# Patient Record
Sex: MALE | Race: WHITE | NOT HISPANIC OR LATINO | Employment: OTHER | URBAN - METROPOLITAN AREA
[De-identification: names, ages, dates, MRNs, and addresses within clinical notes are randomized per-mention and may not be internally consistent; named-entity substitution may affect disease eponyms.]

---

## 2017-01-04 ENCOUNTER — PATIENT MESSAGE (OUTPATIENT)
Dept: ENDOCRINOLOGY | Facility: MEDICAL CENTER | Age: 72
End: 2017-01-04

## 2017-01-04 DIAGNOSIS — E11.9 TYPE 2 DIABETES MELLITUS WITHOUT COMPLICATION, UNSPECIFIED LONG TERM INSULIN USE STATUS: ICD-10-CM

## 2017-01-20 ENCOUNTER — OFFICE VISIT (OUTPATIENT)
Dept: MEDICAL GROUP | Facility: PHYSICIAN GROUP | Age: 72
End: 2017-01-20
Payer: MEDICARE

## 2017-01-20 VITALS
DIASTOLIC BLOOD PRESSURE: 56 MMHG | BODY MASS INDEX: 37.77 KG/M2 | SYSTOLIC BLOOD PRESSURE: 112 MMHG | RESPIRATION RATE: 12 BRPM | HEART RATE: 76 BPM | WEIGHT: 249.2 LBS | HEIGHT: 68 IN | TEMPERATURE: 99.2 F | OXYGEN SATURATION: 94 %

## 2017-01-20 DIAGNOSIS — I10 ESSENTIAL HYPERTENSION, BENIGN: ICD-10-CM

## 2017-01-20 DIAGNOSIS — M72.0 DUPUYTREN'S CONTRACTURE SYNDROME: ICD-10-CM

## 2017-01-20 PROCEDURE — 99214 OFFICE O/P EST MOD 30 MIN: CPT | Performed by: INTERNAL MEDICINE

## 2017-01-20 ASSESSMENT — PATIENT HEALTH QUESTIONNAIRE - PHQ9: CLINICAL INTERPRETATION OF PHQ2 SCORE: 0

## 2017-01-20 NOTE — MR AVS SNAPSHOT
"        Dakota Boyer   2017 4:45 PM   Office Visit   MRN: [id number]    Department:  [facility]   Dept Phone:  [phone]    Description:  Male : 1945   Provider:  Randy Brar D.O.           Reason for Visit     Follow-Up BP Medicien, new left hand issue      Allergies as of 2017     Allergen Noted Reactions    Aleve Cold & [Pseudoephedrine-Naproxen Na] 2013   Anaphylaxis    Ceftriaxone Sodium 2013   Anaphylaxis    Reaction; 1970's.    Naproxen 2013   Anaphylaxis    Reaction; 2004.    Tape 10/18/2016   Rash, Itching    Plastic tape (paper tape ok)      You were diagnosed with     Essential hypertension, benign   [401.1.ICD-9-CM]       Dupuytren's contracture syndrome   [735252]         Vital Signs     Blood Pressure Pulse Temperature Respirations Height Weight    112/56 mmHg 76 37.3 °C (99.2 °F) 12 1.727 m (5' 8\") 113.036 kg (249 lb 3.2 oz)    Body Mass Index Oxygen Saturation Smoking Status             37.90 kg/m2 94% Former Smoker         Basic Information     Date Of Birth Sex Race Ethnicity Preferred Language    1945 Male White Non- English      Your appointments     2017  8:40 AM   Diabetes Care Visit with Isabela Singletary M.D., ENDOCRINOLOGY DIABETES RN   [facility]    [address] [facility] [address], [address]  [place] [address] [phone]           You will be receiving a confirmation call a few days before your appointment from our automated call confirmation system.            2017 10:15 AM   PACER CHECK ONLY with PACER CHECK-[facility]   [facility] (--)    [address], [address]  [place] [address] [phone]            2017 10:45 AM   FOLLOW UP with Jac Christiansen M.D.   [facility] (--)    [address], [address]  [place] [address] [phone]              Problem List             " ICD-10-CM Priority Class Noted - Resolved    Type 2 diabetes mellitus without complication (CMS-[facility]) E11.9 Medium  4/10/2012 - Present    Back pain, chronic M54.9, G89.29 Low  4/10/2012 - Present    Essential hypertension, benign I10 High  4/10/2012 - Present    Neck pain M54.2 Low  4/10/2012 - Present    Insomnia G47.00 Low  4/10/2012 - Present    Dyslipidemia E78.5 High  9/28/2012 - Present    RBBB I45.10 High  9/28/2012 - Present    Hypersensitive carotid sinus syndrome G90.01 High  10/2/2012 - Present    Sick sinus syndrome (CMS-HCC) I49.5 High  10/4/2012 - Present    Pacemaker Z95.0 High  10/4/2012 - Present    Degeneration of lumbar or lumbosacral intervertebral disc M51.37 Low  11/20/2012 - Present    S/P lumbar discectomy Z98.890 Low  11/21/2012 - Present    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 Low  2/27/2013 - Present    Coronary artery disease due to calcified coronary lesion: 40-50% disease in the circumflex at cardiac catheterization in 2013 I25.10, I25.84 High  8/16/2013 - Present    Fatigue R53.83 Medium  9/10/2013 - Present    Shortness of breath R06.02 High  9/10/2013 - Present    Other dyspnea and respiratory abnormality R06.09, R09.89   10/17/2013 - Present    History of diverticulitis Z87.19 High  11/13/2013 - Present    Hypothyroidism E03.9   8/21/2014 - Present    Obesity E66.9   8/21/2014 - Present    Depression with anxiety F41.8   3/28/2016 - Present    Obstructive sleep apnea on CPAP G47.33   3/28/2016 - Present    Non-proliferative diabetic retinopathy, mild, both eyes ([facility]) E11.3293   6/2/2016 - Present    Hypertensive retinopathy of both eyes H35.033   6/2/2016 - Present    Cervical spondylosis M47.812   10/19/2016 - Present      Health Maintenance        Date Due Completion Dates    IMM DTaP/Tdap/Td Vaccine (1 - Tdap) 3/29/2008 3/28/2008    DIABETES MONOFILAMENT / LE EXAM 8/21/2015 8/21/2014, 10/11/2013 (Done)    Override on 10/11/2013: Done (seen by podiatry q 3  months)    A1C SCREENING 9/30/2016 3/30/2016, 9/29/2015, 5/19/2015, 3/3/2015, 12/2/2014, 7/22/2014, 3/5/2014, 2/10/2014, 12/5/2012, 5/1/2012    RETINAL SCREENING 1/6/2017 1/6/2016, 1/6/2016 (Done), 1/7/2015, 1/7/2015, 1/9/2014    Override on 1/6/2016: Done    URINE ACR / MICROALBUMIN 3/30/2017 3/30/2016, 7/22/2014, 3/5/2014, 2/10/2014, 5/1/2012    FASTING LIPID PROFILE 6/13/2017 6/13/2016, 12/3/2015, 5/19/2015, 12/2/2014, 7/22/2014, 8/9/2013, 12/5/2012, 10/1/2012, 5/1/2012    SERUM CREATININE 6/13/2017 6/13/2016, 3/30/2016, 12/3/2015, 9/29/2015, 5/19/2015, 5/19/2015, 12/2/2014, 11/14/2013, 11/13/2013, 10/15/2013, 8/9/2013, 4/10/2013, 3/1/2013, 2/28/2013, 2/19/2013, 12/5/2012, 11/12/2012, 10/1/2012, 9/24/2012    COLONOSCOPY 12/18/2018 12/18/2013            Current Immunizations     13-VALENT PCV PREVNAR 3/28/2016    Influenza TIV (IM) 11/13/2013  3:04 PM, 10/4/2012  4:58 PM    Influenza Vaccine Adult HD 10/28/2016    Pneumococcal polysaccharide vaccine (PPSV-23) 10/4/2012  5:00 PM    SHINGLES VACCINE 3/28/2014    TD Vaccine 3/28/2008      Below and/or attached are the medications your provider expects you to take. Review all of your home medications and newly ordered medications with your provider and/or pharmacist. Follow medication instructions as directed by your provider and/or pharmacist. Please keep your medication list with you and share with your provider. Update the information when medications are discontinued, doses are changed, or new medications (including over-the-counter products) are added; and carry medication information at all times in the event of emergency situations     Allergies:  ALEVE COLD & - Anaphylaxis     CEFTRIAXONE SODIUM - Anaphylaxis     NAPROXEN - Anaphylaxis     TAPE - Rash,Itching               Medications  Valid as of: January 20, 2017 -  5:28 PM    Generic Name Brand Name Tablet Size Instructions for use    Albiglutide (Pen-injector) Albiglutide 30 MG Inject 1 PEN as instructed  every 7 days.        Aspirin (Tablet Delayed Response) ECOTRIN 81 MG Take 81 mg by mouth every day.        Atorvastatin Calcium (Tab) LIPITOR 20 MG Take 1 Tab by mouth every day.        Blood Glucose Monitoring Suppl (Device) Blood Glucose Monitoring Suppl  Meter: Dispense Abbott Freestyle Lite meter. Sig. Use as directed for blood sugar monitoring. #1. NR.        Blood Glucose Monitoring Suppl (Misc) Blood Glucose Monitoring Suppl SUPPLIES Test strips order: Test strips for Abbott Freestyle Lite meter. Sig: use 4 times daily and prn ssx high or low sugar #100 RF x 0        BuPROPion HCl (TABLET SR 24 HR) WELLBUTRIN  MG TAKE 1 TABLET EVERY MORNING        Glucose Blood (Strip) glucose blood  1 Each by Other route as needed.        Insulin Glargine (Solution Pen-injector) LANTUS 100 UNIT/ML INJECT 30 UNITS twice a day        Insulin Pen Needle (Misc) B-D ULTRAFINE III SHORT PEN 31G X 8 MM USE 1 NEEDLE EVERY DAY WITH LANTUS        Insulin Pen Needle (Misc) B-D ULTRAFINE III SHORT PEN 31G X 8 MM USE 1 PEN NEEDLE TWICE A DAY        Lancets (Misc) Lancets  Lancets order: Lancets for Abbott Freestyle Lite meter. Sig: use 4 times daily  and prn ssx high or low sugar. #100 RF x 0        Lancets (Misc) FREESTYLE LANCETS  by Does not apply route.        Levothyroxine Sodium (Tab) SYNTHROID 75 MCG TAKE 1 TABLET EVERY DAY        MetFORMIN HCl (Tab) GLUCOPHAGE 500 MG Take 2 Tabs by mouth 2 times a day, with meals.        Metoprolol Tartrate (Tab) LOPRESSOR 25 MG Take 1 Tab by mouth 2 times a day.        Valsartan (Tab) DIOVAN 80 MG TAKE 1 TABLET DAILY        .                 Medicines prescribed today were sent to:     [facility] - [facility] [address]    [address] [address]    Phone: [phone] Fax: [phone]    Open 24 Hours?: No    [facility] [address] [place] [address] - [address] [address]    Phone: [phone] Fax:  [phone]    Open 24 Hours?: No      Medication refill instructions:       If your prescription bottle indicates you have medication refills left, it is not necessary to call your provider’s office. Please contact your pharmacy and they will refill your medication.    If your prescription bottle indicates you do not have any refills left, you may request refills at any time through one of the following ways: The online [employer] system (except Urgent Care), by calling your provider’s office, or by asking your pharmacy to contact your provider’s office with a refill request. Medication refills are processed only during regular business hours and may not be available until the next business day. Your provider may request additional information or to have a follow-up visit with you prior to refilling your medication.   *Please Note: Medication refills are assigned a new Rx number when refilled electronically. Your pharmacy may indicate that no refills were authorized even though a new prescription for the same medication is available at the pharmacy. Please request the medicine by name with the pharmacy before contacting your provider for a refill.        Referral     A referral request has been sent to our patient care coordination department. Please allow 3-5 business days for us to process this request and contact you either by phone or mail. If you do not hear from us by the 5th business day, please call us at [phone].        Instructions    Dupuytren's Contracture  Dupuytren's contracture affects the fingers and the palm of the hand. This condition usually develops slowly. It may take many years to develop. The pinky finger and the ring finger are most often affected. These fingers start to curve inward, like a claw. At some point, the fingers cannot go straight anymore. This can make it hard to do things like:  · Put on gloves.  · Shake hands.  · Grab something off a shelf.  The condition usually does not  cause pain and is not dangerous.  The condition gets its name from the doctor who came up with an operation to fix the problem. His name was Baron Daniel Leung. Contracture means pulling inward.  CAUSES   Dupuytren's contracture does not start with the fingers. It starts in the palm of the hand, under the skin. The tissue under the skin is called fascia. The fascia covers the cords (tendons) that control how the fingers move. In Dupuytren's contracture the fascia tissue becomes thick and then pulls on the cords. That causes the fingers to curl. The condition can affect both hands and any fingers, but it usually strikes one hand worse than the other. The fingers farthest from the thumb are most often the ones that curl. The cause is not clear. Some experts believe it results from an autoimmune reaction. That means the body's immune system (which fights off disease) attacks itself by mistake. What experts do know is that certain conditions and behaviors (called risk factors) make the chance of having this condition more likely. They include:  · Age. Most people who have the condition are older than 50.  · Sex. It affects men more often than women.  · Family history. The condition tends to run in families from countries in [place] and [place].  · Certain behaviors. People who smoke and drink alcohol are more apt to develop the problem.  · Some other medical conditions. Having diabetes makes Dupuytren's contracture more likely. So does having a condition that involves a seizure (when the brain's function is interrupted).  SYMPTOMS   Signs of this condition take time to develop. Sometimes this takes weeks or months. More often, it takes several years.   · Early symptoms:  ¨ Skin on the palm of the hand becomes thick. This is usually the first sign.  ¨ The skin may look dimpled or puckered.  ¨ Lumps (nodules) show up on the palm. There may be one or more lumps. They are not painful.  · Later  symptoms:  ¨ Thick cords of tissue form in the palm of the hand.  ¨ The pinky and ring fingers start to curl up into the palm.  ¨ The fingers cannot be straightened into their normal position.  DIAGNOSIS   A physical examination is the main way that a healthcare provider can tell if you have Dupuytren's contracture. Other tests usually are not needed. The caregiver will probably:  · Look at your hands. Feel your hands. This is to check for thickening and nodules.  · Measure finger motion. This tells how much your fingers have contracted (pulled in).  · Do a tabletop test. You will be asked to try to put your hand flat on a table, palm down.  TREATMENT   There is no cure for Dupuytren's contracture. But there are ways to treat the symptoms. Options include:  · Watching and waiting. The condition develops slowly. Often it does not create problems for a long time. Sometimes the skin gets thick and nodules form, but the fingers never curl. So, in some cases it is best to just watch the condition carefully and wait to see what happens.  · Shots (injections). Different substances may be injected, including:  ¨ Steroids. These drugs block swelling. These shots should make the condition less uncomfortable. Steroids may also slow down the condition. Shots are given into the nodules. The effect only lasts awhile. More shots may have to be given.  ¨ Enzymes. These are proteins. They weaken the thick tissue. After an injection, the caregiver usually stretches the fingers.  · Needling. A needle is pushed through the skin and into the thick tissue. This is done in several spots. The goal is to break up the thickened tissue. Or to weaken it.  · Surgery. This may be suggested if you cannot grasp objects. Or, if you can no longer put your hand in your pocket.  ¨ A cut (incision) is made in the palm of the hand. The thick tissue is removed.  ¨ Sometimes the thick tissue is attached to the skin. Then, the skin must be removed, too. It  is replaced with a piece of skin from another place on your body. That is called a skin graft.  ¨ Occupational or hand therapy is almost always needed after surgery. This involves special exercises to get back the use of your hand and fingers. After a skin graft, several months of therapy may be needed.  ¨ Sometimes the condition comes back, even after surgery.  · Other methods. You can do some things on your own. They include:  ¨ Stretching the fingers backwards. Do this often.  ¨ Warming the hand and massaging it. Again, do this often.  ¨ Using tools with padded . This should make things easier.  ¨ Wearing heavy gloves while working. This protects the hands.  PROGNOSIS   Dupuytren's contracture usually develops slowly. There is no cure. But, the symptoms can be treated. Sometimes they come back after treatment, but not always. It is important to remember that this is a functional problem and not a life-threatening condition.     This information is not intended to replace advice given to you by your health care provider. Make sure you discuss any questions you have with your health care provider.     Document Released: 10/15/2010 Document Revised: 01/08/2016 Document Reviewed: 10/15/2010  [employer] Interactive Patient Education ©2016 [employer]vier Inc.            [employer] Access Code: Activation code not generated  Current [employer] Status: Active

## 2017-01-21 NOTE — PROGRESS NOTES
Subjective:   Dakota Boyer is a 71 y.o. male here today for multiple problems as listed below.     Patient complains of a hard lump he noticed on his hand that occurred about 6 months ago. It started out smaller in as gotten slightly larger. No history of injury or traumatic event. The patient has in the middle of the hand on the palmar surface between the second and third finger tendons. It is painful when there is pressure over the lump but otherwise not causing any discomfort. Does have some generalized chronic paresthesias in the hand which are unchanged. Has not affected his  strength or ability to grasp objects. Reports normal range of motion. Patient is right handed. Reports his brother had similar lumps on both of this hands. He has not had this problem before. Has not tried any thing for the pain when it occurs but it really doesn't bother him unless he is sleeping on his stomach in its one hand on top of the other which causes increased pressure in the lump.    Essential hypertension, benign  Chronic condition. Managed on metoprolol 25 mg twice a day and Diovan 80 mg daily. Compliant with medications. Denies side effects. Taking medications as directed daily and is taking a baby aspirin. Refills were done for cardiologist recently.       Lab Results   Component Value Date/Time    SODIUM 137 06/13/2016 06:34 AM    POTASSIUM 4.5 06/13/2016 06:34 AM    CHLORIDE 103 06/13/2016 06:34 AM    CO2 26 06/13/2016 06:34 AM    GLUCOSE 137* 06/13/2016 06:34 AM    BUN 14 06/13/2016 06:34 AM    CREATININE 0.88 06/13/2016 06:34 AM    ALKALINE PHOSPHATASE 69 06/13/2016 06:34 AM    AST(SGOT) 19 06/13/2016 06:34 AM    ALT(SGPT) 20 06/13/2016 06:34 AM    TOTAL BILIRUBIN 1.2 06/13/2016 06:34 AM     Current medicines (including changes today)  Current Outpatient Prescriptions   Medication Sig Dispense Refill   • insulin glargine (LANTUS SOLOSTAR) 100 UNIT/ML Solution Pen-injector injection INJECT 30 UNITS twice a  day 15 PEN 2   • atorvastatin (LIPITOR) 20 MG Tab Take 1 Tab by mouth every day. 90 Tab 3   • buPROPion (WELLBUTRIN XL) 150 MG XL tablet TAKE 1 TABLET EVERY MORNING 90 Tab 0   • valsartan (DIOVAN) 80 MG Tab TAKE 1 TABLET DAILY 90 Tab 1   • B-D ULTRAFINE III SHORT PEN 31G X 8 MM Misc USE 1 PEN NEEDLE TWICE A  Each 5   • Blood Glucose Monitoring Suppl SUPPLIES Misc Test strips order: Test strips for Abbott Freestyle Lite meter. Sig: use 4 times daily and prn ssx high or low sugar #100 RF x 0 360 Each 4   • metformin (GLUCOPHAGE) 500 MG Tab Take 2 Tabs by mouth 2 times a day, with meals. 360 Tab 3   • Albiglutide (TANZEUM) 30 MG Pen-injector Inject 1 PEN as instructed every 7 days. 12 Each 3   • FREESTYLE LANCETS Misc by Does not apply route.     • glucose blood strip 1 Each by Other route as needed.     • metoprolol (LOPRESSOR) 25 MG Tab Take 1 Tab by mouth 2 times a day. 180 Tab 3   • levothyroxine (SYNTHROID) 75 MCG TABS TAKE 1 TABLET EVERY DAY 90 Tab 4   • Lancets MISC Lancets order: Lancets for Abbott Freestyle Lite meter. Sig: use 4 times daily  and prn ssx high or low sugar. #100 RF x 0 360 Each 4   • Blood Glucose Monitoring Suppl [place] Meter: Dispense Abbott Freestyle Lite meter. Sig. Use as directed for blood sugar monitoring. #1. NR. 1 Device 0   • B-D ULTRAFINE III SHORT PEN 31G X 8 MM MISC USE 1 NEEDLE EVERY DAY WITH LANTUS 999 Each 2   • aspirin EC (ECOTRIN) 81 MG TBEC Take 81 mg by mouth every day.       No current facility-administered medications for this visit.     He  has a past medical history of Anxiety; Hypertension; Hyperlipidemia; RBBB ( ); Hypersensitive carotid sinus syndrome ( ); Syncope (October 2012); Diabetes; Depression; Snoring; CAD (coronary artery disease) (October 2012); Sick sinus syndrome (CMS-HCC) ( ); Pacemaker (October 2012); Episodic lightheadedness ( ); Incisional infection (April 2013); Insomnia ( ); Degeneration of lumbar or lumbosacral intervertebral disc ( ); S/P  "lumbar discectomy ( ); DJD (degenerative joint disease) of cervical spine ( ); Dental disorder; Breath shortness; Unspecified hemorrhagic conditions (CMS-HCC); Blood clotting disorder (CMS-HCC) (1978); Cancer (CMS-HCC) (? early 90's); High cholesterol; Myocardial infarct (CMS-HCC) (?); Sleep apnea; Thyroid disease; Back pain, chronic (10/18/2016); and Neck pain (10/18/2016).    ROS   As per history of present illness.     Objective:     Blood pressure 112/56, pulse 76, temperature 37.3 °C (99.2 °F), resp. rate 12, height 1.727 m (5' 8\"), weight 113.036 kg (249 lb 3.2 oz), SpO2 94 %. Body mass index is 37.9 kg/(m^2).   Physical Exam:  Alert, oriented in no acute distress.  Eye contact is good, speech goal directed, affect calm  HEENT: conjunctiva non-injected, sclera non-icteric.  Lungs: clear to auscultation bilaterally with good excursion.  CV: regular rate and rhythm.  Ext: no edema, color normal, vascularity normal, temperature normal  Left hand:   Visible discrete palpable nodule along course of flexor tendons near the distal palmar crease causing puckering of the skin, has full extension of MCP and PIP joints. Nodules tender to palpation.  Assessment and Plan:   The following treatment plan was discussed   1. Essential hypertension, benign     2. Dupuytren's contracture syndrome  REFERRAL TO ORTHOPEDICS     1. Well-controlled on current medications-continue valsartan and metoprolol. Repeat lab work due in June 2017.  2. Discussed benign and progressive course of this common disease. Discussed goals of treatment including increasing flexibility of fingers in evaluating the need for further treatment which could include steroid injections and or surgery. Referral done to orthopedics. Patient can use Tylenol when necessary for pain. Informational handout given.    Over 50% of this 20 minute visit was spent on counseling and coordination of care regarding Dupuytren's contracture and treatment options.    Followup: " Return in about 6 months (around 7/20/2017), or if symptoms worsen or fail to improve, for follow-up with PCP.         Please note that dictation has been dictated using voice recognition soft ware. I have made every reasonable attempt to correct obvious errors, but I expect that there are errors of grammar and possibly content that I did not discover before finalizing the note.

## 2017-01-21 NOTE — ASSESSMENT & PLAN NOTE
Chronic condition. Managed on metoprolol 25 mg twice a day and Diovan 80 mg daily. Compliant with medications. Denies side effects. Taking medications as directed daily and is taking a baby aspirin. Refills were done for cardiologist recently.

## 2017-01-21 NOTE — PATIENT INSTRUCTIONS
Dupuytren's Contracture  Dupuytren's contracture affects the fingers and the palm of the hand. This condition usually develops slowly. It may take many years to develop. The pinky finger and the ring finger are most often affected. These fingers start to curve inward, like a claw. At some point, the fingers cannot go straight anymore. This can make it hard to do things like:  · Put on gloves.  · Shake hands.  · Grab something off a shelf.  The condition usually does not cause pain and is not dangerous.  The condition gets its name from the doctor who came up with an operation to fix the problem. His name was Baron Sanchez Billjorge. Contracture means pulling inward.  CAUSES   Dupuytren's contracture does not start with the fingers. It starts in the palm of the hand, under the skin. The tissue under the skin is called fascia. The fascia covers the cords (tendons) that control how the fingers move. In Dupuytren's contracture the fascia tissue becomes thick and then pulls on the cords. That causes the fingers to curl. The condition can affect both hands and any fingers, but it usually strikes one hand worse than the other. The fingers farthest from the thumb are most often the ones that curl. The cause is not clear. Some experts believe it results from an autoimmune reaction. That means the body's immune system (which fights off disease) attacks itself by mistake. What experts do know is that certain conditions and behaviors (called risk factors) make the chance of having this condition more likely. They include:  · Age. Most people who have the condition are older than 50.  · Sex. It affects men more often than women.  · Family history. The condition tends to run in families from countries in [place] and [place].  · Certain behaviors. People who smoke and drink alcohol are more apt to develop the problem.  · Some other medical conditions. Having diabetes makes Dupuytren's contracture more likely. So does  having a condition that involves a seizure (when the brain's function is interrupted).  SYMPTOMS   Signs of this condition take time to develop. Sometimes this takes weeks or months. More often, it takes several years.   · Early symptoms:  ¨ Skin on the palm of the hand becomes thick. This is usually the first sign.  ¨ The skin may look dimpled or puckered.  ¨ Lumps (nodules) show up on the palm. There may be one or more lumps. They are not painful.  · Later symptoms:  ¨ Thick cords of tissue form in the palm of the hand.  ¨ The pinky and ring fingers start to curl up into the palm.  ¨ The fingers cannot be straightened into their normal position.  DIAGNOSIS   A physical examination is the main way that a healthcare provider can tell if you have Dupuytren's contracture. Other tests usually are not needed. The caregiver will probably:  · Look at your hands. Feel your hands. This is to check for thickening and nodules.  · Measure finger motion. This tells how much your fingers have contracted (pulled in).  · Do a tabletop test. You will be asked to try to put your hand flat on a table, palm down.  TREATMENT   There is no cure for Dupuytren's contracture. But there are ways to treat the symptoms. Options include:  · Watching and waiting. The condition develops slowly. Often it does not create problems for a long time. Sometimes the skin gets thick and nodules form, but the fingers never curl. So, in some cases it is best to just watch the condition carefully and wait to see what happens.  · Shots (injections). Different substances may be injected, including:  ¨ Steroids. These drugs block swelling. These shots should make the condition less uncomfortable. Steroids may also slow down the condition. Shots are given into the nodules. The effect only lasts awhile. More shots may have to be given.  ¨ Enzymes. These are proteins. They weaken the thick tissue. After an injection, the caregiver usually stretches the  fingers.  · Needling. A needle is pushed through the skin and into the thick tissue. This is done in several spots. The goal is to break up the thickened tissue. Or to weaken it.  · Surgery. This may be suggested if you cannot grasp objects. Or, if you can no longer put your hand in your pocket.  ¨ A cut (incision) is made in the palm of the hand. The thick tissue is removed.  ¨ Sometimes the thick tissue is attached to the skin. Then, the skin must be removed, too. It is replaced with a piece of skin from another place on your body. That is called a skin graft.  ¨ Occupational or hand therapy is almost always needed after surgery. This involves special exercises to get back the use of your hand and fingers. After a skin graft, several months of therapy may be needed.  ¨ Sometimes the condition comes back, even after surgery.  · Other methods. You can do some things on your own. They include:  ¨ Stretching the fingers backwards. Do this often.  ¨ Warming the hand and massaging it. Again, do this often.  ¨ Using tools with padded . This should make things easier.  ¨ Wearing heavy gloves while working. This protects the hands.  PROGNOSIS   Dupuytren's contracture usually develops slowly. There is no cure. But, the symptoms can be treated. Sometimes they come back after treatment, but not always. It is important to remember that this is a functional problem and not a life-threatening condition.     This information is not intended to replace advice given to you by your health care provider. Make sure you discuss any questions you have with your health care provider.     Document Released: 10/15/2010 Document Revised: 01/08/2016 Document Reviewed: 10/15/2010  Elsevier Interactive Patient Education ©2016 [employer]

## 2017-01-24 ENCOUNTER — PATIENT OUTREACH (OUTPATIENT)
Dept: HEALTH INFORMATION MANAGEMENT | Facility: OTHER | Age: 72
End: 2017-01-24

## 2017-01-24 RX ORDER — FERROUS SULFATE 325(65) MG
325 TABLET ORAL
COMMUNITY
End: 2019-10-06

## 2017-01-24 RX ORDER — THIAMINE HCL 100 MG
TABLET ORAL DAILY
COMMUNITY
End: 2021-05-25

## 2017-01-24 NOTE — PROGRESS NOTES
Outbound call to Dakota for medication reconciliation.    Updated allergy and medication lists.    Patient demonstrates adherence to medication schedule and understanding of indications for medications.    Patient denies any side effects from his current medications.     Dakota has appropriate medication regimen for current disease states.   Last reported home BP reading 120/80. Last office /56. Patient reports fasting blood glucose readings of 115-133. He tests his blood sugars BID.     Dakota is being followed by pain management, Dr. Pruett. He states that he had an implantable stimulator placed in September to help control pain. Reports pain of 6/10 and that the stimulator is not really helping. He does not take any medication for pain. Encouraged patient to follow-up with pain management to report that it is not helping much.     Patient feels satisfied with medication regimen.    Patient can afford his medications.    Patient is up-to-date with his vaccinations. He has received his flu vaccination, prevnar-13, pneumovax 23, and shingles vaccinations.     Educated patient to separate his iron from the levothyroxine by at least 4 hours to prevent impaired absorption. Encouraged patient to take iron in the afternoon or evening time instead of the morning.     Avani Greenberg, PHARMD

## 2017-01-26 ENCOUNTER — OFFICE VISIT (OUTPATIENT)
Dept: ENDOCRINOLOGY | Facility: MEDICAL CENTER | Age: 72
End: 2017-01-26
Payer: MEDICARE

## 2017-01-26 VITALS
HEART RATE: 66 BPM | BODY MASS INDEX: 37.77 KG/M2 | DIASTOLIC BLOOD PRESSURE: 64 MMHG | HEIGHT: 68 IN | OXYGEN SATURATION: 94 % | WEIGHT: 249.2 LBS | SYSTOLIC BLOOD PRESSURE: 118 MMHG

## 2017-01-26 DIAGNOSIS — E03.9 ACQUIRED HYPOTHYROIDISM: ICD-10-CM

## 2017-01-26 DIAGNOSIS — I10 ESSENTIAL HYPERTENSION: ICD-10-CM

## 2017-01-26 DIAGNOSIS — E78.2 MIXED HYPERLIPIDEMIA: ICD-10-CM

## 2017-01-26 DIAGNOSIS — Z79.4 TYPE 2 DIABETES MELLITUS WITHOUT COMPLICATION, WITH LONG-TERM CURRENT USE OF INSULIN (HCC): ICD-10-CM

## 2017-01-26 DIAGNOSIS — E11.9 TYPE 2 DIABETES MELLITUS WITHOUT COMPLICATION, WITH LONG-TERM CURRENT USE OF INSULIN (HCC): ICD-10-CM

## 2017-01-26 LAB
HBA1C MFR BLD: 5.7 % (ref ?–5.8)
INT CON NEG: NORMAL
INT CON POS: NORMAL

## 2017-01-26 PROCEDURE — G8598 ASA/ANTIPLAT THER USED: HCPCS | Performed by: INTERNAL MEDICINE

## 2017-01-26 PROCEDURE — 83036 HEMOGLOBIN GLYCOSYLATED A1C: CPT | Performed by: INTERNAL MEDICINE

## 2017-01-26 PROCEDURE — 3017F COLORECTAL CA SCREEN DOC REV: CPT | Performed by: INTERNAL MEDICINE

## 2017-01-26 PROCEDURE — 99214 OFFICE O/P EST MOD 30 MIN: CPT | Mod: 25 | Performed by: INTERNAL MEDICINE

## 2017-01-26 PROCEDURE — 1101F PT FALLS ASSESS-DOCD LE1/YR: CPT | Performed by: INTERNAL MEDICINE

## 2017-01-26 PROCEDURE — 3044F HG A1C LEVEL LT 7.0%: CPT | Performed by: INTERNAL MEDICINE

## 2017-01-26 PROCEDURE — G8482 FLU IMMUNIZE ORDER/ADMIN: HCPCS | Performed by: INTERNAL MEDICINE

## 2017-01-26 PROCEDURE — 1036F TOBACCO NON-USER: CPT | Performed by: INTERNAL MEDICINE

## 2017-01-26 PROCEDURE — G8419 CALC BMI OUT NRM PARAM NOF/U: HCPCS | Performed by: INTERNAL MEDICINE

## 2017-01-26 PROCEDURE — 4040F PNEUMOC VAC/ADMIN/RCVD: CPT | Performed by: INTERNAL MEDICINE

## 2017-01-26 NOTE — PROGRESS NOTES
Endocrinology Clinic Progress Note  PCP: Randy Brar D.O.    CC: Diabetes    HPI:  Dakota Boyer is a 71 y.o. old patient who comes in today for routine follow up.     Type 2 diabetes: He is currently on Lantus 30 units twice a day, Tanzeum 30 mg once a week and metformin 1000 mg twice a day. He reports compliance with medications. Checks blood sugars 1-2 times a day, most blood sugar readings are in the range of . No hypoglycemic episode in the past 4 months. Next eye exam is scheduled for February 2017, no history of diabetic retinopathy. He denies numbness or tingling in feet.     Hypertension: Blood pressure is well controlled. He is on ARB. Reports compliance with medications.    Hyperlipidemia: LDL is at goal. He is on Lipitor, tolerating well.    Hypothyroidism: He is currently on levothyroxine 75 µg daily. Energy levels are good.     ROS:  Constitutional: No unintentional weight loss  Endo: Denies excessive thirst or frequent urination    Past Medical History:  Patient Active Problem List    Diagnosis Date Noted   • History of diverticulitis 11/13/2013     Priority: High   • Shortness of breath 09/10/2013     Priority: High   • Coronary artery disease due to calcified coronary lesion: 40-50% disease in the circumflex at cardiac catheterization in 2013 08/16/2013     Priority: High   • Sick sinus syndrome (CMS-[facility]) 10/04/2012     Priority: High   • Pacemaker 10/04/2012     Priority: High   • Hypersensitive carotid sinus syndrome 10/02/2012     Priority: High   • Dyslipidemia 09/28/2012     Priority: High   • RBBB 09/28/2012     Priority: High   • Essential hypertension, benign 04/10/2012     Priority: High   • Fatigue 09/10/2013     Priority: Medium   • Type 2 diabetes mellitus without complication (CMS-HCC) 04/10/2012     Priority: Medium   • DJD (degenerative joint disease) of cervical spine 02/27/2013     Priority: Low   • S/P lumbar discectomy 11/21/2012     Priority: Low   •  Degeneration of lumbar or lumbosacral intervertebral disc 11/20/2012     Priority: Low   • Back pain, chronic 04/10/2012     Priority: Low   • Neck pain 04/10/2012     Priority: Low   • Insomnia 04/10/2012     Priority: Low   • Cervical spondylosis 10/19/2016   • Non-proliferative diabetic retinopathy, mild, both eyes (HCC) 06/02/2016   • Hypertensive retinopathy of both eyes 06/02/2016   • Depression with anxiety 03/28/2016   • Obstructive sleep apnea on CPAP 03/28/2016   • Hypothyroidism 08/21/2014   • Obesity 08/21/2014   • Other dyspnea and respiratory abnormality 10/17/2013       Medications:    Current outpatient prescriptions:   •  ferrous sulfate 325 (65 FE) MG tablet, Take 325 mg by mouth every 48 hours., Disp: , Rfl:   •  Cyanocobalamin (VITAMIN B-12) 2500 MCG SL Tab, Place  under tongue every day., Disp: , Rfl:   •  insulin glargine (LANTUS SOLOSTAR) 100 UNIT/ML Solution Pen-injector injection, INJECT 30 UNITS twice a day, Disp: 15 PEN, Rfl: 2  •  atorvastatin (LIPITOR) 20 MG Tab, Take 1 Tab by mouth every day., Disp: 90 Tab, Rfl: 3  •  buPROPion (WELLBUTRIN XL) 150 MG XL tablet, TAKE 1 TABLET EVERY MORNING, Disp: 90 Tab, Rfl: 0  •  valsartan (DIOVAN) 80 MG Tab, TAKE 1 TABLET DAILY, Disp: 90 Tab, Rfl: 1  •  metformin (GLUCOPHAGE) 500 MG Tab, Take 2 Tabs by mouth 2 times a day, with meals., Disp: 360 Tab, Rfl: 3  •  Albiglutide (TANZEUM) 30 MG Pen-injector, Inject 1 PEN as instructed every 7 days., Disp: 12 Each, Rfl: 3  •  metoprolol (LOPRESSOR) 25 MG Tab, Take 1 Tab by mouth 2 times a day., Disp: 180 Tab, Rfl: 3  •  levothyroxine (SYNTHROID) 75 MCG TABS, TAKE 1 TABLET EVERY DAY, Disp: 90 Tab, Rfl: 4  •  aspirin EC (ECOTRIN) 81 MG TBEC, Take 81 mg by mouth every day., Disp: , Rfl:   •  B-D ULTRAFINE III SHORT PEN 31G X 8 MM Misc, USE 1 PEN NEEDLE TWICE A DAY, Disp: 200 Each, Rfl: 5  •  Blood Glucose Monitoring Suppl SUPPLIES Misc, Test strips order: Test strips for Abbott Freestyle Lite meter. Sig: use 4  times daily and prn ssx high or low sugar #100 RF x 0, Disp: 360 Each, Rfl: 4  •  FREESTYLE LANCETS Misc, by Does not apply route., Disp: , Rfl:   •  glucose blood strip, 1 Each by Other route as needed., Disp: , Rfl:   •  Lancets MISC, Lancets order: Lancets for Abbott Freestyle Lite meter. Sig: use 4 times daily  and prn ssx high or low sugar. #100 RF x 0, Disp: 360 Each, Rfl: 4  •  Blood Glucose Monitoring Suppl [place], Meter: Dispense Abbott Freestyle Lite meter. Sig. Use as directed for blood sugar monitoring. #1. NR., Disp: 1 Device, Rfl: 0  •  B-D ULTRAFINE III SHORT PEN 31G X 8 MM MISC, USE 1 NEEDLE EVERY DAY WITH LANTUS, Disp: 999 Each, Rfl: 2    Labs:  Results for TON POWER (MRN [id number]) as of 1/26/2017 08:39   Ref. Range 3/30/2016 07:02 6/13/2016 06:34   Sodium Latest Ref Range: 135-145 mmol/L  137   Potassium Latest Ref Range: 3.6-5.5 mmol/L  4.5   Chloride Latest Ref Range:  mmol/L  103   Co2 Latest Ref Range: 20-33 mmol/L  26   Anion Gap Latest Ref Range: 0.0-11.9   8.0   Glucose Latest Ref Range: 65-99 mg/dL  137 (H)   Bun Latest Ref Range: 8-22 mg/dL  14   Creatinine Latest Ref Range: 0.50-1.40 mg/dL  0.88   GFR If  Latest Ref Range: >60 mL/min/1.73 m 2  >60   GFR If Non  Latest Ref Range: >60 mL/min/1.73 m 2  >60   Calcium Latest Ref Range: 8.5-10.5 mg/dL  9.8   AST(SGOT) Latest Ref Range: 12-45 U/L  19   ALT(SGPT) Latest Ref Range: 2-50 U/L  20   Alkaline Phosphatase Latest Ref Range: 30-99 U/L  69   Total Bilirubin Latest Ref Range: 0.1-1.5 mg/dL  1.2   Albumin Latest Ref Range: 3.2-4.9 g/dL  4.0   Total Protein Latest Ref Range: 6.0-8.2 g/dL  6.6   Globulin Latest Ref Range: 1.9-3.5 g/dL  2.6   A-G Ratio Latest Units: g/dL  1.5   Cholesterol,Tot Latest Ref Range: 100-199 mg/dL  95 (L)   Triglycerides Latest Ref Range: 0-149 mg/dL  116   HDL Latest Ref Range: >=40 mg/dL  34 (A)   LDL Latest Ref Range: <100 mg/dL  38   B Natriuretic Peptide Latest  "Ref Range: 0-100 pg/mL  4   Creatinine, Random Urine Latest Ref Range: 800-2100 mg/d Not Applicable    Micro Alb Creat Ratio Latest Ref Range: 0-30 mg/g 6    Total Volume Latest Units: mL random    Microalbumin, Urine Random Latest Units: mg/dL 1.3    Creatinine Urine Latest Units: mg/dL 222        Physical Examination:  Vital signs: /64 mmHg  Pulse 66  Ht 1.727 m (5' 8\")  Wt 113.036 kg (249 lb 3.2 oz)  BMI 37.90 kg/m2  SpO2 94%  General: No apparent distress, cooperative  Eyes: No scleral icterus, no discharge   Resp: Normal effort, clear to auscultation bilaterally  CVS: Regular rate and rhythm, S1 S2 normal, no murmur  Extremities: No edema  Skin: No rash  Psych: Alert and oriented, normal mood and affect  Feet: Normal sensation to monafilament testing, skin intact    Assessment and Plan:    Type 2 diabetes mellitus without complication  · Hemoglobin A1c today in the clinic is 5.7%  · Goal hemoglobin A1c less than 7%  · Continue Lantus 30 units twice a day, Tanzeum 30 mg once a week, metformin 1000 mg twice a day; no changes to medications today  · Labs: urine microalb cr ratio    Essential hypertension  · Blood pressure is well controlled  · Continue ARB    Mixed hyperlipidemia  · LDL is at goal  · Continue Lipitor    Acquired hypothyroidism  · Repeat TSH  · Continue with levothyroxine 75 µg daily    Return to clinic in 4 months    Thank you for allowing me to participate in the care of this patient.    Isabela Singletary M.D.  09/27/2016    CC:   Randy Brar D.O.    This note was created using voice recognition software (Dragon). The accuracy of the dictation is limited by the abilities of the software. I have reviewed the note prior to signing, however some errors in grammar and context are still possible. If you have any questions related to this note please do not hesitate to contact our office.     "

## 2017-01-26 NOTE — PROGRESS NOTES
Patient with existing type diabetes:  Type 2 diabetes well controlled here for follow up      Patient's health status since last visit: no change.   Issues with diabetes since last visit none  Current Diabetes Medications: Lantus 30 units bid, Tanzeum  30 mg weekly and Metformin 1000 mg bid.     HbA1c: @hba1c@  Lab Results   Component Value Date/Time    GLYCOHEMOGLOBIN 5.7 01/26/2017 09:03 AM        FSBS  Testing: testing blood sugars 2 times per day (alternating breakfast/dinner and then lunch/hs)  Forgot to bring blood sugar logs with him.      Hypoglycemia: no lows.   Exercise: none    Retinal Exam:has appointment on 2/2/17    Daily Foot Exam: checks feet daily, denies problems.  Foot Exam:  Monofilament: done  Monofilament testing with a 10 gram force: sensation intact: intact bilaterally  Visual Inspection: Feet without maceration, ulcers, fissures.  Pedal pulses: intact bilaterally      Routine Dental Exams: current.   Flu vaccine: current.  Pneumonia vaccine current.

## 2017-02-27 RX ORDER — BUPROPION HYDROCHLORIDE 150 MG/1
TABLET ORAL
Qty: 90 TAB | Refills: 0 | Status: SHIPPED | OUTPATIENT
Start: 2017-02-27 | End: 2017-05-27 | Stop reason: SDUPTHER

## 2017-02-27 NOTE — TELEPHONE ENCOUNTER
Was the patient seen in the last year in this department? Yes     Does patient have an active prescription for medications requested? No     Received Request Via: Pharmacy      Pt met protocol?: Yes, OV last month.

## 2017-03-29 ENCOUNTER — PATIENT MESSAGE (OUTPATIENT)
Dept: ENDOCRINOLOGY | Facility: MEDICAL CENTER | Age: 72
End: 2017-03-29

## 2017-03-29 NOTE — TELEPHONE ENCOUNTER
From: Dakota Boyer  To: Isabela Singletary M.D.  Sent: 3/29/2017 10:25 AM PDT  Subject: Procedure Question    I am going to be out of town from 05/19/2017 to 06/03/2017.  I need to change my appointment for 05/25/2017 to another date after 06/03/2017.  Around 10:00 am if possible. Thank You.

## 2017-05-11 DIAGNOSIS — E03.9 UNSPECIFIED HYPOTHYROIDISM: ICD-10-CM

## 2017-05-11 RX ORDER — LEVOTHYROXINE SODIUM 0.07 MG/1
75 TABLET ORAL
Qty: 90 TAB | Refills: 3 | Status: SHIPPED | OUTPATIENT
Start: 2017-05-11 | End: 2018-02-13 | Stop reason: SDUPTHER

## 2017-05-11 NOTE — TELEPHONE ENCOUNTER
Refill done - please inform patient last thyroid labs were done over 1 year ago and order has been placed to recheck this. Thanks.

## 2017-05-29 RX ORDER — BUPROPION HYDROCHLORIDE 150 MG/1
150 TABLET ORAL EVERY MORNING
Qty: 90 TAB | Refills: 0 | Status: SHIPPED | OUTPATIENT
Start: 2017-05-29 | End: 2017-08-28 | Stop reason: SDUPTHER

## 2017-06-07 ENCOUNTER — OFFICE VISIT (OUTPATIENT)
Dept: ENDOCRINOLOGY | Facility: MEDICAL CENTER | Age: 72
End: 2017-06-07
Payer: MEDICARE

## 2017-06-07 VITALS
HEIGHT: 68 IN | OXYGEN SATURATION: 98 % | HEART RATE: 69 BPM | SYSTOLIC BLOOD PRESSURE: 90 MMHG | BODY MASS INDEX: 37.98 KG/M2 | WEIGHT: 250.6 LBS | DIASTOLIC BLOOD PRESSURE: 60 MMHG

## 2017-06-07 DIAGNOSIS — E11.9 TYPE 2 DIABETES MELLITUS WITHOUT COMPLICATION, WITH LONG-TERM CURRENT USE OF INSULIN (HCC): ICD-10-CM

## 2017-06-07 DIAGNOSIS — E03.9 ACQUIRED HYPOTHYROIDISM: ICD-10-CM

## 2017-06-07 DIAGNOSIS — E55.9 UNSPECIFIED VITAMIN D DEFICIENCY: ICD-10-CM

## 2017-06-07 DIAGNOSIS — Z79.4 TYPE 2 DIABETES MELLITUS WITHOUT COMPLICATION, WITH LONG-TERM CURRENT USE OF INSULIN (HCC): ICD-10-CM

## 2017-06-07 DIAGNOSIS — E78.5 DYSLIPIDEMIA: ICD-10-CM

## 2017-06-07 LAB
HBA1C MFR BLD: 5.9 % (ref ?–5.8)
INT CON NEG: NORMAL
INT CON POS: NORMAL

## 2017-06-07 PROCEDURE — 1101F PT FALLS ASSESS-DOCD LE1/YR: CPT | Mod: 8P | Performed by: INTERNAL MEDICINE

## 2017-06-07 PROCEDURE — 99214 OFFICE O/P EST MOD 30 MIN: CPT | Mod: 25 | Performed by: INTERNAL MEDICINE

## 2017-06-07 PROCEDURE — G8598 ASA/ANTIPLAT THER USED: HCPCS | Performed by: INTERNAL MEDICINE

## 2017-06-07 PROCEDURE — 83036 HEMOGLOBIN GLYCOSYLATED A1C: CPT | Performed by: INTERNAL MEDICINE

## 2017-06-07 PROCEDURE — 4040F PNEUMOC VAC/ADMIN/RCVD: CPT | Performed by: INTERNAL MEDICINE

## 2017-06-07 PROCEDURE — G8417 CALC BMI ABV UP PARAM F/U: HCPCS | Performed by: INTERNAL MEDICINE

## 2017-06-07 PROCEDURE — 1036F TOBACCO NON-USER: CPT | Performed by: INTERNAL MEDICINE

## 2017-06-07 PROCEDURE — 3044F HG A1C LEVEL LT 7.0%: CPT | Performed by: INTERNAL MEDICINE

## 2017-06-07 PROCEDURE — 3017F COLORECTAL CA SCREEN DOC REV: CPT | Performed by: INTERNAL MEDICINE

## 2017-06-07 PROCEDURE — G8432 DEP SCR NOT DOC, RNG: HCPCS | Performed by: INTERNAL MEDICINE

## 2017-06-07 NOTE — MR AVS SNAPSHOT
"Dakota Boyer   2017 10:00 AM   Office Visit   MRN: [id number]    Department:  Endocrinology [facility]   Dept Phone:  [phone]    Description:  Male : 1945   Provider:  Kayla Cook R.N.; Isabela Singletary M.D.           Reason for Visit     Diabetes Mellitus     Hypothyroidism           Allergies as of 2017     Allergen Noted Reactions    Aleve Cold & [Pseudoephedrine-Naproxen Na] 2013   Anaphylaxis    Ceftriaxone Sodium 2013   Anaphylaxis    Reaction; 1970's.    Naproxen 2013   Anaphylaxis    Reaction; 2004.    Tape 10/18/2016   Rash, Itching    Plastic tape (paper tape ok)      You were diagnosed with     Type 2 diabetes mellitus without complication, with long-term current use of insulin (CMS-[facility])   [5734937]       Dyslipidemia   [294228]       Acquired hypothyroidism   [1737386]       Unspecified vitamin D deficiency   [268.9.ICD-9-CM]         Vital Signs     Blood Pressure Pulse Height Weight Body Mass Index Oxygen Saturation    90/60 mmHg 69 1.727 m (5' 7.99\") 113.671 kg (250 lb 9.6 oz) 38.11 kg/m2 98%    Smoking Status                   Former Smoker           Basic Information     Date Of Birth Sex Race Ethnicity Preferred Language    1945 Male White Non- English      Your appointments     2017 10:15 AM   PACER CHECK ONLY with PACER CHECK-CAM B   [facility] (--)    [address], [facility] [address]  [place] [id number] [phone]            2017 10:45 AM   FOLLOW UP with Jac Christiansen M.D.   [facility] (--)    [address]  [place] [address]   [phone]            2017 10:20 AM   NEW TO YOU with Sofía Ruvalcaba D.O.   [facility])    [address] [place] [address], [address]  [place] [address]   [phone]            Dec 07, 2017  1:00 PM   Diabetes Care Visit with Isabela Singletary M.D., Kayla LIRA" GEOVANY Cook.   [facility] ([facility])    [address], [address]  [facility] [address] [phone]           You will be receiving a confirmation call a few days before your appointment from our automated call confirmation system.              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 High  10/2/2012 - Present    Sick sinus syndrome (CMS-HCC) I49.5 High  10/4/2012 - Present    Pacemaker Z95.0 High  10/4/2012 - Present    Degeneration of lumbar or lumbosacral intervertebral disc M51.37 Low  11/20/2012 - Present    S/P lumbar discectomy Z98.890 Low  11/21/2012 - Present    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 Low  2/27/2013 - Present    Coronary artery disease due to calcified coronary lesion: 40-50% disease in the circumflex at cardiac catheterization in 2013 I25.10, I25.84 High  8/16/2013 - Present    Fatigue R53.83 Medium  9/10/2013 - Present    Shortness of breath R06.02 High  9/10/2013 - Present    Other dyspnea and respiratory abnormality R06.09, R09.89   10/17/2013 - Present    History of diverticulitis Z87.19 High  11/13/2013 - Present    Hypothyroidism E03.9   8/21/2014 - Present    Obesity E66.9   8/21/2014 - Present    Depression with anxiety F41.8   3/28/2016 - Present    Obstructive sleep apnea on CPAP G47.33, Z99.89   3/28/2016 - Present    Non-proliferative diabetic retinopathy, mild, both eyes (CMS-HCC) E11.3293   6/2/2016 - Present    Hypertensive retinopathy of both eyes H35.033    6/2/2016 - Present    Cervical spondylosis M47.812   10/19/2016 - Present      Health Maintenance        Date Due Completion Dates    IMM DTaP/Tdap/Td Vaccine (1 - Tdap) 3/29/2008 3/28/2008    URINE ACR / MICROALBUMIN 3/30/2017 3/30/2016, 7/22/2014, 3/5/2014, 2/10/2014, 5/1/2012    FASTING LIPID PROFILE 6/13/2017 6/13/2016, 12/3/2015, 5/19/2015, 12/2/2014, 7/22/2014, 8/9/2013, 12/5/2012, 10/1/2012, 5/1/2012    SERUM CREATININE 6/13/2017 6/13/2016, 3/30/2016, 12/3/2015, 9/29/2015, 5/19/2015, 5/19/2015, 12/2/2014, 11/14/2013, 11/13/2013, 10/15/2013, 8/9/2013, 4/10/2013, 3/1/2013, 2/28/2013, 2/19/2013, 12/5/2012, 11/12/2012, 10/1/2012, 9/24/2012    A1C SCREENING 7/26/2017 1/26/2017, 3/30/2016, 9/29/2015, 5/19/2015, 3/3/2015, 12/2/2014, 7/22/2014, 3/5/2014, 2/10/2014, 12/5/2012, 5/1/2012    DIABETES MONOFILAMENT / LE EXAM 1/26/2018 1/26/2017, 8/21/2014, 10/11/2013 (Done)    Override on 10/11/2013: Done (seen by podiatry q 3 months)    RETINAL SCREENING 2/2/2018 2/2/2017, 1/6/2016, 1/6/2016 (Done), 1/7/2015, 1/7/2015, 1/9/2014    Override on 1/6/2016: Done    COLONOSCOPY 12/18/2018 12/18/2013            Results     POCT Hemoglobin A1C      Component    Glycohemoglobin    5.9    Comment:     lot [id number]  11/18    Internal Control Negative    Internal Control Positive                        Current Immunizations     13-VALENT PCV PREVNAR 3/28/2016    Influenza TIV (IM) 11/13/2013  3:04 PM, 10/4/2012  4:58 PM    Influenza Vaccine Adult HD 10/28/2016    Pneumococcal polysaccharide vaccine (PPSV-23) 10/4/2012  5:00 PM    SHINGLES VACCINE 3/28/2014    TD Vaccine 3/28/2008      Below and/or attached are the medications your provider expects you to take. Review all of your home medications and newly ordered medications with your provider and/or pharmacist. Follow medication instructions as directed by your provider and/or pharmacist. Please keep your medication list with you and share with your provider. Update the information  when medications are discontinued, doses are changed, or new medications (including over-the-counter products) are added; and carry medication information at all times in the event of emergency situations     Allergies:  ALEVE COLD & - Anaphylaxis     CEFTRIAXONE SODIUM - Anaphylaxis     NAPROXEN - Anaphylaxis     TAPE - Rash,Itching               Medications  Valid as of: June 07, 2017 - 10:35 AM    Generic Name Brand Name Tablet Size Instructions for use    Albiglutide (Pen-injector) TANZEUM 30 MG INJECT 1 PEN AS INSTRUCTED EVERY 7 DAYS        Aspirin (Tablet Delayed Response) ECOTRIN 81 MG Take 81 mg by mouth every day.        Atorvastatin Calcium (Tab) LIPITOR 20 MG Take 1 Tab by mouth every day.        Blood Glucose Monitoring Suppl (Device) Blood Glucose Monitoring Suppl  Meter: Dispense Abbott Freestyle Lite meter. Sig. Use as directed for blood sugar monitoring. #1. NR.        Blood Glucose Monitoring Suppl (Misc) Blood Glucose Monitoring Suppl SUPPLIES Test strips order: Test strips for Abbott Freestyle Lite meter. Sig: use 4 times daily and prn ssx high or low sugar #100 RF x 0        BuPROPion HCl (TABLET SR 24 HR) WELLBUTRIN  MG Take 1 Tab by mouth every morning.        Cyanocobalamin (SL Tab) Vitamin B-12 2500 MCG Place  under tongue every day.        Ferrous Sulfate (Tab) ferrous sulfate 325 (65 FE) MG Take 325 mg by mouth every 48 hours.        Glucose Blood (Strip) glucose blood  1 Each by Other route as needed.        Insulin Glargine (Solution Pen-injector) LANTUS 100 UNIT/ML INJECT 30 UNITS twice a day        Insulin Pen Needle (Misc) B-D ULTRAFINE III SHORT PEN 31G X 8 MM USE 1 NEEDLE EVERY DAY WITH LANTUS        Insulin Pen Needle (Misc) B-D ULTRAFINE III SHORT PEN 31G X 8 MM USE 1 PEN NEEDLE TWICE A DAY        Lancets (Misc) Lancets  Lancets order: Lancets for Abbott Freestyle Lite meter. Sig: use 4 times daily  and prn ssx high or low sugar. #100 RF x 0        Lancets (Misc) FREESTYLE  LANCETS  by Does not apply route.        Levothyroxine Sodium (Tab) SYNTHROID 75 MCG Take 1 Tab by mouth Every morning on an empty stomach.        MetFORMIN HCl (Tab) GLUCOPHAGE 500 MG TAKE 2 TABLETS TWICE A DAY WITH MEALS        Metoprolol Tartrate (Tab) LOPRESSOR 25 MG TAKE 1 TABLET TWICE A DAY        Valsartan (Tab) DIOVAN 80 MG TAKE 1 TABLET DAILY        .                 Medicines prescribed today were sent to:     [facility] - [address]    [address] [facility] [address]    Phone: [phone] Fax: [phone]    Open 24 Hours?: No    [facility] [address] [place] [address] - [address] [place] [address]    Phone: [phone] Fax: [phone]    Open 24 Hours?: No      Medication refill instructions:       If your prescription bottle indicates you have medication refills left, it is not necessary to call your provider’s office. Please contact your pharmacy and they will refill your medication.    If your prescription bottle indicates you do not have any refills left, you may request refills at any time through one of the following ways: The online Stepsss system (except Urgent Care), by calling your provider’s office, or by asking your pharmacy to contact your provider’s office with a refill request. Medication refills are processed only during regular business hours and may not be available until the next business day. Your provider may request additional information or to have a follow-up visit with you prior to refilling your medication.   *Please Note: Medication refills are assigned a new Rx number when refilled electronically. Your pharmacy may indicate that no refills were authorized even though a new prescription for the same medication is available at the pharmacy. Please request the medicine by name with the pharmacy before contacting your provider for a refill.        Your To Do List     Future Labs/Procedures  Complete By Expires    COMP METABOLIC PANEL  As directed 6/8/2018    FREE THYROXINE  As directed 6/8/2018    LIPID PROFILE  As directed 6/8/2018    MICROALBUMIN CREAT RATIO URINE  As directed 6/8/2018    TSH  As directed 6/8/2018    VITAMIN D,25 HYDROXY  As directed 6/8/2018         A's Child Access Code: [email or web address]  Expires: 7/7/2017 10:35 AM    A's Child  A secure, online tool to manage your health information     [employer] A's Child® is a secure, online tool that connects you to your personalized health information from the privacy of your home -- day or night - making it very easy for you to manage your healthcare. Once the activation process is completed, you can even access your medical information using the A's Child marilu, which is available for free in the Apple Marilu store or Google Play store.     A's Child provides the following levels of access (as shown below):   My Chart Features   [facility]  Specialists [facility]  Urgent  Care Non-[facility]  Primary Care  Doctor   Email your healthcare team securely and privately 24/7 X X X    Manage appointments: schedule your next appointment; view details of past/upcoming appointments X      Request prescription refills. X      View recent personal medical records, including lab and immunizations X X X X   View health record, including health history, allergies, medications X X X X   Read reports about your outpatient visits, procedures, consult and ER notes X X X X   See your discharge summary, which is a recap of your hospital and/or ER visit that includes your diagnosis, lab results, and care plan. X X       How to register for A's Child:  1. Go to  [email or web address].  2. Click on the Sign Up Now box, which takes you to the New Member Sign Up page. You will need to provide the following information:  a. Enter your A's Child Access Code exactly as it appears at the top of this page. (You will not need to use this code after you’ve completed  the sign-up process. If you do not sign up before the expiration date, you must request a new code.)   b. Enter your date of birth.   c. Enter your home email address.   d. Click Submit, and follow the next screen’s instructions.  3. Create a Sinimanes ID. This will be your PixSenset login ID and cannot be changed, so think of one that is secure and easy to remember.  4. Create a Sinimanes password. You can change your password at any time.  5. Enter your Password Reset Question and Answer. This can be used at a later time if you forget your password.   6. Enter your e-mail address. This allows you to receive e-mail notifications when new information is available in Sinimanes.  7. Click Sign Up. You can now view your health information.    For assistance activating your Sinimanes account, call [phone]

## 2017-06-07 NOTE — PROGRESS NOTES
Patient with existing type diabetes:  Type 2 diabetes here for follow up.      Patient's health status since last visit: his wife states he has had a couple of episodes in which he was standing up, complained of increased pain in his back, went to sit down and then became somewhat unresponsive.   Issues with diabetes since last visit none.   Current Diabetes Medications: Lantus 30 units bid, Metformin 1000 mg bid and Tanzeum 30 mg every Mondaly.     HbA1c: @hba1c@  Lab Results   Component Value Date/Time    GLYCOHEMOGLOBIN 5.9 06/07/2017 10:24 AM        FSBS  Testing: checking blood sugars bid (scanned into media)  Most blood sugars are in the 120-150 range.   Hypoglycemia: occasional blood sugars below 100, denies blood sugars less than 70  Exercise: none.     Retinal Exam:current.     Daily Foot Exam: yes, denies problems.     Routine Dental Exams: current.   Flu vaccine: current.   Pneumonia vaccine current.

## 2017-06-07 NOTE — PROGRESS NOTES
Endocrinology Clinic Progress Note  PCP: Randy Brar D.O.    CC: Diabetes    HPI:  Dakota Boyer is a 71 y.o. old patient who comes in today for routine follow up.     Type 2 diabetes: He is currently on Lantus 30 units twice a day, Tanzeum 30 mg once a week and metformin 1000 mg twice a day. He reports compliance with medications. Checks blood sugars 2 times a day, most blood sugar readings are in the range of 100-140. No hypoglycemia. Last eye exam was done in February 2017, no history of diabetic retinopathy. He denies numbness or tingling in feet.     Hypertension: Blood pressure is well controlled. He is on ARB. Reports compliance with medications.    Hyperlipidemia: LDL is at goal. He is on Lipitor, tolerating well.    Hypothyroidism: He is currently on levothyroxine 75 µg daily.    ROS:  Constitutional: No unintentional weight loss  Endo: Denies excessive thirst or frequent urination    Past Medical History:  Patient Active Problem List    Diagnosis Date Noted   • History of diverticulitis 11/13/2013     Priority: High   • Shortness of breath 09/10/2013     Priority: High   • Coronary artery disease due to calcified coronary lesion: 40-50% disease in the circumflex at cardiac catheterization in 2013 08/16/2013     Priority: High   • Sick sinus syndrome (CMS-HCC) 10/04/2012     Priority: High   • Pacemaker 10/04/2012     Priority: High   • Hypersensitive carotid sinus syndrome 10/02/2012     Priority: High   • Dyslipidemia 09/28/2012     Priority: High   • RBBB 09/28/2012     Priority: High   • Essential hypertension, benign 04/10/2012     Priority: High   • Fatigue 09/10/2013     Priority: Medium   • Type 2 diabetes mellitus without complication (CMS-HCC) 04/10/2012     Priority: Medium   • DJD (degenerative joint disease) of cervical spine 02/27/2013     Priority: Low   • S/P lumbar discectomy 11/21/2012     Priority: Low   • Degeneration of lumbar or lumbosacral intervertebral disc 11/20/2012      Priority: Low   • Back pain, chronic 04/10/2012     Priority: Low   • Neck pain 04/10/2012     Priority: Low   • Insomnia 04/10/2012     Priority: Low   • Cervical spondylosis 10/19/2016   • Non-proliferative diabetic retinopathy, mild, both eyes (CMS-HCC) 06/02/2016   • Hypertensive retinopathy of both eyes 06/02/2016   • Depression with anxiety 03/28/2016   • Obstructive sleep apnea on CPAP 03/28/2016   • Hypothyroidism 08/21/2014   • Obesity 08/21/2014   • Other dyspnea and respiratory abnormality 10/17/2013       Medications:    Current outpatient prescriptions:   •  buPROPion (WELLBUTRIN XL) 150 MG XL tablet, Take 1 Tab by mouth every morning., Disp: 90 Tab, Rfl: 0  •  levothyroxine (SYNTHROID) 75 MCG Tab, Take 1 Tab by mouth Every morning on an empty stomach., Disp: 90 Tab, Rfl: 3  •  TANZEUM 30 MG Pen-injector, INJECT 1 PEN AS INSTRUCTED EVERY 7 DAYS, Disp: 12 Each, Rfl: 2  •  metformin (GLUCOPHAGE) 500 MG Tab, TAKE 2 TABLETS TWICE A DAY WITH MEALS, Disp: 360 Tab, Rfl: 2  •  metoprolol (LOPRESSOR) 25 MG Tab, TAKE 1 TABLET TWICE A DAY, Disp: 180 Tab, Rfl: 3  •  insulin glargine (LANTUS SOLOSTAR) 100 UNIT/ML Solution Pen-injector injection, INJECT 30 UNITS twice a day, Disp: 15 PEN, Rfl: 2  •  atorvastatin (LIPITOR) 20 MG Tab, Take 1 Tab by mouth every day., Disp: 90 Tab, Rfl: 3  •  valsartan (DIOVAN) 80 MG Tab, TAKE 1 TABLET DAILY, Disp: 90 Tab, Rfl: 1  •  aspirin EC (ECOTRIN) 81 MG TBEC, Take 81 mg by mouth every day., Disp: , Rfl:   •  ferrous sulfate 325 (65 FE) MG tablet, Take 325 mg by mouth every 48 hours., Disp: , Rfl:   •  Cyanocobalamin (VITAMIN B-12) 2500 MCG SL Tab, Place  under tongue every day., Disp: , Rfl:   •  B-D ULTRAFINE III SHORT PEN 31G X 8 MM Misc, USE 1 PEN NEEDLE TWICE A DAY, Disp: 200 Each, Rfl: 5  •  Blood Glucose Monitoring Suppl SUPPLIES Misc, Test strips order: Test strips for Abbott Freestyle Lite meter. Sig: use 4 times daily and prn ssx high or low sugar #100 RF x 0, Disp:  360 Each, Rfl: 4  •  FREESTYLE LANCETS Misc, by Does not apply route., Disp: , Rfl:   •  glucose blood strip, 1 Each by Other route as needed., Disp: , Rfl:   •  Lancets MISC, Lancets order: Lancets for Abbott Freestyle Lite meter. Sig: use 4 times daily  and prn ssx high or low sugar. #100 RF x 0, Disp: 360 Each, Rfl: 4  •  Blood Glucose Monitoring Suppl [place], Meter: Dispense Abbott Freestyle Lite meter. Sig. Use as directed for blood sugar monitoring. #1. NR., Disp: 1 Device, Rfl: 0  •  B-D ULTRAFINE III SHORT PEN 31G X 8 MM MISC, USE 1 NEEDLE EVERY DAY WITH LANTUS, Disp: 999 Each, Rfl: 2    Labs:   Ref. Range 3/30/2016 07:02 6/13/2016 06:34   Sodium Latest Ref Range: 135-145 mmol/L  137   Potassium Latest Ref Range: 3.6-5.5 mmol/L  4.5   Chloride Latest Ref Range:  mmol/L  103   Co2 Latest Ref Range: 20-33 mmol/L  26   Anion Gap Latest Ref Range: 0.0-11.9   8.0   Glucose Latest Ref Range: 65-99 mg/dL  137 (H)   Bun Latest Ref Range: 8-22 mg/dL  14   Creatinine Latest Ref Range: 0.50-1.40 mg/dL  0.88   GFR If  Latest Ref Range: >60 mL/min/1.73 m 2  >60   GFR If Non  Latest Ref Range: >60 mL/min/1.73 m 2  >60   Calcium Latest Ref Range: 8.5-10.5 mg/dL  9.8   AST(SGOT) Latest Ref Range: 12-45 U/L  19   ALT(SGPT) Latest Ref Range: 2-50 U/L  20   Alkaline Phosphatase Latest Ref Range: 30-99 U/L  69   Total Bilirubin Latest Ref Range: 0.1-1.5 mg/dL  1.2   Albumin Latest Ref Range: 3.2-4.9 g/dL  4.0   Total Protein Latest Ref Range: 6.0-8.2 g/dL  6.6   Globulin Latest Ref Range: 1.9-3.5 g/dL  2.6   A-G Ratio Latest Units: g/dL  1.5   Cholesterol,Tot Latest Ref Range: 100-199 mg/dL  95 (L)   Triglycerides Latest Ref Range: 0-149 mg/dL  116   HDL Latest Ref Range: >=40 mg/dL  34 (A)   LDL Latest Ref Range: <100 mg/dL  38   B Natriuretic Peptide Latest Ref Range: 0-100 pg/mL  4   Creatinine, Random Urine Latest Ref Range: 800-2100 mg/d Not Applicable    Micro Alb Creat Ratio Latest Ref  "Range: 0-30 mg/g 6    Total Volume Latest Units: mL random    Microalbumin, Urine Random Latest Units: mg/dL 1.3    Creatinine Urine Latest Units: mg/dL 222        Physical Examination:  Vital signs: BP 90/60 mmHg  Pulse 69  Ht 1.727 m (5' 7.99\")  Wt 113.671 kg (250 lb 9.6 oz)  BMI 38.11 kg/m2  SpO2 98%  General: No apparent distress, cooperative  Eyes: No scleral icterus, no discharge   Resp: Normal effort, clear to auscultation bilaterally  CVS: Regular rate and rhythm, S1 S2 normal, no murmur  Extremities: No edema  Skin: No rash  Psych: Alert and oriented, normal mood and affect    Assessment and Plan:    Type 2 diabetes mellitus without complication  · Hemoglobin A1c today in the clinic is 5.9%  · Goal hemoglobin A1c less than 7%  · Continue Lantus 30 units twice a day, Tanzeum 30 mg once a week, metformin 1000 mg twice a day; no changes to medications today  · Labs: CMP, TSH, urine microalbumin creatinine ratio, lipid profile    Essential hypertension  · Blood pressure is well controlled  · Continue ARB    Mixed hyperlipidemia  · LDL is at goal  · Continue Lipitor    Acquired hypothyroidism  · Repeat TSH  · Continue with levothyroxine 75 µg daily    Return to clinic in 6 months    Thank you for allowing me to participate in the care of this patient.    Isabela Singletary M.D.     CC:   Randy Brar D.O.    This note was created using voice recognition software (Dragon). The accuracy of the dictation is limited by the abilities of the software. I have reviewed the note prior to signing, however some errors in grammar and context are still possible. If you have any questions related to this note please do not hesitate to contact our office.     "

## 2017-06-12 ENCOUNTER — HOSPITAL ENCOUNTER (OUTPATIENT)
Dept: LAB | Facility: MEDICAL CENTER | Age: 72
End: 2017-06-12
Attending: INTERNAL MEDICINE
Payer: MEDICARE

## 2017-06-12 DIAGNOSIS — E03.9 ACQUIRED HYPOTHYROIDISM: ICD-10-CM

## 2017-06-12 DIAGNOSIS — E11.9 TYPE 2 DIABETES MELLITUS WITHOUT COMPLICATION, WITH LONG-TERM CURRENT USE OF INSULIN (HCC): ICD-10-CM

## 2017-06-12 DIAGNOSIS — Z79.4 TYPE 2 DIABETES MELLITUS WITHOUT COMPLICATION, WITH LONG-TERM CURRENT USE OF INSULIN (HCC): ICD-10-CM

## 2017-06-12 DIAGNOSIS — E78.5 DYSLIPIDEMIA: ICD-10-CM

## 2017-06-12 DIAGNOSIS — E55.9 UNSPECIFIED VITAMIN D DEFICIENCY: ICD-10-CM

## 2017-06-12 LAB
25(OH)D3 SERPL-MCNC: 27 NG/ML (ref 30–100)
ALBUMIN SERPL BCP-MCNC: 4.2 G/DL (ref 3.2–4.9)
ALBUMIN/GLOB SERPL: 1.6 G/DL
ALP SERPL-CCNC: 63 U/L (ref 30–99)
ALT SERPL-CCNC: 18 U/L (ref 2–50)
ANION GAP SERPL CALC-SCNC: 9 MMOL/L (ref 0–11.9)
AST SERPL-CCNC: 18 U/L (ref 12–45)
BILIRUB SERPL-MCNC: 1.2 MG/DL (ref 0.1–1.5)
BUN SERPL-MCNC: 14 MG/DL (ref 8–22)
CALCIUM SERPL-MCNC: 10 MG/DL (ref 8.5–10.5)
CHLORIDE SERPL-SCNC: 103 MMOL/L (ref 96–112)
CHOLEST SERPL-MCNC: 94 MG/DL (ref 100–199)
CO2 SERPL-SCNC: 26 MMOL/L (ref 20–33)
CREAT SERPL-MCNC: 0.8 MG/DL (ref 0.5–1.4)
CREAT UR-MCNC: 35.4 MG/DL
GFR SERPL CREATININE-BSD FRML MDRD: >60 ML/MIN/1.73 M 2
GLOBULIN SER CALC-MCNC: 2.7 G/DL (ref 1.9–3.5)
GLUCOSE SERPL-MCNC: 143 MG/DL (ref 65–99)
HDLC SERPL-MCNC: 36 MG/DL
LDLC SERPL CALC-MCNC: 41 MG/DL
MICROALBUMIN UR-MCNC: <0.7 MG/DL
MICROALBUMIN/CREAT UR: NORMAL MG/G (ref 0–30)
POTASSIUM SERPL-SCNC: 4.2 MMOL/L (ref 3.6–5.5)
PROT SERPL-MCNC: 6.9 G/DL (ref 6–8.2)
SODIUM SERPL-SCNC: 138 MMOL/L (ref 135–145)
T4 FREE SERPL-MCNC: 1.07 NG/DL (ref 0.53–1.43)
TRIGL SERPL-MCNC: 83 MG/DL (ref 0–149)
TSH SERPL DL<=0.005 MIU/L-ACNC: 1.68 UIU/ML (ref 0.3–3.7)

## 2017-06-12 PROCEDURE — 80053 COMPREHEN METABOLIC PANEL: CPT

## 2017-06-12 PROCEDURE — 80061 LIPID PANEL: CPT

## 2017-06-12 PROCEDURE — 84443 ASSAY THYROID STIM HORMONE: CPT

## 2017-06-12 PROCEDURE — 82306 VITAMIN D 25 HYDROXY: CPT

## 2017-06-12 PROCEDURE — 84439 ASSAY OF FREE THYROXINE: CPT

## 2017-06-12 PROCEDURE — 82043 UR ALBUMIN QUANTITATIVE: CPT

## 2017-06-12 PROCEDURE — 36415 COLL VENOUS BLD VENIPUNCTURE: CPT

## 2017-06-12 PROCEDURE — 82570 ASSAY OF URINE CREATININE: CPT

## 2017-06-20 ENCOUNTER — OFFICE VISIT (OUTPATIENT)
Dept: CARDIOLOGY | Facility: MEDICAL CENTER | Age: 72
End: 2017-06-20
Payer: MEDICARE

## 2017-06-20 ENCOUNTER — NON-PROVIDER VISIT (OUTPATIENT)
Dept: CARDIOLOGY | Facility: MEDICAL CENTER | Age: 72
End: 2017-06-20
Payer: MEDICARE

## 2017-06-20 VITALS
OXYGEN SATURATION: 97 % | HEART RATE: 86 BPM | WEIGHT: 247 LBS | SYSTOLIC BLOOD PRESSURE: 130 MMHG | HEIGHT: 68 IN | DIASTOLIC BLOOD PRESSURE: 70 MMHG | BODY MASS INDEX: 37.44 KG/M2

## 2017-06-20 DIAGNOSIS — Z95.0 PACEMAKER: ICD-10-CM

## 2017-06-20 DIAGNOSIS — I25.10 CORONARY ARTERY DISEASE DUE TO CALCIFIED CORONARY LESION: ICD-10-CM

## 2017-06-20 DIAGNOSIS — I10 ESSENTIAL HYPERTENSION, BENIGN: ICD-10-CM

## 2017-06-20 DIAGNOSIS — I25.84 CORONARY ARTERY DISEASE DUE TO CALCIFIED CORONARY LESION: ICD-10-CM

## 2017-06-20 DIAGNOSIS — E78.5 DYSLIPIDEMIA: ICD-10-CM

## 2017-06-20 DIAGNOSIS — R40.4 ALTERED LEVEL OF CONSCIOUSNESS: ICD-10-CM

## 2017-06-20 PROCEDURE — 99214 OFFICE O/P EST MOD 30 MIN: CPT | Performed by: INTERNAL MEDICINE

## 2017-06-20 PROCEDURE — 93280 PM DEVICE PROGR EVAL DUAL: CPT | Performed by: INTERNAL MEDICINE

## 2017-06-20 ASSESSMENT — ENCOUNTER SYMPTOMS: FEVER: 1

## 2017-06-20 NOTE — PROGRESS NOTES
Subjective:   Dakota Boyer is a 72 y.o. male who presents today for followup of his dyspnea on exertion, hypertension, hyperlipidemia, permanent pacemaker and mild coronary artery disease.     His wife since in the note that he had an event with an altered level of consciousness in March. At that time, he was having some difficulty with back pain. He sat down in his chair, quite pale and then his arms stiffened and began to shake. This lasts for several seconds but recurred a few minutes later. Apparently his face was empty and blank but he was able to express the fact that he does not want to go to the hospital. This episode lasted about 10-15 minutes.    Patient states that he remembers being mildly lightheaded but no other symptoms. He denies any chest discomfort, dyspnea, edema or palpitations.    Past Medical History   Diagnosis Date   • Anxiety    • Hypertension    • Hyperlipidemia    • RBBB     • Hypersensitive carotid sinus syndrome     • Syncope October 2012     Treated with PPM   • Diabetes      Oral agents and insulin   • Depression    • Snoring    • CAD (coronary artery disease) October 2012     Positive coronary calcium score.  Follow-up MPI was negative for ischemia.   • Sick sinus syndrome (CMS-LTAC, located within [facility])     • Pacemaker October 2012     [employer] [address] #[id number] implanted by [facility].    • Episodic lightheadedness     • Incisional infection April 2013     Wound dehiscience of neck surgery.   • Insomnia     • Degeneration of lumbar or lumbosacral intervertebral disc     • S/P lumbar discectomy     • DJD (degenerative joint disease) of cervical spine     • Dental disorder    • Breath shortness    • Unspecified hemorrhagic conditions      Reports bleeds easily   • Blood clotting disorder (CMS-LTAC, located [facility]) 1978     s/p Left knee surgery- DVT in left leg   • Cancer (CMS-[facility]AC, located [facility]) ? early 90's     Melanoma Left arm- surgically removed   • High cholesterol    • Myocardial infarct (CMS-LTAC, located [facility]) ?     states was told  by  that he has had a heart attack in the past.   • Sleep apnea      does not use CPAP anymore- stopped in Dec 2015   • Thyroid disease      Hypothyroid   • Back pain, chronic 10/18/2016   • Neck pain 10/18/2016     Past Surgical History   Procedure Laterality Date   • Athroplasty Left 2004   • Arthroscopy, knee Bilateral 1978   • Tonsillectomy     • Vasectomy     • Shoulder decompression Left 2008     Left rotator cuff   • Orthopedic osteotomy       Both knees several times, 8 total   • Recovery  10/4/2012     Performed by Cath-Recovery Surgery at SURGERY SAME DAY [facility]   • Lumbar laminectomy diskectomy  11/20/2012     Performed by Tu Jain M.D. at SURGERY [facility]   • Pacemaker insertion  October 2012     [employer] [address] [id number] implanted by Dr. Waite.   • Cervical fusion posterior  2/27/2013     Performed by Tu Jain M.D. at SURGERY [facility]   • Cervical laminectomy posterior  2/27/2013     Performed by Tu Jain M.D. at SURGERY [facility]   • Wound dehiscence  4/10/2013     Performed by Tu Jain M.D. at SURGERY [facility]   • Recovery  10/17/2013     Performed by Cath-Recovery Surgery at SURGERY SAME DAY [facility]   • Shoulder arthroscopy Right 2015     torn bicep tendon and spurs   • Other     • Spinal cord stimulator  1 month ago   • Pr dstr nrolytc agnt parverteb fct sngl crvcl/thora Right 10/19/2016     Procedure: NEURO DEST FACET C/T W/IG SNGL - 3ON-C3, C8-T1    SINERGY;  Surgeon: Gaurang Pruett M.D.;  Location: [facility];  Service: Pain Management   • Pr dstr nrolytc agnt parverteb fct addl crvcl/thora  10/19/2016     Procedure: NEURO DEST FACET C/T W/IG ADDL;  Surgeon: Gaurang Pruett M.D.;  Location: [facility];  Service: Pain Management   • Pr fluoroscopic guidance needle placement  10/19/2016     Procedure: FLOURO GUIDE NEEDLE PLACEMENT;  Surgeon: Gaurang Pruett M.D.;  Location: [facility];   "Service: Pain Management     Family History   Problem Relation Age of Onset   • Lung Disease Mother      Smoker   • Alcohol/Drug Mother    • Heart Disease Father    • Heart Disease Sister      \"hole in heart\"   • Lung Disease Brother      COPD, CANCER   • Cancer Brother      Prostate, Lung   • Alcohol/Drug Brother    • Heart Disease Brother    • Diabetes Brother    • Hypertension Brother    • Hyperlipidemia Brother    • Heart Disease Maternal Grandmother    • Hypertension Maternal Grandmother    • Hyperlipidemia Maternal Grandmother    • Cancer Brother      stomach, thyroid   • Heart Disease Brother 60     MI, stent, PM/defib     History   Smoking status   • Former Smoker -- 1.00 packs/day for 58 years   • Types: Cigarettes   • Quit date: 01/03/1990   Smokeless tobacco   • Never Used     Allergies   Allergen Reactions   • Aleve Cold & [Pseudoephedrine-Naproxen Na] Anaphylaxis   • Ceftriaxone Sodium Anaphylaxis     Reaction; 1970's.   • Naproxen Anaphylaxis     Reaction; 2004.   • Tape Rash and Itching     Plastic tape (paper tape ok)     Outpatient Encounter Prescriptions as of 6/20/2017   Medication Sig Dispense Refill   • buPROPion (WELLBUTRIN XL) 150 MG XL tablet Take 1 Tab by mouth every morning. 90 Tab 0   • levothyroxine (SYNTHROID) 75 MCG Tab Take 1 Tab by mouth Every morning on an empty stomach. 90 Tab 3   • TANZEUM 30 MG Pen-injector INJECT 1 PEN AS INSTRUCTED EVERY 7 DAYS 12 Each 2   • metformin (GLUCOPHAGE) 500 MG Tab TAKE 2 TABLETS TWICE A DAY WITH MEALS 360 Tab 2   • metoprolol (LOPRESSOR) 25 MG Tab TAKE 1 TABLET TWICE A  Tab 3   • ferrous sulfate 325 (65 FE) MG tablet Take 325 mg by mouth every 48 hours.     • Cyanocobalamin (VITAMIN B-12) 2500 MCG SL Tab Place  under tongue every day.     • insulin glargine (LANTUS SOLOSTAR) 100 UNIT/ML Solution Pen-injector injection INJECT 30 UNITS twice a day 15 PEN 2   • atorvastatin (LIPITOR) 20 MG Tab Take 1 Tab by mouth every day. 90 Tab 3   • valsartan " "(DIOVAN) 80 MG Tab TAKE 1 TABLET DAILY 90 Tab 1   • aspirin EC (ECOTRIN) 81 MG TBEC Take 81 mg by mouth every day.     • B-D ULTRAFINE III SHORT PEN 31G X 8 MM Misc USE 1 PEN NEEDLE TWICE A  Each 5   • Blood Glucose Monitoring Suppl SUPPLIES Misc Test strips order: Test strips for Abbott Freestyle Lite meter. Sig: use 4 times daily and prn ssx high or low sugar #100 RF x 0 360 Each 4   • FREESTYLE LANCETS Misc by Does not apply route.     • glucose blood strip 1 Each by Other route as needed.     • Lancets MISC Lancets order: Lancets for Abbott Freestyle Lite meter. Sig: use 4 times daily  and prn ssx high or low sugar. #100 RF x 0 360 Each 4   • Blood Glucose Monitoring Suppl [place] Meter: Dispense Abbott Freestyle Lite meter. Sig. Use as directed for blood sugar monitoring. #1. NR. 1 Device 0   • B-D ULTRAFINE III SHORT PEN 31G X 8 MM MISC USE 1 NEEDLE EVERY DAY WITH LANTUS 999 Each 2     No facility-administered encounter medications on file as of 6/20/2017.     Review of Systems   Constitutional: Positive for fever.        Objective:   /70 mmHg  Pulse 86  Ht 1.727 m (5' 7.99\")  Wt 112.038 kg (247 lb)  BMI 37.56 kg/m2  SpO2 97%    Physical Exam   Neck: No JVD present.   Cardiovascular: Normal rate and regular rhythm.  Exam reveals no gallop.    Murmur (3/6 systolic at the base) heard.  Pulmonary/Chest: Effort normal. He has no rales.   Abdominal: Soft. There is no tenderness.   Musculoskeletal: He exhibits edema (2+ pretibial stasis changes).     Echocardiogram:  CONCLUSIONS  Normal left ventricular systolic function.  Moderate concentric left ventricular hypertrophy.  Grade I diastolic dysfunction is present.  Trace mitral regurgitation.  Aortic sclerosis with borderline stenosis.  Trace tricuspid regurgitation.  Trace pulmonic insufficiency.  Exam Date: 10/02/2014       Lab Results   Component Value Date/Time    CHOLESTEROL,TOT 94* 06/12/2017 09:53 AM    LDL 41 06/12/2017 09:53 AM    HDL 36* " 06/12/2017 09:53 AM    TRIGLYCERIDES 83 06/12/2017 09:53 AM       Lab Results   Component Value Date/Time    SODIUM 138 06/12/2017 09:53 AM    POTASSIUM 4.2 06/12/2017 09:53 AM    CHLORIDE 103 06/12/2017 09:53 AM    CO2 26 06/12/2017 09:53 AM    GLUCOSE 143* 06/12/2017 09:53 AM    BUN 14 06/12/2017 09:53 AM    CREATININE 0.80 06/12/2017 09:53 AM     Lab Results   Component Value Date/Time    ALKALINE PHOSPHATASE 63 06/12/2017 09:53 AM    AST(SGOT) 18 06/12/2017 09:53 AM    ALT(SGPT) 18 06/12/2017 09:53 AM    TOTAL BILIRUBIN 1.2 06/12/2017 09:53 AM                Assessment:     1. Coronary artery disease due to calcified coronary lesion: 40-50% disease in the circumflex at cardiac catheterization in 2013     2. Pacemaker     3. Dyslipidemia     4. Essential hypertension, benign     5. Altered level of consciousness: March 2017         Medical Decision Making:  Today's Assessment / Status / Plan:     Mr. Boyer had an episode of altered level of consciousness. It sounds like he was probably hypotensive at the time. He may have had a vasovagal event from his back pain. His pacemaker check shows no significant dysrhythmias. He has had one brief episode of PSVT but not around the time of this event.  At this time, he will be evaluated with a carotid ultrasound and repeat echocardiogram. His last echocardiogram was in 2014. It is unlikely that he could've developed severe aortic stenosis but we will reevaluate him. His blood sugar was in the 120s when it was checked, and it is unlikely that he had a hypoglycemic episode.    Depending on his clinical course and the results of the above studies will decide whether or not to refer him to neurology. He will follow-up with an APN after his ultrasounds. He will follow-up with myself in about 3 months.

## 2017-06-20 NOTE — Clinical Note
[facility]   [address] [place] [address]  [place] [address]  Phone: [phone]  Fax: [phone]              Dakota Boyer  1945    Encounter Date: 6/20/2017    Jac Christiansen M.D.          PROGRESS NOTE:  Subjective:   Dakota Boyer is a 72 y.o. male who presents today for followup of his dyspnea on exertion, hypertension, hyperlipidemia, permanent pacemaker and mild coronary artery disease.     His wife since in the note that he had an event with an altered level of consciousness in March. At that time, he was having some difficulty with back pain. He sat down in his chair, quite pale and then his arms stiffened and began to shake. This lasts for several seconds but recurred a few minutes later. Apparently his face was empty and blank but he was able to express the fact that he does not want to go to the hospital. This episode lasted about 10-15 minutes.    Patient states that he remembers being mildly lightheaded but no other symptoms. He denies any chest discomfort, dyspnea, edema or palpitations.    Past Medical History   Diagnosis Date   • Anxiety    • Hypertension    • Hyperlipidemia    • RBBB     • Hypersensitive carotid sinus syndrome     • Syncope October 2012     Treated with PPM   • Diabetes      Oral agents and insulin   • Depression    • Snoring    • CAD (coronary artery disease) October 2012     Positive coronary calcium score.  Follow-up MPI was negative for ischemia.   • Sick sinus syndrome (CMS-HCC)     • Pacemaker October 2012     [employer] [address] #[id number] implanted by [facility].    • Episodic lightheadedness     • Incisional infection April 2013     Wound dehiscience of neck surgery.   • Insomnia     • Degeneration of lumbar or lumbosacral intervertebral disc     • S/P lumbar discectomy     • DJD (degenerative joint disease) of cervical spine     • Dental disorder    • Breath shortness    • Unspecified hemorrhagic  conditions      Reports bleeds easily   • Blood clotting disorder (CMS-HCC) 1978     s/p Left knee surgery- DVT in left leg   • Cancer (CMS-HCC) ? early 90's     Melanoma Left arm- surgically removed   • High cholesterol    • Myocardial infarct (CMS-HCC) ?     states was told by  that he has had a heart attack in the past.   • Sleep apnea      does not use CPAP anymore- stopped in Dec 2015   • Thyroid disease      Hypothyroid   • Back pain, chronic 10/18/2016   • Neck pain 10/18/2016     Past Surgical History   Procedure Laterality Date   • Athroplasty Left 2004   • Arthroscopy, knee Bilateral 1978   • Tonsillectomy     • Vasectomy     • Shoulder decompression Left 2008     Left rotator cuff   • Orthopedic osteotomy       Both knees several times, 8 total   • Recovery  10/4/2012     Performed by Cath-Recovery Surgery at SURGERY SAME DAY [facility]   • Lumbar laminectomy diskectomy  11/20/2012     Performed by Tu Jain M.D. at SURGERY [facility]   • Pacemaker insertion  October 2012     [employer] [facility] [employer] [address] 2110 implanted by Dr. Waite.   • Cervical fusion posterior  2/27/2013     Performed by Tu Jain M.D. at SURGERY [facility]   • Cervical laminectomy posterior  2/27/2013     Performed by Tu Jain M.D. at SURGERY [facility]   • Wound dehiscence  4/10/2013     Performed by Tu Jain M.D. at SURGERY [facility]   • Recovery  10/17/2013     Performed by Cath-Recovery Surgery at SURGERY SAME DAY [facility]   • Shoulder arthroscopy Right 2015     torn bicep tendon and spurs   • Other     • Spinal cord stimulator  1 month ago   • Pr dstr nrolytc agnt parverteb fct sngl crvcl/thora Right 10/19/2016     Procedure: NEURO DEST FACET C/T W/IG SNGL - 3ON-C3, C8-T1    SINERGY;  Surgeon: Gaurang Pruett M.D.;  Location: SURGERY [facility];  Service: Pain Management   • Pr dstr nrolytc agnt parverteb fct addl crvcl/thora  10/19/2016     Procedure: NEURO DEST FACET C/T W/IG  "ADDL;  Surgeon: Gaurang Pruett M.D.;  Location: [facility];  Service: Pain Management   • Pr fluoroscopic guidance needle placement  10/19/2016     Procedure: FLOURO GUIDE NEEDLE PLACEMENT;  Surgeon: Gaurang Pruett M.D.;  Location: [facility];  Service: Pain Management     Family History   Problem Relation Age of Onset   • Lung Disease Mother      Smoker   • Alcohol/Drug Mother    • Heart Disease Father    • Heart Disease Sister      \"hole in heart\"   • Lung Disease Brother      COPD, CANCER   • Cancer Brother      Prostate, Lung   • Alcohol/Drug Brother    • Heart Disease Brother    • Diabetes Brother    • Hypertension Brother    • Hyperlipidemia Brother    • Heart Disease Maternal Grandmother    • Hypertension Maternal Grandmother    • Hyperlipidemia Maternal Grandmother    • Cancer Brother      stomach, thyroid   • Heart Disease Brother 60     MI, stent, PM/defib     History   Smoking status   • Former Smoker -- 1.00 packs/day for 58 years   • Types: Cigarettes   • Quit date: 01/03/1990   Smokeless tobacco   • Never Used     Allergies   Allergen Reactions   • Aleve Cold & [Pseudoephedrine-Naproxen Na] Anaphylaxis   • Ceftriaxone Sodium Anaphylaxis     Reaction; 1970's.   • Naproxen Anaphylaxis     Reaction; 2004.   • Tape Rash and Itching     Plastic tape (paper tape ok)     Outpatient Encounter Prescriptions as of 6/20/2017   Medication Sig Dispense Refill   • buPROPion (WELLBUTRIN XL) 150 MG XL tablet Take 1 Tab by mouth every morning. 90 Tab 0   • levothyroxine (SYNTHROID) 75 MCG Tab Take 1 Tab by mouth Every morning on an empty stomach. 90 Tab 3   • TANZEUM 30 MG Pen-injector INJECT 1 PEN AS INSTRUCTED EVERY 7 DAYS 12 Each 2   • metformin (GLUCOPHAGE) 500 MG Tab TAKE 2 TABLETS TWICE A DAY WITH MEALS 360 Tab 2   • metoprolol (LOPRESSOR) 25 MG Tab TAKE 1 TABLET TWICE A  Tab 3   • ferrous sulfate 325 (65 FE) MG tablet Take 325 mg by mouth every 48 hours.     • Cyanocobalamin " "(VITAMIN B-12) 2500 MCG SL Tab Place  under tongue every day.     • insulin glargine (LANTUS SOLOSTAR) 100 UNIT/ML Solution Pen-injector injection INJECT 30 UNITS twice a day 15 PEN 2   • atorvastatin (LIPITOR) 20 MG Tab Take 1 Tab by mouth every day. 90 Tab 3   • valsartan (DIOVAN) 80 MG Tab TAKE 1 TABLET DAILY 90 Tab 1   • aspirin EC (ECOTRIN) 81 MG TBEC Take 81 mg by mouth every day.     • B-D ULTRAFINE III SHORT PEN 31G X 8 MM Misc USE 1 PEN NEEDLE TWICE A  Each 5   • Blood Glucose Monitoring Suppl SUPPLIES Misc Test strips order: Test strips for Abbott Freestyle Lite meter. Sig: use 4 times daily and prn ssx high or low sugar #100 RF x 0 360 Each 4   • FREESTYLE LANCETS Misc by Does not apply route.     • glucose blood strip 1 Each by Other route as needed.     • Lancets MISC Lancets order: Lancets for Abbott Freestyle Lite meter. Sig: use 4 times daily  and prn ssx high or low sugar. #100 RF x 0 360 Each 4   • Blood Glucose Monitoring Suppl [place] Meter: Dispense Abbott Freestyle Lite meter. Sig. Use as directed for blood sugar monitoring. #1. NR. 1 Device 0   • B-D ULTRAFINE III SHORT PEN 31G X 8 MM MISC USE 1 NEEDLE EVERY DAY WITH LANTUS 999 Each 2     No facility-administered encounter medications on file as of 6/20/2017.     Review of Systems   Constitutional: Positive for fever.        Objective:   /70 mmHg  Pulse 86  Ht 1.727 m (5' 7.99\")  Wt 112.038 kg (247 lb)  BMI 37.56 kg/m2  SpO2 97%    Physical Exam   Neck: No JVD present.   Cardiovascular: Normal rate and regular rhythm.  Exam reveals no gallop.    Murmur (3/6 systolic at the base) heard.  Pulmonary/Chest: Effort normal. He has no rales.   Abdominal: Soft. There is no tenderness.   Musculoskeletal: He exhibits edema (2+ pretibial stasis changes).     Echocardiogram:  CONCLUSIONS  Normal left ventricular systolic function.  Moderate concentric left ventricular hypertrophy.  Grade I diastolic dysfunction is present.  Trace mitral " regurgitation.  Aortic sclerosis with borderline stenosis.  Trace tricuspid regurgitation.  Trace pulmonic insufficiency.  Exam Date: 10/02/2014       Lab Results   Component Value Date/Time    CHOLESTEROL,TOT 94* 06/12/2017 09:53 AM    LDL 41 06/12/2017 09:53 AM    HDL 36* 06/12/2017 09:53 AM    TRIGLYCERIDES 83 06/12/2017 09:53 AM       Lab Results   Component Value Date/Time    SODIUM 138 06/12/2017 09:53 AM    POTASSIUM 4.2 06/12/2017 09:53 AM    CHLORIDE 103 06/12/2017 09:53 AM    CO2 26 06/12/2017 09:53 AM    GLUCOSE 143* 06/12/2017 09:53 AM    BUN 14 06/12/2017 09:53 AM    CREATININE 0.80 06/12/2017 09:53 AM     Lab Results   Component Value Date/Time    ALKALINE PHOSPHATASE 63 06/12/2017 09:53 AM    AST(SGOT) 18 06/12/2017 09:53 AM    ALT(SGPT) 18 06/12/2017 09:53 AM    TOTAL BILIRUBIN 1.2 06/12/2017 09:53 AM                Assessment:     1. Coronary artery disease due to calcified coronary lesion: 40-50% disease in the circumflex at cardiac catheterization in 2013     2. Pacemaker     3. Dyslipidemia     4. Essential hypertension, benign     5. Altered level of consciousness: March 2017         Medical Decision Making:  Today's Assessment / Status / Plan:     Mr. Boyer had an episode of altered level of consciousness. It sounds like he was probably hypotensive at the time. He may have had a vasovagal event from his back pain. His pacemaker check shows no significant dysrhythmias. He has had one brief episode of PSVT but not around the time of this event.  At this time, he will be evaluated with a carotid ultrasound and repeat echocardiogram. His last echocardiogram was in 2014. It is unlikely that he could've developed severe aortic stenosis but we will reevaluate him. His blood sugar was in the 120s when it was checked, and it is unlikely that he had a hypoglycemic episode.    Depending on his clinical course and the results of the above studies will decide whether or not to refer him to neurology. He  will follow-up with an APN after his ultrasounds. He will follow-up with myself in about 3 months.      Randy Brar D.O.  [address]  [address]  [facility] [address]  VIA In Basket

## 2017-06-20 NOTE — MR AVS SNAPSHOT
"        Dakota Paulinood   2017 10:45 AM   Office Visit   MRN: [id number]    Department:  [facility]   Dept Phone:  [phone]    Description:  Male : 1945   Provider:  Jac Christiansen M.D.           Reason for Visit     Follow-Up           Allergies as of 2017     Allergen Noted Reactions    Aleve Cold & [Pseudoephedrine-Naproxen Na] 2013   Anaphylaxis    Ceftriaxone Sodium 2013   Anaphylaxis    Reaction; 1970's.    Naproxen 2013   Anaphylaxis    Reaction; 2004.    Tape 10/18/2016   Rash, Itching    Plastic tape (paper tape ok)      You were diagnosed with     Coronary artery disease due to calcified coronary lesion   [2081819]       Pacemaker   [150144]       Dyslipidemia   [303890]       Essential hypertension, benign   [401.1.ICD-9-CM]       Altered level of consciousness   [223573]         Vital Signs     Blood Pressure Pulse Height Weight Body Mass Index Oxygen Saturation    130/70 mmHg 86 1.727 m (5' 7.99\") 112.038 kg (247 lb) 37.56 kg/m2 97%    Smoking Status                   Former Smoker           Basic Information     Date Of Birth Sex Race Ethnicity Preferred Language    1945 Male White Non- English      Your appointments     2017 10:20 AM   NEW TO YOU with Sofía Ruvalcaba D.O.   [facility])    [address], [address]  [facility] [address] [phone]            Dec 07, 2017  1:00 PM   Diabetes Care Visit with Isabela Singletary M.D., Kayla Cook R.N.   [facility]    [address], [address]  [facility] [address] [phone]           You will be receiving a confirmation call a few days before your appointment from our automated call confirmation system.              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 High  10/2/2012 - Present    Sick sinus syndrome (CMS-HCC) I49.5 High  10/4/2012 - Present    Pacemaker Z95.0 High  10/4/2012 - Present    Degeneration of lumbar or lumbosacral intervertebral disc M51.37 Low  11/20/2012 - Present    S/P lumbar discectomy Z98.890 Low  11/21/2012 - Present    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 Low  2/27/2013 - Present    Coronary artery disease due to calcified coronary lesion: 40-50% disease in the circumflex at cardiac catheterization in 2013 I25.10, I25.84 High  8/16/2013 - Present    Fatigue R53.83 Medium  9/10/2013 - Present    Shortness of breath R06.02 High  9/10/2013 - Present    Other dyspnea and respiratory abnormality R06.09, R09.89   10/17/2013 - Present    History of diverticulitis Z87.19 High  11/13/2013 - Present    Hypothyroidism E03.9   8/21/2014 - Present    Obesity E66.9   8/21/2014 - Present    Depression with anxiety F41.8   3/28/2016 - Present    Obstructive sleep apnea on CPAP G47.33, Z99.89   3/28/2016 - Present    Non-proliferative diabetic retinopathy, mild, both eyes (CMS-HCC) E11.3293   6/2/2016 - Present    Hypertensive retinopathy of both eyes H35.033   6/2/2016 - Present    Cervical spondylosis M47.812   10/19/2016 - Present    Altered level of consciousness: March 2017 R40.4   6/20/2017 - Present      Health Maintenance        Date Due Completion Dates    IMM DTaP/Tdap/Td Vaccine (1 - Tdap) 3/29/2008 3/28/2008    A1C SCREENING 12/7/2017 6/7/2017, 1/26/2017, 3/30/2016, 9/29/2015, 5/19/2015, 3/3/2015, 12/2/2014, 7/22/2014, 3/5/2014, 2/10/2014, 12/5/2012, 5/1/2012    DIABETES MONOFILAMENT / LE EXAM  1/26/2018 1/26/2017, 8/21/2014, 10/11/2013 (Done)    Override on 10/11/2013: Done (seen by podiatry q 3 months)    RETINAL SCREENING 2/2/2018 2/2/2017, 1/6/2016, 1/6/2016 (Done), 1/7/2015, 1/7/2015, 1/9/2014    Override on 1/6/2016: Done    FASTING LIPID PROFILE 6/12/2018 6/12/2017, 6/13/2016, 12/3/2015, 5/19/2015, 12/2/2014, 7/22/2014, 8/9/2013, 12/5/2012, 10/1/2012, 5/1/2012    URINE ACR / MICROALBUMIN 6/12/2018 6/12/2017, 3/30/2016, 7/22/2014, 7/22/2014, 3/5/2014, 3/5/2014, 2/10/2014, 2/10/2014, 12/5/2012, 5/1/2012    SERUM CREATININE 6/12/2018 6/12/2017, 6/13/2016, 3/30/2016, 12/3/2015, 9/29/2015, 5/19/2015, 5/19/2015, 12/2/2014, 11/14/2013, 11/13/2013, 10/15/2013, 8/9/2013, 4/10/2013, 3/1/2013, 2/28/2013, 2/19/2013, 12/5/2012, 11/12/2012, 10/1/2012, 9/24/2012    COLONOSCOPY 12/18/2018 12/18/2013            Current Immunizations     13-VALENT PCV PREVNAR 3/28/2016    Influenza TIV (IM) 11/13/2013  3:04 PM, 10/4/2012  4:58 PM    Influenza Vaccine Adult HD 10/28/2016    Pneumococcal polysaccharide vaccine (PPSV-23) 10/4/2012  5:00 PM    SHINGLES VACCINE 3/28/2014    TD Vaccine 3/28/2008      Below and/or attached are the medications your provider expects you to take. Review all of your home medications and newly ordered medications with your provider and/or pharmacist. Follow medication instructions as directed by your provider and/or pharmacist. Please keep your medication list with you and share with your provider. Update the information when medications are discontinued, doses are changed, or new medications (including over-the-counter products) are added; and carry medication information at all times in the event of emergency situations     Allergies:  ALEVE COLD & - Anaphylaxis     CEFTRIAXONE SODIUM - Anaphylaxis     NAPROXEN - Anaphylaxis     TAPE - Rash,Itching               Medications  Valid as of: June 20, 2017 - 11:26 AM    Generic Name Brand Name Tablet Size Instructions for use    Albiglutide  (Pen-injector) TANZEUM 30 MG INJECT 1 PEN AS INSTRUCTED EVERY 7 DAYS        Aspirin (Tablet Delayed Response) ECOTRIN 81 MG Take 81 mg by mouth every day.        Atorvastatin Calcium (Tab) LIPITOR 20 MG Take 1 Tab by mouth every day.        Blood Glucose Monitoring Suppl (Device) Blood Glucose Monitoring Suppl  Meter: Dispense Abbott Freestyle Lite meter. Sig. Use as directed for blood sugar monitoring. #1. NR.        Blood Glucose Monitoring Suppl (Misc) Blood Glucose Monitoring Suppl SUPPLIES Test strips order: Test strips for Abbott Freestyle Lite meter. Sig: use 4 times daily and prn ssx high or low sugar #100 RF x 0        BuPROPion HCl (TABLET SR 24 HR) WELLBUTRIN  MG Take 1 Tab by mouth every morning.        Cyanocobalamin (SL Tab) Vitamin B-12 2500 MCG Place  under tongue every day.        Ferrous Sulfate (Tab) ferrous sulfate 325 (65 FE) MG Take 325 mg by mouth every 48 hours.        Glucose Blood (Strip) glucose blood  1 Each by Other route as needed.        Insulin Glargine (Solution Pen-injector) LANTUS 100 UNIT/ML INJECT 30 UNITS twice a day        Insulin Pen Needle (Misc) B-D ULTRAFINE III SHORT PEN 31G X 8 MM USE 1 NEEDLE EVERY DAY WITH LANTUS        Insulin Pen Needle (Misc) B-D ULTRAFINE III SHORT PEN 31G X 8 MM USE 1 PEN NEEDLE TWICE A DAY        Lancets (Misc) Lancets  Lancets order: Lancets for Abbott Freestyle Lite meter. Sig: use 4 times daily  and prn ssx high or low sugar. #100 RF x 0        Lancets (Misc) FREESTYLE LANCETS  by Does not apply route.        Levothyroxine Sodium (Tab) SYNTHROID 75 MCG Take 1 Tab by mouth Every morning on an empty stomach.        MetFORMIN HCl (Tab) GLUCOPHAGE 500 MG TAKE 2 TABLETS TWICE A DAY WITH MEALS        Metoprolol Tartrate (Tab) LOPRESSOR 25 MG TAKE 1 TABLET TWICE A DAY        Valsartan (Tab) DIOVAN 80 MG TAKE 1 TABLET DAILY        .                 Medicines prescribed today were sent to:     [facility] - [place], [place] - [address]    [address] [address]    Phone: [phone] Fax: [phone]    Open 24 Hours?: No    [facility] [address] [place] [address]    Phone: [phone] Fax: [phone]    Open 24 Hours?: No      Medication refill instructions:       If your prescription bottle indicates you have medication refills left, it is not necessary to call your provider’s office. Please contact your pharmacy and they will refill your medication.    If your prescription bottle indicates you do not have any refills left, you may request refills at any time through one of the following ways: The online linkedÃ¼ system (except Urgent Care), by calling your provider’s office, or by asking your pharmacy to contact your provider’s office with a refill request. Medication refills are processed only during regular business hours and may not be available until the next business day. Your provider may request additional information or to have a follow-up visit with you prior to refilling your medication.   *Please Note: Medication refills are assigned a new Rx number when refilled electronically. Your pharmacy may indicate that no refills were authorized even though a new prescription for the same medication is available at the pharmacy. Please request the medicine by name with the pharmacy before contacting your provider for a refill.        Your To Do List     Future Labs/Procedures Complete By Expires    Carotid Duplex  As directed 12/20/2017    Scheduling Instructions:    Within the next couple of weeks    Echocardiogram Comp w/o Cont  As directed 6/20/2018    Scheduling Instructions:    Within the next couple of weeks         MyChart Access Code: Activation code not generated  Current [employer] Status: Active

## 2017-07-13 ENCOUNTER — PATIENT MESSAGE (OUTPATIENT)
Dept: ENDOCRINOLOGY | Facility: MEDICAL CENTER | Age: 72
End: 2017-07-13

## 2017-07-13 DIAGNOSIS — I10 ESSENTIAL HYPERTENSION: ICD-10-CM

## 2017-07-13 RX ORDER — VALSARTAN 80 MG/1
TABLET ORAL
Qty: 90 TAB | Refills: 2 | Status: SHIPPED | OUTPATIENT
Start: 2017-07-13 | End: 2017-07-27 | Stop reason: SDUPTHER

## 2017-07-17 ENCOUNTER — HOSPITAL ENCOUNTER (OUTPATIENT)
Dept: RADIOLOGY | Facility: MEDICAL CENTER | Age: 72
End: 2017-07-17
Attending: INTERNAL MEDICINE
Payer: MEDICARE

## 2017-07-17 ENCOUNTER — HOSPITAL ENCOUNTER (OUTPATIENT)
Dept: CARDIOLOGY | Facility: MEDICAL CENTER | Age: 72
End: 2017-07-17
Attending: INTERNAL MEDICINE | Admitting: SURGERY
Payer: MEDICARE

## 2017-07-17 DIAGNOSIS — R40.4 ALTERED LEVEL OF CONSCIOUSNESS: ICD-10-CM

## 2017-07-17 DIAGNOSIS — I25.10 CORONARY ARTERY DISEASE DUE TO CALCIFIED CORONARY LESION: ICD-10-CM

## 2017-07-17 DIAGNOSIS — I25.84 CORONARY ARTERY DISEASE DUE TO CALCIFIED CORONARY LESION: ICD-10-CM

## 2017-07-17 PROCEDURE — 93880 EXTRACRANIAL BILAT STUDY: CPT | Mod: 26 | Performed by: SURGERY

## 2017-07-17 PROCEDURE — 93306 TTE W/DOPPLER COMPLETE: CPT | Mod: 26 | Performed by: INTERNAL MEDICINE

## 2017-07-17 PROCEDURE — 93880 EXTRACRANIAL BILAT STUDY: CPT

## 2017-07-17 PROCEDURE — 93306 TTE W/DOPPLER COMPLETE: CPT

## 2017-07-20 RX ORDER — PEN NEEDLE, DIABETIC 31 GX5/16"
NEEDLE, DISPOSABLE MISCELLANEOUS
Qty: 200 EACH | Refills: 9 | Status: SHIPPED | OUTPATIENT
Start: 2017-07-20 | End: 2017-07-27

## 2017-07-27 ENCOUNTER — OFFICE VISIT (OUTPATIENT)
Dept: CARDIOLOGY | Facility: MEDICAL CENTER | Age: 72
End: 2017-07-27
Payer: MEDICARE

## 2017-07-27 VITALS
OXYGEN SATURATION: 95 % | BODY MASS INDEX: 38.04 KG/M2 | DIASTOLIC BLOOD PRESSURE: 58 MMHG | HEART RATE: 76 BPM | HEIGHT: 68 IN | WEIGHT: 251 LBS | SYSTOLIC BLOOD PRESSURE: 116 MMHG | RESPIRATION RATE: 14 BRPM

## 2017-07-27 DIAGNOSIS — I25.10 CORONARY ARTERY DISEASE DUE TO CALCIFIED CORONARY LESION: ICD-10-CM

## 2017-07-27 DIAGNOSIS — I10 ESSENTIAL HYPERTENSION: ICD-10-CM

## 2017-07-27 DIAGNOSIS — Z95.0 PACEMAKER: ICD-10-CM

## 2017-07-27 DIAGNOSIS — E78.5 DYSLIPIDEMIA: ICD-10-CM

## 2017-07-27 DIAGNOSIS — R40.4 ALTERED LEVEL OF CONSCIOUSNESS: ICD-10-CM

## 2017-07-27 DIAGNOSIS — I25.84 CORONARY ARTERY DISEASE DUE TO CALCIFIED CORONARY LESION: ICD-10-CM

## 2017-07-27 PROCEDURE — 99214 OFFICE O/P EST MOD 30 MIN: CPT | Performed by: NURSE PRACTITIONER

## 2017-07-27 RX ORDER — VALSARTAN 80 MG/1
TABLET ORAL
Qty: 90 TAB | Refills: 3 | Status: SHIPPED | OUTPATIENT
Start: 2017-07-27 | End: 2018-09-06 | Stop reason: SDUPTHER

## 2017-07-27 ASSESSMENT — ENCOUNTER SYMPTOMS
ORTHOPNEA: 0
ABDOMINAL PAIN: 0
DIZZINESS: 0
MYALGIAS: 0
PALPITATIONS: 0
WEAKNESS: 0
PND: 0
CLAUDICATION: 0
SHORTNESS OF BREATH: 1
COUGH: 0

## 2017-07-27 NOTE — MR AVS SNAPSHOT
"        Dakota Boyer   2017 8:20 AM   Office Visit   MRN: [id number]    Department:  [facility]   Dept Phone:  [phone]    Description:  Male : 1945   Provider:  ERIN Mckeon           Reason for Visit     Follow-Up           Allergies as of 2017     Allergen Noted Reactions    Aleve Cold & [Pseudoephedrine-Naproxen Na] 2013   Anaphylaxis    Ceftriaxone Sodium 2013   Anaphylaxis    Reaction; 1970's.    Naproxen 2013   Anaphylaxis    Reaction; 2004.    Tape 10/18/2016   Rash, Itching    Plastic tape (paper tape ok)      You were diagnosed with     Altered level of consciousness   [477656]       Pacemaker   [717646]       Coronary artery disease due to calcified coronary lesion   [4645690]       Dyslipidemia   [496463]       Essential hypertension   [1809630]         Vital Signs     Blood Pressure Pulse Respirations Height Weight Body Mass Index    116/58 mmHg 76 14 1.727 m (5' 7.99\") 113.853 kg (251 lb) 38.17 kg/m2    Oxygen Saturation Smoking Status                95% Former Smoker          Basic Information     Date Of Birth Sex Race Ethnicity Preferred Language    1945 Male White Non- English      Your appointments     Sep 25, 2017 11:15 AM   FOLLOW UP with Jac Christiansen M.D.   [facility] (--)    [address]  [facility] [address] [phone]            2017 10:20 AM   NEW TO YOU with Sofía Ruvalcaba D.O.   [facility])    [address]  [facility] [address] [phone]            Dec 07, 2017  1:00 PM   Diabetes Care Visit with Isabela Singletary M.D., Kayla Cook R.N.   [facility]    [address], [address]  [facility] [address] [phone]           You will be receiving a confirmation call a few days before your appointment from our automated call confirmation " system.              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 High  10/2/2012 - Present    Sick sinus syndrome (CMS-HCC) I49.5 High  10/4/2012 - Present    Pacemaker Z95.0 High  10/4/2012 - Present    Degeneration of lumbar or lumbosacral intervertebral disc M51.37 Low  11/20/2012 - Present    S/P lumbar discectomy Z98.890 Low  11/21/2012 - Present    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 Low  2/27/2013 - Present    Coronary artery disease due to calcified coronary lesion: 40-50% disease in the circumflex at cardiac catheterization in 2013 I25.10, I25.84 High  8/16/2013 - Present    Fatigue R53.83 Medium  9/10/2013 - Present    Shortness of breath R06.02 High  9/10/2013 - Present    Other dyspnea and respiratory abnormality R06.09, R09.89   10/17/2013 - Present    History of diverticulitis Z87.19 High  11/13/2013 - Present    Hypothyroidism E03.9   8/21/2014 - Present    Obesity E66.9   8/21/2014 - Present    Depression with anxiety F41.8   3/28/2016 - Present    Obstructive sleep apnea on CPAP G47.33, Z99.89   3/28/2016 - Present    Non-proliferative diabetic retinopathy, mild, both eyes (CMS-HCC) E11.3293   6/2/2016 - Present    Hypertensive retinopathy of both eyes H35.033   6/2/2016 - Present    Cervical spondylosis M47.812   10/19/2016 - Present    Altered level of consciousness: March 2017 R40.4   6/20/2017 - Present      Health Maintenance        Date Due Completion Dates    IMM DTaP/Tdap/Td Vaccine (1 - Tdap) 3/29/2008  3/28/2008    IMM INFLUENZA (1) 9/1/2017 10/28/2016, 11/13/2013, 10/4/2012    A1C SCREENING 12/7/2017 6/7/2017, 1/26/2017, 3/30/2016, 9/29/2015, 5/19/2015, 3/3/2015, 12/2/2014, 7/22/2014, 3/5/2014, 2/10/2014, 12/5/2012, 5/1/2012    DIABETES MONOFILAMENT / LE EXAM 1/26/2018 1/26/2017, 8/21/2014, 10/11/2013 (Done)    Override on 10/11/2013: Done (seen by podiatry q 3 months)    RETINAL SCREENING 2/2/2018 2/2/2017, 1/6/2016, 1/6/2016 (Done), 1/7/2015, 1/7/2015, 1/9/2014    Override on 1/6/2016: Done    FASTING LIPID PROFILE 6/12/2018 6/12/2017, 6/13/2016, 12/3/2015, 5/19/2015, 12/2/2014, 7/22/2014, 8/9/2013, 12/5/2012, 10/1/2012, 5/1/2012    URINE ACR / MICROALBUMIN 6/12/2018 6/12/2017, 3/30/2016, 7/22/2014, 7/22/2014, 3/5/2014, 3/5/2014, 2/10/2014, 2/10/2014, 12/5/2012, 5/1/2012    SERUM CREATININE 6/12/2018 6/12/2017, 6/13/2016, 3/30/2016, 12/3/2015, 9/29/2015, 5/19/2015, 5/19/2015, 12/2/2014, 11/14/2013, 11/13/2013, 10/15/2013, 8/9/2013, 4/10/2013, 3/1/2013, 2/28/2013, 2/19/2013, 12/5/2012, 11/12/2012, 10/1/2012, 9/24/2012    COLONOSCOPY 12/18/2018 12/18/2013            Current Immunizations     13-VALENT PCV PREVNAR 3/28/2016    Influenza TIV (IM) 11/13/2013  3:04 PM, 10/4/2012  4:58 PM    Influenza Vaccine Adult HD 10/28/2016    Pneumococcal polysaccharide vaccine (PPSV-23) 10/4/2012  5:00 PM    SHINGLES VACCINE 3/28/2014    TD Vaccine 3/28/2008      Below and/or attached are the medications your provider expects you to take. Review all of your home medications and newly ordered medications with your provider and/or pharmacist. Follow medication instructions as directed by your provider and/or pharmacist. Please keep your medication list with you and share with your provider. Update the information when medications are discontinued, doses are changed, or new medications (including over-the-counter products) are added; and carry medication information at all times in the event of emergency situations     Allergies:   ALEVE COLD & - Anaphylaxis     CEFTRIAXONE SODIUM - Anaphylaxis     NAPROXEN - Anaphylaxis     TAPE - Rash,Itching               Medications  Valid as of: July 27, 2017 -  9:13 AM    Generic Name Brand Name Tablet Size Instructions for use    Albiglutide (Pen-injector) TANZEUM 30 MG INJECT 1 PEN AS INSTRUCTED EVERY 7 DAYS        Aspirin (Tablet Delayed Response) ECOTRIN 81 MG Take 81 mg by mouth every day.        Atorvastatin Calcium (Tab) LIPITOR 20 MG Take 1 Tab by mouth every day.        Blood Glucose Monitoring Suppl (Device) Blood Glucose Monitoring Suppl  Meter: Dispense Abbott Freestyle Lite meter. Sig. Use as directed for blood sugar monitoring. #1. NR.        Blood Glucose Monitoring Suppl (Misc) Blood Glucose Monitoring Suppl SUPPLIES Test strips order: Test strips for Abbott Freestyle Lite meter. Sig: use 4 times daily and prn ssx high or low sugar #100 RF x 0        BuPROPion HCl (TABLET SR 24 HR) WELLBUTRIN  MG Take 1 Tab by mouth every morning.        Cyanocobalamin (SL Tab) Vitamin B-12 2500 MCG Place  under tongue every day.        Ferrous Sulfate (Tab) ferrous sulfate 325 (65 FE) MG Take 325 mg by mouth every 48 hours.        Insulin Glargine (Solution Pen-injector) LANTUS SOLOSTAR 100 UNIT/ML INJECT 30 UNITS TWICE A DAY        Insulin Pen Needle (Misc) B-D ULTRAFINE III SHORT PEN 31G X 8 MM USE 1 NEEDLE EVERY DAY WITH LANTUS        Lancets (Misc) Lancets  Lancets order: Lancets for Abbott Freestyle Lite meter. Sig: use 4 times daily  and prn ssx high or low sugar. #100 RF x 0        Levothyroxine Sodium (Tab) SYNTHROID 75 MCG Take 1 Tab by mouth Every morning on an empty stomach.        MetFORMIN HCl (Tab) GLUCOPHAGE 500 MG TAKE 2 TABLETS TWICE A DAY WITH MEALS        Metoprolol Tartrate (Tab) LOPRESSOR 25 MG TAKE 1 TABLET TWICE A DAY        Valsartan (Tab) DIOVAN 80 MG TAKE 1 TABLET DAILY        .                 Medicines prescribed today were sent to:     [facility] -  [place], [place] - [address]    [address] [address]    Phone: [phone] Fax: [phone]    Open 24 Hours?: No    [facility] [address] [place] [address] - [address] [place] [address]    Phone: [phone] Fax: [phone]    Open 24 Hours?: No      Medication refill instructions:       If your prescription bottle indicates you have medication refills left, it is not necessary to call your provider’s office. Please contact your pharmacy and they will refill your medication.    If your prescription bottle indicates you do not have any refills left, you may request refills at any time through one of the following ways: The online [employer] system (except Urgent Care), by calling your provider’s office, or by asking your pharmacy to contact your provider’s office with a refill request. Medication refills are processed only during regular business hours and may not be available until the next business day. Your provider may request additional information or to have a follow-up visit with you prior to refilling your medication.   *Please Note: Medication refills are assigned a new Rx number when refilled electronically. Your pharmacy may indicate that no refills were authorized even though a new prescription for the same medication is available at the pharmacy. Please request the medicine by name with the pharmacy before contacting your provider for a refill.           [employer] Access Code: Activation code not generated  Current [employer] Status: Active

## 2017-07-27 NOTE — PROGRESS NOTES
"Subjective:   Dakota \"Gerry\"  Luis Daniel Boyer is a 72 y.o. male who presents today with his wife, Jenny, to follow-up on an episode of altered level of consciousness. Back in March, he had an episode where he had reduced level of consciousness and some minor shaking of his extremities. He was evaluated by Dr. Christiansen who felt that this may be vasovagal but ordered some testing. Patient is here for results.    He's not had any further episodes. Patient denies any near syncope or lightheadedness. He has a history of coronary artery disease, diabetes, hypertension and hyperlipidemia. He does not check his blood pressure regularly. He's not been having any exertional anginal symptoms. He does not exercise regularly. He states he feels generally well.    He has sleep apnea but does not use the CPAP. He has a pacemaker that was checked after the procedure and nothing was revealed as to the cause of his altered level of consciousness.    Past Medical History   Diagnosis Date   • Anxiety    • Hypertension    • Hyperlipidemia    • RBBB     • Hypersensitive carotid sinus syndrome     • Syncope October 2012     Treated with PPM   • Diabetes      Oral agents and insulin   • Depression    • Snoring    • CAD (coronary artery disease) October 2012     Positive coronary calcium score.  Follow-up MPI was negative for ischemia.   • Sick sinus syndrome (CMS-[facility])     • Pacemaker October 2012     [employer] [address] #[id number] implanted by [facility].    • Episodic lightheadedness     • Incisional infection April 2013     Wound dehiscience of neck surgery.   • Insomnia     • Degeneration of lumbar or lumbosacral intervertebral disc     • S/P lumbar discectomy     • DJD (degenerative joint disease) of cervical spine     • Dental disorder    • Breath shortness    • Unspecified hemorrhagic conditions      Reports bleeds easily   • Blood clotting disorder (CMS-[facility]) 1978     s/p Left knee surgery- DVT in left leg   • Cancer (CMS-[facility]) ? early " 90's     Melanoma Left arm- surgically removed   • High cholesterol    • Myocardial infarct (CMS-[facility]) ?     states was told by  that he has had a heart attack in the past.   • Sleep apnea      does not use CPAP anymore- stopped in Dec 2015   • Thyroid disease      Hypothyroid   • Back pain, chronic 10/18/2016   • Neck pain 10/18/2016     Past Surgical History   Procedure Laterality Date   • Athroplasty Left 2004   • Arthroscopy, knee Bilateral 1978   • Tonsillectomy     • Vasectomy     • Shoulder decompression Left 2008     Left rotator cuff   • Orthopedic osteotomy       Both knees several times, 8 total   • Recovery  10/4/2012     Performed by Cath-Recovery Surgery at SURGERY SAME DAY [facility]   • Lumbar laminectomy diskectomy  11/20/2012     Performed by Tu Jain M.D. at SURGERY [facility]   • Pacemaker insertion  October 2012     [employer] [address] [address] [id number] implanted by Dr. Waite.   • Cervical fusion posterior  2/27/2013     Performed by Tu Jain M.D. at SURGERY [facility]   • Cervical laminectomy posterior  2/27/2013     Performed by Tu Jain M.D. at SURGERY [facility]   • Wound dehiscence  4/10/2013     Performed by Tu Jain M.D. at SURGERY [facility]   • Recovery  10/17/2013     Performed by Cath-Recovery Surgery at SURGERY SAME DAY [facility]   • Shoulder arthroscopy Right 2015     torn bicep tendon and spurs   • Other     • Spinal cord stimulator  1 month ago   • Pr dstr nrolytc agnt parverteb fct sngl crvcl/thora Right 10/19/2016     Procedure: NEURO DEST FACET C/T W/IG SNGL - 3ON-C3, C8-T1    SINERGY;  Surgeon: Gaurang Pruett M.D.;  Location: SURGERY [facility];  Service: Pain Management   • Pr dstr nrolytc agnt parverteb fct addl crvcl/thora  10/19/2016     Procedure: NEURO DEST FACET C/T W/IG ADDL;  Surgeon: Gaurang Pruett M.D.;  Location: SURGERY [facility];  Service: Pain Management   • Pr fluoroscopic guidance needle placement   "10/19/2016     Procedure: FLOURO GUIDE NEEDLE PLACEMENT;  Surgeon: Gaurang Pruett M.D.;  Location: SURGERY [facility];  Service: Pain Management     Family History   Problem Relation Age of Onset   • Lung Disease Mother      Smoker   • Alcohol/Drug Mother    • Heart Disease Father 36     CAD   • Heart Disease Sister      \"hole in heart\"   • Lung Disease Brother      COPD, CANCER   • Cancer Brother      Prostate, Lung   • Alcohol/Drug Brother    • Heart Disease Brother    • Diabetes Brother    • Hypertension Brother    • Hyperlipidemia Brother    • Heart Disease Maternal Grandmother    • Hypertension Maternal Grandmother    • Hyperlipidemia Maternal Grandmother    • Cancer Brother      stomach, thyroid   • Heart Disease Brother 60     MI, stent, PM/defib     History   Smoking status   • Former Smoker -- 1.00 packs/day for 40 years   • Types: Cigarettes   • Quit date: 01/03/1990   Smokeless tobacco   • Never Used     Allergies   Allergen Reactions   • Aleve Cold & [Pseudoephedrine-Naproxen Na] Anaphylaxis   • Ceftriaxone Sodium Anaphylaxis     Reaction; 1970's.   • Naproxen Anaphylaxis     Reaction; 2004.   • Tape Rash and Itching     Plastic tape (paper tape ok)     Outpatient Encounter Prescriptions as of 7/27/2017   Medication Sig Dispense Refill   • valsartan (DIOVAN) 80 MG Tab TAKE 1 TABLET DAILY 90 Tab 3   • LANTUS SOLOSTAR 100 UNIT/ML Solution Pen-injector injection INJECT 30 UNITS TWICE A DAY (Patient taking differently: INJECT 25 UNITS TWICE A DAY OR AS DIRECTED) 45 mL 1   • buPROPion (WELLBUTRIN XL) 150 MG XL tablet Take 1 Tab by mouth every morning. 90 Tab 0   • levothyroxine (SYNTHROID) 75 MCG Tab Take 1 Tab by mouth Every morning on an empty stomach. 90 Tab 3   • TANZEUM 30 MG Pen-injector INJECT 1 PEN AS INSTRUCTED EVERY 7 DAYS 12 Each 2   • metformin (GLUCOPHAGE) 500 MG Tab TAKE 2 TABLETS TWICE A DAY WITH MEALS 360 Tab 2   • metoprolol (LOPRESSOR) 25 MG Tab TAKE 1 TABLET TWICE A  Tab 3   • " "ferrous sulfate 325 (65 FE) MG tablet Take 325 mg by mouth every 48 hours.     • Cyanocobalamin (VITAMIN B-12) 2500 MCG SL Tab Place  under tongue every day.     • atorvastatin (LIPITOR) 20 MG Tab Take 1 Tab by mouth every day. 90 Tab 3   • Blood Glucose Monitoring Suppl SUPPLIES Misc Test strips order: Test strips for Abbott Freestyle Lite meter. Sig: use 4 times daily and prn ssx high or low sugar #100 RF x 0 360 Each 4   • Lancets MISC Lancets order: Lancets for Abbott Freestyle Lite meter. Sig: use 4 times daily  and prn ssx high or low sugar. #100 RF x 0 360 Each 4   • Blood Glucose Monitoring Suppl [place] Meter: Dispense Abbott Freestyle Lite meter. Sig. Use as directed for blood sugar monitoring. #1. NR. 1 Device 0   • B-D ULTRAFINE III SHORT PEN 31G X 8 MM MISC USE 1 NEEDLE EVERY DAY WITH LANTUS 999 Each 2   • aspirin EC (ECOTRIN) 81 MG TBEC Take 81 mg by mouth every day.     • [DISCONTINUED] B-D ULTRAFINE III SHORT PEN 31G X 8 MM Misc USE TWICE A DAY (Patient not taking: Reported on 7/27/2017) 200 Each 9   • [DISCONTINUED] valsartan (DIOVAN) 80 MG Tab TAKE 1 TABLET DAILY 90 Tab 2   • [DISCONTINUED] FREESTYLE LANCETS Misc by Does not apply route.     • [DISCONTINUED] glucose blood strip 1 Each by Other route as needed.       No facility-administered encounter medications on file as of 7/27/2017.     Review of Systems   Constitutional: Negative for malaise/fatigue.   Respiratory: Positive for shortness of breath (exertion). Negative for cough.    Cardiovascular: Negative for chest pain, palpitations, orthopnea, claudication, leg swelling and PND.   Gastrointestinal: Negative for abdominal pain.   Musculoskeletal: Negative for myalgias.   Neurological: Negative for dizziness and weakness.        Objective:   /58 mmHg  Pulse 76  Resp 14  Ht 1.727 m (5' 7.99\")  Wt 113.853 kg (251 lb)  BMI 38.17 kg/m2  SpO2 95%    Physical Exam   Constitutional: He is oriented to person, place, and time. He appears " well-developed and well-nourished.   HENT:   Head: Normocephalic.   Eyes: Conjunctivae are normal.   Neck: No JVD present. No thyromegaly present.   Cardiovascular: Normal rate, regular rhythm and normal heart sounds.    Pulmonary/Chest: Effort normal and breath sounds normal. He has no wheezes. He has no rales.   Pacemaker left upper chest without redness or fluctuance.   Abdominal: Soft. Bowel sounds are normal. He exhibits no distension. There is no tenderness.   Protuberant abdomen   Musculoskeletal: He exhibits edema (trace bilateral).   Neurological: He is alert and oriented to person, place, and time.   Skin: Skin is warm and dry.   Psychiatric: He has a normal mood and affect.     July 17, 2017: Carotid Duplex  Report   Vascular Laboratory   CONCLUSIONS   Less than 50% bilateral internal carotid artery stenoses   Normal subclavians   Normal vertebrals      July 17, 2017: Transthoracic Echo Report  Left ventricular ejection fraction is visually estimated to be 65%.   Mild concentric left ventricular hypertrophy.   Mild mitral regurgitation.   Mild aortic stenosis.   Right ventricular systolic pressure is estimated to be 38 mmHg. Right   atrial pressure is estimated to be 3 mmHg.  No pericardial effusion seen.   Echo images from 10/2/14 showed normal gradients across aortic valve.   Normal LV function.    Results for TON POWER (MRN [id number]) as of 7/27/2017 09:18   Ref. Range 6/12/2017 09:53   Sodium Latest Ref Range: 135-145 mmol/L 138   Potassium Latest Ref Range: 3.6-5.5 mmol/L 4.2   Chloride Latest Ref Range:  mmol/L 103   Co2 Latest Ref Range: 20-33 mmol/L 26   Anion Gap Latest Ref Range: 0.0-11.9  9.0   Glucose Latest Ref Range: 65-99 mg/dL 143 (H)   Bun Latest Ref Range: 8-22 mg/dL 14   Creatinine Latest Ref Range: 0.50-1.40 mg/dL 0.80   GFR If  Latest Ref Range: >60 mL/min/1.73 m 2 >60   GFR If Non  Latest Ref Range: >60 mL/min/1.73 m 2 >60   Calcium  Latest Ref Range: 8.5-10.5 mg/dL 10.0   AST(SGOT) Latest Ref Range: 12-45 U/L 18   ALT(SGPT) Latest Ref Range: 2-50 U/L 18   Alkaline Phosphatase Latest Ref Range: 30-99 U/L 63   Total Bilirubin Latest Ref Range: 0.1-1.5 mg/dL 1.2   Albumin Latest Ref Range: 3.2-4.9 g/dL 4.2   Total Protein Latest Ref Range: 6.0-8.2 g/dL 6.9   Globulin Latest Ref Range: 1.9-3.5 g/dL 2.7   A-G Ratio Latest Units: g/dL 1.6   Cholesterol,Tot Latest Ref Range: 100-199 mg/dL 94 (L)   Triglycerides Latest Ref Range: 0-149 mg/dL 83   HDL Latest Ref Range: >=40 mg/dL 36 (A)   LDL Latest Ref Range: <100 mg/dL 41       Assessment:     1. Altered level of consciousness: March 2017     2. Pacemaker     3. Coronary artery disease due to calcified coronary lesion: 40-50% disease in the circumflex at cardiac catheterization in 2013     4. Dyslipidemia     5. Essential hypertension  valsartan (DIOVAN) 80 MG Tab       Medical Decision Making:  Today's Assessment / Status / Plan:     Altered level of consciousness: No further episodes. This may have been vasovagal or possibly hypoglycemic episode. His blood sugar was checked after the episode and was reported to be 120. Sometimes the blood sugar can be low and stress causes a release of glycogen causing the sugar to be normal when checked. However he's not had any further problems and feels well. He did have some minor shaking of his extremities but did not have a postictal state. Does not appear this was a seizure. If he does have another episode we will explore further. At this time I do not think he needs referral to neurology.    Pacemaker: Appears to be functioning well. Nothing was found on the pacemaker checked that would splaying his altered consciousness.    Coronary artery disease: Asymptomatic at this time. He is encouraged to exercise to raise his HDL and prevent progression of his disease.    Dyslipidemia: Lipids are excellent with the exception of HDL being low at 36. Again he is  encouraged exercise.    Hypertension: His blood pressure is good and he has no lightheadedness. Continue current regimen.    He will follow up as scheduled with Dr. Christiansen on September 25, 2017. He will follow-up sooner or contact us using my chart if problems.    Collaborating Provider: Dr. Benjie Campos.

## 2017-07-27 NOTE — Clinical Note
"     [facility]   [address], [address]  [place] [address]  Phone: [phone]  Fax: [phone]              Dakota Boyer  1945    Encounter Date: 7/27/2017    ERIN Mckeon          PROGRESS NOTE:  Subjective:   Dakota \"Gerry\"  Luis Daniel Boyer is a 72 y.o. male who presents today with his wife, Jenny, to follow-up on an episode of altered level of consciousness. Back in March, he had an episode where he had reduced level of consciousness and some minor shaking of his extremities. He was evaluated by Dr. Christiansen who felt that this may be vasovagal but ordered some testing. Patient is here for results.    He's not had any further episodes. Patient denies any near syncope or lightheadedness. He has a history of coronary artery disease, diabetes, hypertension and hyperlipidemia. He does not check his blood pressure regularly. He's not been having any exertional anginal symptoms. He does not exercise regularly. He states he feels generally well.    He has sleep apnea but does not use the CPAP. He has a pacemaker that was checked after the procedure and nothing was revealed as to the cause of his altered level of consciousness.    Past Medical History   Diagnosis Date   • Anxiety    • Hypertension    • Hyperlipidemia    • RBBB     • Hypersensitive carotid sinus syndrome     • Syncope October 2012     Treated with PPM   • Diabetes      Oral agents and insulin   • Depression    • Snoring    • CAD (coronary artery disease) October 2012     Positive coronary calcium score.  Follow-up MPI was negative for ischemia.   • Sick sinus syndrome (CMS-HCC)     • Pacemaker October 2012     [employer] [facility] #[id number] implanted by [facility].    • Episodic lightheadedness     • Incisional infection April 2013     Wound dehiscience of neck surgery.   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 early 90's     Melanoma Left arm- surgically removed   • High cholesterol    • Myocardial infarct (CMS-HCC) ?     states was told by  that he has had a heart attack in the past.   • Sleep apnea      does not use CPAP anymore- stopped in Dec 2015   • Thyroid disease      Hypothyroid   • Back pain, chronic 10/18/2016   • Neck pain 10/18/2016     Past Surgical History   Procedure Laterality Date   • Athroplasty Left 2004   • Arthroscopy, knee Bilateral 1978   • Tonsillectomy     • Vasectomy     • Shoulder decompression Left 2008     Left rotator cuff   • Orthopedic osteotomy       Both knees several times, 8 total   • Recovery  10/4/2012     Performed by Cath-Recovery Surgery at SURGERY SAME DAY [facility]   • Lumbar laminectomy diskectomy  11/20/2012     Performed by Tu Jain M.D. at SURGERY [facility]   • Pacemaker insertion  October 2012     [employer] [address] [id number] implanted by Dr. Waite.   • Cervical fusion posterior  2/27/2013     Performed by Tu Jain M.D. at SURGERY [facility]   • Cervical laminectomy posterior  2/27/2013     Performed by Tu Jain M.D. at SURGERY [facility]   • Wound dehiscence  4/10/2013     Performed by Tu Jain M.D. at SURGERY [facility]   • Recovery  10/17/2013     Performed by Cath-Recovery Surgery at SURGERY SAME DAY [facility]   • Shoulder arthroscopy Right 2015     torn bicep tendon and spurs   • Other     • Spinal cord stimulator  1 month ago   • Pr dstr nrolytc agnt parverteb fct sngl crvcl/thora Right 10/19/2016     Procedure: NEURO DEST FACET C/T W/IG SNGL - 3ON-C3, C8-T1    SINERGY;  Surgeon: Gaurang Pruett M.D.;  Location: SURGERY [facility];  Service: Pain  "Management   • Pr dstr nrolytc agnt parverteb fct addl crvcl/thora  10/19/2016     Procedure: NEURO DEST FACET C/T W/IG ADDL;  Surgeon: Gaurang Pruett M.D.;  Location: SURGERY [facility];  Service: Pain Management   • Pr fluoroscopic guidance needle placement  10/19/2016     Procedure: FLOURO GUIDE NEEDLE PLACEMENT;  Surgeon: Gaurang Pruett M.D.;  Location: SURGERY [facility];  Service: Pain Management     Family History   Problem Relation Age of Onset   • Lung Disease Mother      Smoker   • Alcohol/Drug Mother    • Heart Disease Father 36     CAD   • Heart Disease Sister      \"hole in heart\"   • Lung Disease Brother      COPD, CANCER   • Cancer Brother      Prostate, Lung   • Alcohol/Drug Brother    • Heart Disease Brother    • Diabetes Brother    • Hypertension Brother    • Hyperlipidemia Brother    • Heart Disease Maternal Grandmother    • Hypertension Maternal Grandmother    • Hyperlipidemia Maternal Grandmother    • Cancer Brother      stomach, thyroid   • Heart Disease Brother 60     MI, stent, PM/defib     History   Smoking status   • Former Smoker -- 1.00 packs/day for 40 years   • Types: Cigarettes   • Quit date: 01/03/1990   Smokeless tobacco   • Never Used     Allergies   Allergen Reactions   • Aleve Cold & [Pseudoephedrine-Naproxen Na] Anaphylaxis   • Ceftriaxone Sodium Anaphylaxis     Reaction; 1970's.   • Naproxen Anaphylaxis     Reaction; 2004.   • Tape Rash and Itching     Plastic tape (paper tape ok)     Outpatient Encounter Prescriptions as of 7/27/2017   Medication Sig Dispense Refill   • valsartan (DIOVAN) 80 MG Tab TAKE 1 TABLET DAILY 90 Tab 3   • LANTUS SOLOSTAR 100 UNIT/ML Solution Pen-injector injection INJECT 30 UNITS TWICE A DAY (Patient taking differently: INJECT 25 UNITS TWICE A DAY OR AS DIRECTED) 45 mL 1   • buPROPion (WELLBUTRIN XL) 150 MG XL tablet Take 1 Tab by mouth every morning. 90 Tab 0   • levothyroxine (SYNTHROID) 75 MCG Tab Take 1 Tab by mouth Every morning on an " empty stomach. 90 Tab 3   • TANZEUM 30 MG Pen-injector INJECT 1 PEN AS INSTRUCTED EVERY 7 DAYS 12 Each 2   • metformin (GLUCOPHAGE) 500 MG Tab TAKE 2 TABLETS TWICE A DAY WITH MEALS 360 Tab 2   • metoprolol (LOPRESSOR) 25 MG Tab TAKE 1 TABLET TWICE A  Tab 3   • ferrous sulfate 325 (65 FE) MG tablet Take 325 mg by mouth every 48 hours.     • Cyanocobalamin (VITAMIN B-12) 2500 MCG SL Tab Place  under tongue every day.     • atorvastatin (LIPITOR) 20 MG Tab Take 1 Tab by mouth every day. 90 Tab 3   • Blood Glucose Monitoring Suppl SUPPLIES Misc Test strips order: Test strips for Abbott Freestyle Lite meter. Sig: use 4 times daily and prn ssx high or low sugar #100 RF x 0 360 Each 4   • Lancets MISC Lancets order: Lancets for Abbott Freestyle Lite meter. Sig: use 4 times daily  and prn ssx high or low sugar. #100 RF x 0 360 Each 4   • Blood Glucose Monitoring Suppl [place] Meter: Dispense Abbott Freestyle Lite meter. Sig. Use as directed for blood sugar monitoring. #1. NR. 1 Device 0   • B-D ULTRAFINE III SHORT PEN 31G X 8 MM MISC USE 1 NEEDLE EVERY DAY WITH LANTUS 999 Each 2   • aspirin EC (ECOTRIN) 81 MG TBEC Take 81 mg by mouth every day.     • [DISCONTINUED] B-D ULTRAFINE III SHORT PEN 31G X 8 MM Misc USE TWICE A DAY (Patient not taking: Reported on 7/27/2017) 200 Each 9   • [DISCONTINUED] valsartan (DIOVAN) 80 MG Tab TAKE 1 TABLET DAILY 90 Tab 2   • [DISCONTINUED] FREESTYLE LANCETS Misc by Does not apply route.     • [DISCONTINUED] glucose blood strip 1 Each by Other route as needed.       No facility-administered encounter medications on file as of 7/27/2017.     Review of Systems   Constitutional: Negative for malaise/fatigue.   Respiratory: Positive for shortness of breath (exertion). Negative for cough.    Cardiovascular: Negative for chest pain, palpitations, orthopnea, claudication, leg swelling and PND.   Gastrointestinal: Negative for abdominal pain.   Musculoskeletal: Negative for myalgias.    "  Neurological: Negative for dizziness and weakness.        Objective:   /58 mmHg  Pulse 76  Resp 14  Ht 1.727 m (5' 7.99\")  Wt 113.853 kg (251 lb)  BMI 38.17 kg/m2  SpO2 95%    Physical Exam   Constitutional: He is oriented to person, place, and time. He appears well-developed and well-nourished.   HENT:   Head: Normocephalic.   Eyes: Conjunctivae are normal.   Neck: No JVD present. No thyromegaly present.   Cardiovascular: Normal rate, regular rhythm and normal heart sounds.    Pulmonary/Chest: Effort normal and breath sounds normal. He has no wheezes. He has no rales.   Pacemaker left upper chest without redness or fluctuance.   Abdominal: Soft. Bowel sounds are normal. He exhibits no distension. There is no tenderness.   Protuberant abdomen   Musculoskeletal: He exhibits edema (trace bilateral).   Neurological: He is alert and oriented to person, place, and time.   Skin: Skin is warm and dry.   Psychiatric: He has a normal mood and affect.     July 17, 2017: Carotid Duplex  Report   Vascular Laboratory   CONCLUSIONS   Less than 50% bilateral internal carotid artery stenoses   Normal subclavians   Normal vertebrals      July 17, 2017: Transthoracic Echo Report  Left ventricular ejection fraction is visually estimated to be 65%.   Mild concentric left ventricular hypertrophy.   Mild mitral regurgitation.   Mild aortic stenosis.   Right ventricular systolic pressure is estimated to be 38 mmHg. Right   atrial pressure is estimated to be 3 mmHg.  No pericardial effusion seen.   Echo images from 10/2/14 showed normal gradients across aortic valve.   Normal LV function.    Results for TON POWER (MRN [id number]) as of 7/27/2017 09:18   Ref. Range 6/12/2017 09:53   Sodium Latest Ref Range: 135-145 mmol/L 138   Potassium Latest Ref Range: 3.6-5.5 mmol/L 4.2   Chloride Latest Ref Range:  mmol/L 103   Co2 Latest Ref Range: 20-33 mmol/L 26   Anion Gap Latest Ref Range: 0.0-11.9  9.0   Glucose " Latest Ref Range: 65-99 mg/dL 143 (H)   Bun Latest Ref Range: 8-22 mg/dL 14   Creatinine Latest Ref Range: 0.50-1.40 mg/dL 0.80   GFR If  Latest Ref Range: >60 mL/min/1.73 m 2 >60   GFR If Non  Latest Ref Range: >60 mL/min/1.73 m 2 >60   Calcium Latest Ref Range: 8.5-10.5 mg/dL 10.0   AST(SGOT) Latest Ref Range: 12-45 U/L 18   ALT(SGPT) Latest Ref Range: 2-50 U/L 18   Alkaline Phosphatase Latest Ref Range: 30-99 U/L 63   Total Bilirubin Latest Ref Range: 0.1-1.5 mg/dL 1.2   Albumin Latest Ref Range: 3.2-4.9 g/dL 4.2   Total Protein Latest Ref Range: 6.0-8.2 g/dL 6.9   Globulin Latest Ref Range: 1.9-3.5 g/dL 2.7   A-G Ratio Latest Units: g/dL 1.6   Cholesterol,Tot Latest Ref Range: 100-199 mg/dL 94 (L)   Triglycerides Latest Ref Range: 0-149 mg/dL 83   HDL Latest Ref Range: >=40 mg/dL 36 (A)   LDL Latest Ref Range: <100 mg/dL 41       Assessment:     1. Altered level of consciousness: March 2017     2. Pacemaker     3. Coronary artery disease due to calcified coronary lesion: 40-50% disease in the circumflex at cardiac catheterization in 2013     4. Dyslipidemia     5. Essential hypertension  valsartan (DIOVAN) 80 MG Tab       Medical Decision Making:  Today's Assessment / Status / Plan:     Altered level of consciousness: No further episodes. This may have been vasovagal or possibly hypoglycemic episode. His blood sugar was checked after the episode and was reported to be 120. Sometimes the blood sugar can be low and stress causes a release of glycogen causing the sugar to be normal when checked. However he's not had any further problems and feels well. He did have some minor shaking of his extremities but did not have a postictal state. Does not appear this was a seizure. If he does have another episode we will explore further. At this time I do not think he needs referral to neurology.    Pacemaker: Appears to be functioning well. Nothing was found on the pacemaker checked that  would splaying his altered consciousness.    Coronary artery disease: Asymptomatic at this time. He is encouraged to exercise to raise his HDL and prevent progression of his disease.    Dyslipidemia: Lipids are excellent with the exception of HDL being low at 36. Again he is encouraged exercise.    Hypertension: His blood pressure is good and he has no lightheadedness. Continue current regimen.    He will follow up as scheduled with Dr. Christiansen on September 25, 2017. He will follow-up sooner or contact us using my chart if problems.    Collaborating Provider: Dr. Benjie Campos.          No Recipients

## 2017-08-29 RX ORDER — BUPROPION HYDROCHLORIDE 150 MG/1
TABLET ORAL
Qty: 90 TAB | Refills: 0 | Status: SHIPPED | OUTPATIENT
Start: 2017-08-29 | End: 2017-11-26 | Stop reason: SDUPTHER

## 2017-08-29 NOTE — TELEPHONE ENCOUNTER
Patient has appointment to establish care with new PCP 11-13-17, will send three months to pharmacy.

## 2017-09-25 ENCOUNTER — OFFICE VISIT (OUTPATIENT)
Dept: CARDIOLOGY | Facility: MEDICAL CENTER | Age: 72
End: 2017-09-25
Payer: MEDICARE

## 2017-09-25 VITALS
DIASTOLIC BLOOD PRESSURE: 70 MMHG | HEIGHT: 68 IN | HEART RATE: 74 BPM | SYSTOLIC BLOOD PRESSURE: 120 MMHG | BODY MASS INDEX: 38.34 KG/M2 | WEIGHT: 253 LBS | OXYGEN SATURATION: 95 %

## 2017-09-25 DIAGNOSIS — I25.84 CORONARY ARTERY DISEASE DUE TO CALCIFIED CORONARY LESION: ICD-10-CM

## 2017-09-25 DIAGNOSIS — I25.10 CORONARY ARTERY DISEASE DUE TO CALCIFIED CORONARY LESION: ICD-10-CM

## 2017-09-25 DIAGNOSIS — E78.5 DYSLIPIDEMIA: ICD-10-CM

## 2017-09-25 DIAGNOSIS — Z95.0 PACEMAKER: ICD-10-CM

## 2017-09-25 DIAGNOSIS — I10 ESSENTIAL HYPERTENSION, BENIGN: ICD-10-CM

## 2017-09-25 PROCEDURE — 99214 OFFICE O/P EST MOD 30 MIN: CPT | Performed by: INTERNAL MEDICINE

## 2017-09-25 ASSESSMENT — ENCOUNTER SYMPTOMS: FEVER: 1

## 2017-09-25 NOTE — PROGRESS NOTES
Subjective:   Dakota Boyer is a 72 y.o. male who presents today for followup of his dyspnea on exertion, hypertension, hyperlipidemia, permanent pacemaker and mild coronary artery disease.     His wife since in the note that he had an event with an altered level of consciousness in March. This has not been recurrent. He has had a carotid ultrasound and repeat echocardiogram.    He is not taking his blood pressure very frequently at home. He has chronic dyspnea on exertion at less than half a block. He has had no PND or orthopnea. He does have some chronic left lower extremity edema since his left knee replacement. He stated no racing of his heart, lightheadedness or altered level of consciousness.    Past Medical History:   Diagnosis Date   • Anxiety    • Back pain, chronic 10/18/2016   • Blood clotting disorder (CMS-HCC) 1978    s/p Left knee surgery- DVT in left leg   • Breath shortness    • CAD (coronary artery disease) October 2012    Positive coronary calcium score.  Follow-up MPI was negative for ischemia.   • Cancer (CMS-HCC) ? early 90's    Melanoma Left arm- surgically removed   • Degeneration of lumbar or lumbosacral intervertebral disc     • Dental disorder    • Depression    • Diabetes     Oral agents and insulin   • DJD (degenerative joint disease) of cervical spine     • Episodic lightheadedness     • High cholesterol    • Hyperlipidemia    • Hypersensitive carotid sinus syndrome     • Hypertension    • Incisional infection April 2013    Wound dehiscience of neck surgery.   • Insomnia     • Myocardial infarct (CMS-HCC) ?    states was told by  that he has had a heart attack in the past.   • Neck pain 10/18/2016   • Pacemaker October 2012    [employer] [employer] [facility] #[id number] [facility]    • RBBB     • S/P lumbar discectomy     • Sick sinus syndrome (CMS-HCC)     • Sleep apnea     does not use CPAP anymore- stopped in Dec 2015   • Snoring    • Syncope October 2012    Treated with PPM    • Thyroid disease     Hypothyroid   • Unspecified hemorrhagic conditions     Reports bleeds easily     Past Surgical History:   Procedure Laterality Date   • ARTHROSCOPY, KNEE Bilateral 1978   • ATHROPLASTY Left 2004   • CERVICAL FUSION POSTERIOR  2/27/2013    Performed by Tu Jain M.D. at SURGERY [facility]   • CERVICAL LAMINECTOMY POSTERIOR  2/27/2013    Performed by Tu Jain M.D. at SURGERY [facility]   • LUMBAR LAMINECTOMY DISKECTOMY  11/20/2012    Performed by Tu Jain M.D. at SURGERY [facility]   • ORTHOPEDIC OSTEOTOMY      Both knees several times, 8 total   • OTHER     • PACEMAKER INSERTION  October 2012    [employer] [address] [address] [id number] implanted by Dr. Waite.   • PB FLUOROSCOPIC GUIDANCE NEEDLE PLACEMENT  10/19/2016    Procedure: FLOURO GUIDE NEEDLE PLACEMENT;  Surgeon: Gaurang Pruett M.D.;  Location: SURGERY [facility];  Service: Pain Management   • AL DSTR NROLYTC AGNT PARVERTEB FCT ADDL CRVCL/THORA  10/19/2016    Procedure: NEURO DEST FACET C/T W/IG ADDL;  Surgeon: Gaurang Pruett M.D.;  Location: SURGERY [facility];  Service: Pain Management   • AL DSTR NROLYTC AGNT PARVERTEB FCT SNGL CRVCL/THORA Right 10/19/2016    Procedure: NEURO DEST FACET C/T W/IG SNGL - 3ON-C3, C8-T1    SINERGY;  Surgeon: Gaurang Pruett M.D.;  Location: [facility];  Service: Pain Management   • RECOVERY  10/4/2012    Performed by Cath-Recovery Surgery at SURGERY SAME DAY [facility]   • RECOVERY  10/17/2013    Performed by Cath-Recovery Surgery at SURGERY SAME DAY [facility]   • SHOULDER ARTHROSCOPY Right 2015    torn bicep tendon and spurs   • SHOULDER DECOMPRESSION Left 2008    Left rotator cuff   • SPINAL CORD STIMULATOR  1 month ago   • TONSILLECTOMY     • VASECTOMY     • WOUND DEHISCENCE  4/10/2013    Performed by uT Jain M.D. at SURGERY [facility]     Family History   Problem Relation Age of Onset   • Lung Disease Mother      Smoker   • Alcohol/Drug  "Mother    • Heart Disease Father 36     CAD   • Heart Disease Sister      \"hole in heart\"   • Lung Disease Brother      COPD, CANCER   • Cancer Brother      Prostate, Lung   • Alcohol/Drug Brother    • Heart Disease Brother    • Diabetes Brother    • Hypertension Brother    • Hyperlipidemia Brother    • Heart Disease Maternal Grandmother    • Hypertension Maternal Grandmother    • Hyperlipidemia Maternal Grandmother    • Cancer Brother      stomach, thyroid   • Heart Disease Brother 60     MI, stent, PM/defib     History   Smoking Status   • Former Smoker   • Packs/day: 1.00   • Years: 40.00   • Types: Cigarettes   • Quit date: 1/3/1990   Smokeless Tobacco   • Never Used     Allergies   Allergen Reactions   • Aleve Cold & [Pseudoephedrine-Naproxen Na] Anaphylaxis   • Ceftriaxone Sodium Anaphylaxis     Reaction; 1970's.   • Naproxen Anaphylaxis     Reaction; 2004.   • Tape Rash and Itching     Plastic tape (paper tape ok)     Outpatient Encounter Prescriptions as of 9/25/2017   Medication Sig Dispense Refill   • FREESTYLE LITE strip USE TO TEST FOUR TIMES A DAY AND AS NEEDED FOR SYMPTOMS OF HIGH OR LOW SUGAR 350 Strip 4   • buPROPion (WELLBUTRIN XL) 150 MG XL tablet TAKE 1 TABLET EVERY MORNING 90 Tab 0   • valsartan (DIOVAN) 80 MG Tab TAKE 1 TABLET DAILY 90 Tab 3   • LANTUS SOLOSTAR 100 UNIT/ML Solution Pen-injector injection INJECT 30 UNITS TWICE A DAY (Patient taking differently: INJECT 25 UNITS TWICE A DAY OR AS DIRECTED) 45 mL 1   • levothyroxine (SYNTHROID) 75 MCG Tab Take 1 Tab by mouth Every morning on an empty stomach. 90 Tab 3   • TANZEUM 30 MG Pen-injector INJECT 1 PEN AS INSTRUCTED EVERY 7 DAYS 12 Each 2   • metformin (GLUCOPHAGE) 500 MG Tab TAKE 2 TABLETS TWICE A DAY WITH MEALS 360 Tab 2   • metoprolol (LOPRESSOR) 25 MG Tab TAKE 1 TABLET TWICE A  Tab 3   • ferrous sulfate 325 (65 FE) MG tablet Take 325 mg by mouth every 48 hours.     • Cyanocobalamin (VITAMIN B-12) 2500 MCG SL Tab Place  under " "tongue every day.     • atorvastatin (LIPITOR) 20 MG Tab Take 1 Tab by mouth every day. 90 Tab 3   • Lancets MISC Lancets order: Lancets for Abbott Freestyle Lite meter. Sig: use 4 times daily  and prn ssx high or low sugar. #100 RF x 0 360 Each 4   • Blood Glucose Monitoring Suppl [place] Meter: Dispense Abbott Freestyle Lite meter. Sig. Use as directed for blood sugar monitoring. #1. NR. 1 Device 0   • B-D ULTRAFINE III SHORT PEN 31G X 8 MM MISC USE 1 NEEDLE EVERY DAY WITH LANTUS 999 Each 2   • aspirin EC (ECOTRIN) 81 MG TBEC Take 81 mg by mouth every day.       No facility-administered encounter medications on file as of 9/25/2017.      Review of Systems   Constitutional: Positive for fever.        Objective:   /70   Pulse 74   Ht 1.727 m (5' 8\")   Wt 114.8 kg (253 lb)   SpO2 95%   BMI 38.47 kg/m²     Physical Exam   Neck: No JVD present.   Cardiovascular: Normal rate and regular rhythm.  Exam reveals no gallop.    Murmur (3/6 systolic at the base) heard.  Pulmonary/Chest: Effort normal. He has no rales.   Abdominal: Soft. There is no tenderness.   Musculoskeletal: He exhibits edema (2+ pretibial stasis changes).       Echocardiogram:  CONCLUSIONS  Left ventricular ejection fraction is visually estimated to be 65%.   Mild concentric left ventricular hypertrophy.   Mild mitral regurgitation.   Mild aortic stenosis.   Right ventricular systolic pressure is estimated to be 38 mmHg. Right   atrial pressure is estimated to be 3 mmHg.  No pericardial effusion seen.   Echo images from 10/2/14 showed normal gradients across aortic valve.   Normal LV function.    Exam Date:         07/17/2017      Vascular Laboratory   CONCLUSIONS   Less than 50% bilateral internal carotid artery stenoses     Normal subclavians     Normal vertebrals     Exam Date:     07/17/2017 08:36      Lab Results   Component Value Date/Time    CHOLSTRLTOT 94 (L) 06/12/2017 09:53 AM    LDL 41 06/12/2017 09:53 AM    HDL 36 (A) 06/12/2017 09:53 " AM    TRIGLYCERIDE 83 06/12/2017 09:53 AM       Lab Results   Component Value Date/Time    SODIUM 138 06/12/2017 09:53 AM    POTASSIUM 4.2 06/12/2017 09:53 AM    CHLORIDE 103 06/12/2017 09:53 AM    CO2 26 06/12/2017 09:53 AM    GLUCOSE 143 (H) 06/12/2017 09:53 AM    BUN 14 06/12/2017 09:53 AM    CREATININE 0.80 06/12/2017 09:53 AM     Lab Results   Component Value Date/Time    ALKPHOSPHAT 63 06/12/2017 09:53 AM    ASTSGOT 18 06/12/2017 09:53 AM    ALTSGPT 18 06/12/2017 09:53 AM    TBILIRUBIN 1.2 06/12/2017 09:53 AM                Assessment:     1. Coronary artery disease due to calcified coronary lesion: 40-50% disease in the circumflex at cardiac catheterization in 2013     2. Dyslipidemia     3. Essential hypertension, benign     4. Pacemaker         Medical Decision Making:  Today's Assessment / Status / Plan:     Mr. Boyer Is clinically stable. He's had no recurrent episodes of altered mental status and his carotid ultrasound shows only mild carotid plaque. He has mild aortic stenosis but this is not significant. His lipid status and blood pressure are under good control. He continues on routine pacemaker checks. He will follow-up in about 6 months.

## 2017-09-25 NOTE — LETTER
[facility]   [address] [place] [address] 400  [place] [address]  Phone: [phone]  Fax: [phone]              Dakota Boyer  1945    Encounter Date: 9/25/2017    Jac Christiansen M.D.          PROGRESS NOTE:  Subjective:   Dakoat Boyer is a 72 y.o. male who presents today for followup of his dyspnea on exertion, hypertension, hyperlipidemia, permanent pacemaker and mild coronary artery disease.     His wife since in the note that he had an event with an altered level of consciousness in March. This has not been recurrent. He has had a carotid ultrasound and repeat echocardiogram.    He is not taking his blood pressure very frequently at home. He has chronic dyspnea on exertion at less than half a block. He has had no PND or orthopnea. He does have some chronic left lower extremity edema since his left knee replacement. He stated no racing of his heart, lightheadedness or altered level of consciousness.    Past Medical History:   Diagnosis Date   • Anxiety    • Back pain, chronic 10/18/2016   • Blood clotting disorder (CMS-HCC) 1978    s/p Left knee surgery- DVT in left leg   • Breath shortness    • CAD (coronary artery disease) October 2012    Positive coronary calcium score.  Follow-up MPI was negative for ischemia.   • Cancer (CMS-HCC) ? early 90's    Melanoma Left arm- surgically removed   • Degeneration of lumbar or lumbosacral intervertebral disc     • Dental disorder    • Depression    • Diabetes     Oral agents and insulin   • DJD (degenerative joint disease) of cervical spine     • Episodic lightheadedness     • High cholesterol    • Hyperlipidemia    • Hypersensitive carotid sinus syndrome     • Hypertension    • Incisional infection April 2013    Wound dehiscience of neck surgery.   • Insomnia     • Myocardial infarct (CMS-HCC) ?    states was told by  that he has had a heart attack in the past.   • Neck pain 10/18/2016   • Pacemaker  October 2012    [employer] [address] [address] #[id number] implanted by [facility].    • RBBB     • S/P lumbar discectomy     • Sick sinus syndrome (CMS-HCC)     • Sleep apnea     does not use CPAP anymore- stopped in Dec 2015   • Snoring    • Syncope October 2012    Treated with PPM   • Thyroid disease     Hypothyroid   • Unspecified hemorrhagic conditions     Reports bleeds easily     Past Surgical History:   Procedure Laterality Date   • ARTHROSCOPY, KNEE Bilateral 1978   • ATHROPLASTY Left 2004   • CERVICAL FUSION POSTERIOR  2/27/2013    Performed by Tu Jain M.D. at SURGERY [facility] ORS   • CERVICAL LAMINECTOMY POSTERIOR  2/27/2013    Performed by Tu Jain M.D. at SURGERY [facility] ORS   • LUMBAR LAMINECTOMY DISKECTOMY  11/20/2012    Performed by Tu Jain M.D. at SURGERY [facility] ORS   • ORTHOPEDIC OSTEOTOMY      Both knees several times, 8 total   • OTHER     • PACEMAKER INSERTION  October 2012    [facility] [address] [id number] implanted by Dr. Waite.   • PB FLUOROSCOPIC GUIDANCE NEEDLE PLACEMENT  10/19/2016    Procedure: FLOURO GUIDE NEEDLE PLACEMENT;  Surgeon: Gaurang Pruett M.D.;  Location: SURGERY SURGICAL [facility] ORS;  Service: Pain Management   • MO DSTR NROLYTC AGNT PARVERTEB FCT ADDL CRVCL/THORA  10/19/2016    Procedure: NEURO DEST FACET C/T W/IG ADDL;  Surgeon: Gaurang Pruett M.D.;  Location: SURGERY SURGICAL [facility] ORS;  Service: Pain Management   • MO DSTR NROLYTC AGNT PARVERTEB FCT SNGL CRVCL/THORA Right 10/19/2016    Procedure: NEURO DEST FACET C/T W/IG SNGL - 3ON-C3, C8-T1    SINERGY;  Surgeon: Gaurang Pruett M.D.;  Location: SURGERY [facility];  Service: Pain Management   • RECOVERY  10/4/2012    Performed by Cath-Recovery Surgery at SURGERY SAME DAY [facility] ORS   • RECOVERY  10/17/2013    Performed by Cath-Recovery Surgery at SURGERY SAME DAY [facility] ORS   • SHOULDER ARTHROSCOPY Right 2015    torn bicep tendon and spurs   • SHOULDER DECOMPRESSION Left 2008    Left rotator cuff   •  "SPINAL CORD STIMULATOR  1 month ago   • TONSILLECTOMY     • VASECTOMY     • WOUND DEHISCENCE  4/10/2013    Performed by Tu Jain M.D. at SURGERY [facility]     Family History   Problem Relation Age of Onset   • Lung Disease Mother      Smoker   • Alcohol/Drug Mother    • Heart Disease Father 36     CAD   • Heart Disease Sister      \"hole in heart\"   • Lung Disease Brother      COPD, CANCER   • Cancer Brother      Prostate, Lung   • Alcohol/Drug Brother    • Heart Disease Brother    • Diabetes Brother    • Hypertension Brother    • Hyperlipidemia Brother    • Heart Disease Maternal Grandmother    • Hypertension Maternal Grandmother    • Hyperlipidemia Maternal Grandmother    • Cancer Brother      stomach, thyroid   • Heart Disease Brother 60     MI, stent, PM/defib     History   Smoking Status   • Former Smoker   • Packs/day: 1.00   • Years: 40.00   • Types: Cigarettes   • Quit date: 1/3/1990   Smokeless Tobacco   • Never Used     Allergies   Allergen Reactions   • Aleve Cold & [Pseudoephedrine-Naproxen Na] Anaphylaxis   • Ceftriaxone Sodium Anaphylaxis     Reaction; 1970's.   • Naproxen Anaphylaxis     Reaction; 2004.   • Tape Rash and Itching     Plastic tape (paper tape ok)     Outpatient Encounter Prescriptions as of 9/25/2017   Medication Sig Dispense Refill   • FREESTYLE LITE strip USE TO TEST FOUR TIMES A DAY AND AS NEEDED FOR SYMPTOMS OF HIGH OR LOW SUGAR 350 Strip 4   • buPROPion (WELLBUTRIN XL) 150 MG XL tablet TAKE 1 TABLET EVERY MORNING 90 Tab 0   • valsartan (DIOVAN) 80 MG Tab TAKE 1 TABLET DAILY 90 Tab 3   • LANTUS SOLOSTAR 100 UNIT/ML Solution Pen-injector injection INJECT 30 UNITS TWICE A DAY (Patient taking differently: INJECT 25 UNITS TWICE A DAY OR AS DIRECTED) 45 mL 1   • levothyroxine (SYNTHROID) 75 MCG Tab Take 1 Tab by mouth Every morning on an empty stomach. 90 Tab 3   • TANZEUM 30 MG Pen-injector INJECT 1 PEN AS INSTRUCTED EVERY 7 DAYS 12 Each 2   • metformin (GLUCOPHAGE) 500 MG " "Tab TAKE 2 TABLETS TWICE A DAY WITH MEALS 360 Tab 2   • metoprolol (LOPRESSOR) 25 MG Tab TAKE 1 TABLET TWICE A  Tab 3   • ferrous sulfate 325 (65 FE) MG tablet Take 325 mg by mouth every 48 hours.     • Cyanocobalamin (VITAMIN B-12) 2500 MCG SL Tab Place  under tongue every day.     • atorvastatin (LIPITOR) 20 MG Tab Take 1 Tab by mouth every day. 90 Tab 3   • Lancets MISC Lancets order: Lancets for Abbott Freestyle Lite meter. Sig: use 4 times daily  and prn ssx high or low sugar. #100 RF x 0 360 Each 4   • Blood Glucose Monitoring Suppl [place] Meter: Dispense Abbott Freestyle Lite meter. Sig. Use as directed for blood sugar monitoring. #1. NR. 1 Device 0   • B-D ULTRAFINE III SHORT PEN 31G X 8 MM MISC USE 1 NEEDLE EVERY DAY WITH LANTUS 999 Each 2   • aspirin EC (ECOTRIN) 81 MG TBEC Take 81 mg by mouth every day.       No facility-administered encounter medications on file as of 9/25/2017.      Review of Systems   Constitutional: Positive for fever.        Objective:   /70   Pulse 74   Ht 1.727 m (5' 8\")   Wt 114.8 kg (253 lb)   SpO2 95%   BMI 38.47 kg/m²      Physical Exam   Neck: No JVD present.   Cardiovascular: Normal rate and regular rhythm.  Exam reveals no gallop.    Murmur (3/6 systolic at the base) heard.  Pulmonary/Chest: Effort normal. He has no rales.   Abdominal: Soft. There is no tenderness.   Musculoskeletal: He exhibits edema (2+ pretibial stasis changes).       Echocardiogram:  CONCLUSIONS  Left ventricular ejection fraction is visually estimated to be 65%.   Mild concentric left ventricular hypertrophy.   Mild mitral regurgitation.   Mild aortic stenosis.   Right ventricular systolic pressure is estimated to be 38 mmHg. Right   atrial pressure is estimated to be 3 mmHg.  No pericardial effusion seen.   Echo images from 10/2/14 showed normal gradients across aortic valve.   Normal LV function.    Exam Date:         07/17/2017      Vascular Laboratory   CONCLUSIONS   Less than 50% " bilateral internal carotid artery stenoses     Normal subclavians     Normal vertebrals     Exam Date:     07/17/2017 08:36      Lab Results   Component Value Date/Time    CHOLSTRLTOT 94 (L) 06/12/2017 09:53 AM    LDL 41 06/12/2017 09:53 AM    HDL 36 (A) 06/12/2017 09:53 AM    TRIGLYCERIDE 83 06/12/2017 09:53 AM       Lab Results   Component Value Date/Time    SODIUM 138 06/12/2017 09:53 AM    POTASSIUM 4.2 06/12/2017 09:53 AM    CHLORIDE 103 06/12/2017 09:53 AM    CO2 26 06/12/2017 09:53 AM    GLUCOSE 143 (H) 06/12/2017 09:53 AM    BUN 14 06/12/2017 09:53 AM    CREATININE 0.80 06/12/2017 09:53 AM     Lab Results   Component Value Date/Time    ALKPHOSPHAT 63 06/12/2017 09:53 AM    ASTSGOT 18 06/12/2017 09:53 AM    ALTSGPT 18 06/12/2017 09:53 AM    TBILIRUBIN 1.2 06/12/2017 09:53 AM                Assessment:     1. Coronary artery disease due to calcified coronary lesion: 40-50% disease in the circumflex at cardiac catheterization in 2013     2. Dyslipidemia     3. Essential hypertension, benign     4. Pacemaker         Medical Decision Making:  Today's Assessment / Status / Plan:     Mr. Boyer Is clinically stable. He's had no recurrent episodes of altered mental status and his carotid ultrasound shows only mild carotid plaque. He has mild aortic stenosis but this is not significant. His lipid status and blood pressure are under good control. He continues on routine pacemaker checks. He will follow-up in about 6 months.      Randy Brar D.O.  [address] [facility] [address]  [address]  [facility] [address]  VIA In Basket

## 2017-10-03 DIAGNOSIS — E11.9 TYPE 2 DIABETES MELLITUS WITHOUT COMPLICATION, WITH LONG-TERM CURRENT USE OF INSULIN (HCC): ICD-10-CM

## 2017-10-03 DIAGNOSIS — Z79.4 TYPE 2 DIABETES MELLITUS WITHOUT COMPLICATION, WITH LONG-TERM CURRENT USE OF INSULIN (HCC): ICD-10-CM

## 2017-10-03 RX ORDER — INSULIN GLARGINE 100 [IU]/ML
INJECTION, SOLUTION SUBCUTANEOUS
Qty: 45 ML | Refills: 1 | Status: SHIPPED | OUTPATIENT
Start: 2017-10-03 | End: 2017-12-07 | Stop reason: SDUPTHER

## 2017-11-13 ENCOUNTER — OFFICE VISIT (OUTPATIENT)
Dept: MEDICAL GROUP | Facility: PHYSICIAN GROUP | Age: 72
End: 2017-11-13
Payer: MEDICARE

## 2017-11-13 ENCOUNTER — APPOINTMENT (OUTPATIENT)
Dept: RADIOLOGY | Facility: IMAGING CENTER | Age: 72
End: 2017-11-13
Attending: FAMILY MEDICINE
Payer: MEDICARE

## 2017-11-13 ENCOUNTER — HOSPITAL ENCOUNTER (OUTPATIENT)
Dept: LAB | Facility: MEDICAL CENTER | Age: 72
End: 2017-11-13
Attending: FAMILY MEDICINE
Payer: MEDICARE

## 2017-11-13 VITALS
HEART RATE: 65 BPM | BODY MASS INDEX: 38.19 KG/M2 | HEIGHT: 68 IN | WEIGHT: 252 LBS | SYSTOLIC BLOOD PRESSURE: 122 MMHG | DIASTOLIC BLOOD PRESSURE: 72 MMHG | RESPIRATION RATE: 16 BRPM | OXYGEN SATURATION: 90 % | TEMPERATURE: 98.2 F

## 2017-11-13 DIAGNOSIS — E06.3 HYPOTHYROIDISM DUE TO HASHIMOTO'S THYROIDITIS: ICD-10-CM

## 2017-11-13 DIAGNOSIS — E03.8 HYPOTHYROIDISM DUE TO HASHIMOTO'S THYROIDITIS: ICD-10-CM

## 2017-11-13 DIAGNOSIS — I10 ESSENTIAL HYPERTENSION, BENIGN: ICD-10-CM

## 2017-11-13 DIAGNOSIS — Z12.5 SCREENING PSA (PROSTATE SPECIFIC ANTIGEN): ICD-10-CM

## 2017-11-13 DIAGNOSIS — Z79.4 TYPE 2 DIABETES MELLITUS WITHOUT COMPLICATION, WITH LONG-TERM CURRENT USE OF INSULIN (HCC): ICD-10-CM

## 2017-11-13 DIAGNOSIS — E11.9 TYPE 2 DIABETES MELLITUS WITHOUT COMPLICATION, WITH LONG-TERM CURRENT USE OF INSULIN (HCC): ICD-10-CM

## 2017-11-13 DIAGNOSIS — I49.5 SICK SINUS SYNDROME (HCC): ICD-10-CM

## 2017-11-13 DIAGNOSIS — M17.12 PRIMARY OSTEOARTHRITIS OF LEFT KNEE: ICD-10-CM

## 2017-11-13 DIAGNOSIS — Z23 NEED FOR VACCINATION: ICD-10-CM

## 2017-11-13 DIAGNOSIS — M17.11 PRIMARY OSTEOARTHRITIS OF RIGHT KNEE: ICD-10-CM

## 2017-11-13 PROCEDURE — 99214 OFFICE O/P EST MOD 30 MIN: CPT | Mod: 25 | Performed by: FAMILY MEDICINE

## 2017-11-13 PROCEDURE — 90662 IIV NO PRSV INCREASED AG IM: CPT | Performed by: FAMILY MEDICINE

## 2017-11-13 PROCEDURE — 36415 COLL VENOUS BLD VENIPUNCTURE: CPT | Mod: GA

## 2017-11-13 PROCEDURE — 84153 ASSAY OF PSA TOTAL: CPT | Mod: GA

## 2017-11-13 PROCEDURE — G0008 ADMIN INFLUENZA VIRUS VAC: HCPCS | Performed by: FAMILY MEDICINE

## 2017-11-13 PROCEDURE — 73562 X-RAY EXAM OF KNEE 3: CPT | Mod: TC,RT | Performed by: FAMILY MEDICINE

## 2017-11-13 RX ORDER — MELOXICAM 7.5 MG/1
7.5-15 TABLET ORAL DAILY
Qty: 60 TAB | Refills: 2 | Status: SHIPPED | OUTPATIENT
Start: 2017-11-13 | End: 2018-01-23 | Stop reason: SDUPTHER

## 2017-11-13 NOTE — PROGRESS NOTES
Subjective:     Chief Complaint   Patient presents with   • Establish Care       Dakota Boyer is a 72 y.o. male here today to est care with new provider. Patient is a former patient of Dr. Brar who has changed offices.    Pt reports L lateral knee pain  and weakness.  Pt is concerned that L knee pain is causing R knee pain as well. Pt reports pain with standing and walking.      Diabetes: Patient takes fasting blood sugars, which range 140s. Patient is compliant with medications. No side effects reported such as diarrhea, abdominal pain, dark tarry stool, hematochezia, headache, shakiness, or lightheadedness due to low blood sugar.  Patient denies chest pain, numbness and tingling in hands and feet, change in sensation in hands or feet, sores on feet, change in urine,  or change in vision.      Hypothyroid :Patient is currently taking the levothyroxine each morning on a  empty stomach with a glass of water one hour before eating. Patient denies symptoms of weight changes, heat or cold intolerance, diarrhea, constipation, heart palpitations, or loss of hair.    Pt reports diminished stream for > 1 year with hx of elevated.     Allergies   Allergen Reactions   • Aleve Cold & [Pseudoephedrine-Naproxen Na] Anaphylaxis   • Ceftriaxone Sodium Anaphylaxis     Reaction; 1970's.   • Naproxen Anaphylaxis     Reaction; 2004.   • Tape Rash and Itching     Plastic tape (paper tape ok)     Current medicines (including changes today)  Current Outpatient Prescriptions   Medication Sig Dispense Refill   • meloxicam (MOBIC) 7.5 MG Tab Take 1-2 Tabs by mouth every day. 60 Tab 2   • LANTUS SOLOSTAR 100 UNIT/ML Solution Pen-injector injection INJECT 30 UNITS TWICE A DAY 45 mL 1   • FREESTYLE LITE strip USE TO TEST FOUR TIMES A DAY AND AS NEEDED FOR SYMPTOMS OF HIGH OR LOW SUGAR 350 Strip 4   • buPROPion (WELLBUTRIN XL) 150 MG XL tablet TAKE 1 TABLET EVERY MORNING 90 Tab 0   • valsartan (DIOVAN) 80 MG Tab TAKE 1 TABLET DAILY  90 Tab 3   • levothyroxine (SYNTHROID) 75 MCG Tab Take 1 Tab by mouth Every morning on an empty stomach. 90 Tab 3   • TANZEUM 30 MG Pen-injector INJECT 1 PEN AS INSTRUCTED EVERY 7 DAYS 12 Each 2   • metformin (GLUCOPHAGE) 500 MG Tab TAKE 2 TABLETS TWICE A DAY WITH MEALS 360 Tab 2   • metoprolol (LOPRESSOR) 25 MG Tab TAKE 1 TABLET TWICE A  Tab 3   • atorvastatin (LIPITOR) 20 MG Tab Take 1 Tab by mouth every day. 90 Tab 3   • Lancets MISC Lancets order: Lancets for Abbott Freestyle Lite meter. Sig: use 4 times daily  and prn ssx high or low sugar. #100 RF x 0 360 Each 4   • Blood Glucose Monitoring Suppl [place] Meter: Dispense Abbott Freestyle Lite meter. Sig. Use as directed for blood sugar monitoring. #1. NR. 1 Device 0   • B-D ULTRAFINE III SHORT PEN 31G X 8 MM MISC USE 1 NEEDLE EVERY DAY WITH LANTUS 999 Each 2   • aspirin EC (ECOTRIN) 81 MG TBEC Take 81 mg by mouth every day.     • ferrous sulfate 325 (65 FE) MG tablet Take 325 mg by mouth every 48 hours.     • Cyanocobalamin (VITAMIN B-12) 2500 MCG SL Tab Place  under tongue every day.       No current facility-administered medications for this visit.      Social History   Substance Use Topics   • Smoking status: Former Smoker     Packs/day: 1.00     Years: 40.00     Types: Cigarettes     Quit date: 1/3/1990   • Smokeless tobacco: Never Used   • Alcohol use No     Family Status   Relation Status   • Mother  at age 80    resp failure   • Father  at age 63    suicide   • Sister Alive   • Brother  at age 68    CA lung   • Maternal Grandmother    • Brother  at age 63   • Brother Alive   • Maternal Aunt    • Maternal Uncle    • Paternal Aunt    • Paternal Uncle    • Maternal Grandfather    • Paternal Grandmother    • Paternal Grandfather      Family History   Problem Relation Age of Onset   • Lung Disease Mother      Smoker   • Alcohol/Drug Mother    • Heart Disease  "Father 36     CAD   • Heart Disease Sister      \"hole in heart\"   • Lung Disease Brother      COPD, CANCER   • Cancer Brother      Prostate, Lung   • Alcohol/Drug Brother    • Heart Disease Brother    • Diabetes Brother    • Hypertension Brother    • Hyperlipidemia Brother    • Heart Disease Maternal Grandmother    • Hypertension Maternal Grandmother    • Hyperlipidemia Maternal Grandmother    • Cancer Brother      stomach, thyroid   • Heart Disease Brother 60     MI, stent, PM/defib     He    has a past medical history of Anxiety; Back pain, chronic (10/18/2016); Blood clotting disorder (CMS-HCC) (1978); Breath shortness; CAD (coronary artery disease) (October 2012); Cancer (CMS-HCC) (? early 90's); Degeneration of lumbar or lumbosacral intervertebral disc ( ); Dental disorder; Depression; Diabetes; DJD (degenerative joint disease) of cervical spine ( ); Episodic lightheadedness ( ); High cholesterol; Hyperlipidemia; Hypersensitive carotid sinus syndrome ( ); Hypertension; Incisional infection (April 2013); Insomnia ( ); Myocardial infarct (?); Neck pain (10/18/2016); Pacemaker (October 2012); RBBB ( ); S/P lumbar discectomy ( ); Sick sinus syndrome (CMS-HCC) ( ); Sleep apnea; Syncope (October 2012); Thyroid disease; and Unspecified hemorrhagic conditions.        ROS   GEN: no weight loss, fevers, or chills  HEENT: no blurry vision or change in vision  Resp: no shortness of breath, no cough  CV: no racing heart, no irregular beats, no chest pain  Abd: no nausea, no vomiting, no diarrhea, no constipation, no blood in stool, no dark stools, no incontinence  : no dysuria, no hematuria, no urinary incontinence, no increased frequency  MSK: as per HPI  Neuro: no headaches, no dizziness, no LOC, no weakness, no numbness/tingling       Objective:     Blood pressure 122/72, pulse 65, temperature 36.8 °C (98.2 °F), resp. rate 16, height 1.727 m (5' 8\"), weight 114.3 kg (252 lb), SpO2 90 %. Body mass index is 38.32 kg/m². "   Physical Exam:  Constitutional: Alert, no distress.  Skin: Warm, dry, good turgor, no rashes in visible areas.  Eye: Equal, round and reactive, conjunctiva clear, lids normal.  ENMT: Lips without lesions, oropharynx non-erythematous, no exudate, moist oral mucosa, bilateral tympanic membranes: No bulging, no retraction, no fluid, nonerythematous, positive light reflex, external auditory canals: Clear, scant cerumen, nonerythematous  Neck: Trachea midline, no masses, no thyromegaly. No cervical or supraclavicular lymphadenopathy. Full ROM  Respiratory: Unlabored respiratory effort, good air movement, lungs clear to auscultation, no wheezes, no ronchi.  Cardiovascular:RRR, +S1, S2, 3/6 systolic murmur, no peripheral edema, pedal and radial pulses equal and intact bilat  Abdomen: Soft, non-tender, no masses, no hepatosplenomegaly.  MSK:5/5 muscle strength in upper extremities as well as lower extremity bilaterally, bilat knee crepitus  Psych: Alert and oriented, appropriate affect and mood.  Neuro: CN2-12 intact, no gross motor or sensory deficits      Assessment and Plan:   The following treatment plan was discussed    1. Type 2 diabetes mellitus without complication, with long-term current use of insulin (CMS-[facility])  Chronic: well controlled based off home blood sugars and A1C in June.   Discussed diet, exercise, disease management and weight loss goal   Pt is advised to have 150 mins of exercise a week.   Pt also understands how to count carbs with a goal for 50 carbs per meal. The pt was advised to set a goal for each meal to be 25%carbs, 25% protein, and 50% fruits/veggies. Pt also advised to look at ADA website for further information. Patient to monitor sugars: daily  Pt is currently on Lantus, Metformin, ASA, ARBi, and statin.     2. Hypothyroidism  TSH WNL    3. Essential hypertension, benign  Chronic: well controlled      4. Screening PSA (prostate specific antigen)  Pt report hesitance as above.   - PROSTATE  SPECIFIC AG SCREENING; Future    5. Need for vaccination  I discussed benefits and side effects of each vaccine with patient, and I answered all patient's questions about vaccines.  - INFLUENZA VACCINE, HIGH DOSE (65+ ONLY)    6. Primary osteoarthritis of left knee  Acute on chronic: recommend Mobic  - DX-KNEE 3 VIEWS RIGHT; Future  - meloxicam (MOBIC) 7.5 MG Tab; Take 1-2 Tabs by mouth every day.  Dispense: 60 Tab; Refill: 2    7. Sick sinus syndrome (CMS-HCC)  Pacer in place, no cardiac symptoms.      Followup: Return in about 3 months (around 2/13/2018) for chronic conditions.    Please note that this dictation was created using voice recognition software. I have made every reasonable attempt to correct obvious errors, but I expect that there are errors of grammar and possibly content that I did not discover before finalizing the note.

## 2017-11-14 LAB — PSA SERPL-MCNC: 7.47 NG/ML (ref 0–4)

## 2017-11-27 RX ORDER — BUPROPION HYDROCHLORIDE 150 MG/1
150 TABLET ORAL EVERY MORNING
Qty: 90 TAB | Refills: 0 | Status: SHIPPED | OUTPATIENT
Start: 2017-11-27 | End: 2018-02-25 | Stop reason: SDUPTHER

## 2017-11-27 NOTE — TELEPHONE ENCOUNTER
Was the patient seen in the last year in this department? Yes    Does patient have an active prescription for medications requested? No     Received Request Via: Pharmacy      Pt met protocol?: Yes    O/v 11/17

## 2017-12-07 ENCOUNTER — OFFICE VISIT (OUTPATIENT)
Dept: ENDOCRINOLOGY | Facility: MEDICAL CENTER | Age: 72
End: 2017-12-07
Payer: MEDICARE

## 2017-12-07 VITALS
HEIGHT: 68 IN | WEIGHT: 248.8 LBS | HEART RATE: 74 BPM | OXYGEN SATURATION: 92 % | DIASTOLIC BLOOD PRESSURE: 72 MMHG | BODY MASS INDEX: 37.71 KG/M2 | SYSTOLIC BLOOD PRESSURE: 124 MMHG

## 2017-12-07 DIAGNOSIS — I10 ESSENTIAL HYPERTENSION: ICD-10-CM

## 2017-12-07 DIAGNOSIS — E78.2 MIXED HYPERLIPIDEMIA: ICD-10-CM

## 2017-12-07 DIAGNOSIS — E11.9 TYPE 2 DIABETES MELLITUS WITHOUT COMPLICATION, WITH LONG-TERM CURRENT USE OF INSULIN (HCC): ICD-10-CM

## 2017-12-07 DIAGNOSIS — E03.9 ACQUIRED HYPOTHYROIDISM: ICD-10-CM

## 2017-12-07 DIAGNOSIS — Z79.4 TYPE 2 DIABETES MELLITUS WITHOUT COMPLICATION, WITH LONG-TERM CURRENT USE OF INSULIN (HCC): ICD-10-CM

## 2017-12-07 LAB
HBA1C MFR BLD: 5.9 % (ref ?–5.8)
INT CON NEG: NORMAL
INT CON POS: NORMAL

## 2017-12-07 PROCEDURE — 83036 HEMOGLOBIN GLYCOSYLATED A1C: CPT | Performed by: INTERNAL MEDICINE

## 2017-12-07 PROCEDURE — 99214 OFFICE O/P EST MOD 30 MIN: CPT | Performed by: INTERNAL MEDICINE

## 2017-12-07 NOTE — PROGRESS NOTES
"RN-CDE Note    Subjective:     Health changes since last visit/interval Hx: None    Medications (including changes made today)  Current Outpatient Prescriptions   Medication Sig Dispense Refill   • buPROPion (WELLBUTRIN XL) 150 MG XL tablet Take 1 Tab by mouth every morning. 90 Tab 0   • meloxicam (MOBIC) 7.5 MG Tab Take 1-2 Tabs by mouth every day. 60 Tab 2   • LANTUS SOLOSTAR 100 UNIT/ML Solution Pen-injector injection INJECT 30 UNITS TWICE A DAY 45 mL 1   • FREESTYLE LITE strip USE TO TEST FOUR TIMES A DAY AND AS NEEDED FOR SYMPTOMS OF HIGH OR LOW SUGAR 350 Strip 4   • valsartan (DIOVAN) 80 MG Tab TAKE 1 TABLET DAILY 90 Tab 3   • levothyroxine (SYNTHROID) 75 MCG Tab Take 1 Tab by mouth Every morning on an empty stomach. 90 Tab 3   • metformin (GLUCOPHAGE) 500 MG Tab TAKE 2 TABLETS TWICE A DAY WITH MEALS 360 Tab 2   • metoprolol (LOPRESSOR) 25 MG Tab TAKE 1 TABLET TWICE A  Tab 3   • ferrous sulfate 325 (65 FE) MG tablet Take 325 mg by mouth every 48 hours.     • Cyanocobalamin (VITAMIN B-12) 2500 MCG SL Tab Place  under tongue every day.     • atorvastatin (LIPITOR) 20 MG Tab Take 1 Tab by mouth every day. 90 Tab 3   • aspirin EC (ECOTRIN) 81 MG TBEC Take 81 mg by mouth every day.     • TANZEUM 30 MG Pen-injector INJECT 1 PEN AS INSTRUCTED EVERY 7 DAYS 12 Each 2   • Lancets MISC Lancets order: Lancets for Abbott Freestyle Lite meter. Sig: use 4 times daily  and prn ssx high or low sugar. #100 RF x 0 360 Each 4   • Blood Glucose Monitoring Suppl [place] Meter: Dispense Abbott Freestyle Lite meter. Sig. Use as directed for blood sugar monitoring. #1. NR. 1 Device 0   • B-D ULTRAFINE III SHORT PEN 31G X 8 MM MISC USE 1 NEEDLE EVERY DAY WITH LANTUS 999 Each 2     No current facility-administered medications for this visit.        Taking daily ASA: Yes  Taking above medications as prescribed: Yes  Patient Denies side effects of medication.    Exercise: no regular exercise, sedentary  Diet: \"healthy\" diet  in " general  meals per day on average: 3    Health Maintenance:   Health Maintenance Topics with due status: Overdue       Topic Date Due    IMM DTaP/Tdap/Td Vaccine 03/29/2008    Annual Wellness Visit 02/09/2017     Health Maintenance Topics with due status: Due On       Topic Date Due    A1C SCREENING 12/07/2017       Immunizations:   PPSV23: up to date  [id number]: up to date  Tdap: unknown  Flu: up to date      DM:   Last A1c:   Lab Results   Component Value Date/Time    HBA1C 5.9 12/07/2017 01:09 PM      A1c goal: < 7    Glucose monitoring frequency: testing bid  fasting range: 120-140  trend: 120-150 ac lunch and dinner.     Hypoglycemic episodes: no     Last Retinal Exam: current  Daily Foot Exam: yes  Routine Dental Exams: yes    Lab Results   Component Value Date/Time    MALBCRT see below 06/12/2017 09:52 AM    MICROALBUR <0.7 06/12/2017 09:52 AM        ACR Albumin/Creatinine Ratio goal <30 at goal        HTN:   Blood pressure goal <140/<80 at goal.   Currently Rx ACE/ARB: Yes    Dyslipidemia:    Lab Results   Component Value Date/Time    CHOLSTRLTOT 94 (L) 06/12/2017 09:53 AM    LDL 41 06/12/2017 09:53 AM    HDL 36 (A) 06/12/2017 09:53 AM    TRIGLYCERIDE 83 06/12/2017 09:53 AM       Lab Results   Component Value Date/Time    SODIUM 138 06/12/2017 09:53 AM    POTASSIUM 4.2 06/12/2017 09:53 AM    CHLORIDE 103 06/12/2017 09:53 AM    CO2 26 06/12/2017 09:53 AM    GLUCOSE 143 (H) 06/12/2017 09:53 AM    BUN 14 06/12/2017 09:53 AM    CREATININE 0.80 06/12/2017 09:53 AM     Lab Results   Component Value Date/Time    ALKPHOSPHAT 63 06/12/2017 09:53 AM    ASTSGOT 18 06/12/2017 09:53 AM    ALTSGPT 18 06/12/2017 09:53 AM    TBILIRUBIN 1.2 06/12/2017 09:53 AM        Currently Rx Statin: Yes      He  reports that he quit smoking about 27 years ago. His smoking use included Cigarettes. He has a 40.00 pack-year smoking history. He has never used smokeless tobacco.    Objective:     Exam:  Monofilament: not done      Plan:      Discussed All medications, side effects and compliance (discussed carefully)  Annual eye examinations at Ophthalmology  Foot care discussed and Podiatry visits  Home glucose monitoring emphasized.

## 2017-12-07 NOTE — PROGRESS NOTES
Endocrinology Clinic Progress Note    CC: Diabetes    HPI:  Dakota Boyer is a 71 y.o. old patient who comes in today for routine follow up.     Type 2 diabetes: He is currently on Lantus 35 units twice a day, Tanzeum 30 mg once a week and metformin 1000 mg twice a day. He reports compliance with medications. Checks blood sugars twice a day, most blood sugar readings are in the range of 100-150. No hypoglycemia. Last eye exam was done in February 2017, has another eye exam scheduled for March 2018. No issues with numbness or tingling in feet.    Hypertension: Blood pressure is well controlled. He is on ARB. Reports compliance with medications.    Hyperlipidemia: LDL is at goal. He is on Lipitor, tolerating well.    Hypothyroidism: He is currently on levothyroxine 75 µg daily.    ROS:  Constitutional: No unintentional weight loss  Endo: Denies excessive thirst or frequent urination    Past Medical History:  Patient Active Problem List    Diagnosis Date Noted   • History of diverticulitis 11/13/2013     Priority: High   • Shortness of breath 09/10/2013     Priority: High   • Coronary artery disease due to calcified coronary lesion: 40-50% disease in the circumflex at cardiac catheterization in 2013 08/16/2013     Priority: High   • Sick sinus syndrome (CMS-[facility]) 10/04/2012     Priority: High   • Pacemaker 10/04/2012     Priority: High   • Hypersensitive carotid sinus syndrome 10/02/2012     Priority: High   • Dyslipidemia 09/28/2012     Priority: High   • RBBB 09/28/2012     Priority: High   • Essential hypertension, benign 04/10/2012     Priority: High   • Fatigue 09/10/2013     Priority: Medium   • Type 2 diabetes mellitus without complication (CMS-[facility]) 04/10/2012     Priority: Medium   • DJD (degenerative joint disease) of cervical spine 02/27/2013     Priority: Low   • S/P lumbar discectomy 11/21/2012     Priority: Low   • Degeneration of lumbar or lumbosacral intervertebral disc 11/20/2012     Priority:  Low   • Back pain, chronic 04/10/2012     Priority: Low   • Neck pain 04/10/2012     Priority: Low   • Insomnia 04/10/2012     Priority: Low   • Altered level of consciousness: March 2017 06/20/2017   • Cervical spondylosis 10/19/2016   • Non-proliferative diabetic retinopathy, mild, both eyes (CMS-HCC) 06/02/2016   • Hypertensive retinopathy of both eyes 06/02/2016   • Depression with anxiety 03/28/2016   • Obstructive sleep apnea on CPAP 03/28/2016   • Hypothyroidism 08/21/2014   • Obesity 08/21/2014   • Other dyspnea and respiratory abnormality 10/17/2013     Physical Examination:  Vital signs: There were no vitals taken for this visit.  General: No apparent distress, cooperative  Eyes: No scleral icterus, no discharge   Resp: Normal effort  Extremities: No edema  Skin: No rash  Psych: Alert and oriented, normal mood and affect    Assessment and Plan:    Type 2 diabetes mellitus without complication  · Hemoglobin A1c today in the clinic is 5.9%  · Goal hemoglobin A1c less than 7%  · Continue Lantus 35 units twice a day, Tanzeum 30 mg once a week, metformin 1000 mg twice a day; no changes to medications today  · Repeat labs now: BMP     Essential hypertension  · Blood pressure is well controlled  · Continue ARB    Mixed hyperlipidemia  · LDL 41  · Continue Lipitor    Acquired hypothyroidism  · Continue with levothyroxine 75 µg daily  · Repeat labs now for TSH and free T4    Return to clinic in 6 months.    Thank you for allowing me to participate in the care of this patient.    Isabela Singletary M.D.     This note was created using voice recognition software (Dragon). The accuracy of the dictation is limited by the abilities of the software. I have reviewed the note prior to signing, however some errors in grammar and context are still possible. If you have any questions related to this note please do not hesitate to contact our office.

## 2018-01-17 ENCOUNTER — OFFICE VISIT (OUTPATIENT)
Dept: MEDICAL GROUP | Facility: PHYSICIAN GROUP | Age: 73
End: 2018-01-17
Payer: MEDICARE

## 2018-01-17 VITALS
TEMPERATURE: 98 F | HEART RATE: 76 BPM | WEIGHT: 254 LBS | DIASTOLIC BLOOD PRESSURE: 60 MMHG | HEIGHT: 68 IN | BODY MASS INDEX: 38.49 KG/M2 | OXYGEN SATURATION: 95 % | SYSTOLIC BLOOD PRESSURE: 122 MMHG

## 2018-01-17 DIAGNOSIS — M51.37 DEGENERATION OF LUMBAR OR LUMBOSACRAL INTERVERTEBRAL DISC: ICD-10-CM

## 2018-01-17 DIAGNOSIS — Z79.4 TYPE 2 DIABETES MELLITUS WITHOUT COMPLICATION, WITH LONG-TERM CURRENT USE OF INSULIN (HCC): ICD-10-CM

## 2018-01-17 DIAGNOSIS — E11.9 TYPE 2 DIABETES MELLITUS WITHOUT COMPLICATION, WITH LONG-TERM CURRENT USE OF INSULIN (HCC): ICD-10-CM

## 2018-01-17 DIAGNOSIS — F41.8 DEPRESSION WITH ANXIETY: ICD-10-CM

## 2018-01-17 DIAGNOSIS — I25.10 CORONARY ARTERY DISEASE DUE TO CALCIFIED CORONARY LESION: ICD-10-CM

## 2018-01-17 DIAGNOSIS — E78.5 DYSLIPIDEMIA: ICD-10-CM

## 2018-01-17 DIAGNOSIS — I10 ESSENTIAL HYPERTENSION, BENIGN: ICD-10-CM

## 2018-01-17 DIAGNOSIS — I45.10 RBBB: ICD-10-CM

## 2018-01-17 DIAGNOSIS — M47.812 SPONDYLOSIS OF CERVICAL REGION WITHOUT MYELOPATHY OR RADICULOPATHY: ICD-10-CM

## 2018-01-17 DIAGNOSIS — Z01.818 PREOPERATIVE CLEARANCE: ICD-10-CM

## 2018-01-17 DIAGNOSIS — G47.33 OBSTRUCTIVE SLEEP APNEA ON CPAP: ICD-10-CM

## 2018-01-17 DIAGNOSIS — M17.11 PRIMARY OSTEOARTHRITIS OF RIGHT KNEE: ICD-10-CM

## 2018-01-17 DIAGNOSIS — I25.84 CORONARY ARTERY DISEASE DUE TO CALCIFIED CORONARY LESION: ICD-10-CM

## 2018-01-17 DIAGNOSIS — R06.09 DYSPNEA ON EXERTION: ICD-10-CM

## 2018-01-17 DIAGNOSIS — Z95.0 PACEMAKER: ICD-10-CM

## 2018-01-17 DIAGNOSIS — I49.5 SICK SINUS SYNDROME (HCC): ICD-10-CM

## 2018-01-17 PROBLEM — R40.4 ALTERED LEVEL OF CONSCIOUSNESS: Status: RESOLVED | Noted: 2017-06-20 | Resolved: 2018-01-17

## 2018-01-17 PROCEDURE — 99214 OFFICE O/P EST MOD 30 MIN: CPT | Performed by: FAMILY MEDICINE

## 2018-01-17 ASSESSMENT — PATIENT HEALTH QUESTIONNAIRE - PHQ9: CLINICAL INTERPRETATION OF PHQ2 SCORE: 0

## 2018-01-17 NOTE — PROGRESS NOTES
Subjective:     Chief Complaint   Patient presents with   • Other     sugical clerance for R. knee        Dakota Boyer is a 72 y.o. male here today for pre-op surgical clearance. Dr. Cast is requesting surgical clearance for R total knee replacement.  Pt has multiple chronic conditions which increase his risk for surgery. Pt reports chronic R knee pain that is severe and limits his ADLs.   Pt reports dyspnea with walking >200 feet. Pt reports   Breathing has been stable for 6 months.Patient is not currently taking any breathing  Medications. Pt has LYNDA, but does not use CPAP. Patient is currently not using oxygen.     Pt has HTN, CAD, and pacemaker. Denies chest pain,  edema, palpitations, syncope, or near-syncope.    Pt has chronic neck and low back pain.     Diabetes:  Patient is compliant with medications. No side effects reported such as diarrhea, abdominal pain, dark tarry stool, hematochezia, headache, shakiness, or lightheadedness due to low blood sugar.  Patient denies chest pain, numbness and tingling in hands and feet, change in sensation in hands or feet, sores on feet, change in urine,  or change in vision.    Pt reports mood is stable. He denies helplessness, hopelessness, or thoughts of self-harm       Allergies   Allergen Reactions   • Aleve Cold & [Pseudoephedrine-Naproxen Na] Anaphylaxis   • Ceftriaxone Sodium Anaphylaxis     Reaction; 1970's.   • Naproxen Anaphylaxis     Reaction; 2004.   • Tape Rash and Itching     Plastic tape (paper tape ok)     Current medicines (including changes today)  Current Outpatient Prescriptions   Medication Sig Dispense Refill   • atorvastatin (LIPITOR) 20 MG Tab Take 1 Tab by mouth every day. 90 Tab 3   • metformin (GLUCOPHAGE) 500 MG Tab TAKE 2 TABLETS TWICE A DAY WITH MEALS 360 Tab 2   • insulin glargine (LANTUS SOLOSTAR) 100 UNIT/ML Solution Pen-injector injection INJECT 35 UNITS TWICE A DAY 45 mL 3   • buPROPion (WELLBUTRIN XL) 150 MG XL tablet Take 1  Tab by mouth every morning. 90 Tab 0   • meloxicam (MOBIC) 7.5 MG Tab Take 1-2 Tabs by mouth every day. 60 Tab 2   • FREESTYLE LITE strip USE TO TEST FOUR TIMES A DAY AND AS NEEDED FOR SYMPTOMS OF HIGH OR LOW SUGAR 350 Strip 4   • valsartan (DIOVAN) 80 MG Tab TAKE 1 TABLET DAILY 90 Tab 3   • levothyroxine (SYNTHROID) 75 MCG Tab Take 1 Tab by mouth Every morning on an empty stomach. 90 Tab 3   • TANZEUM 30 MG Pen-injector INJECT 1 PEN AS INSTRUCTED EVERY 7 DAYS 12 Each 2   • metoprolol (LOPRESSOR) 25 MG Tab TAKE 1 TABLET TWICE A  Tab 3   • ferrous sulfate 325 (65 FE) MG tablet Take 325 mg by mouth every 48 hours.     • Cyanocobalamin (VITAMIN B-12) 2500 MCG SL Tab Place  under tongue every day.     • Lancets MISC Lancets order: Lancets for Abbott Freestyle Lite meter. Sig: use 4 times daily  and prn ssx high or low sugar. #100 RF x 0 360 Each 4   • Blood Glucose Monitoring Suppl [place] Meter: Dispense Abbott Freestyle Lite meter. Sig. Use as directed for blood sugar monitoring. #1. NR. 1 Device 0   • B-D ULTRAFINE III SHORT PEN 31G X 8 MM MISC USE 1 NEEDLE EVERY DAY WITH LANTUS 999 Each 2   • aspirin EC (ECOTRIN) 81 MG TBEC Take 81 mg by mouth every day.       No current facility-administered medications for this visit.      Social History   Substance Use Topics   • Smoking status: Former Smoker     Packs/day: 1.00     Years: 40.00     Types: Cigarettes     Quit date: 1/3/1990   • Smokeless tobacco: Never Used   • Alcohol use No     Family Status   Relation Status   • Mother  at age 80    resp failure   • Father  at age 63    suicide   • Sister Alive   • Brother  at age 68    CA lung   • Maternal Grandmother    • Brother  at age 63   • Brother Alive   • Maternal Aunt    • Maternal Uncle    • Paternal Aunt    • Paternal Uncle    • Maternal Grandfather    • Paternal Grandmother    • Paternal Grandfather      Family  "History   Problem Relation Age of Onset   • Lung Disease Mother      Smoker   • Alcohol/Drug Mother    • Heart Disease Father 36     CAD   • Heart Disease Sister      \"hole in heart\"   • Lung Disease Brother      COPD, CANCER   • Cancer Brother      Prostate, Lung   • Alcohol/Drug Brother    • Heart Disease Brother    • Diabetes Brother    • Hypertension Brother    • Hyperlipidemia Brother    • Heart Disease Maternal Grandmother    • Hypertension Maternal Grandmother    • Hyperlipidemia Maternal Grandmother    • Cancer Brother      stomach, thyroid   • Heart Disease Brother 60     MI, stent, PM/defib     He    has a past medical history of Anxiety; Back pain, chronic (10/18/2016); Blood clotting disorder (CMS-HCC) (1978); Breath shortness; CAD (coronary artery disease) (October 2012); Cancer (CMS-HCC) (? early 90's); Degeneration of lumbar or lumbosacral intervertebral disc ( ); Dental disorder; Depression; Diabetes; DJD (degenerative joint disease) of cervical spine ( ); Episodic lightheadedness ( ); High cholesterol; Hyperlipidemia; Hypersensitive carotid sinus syndrome ( ); Hypertension; Incisional infection (April 2013); Insomnia ( ); Myocardial infarct (?); Neck pain (10/18/2016); Pacemaker (October 2012); RBBB ( ); S/P lumbar discectomy ( ); Sick sinus syndrome (CMS-HCC) ( ); Sleep apnea; Syncope (October 2012); Thyroid disease; and Unspecified hemorrhagic conditions.        ROS   GEN: no weight loss, fevers, or chills  HEENT: no blurry vision or change in vision, no ear pain, no difficulty swallowing, no throat pain, no runny nose, no nasal congestion  Resp: +shortness of breath, no cough  CV: no racing heart, no irregular beats, no chest pain  Abd: no nausea, no vomiting, no diarrhea, no constipation, no blood in stool, no dark stools, no incontinence  : no dysuria, no hematuria, no urinary incontinence, no increased frequency  MSK: +knee joint pain, +limited motion in cervical and lumbar spine  Neuro: no " "headaches, no dizziness, no LOC, no weakness, no numbness/tingling       Objective:     Blood pressure 122/60, pulse 76, temperature 36.7 °C (98 °F), height 1.727 m (5' 8\"), weight 115.2 kg (254 lb), SpO2 95 %. Body mass index is 38.62 kg/m².   Physical Exam:  Constitutional: Alert, no distress, obese.  Skin: Warm, dry, good turgor, no rashes in visible areas.  Eye: Equal, round and reactive, conjunctiva clear, lids normal.  ENMT: Lips without lesions, oropharynx non-erythematous, no exudate, moist oral mucosa, bilateral tympanic membranes: No bulging, no retraction, no fluid, nonerythematous, positive light reflex, external auditory canals: Clear, scant cerumen, nonerythematous  Neck: Trachea midline, no masses, no thyromegaly. No cervical or supraclavicular lymphadenopathy.  Respiratory: Unlabored respiratory effort, good air movement, lungs clear to auscultation, no wheezes, no ronchi.  Cardiovascular:RRR, +S1, S2, no murmur, no peripheral edema, pedal and radial pulses equal and intact bilat  Abdomen: Soft, non-tender, no masses, no hepatosplenomegaly.  MSK:5/5 muscle strength in upper extremities as well as lower extremity bilaterally, difficulty ambulated d/t limited knee motion without pain, R knee crepitus  Psych: Alert and oriented, appropriate affect and mood.  Neuro: CN2-12 intact, no gross motor or sensory deficits      Assessment and Plan:   The following treatment plan was discussed    1. Preoperative clearance  Pt is requesting clearance for R total knee replacement under general anesthesia.    Pt has  hx of: obesity, DVT post surgery in 1978, untreated LYNDA, tobacco use quit in 1990 (40 pack yr hx), surgical complications (infection of knee s/p surgery, fistula after neck surgery)  Pt is unable to walk 4 blocks which is equivocal to 4 METS.    Cardiovascular risk: RCRI:  Hx of myocardial ischemia,  IDDM, Risk of MI, PE, V fib, arrest, or complete block: 2=3.6%  Pulmonary Risk: >51 yo, dyspnea, ASA " class 3, LYNDA    Pt is at high risk for intermediate risk surgery.  PT WILL NEED CARDIAC AND PULMONARY CLEARANCE IN ORDER TO PROCEED WITH SURGERY. Pt and wife understand.    I have fully reviewed past medical, surgical, social, and family histories.  Risk of surgery discussed with pt. Pt advised to take all medications as usual on the day prior to surgery.  Pt is advised to take only beta-blocker on morning of surgery. Pt is to stop Aspirin 5 days prior.   I recommend close monitoring of BP during procedure and nebulized breathing treatments.   - CBC WITH DIFFERENTIAL; Future  - COMP METABOLIC PANEL; Future    2. Coronary artery disease due to calcified coronary lesion: 40-50% disease in the circumflex at cardiac catheterization in 2013  Stable: increased risk for surgery    3. Primary osteoarthritis of right knee  Worsening: pt requests surgery    4. Sick sinus syndrome (CMS-HCC)  Stable: increased risk for surgery    5. RBBB  Stable    6. Pacemaker  Chronic: Due to #4    7. Obstructive sleep apnea on CPAP  Chronic: uncontrolled. Stable: increased risk for surgery    8. Type 2 diabetes mellitus without complication, with long-term current use of insulin (CMS-[facility])  Chronic: stable      9. Class 2 obesity due to excess calories with serious comorbidity and body mass index (BMI) of 38.0 to 38.9 in adult  Stable: increased risk for surgery    10. Essential hypertension, benign  Stable: increased risk for surgery    11. Dyslipidemia  Stable: increased risk for surgery    12. Spondylosis of cervical region without myelopathy or radiculopathy  Stable: concern for postioning during surgery    13. Degeneration of lumbar or lumbosacral intervertebral disc  Stable: concern for postioning during surgery    14. Depression with anxiety  Chronic: stable    15. Dyspnea on exertion  Pt reports chronic.  Pt is unable to walk 200 feet without SOB.  Pt reports he has Pulm appt scheduled.     Total 25 minutes face-to-face time spent  with patient, with greater than 50% of the total time discussing patient's issues and symptoms as listed above in assessment and plan, as well as managing coordination of care for future evaluation and treatment.    Followup: Return in about 1 week (around 1/24/2018) for est care with mar.    Please note that this dictation was created using voice recognition software. I have made every reasonable attempt to correct obvious errors, but I expect that there are errors of grammar and possibly content that I did not discover before finalizing the note.

## 2018-01-18 ENCOUNTER — APPOINTMENT (OUTPATIENT)
Dept: RADIOLOGY | Facility: IMAGING CENTER | Age: 73
End: 2018-01-18
Attending: INTERNAL MEDICINE
Payer: MEDICARE

## 2018-01-18 ENCOUNTER — OFFICE VISIT (OUTPATIENT)
Dept: PULMONOLOGY | Facility: HOSPICE | Age: 73
End: 2018-01-18
Payer: MEDICARE

## 2018-01-18 VITALS
HEIGHT: 68 IN | TEMPERATURE: 98.3 F | RESPIRATION RATE: 16 BRPM | SYSTOLIC BLOOD PRESSURE: 138 MMHG | BODY MASS INDEX: 38.34 KG/M2 | HEART RATE: 100 BPM | DIASTOLIC BLOOD PRESSURE: 80 MMHG | OXYGEN SATURATION: 98 % | WEIGHT: 253 LBS

## 2018-01-18 DIAGNOSIS — R06.00 DYSPNEA, UNSPECIFIED TYPE: ICD-10-CM

## 2018-01-18 DIAGNOSIS — G47.33 OSA (OBSTRUCTIVE SLEEP APNEA): ICD-10-CM

## 2018-01-18 PROCEDURE — 99214 OFFICE O/P EST MOD 30 MIN: CPT | Performed by: INTERNAL MEDICINE

## 2018-01-18 PROCEDURE — 71046 X-RAY EXAM CHEST 2 VIEWS: CPT | Mod: TC,FY | Performed by: INTERNAL MEDICINE

## 2018-01-18 NOTE — LETTER
Sebastian Shaver M.D.  [facility] Pulmonary Medicine   [address] [place] [address] [place]  [place] [address]  Phone: [phone] - Fax: [phone]           Encounter Date: 1/18/2018  Provider: Sebastian Shaver M.D.  Location of Care: [facility] PULMONARY MEDICINE      Patient:   Dakota Boyer   MR Number: [id number]   YOB: 1945     PROGRESS NOTE:  Chief Complaint   Patient presents with   • Follow-Up     Surgical Clearance, knee surgery VANITA Dr. Helio Cast       HPI:  The patient is a 72-year-old man with a history of obstructive sleep apnea diagnosed in 2013. At that time he had an AHI of 16.7 associated with hypoxemia. He was titrated successfully to 12 cm of water pressure. He has not used his equipment in some time. Apparently it caused dry mouth and he eventually had some dental problems. He uses his machine last night for the first time and his overall AHI was 1.8 while on 12 cm of water pressure CPAP.  He is a former smoker but quit in 1990. He has never had asthma or emphysema. He says he has never been diagnosed with COPD. He is not on any inhalers. He is not on oxygen. He is not complaining of shortness of breath with usual activity. A chest x-ray obtained today in the office demonstrated the presence of pacemaker. There was no acute pulmonary abnormality.    Past Medical History:   Diagnosis Date   • Anxiety    • Back pain, chronic 10/18/2016   • Blood clotting disorder (CMS-[facility]) 1978    s/p Left knee surgery- DVT in left leg   • Breath shortness    • CAD (coronary artery disease) October 2012    Positive coronary calcium score.  Follow-up MPI was negative for ischemia.   • Cancer (CMS-[facility]) ? early 90's    Melanoma Left arm- surgically removed   • Degeneration of lumbar or lumbosacral intervertebral disc     • Dental disorder    • Depression    • Diabetes     Oral agents and insulin   • DJD (degenerative joint disease) of cervical spine     • Episodic  lightheadedness     • High cholesterol    • Hyperlipidemia    • Hypersensitive carotid sinus syndrome     • Hypertension    • Incisional infection April 2013    Wound dehiscience of neck surgery.   • Insomnia     • Myocardial infarct ?    states was told by  that he has had a heart attack in the past.   • Neck pain 10/18/2016   • Pacemaker October 2012    [facility] [id number]    • RBBB     • S/P lumbar discectomy     • Sick sinus syndrome (CMS-HCC)     • Sleep apnea     does not use CPAP anymore- stopped in Dec 2015   • Syncope October 2012    Treated with PPM   • Thyroid disease     Hypothyroid   • Unspecified hemorrhagic conditions     Reports bleeds easily       ROS:   Constitutional: Denies fevers, chills, night sweats, fatigue or weight loss  Eyes: Denies vision loss, pain, drainage, double vision  Ears, Nose, Throat: Denies earache, tinnitus, hoarseness  Cardiovascular: Denies chest pain, tightness, palpitations  Respiratory: Denies shortness of breath, sputum production, cough, hemoptysis  Sleep: See history of present illness  GI: Denies abdominal pain, nausea, vomiting, diarrhea  : Denies frequent urination, hematuria, painful urination  Musculoskeletal: Has had neck surgery and multiple knee surgeries.  Neurological: Denies headaches, seizures  Skin: Denies rashes, color changes  Psychiatric: Denies depression or thoughts of suicide  Hematologic: Denies bleeding tendency or clotting tendency  Allergic/Immunologic: Denies rhinitis, skin sensitivity    Social History     Social History   • Marital status:      Spouse name: N/A   • Number of children: N/A   • Years of education: N/A     Occupational History   • Not on file.     Social History Main Topics   • Smoking status: Former Smoker     Packs/day: 1.00     Years: 40.00     Types: Cigarettes     Quit date: 1/3/1990   • Smokeless tobacco: Never Used   • Alcohol use No   • Drug use: No   • Sexual activity: Yes      Partners: Female     Other Topics Concern   • Not on file     Social History Narrative   • No narrative on file     Aleve cold & [pseudoephedrine-naproxen na]; Ceftriaxone sodium; Naproxen; and Tape  Current Outpatient Prescriptions on File Prior to Visit   Medication Sig Dispense Refill   • atorvastatin (LIPITOR) 20 MG Tab Take 1 Tab by mouth every day. 90 Tab 3   • metformin (GLUCOPHAGE) 500 MG Tab TAKE 2 TABLETS TWICE A DAY WITH MEALS 360 Tab 2   • insulin glargine (LANTUS SOLOSTAR) 100 UNIT/ML Solution Pen-injector injection INJECT 35 UNITS TWICE A DAY 45 mL 3   • meloxicam (MOBIC) 7.5 MG Tab Take 1-2 Tabs by mouth every day. 60 Tab 2   • valsartan (DIOVAN) 80 MG Tab TAKE 1 TABLET DAILY 90 Tab 3   • levothyroxine (SYNTHROID) 75 MCG Tab Take 1 Tab by mouth Every morning on an empty stomach. 90 Tab 3   • metoprolol (LOPRESSOR) 25 MG Tab TAKE 1 TABLET TWICE A  Tab 3   • ferrous sulfate 325 (65 FE) MG tablet Take 325 mg by mouth every 48 hours.     • Cyanocobalamin (VITAMIN B-12) 2500 MCG SL Tab Place  under tongue every day.     • aspirin EC (ECOTRIN) 81 MG TBEC Take 81 mg by mouth every day.     • buPROPion (WELLBUTRIN XL) 150 MG XL tablet Take 1 Tab by mouth every morning. 90 Tab 0   • FREESTYLE LITE strip USE TO TEST FOUR TIMES A DAY AND AS NEEDED FOR SYMPTOMS OF HIGH OR LOW SUGAR 350 Strip 4   • TANZEUM 30 MG Pen-injector INJECT 1 PEN AS INSTRUCTED EVERY 7 DAYS 12 Each 2   • Lancets MISC Lancets order: Lancets for Abbott Freestyle Lite meter. Sig: use 4 times daily  and prn ssx high or low sugar. #100 RF x 0 360 Each 4   • Blood Glucose Monitoring Suppl [place] Meter: Dispense Abbott Freestyle Lite meter. Sig. Use as directed for blood sugar monitoring. #1. NR. 1 Device 0   • B-D ULTRAFINE III SHORT PEN 31G X 8 MM MISC USE 1 NEEDLE EVERY DAY WITH LANTUS 999 Each 2     No current facility-administered medications on file prior to visit.      Blood pressure 138/80, pulse 100, temperature 36.8 °C (98.3 °F),  "resp. rate 16, height 1.727 m (5' 8\"), weight 114.8 kg (253 lb), SpO2 98 %.  Family History   Problem Relation Age of Onset   • Lung Disease Mother      Smoker   • Alcohol/Drug Mother    • Heart Disease Father 36     CAD   • Heart Disease Sister      \"hole in heart\"   • Lung Disease Brother      COPD, CANCER   • Cancer Brother      Prostate, Lung   • Alcohol/Drug Brother    • Heart Disease Brother    • Diabetes Brother    • Hypertension Brother    • Hyperlipidemia Brother    • Heart Disease Maternal Grandmother    • Hypertension Maternal Grandmother    • Hyperlipidemia Maternal Grandmother    • Cancer Brother      stomach, thyroid   • Heart Disease Brother 60     MI, stent, PM/defib       Physical Exam:  No distress at rest on room air  HEENT: PERRLA, EOMI, no scleral icterus, no nasal or oral lesions  Neck: No thyromegaly, no adenopathy, no bruits  Mallampatti: Grade II  Lungs: Equal breath sounds, no wheezes or crackles  Heart: Regular rate and rhythm, no gallops or murmurs  Abdomen: Soft, benign, no organomegaly  Extremities: No clubbing, cyanosis, or edema  Neurologic: Cranial nerve, motor, and sensory exam are normal    1. Dyspnea, unspecified type    2. LYNDA (obstructive sleep apnea)        At this point he is asymptomatic from a pulmonary standpoint. His chest x-ray shows no acute abnormalities. Pulmonary function testing in the past did not show any evidence of significant obstructive or restrictive lung disease.  He does have obstructive sleep apnea. He is willing to restart CPAP.  We will obtain a new mask and supplies for this man. We will continue CPAP at 12 cm of water pressure. We have advised him to use his humidifier to prevent excessive dryness.    At this time I see no contraindication to necessary surgery. He has no ongoing acute pulmonary problems. I would expect postoperative hypoxemia in the recovery room due to his obstructive sleep apnea. This should be managed with supplemental oxygen. I have " also suggested that he bring his CPAP machine with him to the hospital.      Electronically signed by Sebastian Shaver M.D.  on 01/18/18    No Recipients

## 2018-01-19 NOTE — PROGRESS NOTES
Chief Complaint   Patient presents with   • Follow-Up     Surgical Clearance, knee surgery VANITA Dr. Helio Cast       HPI:  The patient is a 72-year-old man with a history of obstructive sleep apnea diagnosed in 2013. At that time he had an AHI of 16.7 associated with hypoxemia. He was titrated successfully to 12 cm of water pressure. He has not used his equipment in some time. Apparently it caused dry mouth and he eventually had some dental problems. He uses his machine last night for the first time and his overall AHI was 1.8 while on 12 cm of water pressure CPAP.  He is a former smoker but quit in 1990. He has never had asthma or emphysema. He says he has never been diagnosed with COPD. He is not on any inhalers. He is not on oxygen. He is not complaining of shortness of breath with usual activity. A chest x-ray obtained today in the office demonstrated the presence of pacemaker. There was no acute pulmonary abnormality.    Past Medical History:   Diagnosis Date   • Anxiety    • Back pain, chronic 10/18/2016   • Blood clotting disorder (CMS-HCC) 1978    s/p Left knee surgery- DVT in left leg   • Breath shortness    • CAD (coronary artery disease) October 2012    Positive coronary calcium score.  Follow-up MPI was negative for ischemia.   • Cancer (CMS-HCC) ? early 90's    Melanoma Left arm- surgically removed   • Degeneration of lumbar or lumbosacral intervertebral disc     • Dental disorder    • Depression    • Diabetes     Oral agents and insulin   • DJD (degenerative joint disease) of cervical spine     • Episodic lightheadedness     • High cholesterol    • Hyperlipidemia    • Hypersensitive carotid sinus syndrome     • Hypertension    • Incisional infection April 2013    Wound dehiscience of neck surgery.   • Insomnia     • Myocardial infarct ?    states was told by  that he has had a heart attack in the past.   • Neck pain 10/18/2016   • Pacemaker October 2012    [facility] [employer] [facility] #[id number] implanted by  [facility].    • RBBB     • S/P lumbar discectomy     • Sick sinus syndrome (CMS-HCC)     • Sleep apnea     does not use CPAP anymore- stopped in Dec 2015   • Syncope October 2012    Treated with PPM   • Thyroid disease     Hypothyroid   • Unspecified hemorrhagic conditions     Reports bleeds easily       ROS:   Constitutional: Denies fevers, chills, night sweats, fatigue or weight loss  Eyes: Denies vision loss, pain, drainage, double vision  Ears, Nose, Throat: Denies earache, tinnitus, hoarseness  Cardiovascular: Denies chest pain, tightness, palpitations  Respiratory: Denies shortness of breath, sputum production, cough, hemoptysis  Sleep: See history of present illness  GI: Denies abdominal pain, nausea, vomiting, diarrhea  : Denies frequent urination, hematuria, painful urination  Musculoskeletal: Has had neck surgery and multiple knee surgeries.  Neurological: Denies headaches, seizures  Skin: Denies rashes, color changes  Psychiatric: Denies depression or thoughts of suicide  Hematologic: Denies bleeding tendency or clotting tendency  Allergic/Immunologic: Denies rhinitis, skin sensitivity    Social History     Social History   • Marital status:      Spouse name: N/A   • Number of children: N/A   • Years of education: N/A     Occupational History   • Not on file.     Social History Main Topics   • Smoking status: Former Smoker     Packs/day: 1.00     Years: 40.00     Types: Cigarettes     Quit date: 1/3/1990   • Smokeless tobacco: Never Used   • Alcohol use No   • Drug use: No   • Sexual activity: Yes     Partners: Female     Other Topics Concern   • Not on file     Social History Narrative   • No narrative on file     Aleve cold & [pseudoephedrine-naproxen na]; Ceftriaxone sodium; Naproxen; and Tape  Current Outpatient Prescriptions on File Prior to Visit   Medication Sig Dispense Refill   • atorvastatin (LIPITOR) 20 MG Tab Take 1 Tab by mouth every day. 90 Tab 3   • metformin (GLUCOPHAGE) 500 MG Tab  "TAKE 2 TABLETS TWICE A DAY WITH MEALS 360 Tab 2   • insulin glargine (LANTUS SOLOSTAR) 100 UNIT/ML Solution Pen-injector injection INJECT 35 UNITS TWICE A DAY 45 mL 3   • meloxicam (MOBIC) 7.5 MG Tab Take 1-2 Tabs by mouth every day. 60 Tab 2   • valsartan (DIOVAN) 80 MG Tab TAKE 1 TABLET DAILY 90 Tab 3   • levothyroxine (SYNTHROID) 75 MCG Tab Take 1 Tab by mouth Every morning on an empty stomach. 90 Tab 3   • metoprolol (LOPRESSOR) 25 MG Tab TAKE 1 TABLET TWICE A  Tab 3   • ferrous sulfate 325 (65 FE) MG tablet Take 325 mg by mouth every 48 hours.     • Cyanocobalamin (VITAMIN B-12) 2500 MCG SL Tab Place  under tongue every day.     • aspirin EC (ECOTRIN) 81 MG TBEC Take 81 mg by mouth every day.     • buPROPion (WELLBUTRIN XL) 150 MG XL tablet Take 1 Tab by mouth every morning. 90 Tab 0   • FREESTYLE LITE strip USE TO TEST FOUR TIMES A DAY AND AS NEEDED FOR SYMPTOMS OF HIGH OR LOW SUGAR 350 Strip 4   • TANZEUM 30 MG Pen-injector INJECT 1 PEN AS INSTRUCTED EVERY 7 DAYS 12 Each 2   • Lancets MISC Lancets order: Lancets for Abbott Freestyle Lite meter. Sig: use 4 times daily  and prn ssx high or low sugar. #100 RF x 0 360 Each 4   • Blood Glucose Monitoring Suppl [place] Meter: Dispense Abbott Freestyle Lite meter. Sig. Use as directed for blood sugar monitoring. #1. NR. 1 Device 0   • B-D ULTRAFINE III SHORT PEN 31G X 8 MM MISC USE 1 NEEDLE EVERY DAY WITH LANTUS 999 Each 2     No current facility-administered medications on file prior to visit.      Blood pressure 138/80, pulse 100, temperature 36.8 °C (98.3 °F), resp. rate 16, height 1.727 m (5' 8\"), weight 114.8 kg (253 lb), SpO2 98 %.  Family History   Problem Relation Age of Onset   • Lung Disease Mother      Smoker   • Alcohol/Drug Mother    • Heart Disease Father 36     CAD   • Heart Disease Sister      \"hole in heart\"   • Lung Disease Brother      COPD, CANCER   • Cancer Brother      Prostate, Lung   • Alcohol/Drug Brother    • Heart Disease Brother    • " Diabetes Brother    • Hypertension Brother    • Hyperlipidemia Brother    • Heart Disease Maternal Grandmother    • Hypertension Maternal Grandmother    • Hyperlipidemia Maternal Grandmother    • Cancer Brother      stomach, thyroid   • Heart Disease Brother 60     MI, stent, PM/defib       Physical Exam:  No distress at rest on room air  HEENT: PERRLA, EOMI, no scleral icterus, no nasal or oral lesions  Neck: No thyromegaly, no adenopathy, no bruits  Mallampatti: Grade II  Lungs: Equal breath sounds, no wheezes or crackles  Heart: Regular rate and rhythm, no gallops or murmurs  Abdomen: Soft, benign, no organomegaly  Extremities: No clubbing, cyanosis, or edema  Neurologic: Cranial nerve, motor, and sensory exam are normal    1. Dyspnea, unspecified type    2. LYNDA (obstructive sleep apnea)        At this point he is asymptomatic from a pulmonary standpoint. His chest x-ray shows no acute abnormalities. Pulmonary function testing in the past did not show any evidence of significant obstructive or restrictive lung disease.  He does have obstructive sleep apnea. He is willing to restart CPAP.  We will obtain a new mask and supplies for this man. We will continue CPAP at 12 cm of water pressure. We have advised him to use his humidifier to prevent excessive dryness.    At this time I see no contraindication to necessary surgery. He has no ongoing acute pulmonary problems. I would expect postoperative hypoxemia in the recovery room due to his obstructive sleep apnea. This should be managed with supplemental oxygen. I have also suggested that he bring his CPAP machine with him to the hospital.

## 2018-01-23 ENCOUNTER — OFFICE VISIT (OUTPATIENT)
Dept: MEDICAL GROUP | Facility: PHYSICIAN GROUP | Age: 73
End: 2018-01-23
Payer: MEDICARE

## 2018-01-23 VITALS
OXYGEN SATURATION: 95 % | WEIGHT: 253 LBS | BODY MASS INDEX: 38.34 KG/M2 | SYSTOLIC BLOOD PRESSURE: 118 MMHG | DIASTOLIC BLOOD PRESSURE: 56 MMHG | HEART RATE: 78 BPM | HEIGHT: 68 IN | TEMPERATURE: 99 F

## 2018-01-23 DIAGNOSIS — M54.2 NECK PAIN: ICD-10-CM

## 2018-01-23 DIAGNOSIS — M17.0 PRIMARY OSTEOARTHRITIS OF BOTH KNEES: ICD-10-CM

## 2018-01-23 DIAGNOSIS — G89.29 CHRONIC BACK PAIN, UNSPECIFIED BACK LOCATION, UNSPECIFIED BACK PAIN LATERALITY: ICD-10-CM

## 2018-01-23 DIAGNOSIS — G90.01 HYPERSENSITIVE CAROTID SINUS SYNDROME: ICD-10-CM

## 2018-01-23 DIAGNOSIS — R97.20 ELEVATED PSA: ICD-10-CM

## 2018-01-23 DIAGNOSIS — E11.3293 MILD NONPROLIFERATIVE DIABETIC RETINOPATHY OF BOTH EYES ASSOCIATED WITH TYPE 2 DIABETES MELLITUS, MACULAR EDEMA PRESENCE UNSPECIFIED (HCC): ICD-10-CM

## 2018-01-23 DIAGNOSIS — H35.033 HYPERTENSIVE RETINOPATHY OF BOTH EYES: ICD-10-CM

## 2018-01-23 DIAGNOSIS — G47.33 OBSTRUCTIVE SLEEP APNEA ON CPAP: ICD-10-CM

## 2018-01-23 DIAGNOSIS — Z79.4 TYPE 2 DIABETES MELLITUS WITHOUT COMPLICATION, WITH LONG-TERM CURRENT USE OF INSULIN (HCC): ICD-10-CM

## 2018-01-23 DIAGNOSIS — Z98.890 S/P LUMBAR DISCECTOMY: ICD-10-CM

## 2018-01-23 DIAGNOSIS — F41.8 DEPRESSION WITH ANXIETY: ICD-10-CM

## 2018-01-23 DIAGNOSIS — I10 ESSENTIAL HYPERTENSION, BENIGN: ICD-10-CM

## 2018-01-23 DIAGNOSIS — I25.84 CORONARY ARTERY DISEASE DUE TO CALCIFIED CORONARY LESION: ICD-10-CM

## 2018-01-23 DIAGNOSIS — I45.10 RBBB: ICD-10-CM

## 2018-01-23 DIAGNOSIS — M54.9 CHRONIC BACK PAIN, UNSPECIFIED BACK LOCATION, UNSPECIFIED BACK PAIN LATERALITY: ICD-10-CM

## 2018-01-23 DIAGNOSIS — M51.37 DEGENERATION OF LUMBAR OR LUMBOSACRAL INTERVERTEBRAL DISC: ICD-10-CM

## 2018-01-23 DIAGNOSIS — E11.9 TYPE 2 DIABETES MELLITUS WITHOUT COMPLICATION, WITH LONG-TERM CURRENT USE OF INSULIN (HCC): ICD-10-CM

## 2018-01-23 DIAGNOSIS — E06.3 HYPOTHYROIDISM DUE TO HASHIMOTO'S THYROIDITIS: ICD-10-CM

## 2018-01-23 DIAGNOSIS — I25.10 CORONARY ARTERY DISEASE DUE TO CALCIFIED CORONARY LESION: ICD-10-CM

## 2018-01-23 DIAGNOSIS — E78.5 DYSLIPIDEMIA: ICD-10-CM

## 2018-01-23 DIAGNOSIS — E03.8 HYPOTHYROIDISM DUE TO HASHIMOTO'S THYROIDITIS: ICD-10-CM

## 2018-01-23 DIAGNOSIS — M47.812 SPONDYLOSIS OF CERVICAL REGION WITHOUT MYELOPATHY OR RADICULOPATHY: ICD-10-CM

## 2018-01-23 DIAGNOSIS — Z95.0 PACEMAKER: ICD-10-CM

## 2018-01-23 DIAGNOSIS — I49.5 SICK SINUS SYNDROME (HCC): ICD-10-CM

## 2018-01-23 DIAGNOSIS — M47.812 OSTEOARTHRITIS OF CERVICAL SPINE, UNSPECIFIED SPINAL OSTEOARTHRITIS COMPLICATION STATUS: ICD-10-CM

## 2018-01-23 PROCEDURE — 99214 OFFICE O/P EST MOD 30 MIN: CPT | Performed by: PHYSICIAN ASSISTANT

## 2018-01-23 RX ORDER — MELOXICAM 7.5 MG/1
7.5-15 TABLET ORAL DAILY
Qty: 180 TAB | Refills: 1 | Status: SHIPPED | OUTPATIENT
Start: 2018-01-23 | End: 2018-07-04 | Stop reason: SDUPTHER

## 2018-01-23 NOTE — ASSESSMENT & PLAN NOTE
Follows with [facility]. Was recently seen after re-starting CPAP after being off of it for an extended period of time. [facility] has another appointment in 3 months.

## 2018-01-23 NOTE — ASSESSMENT & PLAN NOTE
Follows with Dr. Singletary in endocrinology. On Metformin, Lantus, and Tanzeum. Last A1c in 12/2017.

## 2018-01-23 NOTE — ASSESSMENT & PLAN NOTE
"Elevated on most recent check in 11/2017. Patient has not yet followed up with his urologist, Dr. Morse. States he was unaware the level was high. Patient states he has history of \"prostate enlargement\" and previously underwent biopsy several years back which was negative.  "

## 2018-01-23 NOTE — ASSESSMENT & PLAN NOTE
Chronic for many years. Previously underwent lumbar discectomy surgery and has current neurostimulator which he states he would like removed as he doesn't believe it is helping. Follows with [facility] and plans to schedule an appointment to discuss further. Taking Meloxicam PRN pain, which seems to be helping.

## 2018-01-23 NOTE — ASSESSMENT & PLAN NOTE
Takes valsartan 80 mg daily and metoprolol 25 mg BID. Per patient, blood pressure has been well-controlled on this regimen. Also follows with cardiology.

## 2018-01-23 NOTE — ASSESSMENT & PLAN NOTE
Chronic for many years. History of cervical spondylosis. Controls pain with PRN Meloxicam. Needing refills today.

## 2018-01-23 NOTE — PROGRESS NOTES
Subjective:   Dakota Boyer is a 72 y.o. male here today for hypertension, follow-up on other chronic medical conditions. He is a new patient to me and is also establishing care today.    Previous PCP: Sofía Ruvalcaba,     HPI: Patient has the following current medical problems:    Essential hypertension, benign  Takes valsartan 80 mg daily and metoprolol 25 mg BID. Per patient, blood pressure has been well-controlled on this regimen. Also follows with cardiology.    Type 2 diabetes mellitus without complication  Follows with Dr. Singletary in endocrinology. On Metformin, Lantus, and Tanzeum. Last A1c in 12/2017.    Depression with anxiety  Takes Bupropion  mg daily. Has been on for many years. Works well for symptoms.    Obstructive sleep apnea on CPAP  Follows with [facility]. Was recently seen after re-starting CPAP after being off of it for an extended period of time. States has another appointment in 3 months.    Non-proliferative diabetic retinopathy, mild, both eyes  Follows with ophthalmology, Dr. Long.    Coronary artery disease due to calcified coronary lesion: 40-50% disease in the circumflex at cardiac catheterization in 2013  Chronic issue, follows with cardiology. Takes daily baby aspirin.    Pacemaker  Placed in Oct 2012. Has sick sinus syndrome. Follows with cardiology.    Sick sinus syndrome  Follows with cardiology, Dr. Charles, every 6 months. Has pacemaker.    Hypersensitive carotid sinus syndrome  Follows with cardiology.    RBBB  Follows with cardiology.    Dyslipidemia  Takes atorvastatin 20 mg daily. Last lipid panel in 6/2017.    Hypothyroidism  On Levothyroxine 75 mcg daily. Last TSH 6/2017. Managed by endocrinology, Dr. Singletary.    Obesity  BMI 38.47.    Hypertensive retinopathy of both eyes  Follows with ophthalmology, Dr. Long.    Elevated PSA  Elevated on most recent check in 11/2017. Patient has not yet followed up with his urologist, Dr. Morse. States he was  "unaware the level was high. Patient states he has history of \"prostate enlargement\" and previously underwent biopsy several years back which was negative.    Back pain, chronic  Chronic for many years. Previously underwent lumbar discectomy surgery and has current neurostimulator which he states he would like removed as he doesn't believe it is helping. Follows with [facility] and plans to schedule an appointment to discuss further. Taking Meloxicam PRN pain, which seems to be helping.    Neck pain  Chronic for many years. History of cervical spondylosis. Controls pain with PRN Meloxicam. Needing refills today.    Bilateral primary osteoarthritis of knee  Endorses previous L knee replacement in 2004. Is currently undergoing process to get cleared for R knee replacement. Has already had total of 4 reconstructive surgeries on the R knee in the 1970s-1980s. States he was told L knee would need to be re-replaced as well, but his orthopedist is planning on doing this after R knee replacement. Controls pain with PRN Meloxicam.       Current medicines (including changes today)  Current Outpatient Prescriptions   Medication Sig Dispense Refill   • meloxicam (MOBIC) 7.5 MG Tab Take 1-2 Tabs by mouth every day. 180 Tab 1   • atorvastatin (LIPITOR) 20 MG Tab Take 1 Tab by mouth every day. 90 Tab 3   • metformin (GLUCOPHAGE) 500 MG Tab TAKE 2 TABLETS TWICE A DAY WITH MEALS 360 Tab 2   • insulin glargine (LANTUS SOLOSTAR) 100 UNIT/ML Solution Pen-injector injection INJECT 35 UNITS TWICE A DAY 45 mL 3   • buPROPion (WELLBUTRIN XL) 150 MG XL tablet Take 1 Tab by mouth every morning. 90 Tab 0   • FREESTYLE LITE strip USE TO TEST FOUR TIMES A DAY AND AS NEEDED FOR SYMPTOMS OF HIGH OR LOW SUGAR 350 Strip 4   • valsartan (DIOVAN) 80 MG Tab TAKE 1 TABLET DAILY 90 Tab 3   • levothyroxine (SYNTHROID) 75 MCG Tab Take 1 Tab by mouth Every morning on an empty stomach. 90 Tab 3   • TANZEUM 30 MG Pen-injector INJECT 1 PEN AS " "INSTRUCTED EVERY 7 DAYS 12 Each 2   • metoprolol (LOPRESSOR) 25 MG Tab TAKE 1 TABLET TWICE A  Tab 3   • ferrous sulfate 325 (65 FE) MG tablet Take 325 mg by mouth every 48 hours.     • Cyanocobalamin (VITAMIN B-12) 2500 MCG SL Tab Place  under tongue every day.     • Lancets MISC Lancets order: Lancets for Abbott Freestyle Lite meter. Sig: use 4 times daily  and prn ssx high or low sugar. #100 RF x 0 360 Each 4   • Blood Glucose Monitoring Suppl [place] Meter: Dispense Abbott Freestyle Lite meter. Sig. Use as directed for blood sugar monitoring. #1. NR. 1 Device 0   • B-D ULTRAFINE III SHORT PEN 31G X 8 MM MISC USE 1 NEEDLE EVERY DAY WITH LANTUS 999 Each 2   • aspirin EC (ECOTRIN) 81 MG TBEC Take 81 mg by mouth every day.       No current facility-administered medications for this visit.      He  has a past medical history of Anxiety; Back pain, chronic (10/18/2016); Blood clotting disorder (CMS-HCC) (1978); Breath shortness; CAD (coronary artery disease) (October 2012); Cancer (CMS-HCC) (? early 90's); Degeneration of lumbar or lumbosacral intervertebral disc ( ); Dental disorder; Depression; Diabetes; DJD (degenerative joint disease) of cervical spine ( ); Episodic lightheadedness ( ); High cholesterol; Hyperlipidemia; Hypersensitive carotid sinus syndrome ( ); Hypertension; Incisional infection (April 2013); Insomnia ( ); Myocardial infarct (?); Neck pain (10/18/2016); Pacemaker (October 2012); RBBB ( ); S/P lumbar discectomy ( ); Sick sinus syndrome (CMS-HCC) ( ); Sleep apnea; Syncope (October 2012); Thyroid disease; and Unspecified hemorrhagic conditions.      ROS  Pulmonary ROS: No shortness of breath  Cardiovascular ROS: No chest pain       Objective:     Blood pressure 118/56, pulse 78, temperature 37.2 °C (99 °F), height 1.727 m (5' 8\"), weight 114.8 kg (253 lb), SpO2 95 %. Body mass index is 38.47 kg/m².   Physical Exam:  Constitutional: Alert, no distress.  Skin: No rashes in visible areas.  Eye: " Conjunctiva clear, lids normal.  ENMT: Lips without lesions, moist mucus membranes.  Respiratory: Unlabored respiratory effort, lungs clear to auscultation, no wheezes, no rhonchi.  Cardiovascular: Pacemaker visible L upper chest. Normal S1, S2, no murmur, no lower extremity edema.      Assessment and Plan:   The following treatment plan was discussed    1. Sick sinus syndrome (CMS-[facility])  2. Pacemaker  Chronic issue, has pacemaker, continue to follow with cardiology.    3. Coronary artery disease due to calcified coronary lesion: 40-50% disease in the circumflex at cardiac catheterization in 2013  Chronic issue, stable, on aspirin, continue to follow with cardiology.    4. RBBB  Chronic issue, stable, continue to follow with cardiology.    5. Hypersensitive carotid sinus syndrome  Chronic issue, stable, continue to follow with cardiology.    6. Essential hypertension, benign  Chronic issue, well-controlled, continue valsartan and metoprolol.    7. Dyslipidemia  Chronic issue, well-controlled. Reviewed last lipid panel from 6/2017 which was normal. Continue statin.    Cholesterol,Tot 94  100 - 199 mg/dL Final   Triglycerides 83 0 - 149 mg/dL Final   HDL 36  >=40 mg/dL Final   LDL 41 <100 mg/dL Final       8. Type 2 diabetes mellitus without complication, with long-term current use of insulin (CMS-HCC)  Chronic issue, well-controlled. Last A1c in 12/2017 was 5.9. Continue current medications. Continue to follow with endocrinology.    9. Hypothyroidism due to Hashimoto's thyroiditis  Chronic issue, well-controlled. Continue levothyroxine. Continue to follow with endocrinology.    10. Depression with anxiety  Chronic issue, well-controlled. Continue Bupropion.    11. Obstructive sleep apnea on CPAP  Chronic issue, well-controlled on CPAP. Continue to follow with pulmonology.    12. Mild nonproliferative diabetic retinopathy of both eyes associated with type 2 diabetes mellitus, macular edema presence unspecified  (CMS-[facility])  13. Hypertensive retinopathy of both eyes  Chronic retinopathy, stable, continue to follow with ophthalmology.    14. Primary osteoarthritis of both knees  Chronic issue, continue meloxicam PRN. Follow up for surgery as recommended by orthopedist at [facility].  - meloxicam (MOBIC) 7.5 MG Tab; Take 1-2 Tabs by mouth every day.  Dispense: 180 Tab; Refill: 1    15. Chronic back pain, unspecified back location, unspecified back pain laterality  16. Degeneration of lumbar or lumbosacral intervertebral disc  17. S/P lumbar discectomy  Chronic, currently controlled with meloxicam use. Will follow up with pain management to discuss removal of neurostimulator.  - meloxicam (MOBIC) 7.5 MG Tab; Take 1-2 Tabs by mouth every day.  Dispense: 180 Tab; Refill: 1    18. Neck pain  19. Spondylosis of cervical region without myelopathy or radiculopathy  20. Osteoarthritis of cervical spine, unspecified spinal osteoarthritis complication status  Chronic, currently controlled with meloxicam use.  - meloxicam (MOBIC) 7.5 MG Tab; Take 1-2 Tabs by mouth every day.  Dispense: 180 Tab; Refill: 1    21. Elevated PSA  Reviewed PSA results from 11/2017. Elevated at 7.47 which is significant increase from last check in 2014, which was 3.93. Recommend that he schedule appointment with his urologist. Hasn't seen in several years, so I placed new referral.  - REFERRAL TO UROLOGY    22. Class 2 obesity due to excess calories with serious comorbidity and body mass index (BMI) of 38.0 to 38.9 in adult  Chronic issue, stable.      Followup: Return in about 6 months (around 7/23/2018) for routine f/u; Short.    Tanya Rayo P.A.-C.

## 2018-01-23 NOTE — ASSESSMENT & PLAN NOTE
Endorses previous L knee replacement in 2004. Is currently undergoing process to get cleared for R knee replacement. Has already had total of 4 reconstructive surgeries on the R knee in the 1970s-1980s. States he was told L knee would need to be re-replaced as well, but his orthopedist is planning on doing this after R knee replacement. Controls pain with PRN Meloxicam.

## 2018-02-13 DIAGNOSIS — E03.9 HYPOTHYROIDISM: ICD-10-CM

## 2018-02-14 RX ORDER — LEVOTHYROXINE SODIUM 0.07 MG/1
TABLET ORAL
Qty: 90 TAB | Refills: 1 | Status: SHIPPED | OUTPATIENT
Start: 2018-02-14 | End: 2018-08-13 | Stop reason: SDUPTHER

## 2018-02-14 NOTE — TELEPHONE ENCOUNTER
Was the patient seen in the last year in this department? Yes     Does patient have an active prescription for medications requested? No     Received Request Via: Pharmacy      Pt met protocol?: Yes, labs 6/17 tsh 1.680, mikaela Rayo 1/23/18

## 2018-02-15 ENCOUNTER — HOSPITAL ENCOUNTER (OUTPATIENT)
Dept: HOSPITAL 8 - STAR | Age: 73
End: 2018-02-15
Attending: ORTHOPAEDIC SURGERY
Payer: MEDICARE

## 2018-02-15 DIAGNOSIS — E11.9: ICD-10-CM

## 2018-02-15 DIAGNOSIS — I45.10: ICD-10-CM

## 2018-02-15 DIAGNOSIS — Z79.4: ICD-10-CM

## 2018-02-15 DIAGNOSIS — M17.11: Primary | ICD-10-CM

## 2018-02-15 DIAGNOSIS — G47.30: ICD-10-CM

## 2018-02-15 LAB
ALBUMIN SERPL-MCNC: 3.6 G/DL (ref 3.4–5)
ALP SERPL-CCNC: 76 U/L (ref 45–117)
ALT SERPL-CCNC: 26 U/L (ref 12–78)
ANION GAP SERPL CALC-SCNC: 8 MMOL/L (ref 5–15)
APTT BLD: 27 SECONDS (ref 25–31)
BASOPHILS # BLD AUTO: 0.04 X10^3/UL (ref 0–0.1)
BASOPHILS NFR BLD AUTO: 0 % (ref 0–1)
BILIRUB SERPL-MCNC: 1 MG/DL (ref 0.2–1)
CALCIUM SERPL-MCNC: 9.4 MG/DL (ref 8.5–10.1)
CHLORIDE SERPL-SCNC: 103 MMOL/L (ref 98–107)
CREAT SERPL-MCNC: 1.13 MG/DL (ref 0.7–1.3)
EOSINOPHIL # BLD AUTO: 0.27 X10^3/UL (ref 0–0.4)
EOSINOPHIL NFR BLD AUTO: 3 % (ref 1–7)
ERYTHROCYTE [DISTWIDTH] IN BLOOD BY AUTOMATED COUNT: 14.6 % (ref 9.4–14.8)
EST. AVERAGE GLUCOSE BLD GHB EST-MCNC: 143 MG/DL (ref 0–126)
HBA1C MFR BLD: 6.6 % (ref 4.2–6.3)
INR PPP: 1.04 (ref 0.93–1.1)
LYMPHOCYTES # BLD AUTO: 1.92 X10^3/UL (ref 1–3.4)
LYMPHOCYTES NFR BLD AUTO: 22 % (ref 22–44)
MCH RBC QN AUTO: 32.7 PG (ref 27.5–34.5)
MCHC RBC AUTO-ENTMCNC: 34.7 G/DL (ref 33.2–36.2)
MCV RBC AUTO: 94.3 FL (ref 81–97)
MD: NO
MONOCYTES # BLD AUTO: 0.71 X10^3/UL (ref 0.2–0.8)
MONOCYTES NFR BLD AUTO: 8 % (ref 2–9)
NEUTROPHILS # BLD AUTO: 5.67 X10^3/UL (ref 1.8–6.8)
NEUTROPHILS NFR BLD AUTO: 66 % (ref 42–75)
PLATELET # BLD AUTO: 180 X10^3/UL (ref 130–400)
PMV BLD AUTO: 8.8 FL (ref 7.4–10.4)
PROT SERPL-MCNC: 6.7 G/DL (ref 6.4–8.2)
PROTHROMBIN TIME: 10.7 SECONDS (ref 9.6–11.5)
RBC # BLD AUTO: 4.14 X10^6/UL (ref 4.38–5.82)

## 2018-02-15 PROCEDURE — 87081 CULTURE SCREEN ONLY: CPT

## 2018-02-15 PROCEDURE — 93005 ELECTROCARDIOGRAM TRACING: CPT

## 2018-02-15 PROCEDURE — 85610 PROTHROMBIN TIME: CPT

## 2018-02-15 PROCEDURE — 80053 COMPREHEN METABOLIC PANEL: CPT

## 2018-02-15 PROCEDURE — 36415 COLL VENOUS BLD VENIPUNCTURE: CPT

## 2018-02-15 PROCEDURE — 85025 COMPLETE CBC W/AUTO DIFF WBC: CPT

## 2018-02-15 PROCEDURE — 85730 THROMBOPLASTIN TIME PARTIAL: CPT

## 2018-02-15 PROCEDURE — 83036 HEMOGLOBIN GLYCOSYLATED A1C: CPT

## 2018-02-27 RX ORDER — BUPROPION HYDROCHLORIDE 150 MG/1
TABLET ORAL
Qty: 90 TAB | Refills: 1 | Status: SHIPPED | OUTPATIENT
Start: 2018-02-27 | End: 2018-08-26 | Stop reason: SDUPTHER

## 2018-03-27 DIAGNOSIS — E11.65 TYPE 2 DIABETES MELLITUS WITH HYPERGLYCEMIA, WITH LONG-TERM CURRENT USE OF INSULIN (HCC): ICD-10-CM

## 2018-03-27 DIAGNOSIS — Z79.4 TYPE 2 DIABETES MELLITUS WITHOUT COMPLICATION, WITH LONG-TERM CURRENT USE OF INSULIN (HCC): ICD-10-CM

## 2018-03-27 DIAGNOSIS — Z79.4 TYPE 2 DIABETES MELLITUS WITH HYPERGLYCEMIA, WITH LONG-TERM CURRENT USE OF INSULIN (HCC): ICD-10-CM

## 2018-03-27 DIAGNOSIS — E11.9 TYPE 2 DIABETES MELLITUS WITHOUT COMPLICATION, WITH LONG-TERM CURRENT USE OF INSULIN (HCC): ICD-10-CM

## 2018-03-27 RX ORDER — INSULIN GLARGINE 100 [IU]/ML
INJECTION, SOLUTION SUBCUTANEOUS
Qty: 45 ML | Refills: 0 | Status: SHIPPED | OUTPATIENT
Start: 2018-03-27 | End: 2018-05-31 | Stop reason: SDUPTHER

## 2018-04-24 ENCOUNTER — SLEEP CENTER VISIT (OUTPATIENT)
Dept: SLEEP MEDICINE | Facility: MEDICAL CENTER | Age: 73
End: 2018-04-24
Payer: MEDICARE

## 2018-04-24 VITALS
BODY MASS INDEX: 40.16 KG/M2 | SYSTOLIC BLOOD PRESSURE: 127 MMHG | RESPIRATION RATE: 15 BRPM | HEIGHT: 68 IN | DIASTOLIC BLOOD PRESSURE: 70 MMHG | HEART RATE: 75 BPM | OXYGEN SATURATION: 95 % | WEIGHT: 265 LBS

## 2018-04-24 DIAGNOSIS — G47.33 OSA (OBSTRUCTIVE SLEEP APNEA): ICD-10-CM

## 2018-04-24 PROCEDURE — 99213 OFFICE O/P EST LOW 20 MIN: CPT | Performed by: NURSE PRACTITIONER

## 2018-04-24 NOTE — PATIENT INSTRUCTIONS
Plan:    1) Fit for new mask today in the office. Order for new CPAP machine, mask and supplies sent to [facility]. Continue CPAP at 12 CM H20. Encouraged increased time spent on therapy.   2) Sleep hygiene discussed. Recommend keeping a set sleep/wake schedule. Logging enough hours of sleep. Limiting/Avoiding naps. No caffeine after noon and no heavy meals in the evening. Recommend daily exercise.   3) Weight loss recommended.  4) Follow up in 3 months with compliance card download, sooner if needed.

## 2018-04-24 NOTE — PROGRESS NOTES
Chief Complaint   Patient presents with   • Follow-Up     PMA 2015 / RESTART CPAP    • Apnea       HPI:  Dakota Boyer is a 73 y.o. year old male here today for follow-up on his obstructive sleep apnea. He was last seen in our Pulmonary Clinic 1/18/2018 with Dr. Sebastian Shaver. He is a former smoker but quit in 1990. No prior diagnosis of Asthma or Emphysema. He denies dyspnea. He is not on inhalers or oxygen. Chest x-ray at his last office visit demonstrated the presence of pacemaker. There was no acute pulmonary abnormality.    He does have a history of obstructive sleep apnea diagnosed in 2013. At that time he had an AHI of 16.7 associated with hypoxemia. He was titrated successfully to 12 cm of water pressure. He had stopped CPAP in the past due to tolerance issues. He was recommended to restart CPAP at his last office visit. He was ordered a new mask and supplies. He states he never received new supplies as he was told he needs to meet compliance. His compliance card download today in the office indicates an AHI of 2 with an average use of 4.5 hours at night. He has had mask comfort issues. He has soreness on the bridge of his nose. He feels the pressure is comfortable. His machine is over 5 years old. He states his knob for the humidifier is broke and he has been unable to adjust the amount of heated moisture. He is unsure of whether or not he is sleeping better on therapy. He notes dry mouth in the morning. He denies any morning headaches.       Past Medical History:   Diagnosis Date   • Anxiety    • Back pain, chronic 10/18/2016   • Blood clotting disorder (CMS-HCC) 1978    s/p Left knee surgery- DVT in left leg   • Breath shortness    • CAD (coronary artery disease) October 2012    Positive coronary calcium score.  Follow-up MPI was negative for ischemia.   • Cancer (CMS-HCC) ? early 90's    Melanoma Left arm- surgically removed   • Degeneration of lumbar or lumbosacral intervertebral disc     •  Dental disorder    • Depression    • Diabetes     Oral agents and insulin   • DJD (degenerative joint disease) of cervical spine     • Episodic lightheadedness     • High cholesterol    • Hyperlipidemia    • Hypersensitive carotid sinus syndrome     • Hypertension    • Incisional infection April 2013    Wound dehiscience of neck surgery.   • Insomnia     • Myocardial infarct ?    states was told by  that he has had a heart attack in the past.   • Neck pain 10/18/2016   • Pacemaker October 2012    [employer] [address] #[id number] implanted by [facility]    • RBBB     • S/P lumbar discectomy     • Sick sinus syndrome (CMS-HCC)     • Sleep apnea     does not use CPAP anymore- stopped in Dec 2015   • Syncope October 2012    Treated with PPM   • Thyroid disease     Hypothyroid   • Unspecified hemorrhagic conditions     Reports bleeds easily       Past Surgical History:   Procedure Laterality Date   • OK DSTR NROLYTC AGNT PARVERTEB FCT SNGL CRVCL/THORA Right 10/19/2016    Procedure: NEURO DEST FACET C/T W/IG SNGL - 3ON-C3, C8-T1    SINERGY;  Surgeon: Gaurang Pruett M.D.;  Location: SURGERY SURGICAL [facility] ORS;  Service: Pain Management   • OK DSTR NROLYTC AGNT PARVERTEB FCT ADDL CRVCL/THORA  10/19/2016    Procedure: NEURO DEST FACET C/T W/IG ADDL;  Surgeon: Gaurang Pruett M.D.;  Location: SURGERY [facility] ORS;  Service: Pain Management   • PB FLUOROSCOPIC GUIDANCE NEEDLE PLACEMENT  10/19/2016    Procedure: FLOURO GUIDE NEEDLE PLACEMENT;  Surgeon: Gaurang Pruett M.D.;  Location: SURGERY [facility] ORS;  Service: Pain Management   • SHOULDER ARTHROSCOPY Right 2015    torn bicep tendon and spurs   • RECOVERY  10/17/2013    Performed by Cath-Recovery Surgery at [facility] SAME DAY [facility] ORS   • WOUND DEHISCENCE  4/10/2013    Performed by Tu Jain M.D. at SURGERY [facility] ORS   • CERVICAL FUSION POSTERIOR  2/27/2013    Performed by Tu Jain M.D. at SURGERY [facility] ORS   • CERVICAL LAMINECTOMY POSTERIOR   "2/27/2013    Performed by Tu Jain M.D. at SURGERY [facility] ORS   • LUMBAR LAMINECTOMY DISKECTOMY  11/20/2012    Performed by Tu Jain M.D. at SURGERY [facility]   • RECOVERY  10/4/2012    Performed by Cath-Recovery Surgery at SURGERY SAME DAY [facility]   • PACEMAKER INSERTION  October 2012    [employer] [id number] implanted by Dr. Waite.   • SHOULDER DECOMPRESSION Left 2008    Left rotator cuff   • ATHROPLASTY Left 2004   • ARTHROSCOPY, KNEE Bilateral 1978   • ORTHOPEDIC OSTEOTOMY      Both knees several times, 8 total   • OTHER     • SPINAL CORD STIMULATOR  1 month ago   • TONSILLECTOMY     • VASECTOMY         Family History   Problem Relation Age of Onset   • Lung Disease Mother      Smoker   • Alcohol/Drug Mother    • Heart Disease Father 36     CAD   • Heart Disease Sister      \"hole in heart\"   • Lung Disease Brother      COPD, CANCER   • Cancer Brother      Prostate, Lung   • Alcohol/Drug Brother    • Heart Disease Brother    • Diabetes Brother    • Hypertension Brother    • Hyperlipidemia Brother    • Heart Disease Maternal Grandmother    • Hypertension Maternal Grandmother    • Hyperlipidemia Maternal Grandmother    • Cancer Brother      stomach, thyroid   • Heart Disease Brother 60     MI, stent, PM/defib   • Sleep Apnea Brother        Social History     Social History   • Marital status:      Spouse name: N/A   • Number of children: N/A   • Years of education: N/A     Occupational History   • Not on file.     Social History Main Topics   • Smoking status: Former Smoker     Packs/day: 1.00     Years: 40.00     Types: Cigarettes     Quit date: 1/3/1990   • Smokeless tobacco: Never Used   • Alcohol use No   • Drug use: No   • Sexual activity: Yes     Partners: Female     Other Topics Concern   • Not on file     Social History Narrative   • No narrative on file       ROS:  Constitutional: Denies fevers, chills, sweats, weight loss  Eyes: Denies glasses, vision loss, " pain, drainage, double vision  Ears/Nose/Mouth/Throat: Denies rhinitis, nasal congestion, ear ache, difficulty hearing, sore throat, persistent hoarseness, decayed teeth/toothache  Cardiovascular: Denies chest pain, tightness, palpitations, swelling in feet/legs, fainting, difficulty breathing when laying down  Respiratory: Denies shortness of breath, cough, sputum, wheezing, painful breathing, coughing up blood  GI: Denies heartburn, difficulty swallowing, nausea, vomiting, abdominal pain, diarrhea, constipation  : Denies frequent urination, painful urination  Integumentary: Denies rashes, lumps or color changes  MSK: Denies painful joints, sore muscles, and back pain.   Neurological: Denies frequent headaches, dizziness, weakness  Sleep: See HPI     Current Outpatient Prescriptions   Medication Sig Dispense Refill   • LANTUS SOLOSTAR 100 UNIT/ML Solution Pen-injector injection INJECT 30 UNITS TWICE A DAY 45 mL 0   • TANZEUM 30 MG Pen-injector INJECT THE CONTENTS OF 1 PEN AS INSTRUCTED EVERY 7 DAYS 12 Each 0   • buPROPion (WELLBUTRIN XL) 150 MG XL tablet TAKE 1 TABLET EVERY MORNING 90 Tab 1   • levothyroxine (SYNTHROID) 75 MCG Tab TAKE 1 TABLET EVERY MORNING ON AN EMPTY STOMACH 90 Tab 1   • metoprolol (LOPRESSOR) 25 MG Tab TAKE 1 TABLET TWICE A  Tab 2   • meloxicam (MOBIC) 7.5 MG Tab Take 1-2 Tabs by mouth every day. 180 Tab 1   • atorvastatin (LIPITOR) 20 MG Tab Take 1 Tab by mouth every day. 90 Tab 3   • metformin (GLUCOPHAGE) 500 MG Tab TAKE 2 TABLETS TWICE A DAY WITH MEALS 360 Tab 2   • insulin glargine (LANTUS SOLOSTAR) 100 UNIT/ML Solution Pen-injector injection INJECT 35 UNITS TWICE A DAY 45 mL 3   • FREESTYLE LITE strip USE TO TEST FOUR TIMES A DAY AND AS NEEDED FOR SYMPTOMS OF HIGH OR LOW SUGAR 350 Strip 4   • valsartan (DIOVAN) 80 MG Tab TAKE 1 TABLET DAILY 90 Tab 3   • Cyanocobalamin (VITAMIN B-12) 2500 MCG SL Tab Place  under tongue every day.     • Lancets MISC Lancets order: Lancets for  "[employer] Freestyle Lite meter. Sig: use 4 times daily  and prn ssx high or low sugar. #100 RF x 0 360 Each 4   • Blood Glucose Monitoring Suppl [place] Meter: Dispense Abbott Freestyle Lite meter. Sig. Use as directed for blood sugar monitoring. #1. NR. 1 Device 0   • B-D ULTRAFINE III SHORT PEN 31G X 8 MM MISC USE 1 NEEDLE EVERY DAY WITH LANTUS 999 Each 2   • aspirin EC (ECOTRIN) 81 MG TBEC Take 81 mg by mouth every day.     • ferrous sulfate 325 (65 FE) MG tablet Take 325 mg by mouth every 48 hours.       No current facility-administered medications for this visit.        Allergies   Allergen Reactions   • Aleve Cold & [Pseudoephedrine-Naproxen Na] Anaphylaxis   • Ceftriaxone Sodium Anaphylaxis     Reaction; 1970's.   • Naproxen Anaphylaxis     Reaction; 2004.   • Tape Rash and Itching     Plastic tape (paper tape ok)       Blood pressure 127/70, pulse 75, resp. rate 15, height 1.727 m (5' 8\"), weight 120.2 kg (265 lb), SpO2 95 %.    PE:   Appearance: Well developed, well nourished, no acute distress  Eyes: PERRL, EOM intact, sclera white, conjunctiva moist  Ears: no lesions or deformities  Hearing: grossly intact  Nose: no lesions or deformities  Oropharynx: tongue normal, posterior pharynx without erythema or exudate  Mallampati Classification: Class 3  Neck: supple, trachea midline, no masses   Respiratory effort: no intercostal retractions or use of accessory muscles  Lung auscultation: no rales, rhonchi or wheezes  Heart auscultation: no murmur rub or gallop  Extremities: no cyanosis or edema  Abdomen: soft ,non tender, no masses  Gait and Station: normal  Digits and nails: no clubbing, cyanosis, petechiae or nodes.  Cranial nerves: grossly intact  Skin: no rashes, lesions or ulcers noted  Orientation: Oriented to time, person and place  Mood and affect: mood and affect appropriate, normal interaction with examiner  Judgement: Intact          Assessment:  1. LYNDA (obstructive sleep apnea)     2. BMI 40.0-44.9, " adult (CMS-[facility])  Patient identified as having weight management issue.  Appropriate orders and counseling given.         Plan:    1) Fit for new mask today in the office. Order for new CPAP machine, mask and supplies sent to [facility]. Continue CPAP at 12 CM H20. Encouraged increased time spent on therapy.   2) Sleep hygiene discussed. Recommend keeping a set sleep/wake schedule. Logging enough hours of sleep. Limiting/Avoiding naps. No caffeine after noon and no heavy meals in the evening. Recommend daily exercise.   3) Weight loss recommended.  4) Follow up in 3 months with compliance card download, sooner if needed.

## 2018-05-15 ENCOUNTER — PATIENT MESSAGE (OUTPATIENT)
Dept: SLEEP MEDICINE | Facility: MEDICAL CENTER | Age: 73
End: 2018-05-15

## 2018-05-15 NOTE — TELEPHONE ENCOUNTER
From: Dakota Boyer  To: ERIN Ramírez  Sent: 5/15/2018 10:11 AM PDT  Subject: Procedure Question    I saw you on 04/24/2018 - new equipment was suppose to be sent out for referral. As of today there has been NO contact of any kind.  Any advice?

## 2018-05-31 DIAGNOSIS — E11.65 TYPE 2 DIABETES MELLITUS WITH HYPERGLYCEMIA, WITH LONG-TERM CURRENT USE OF INSULIN (HCC): ICD-10-CM

## 2018-05-31 DIAGNOSIS — Z79.4 TYPE 2 DIABETES MELLITUS WITHOUT COMPLICATION, WITH LONG-TERM CURRENT USE OF INSULIN (HCC): ICD-10-CM

## 2018-05-31 DIAGNOSIS — E11.9 TYPE 2 DIABETES MELLITUS WITHOUT COMPLICATION, WITH LONG-TERM CURRENT USE OF INSULIN (HCC): ICD-10-CM

## 2018-05-31 DIAGNOSIS — Z79.4 TYPE 2 DIABETES MELLITUS WITH HYPERGLYCEMIA, WITH LONG-TERM CURRENT USE OF INSULIN (HCC): ICD-10-CM

## 2018-06-01 RX ORDER — INSULIN GLARGINE 100 [IU]/ML
INJECTION, SOLUTION SUBCUTANEOUS
Qty: 45 ML | Refills: 3 | Status: SHIPPED | OUTPATIENT
Start: 2018-06-01 | End: 2019-02-12

## 2018-06-11 ENCOUNTER — OFFICE VISIT (OUTPATIENT)
Dept: ENDOCRINOLOGY | Facility: MEDICAL CENTER | Age: 73
End: 2018-06-11
Payer: MEDICARE

## 2018-06-11 VITALS
HEIGHT: 68 IN | HEART RATE: 78 BPM | SYSTOLIC BLOOD PRESSURE: 115 MMHG | DIASTOLIC BLOOD PRESSURE: 62 MMHG | WEIGHT: 247 LBS | BODY MASS INDEX: 37.44 KG/M2 | OXYGEN SATURATION: 92 %

## 2018-06-11 DIAGNOSIS — I10 ESSENTIAL HYPERTENSION: ICD-10-CM

## 2018-06-11 DIAGNOSIS — E11.9 TYPE 2 DIABETES MELLITUS WITHOUT COMPLICATION, WITH LONG-TERM CURRENT USE OF INSULIN (HCC): ICD-10-CM

## 2018-06-11 DIAGNOSIS — E03.9 ACQUIRED HYPOTHYROIDISM: ICD-10-CM

## 2018-06-11 DIAGNOSIS — E78.2 MIXED HYPERLIPIDEMIA: ICD-10-CM

## 2018-06-11 DIAGNOSIS — Z79.4 TYPE 2 DIABETES MELLITUS WITHOUT COMPLICATION, WITH LONG-TERM CURRENT USE OF INSULIN (HCC): ICD-10-CM

## 2018-06-11 LAB
HBA1C MFR BLD: 5.8 % (ref ?–5.8)
INT CON NEG: NEGATIVE
INT CON POS: POSITIVE

## 2018-06-11 PROCEDURE — 99214 OFFICE O/P EST MOD 30 MIN: CPT | Performed by: INTERNAL MEDICINE

## 2018-06-11 PROCEDURE — 83036 HEMOGLOBIN GLYCOSYLATED A1C: CPT | Performed by: INTERNAL MEDICINE

## 2018-06-11 NOTE — PROGRESS NOTES
"Endocrinology Clinic Progress Note    CC: Diabetes    HPI:  1. Type 2 diabetes mellitus without complication, with long-term current use of insulin (HCC)  He is currently on Lantus 35 units twice a day, Tanzeum 30 mg weekly and metformin 1000 g twice a day. Reports compliance with medications. Checks blood sugars usually once a day. Most blood sugar readings are in the range of 120-160. He had one mild hypoglycemic episode in the past month. He denies numbness or tingling in feet. He is up-to-date with eye exam.    2. Essential hypertension  Blood pressure is well controlled. He is on ARB.    3. Acquired hypothyroidism  He is currently on levothyroxine 75 µg daily, he has been on levothyroxine since 2013 when TSH was slightly elevated. TPO Antibody was negative. He is interested in seeing if he can get off levothyroxine.    4. Mixed hyperlipidemia  He is currently on Lipitor, tolerating well.    ROS:  Constitutional: Negative for unintentional weight loss  Endo: Negative for numbness or tingling in feet    PMH:  Patient Active Problem List   Diagnosis   • Type 2 diabetes mellitus without complication (HCC)   • Back pain, chronic   • Essential hypertension, benign   • Neck pain   • Dyslipidemia   • RBBB   • Hypersensitive carotid sinus syndrome   • Sick sinus syndrome (HCC)   • Pacemaker   • Degeneration of lumbar or lumbosacral intervertebral disc   • S/P lumbar discectomy   • DJD (degenerative joint disease) of cervical spine   • Coronary artery disease due to calcified coronary lesion: 40-50% disease in the circumflex at cardiac catheterization in 2013   • Hypothyroidism   • Obesity   • Depression with anxiety   • Obstructive sleep apnea on CPAP   • Non-proliferative diabetic retinopathy, mild, both eyes (HCC)   • Hypertensive retinopathy of both eyes   • Cervical spondylosis   • Elevated PSA   • Bilateral primary osteoarthritis of knee     EXAM:  Vital signs: /62   Pulse 78   Ht 1.727 m (5' 8\")   Wt 112 " kg (247 lb)   SpO2 92%   BMI 37.56 kg/m²   General: No apparent distress, cooperative  Eyes: No scleral icterus, no discharge  Neck: Normal on external inspection  Resp: Normal effort, clear to auscultation bilaterally  CVS: Regular rate and rhythm, S1 S2 normal  Musculoskeletal: Normal gait  Extremities: No lower extremity edema  Skin: No rash on visible skin  Psych: Alert and oriented, normal mood and affect    Assessment and Plan:    1. Type 2 diabetes mellitus without complication, with long-term current use of insulin (HCC)  · Hemoglobin A1c today in the clinic is 5.8%  · Goal A1c less than 7.5%  · No changes to medications today  · Repeat labs  - BASIC METABOLIC PANEL; Future  - MICROALBUMIN CREAT RATIO URINE; Future    2. Essential hypertension  · Blood pressure is well controlled    3. Acquired hypothyroidism  · Advised to discontinue levothyroxine for now, and repeat labs in one month  · In the past she had negative TPO antibody, and he was started on levothyroxine in 2013 when his TSH was just slightly elevated at 6 on one occasion  - TSH WITH REFLEX TO FT4; Future    4. Mixed hyperlipidemia  · Continue Lipitor  · Repeat labs  - LIPID PROFILE; Future    Return in about 6 months (around 12/11/2018).    Thank you for allowing me to participate in the care of this patient.    Isabela Singletary M.D.    CC:   Sofía Mathew D.O.    This note was created using voice recognition software (Dragon). The accuracy of the dictation is limited by the abilities of the software. I have reviewed the note prior to signing, however some errors in grammar and context are still possible. If you have any questions related to this note please do not hesitate to contact our office.

## 2018-06-28 ENCOUNTER — HOSPITAL ENCOUNTER (OUTPATIENT)
Dept: LAB | Facility: MEDICAL CENTER | Age: 73
End: 2018-06-28
Attending: UROLOGY
Payer: MEDICARE

## 2018-06-28 LAB — PSA SERPL-MCNC: 7.94 NG/ML (ref 0–4)

## 2018-06-28 PROCEDURE — 36415 COLL VENOUS BLD VENIPUNCTURE: CPT

## 2018-06-28 PROCEDURE — 84153 ASSAY OF PSA TOTAL: CPT

## 2018-07-02 ENCOUNTER — OFFICE VISIT (OUTPATIENT)
Dept: CARDIOLOGY | Facility: MEDICAL CENTER | Age: 73
End: 2018-07-02
Payer: MEDICARE

## 2018-07-02 ENCOUNTER — NON-PROVIDER VISIT (OUTPATIENT)
Dept: CARDIOLOGY | Facility: MEDICAL CENTER | Age: 73
End: 2018-07-02
Payer: MEDICARE

## 2018-07-02 VITALS
HEART RATE: 78 BPM | OXYGEN SATURATION: 96 % | HEIGHT: 68 IN | WEIGHT: 248 LBS | SYSTOLIC BLOOD PRESSURE: 128 MMHG | DIASTOLIC BLOOD PRESSURE: 50 MMHG | BODY MASS INDEX: 37.59 KG/M2

## 2018-07-02 VITALS
HEIGHT: 68 IN | WEIGHT: 248 LBS | DIASTOLIC BLOOD PRESSURE: 50 MMHG | BODY MASS INDEX: 37.59 KG/M2 | OXYGEN SATURATION: 96 % | SYSTOLIC BLOOD PRESSURE: 128 MMHG | HEART RATE: 78 BPM

## 2018-07-02 DIAGNOSIS — Z95.0 PACEMAKER: ICD-10-CM

## 2018-07-02 DIAGNOSIS — I25.84 CORONARY ARTERY DISEASE DUE TO CALCIFIED CORONARY LESION: ICD-10-CM

## 2018-07-02 DIAGNOSIS — I25.10 CORONARY ARTERY DISEASE DUE TO CALCIFIED CORONARY LESION: ICD-10-CM

## 2018-07-02 DIAGNOSIS — I10 ESSENTIAL HYPERTENSION, BENIGN: ICD-10-CM

## 2018-07-02 DIAGNOSIS — I49.5 SICK SINUS SYNDROME (HCC): ICD-10-CM

## 2018-07-02 DIAGNOSIS — E78.5 DYSLIPIDEMIA: ICD-10-CM

## 2018-07-02 PROCEDURE — 99214 OFFICE O/P EST MOD 30 MIN: CPT | Performed by: INTERNAL MEDICINE

## 2018-07-02 PROCEDURE — 93288 INTERROG EVL PM/LDLS PM IP: CPT | Performed by: NURSE PRACTITIONER

## 2018-07-02 NOTE — PROGRESS NOTES
Device is working normally. He is paced only 1% of total time.  2 mode switching episodes, both <6 seconds (<1% of total time).  Normal sensing and capture of RA and RV leads; stable impedances. Battery longevity is 9.1-10.2 years.  No changes are made today.    FU in 6 months for next PM check with me. He does see Dr. Christiansen today too.    Collaborating MD: Елена

## 2018-07-02 NOTE — PROGRESS NOTES
Chief Complaint   Patient presents with   • Hypertension     Follow up   • Coronary Artery Disease       Subjective:   Dakota Boyer is a 73 y.o. male who presents today for followup of his dyspnea on exertion, hypertension, hyperlipidemia, permanent pacemaker and mild coronary artery disease.     He has had no chest discomfort, dyspnea on exertion, PND, orthopnea or lightheadedness.  He does note his heart beating fast if he does some heavy exertion.  In addition, he has bilateral lower extremity edema which seems to be related to his knee replacements.      Past Medical History:   Diagnosis Date   • Anxiety    • Back pain, chronic 10/18/2016   • Blood clotting disorder (HCC) 1978    s/p Left knee surgery- DVT in left leg   • Breath shortness    • CAD (coronary artery disease) October 2012    Positive coronary calcium score.  Follow-up MPI was negative for ischemia.   • Cancer (HCC) ? early 90's    Melanoma Left arm- surgically removed   • Degeneration of lumbar or lumbosacral intervertebral disc     • Dental disorder    • Depression    • Diabetes     Oral agents and insulin   • DJD (degenerative joint disease) of cervical spine     • Episodic lightheadedness     • High cholesterol    • Hyperlipidemia    • Hypersensitive carotid sinus syndrome     • Hypertension    • Incisional infection April 2013    Wound dehiscience of neck surgery.   • Insomnia     • Myocardial infarct (HCC) ?    states was told by  that he has had a heart attack in the past.   • Neck pain 10/18/2016   • Pacemaker October 2012    [employer] [address] #[id number] implanted by [facility].    • RBBB     • S/P lumbar discectomy     • Sick sinus syndrome (HCC)     • Sleep apnea     does not use CPAP anymore- stopped in Dec 2015   • Syncope October 2012    Treated with PPM   • Thyroid disease     Hypothyroid   • Unspecified hemorrhagic conditions     Reports bleeds easily     Past Surgical History:   Procedure Laterality Date   • MN DSTR NROLYTC  "AGNT PARVERTEB FCT SNGL CRVCL/THORA Right 10/19/2016    Procedure: NEURO DEST FACET C/T W/IG SNGL - 3ON-C3, C8-T1    SINERGY;  Surgeon: Gaurang Pruett M.D.;  Location: [facility];  Service: Pain Management   • WA DSTR NROLYTC AGNT PARVERTEB FCT ADDL CRVCL/THORA  10/19/2016    Procedure: NEURO DEST FACET C/T W/IG ADDL;  Surgeon: Gaurang Pruett M.D.;  Location: [facility];  Service: Pain Management   • PB FLUOROSCOPIC GUIDANCE NEEDLE PLACEMENT  10/19/2016    Procedure: FLOURO GUIDE NEEDLE PLACEMENT;  Surgeon: Gaurang Pruett M.D.;  Location: [facility];  Service: Pain Management   • SHOULDER ARTHROSCOPY Right 2015    torn bicep tendon and spurs   • RECOVERY  10/17/2013    Performed by Cath-Recovery Surgery at SURGERY SAME DAY [facility]   • WOUND DEHISCENCE  4/10/2013    Performed by Tu Jain M.D. at SURGERY [facility]   • CERVICAL FUSION POSTERIOR  2/27/2013    Performed by Tu Jain M.D. at SURGERY [facility]   • CERVICAL LAMINECTOMY POSTERIOR  2/27/2013    Performed by Tu Jain M.D. at SURGERY [facility]   • LUMBAR LAMINECTOMY DISKECTOMY  11/20/2012    Performed by Tu Jain M.D. at SURGERY [facility]   • RECOVERY  10/4/2012    Performed by Cath-Recovery Surgery at SURGERY SAME DAY [facility]   • PACEMAKER INSERTION  October 2012    [employer] [facility] [employer] [address] [id number] implanted by Dr. Waite.   • SHOULDER DECOMPRESSION Left 2008    Left rotator cuff   • ATHROPLASTY Left 2004   • ARTHROSCOPY, KNEE Bilateral 1978   • ORTHOPEDIC OSTEOTOMY      Both knees several times, 8 total   • OTHER     • SPINAL CORD STIMULATOR  1 month ago   • TONSILLECTOMY     • VASECTOMY       Family History   Problem Relation Age of Onset   • Lung Disease Mother      Smoker   • Alcohol/Drug Mother    • Heart Disease Father 36     CAD   • Heart Disease Sister      \"hole in heart\"   • Lung Disease Brother      COPD, CANCER   • Cancer Brother      Prostate, Lung   • " Alcohol/Drug Brother    • Heart Disease Brother    • Diabetes Brother    • Hypertension Brother    • Hyperlipidemia Brother    • Heart Disease Maternal Grandmother    • Hypertension Maternal Grandmother    • Hyperlipidemia Maternal Grandmother    • Cancer Brother      stomach, thyroid   • Heart Disease Brother 60     MI, stent, PM/defib   • Sleep Apnea Brother      Social History     Social History   • Marital status:      Spouse name: N/A   • Number of children: N/A   • Years of education: N/A     Occupational History   • Not on file.     Social History Main Topics   • Smoking status: Former Smoker     Packs/day: 1.00     Years: 40.00     Types: Cigarettes     Quit date: 1/3/1990   • Smokeless tobacco: Never Used   • Alcohol use No   • Drug use: No   • Sexual activity: Yes     Partners: Female     Other Topics Concern   • Not on file     Social History Narrative   • No narrative on file     Allergies   Allergen Reactions   • Aleve Cold & [Pseudoephedrine-Naproxen Na] Anaphylaxis   • Ceftriaxone Sodium Anaphylaxis     Reaction; 1970's.   • Naproxen Anaphylaxis     Reaction; 2004.   • Tape Rash and Itching     Plastic tape (paper tape ok)     Outpatient Encounter Prescriptions as of 7/2/2018   Medication Sig Dispense Refill   • TANZEUM 30 MG Pen-injector INJECT THE CONTENTS OF 1 PEN AS INSTRUCTED EVERY 7 DAYS. 12 Each 3   • LANTUS SOLOSTAR 100 UNIT/ML Solution Pen-injector injection INJECT 30 UNITS TWICE A DAY 45 mL 3   • buPROPion (WELLBUTRIN XL) 150 MG XL tablet TAKE 1 TABLET EVERY MORNING 90 Tab 1   • levothyroxine (SYNTHROID) 75 MCG Tab TAKE 1 TABLET EVERY MORNING ON AN EMPTY STOMACH 90 Tab 1   • metoprolol (LOPRESSOR) 25 MG Tab TAKE 1 TABLET TWICE A  Tab 2   • meloxicam (MOBIC) 7.5 MG Tab Take 1-2 Tabs by mouth every day. 180 Tab 1   • atorvastatin (LIPITOR) 20 MG Tab Take 1 Tab by mouth every day. 90 Tab 3   • metformin (GLUCOPHAGE) 500 MG Tab TAKE 2 TABLETS TWICE A DAY WITH MEALS 360 Tab 2   •  "FREESTYLE LITE strip USE TO TEST FOUR TIMES A DAY AND AS NEEDED FOR SYMPTOMS OF HIGH OR LOW SUGAR 350 Strip 4   • valsartan (DIOVAN) 80 MG Tab TAKE 1 TABLET DAILY 90 Tab 3   • ferrous sulfate 325 (65 FE) MG tablet Take 325 mg by mouth every 48 hours.     • Cyanocobalamin (VITAMIN B-12) 2500 MCG SL Tab Place  under tongue every day.     • Lancets MISC Lancets order: Lancets for Abbott Freestyle Lite meter. Sig: use 4 times daily  and prn ssx high or low sugar. #100 RF x 0 360 Each 4   • Blood Glucose Monitoring Suppl [place] Meter: Dispense Abbott Freestyle Lite meter. Sig. Use as directed for blood sugar monitoring. #1. NR. 1 Device 0   • B-D ULTRAFINE III SHORT PEN 31G X 8 MM MISC USE 1 NEEDLE EVERY DAY WITH LANTUS 999 Each 2   • aspirin EC (ECOTRIN) 81 MG TBEC Take 81 mg by mouth every day.       No facility-administered encounter medications on file as of 7/2/2018.      ROS     Objective:   /50   Pulse 78   Ht 1.727 m (5' 8\")   Wt 112.5 kg (248 lb)   SpO2 96%   BMI 37.71 kg/m²     Physical Exam   Constitutional: He appears well-developed and well-nourished.   Neck: No JVD present.   Cardiovascular: Normal rate and regular rhythm.    Murmur (3/6 systolic at the base) heard.  Pulmonary/Chest: Effort normal and breath sounds normal. He has no rales.   Abdominal: Soft. There is no tenderness.   Musculoskeletal: He exhibits edema (1-2+ pretibial).     Lab Results   Component Value Date/Time    CHOLSTRLTOT 94 (L) 06/12/2017 09:53 AM    LDL 41 06/12/2017 09:53 AM    HDL 36 (A) 06/12/2017 09:53 AM    TRIGLYCERIDE 83 06/12/2017 09:53 AM       Lab Results   Component Value Date/Time    SODIUM 138 06/12/2017 09:53 AM    POTASSIUM 4.2 06/12/2017 09:53 AM    CHLORIDE 103 06/12/2017 09:53 AM    CO2 26 06/12/2017 09:53 AM    GLUCOSE 143 (H) 06/12/2017 09:53 AM    BUN 14 06/12/2017 09:53 AM    CREATININE 0.80 06/12/2017 09:53 AM     Lab Results   Component Value Date/Time    ALKPHOSPHAT 63 06/12/2017 09:53 AM    ASTSGOT " 18 06/12/2017 09:53 AM    ALTSGPT 18 06/12/2017 09:53 AM    TBILIRUBIN 1.2 06/12/2017 09:53 AM      Lab Results   Component Value Date/Time    BNPBTYPENAT 4 06/13/2016 06:34 AM      Transthoracic  Echo Report  CONCLUSIONS  Left ventricular ejection fraction is visually estimated to be 65%.   Mild concentric left ventricular hypertrophy.   Mild mitral regurgitation.   Mild aortic stenosis.   Right ventricular systolic pressure is estimated to be 38 mmHg. Right   atrial pressure is estimated to be 3 mmHg.  No pericardial effusion seen.   Echo images from 10/2/14 showed normal gradients across aortic valve.   Normal LV function.    Exam Date:         07/17/2017     Pacemaker check from today: Patient's pacemaker is functioning normally.  He has about 10 years of battery life remaining.  He did have 2 brief episodes of mode switching lasting less than 6 seconds each.    Assessment:     1. Coronary artery disease due to calcified coronary lesion: 40-50% disease in the circumflex at cardiac catheterization in 2013     2. Pacemaker     3. Dyslipidemia     4. Essential hypertension, benign         Medical Decision Making:  Today's Assessment / Status / Plan:     Mr. Boyer is clinically stable.  He does have bilateral edema which is probably related to his knee replacement surgeries.  His pacemaker is functioning normally.  His blood pressure appears to be under good control.  He is going to have lab work obtained for his endocrinologist.  We will check to make sure there is a lipid and CMP with that.  If not, we will order them.  He will otherwise follow-up in about 6 months.

## 2018-07-02 NOTE — LETTER
[facility]   [address]   [address]   [place] [address]  Phone: [phone]  Fax: [phone]              Dakota Boyer  1945    Encounter Date: 7/2/2018    Jac Christiansen M.D.          PROGRESS NOTE:  Chief Complaint   Patient presents with   • Hypertension     Follow up   • Coronary Artery Disease       Subjective:   Dakota Boyer is a 73 y.o. male who presents today for followup of his dyspnea on exertion, hypertension, hyperlipidemia, permanent pacemaker and mild coronary artery disease.     He has had no chest discomfort, dyspnea on exertion, PND, orthopnea or lightheadedness.  He does note his heart beating fast if he does some heavy exertion.  In addition, he has bilateral lower extremity edema which seems to be related to his knee replacements.      Past Medical History:   Diagnosis Date   • Anxiety    • Back pain, chronic 10/18/2016   • Blood clotting disorder (HCC) 1978    s/p Left knee surgery- DVT in left leg   • Breath shortness    • CAD (coronary artery disease) October 2012    Positive coronary calcium score.  Follow-up MPI was negative for ischemia.   • Cancer (HCC) ? early 90's    Melanoma Left arm- surgically removed   • Degeneration of lumbar or lumbosacral intervertebral disc     • Dental disorder    • Depression    • Diabetes     Oral agents and insulin   • DJD (degenerative joint disease) of cervical spine     • Episodic lightheadedness     • High cholesterol    • Hyperlipidemia    • Hypersensitive carotid sinus syndrome     • Hypertension    • Incisional infection April 2013    Wound dehiscience of neck surgery.   • Insomnia     • Myocardial infarct (HCC) ?    states was told by  that he has had a heart attack in the past.   • Neck pain 10/18/2016   • Pacemaker October 2012    [facility] [address] #[id number] implanted by [facility].    • RBBB     • S/P lumbar discectomy     • Sick sinus syndrome  ([facility])     • Sleep apnea     does not use CPAP anymore- stopped in Dec 2015   • Syncope October 2012    Treated with PPM   • Thyroid disease     Hypothyroid   • Unspecified hemorrhagic conditions     Reports bleeds easily     Past Surgical History:   Procedure Laterality Date   • RI DSTR NROLYTC AGNT PARVERTEB FCT SNGL CRVCL/THORA Right 10/19/2016    Procedure: NEURO DEST FACET C/T W/IG SNGL - 3ON-C3, C8-T1    SINERGY;  Surgeon: Gaurang Pruett M.D.;  Location: SURGERY [facility];  Service: Pain Management   • RI DSTR NROLYTC AGNT PARVERTEB FCT ADDL CRVCL/THORA  10/19/2016    Procedure: NEURO DEST FACET C/T W/IG ADDL;  Surgeon: Gaurang Pruett M.D.;  Location: [facility];  Service: Pain Management   • PB FLUOROSCOPIC GUIDANCE NEEDLE PLACEMENT  10/19/2016    Procedure: FLOURO GUIDE NEEDLE PLACEMENT;  Surgeon: Gaurang Pruett M.D.;  Location: [facility];  Service: Pain Management   • SHOULDER ARTHROSCOPY Right 2015    torn bicep tendon and spurs   • RECOVERY  10/17/2013    Performed by Cath-Recovery Surgery at SURGERY SAME DAY [facility]   • WOUND DEHISCENCE  4/10/2013    Performed by Tu Jain M.D. at SURGERY [facility]   • CERVICAL FUSION POSTERIOR  2/27/2013    Performed by Tu Jain M.D. at SURGERY [facility]   • CERVICAL LAMINECTOMY POSTERIOR  2/27/2013    Performed by Tu Jain M.D. at SURGERY [facility]   • LUMBAR LAMINECTOMY DISKECTOMY  11/20/2012    Performed by Tu Jain M.D. at SURGERY [facility]   • RECOVERY  10/4/2012    Performed by Cath-Recovery Surgery at SURGERY SAME DAY [facility]   • PACEMAKER INSERTION  October 2012    [employer]  2110 implanted by Dr. Waite.   • SHOULDER DECOMPRESSION Left 2008    Left rotator cuff   • ATHROPLASTY Left 2004   • ARTHROSCOPY, KNEE Bilateral 1978   • ORTHOPEDIC OSTEOTOMY      Both knees several times, 8 total   • OTHER     • SPINAL CORD STIMULATOR  1 month ago   • TONSILLECTOMY      "  • VASECTOMY       Family History   Problem Relation Age of Onset   • Lung Disease Mother      Smoker   • Alcohol/Drug Mother    • Heart Disease Father 36     CAD   • Heart Disease Sister      \"hole in heart\"   • Lung Disease Brother      COPD, CANCER   • Cancer Brother      Prostate, Lung   • Alcohol/Drug Brother    • Heart Disease Brother    • Diabetes Brother    • Hypertension Brother    • Hyperlipidemia Brother    • Heart Disease Maternal Grandmother    • Hypertension Maternal Grandmother    • Hyperlipidemia Maternal Grandmother    • Cancer Brother      stomach, thyroid   • Heart Disease Brother 60     MI, stent, PM/defib   • Sleep Apnea Brother      Social History     Social History   • Marital status:      Spouse name: N/A   • Number of children: N/A   • Years of education: N/A     Occupational History   • Not on file.     Social History Main Topics   • Smoking status: Former Smoker     Packs/day: 1.00     Years: 40.00     Types: Cigarettes     Quit date: 1/3/1990   • Smokeless tobacco: Never Used   • Alcohol use No   • Drug use: No   • Sexual activity: Yes     Partners: Female     Other Topics Concern   • Not on file     Social History Narrative   • No narrative on file     Allergies   Allergen Reactions   • Aleve Cold & [Pseudoephedrine-Naproxen Na] Anaphylaxis   • Ceftriaxone Sodium Anaphylaxis     Reaction; 1970's.   • Naproxen Anaphylaxis     Reaction; 2004.   • Tape Rash and Itching     Plastic tape (paper tape ok)     Outpatient Encounter Prescriptions as of 7/2/2018   Medication Sig Dispense Refill   • TANZEUM 30 MG Pen-injector INJECT THE CONTENTS OF 1 PEN AS INSTRUCTED EVERY 7 DAYS. 12 Each 3   • LANTUS SOLOSTAR 100 UNIT/ML Solution Pen-injector injection INJECT 30 UNITS TWICE A DAY 45 mL 3   • buPROPion (WELLBUTRIN XL) 150 MG XL tablet TAKE 1 TABLET EVERY MORNING 90 Tab 1   • levothyroxine (SYNTHROID) 75 MCG Tab TAKE 1 TABLET EVERY MORNING ON AN EMPTY STOMACH 90 Tab 1   • metoprolol " "(LOPRESSOR) 25 MG Tab TAKE 1 TABLET TWICE A  Tab 2   • meloxicam (MOBIC) 7.5 MG Tab Take 1-2 Tabs by mouth every day. 180 Tab 1   • atorvastatin (LIPITOR) 20 MG Tab Take 1 Tab by mouth every day. 90 Tab 3   • metformin (GLUCOPHAGE) 500 MG Tab TAKE 2 TABLETS TWICE A DAY WITH MEALS 360 Tab 2   • FREESTYLE LITE strip USE TO TEST FOUR TIMES A DAY AND AS NEEDED FOR SYMPTOMS OF HIGH OR LOW SUGAR 350 Strip 4   • valsartan (DIOVAN) 80 MG Tab TAKE 1 TABLET DAILY 90 Tab 3   • ferrous sulfate 325 (65 FE) MG tablet Take 325 mg by mouth every 48 hours.     • Cyanocobalamin (VITAMIN B-12) 2500 MCG SL Tab Place  under tongue every day.     • Lancets MISC Lancets order: Lancets for Abbott Freestyle Lite meter. Sig: use 4 times daily  and prn ssx high or low sugar. #100 RF x 0 360 Each 4   • Blood Glucose Monitoring Suppl [place] Meter: Dispense Abbott Freestyle Lite meter. Sig. Use as directed for blood sugar monitoring. #1. NR. 1 Device 0   • B-D ULTRAFINE III SHORT PEN 31G X 8 MM MISC USE 1 NEEDLE EVERY DAY WITH LANTUS 999 Each 2   • aspirin EC (ECOTRIN) 81 MG TBEC Take 81 mg by mouth every day.       No facility-administered encounter medications on file as of 7/2/2018.      ROS     Objective:   /50   Pulse 78   Ht 1.727 m (5' 8\")   Wt 112.5 kg (248 lb)   SpO2 96%   BMI 37.71 kg/m²      Physical Exam   Constitutional: He appears well-developed and well-nourished.   Neck: No JVD present.   Cardiovascular: Normal rate and regular rhythm.    Murmur (3/6 systolic at the base) heard.  Pulmonary/Chest: Effort normal and breath sounds normal. He has no rales.   Abdominal: Soft. There is no tenderness.   Musculoskeletal: He exhibits edema (1-2+ pretibial).     Lab Results   Component Value Date/Time    CHOLSTRLTOT 94 (L) 06/12/2017 09:53 AM    LDL 41 06/12/2017 09:53 AM    HDL 36 (A) 06/12/2017 09:53 AM    TRIGLYCERIDE 83 06/12/2017 09:53 AM       Lab Results   Component Value Date/Time    SODIUM 138 06/12/2017 09:53 AM   "    POTASSIUM 4.2 06/12/2017 09:53 AM    CHLORIDE 103 06/12/2017 09:53 AM    CO2 26 06/12/2017 09:53 AM    GLUCOSE 143 (H) 06/12/2017 09:53 AM    BUN 14 06/12/2017 09:53 AM    CREATININE 0.80 06/12/2017 09:53 AM     Lab Results   Component Value Date/Time    ALKPHOSPHAT 63 06/12/2017 09:53 AM    ASTSGOT 18 06/12/2017 09:53 AM    ALTSGPT 18 06/12/2017 09:53 AM    TBILIRUBIN 1.2 06/12/2017 09:53 AM      Lab Results   Component Value Date/Time    BNPBTYPENAT 4 06/13/2016 06:34 AM      Transthoracic  Echo Report  CONCLUSIONS  Left ventricular ejection fraction is visually estimated to be 65%.   Mild concentric left ventricular hypertrophy.   Mild mitral regurgitation.   Mild aortic stenosis.   Right ventricular systolic pressure is estimated to be 38 mmHg. Right   atrial pressure is estimated to be 3 mmHg.  No pericardial effusion seen.   Echo images from 10/2/14 showed normal gradients across aortic valve.   Normal LV function.    Exam Date:         07/17/2017     Pacemaker check from today: Patient's pacemaker is functioning normally.  He has about 10 years of battery life remaining.  He did have 2 brief episodes of mode switching lasting less than 6 seconds each.    Assessment:     1. Coronary artery disease due to calcified coronary lesion: 40-50% disease in the circumflex at cardiac catheterization in 2013     2. Pacemaker     3. Dyslipidemia     4. Essential hypertension, benign         Medical Decision Making:  Today's Assessment / Status / Plan:     Mr. Boyer is clinically stable.  He does have bilateral edema which is probably related to his knee replacement surgeries.  His pacemaker is functioning normally.  His blood pressure appears to be under good control.  He is going to have lab work obtained for his endocrinologist.  We will check to make sure there is a lipid and CMP with that.  If not we will order them.  He will otherwise follow-up in about 6 months.      JENNY Martines-WILBERT.  [address]  [address]  [place] [address]  VIA In Basket

## 2018-07-04 DIAGNOSIS — M47.812 SPONDYLOSIS OF CERVICAL REGION WITHOUT MYELOPATHY OR RADICULOPATHY: ICD-10-CM

## 2018-07-04 DIAGNOSIS — M51.37 DEGENERATION OF LUMBAR OR LUMBOSACRAL INTERVERTEBRAL DISC: ICD-10-CM

## 2018-07-04 DIAGNOSIS — M17.0 PRIMARY OSTEOARTHRITIS OF BOTH KNEES: ICD-10-CM

## 2018-07-05 RX ORDER — MELOXICAM 7.5 MG/1
TABLET ORAL
Qty: 180 TAB | Refills: 0 | Status: SHIPPED | OUTPATIENT
Start: 2018-07-05 | End: 2018-10-04 | Stop reason: SDUPTHER

## 2018-07-05 NOTE — TELEPHONE ENCOUNTER
Please remind pt to make an appointment by the end of the month for more refills. Will send 3 months to pharmacy.

## 2018-07-30 ENCOUNTER — TELEPHONE (OUTPATIENT)
Dept: MEDICAL GROUP | Facility: PHYSICIAN GROUP | Age: 73
End: 2018-07-30

## 2018-07-30 NOTE — TELEPHONE ENCOUNTER
Future Appointments       Provider Department Center    8/9/2018 1:05 PM [facility]C.; [facility]    8/13/2018 3:00 PM ERIN Ramírez [facility]     12/5/2018 10:40 AM Isabela Singletary M.D. [facility] [place] [place]        ANNUAL WELLNESS VISIT PRE-VISIT PLANNING WITHOUT OUTREACH    1.  Reviewed note from last office visit with PCP: YES    2.  If any orders were placed at last visit, do we have Results/Consult Notes?        •  Labs - Labs ordered, NOT completed. Patient advised to complete prior to next appointment..       •  Imaging - Imaging was not ordered at last office visit.       •  Referrals - Referral ordered, patient has NOT been seen.    3.  Immunizations were updated in [employer] using WebIZ?: Yes       •  WebIZ Recommendations: FLU, TDAP and SHINGRIX (Shingles)        •  Is patient due for Tdap? YES. Patient was notified of copay/out of pocket cost. needs script for pharmacy        •  Is patient due for Shingles? YES. Patient was notified of copay/out of pocket cost.  Needs script for pharmacy     4.  Patient is due for the following Health Maintenance Topics:   Health Maintenance Due   Topic Date Due   • IMM DTaP/Tdap/Td Vaccine (1 - Tdap) 03/29/2008   • IMM ZOSTER VACCINES (2 of 3) 05/23/2014   • Annual Wellness Visit  02/09/2017   • DIABETES MONOFILAMENT / LE EXAM  01/26/2018   • FASTING LIPID PROFILE  06/12/2018   • URINE ACR / MICROALBUMIN  06/12/2018   • SERUM CREATININE  06/12/2018       5.  Reviewed/Updated the following with patient:       •   Preferred Pharmacy? YES       •   Preferred Lab? YES       •   Preferred Communication? YES       •   Allergies? YES       •   Medications? YES. Was Abstract Encounter opened and chart updated? YES       •   Social History? YES. Was Abstract Encounter opened and chart updated? YES       •   Family History (document living status of  immediate family members and if + hx of  cancer, diabetes, hypertension, hyperlipidemia, heart attack, stroke) YES. Was Abstract Encounter opened and chart updated? YES    6.  Care Team Updated:       •   DME Company (gait device, O2, CPAP, etc.): YES CPAP        •   Other Specialists (eye doctor, derm, GYN, cardiology, endo, etc): YES    7.  Patient has the following Care Path diagnoses on Problem List:  DIABETES  DEPRESSION     8.  MDX printed and highlighted for Provider? NO INS MCR     9.  Patient was advised: “This is a free wellness visit. The provider will screen for medical conditions to help you stay healthy. If you have other concerns to address you may be asked to discuss these at a separate visit or there may be an additional fee.”     10.  Patient was informed to arrive 15 min prior to their scheduled appointment and bring in their medication bottles.

## 2018-08-09 ENCOUNTER — OFFICE VISIT (OUTPATIENT)
Dept: MEDICAL GROUP | Facility: PHYSICIAN GROUP | Age: 73
End: 2018-08-09
Payer: MEDICARE

## 2018-08-09 VITALS
HEART RATE: 70 BPM | WEIGHT: 251 LBS | BODY MASS INDEX: 38.04 KG/M2 | HEIGHT: 68 IN | TEMPERATURE: 98.3 F | DIASTOLIC BLOOD PRESSURE: 80 MMHG | SYSTOLIC BLOOD PRESSURE: 130 MMHG | OXYGEN SATURATION: 95 %

## 2018-08-09 DIAGNOSIS — E66.01 CLASS 2 SEVERE OBESITY DUE TO EXCESS CALORIES WITH SERIOUS COMORBIDITY AND BODY MASS INDEX (BMI) OF 38.0 TO 38.9 IN ADULT (HCC): ICD-10-CM

## 2018-08-09 DIAGNOSIS — I49.5 SICK SINUS SYNDROME (HCC): ICD-10-CM

## 2018-08-09 DIAGNOSIS — Z00.00 MEDICARE ANNUAL WELLNESS VISIT, SUBSEQUENT: ICD-10-CM

## 2018-08-09 DIAGNOSIS — E03.8 HYPOTHYROIDISM DUE TO HASHIMOTO'S THYROIDITIS: ICD-10-CM

## 2018-08-09 DIAGNOSIS — Z95.0 PACEMAKER: ICD-10-CM

## 2018-08-09 DIAGNOSIS — G89.29 CHRONIC BACK PAIN, UNSPECIFIED BACK LOCATION, UNSPECIFIED BACK PAIN LATERALITY: ICD-10-CM

## 2018-08-09 DIAGNOSIS — E06.3 HYPOTHYROIDISM DUE TO HASHIMOTO'S THYROIDITIS: ICD-10-CM

## 2018-08-09 DIAGNOSIS — M17.0 BILATERAL PRIMARY OSTEOARTHRITIS OF KNEE: ICD-10-CM

## 2018-08-09 DIAGNOSIS — I10 ESSENTIAL HYPERTENSION, BENIGN: ICD-10-CM

## 2018-08-09 DIAGNOSIS — M54.9 CHRONIC BACK PAIN, UNSPECIFIED BACK LOCATION, UNSPECIFIED BACK PAIN LATERALITY: ICD-10-CM

## 2018-08-09 DIAGNOSIS — F41.8 DEPRESSION WITH ANXIETY: ICD-10-CM

## 2018-08-09 DIAGNOSIS — H35.033 HYPERTENSIVE RETINOPATHY OF BOTH EYES: ICD-10-CM

## 2018-08-09 DIAGNOSIS — C61 PROSTATE CANCER (HCC): ICD-10-CM

## 2018-08-09 DIAGNOSIS — M51.37 DEGENERATION OF LUMBAR OR LUMBOSACRAL INTERVERTEBRAL DISC: ICD-10-CM

## 2018-08-09 DIAGNOSIS — M54.2 NECK PAIN: ICD-10-CM

## 2018-08-09 DIAGNOSIS — G47.33 OBSTRUCTIVE SLEEP APNEA ON CPAP: ICD-10-CM

## 2018-08-09 DIAGNOSIS — Z98.890 S/P LUMBAR DISCECTOMY: ICD-10-CM

## 2018-08-09 DIAGNOSIS — I45.10 RBBB: ICD-10-CM

## 2018-08-09 DIAGNOSIS — Z79.4 TYPE 2 DIABETES MELLITUS WITHOUT COMPLICATION, WITH LONG-TERM CURRENT USE OF INSULIN (HCC): ICD-10-CM

## 2018-08-09 DIAGNOSIS — G90.01 HYPERSENSITIVE CAROTID SINUS SYNDROME: ICD-10-CM

## 2018-08-09 DIAGNOSIS — M47.812 SPONDYLOSIS OF CERVICAL REGION WITHOUT MYELOPATHY OR RADICULOPATHY: ICD-10-CM

## 2018-08-09 DIAGNOSIS — E11.3293 MILD NONPROLIFERATIVE DIABETIC RETINOPATHY OF BOTH EYES ASSOCIATED WITH TYPE 2 DIABETES MELLITUS, MACULAR EDEMA PRESENCE UNSPECIFIED (HCC): ICD-10-CM

## 2018-08-09 DIAGNOSIS — M47.812 OSTEOARTHRITIS OF CERVICAL SPINE, UNSPECIFIED SPINAL OSTEOARTHRITIS COMPLICATION STATUS: ICD-10-CM

## 2018-08-09 DIAGNOSIS — I25.10 CORONARY ARTERY DISEASE DUE TO CALCIFIED CORONARY LESION: ICD-10-CM

## 2018-08-09 DIAGNOSIS — R97.20 ELEVATED PSA: ICD-10-CM

## 2018-08-09 DIAGNOSIS — E11.9 TYPE 2 DIABETES MELLITUS WITHOUT COMPLICATION, WITH LONG-TERM CURRENT USE OF INSULIN (HCC): ICD-10-CM

## 2018-08-09 DIAGNOSIS — E78.5 DYSLIPIDEMIA: ICD-10-CM

## 2018-08-09 DIAGNOSIS — Z23 NEED FOR VACCINATION: ICD-10-CM

## 2018-08-09 DIAGNOSIS — I25.84 CORONARY ARTERY DISEASE DUE TO CALCIFIED CORONARY LESION: ICD-10-CM

## 2018-08-09 PROCEDURE — G0439 PPPS, SUBSEQ VISIT: HCPCS | Performed by: PHYSICIAN ASSISTANT

## 2018-08-09 ASSESSMENT — ENCOUNTER SYMPTOMS: GENERAL WELL-BEING: GOOD

## 2018-08-09 ASSESSMENT — ACTIVITIES OF DAILY LIVING (ADL): BATHING_REQUIRES_ASSISTANCE: 0

## 2018-08-09 ASSESSMENT — PATIENT HEALTH QUESTIONNAIRE - PHQ9: CLINICAL INTERPRETATION OF PHQ2 SCORE: 0

## 2018-08-09 NOTE — ASSESSMENT & PLAN NOTE
Chronic issue secondary to spondylosis. Pain improved with PRN meloxicam. Continue current management.

## 2018-08-09 NOTE — ASSESSMENT & PLAN NOTE
Chronic issue, stable, now off levothyroxine at advice of endocrinologist. Will be having repeat thryoid labs done soon. Continue to follow with endocrinology.

## 2018-08-09 NOTE — ASSESSMENT & PLAN NOTE
Chronic issue causing chronic low back pain. Stable, neurostimulator does not help. Continue PRN meloxicam.

## 2018-08-09 NOTE — ASSESSMENT & PLAN NOTE
Chronic issue, stable. Has neurostimulator but keeps it turned off as he doesn't believe it is helping. No longer following with pain management. Takes meloxicam as needed which helps somewhat.

## 2018-08-09 NOTE — ASSESSMENT & PLAN NOTE
Chronic issue, well-controlled with pacemaker. Continue current management, including periodic device checks with cardiology.

## 2018-08-09 NOTE — ASSESSMENT & PLAN NOTE
New problem that was diagnosed in 3/2018 after prostate biopsy performed due to high PSA. Currently undergoing surveillance. Next follow up with urology in 11/2018. Plan is for repeat biopsy 12-18 months after initial. Should continue to see urology as recommended for management.

## 2018-08-09 NOTE — ASSESSMENT & PLAN NOTE
Chronic issue causing chronic neck pain, which is improved with PRN meloxicam. States pain is stable. Continue current management.

## 2018-08-09 NOTE — ASSESSMENT & PLAN NOTE
"Established problem, previous syncopal episodes around time of diagnosis. Per wife, \"flat-lined\" during carotid massage. Now has pacemaker. Currently well-controlled. Follows with cardiology.  "

## 2018-08-09 NOTE — ASSESSMENT & PLAN NOTE
Chronic issue, stable, on appropriate medical therapy including BB, statin, and aspirin.  Follows with cardiology, Dr. Christiansen, every 4-6 months.  Continue current management.

## 2018-08-09 NOTE — ASSESSMENT & PLAN NOTE
Established problem, slight worsening per review. Now following with urology. Recently diagnosed with prostate cancer. Will continue to follow with urology for surveillance and management.

## 2018-08-09 NOTE — ASSESSMENT & PLAN NOTE
Chronic issue, stable, follows with ophthalmology. Next appointment in March 2019. Continue current management.

## 2018-08-09 NOTE — ASSESSMENT & PLAN NOTE
Chronic issue, well-controlled since re-starting CPAP. Follows with [facility]. Continue current management.

## 2018-08-09 NOTE — PROGRESS NOTES
Monofilament testing with a 10 gram force: sensation intact: intact bilaterally  Visual Inspection: Feet without maceration, ulcers, fissures.  Pedal pulses: intact bilaterally

## 2018-08-09 NOTE — ASSESSMENT & PLAN NOTE
Chronic issue, improved since R TKA in 2/2018. Still having trouble with L knee, which was replaced in 2004, but is needing re-replaced per orthopedics. Patient does not want to do anytime soon. Pain reasonably well-controlled with PRN meloxicam. Continue current management. Continue follow up with orthopedics as needed.

## 2018-08-09 NOTE — ASSESSMENT & PLAN NOTE
Chronic issue, well-controlled on daily statin. Reviewed last lipid panel from 6/2017 which was normal. Overdue for repeat lipids which I have ordered. Continue atorvastatin.

## 2018-08-09 NOTE — ASSESSMENT & PLAN NOTE
Chronic issue, well-controlled on current medication regimen. BP today in the office is 130/80. Continue metoprolol and valsartan. Patient advised to check with his pharmacy to ensure that his valsartan is not involved in current recall.

## 2018-08-09 NOTE — PROGRESS NOTES
Chief Complaint   Patient presents with   • Annual Wellness Visit         HPI:  Dakota is a 73 y.o. here for Medicare Annual Wellness Visit. Is an established patient of mine.        Patient Active Problem List    Diagnosis Date Noted   • Coronary artery disease due to calcified coronary lesion: 40-50% disease in the circumflex at cardiac catheterization in 2013 08/16/2013     Priority: High   • Sick sinus syndrome (HCC) 10/04/2012     Priority: High   • Pacemaker 10/04/2012     Priority: High   • Hypersensitive carotid sinus syndrome 10/02/2012     Priority: High   • Dyslipidemia 09/28/2012     Priority: High   • RBBB 09/28/2012     Priority: High   • Essential hypertension, benign 04/10/2012     Priority: High   • Type 2 diabetes mellitus without complication (HCC) 04/10/2012     Priority: Medium   • DJD (degenerative joint disease) of cervical spine 02/27/2013     Priority: Low   • S/P lumbar discectomy 11/21/2012     Priority: Low   • Degeneration of lumbar or lumbosacral intervertebral disc 11/20/2012     Priority: Low   • Back pain, chronic 04/10/2012     Priority: Low   • Neck pain 04/10/2012     Priority: Low   • Elevated PSA 01/23/2018   • Bilateral primary osteoarthritis of knee 01/23/2018   • Cervical spondylosis 10/19/2016   • Non-proliferative diabetic retinopathy, mild, both eyes (HCC) 06/02/2016   • Hypertensive retinopathy of both eyes 06/02/2016   • Depression with anxiety 03/28/2016   • Obstructive sleep apnea on CPAP 03/28/2016   • Hypothyroidism 08/21/2014   • Obesity 08/21/2014       Current Outpatient Prescriptions   Medication Sig Dispense Refill   • tetanus-dipth-acell pertussis (ADACEL) 5-2-15.5 LF-MCG/0.5 Suspension 0.5 mL by Intramuscular route Once PRN for up to 1 dose. 1 Vial 0   • NON SPECIFIED 0.5 mL by Intramuscular route Once for 1 dose. 1 Each 1   • B-D ULTRAFINE III SHORT PEN 31G X 8 MM Misc USE TWICE A  Each 2   • meloxicam (MOBIC) 7.5 MG Tab TAKE 1 TO 2 TABLETS DAILY 180  Tab 0   • TANZEUM 30 MG Pen-injector INJECT THE CONTENTS OF 1 PEN AS INSTRUCTED EVERY 7 DAYS. 12 Each 3   • LANTUS SOLOSTAR 100 UNIT/ML Solution Pen-injector injection INJECT 30 UNITS TWICE A DAY 45 mL 3   • buPROPion (WELLBUTRIN XL) 150 MG XL tablet TAKE 1 TABLET EVERY MORNING 90 Tab 1   • levothyroxine (SYNTHROID) 75 MCG Tab TAKE 1 TABLET EVERY MORNING ON AN EMPTY STOMACH 90 Tab 1   • metoprolol (LOPRESSOR) 25 MG Tab TAKE 1 TABLET TWICE A  Tab 2   • atorvastatin (LIPITOR) 20 MG Tab Take 1 Tab by mouth every day. 90 Tab 3   • metformin (GLUCOPHAGE) 500 MG Tab TAKE 2 TABLETS TWICE A DAY WITH MEALS 360 Tab 2   • FREESTYLE LITE strip USE TO TEST FOUR TIMES A DAY AND AS NEEDED FOR SYMPTOMS OF HIGH OR LOW SUGAR 350 Strip 4   • valsartan (DIOVAN) 80 MG Tab TAKE 1 TABLET DAILY 90 Tab 3   • ferrous sulfate 325 (65 FE) MG tablet Take 325 mg by mouth every 48 hours.     • Cyanocobalamin (VITAMIN B-12) 2500 MCG SL Tab Place  under tongue every day.     • Lancets MISC Lancets order: Lancets for Abbott Freestyle Lite meter. Sig: use 4 times daily  and prn ssx high or low sugar. #100 RF x 0 360 Each 4   • Blood Glucose Monitoring Suppl [place] Meter: Dispense Abbott Freestyle Lite meter. Sig. Use as directed for blood sugar monitoring. #1. NR. 1 Device 0   • B-D ULTRAFINE III SHORT PEN 31G X 8 MM MISC USE 1 NEEDLE EVERY DAY WITH LANTUS 999 Each 2   • aspirin EC (ECOTRIN) 81 MG TBEC Take 81 mg by mouth every day.       No current facility-administered medications for this visit.         Patient is taking medications as noted in medication list.  Current supplements as per medication list.     Allergies: Aleve cold & [pseudoephedrine-naproxen na]; Ceftriaxone sodium; Naproxen; and Tape    Current social contact/activities: Visit with family and friends      Is patient current with immunizations? No, due for TDAP and SHINGRIX (Shingles). Patient is interested in receiving NONE today.    He  reports that he quit smoking about 28  years ago. His smoking use included Cigarettes. He has a 40.00 pack-year smoking history. He has never used smokeless tobacco. He reports that he does not drink alcohol or use drugs.  Counseling given: Not Answered        DPA/Advanced directive: Patient has Advanced Directive on file.     ROS:    Gait: Uses no assistive device   Ostomy: No   Other tubes: No   Amputations: No   Chronic oxygen use Yes CPAP  Last eye exam 03/2018   Wears hearing aids: No   : Reports urinary leakage during the last 6 months that has not interfered at all with their daily activities or sleep.    Screening:    DIABETES    Has patient ever had diabetes education? Yes, and is NOT interested in more at this time.     DEPRESSION     Is patient seeing a counselor, psychiatrist or other healthcare provider regarding their mental health? No, and not interested.     Depression Screen (PHQ-2/PHQ-9) 1/20/2017 1/17/2018 8/9/2018   PHQ-2 Total Score - - -   PHQ-2 Total Score 0 0 0       Interpretation of PHQ-9 Total Score   Score Severity   1-4 No Depression   5-9 Mild Depression   10-14 Moderate Depression   15-19 Moderately Severe Depression   20-27 Severe Depression      Depression Screening    Little interest or pleasure in doing things?  0 - not at all  Feeling down, depressed, or hopeless? 0 - not at all  Patient Health Questionnaire Score: 0    If depressive symptoms identified deferred to follow up visit unless specifically addressed in assessment and plan.    Interpretation of PHQ-9 Total Score   Score Severity   1-4 No Depression   5-9 Mild Depression   10-14 Moderate Depression   15-19 Moderately Severe Depression   20-27 Severe Depression    Screening for Cognitive Impairment    Three Minute Recall (leader, season, table)  2/3 Leader season table   Erik clock face with all 12 numbers and set the hands to show 10 past 11.  Yes 11:10 5/5  If cognitive concerns identified, deferred for follow up unless specifically addressed in assessment  and plan.    Fall Risk Assessment    Has the patient had two or more falls in the last year or any fall with injury in the last year?  No  If fall risk identified, deferred for follow up unless specifically addressed in assessment and plan.    Safety Assessment    Throw rugs on floor.  Yes  Handrails on all stairs.  Yes  Good lighting in all hallways.  Yes  Difficulty hearing.  No  Patient counseled about all safety risks that were identified.    Functional Assessment ADLs    Are there any barriers preventing you from cooking for yourself or meeting nutritional needs?  No.    Are there any barriers preventing you from driving safely or obtaining transportation?  No.    Are there any barriers preventing you from using a telephone or calling for help?  No.    Are there any barriers preventing you from shopping?  No.    Are there any barriers preventing you from taking care of your own finances?  No.    Are there any barriers preventing you from managing your medications?  No.    Are there any barriers preventing you from showering, bathing or dressing yourself?  No.    Are you currently engaging in any exercise or physical activity?  No.     What is your perception of your health?  Good.    Health Maintenance Summary                IMM DTaP/Tdap/Td Vaccine Overdue 3/29/2008      Done 3/28/2008 Imm Admin: TD Vaccine    IMM ZOSTER VACCINES Overdue 5/23/2014      Done 3/28/2014 Imm Admin: Zoster Vaccine Live (ZVL) (Zostavax)     Patient has more history with this topic...    Annual Wellness Visit Overdue 2/9/2017      Done 2/9/2016      Patient has more history with this topic...    DIABETES MONOFILAMENT / LE EXAM Overdue 1/26/2018      Done 1/26/2017 AMB DIABETIC MONOFILAMENT LOWER EXTREMITY EXAM     Patient has more history with this topic...    FASTING LIPID PROFILE Overdue 6/12/2018      Done 6/12/2017 LIPID PROFILE      Patient has more history with this topic...    URINE ACR / MICROALBUMIN Overdue 6/12/2018       "Done 6/12/2017 MICROALBUMIN CREAT RATIO URINE     Patient has more history with this topic...    SERUM CREATININE Overdue 6/12/2018      Done 6/12/2017 COMP METABOLIC PANEL      Patient has more history with this topic...    IMM INFLUENZA Next Due 9/1/2018      Done 11/13/2017 Imm Admin: Influenza Vaccine Adult HD     Patient has more history with this topic...    A1C SCREENING Next Due 12/11/2018      Done 6/11/2018 POCT A1C     Patient has more history with this topic...    COLONOSCOPY Next Due 12/18/2018      Done 12/18/2013 AMB REFERRAL TO GI FOR COLONOSCOPY    RETINAL SCREENING Next Due 3/1/2019      Done 3/1/2018 REFERRAL FOR RETINAL SCREENING EXAM     Patient has more history with this topic...          Patient Care Team:  Tanya Rayo P.A.-C. as PCP - General (Physician Assistant)  Jac Christiansen M.D. (Cardiology)  [facility]  Isabela Singletary M.D. as Consulting Physician (Endocrinology)  [facility]    Social History   Substance Use Topics   • Smoking status: Former Smoker     Packs/day: 1.00     Years: 40.00     Types: Cigarettes     Quit date: 1/3/1990   • Smokeless tobacco: Never Used   • Alcohol use No     Family History   Problem Relation Age of Onset   • Lung Disease Mother         Smoker   • Alcohol/Drug Mother    • Heart Disease Father 36        CAD   • Heart Disease Sister         \"hole in heart\"   • Lung Disease Brother         COPD, CANCER   • Cancer Brother         Prostate, Lung   • Alcohol/Drug Brother    • Heart Disease Brother    • Diabetes Brother    • Hypertension Brother    • Hyperlipidemia Brother    • Heart Disease Maternal Grandmother    • Hypertension Maternal Grandmother    • Hyperlipidemia Maternal Grandmother    • Cancer Brother         stomach, thyroid   • Heart Disease Brother 60        MI, stent, PM/defib   • Sleep Apnea Brother    • No Known Problems Maternal Grandfather    • No Known Problems Paternal Grandmother      He  has a past medical history " of Anxiety; Back pain, chronic (10/18/2016); Blood clotting disorder (HCC) (1978); Breath shortness; CAD (coronary artery disease) (October 2012); Cancer (HCC) (? early 90's); Degeneration of lumbar or lumbosacral intervertebral disc ( ); Dental disorder; Depression; Diabetes; DJD (degenerative joint disease) of cervical spine ( ); Episodic lightheadedness ( ); High cholesterol; Hyperlipidemia; Hypersensitive carotid sinus syndrome ( ); Hypertension; Incisional infection (April 2013); Insomnia ( ); Myocardial infarct (HCC) (?); Neck pain (10/18/2016); Pacemaker (October 2012); RBBB ( ); S/P lumbar discectomy ( ); Sick sinus syndrome (HCC) ( ); Sleep apnea; Syncope (October 2012); Thyroid disease; and Unspecified hemorrhagic conditions.   Past Surgical History:   Procedure Laterality Date   • AR DSTR NROLYTC AGNT PARVERTEB FCT SNGL CRVCL/THORA Right 10/19/2016    Procedure: NEURO DEST FACET C/T W/IG SNGL - 3ON-C3, C8-T1    SINERGY;  Surgeon: Gaurang Pruett M.D.;  Location: [facility];  Service: Pain Management   • AR DSTR NROLYTC AGNT PARVERTEB FCT ADDL CRVCL/THORA  10/19/2016    Procedure: NEURO DEST FACET C/T W/IG ADDL;  Surgeon: Gaurang Pruett M.D.;  Location: [facility];  Service: Pain Management   • PB FLUOROSCOPIC GUIDANCE NEEDLE PLACEMENT  10/19/2016    Procedure: FLOURO GUIDE NEEDLE PLACEMENT;  Surgeon: Gaurang Pruett M.D.;  Location: [facility];  Service: Pain Management   • SHOULDER ARTHROSCOPY Right 2015    torn bicep tendon and spurs   • RECOVERY  10/17/2013    Performed by Cath-Recovery Surgery at SURGERY SAME DAY [facility] ORS   • WOUND DEHISCENCE  4/10/2013    Performed by Tu Jain M.D. at SURGERY [facility] ORS   • CERVICAL FUSION POSTERIOR  2/27/2013    Performed by Tu Jain M.D. at SURGERY [facility] ORS   • CERVICAL LAMINECTOMY POSTERIOR  2/27/2013    Performed by Tu Jain M.D. at [facility]   • LUMBAR LAMINECTOMY DISKECTOMY   "11/20/2012    Performed by Tu Jain M.D. at SURGERY [facility] ORS   • RECOVERY  10/4/2012    Performed by Cath-Recovery Surgery at SURGERY SAME DAY [facility]   • PACEMAKER INSERTION  October 2012    [facility] [employer]  2110 implanted by Dr. Waite.   • SHOULDER DECOMPRESSION Left 2008    Left rotator cuff   • ATHROPLASTY Left 2004   • ARTHROSCOPY, KNEE Bilateral 1978   • ORTHOPEDIC OSTEOTOMY      Both knees several times, 8 total   • OTHER     • SPINAL CORD STIMULATOR  1 month ago   • TONSILLECTOMY     • VASECTOMY             Exam:     Blood pressure 130/80, pulse 70, temperature 36.8 °C (98.3 °F), height 1.727 m (5' 8\"), weight 113.9 kg (251 lb), SpO2 95 %. Body mass index is 38.16 kg/m².    Hearing good.    Dentition good  Alert, oriented in no acute distress.  Eye contact is good, speech goal directed, affect calm  Normal S1/S2, RRR  Lungs CTA bilat      Assessment and Plan. The following treatment and monitoring plan is recommended:      Back pain, chronic  Chronic issue, stable. Has neurostimulator but keeps it turned off as he doesn't believe it is helping. No longer following with pain management. Takes meloxicam as needed which helps somewhat.    Bilateral primary osteoarthritis of knee  Chronic issue, improved since R TKA in 2/2018. Still having trouble with L knee, which was replaced in 2004, but is needing re-replaced per orthopedics. Patient does not want to do anytime soon. Pain reasonably well-controlled with PRN meloxicam. Continue current management. Continue follow up with orthopedics as needed.    Neck pain  Chronic issue secondary to spondylosis. Pain improved with PRN meloxicam. Continue current management.    Coronary artery disease due to calcified coronary lesion: 40-50% disease in the circumflex at cardiac catheterization in 2013  Chronic issue, stable, on appropriate medical therapy including BB, statin, and aspirin.  Follows with cardiology, Dr. Christiansen, every 4-6 " "months.  Continue current management.    Cervical spondylosis  Chronic issue causing chronic neck pain, which is improved with PRN meloxicam. States pain is stable. Continue current management.    Pacemaker  Chronic since 2012. Stable, no problems. Follows with cardiology for device checks. Continue current management.    Degeneration of lumbar or lumbosacral intervertebral disc  Chronic issue causing chronic low back pain. Stable, neurostimulator does not help. Continue PRN meloxicam.    Depression with anxiety  Chronic issue, well-controlled with daily Bupropion XL. Continue current management.    Dyslipidemia  Chronic issue, well-controlled on daily statin. Reviewed last lipid panel from 6/2017 which was normal. Overdue for repeat lipids which I have ordered. Continue atorvastatin.    DJD (degenerative joint disease) of cervical spine  Chronic issue causing chronic neck pain, which is improved with PRN meloxicam. States pain is stable. Continue current management.    Elevated PSA  Established problem, slight worsening per review. Now following with urology. Recently diagnosed with prostate cancer. Will continue to follow with urology for surveillance and management.    Essential hypertension, benign  Chronic issue, well-controlled on current medication regimen. BP today in the office is 130/80. Continue metoprolol and valsartan. Patient advised to check with his pharmacy to ensure that his valsartan is not involved in current recall.    Hypersensitive carotid sinus syndrome  Established problem, previous syncopal episodes around time of diagnosis. Per wife, \"flat-lined\" during carotid massage. Now has pacemaker. Currently well-controlled. Follows with cardiology.    Hypertensive retinopathy of both eyes  Chronic issue, stable, follows with ophthalmology. Next appointment in March 2019. Continue current management.    Non-proliferative diabetic retinopathy, mild, both eyes  Chronic issue, stable, follows with " ophthalmology. Next appointment in March 2019. Continue current management.    Obstructive sleep apnea on CPAP  Chronic issue, well-controlled since re-starting CPAP. Follows with [facility]. Continue current management.    Type 2 diabetes mellitus without complication  Chronic issue, well-controlled, managed by endocrinology. Fasting BS running around 130-140 per patient. Last A1c in 6/2018 was 5.8. Continue current management with metformin, Tanzeum, and Lantus.    Sick sinus syndrome  Chronic issue, well-controlled with pacemaker. Continue current management, including periodic device checks with cardiology.    Obesity  Chronic issue, stable.    RBBB  Chronic issue, stable, will continue to follow with cardiology.    S/P lumbar discectomy  Previously done for back pain. Pain currently stable.     Hypothyroidism  Chronic issue, stable, now off levothyroxine at advice of endocrinologist. Will be having repeat thryoid labs done soon. Continue to follow with endocrinology.    Prostate cancer (HCC)  New problem that was diagnosed in 3/2018 after prostate biopsy performed due to high PSA. Currently undergoing surveillance. Next follow up with urology in 11/2018. Plan is for repeat biopsy 12-18 months after initial. Should continue to see urology as recommended for management.        Services suggested: No services needed at this time  Health Care Screening recommendations as per orders if indicated.  Referrals offered: PT/OT/Nutrition counseling/Behavioral Health/Smoking cessation as per orders if indicated.    Discussion today about general wellness and lifestyle habits:    · Prevent falls and reduce trip hazards; Cautioned about securing or removing rugs.  · Have a working fire alarm and carbon monoxide detector;   · Engage in regular physical activity and social activities       Follow-up: Return in about 6 months (around 2/9/2019) for routine f/u; Short.       Tanya Rayo P.A.-C.

## 2018-08-09 NOTE — ASSESSMENT & PLAN NOTE
Chronic since 2012. Stable, no problems. Follows with cardiology for device checks. Continue current management.

## 2018-08-09 NOTE — ASSESSMENT & PLAN NOTE
Chronic issue, well-controlled, managed by endocrinology. Fasting BS running around 130-140 per patient. Last A1c in 6/2018 was 5.8. Continue current management with metformin, Tanzeum, and Lantus.

## 2018-08-13 ENCOUNTER — SLEEP CENTER VISIT (OUTPATIENT)
Dept: SLEEP MEDICINE | Facility: MEDICAL CENTER | Age: 73
End: 2018-08-13
Payer: MEDICARE

## 2018-08-13 VITALS
HEART RATE: 86 BPM | WEIGHT: 246 LBS | OXYGEN SATURATION: 94 % | RESPIRATION RATE: 16 BRPM | HEIGHT: 68 IN | DIASTOLIC BLOOD PRESSURE: 68 MMHG | BODY MASS INDEX: 37.28 KG/M2 | SYSTOLIC BLOOD PRESSURE: 138 MMHG

## 2018-08-13 DIAGNOSIS — G47.33 OSA (OBSTRUCTIVE SLEEP APNEA): ICD-10-CM

## 2018-08-13 PROCEDURE — 99213 OFFICE O/P EST LOW 20 MIN: CPT | Performed by: NURSE PRACTITIONER

## 2018-08-13 NOTE — PROGRESS NOTES
Chief Complaint   Patient presents with   • Follow-Up     LYNDA       HPI:  Dakota Boyer is a 73 y.o. year old male here today for follow-up on his obstructive sleep apnea. He was seen in our Pulmonary Clinic 1/18/2018 with Dr. Sebastian Shaver. He is a former smoker but quit in 1990. No prior diagnosis of Asthma or Emphysema. He denies dyspnea. He is not on inhalers or oxygen. Chest x-ray 1/2018 demonstrated the presence of pacemaker. There was no acute pulmonary abnormality.     He does have a history of obstructive sleep apnea diagnosed in 2013. At that time he had an AHI of 16.7 associated with hypoxemia. He was titrated successfully to 12 cm of water pressure. He had stopped CPAP in the past due to tolerance issues. He was recommended to restart CPAP when he was seen with Dr. Shaver. He was ordered a new machine as well as new mask and supplies at his last sleep visit in April 2018. He did receive a new CPAP machine. Compliance download performed today in the office on CPAP pressure of 12 CM indicates an AHI of 1.4 with an average use of 6.5 hours at night.   He has a dream wear full face mask which he feels is comfortable. He tolerates the pressure well. He does feel he sleeps better on therapy and wakes more refreshed. He denies any morning headaches.         Past Medical History:   Diagnosis Date   • Anxiety    • Back pain, chronic 10/18/2016   • Blood clotting disorder (HCC) 1978    s/p Left knee surgery- DVT in left leg   • Breath shortness    • CAD (coronary artery disease) October 2012    Positive coronary calcium score.  Follow-up MPI was negative for ischemia.   • Cancer (HCC) ? early 90's    Melanoma Left arm- surgically removed   • Degeneration of lumbar or lumbosacral intervertebral disc     • Dental disorder    • Depression    • Diabetes     Oral agents and insulin   • DJD (degenerative joint disease) of cervical spine     • Episodic lightheadedness     • High cholesterol    • Hyperlipidemia    •  Hypersensitive carotid sinus syndrome     • Hypertension    • Incisional infection April 2013    Wound dehiscience of neck surgery.   • Insomnia     • Myocardial infarct (HCC) ?    states was told by  that he has had a heart attack in the past.   • Neck pain 10/18/2016   • Pacemaker October 2012    [employer] [address] #[id number] implanted by [facility].    • RBBB     • S/P lumbar discectomy     • Sick sinus syndrome (HCC)     • Sleep apnea     does not use CPAP anymore- stopped in Dec 2015   • Syncope October 2012    Treated with PPM   • Thyroid disease     Hypothyroid   • Unspecified hemorrhagic conditions     Reports bleeds easily       Past Surgical History:   Procedure Laterality Date   • AR DSTR NROLYTC AGNT PARVERTEB FCT SNGL CRVCL/THORA Right 10/19/2016    Procedure: NEURO DEST FACET C/T W/IG SNGL - 3ON-C3, C8-T1    SINERGY;  Surgeon: Gaurang Pruett M.D.;  Location: [facility];  Service: Pain Management   • AR DSTR NROLYTC AGNT PARVERTEB FCT ADDL CRVCL/THORA  10/19/2016    Procedure: NEURO DEST FACET C/T W/IG ADDL;  Surgeon: Gaurang Pruett M.D.;  Location: SURGERY [facility];  Service: Pain Management   • PB FLUOROSCOPIC GUIDANCE NEEDLE PLACEMENT  10/19/2016    Procedure: FLOURO GUIDE NEEDLE PLACEMENT;  Surgeon: Gaurang Pruett M.D.;  Location: [facility];  Service: Pain Management   • SHOULDER ARTHROSCOPY Right 2015    torn bicep tendon and spurs   • RECOVERY  10/17/2013    Performed by Cath-Recovery Surgery at SURGERY SAME DAY [facility]   • WOUND DEHISCENCE  4/10/2013    Performed by Tu Jain M.D. at SURGERY [facility]   • CERVICAL FUSION POSTERIOR  2/27/2013    Performed by Tu Jain M.D. at SURGERY [facility]   • CERVICAL LAMINECTOMY POSTERIOR  2/27/2013    Performed by Tu Jain M.D. at SURGERY [facility]   • LUMBAR LAMINECTOMY DISKECTOMY  11/20/2012    Performed by Tu Jain M.D. at SURGERY [facility]   • RECOVERY  10/4/2012     "Performed by Cath-Recovery Surgery at SURGERY SAME DAY [facility]   • PACEMAKER INSERTION  October 2012    [employer] [address] [address] [id number] implanted by Dr. Waite.   • SHOULDER DECOMPRESSION Left 2008    Left rotator cuff   • ATHROPLASTY Left 2004   • ARTHROSCOPY, KNEE Bilateral 1978   • ORTHOPEDIC OSTEOTOMY      Both knees several times, 8 total   • OTHER     • SPINAL CORD STIMULATOR  1 month ago   • TONSILLECTOMY     • VASECTOMY         Family History   Problem Relation Age of Onset   • Lung Disease Mother         Smoker   • Alcohol/Drug Mother    • Heart Disease Father 36        CAD   • Heart Disease Sister         \"hole in heart\"   • Lung Disease Brother         COPD, CANCER   • Cancer Brother         Prostate, Lung   • Alcohol/Drug Brother    • Heart Disease Brother    • Diabetes Brother    • Hypertension Brother    • Hyperlipidemia Brother    • Heart Disease Maternal Grandmother    • Hypertension Maternal Grandmother    • Hyperlipidemia Maternal Grandmother    • Cancer Brother         stomach, thyroid   • Heart Disease Brother 60        MI, stent, PM/defib   • Sleep Apnea Brother    • No Known Problems Maternal Grandfather    • No Known Problems Paternal Grandmother        Social History     Social History   • Marital status:      Spouse name: N/A   • Number of children: N/A   • Years of education: N/A     Occupational History   • Not on file.     Social History Main Topics   • Smoking status: Former Smoker     Packs/day: 1.00     Years: 40.00     Types: Cigarettes     Quit date: 1/3/1990   • Smokeless tobacco: Never Used   • Alcohol use No   • Drug use: No   • Sexual activity: Yes     Partners: Female     Other Topics Concern   • Not on file     Social History Narrative   • No narrative on file       ROS:  Constitutional: Denies fevers, chills, sweats, weight loss  Eyes: Denies glasses, vision loss, pain, drainage, double vision  Ears/Nose/Mouth/Throat: Denies rhinitis, nasal congestion, ear ache, " difficulty hearing, sore throat, persistent hoarseness, decayed teeth/toothache  Cardiovascular: Denies chest pain, tightness, palpitations, swelling in feet/legs, fainting, difficulty breathing when laying down  Respiratory: Denies shortness of breath, cough, sputum, wheezing, painful breathing, coughing up blood  GI: Denies heartburn, difficulty swallowing, nausea, vomiting, abdominal pain, diarrhea, constipation  : Denies frequent urination, painful urination  Integumentary: Denies rashes, lumps or color changes  MSK: Denies painful joints, sore muscles, and back pain.   Neurological: Denies frequent headaches, dizziness, weakness  Sleep: See HPI       Current Outpatient Prescriptions   Medication Sig Dispense Refill   • B-D ULTRAFINE III SHORT PEN 31G X 8 MM Misc USE TWICE A  Each 2   • meloxicam (MOBIC) 7.5 MG Tab TAKE 1 TO 2 TABLETS DAILY 180 Tab 0   • TANZEUM 30 MG Pen-injector INJECT THE CONTENTS OF 1 PEN AS INSTRUCTED EVERY 7 DAYS. 12 Each 3   • LANTUS SOLOSTAR 100 UNIT/ML Solution Pen-injector injection INJECT 30 UNITS TWICE A DAY 45 mL 3   • buPROPion (WELLBUTRIN XL) 150 MG XL tablet TAKE 1 TABLET EVERY MORNING 90 Tab 1   • levothyroxine (SYNTHROID) 75 MCG Tab TAKE 1 TABLET EVERY MORNING ON AN EMPTY STOMACH 90 Tab 1   • metoprolol (LOPRESSOR) 25 MG Tab TAKE 1 TABLET TWICE A  Tab 2   • atorvastatin (LIPITOR) 20 MG Tab Take 1 Tab by mouth every day. 90 Tab 3   • metformin (GLUCOPHAGE) 500 MG Tab TAKE 2 TABLETS TWICE A DAY WITH MEALS 360 Tab 2   • FREESTYLE LITE strip USE TO TEST FOUR TIMES A DAY AND AS NEEDED FOR SYMPTOMS OF HIGH OR LOW SUGAR 350 Strip 4   • valsartan (DIOVAN) 80 MG Tab TAKE 1 TABLET DAILY 90 Tab 3   • ferrous sulfate 325 (65 FE) MG tablet Take 325 mg by mouth every 48 hours.     • Cyanocobalamin (VITAMIN B-12) 2500 MCG SL Tab Place  under tongue every day.     • Lancets MISC Lancets order: Lancets for Abbott Freestyle Lite meter. Sig: use 4 times daily  and prn ssx high or  "low sugar. #100 RF x 0 360 Each 4   • Blood Glucose Monitoring Suppl [place] Meter: Dispense Abbott Freestyle Lite meter. Sig. Use as directed for blood sugar monitoring. #1. NR. 1 Device 0   • B-D ULTRAFINE III SHORT PEN 31G X 8 MM MISC USE 1 NEEDLE EVERY DAY WITH LANTUS 999 Each 2   • aspirin EC (ECOTRIN) 81 MG TBEC Take 81 mg by mouth every day.     • tetanus-dipth-acell pertussis (ADACEL) 5-2-15.5 LF-MCG/0.5 Suspension 0.5 mL by Intramuscular route Once PRN for up to 1 dose. 1 Vial 0     No current facility-administered medications for this visit.        Allergies   Allergen Reactions   • Aleve Cold & [Pseudoephedrine-Naproxen Na] Anaphylaxis   • Ceftriaxone Sodium Anaphylaxis     Reaction; 1970's.   • Naproxen Anaphylaxis     Reaction; 2004.   • Tape Rash and Itching     Plastic tape (paper tape ok)       Blood pressure 138/68, pulse 86, resp. rate 16, height 1.727 m (5' 8\"), weight 111.6 kg (246 lb), SpO2 94 %.    PE:   Appearance: Well developed, well nourished, no acute distress  Eyes: PERRL, EOM intact, sclera white, conjunctiva moist  Ears: no lesions or deformities  Hearing: grossly intact  Nose: no lesions or deformities  Oropharynx: tongue normal, posterior pharynx without erythema or exudate  Mallampati Classification: Class 4  Neck: supple, trachea midline, no masses   Respiratory effort: no intercostal retractions or use of accessory muscles  Lung auscultation: no rales, rhonchi or wheezes  Heart auscultation: no murmur rub or gallop  Extremities: no cyanosis or edema  Abdomen: soft ,non tender, no masses  Gait and Station: normal  Digits and nails: no clubbing, cyanosis, petechiae or nodes.  Cranial nerves: grossly intact  Skin: no rashes, lesions or ulcers noted  Orientation: Oriented to time, person and place  Mood and affect: mood and affect appropriate, normal interaction with examiner  Judgement: Intact          Assessment:    1. LYNDA (obstructive sleep apnea)     2. BMI 37.0-37.9, adult   "         Plan:    1) Continue CPAP @ 12 CM H20. He is using his machine nightly and benefiting from use. His AHI is reduced to 1.4.   2) Sleep hygiene discussed. Recommend keeping a set sleep/wake schedule. Logging enough hours of sleep. Limiting/Avoiding naps. No caffeine after noon and no heavy meals in the evening. Recommend daily exercise.   3) Weight loss recommended.  4) Annual follow up with compliance card download, sooner if needed.

## 2018-08-13 NOTE — PATIENT INSTRUCTIONS
Plan:    1) Continue CPAP @ 12 CM H20. He is using his machine nightly and benefiting from use. His AHI is reduced to 1.4.   2) Sleep hygiene discussed. Recommend keeping a set sleep/wake schedule. Logging enough hours of sleep. Limiting/Avoiding naps. No caffeine after noon and no heavy meals in the evening. Recommend daily exercise.   3) Weight loss recommended.  4) Annual follow up with compliance card download, sooner if needed.

## 2018-08-14 RX ORDER — LEVOTHYROXINE SODIUM 0.07 MG/1
TABLET ORAL
Qty: 90 TAB | Refills: 0 | Status: SHIPPED | OUTPATIENT
Start: 2018-08-14 | End: 2019-02-12

## 2018-08-20 ENCOUNTER — HOSPITAL ENCOUNTER (OUTPATIENT)
Dept: LAB | Facility: MEDICAL CENTER | Age: 73
End: 2018-08-20
Attending: INTERNAL MEDICINE
Payer: MEDICARE

## 2018-08-20 DIAGNOSIS — E78.2 MIXED HYPERLIPIDEMIA: ICD-10-CM

## 2018-08-20 DIAGNOSIS — E11.9 TYPE 2 DIABETES MELLITUS WITHOUT COMPLICATION, WITH LONG-TERM CURRENT USE OF INSULIN (HCC): ICD-10-CM

## 2018-08-20 DIAGNOSIS — E03.9 ACQUIRED HYPOTHYROIDISM: ICD-10-CM

## 2018-08-20 DIAGNOSIS — Z79.4 TYPE 2 DIABETES MELLITUS WITHOUT COMPLICATION, WITH LONG-TERM CURRENT USE OF INSULIN (HCC): ICD-10-CM

## 2018-08-20 LAB
ANION GAP SERPL CALC-SCNC: 8 MMOL/L (ref 0–11.9)
BUN SERPL-MCNC: 21 MG/DL (ref 8–22)
CALCIUM SERPL-MCNC: 9.7 MG/DL (ref 8.5–10.5)
CHLORIDE SERPL-SCNC: 105 MMOL/L (ref 96–112)
CHOLEST SERPL-MCNC: 99 MG/DL (ref 100–199)
CO2 SERPL-SCNC: 24 MMOL/L (ref 20–33)
CREAT SERPL-MCNC: 0.96 MG/DL (ref 0.5–1.4)
CREAT UR-MCNC: 158.6 MG/DL
GLUCOSE SERPL-MCNC: 144 MG/DL (ref 65–99)
HDLC SERPL-MCNC: 35 MG/DL
LDLC SERPL CALC-MCNC: 49 MG/DL
MICROALBUMIN UR-MCNC: 1.3 MG/DL
MICROALBUMIN/CREAT UR: 8 MG/G (ref 0–30)
POTASSIUM SERPL-SCNC: 4.6 MMOL/L (ref 3.6–5.5)
SODIUM SERPL-SCNC: 137 MMOL/L (ref 135–145)
TRIGL SERPL-MCNC: 75 MG/DL (ref 0–149)
TSH SERPL DL<=0.005 MIU/L-ACNC: 4.1 UIU/ML (ref 0.38–5.33)

## 2018-08-20 PROCEDURE — 36415 COLL VENOUS BLD VENIPUNCTURE: CPT

## 2018-08-20 PROCEDURE — 80061 LIPID PANEL: CPT

## 2018-08-20 PROCEDURE — 82570 ASSAY OF URINE CREATININE: CPT

## 2018-08-20 PROCEDURE — 84443 ASSAY THYROID STIM HORMONE: CPT

## 2018-08-20 PROCEDURE — 82043 UR ALBUMIN QUANTITATIVE: CPT

## 2018-08-20 PROCEDURE — 80048 BASIC METABOLIC PNL TOTAL CA: CPT

## 2018-08-20 NOTE — TELEPHONE ENCOUNTER
Was the patient seen in the last year in this department? Yes    Does patient have an active prescription for medications requested? No     Received Request Via: Pharmacy   metFORMIN (GLUCOPHAGE) 500 MG Tab  TAKE 2 TABLETS TWICE A DAY WITH MEALS

## 2018-08-24 ENCOUNTER — PATIENT OUTREACH (OUTPATIENT)
Dept: HEALTH INFORMATION MANAGEMENT | Facility: OTHER | Age: 73
End: 2018-08-24

## 2018-08-24 ENCOUNTER — TELEPHONE (OUTPATIENT)
Dept: SLEEP MEDICINE | Facility: MEDICAL CENTER | Age: 73
End: 2018-08-24

## 2018-08-24 NOTE — PROGRESS NOTES
Outcome: Left Message    Please transfer to Patient Outreach Team at [phone] when patient returns call.    WebIZ Checked & Epic Updated:  yes    HealthConnect Verified: no    Attempt # 1

## 2018-08-24 NOTE — TELEPHONE ENCOUNTER
Patient's wife came in to [facility]. She states [facility] still has not received his new supply order. Sge is requesting those orders to be faxed. Please advise.

## 2018-08-27 RX ORDER — BUPROPION HYDROCHLORIDE 150 MG/1
TABLET ORAL
Qty: 90 TAB | Refills: 1 | Status: SHIPPED | OUTPATIENT
Start: 2018-08-27 | End: 2019-03-15 | Stop reason: SDUPTHER

## 2018-09-05 NOTE — PROGRESS NOTES
PT stated they had just gone over all of their chart at [facility] a couple weeks ago. Did not have him verify it all again. PT is only due for immunizations, and gets those done at a pharmacy

## 2018-09-06 DIAGNOSIS — I10 ESSENTIAL HYPERTENSION: ICD-10-CM

## 2018-09-06 RX ORDER — VALSARTAN 80 MG/1
TABLET ORAL
Qty: 90 TAB | Refills: 2 | Status: SHIPPED | OUTPATIENT
Start: 2018-09-06 | End: 2018-09-27 | Stop reason: CLARIF

## 2018-09-21 DIAGNOSIS — Z79.4 TYPE 2 DIABETES MELLITUS WITH HYPERGLYCEMIA, WITH LONG-TERM CURRENT USE OF INSULIN (HCC): ICD-10-CM

## 2018-09-21 DIAGNOSIS — E11.65 TYPE 2 DIABETES MELLITUS WITH HYPERGLYCEMIA, WITH LONG-TERM CURRENT USE OF INSULIN (HCC): ICD-10-CM

## 2018-09-27 RX ORDER — LOSARTAN POTASSIUM 50 MG/1
50 TABLET ORAL DAILY
Qty: 90 TAB | Refills: 3 | Status: SHIPPED | OUTPATIENT
Start: 2018-09-27 | End: 2019-03-18 | Stop reason: RX

## 2018-10-04 DIAGNOSIS — M17.0 PRIMARY OSTEOARTHRITIS OF BOTH KNEES: ICD-10-CM

## 2018-10-04 DIAGNOSIS — M51.37 DEGENERATION OF LUMBAR OR LUMBOSACRAL INTERVERTEBRAL DISC: ICD-10-CM

## 2018-10-04 DIAGNOSIS — M47.812 SPONDYLOSIS OF CERVICAL REGION WITHOUT MYELOPATHY OR RADICULOPATHY: ICD-10-CM

## 2018-10-05 RX ORDER — MELOXICAM 7.5 MG/1
TABLET ORAL
Qty: 180 TAB | Refills: 1 | Status: SHIPPED | OUTPATIENT
Start: 2018-10-05 | End: 2019-04-03 | Stop reason: SDUPTHER

## 2018-11-19 ENCOUNTER — HOSPITAL ENCOUNTER (OUTPATIENT)
Dept: LAB | Facility: MEDICAL CENTER | Age: 73
End: 2018-11-19
Attending: UROLOGY
Payer: MEDICARE

## 2018-11-19 PROCEDURE — 84153 ASSAY OF PSA TOTAL: CPT

## 2018-11-19 PROCEDURE — 36415 COLL VENOUS BLD VENIPUNCTURE: CPT

## 2018-11-20 LAB — PSA SERPL-MCNC: 9.11 NG/ML (ref 0–4)

## 2018-12-04 ENCOUNTER — OFFICE VISIT (OUTPATIENT)
Dept: MEDICAL GROUP | Facility: PHYSICIAN GROUP | Age: 73
End: 2018-12-04
Payer: MEDICARE

## 2018-12-04 VITALS
TEMPERATURE: 98.9 F | BODY MASS INDEX: 38.77 KG/M2 | HEART RATE: 100 BPM | WEIGHT: 255.8 LBS | OXYGEN SATURATION: 93 % | HEIGHT: 68 IN | DIASTOLIC BLOOD PRESSURE: 58 MMHG | SYSTOLIC BLOOD PRESSURE: 130 MMHG

## 2018-12-04 DIAGNOSIS — J01.90 ACUTE RHINOSINUSITIS: ICD-10-CM

## 2018-12-04 DIAGNOSIS — R60.0 BILATERAL LOWER EXTREMITY EDEMA: ICD-10-CM

## 2018-12-04 PROCEDURE — 99214 OFFICE O/P EST MOD 30 MIN: CPT | Performed by: PHYSICIAN ASSISTANT

## 2018-12-04 RX ORDER — FUROSEMIDE 20 MG/1
20 TABLET ORAL DAILY
Qty: 30 TAB | Refills: 2 | Status: SHIPPED | OUTPATIENT
Start: 2018-12-04 | End: 2019-02-26

## 2018-12-04 NOTE — PATIENT INSTRUCTIONS
Furosemide tablets  What is this medicine?  FUROSEMIDE (vic OH se mide) is a diuretic. It helps you make more urine and to lose salt and excess water from your body. This medicine is used to treat high blood pressure, and edema or swelling from heart, kidney, or liver disease.  This medicine may be used for other purposes; ask your health care provider or pharmacist if you have questions.  COMMON BRAND NAME(S): Active-Medicated Specimen Kit, Delone, Diuscreen, Lasix, RX Specimen Collection Kit, Specimen Collection Kit, URINX Medicated Specimen Collection  What should I tell my health care provider before I take this medicine?  They need to know if you have any of these conditions:  -abnormal blood electrolytes  -diarrhea or vomiting  -gout  -heart disease  -kidney disease, small amounts of urine, or difficulty passing urine  -liver disease  -thyroid disease  -an unusual or allergic reaction to furosemide, sulfa drugs, other medicines, foods, dyes, or preservatives  -pregnant or trying to get pregnant  -breast-feeding  How should I use this medicine?  Take this medicine by mouth with a glass of water. Follow the directions on the prescription label. You may take this medicine with or without food. If it upsets your stomach, take it with food or milk. Do not take your medicine more often than directed. Remember that you will need to pass more urine after taking this medicine. Do not take your medicine at a time of day that will cause you problems. Do not take at bedtime.  Talk to your pediatrician regarding the use of this medicine in children. While this drug may be prescribed for selected conditions, precautions do apply.  Overdosage: If you think you have taken too much of this medicine contact a poison control center or emergency room at once.  NOTE: This medicine is only for you. Do not share this medicine with others.  What if I miss a dose?  If you miss a dose, take it as soon as you can. If it is almost time  for your next dose, take only that dose. Do not take double or extra doses.  What may interact with this medicine?  -aspirin and aspirin-like medicines  -certain antibiotics  -chloral hydrate  -cisplatin  -cyclosporine  -digoxin  -diuretics  -laxatives  -lithium  -medicines for blood pressure  -medicines that relax muscles for surgery  -methotrexate  -NSAIDs, medicines for pain and inflammation like ibuprofen, naproxen, or indomethacin  -phenytoin  -steroid medicines like prednisone or cortisone  -sucralfate  -thyroid hormones  This list may not describe all possible interactions. Give your health care provider a list of all the medicines, herbs, non-prescription drugs, or dietary supplements you use. Also tell them if you smoke, drink alcohol, or use illegal drugs. Some items may interact with your medicine.  What should I watch for while using this medicine?  Visit your doctor or health care professional for regular checks on your progress. Check your blood pressure regularly. Ask your doctor or health care professional what your blood pressure should be, and when you should contact him or her. If you are a diabetic, check your blood sugar as directed.  You may need to be on a special diet while taking this medicine. Check with your doctor. Also, ask how many glasses of fluid you need to drink a day. You must not get dehydrated.  You may get drowsy or dizzy. Do not drive, use machinery, or do anything that needs mental alertness until you know how this drug affects you. Do not stand or sit up quickly, especially if you are an older patient. This reduces the risk of dizzy or fainting spells. Alcohol can make you more drowsy and dizzy. Avoid alcoholic drinks.  This medicine can make you more sensitive to the sun. Keep out of the sun. If you cannot avoid being in the sun, wear protective clothing and use sunscreen. Do not use sun lamps or tanning beds/booths.  What side effects may I notice from receiving this  medicine?  Side effects that you should report to your doctor or health care professional as soon as possible:  -blood in urine or stools  -dry mouth  -fever or chills  -hearing loss or ringing in the ears  -irregular heartbeat  -muscle pain or weakness, cramps  -skin rash  -stomach upset, pain, or nausea  -tingling or numbness in the hands or feet  -unusually weak or tired  -vomiting or diarrhea  -yellowing of the eyes or skin  Side effects that usually do not require medical attention (report to your doctor or health care professional if they continue or are bothersome):  -headache  -loss of appetite  -unusual bleeding or bruising  This list may not describe all possible side effects. Call your doctor for medical advice about side effects. You may report side effects to FDA at 6-482-FDA-8706.  Where should I keep my medicine?  Keep out of the reach of children.  Store at room temperature between 15 and 30 degrees C (59 and 86 degrees F). Protect from light. Throw away any unused medicine after the expiration date.  NOTE: This sheet is a summary. It may not cover all possible information. If you have questions about this medicine, talk to your doctor, pharmacist, or health care provider.  © 2018 [employer]vier/Gold Standard (2016-03-09 13:49:50)

## 2018-12-05 NOTE — PROGRESS NOTES
Subjective:   Dakota Boyer is a 73 y.o. male here today for leg swelling, cold symptoms. Is an established patient of mine.    HPI:    Patient presents to the office today with complaints of leg swelling.  He was recently seen by his urologist and had complained at that time and was given a small supply of Lasix to use but states that his urologist wanted him to be seen by his PCP to determine if this was something he could continue long-term.  Patient states that he has had swelling in his legs for quite a long time.  His cardiologist is aware.  Swelling has been attributed to his previous knee replacements.  He had his left knee replaced in 2004 and the right more recently in 2017.  His left leg has always been more swollen than his right.  He is previously tried to use compression socks/stockings but did not feel that it was significantly helping and was irritating his legs so he stopped using.  He denies any new shortness of breath, chest pain, palpitations.    He also complains today of cold symptoms for the last couple of days.  He has had some nasal congestion, sore throat, and cough.  No fever or chills.  No ear pain.  No worsening of his breathing.      Current medicines (including changes today)  Current Outpatient Prescriptions   Medication Sig Dispense Refill   • furosemide (LASIX) 20 MG Tab Take 1 Tab by mouth every day. 30 Tab 2   • Dulaglutide (TRULICITY) 1.5 MG/0.5ML Solution Pen-injector Inject 1.5 mg as instructed every 7 days. 12 PEN 1   • meloxicam (MOBIC) 7.5 MG Tab Take 1 to 2 tablets by mouth daily 180 Tab 1   • losartan (COZAAR) 50 MG Tab Take 1 Tab by mouth every day. 90 Tab 3   • buPROPion (WELLBUTRIN XL) 150 MG XL tablet TAKE 1 TABLET EVERY MORNING 90 Tab 1   • metFORMIN (GLUCOPHAGE) 500 MG Tab TAKE 2 TABLETS TWICE A DAY WITH MEALS 360 Tab 2   • levothyroxine (SYNTHROID) 75 MCG Tab TAKE 1 TABLET EVERY MORNING ON AN EMPTY STOMACH 90 Tab 0   • tetanus-dipth-acell pertussis (ADACEL)  5-2-15.5 LF-MCG/0.5 Suspension 0.5 mL by Intramuscular route Once PRN for up to 1 dose. 1 Vial 0   • B-D ULTRAFINE III SHORT PEN 31G X 8 MM Misc USE TWICE A  Each 2   • LANTUS SOLOSTAR 100 UNIT/ML Solution Pen-injector injection INJECT 30 UNITS TWICE A DAY 45 mL 3   • metoprolol (LOPRESSOR) 25 MG Tab TAKE 1 TABLET TWICE A  Tab 2   • atorvastatin (LIPITOR) 20 MG Tab Take 1 Tab by mouth every day. 90 Tab 3   • FREESTYLE LITE strip USE TO TEST FOUR TIMES A DAY AND AS NEEDED FOR SYMPTOMS OF HIGH OR LOW SUGAR 350 Strip 4   • ferrous sulfate 325 (65 FE) MG tablet Take 325 mg by mouth every 48 hours.     • Cyanocobalamin (VITAMIN B-12) 2500 MCG SL Tab Place  under tongue every day.     • Lancets MISC Lancets order: Lancets for Abbott Freestyle Lite meter. Sig: use 4 times daily  and prn ssx high or low sugar. #100 RF x 0 360 Each 4   • Blood Glucose Monitoring Suppl [place] Meter: Dispense Abbott Freestyle Lite meter. Sig. Use as directed for blood sugar monitoring. #1. NR. 1 Device 0   • B-D ULTRAFINE III SHORT PEN 31G X 8 MM MISC USE 1 NEEDLE EVERY DAY WITH LANTUS 999 Each 2   • aspirin EC (ECOTRIN) 81 MG TBEC Take 81 mg by mouth every day.       No current facility-administered medications for this visit.      He  has a past medical history of Anxiety; Back pain, chronic (10/18/2016); Blood clotting disorder ([facility]) (1978); Breath shortness; CAD (coronary artery disease) (October 2012); Cancer ([facility]) (? early 90's); Degeneration of lumbar or lumbosacral intervertebral disc ( ); Dental disorder; Depression; Diabetes; DJD (degenerative joint disease) of cervical spine ( ); Episodic lightheadedness ( ); High cholesterol; Hyperlipidemia; Hypersensitive carotid sinus syndrome ( ); Hypertension; Incisional infection (April 2013); Insomnia ( ); Myocardial infarct ([facility]) (?); Neck pain (10/18/2016); Pacemaker (October 2012); RBBB ( ); S/P lumbar discectomy ( ); Sick sinus syndrome ([facility]) ( ); Sleep apnea; Syncope (October  "2012); Thyroid disease; and Unspecified hemorrhagic conditions.    ROS  As per HPI.       Objective:     Blood pressure 130/58, pulse 100, temperature 37.2 °C (98.9 °F), temperature source Temporal, height 1.727 m (5' 8\"), weight 116 kg (255 lb 12.8 oz), SpO2 93 %. Body mass index is 38.89 kg/m².     Physical Exam:  Constitutional: Alert, well-appearing elderly male in no distress.  Skin: Warm, dry, good turgor, no rashes in visible areas.  Eye: Conjunctiva clear, lids normal.  ENMT: External ear canals are clear without erythema, edema, or drainage.  Tympanic membranes are pearly gray bilaterally without injection or bulging.  Nasal turbinates appear mildly inflamed and injected.  No rhinorrhea visible.  Lips without lesions, moist mucus membranes.  No pharyngeal erythema.  Neck: No masses. No submandibular or cervical lymphadenopathy.  Respiratory: Unlabored respiratory effort, lungs clear to auscultation, no wheezes, no rhonchi.  Cardiovascular: Normal S1, S2, no murmur, 1+ pitting edema noted to the bilateral lower extremities to just below the knees.  Visibly worse on the left side.  No calf erythema or tenderness to palpation.      Assessment and Plan:   The following treatment plan was discussed    1. Bilateral lower extremity edema  New problem to me but ongoing over many years per patient, uncontrolled.  He does have some edema on exam, nothing severe but it is bothersome to him.  I do not see a problem in having him continue low-dose of Lasix.  Would like him to run past his cardiologist at next appointment.  Will start on 20 mg daily.  Should have BMP checked in a couple of weeks.  Discussed importance of high-potassium diet.  - furosemide (LASIX) 20 MG Tab; Take 1 Tab by mouth every day.  Dispense: 30 Tab; Refill: 2  - BASIC METABOLIC PANEL; Future    2. Acute rhinosinusitis  New problem, uncontrolled.  Explained to patient that symptoms are likely viral and antibiotics not necessary.  Supportive cares " discussed.  If no improvement at all by the 10-day janeth of illness or significant worsening, should follow-up for reevaluation.      Followup: Return if symptoms worsen or fail to improve.    Tanya Rayo P.A.-C.

## 2018-12-12 DIAGNOSIS — E78.5 HYPERLIPIDEMIA, UNSPECIFIED HYPERLIPIDEMIA TYPE: ICD-10-CM

## 2018-12-18 ENCOUNTER — HOSPITAL ENCOUNTER (OUTPATIENT)
Dept: LAB | Facility: MEDICAL CENTER | Age: 73
End: 2018-12-18
Attending: PHYSICIAN ASSISTANT
Payer: MEDICARE

## 2018-12-18 DIAGNOSIS — R60.0 BILATERAL LOWER EXTREMITY EDEMA: ICD-10-CM

## 2018-12-18 LAB
ANION GAP SERPL CALC-SCNC: 9 MMOL/L (ref 0–11.9)
BUN SERPL-MCNC: 17 MG/DL (ref 8–22)
CALCIUM SERPL-MCNC: 9.7 MG/DL (ref 8.5–10.5)
CHLORIDE SERPL-SCNC: 103 MMOL/L (ref 96–112)
CO2 SERPL-SCNC: 25 MMOL/L (ref 20–33)
CREAT SERPL-MCNC: 0.84 MG/DL (ref 0.5–1.4)
GLUCOSE SERPL-MCNC: 138 MG/DL (ref 65–99)
POTASSIUM SERPL-SCNC: 3.8 MMOL/L (ref 3.6–5.5)
SODIUM SERPL-SCNC: 137 MMOL/L (ref 135–145)

## 2018-12-18 PROCEDURE — 80048 BASIC METABOLIC PNL TOTAL CA: CPT

## 2018-12-18 PROCEDURE — 36415 COLL VENOUS BLD VENIPUNCTURE: CPT

## 2018-12-18 RX ORDER — ATORVASTATIN CALCIUM 20 MG/1
TABLET, FILM COATED ORAL
Qty: 90 TAB | Refills: 2 | Status: SHIPPED | OUTPATIENT
Start: 2018-12-18 | End: 2019-09-14 | Stop reason: SDUPTHER

## 2019-01-10 ENCOUNTER — OFFICE VISIT (OUTPATIENT)
Dept: ENDOCRINOLOGY | Facility: MEDICAL CENTER | Age: 74
End: 2019-01-10
Payer: MEDICARE

## 2019-01-10 VITALS
HEART RATE: 115 BPM | BODY MASS INDEX: 38.34 KG/M2 | OXYGEN SATURATION: 98 % | SYSTOLIC BLOOD PRESSURE: 136 MMHG | WEIGHT: 253 LBS | HEIGHT: 68 IN | DIASTOLIC BLOOD PRESSURE: 48 MMHG

## 2019-01-10 DIAGNOSIS — I10 ESSENTIAL HYPERTENSION, BENIGN: ICD-10-CM

## 2019-01-10 DIAGNOSIS — E11.9 TYPE 2 DIABETES MELLITUS WITHOUT COMPLICATION, UNSPECIFIED WHETHER LONG TERM INSULIN USE (HCC): ICD-10-CM

## 2019-01-10 LAB
HBA1C MFR BLD: 6.2 % (ref ?–5.8)
INT CON NEG: NORMAL
INT CON POS: NORMAL

## 2019-01-10 PROCEDURE — 83036 HEMOGLOBIN GLYCOSYLATED A1C: CPT | Performed by: PHYSICIAN ASSISTANT

## 2019-01-10 PROCEDURE — 99214 OFFICE O/P EST MOD 30 MIN: CPT | Performed by: PHYSICIAN ASSISTANT

## 2019-01-10 NOTE — PROGRESS NOTES
New Patient Consult Note  Referred by: Tanya Rayo P.A.-C.    Reason for consult: Diabetes Management Type 2    HPI:  Dakota Boyer is a 73 y.o. old patient who is seeing us today for diabetes care.  This is a pleasant patient with diabetes and I appreciate the opportunity to participate in the care of this patient.  This is a new patient with me today.    Labs of 1/10/19 HbA1c is 6.2, GFR >60    BG Diary:1/10/2019  In the AM:  No log    Has been Diabetic since T2 8 years  Has a Glucagon pen at home: no    1. Type 2 diabetes mellitus without complication, unspecified whether long term insulin use (HCC)  This is a new patient with me on 1/10/19  He is on:  1.   Metformin 500mg two in the AM and two in the PM  2.   Lantus  35 twice a day  3.   Trulicity 1.5 once weekly    2. Essential hypertension, benign  This is stable and no changes needed        ROS:   Constitutional: No change in weight , No fatigue, No night sweats.  HEENT: No Headache.  Eyes:  No blurred vision, No visual changes.  Cardiac: No chest pain, No palpitations.  Resp: No shortness of breath, No cough,   Gastro: No nausea or vomiting, No diarrhea.  Neuro: Denies numbness or tinging in bilateral feet or hands, and no loss of sensation.  Endo: No heat or cold intolerance.  : No polyuria, No polydipsia, No chronic UTI's.  Lower extremities: No lower leg edema bilateral.  All other systems were reviewed and were negative.    Past Medical History:  Patient Active Problem List    Diagnosis Date Noted   • Coronary artery disease due to calcified coronary lesion: 40-50% disease in the circumflex at cardiac catheterization in 2013 08/16/2013     Priority: High   • Sick sinus syndrome (HCC) 10/04/2012     Priority: High   • Pacemaker 10/04/2012     Priority: High   • Hypersensitive carotid sinus syndrome 10/02/2012     Priority: High   • Dyslipidemia 09/28/2012     Priority: High   • RBBB 09/28/2012     Priority: High   • Essential  hypertension, benign 04/10/2012     Priority: High   • Type 2 diabetes mellitus without complication ([facility]) 04/10/2012     Priority: Medium   • DJD (degenerative joint disease) of cervical spine 02/27/2013     Priority: Low   • S/P lumbar discectomy 11/21/2012     Priority: Low   • Degeneration of lumbar or lumbosacral intervertebral disc 11/20/2012     Priority: Low   • Back pain, chronic 04/10/2012     Priority: Low   • Neck pain 04/10/2012     Priority: Low   • Bilateral lower extremity edema 12/04/2018   • Prostate cancer ([facility]) 08/09/2018   • Elevated PSA 01/23/2018   • Bilateral primary osteoarthritis of knee 01/23/2018   • Cervical spondylosis 10/19/2016   • Non-proliferative diabetic retinopathy, mild, both eyes ([facility]) 06/02/2016   • Hypertensive retinopathy of both eyes 06/02/2016   • Depression with anxiety 03/28/2016   • Obstructive sleep apnea on CPAP 03/28/2016   • Hypothyroidism 08/21/2014   • Obesity 08/21/2014       Past Surgical History:  Past Surgical History:   Procedure Laterality Date   • DC DSTR NROLYTC AGNT PARVERTEB FCT SNGL CRVCL/THORA Right 10/19/2016    Procedure: NEURO DEST FACET C/T W/IG SNGL - 3ON-C3, C8-T1    SINERGY;  Surgeon: Gaurang Pruett M.D.;  Location: [facility];  Service: Pain Management   • DC DSTR NROLYTC AGNT PARVERTEB FCT ADDL CRVCL/THORA  10/19/2016    Procedure: NEURO DEST FACET C/T W/IG ADDL;  Surgeon: Gaurang Pruett M.D.;  Location: SURGERY [facility] ORS;  Service: Pain Management   • PB FLUOROSCOPIC GUIDANCE NEEDLE PLACEMENT  10/19/2016    Procedure: FLOURO GUIDE NEEDLE PLACEMENT;  Surgeon: Gaurang Pruett M.D.;  Location: SURGERY [facility] ORS;  Service: Pain Management   • SHOULDER ARTHROSCOPY Right 2015    torn bicep tendon and spurs   • RECOVERY  10/17/2013    Performed by Cath-Recovery Surgery at [facility] SAME DAY [facility] ORS   • WOUND DEHISCENCE  4/10/2013    Performed by Tu Jain M.D. at SURGERY [facility] ORS   • CERVICAL FUSION POSTERIOR   "2/27/2013    Performed by Tu Jain M.D. at SURGERY [facility] ORS   • CERVICAL LAMINECTOMY POSTERIOR  2/27/2013    Performed by Tu Jain M.D. at SURGERY [facility]   • LUMBAR LAMINECTOMY DISKECTOMY  11/20/2012    Performed by uT Jain M.D. at SURGERY [facility]   • RECOVERY  10/4/2012    Performed by Cath-Recovery Surgery at SURGERY SAME DAY [facility]   • PACEMAKER INSERTION  October 2012    [facility]  2110 implanted by Dr. Waite.   • SHOULDER DECOMPRESSION Left 2008    Left rotator cuff   • ATHROPLASTY Left 2004   • ARTHROSCOPY, KNEE Bilateral 1978   • ORTHOPEDIC OSTEOTOMY      Both knees several times, 8 total   • OTHER     • SPINAL CORD STIMULATOR  1 month ago   • TONSILLECTOMY     • VASECTOMY         Allergies:  Aleve cold & [pseudoephedrine-naproxen na]; Ceftriaxone sodium; Naproxen; and Tape    Social History:  Social History     Social History   • Marital status:      Spouse name: N/A   • Number of children: N/A   • Years of education: N/A     Occupational History   • Not on file.     Social History Main Topics   • Smoking status: Former Smoker     Packs/day: 1.00     Years: 40.00     Types: Cigarettes     Quit date: 1/3/1990   • Smokeless tobacco: Never Used   • Alcohol use No   • Drug use: No   • Sexual activity: Yes     Partners: Female     Other Topics Concern   • Not on file     Social History Narrative   • No narrative on file       Family History:  Family History   Problem Relation Age of Onset   • Lung Disease Mother         Smoker   • Alcohol/Drug Mother    • Heart Disease Father 36        CAD   • Heart Disease Sister         \"hole in heart\"   • Lung Disease Brother         COPD, CANCER   • Cancer Brother         Prostate, Lung   • Alcohol/Drug Brother    • Heart Disease Brother    • Diabetes Brother    • Hypertension Brother    • Hyperlipidemia Brother    • Heart Disease Maternal Grandmother    • Hypertension Maternal Grandmother    • " Hyperlipidemia Maternal Grandmother    • Cancer Brother         stomach, thyroid   • Heart Disease Brother 60        MI, stent, PM/defib   • Sleep Apnea Brother    • No Known Problems Maternal Grandfather    • No Known Problems Paternal Grandmother        Medications:    Current Outpatient Prescriptions:   •  atorvastatin (LIPITOR) 20 MG Tab, TAKE 1 TABLET DAILY, Disp: 90 Tab, Rfl: 2  •  furosemide (LASIX) 20 MG Tab, Take 1 Tab by mouth every day., Disp: 30 Tab, Rfl: 2  •  meloxicam (MOBIC) 7.5 MG Tab, Take 1 to 2 tablets by mouth daily, Disp: 180 Tab, Rfl: 1  •  losartan (COZAAR) 50 MG Tab, Take 1 Tab by mouth every day., Disp: 90 Tab, Rfl: 3  •  Dulaglutide (TRULICITY) 1.5 MG/0.5ML Solution Pen-injector, Inject 1.5 mg as instructed every 7 days., Disp: 12 PEN, Rfl: 1  •  buPROPion (WELLBUTRIN XL) 150 MG XL tablet, TAKE 1 TABLET EVERY MORNING, Disp: 90 Tab, Rfl: 1  •  metFORMIN (GLUCOPHAGE) 500 MG Tab, TAKE 2 TABLETS TWICE A DAY WITH MEALS, Disp: 360 Tab, Rfl: 2  •  LANTUS SOLOSTAR 100 UNIT/ML Solution Pen-injector injection, INJECT 30 UNITS TWICE A DAY, Disp: 45 mL, Rfl: 3  •  metoprolol (LOPRESSOR) 25 MG Tab, TAKE 1 TABLET TWICE A DAY, Disp: 180 Tab, Rfl: 2  •  FREESTYLE LITE strip, USE TO TEST FOUR TIMES A DAY AND AS NEEDED FOR SYMPTOMS OF HIGH OR LOW SUGAR, Disp: 350 Strip, Rfl: 4  •  ferrous sulfate 325 (65 FE) MG tablet, Take 325 mg by mouth every 48 hours., Disp: , Rfl:   •  Cyanocobalamin (VITAMIN B-12) 2500 MCG SL Tab, Place  under tongue every day., Disp: , Rfl:   •  Lancets MISC, Lancets order: Lancets for Abbott Freestyle Lite meter. Sig: use 4 times daily  and prn ssx high or low sugar. #100 RF x 0, Disp: 360 Each, Rfl: 4  •  aspirin EC (ECOTRIN) 81 MG TBEC, Take 81 mg by mouth every day., Disp: , Rfl:   •  levothyroxine (SYNTHROID) 75 MCG Tab, TAKE 1 TABLET EVERY MORNING ON AN EMPTY STOMACH (Patient not taking: Reported on 1/10/2019), Disp: 90 Tab, Rfl: 0  •  tetanus-dipth-acell pertussis (ADACEL)  "5-2-15.5 LF-MCG/0.5 Suspension, 0.5 mL by Intramuscular route Once PRN for up to 1 dose., Disp: 1 Vial, Rfl: 0  •  B-D ULTRAFINE III SHORT PEN 31G X 8 MM Misc, USE TWICE A DAY, Disp: 200 Each, Rfl: 2  •  Blood Glucose Monitoring Suppl [place], Meter: Dispense Abbott Freestyle Lite meter. Sig. Use as directed for blood sugar monitoring. #1. NR., Disp: 1 Device, Rfl: 0  •  B-D ULTRAFINE III SHORT PEN 31G X 8 MM MISC, USE 1 NEEDLE EVERY DAY WITH LANTUS, Disp: 999 Each, Rfl: 2      Physical Examination:   Vital signs: /48 (BP Location: Right arm, Patient Position: Sitting, BP Cuff Size: Adult)   Pulse (!) 115   Ht 1.727 m (5' 7.99\")   Wt 114.8 kg (253 lb)   SpO2 98%   BMI 38.48 kg/m²   General: No distress, cooperative, well dressed and well nourished.   Eyes: No scleral icterus or discharge, No hyposphagma  ENMT: Normal on external inspection of nose, lips, No nasal drainage   Neck: No abnormal masses on inspection  Resp: Normal effort, Bilateral clear to auscultation, No wheezing, No rales  CVS: Regular rate and rhythm, S1 S2 normal, No murmur. No gallop  Extremities: No edema bilateral extremities  Neuro: Alert and oriented  Skin: No rash, No Ulcers  Psych: Normal mood and affect    Assessment and Plan:    1. Type 2 diabetes mellitus without complication, unspecified whether long term insulin use (HCC)    He is on:  1.   Metformin 500mg two in the AM and two in the PM  2.   Lantus  35 twice a day (Decrease to 20 at night only)  3.   Trulicity 1.5 once weekly  4.   Jardiance 25mg one a day (Start on 1/10/19)      2. Essential hypertension, benign  This is stable and no changes needed    Return in about 1 month (around 2/10/2019).    Blood glucose log: Check BG in the morning when wake up, before lunch or dinner and before bed.  So three times a day.  Always bring BG diary to the next office visit.     Thank you kindly for allowing me to participate in the diabetes care plan for this patient.    Favio John" MITA, BC-[facility]  Board Certified - Advanced Diabetes Management  01/10/19    CC:   Tanya Rayo P.A.-C.

## 2019-01-10 NOTE — PATIENT INSTRUCTIONS
He is on:  1.   Metformin 500mg two in the AM and two in the PM  2.   Lantus  35 twice a day (Decrease to 20 at night only)  3.   Trulicity 1.5 once weekly  4.   Jardiance 25mg one a day (Start on 1/10/19)

## 2019-02-12 ENCOUNTER — OFFICE VISIT (OUTPATIENT)
Dept: MEDICAL GROUP | Facility: PHYSICIAN GROUP | Age: 74
End: 2019-02-12
Payer: MEDICARE

## 2019-02-12 VITALS
HEIGHT: 68 IN | WEIGHT: 254 LBS | OXYGEN SATURATION: 95 % | DIASTOLIC BLOOD PRESSURE: 72 MMHG | BODY MASS INDEX: 38.49 KG/M2 | HEART RATE: 88 BPM | SYSTOLIC BLOOD PRESSURE: 142 MMHG | TEMPERATURE: 98.5 F

## 2019-02-12 DIAGNOSIS — R60.0 BILATERAL LOWER EXTREMITY EDEMA: ICD-10-CM

## 2019-02-12 DIAGNOSIS — I10 ESSENTIAL HYPERTENSION, BENIGN: ICD-10-CM

## 2019-02-12 DIAGNOSIS — J06.9 VIRAL URI WITH COUGH: ICD-10-CM

## 2019-02-12 DIAGNOSIS — E03.8 SUBCLINICAL HYPOTHYROIDISM: ICD-10-CM

## 2019-02-12 DIAGNOSIS — I25.84 CORONARY ARTERY DISEASE DUE TO CALCIFIED CORONARY LESION: ICD-10-CM

## 2019-02-12 DIAGNOSIS — E11.9 TYPE 2 DIABETES MELLITUS WITHOUT COMPLICATION, UNSPECIFIED WHETHER LONG TERM INSULIN USE (HCC): ICD-10-CM

## 2019-02-12 DIAGNOSIS — Z95.0 PACEMAKER: ICD-10-CM

## 2019-02-12 DIAGNOSIS — C61 PROSTATE CANCER (HCC): ICD-10-CM

## 2019-02-12 DIAGNOSIS — I25.10 CORONARY ARTERY DISEASE DUE TO CALCIFIED CORONARY LESION: ICD-10-CM

## 2019-02-12 PROCEDURE — 99214 OFFICE O/P EST MOD 30 MIN: CPT | Performed by: PHYSICIAN ASSISTANT

## 2019-02-12 RX ORDER — INSULIN GLARGINE 100 [IU]/ML
10 INJECTION, SOLUTION SUBCUTANEOUS NIGHTLY
COMMUNITY
End: 2019-10-06

## 2019-02-12 ASSESSMENT — PATIENT HEALTH QUESTIONNAIRE - PHQ9: CLINICAL INTERPRETATION OF PHQ2 SCORE: 0

## 2019-02-12 NOTE — PROGRESS NOTES
Subjective:   Dakota Boyer is a 74 y.o. male here today for follow-up on DM and other chronic conditions. Is an established patient of mine.    HPI:    Patient presents to the office today for routine follow-up. I last saw him in 12/2018. He has the following chronic conditions:    -DM2. Treated with metformin 1000 mg BID, Lantus 30 units BID, Trulicity 1.5 mg weekly, and Jardiance 25 mg daily. His last A1c was 6.2 last month. He continues to follow regularly with endocrinology and is up-to-date on all recommended diabetic screenings.    -HTN. Treated with losartan 50 mg daily (previously on valsartan but was switched to losartan in 9/2018) and metoprolol 25 mg BID.    -subclinical hypothyroidism. Was previously on levothyroxine but was told to stop medication > 6 months ago. TSH last checked in 8/2018.    -multiple cardiac conditions including h/o sick sinus syndrome with pacemaker and CAD. He follows regularly with cardiology--Dr. Christiansen--for monitoring and device checks of his pacemaker. Last appointment with him was in July. He is on losartan and metoprolol. He states that he stopped taking the metoprolol after his last refill ran out as he was under the impression that he wasn't supposed to take it anymore.    -depression/anxiety. Continues to take Bupropion XL which he has been on for a long time. He feels this continues to work well for him. Denies any current depressed mood.    -prostate cancer. Follows with Dr. Garcia at [facility]. Has not had any intervention as of yet. Is undergoing active surveillance. He mentions that he has been having difficulty telling that his bladder is full. Will occasionally leak before he gets to the bathroom. Has not yet mentioned this issue to his urologist.    -lower extremity edema. Recently addressed at last appointment. His urologist had started him on low-dose Lasix for this which I told him was fine to continue as the swelling is uncomfortable for him and  "the medication helps. Has previously tried compression stockings which he did not find very helpful.     New concerns today:    -He states he is getting over a \"cold.\" Onset was 2 weeks ago. Main symptom was chest congestion and cough which he states are improving. Able to lie flat in bed now. No fever, nasal congestion, ST, ear pain, SOB (other than during coughing fits).      Current medicines (including changes today)  Current Outpatient Prescriptions   Medication Sig Dispense Refill   • Empagliflozin (JARDIANCE) 25 MG Tab Take  by mouth.     • insulin glargine (LANTUS) 100 UNIT/ML Solution Inject 20 Units as instructed every evening.     • metoprolol (LOPRESSOR) 25 MG Tab TAKE 1 TABLET TWICE A  Tab 2   • atorvastatin (LIPITOR) 20 MG Tab TAKE 1 TABLET DAILY 90 Tab 2   • furosemide (LASIX) 20 MG Tab Take 1 Tab by mouth every day. 30 Tab 2   • meloxicam (MOBIC) 7.5 MG Tab Take 1 to 2 tablets by mouth daily 180 Tab 1   • losartan (COZAAR) 50 MG Tab Take 1 Tab by mouth every day. 90 Tab 3   • Dulaglutide (TRULICITY) 1.5 MG/0.5ML Solution Pen-injector Inject 1.5 mg as instructed every 7 days. 12 PEN 1   • buPROPion (WELLBUTRIN XL) 150 MG XL tablet TAKE 1 TABLET EVERY MORNING 90 Tab 1   • metFORMIN (GLUCOPHAGE) 500 MG Tab TAKE 2 TABLETS TWICE A DAY WITH MEALS 360 Tab 2   • B-D ULTRAFINE III SHORT PEN 31G X 8 MM Misc USE TWICE A  Each 2   • FREESTYLE LITE strip USE TO TEST FOUR TIMES A DAY AND AS NEEDED FOR SYMPTOMS OF HIGH OR LOW SUGAR 350 Strip 4   • ferrous sulfate 325 (65 FE) MG tablet Take 325 mg by mouth every 48 hours.     • Cyanocobalamin (VITAMIN B-12) 2500 MCG SL Tab Place  under tongue every day.     • Lancets MISC Lancets order: Lancets for Abbott Freestyle Lite meter. Sig: use 4 times daily  and prn ssx high or low sugar. #100 RF x 0 360 Each 4   • Blood Glucose Monitoring Suppl [place] Meter: Dispense Abbott Freestyle Lite meter. Sig. Use as directed for blood sugar monitoring. #1. NR. 1 Device " "0   • B-D ULTRAFINE III SHORT PEN 31G X 8 MM MISC USE 1 NEEDLE EVERY DAY WITH LANTUS 999 Each 2   • aspirin EC (ECOTRIN) 81 MG TBEC Take 81 mg by mouth every day.       No current facility-administered medications for this visit.      He  has a past medical history of Anxiety; Back pain, chronic (10/18/2016); Blood clotting disorder ([facility]) (1978); Breath shortness; CAD (coronary artery disease) (October 2012); Cancer ([facility]) (? early 90's); Degeneration of lumbar or lumbosacral intervertebral disc ( ); Dental disorder; Depression; Diabetes; DJD (degenerative joint disease) of cervical spine ( ); Episodic lightheadedness ( ); High cholesterol; Hyperlipidemia; Hypersensitive carotid sinus syndrome ( ); Hypertension; Incisional infection (April 2013); Insomnia ( ); Myocardial infarct ([facility]) (?); Neck pain (10/18/2016); Pacemaker (October 2012); RBBB ( ); S/P lumbar discectomy ( ); Sick sinus syndrome ([facility]) ( ); Sleep apnea; Syncope (October 2012); Thyroid disease; and Unspecified hemorrhagic conditions.    ROS  As per HPI.       Objective:     Blood pressure 142/72, pulse 88, temperature 36.9 °C (98.5 °F), height 1.727 m (5' 8\"), weight 115.2 kg (254 lb), SpO2 95 %. Body mass index is 38.62 kg/m².     Physical Exam:  Constitutional: Alert, obese but otherwise well-appearing, no distress.  Skin: Warm, dry, good turgor, no rashes in visible areas.  Eye: Pupils are equal and round, conjunctiva clear, lids normal.  ENMT: External ear canals are clear without erythema, edema, or drainage.  Tympanic membranes are pearly gray bilaterally and without injection or bulging.  No rhinorrhea noted.  Lips without lesions, moist mucus membranes.  No pharyngeal erythema.  Neck: No masses. No submandibular or cervical lymphadenopathy.  Respiratory: Unlabored respiratory effort, SPO2 95% on room air lungs clear to auscultation, no wheezes, no rhonchi.  No coughing noted during today's appointment.  Cardiovascular: Normal S1, S2, no " murmur.      Assessment and Plan:   The following treatment plan was discussed    1. Type 2 diabetes mellitus without complication, unspecified whether long term insulin use (HCC)  Chronic issue, well controlled on current medication regimen.  He will continue to follow with endocrinology.  Will continue metformin, Lantus, Trulicity, and Jardiance.    2. Essential hypertension, benign  Chronic issue, well controlled on current medications.  Blood pressure today in the office is 142/72.  Continue losartan and metoprolol.  I have refilled metoprolol for him since he has been out of it and not taking. I do not see any notes from his cardiologist advising him to stop medication.  - metoprolol (LOPRESSOR) 25 MG Tab; TAKE 1 TABLET TWICE A DAY  Dispense: 180 Tab; Refill: 2    3. Subclinical hypothyroidism  Chronic issue, unclear level of control given no recent TSH.  He stopped his levothyroxine at some point in the last year.  He cannot remember exactly when.  This was at the advice of his endocrinologist who had seen that his TSH has never been significantly elevated and per my review, his free T4 levels have always been normal.  This is indicative of subclinical hypothyroidism which generally is not treated, especially at his advanced age.  Will recheck TSH.  - TSH WITH REFLEX TO FT4; Future    4. Pacemaker  Chronic issue, well controlled.  Pacemaker was initially placed due to sick sinus syndrome.  He has been doing well and is compliant with recommended device checks.  We will continue to follow with cardiology.    5. Coronary artery disease due to calcified coronary lesion: 40-50% disease in the circumflex at cardiac catheterization in 2013  Chronic issue, well controlled with medical management.  Will continue losartan and restart metoprolol.  Continue to follow with cardiology.  - metoprolol (LOPRESSOR) 25 MG Tab; TAKE 1 TABLET TWICE A DAY  Dispense: 180 Tab; Refill: 2    6. Prostate cancer (HCC)  Chronic issue,  stable per patient.  I did review his most recent follow-up note from Dr. Garcia (12/2018) which does confirm that active surveillance is being continued.  I have encouraged patient to speak to his urologist about his lower urinary tract symptoms that he has been having.  He is not currently on any prostate medication.  He has an upcoming appointment next month and will address at that time.    7. Bilateral lower extremity edema  Chronic issue, stable but uncontrolled at present as he is out of his Lasix.  He prefers to discuss with his cardiologist before restarting which is fine.  He will mention at his next appointment.    8. Viral URI with cough  New problem, spontaneously improving without intervention.  Discussed with patient that he is likely had a viral upper respiratory infection.  Cough is generally the last symptom to go away.  It does appear to be improving.  He did not cough at all during appointment today and lungs are clear on exam.  He does not have any warning flag symptoms such as fever or shortness of breath.  He is to let me know if he feels he is getting worse, develops new shortness of breath, or new fever.      Followup: Return in about 6 months (around 8/12/2019).    Tanya Rayo P.A.-C.

## 2019-02-15 ENCOUNTER — OFFICE VISIT (OUTPATIENT)
Dept: ENDOCRINOLOGY | Facility: MEDICAL CENTER | Age: 74
End: 2019-02-15
Payer: MEDICARE

## 2019-02-15 VITALS
HEIGHT: 68 IN | WEIGHT: 242 LBS | DIASTOLIC BLOOD PRESSURE: 50 MMHG | HEART RATE: 73 BPM | OXYGEN SATURATION: 94 % | SYSTOLIC BLOOD PRESSURE: 122 MMHG | BODY MASS INDEX: 36.68 KG/M2

## 2019-02-15 DIAGNOSIS — I10 ESSENTIAL HYPERTENSION, BENIGN: ICD-10-CM

## 2019-02-15 DIAGNOSIS — E11.9 TYPE 2 DIABETES MELLITUS WITHOUT COMPLICATION, UNSPECIFIED WHETHER LONG TERM INSULIN USE (HCC): ICD-10-CM

## 2019-02-15 PROCEDURE — 99214 OFFICE O/P EST MOD 30 MIN: CPT | Performed by: PHYSICIAN ASSISTANT

## 2019-02-15 NOTE — PATIENT INSTRUCTIONS
He is on:  1.   Metformin 500mg two in the AM and two in the PM  (Stop)  2.   Lantus  20 twice a day (DECREASE to 10)  3.   Trulicity 1.5 once weekly  4.   Jardiance 25mg one a day (Start on 1/10/19) (Stop)  5.   Synjardy 12.5/1000 one in the AM one in the PM

## 2019-02-15 NOTE — PROGRESS NOTES
Return to office Patient Consult Note  Referred by: Tanya Rayo P.A.-C.    Reason for consult: Diabetes Management Type 2    HPI:  Dakota Boyer is a 74 y.o. old patient who is seeing us today for diabetes care.  This is a pleasant patient with diabetes and I appreciate the opportunity to participate in the care of this patient.    Labs of 1/10/19 HbA1c is 6.2, GFR >60    BG Diary:2/15/2019  In the AM: 138, 143, 151, 151, 136, 143, 142, 141      1. Type 2 diabetes mellitus without complication, unspecified whether long term insulin use (HCC)    This is a new patient with me on 1/10/19  He is on:  1.   Metformin 500mg two in the AM and two in the PM  2.   Lantus  20 twice a day  3.   Trulicity 1.5 once weekly  4.   Jardiance 25mg one a day (Start on 1/10/19)     2. Essential hypertension, benign  This is stable and no changes needed      ROS:   Constitutional: No night sweats.  Eyes:  No visual changes.  Cardiac: No chest pain, No palpitations or racing heart rate.  Resp: No shortness of breath, No cough,   Gi: No Diarrhea    All other systems were reviewed and were/are negative.  The ROS was revised/revisited during this office visit from the patients first office visit with me on 1/10/19 Please review the full ROS during the first office visit.    Past Medical History:  Patient Active Problem List    Diagnosis Date Noted   • Coronary artery disease due to calcified coronary lesion: 40-50% disease in the circumflex at cardiac catheterization in 2013 08/16/2013     Priority: High   • Pacemaker 10/04/2012     Priority: High   • Hypersensitive carotid sinus syndrome 10/02/2012     Priority: High   • Dyslipidemia 09/28/2012     Priority: High   • RBBB 09/28/2012     Priority: High   • Essential hypertension, benign 04/10/2012     Priority: High   • Type 2 diabetes mellitus without complication (HCC) 04/10/2012     Priority: Medium   • DJD (degenerative joint disease) of cervical spine 02/27/2013      Priority: Low   • S/P lumbar discectomy 11/21/2012     Priority: Low   • Degeneration of lumbar or lumbosacral intervertebral disc 11/20/2012     Priority: Low   • Back pain, chronic 04/10/2012     Priority: Low   • Neck pain 04/10/2012     Priority: Low   • Bilateral lower extremity edema 12/04/2018   • Prostate cancer (HCC) 08/09/2018   • Elevated PSA 01/23/2018   • Bilateral primary osteoarthritis of knee 01/23/2018   • Cervical spondylosis 10/19/2016   • Non-proliferative diabetic retinopathy, mild, both eyes (HCC) 06/02/2016   • Hypertensive retinopathy of both eyes 06/02/2016   • Depression with anxiety 03/28/2016   • Obstructive sleep apnea on CPAP 03/28/2016   • Subclinical hypothyroidism 08/21/2014   • Obesity 08/21/2014       Past Surgical History:  Past Surgical History:   Procedure Laterality Date   • MA DSTR NROLYTC AGNT PARVERTEB FCT SNGL CRVCL/THORA Right 10/19/2016    Procedure: NEURO DEST FACET C/T W/IG SNGL - 3ON-C3, C8-T1    SINERGY;  Surgeon: Gaurang Pruett M.D.;  Location: [facility];  Service: Pain Management   • MA DSTR NROLYTC AGNT PARVERTEB FCT ADDL CRVCL/THORA  10/19/2016    Procedure: NEURO DEST FACET C/T W/IG ADDL;  Surgeon: Gaurang Pruett M.D.;  Location: [facility];  Service: Pain Management   • PB FLUOROSCOPIC GUIDANCE NEEDLE PLACEMENT  10/19/2016    Procedure: FLOURO GUIDE NEEDLE PLACEMENT;  Surgeon: Gaurang Pruett M.D.;  Location: [facility];  Service: Pain Management   • SHOULDER ARTHROSCOPY Right 2015    torn bicep tendon and spurs   • RECOVERY  10/17/2013    Performed by Cath-Recovery Surgery at SURGERY SAME DAY [facility]   • WOUND DEHISCENCE  4/10/2013    Performed by Tu Jain M.D. at SURGERY [facility] ORS   • CERVICAL FUSION POSTERIOR  2/27/2013    Performed by Tu Jain M.D. at SURGERY [facility] ORS   • CERVICAL LAMINECTOMY POSTERIOR  2/27/2013    Performed by Tu Jain M.D. at SURGERY [facility]   • LUMBAR  "LAMINECTOMY DISKECTOMY  11/20/2012    Performed by Tu Jain M.D. at SURGERY [facility] ORS   • RECOVERY  10/4/2012    Performed by Cath-Recovery Surgery at SURGERY SAME DAY [facility] ORS   • PACEMAKER INSERTION  October 2012    [employer]  2110 implanted by Dr. Waite.   • SHOULDER DECOMPRESSION Left 2008    Left rotator cuff   • ATHROPLASTY Left 2004   • ARTHROSCOPY, KNEE Bilateral 1978   • ORTHOPEDIC OSTEOTOMY      Both knees several times, 8 total   • OTHER     • SPINAL CORD STIMULATOR  1 month ago   • TONSILLECTOMY     • VASECTOMY         Allergies:  Aleve cold & [pseudoephedrine-naproxen na]; Ceftriaxone sodium; Naproxen; and Tape    Social History:  Social History     Social History   • Marital status:      Spouse name: N/A   • Number of children: N/A   • Years of education: N/A     Occupational History   • Not on file.     Social History Main Topics   • Smoking status: Former Smoker     Packs/day: 1.00     Years: 40.00     Types: Cigarettes     Quit date: 1/3/1990   • Smokeless tobacco: Never Used   • Alcohol use No   • Drug use: No   • Sexual activity: Yes     Partners: Female     Other Topics Concern   • Not on file     Social History Narrative   • No narrative on file       Family History:  Family History   Problem Relation Age of Onset   • Lung Disease Mother         Smoker   • Alcohol/Drug Mother    • Heart Disease Father 36        CAD   • Heart Disease Sister         \"hole in heart\"   • Lung Disease Brother         COPD, CANCER   • Cancer Brother         Prostate, Lung   • Alcohol/Drug Brother    • Heart Disease Brother    • Diabetes Brother    • Hypertension Brother    • Hyperlipidemia Brother    • Heart Disease Maternal Grandmother    • Hypertension Maternal Grandmother    • Hyperlipidemia Maternal Grandmother    • Cancer Brother         stomach, thyroid   • Heart Disease Brother 60        MI, stent, PM/defib   • Sleep Apnea Brother    • No Known Problems Maternal " Grandfather    • No Known Problems Paternal Grandmother        Medications:    Current Outpatient Prescriptions:   •  Empagliflozin (JARDIANCE) 25 MG Tab, Take  by mouth., Disp: , Rfl:   •  insulin glargine (LANTUS) 100 UNIT/ML Solution, Inject 20 Units as instructed every evening., Disp: , Rfl:   •  metoprolol (LOPRESSOR) 25 MG Tab, TAKE 1 TABLET TWICE A DAY, Disp: 180 Tab, Rfl: 2  •  atorvastatin (LIPITOR) 20 MG Tab, TAKE 1 TABLET DAILY, Disp: 90 Tab, Rfl: 2  •  furosemide (LASIX) 20 MG Tab, Take 1 Tab by mouth every day., Disp: 30 Tab, Rfl: 2  •  meloxicam (MOBIC) 7.5 MG Tab, Take 1 to 2 tablets by mouth daily, Disp: 180 Tab, Rfl: 1  •  losartan (COZAAR) 50 MG Tab, Take 1 Tab by mouth every day., Disp: 90 Tab, Rfl: 3  •  Dulaglutide (TRULICITY) 1.5 MG/0.5ML Solution Pen-injector, Inject 1.5 mg as instructed every 7 days., Disp: 12 PEN, Rfl: 1  •  buPROPion (WELLBUTRIN XL) 150 MG XL tablet, TAKE 1 TABLET EVERY MORNING, Disp: 90 Tab, Rfl: 1  •  metFORMIN (GLUCOPHAGE) 500 MG Tab, TAKE 2 TABLETS TWICE A DAY WITH MEALS, Disp: 360 Tab, Rfl: 2  •  B-D ULTRAFINE III SHORT PEN 31G X 8 MM Misc, USE TWICE A DAY, Disp: 200 Each, Rfl: 2  •  FREESTYLE LITE strip, USE TO TEST FOUR TIMES A DAY AND AS NEEDED FOR SYMPTOMS OF HIGH OR LOW SUGAR, Disp: 350 Strip, Rfl: 4  •  ferrous sulfate 325 (65 FE) MG tablet, Take 325 mg by mouth every 48 hours., Disp: , Rfl:   •  Cyanocobalamin (VITAMIN B-12) 2500 MCG SL Tab, Place  under tongue every day., Disp: , Rfl:   •  Lancets MISC, Lancets order: Lancets for Abbott Freestyle Lite meter. Sig: use 4 times daily  and prn ssx high or low sugar. #100 RF x 0, Disp: 360 Each, Rfl: 4  •  Blood Glucose Monitoring Suppl [place], Meter: Dispense Abbott Freestyle Lite meter. Sig. Use as directed for blood sugar monitoring. #1. NR., Disp: 1 Device, Rfl: 0  •  B-D ULTRAFINE III SHORT PEN 31G X 8 MM MISC, USE 1 NEEDLE EVERY DAY WITH LANTUS, Disp: 999 Each, Rfl: 2  •  aspirin EC (ECOTRIN) 81 MG TBEC, Take 81  "mg by mouth every day., Disp: , Rfl:         Physical Examination:   Vital signs: /50 (BP Location: Left arm, Patient Position: Sitting)   Pulse 73   Ht 1.727 m (5' 8\")   Wt 109.8 kg (242 lb)   SpO2 94%   BMI 36.80 kg/m²   General: No distress, cooperative, well dressed and well nourished.   Eyes: No scleral icterus or discharge, No hyposphagma  ENMT: Normal on external inspection of nose, lips, No nasal drainage   Neck: No abnormal masses on inspection  Resp: Normal effort, Bilateral clear to auscultation, No wheezing, No rales  CVS: Regular rate and rhythm, S1 S2 normal, No murmur. No gallop  Extremities: No edema bilateral extremities  Neuro: Alert and oriented  Skin: No rash, No Ulcers  Psych: Normal mood and affect      Assessment and Plan:    1. Type 2 diabetes mellitus without complication, unspecified whether long term insulin use (HCC)    He is on:  1.   Metformin 500mg two in the AM and two in the PM  (Stop)  2.   Lantus  20 twice a day (DECREASE to 10)  3.   Trulicity 1.5 once weekly  4.   Jardiance 25mg one a day (Start on 1/10/19) (Stop)  5.   Synjardy 12.5/1000 one in the AM one in the PM    2. Essential hypertension, benign  Is on a high risk medication Insulin and we will continue to follow     Return in about 3 months (around 5/15/2019).    Blood glucose log: Check BG in the morning when wake up, before lunch or dinner and before bed.  So three times a day.  Always bring BG diary to the next office visit.       Thank you kindly for allowing me to participate in the diabetes care plan for this patient.    Favio John PA-C, BC-ADM  Board Certified - Advanced Diabetes Management  02/15/19    CC:   Tanya Rayo P.A.-C.    "

## 2019-02-21 DIAGNOSIS — E11.65 TYPE 2 DIABETES MELLITUS WITH HYPERGLYCEMIA, WITH LONG-TERM CURRENT USE OF INSULIN (HCC): ICD-10-CM

## 2019-02-21 DIAGNOSIS — Z79.4 TYPE 2 DIABETES MELLITUS WITH HYPERGLYCEMIA, WITH LONG-TERM CURRENT USE OF INSULIN (HCC): ICD-10-CM

## 2019-02-21 NOTE — TELEPHONE ENCOUNTER
Was the patient seen in the last year in this department? Yes 02/15/19     Does patient have an active prescription for medications requested? No     Received Request Via: Pharmacy     Dulaglutide (TRULICITY) 1.5 MG/0.5ML Solution Pen-injector   Sig: Inject 1.5 mg as instructed every 7 days.

## 2019-02-26 ENCOUNTER — OFFICE VISIT (OUTPATIENT)
Dept: CARDIOLOGY | Facility: MEDICAL CENTER | Age: 74
End: 2019-02-26
Payer: MEDICARE

## 2019-02-26 ENCOUNTER — NON-PROVIDER VISIT (OUTPATIENT)
Dept: CARDIOLOGY | Facility: MEDICAL CENTER | Age: 74
End: 2019-02-26
Payer: MEDICARE

## 2019-02-26 VITALS
BODY MASS INDEX: 37.98 KG/M2 | WEIGHT: 242 LBS | HEART RATE: 78 BPM | SYSTOLIC BLOOD PRESSURE: 130 MMHG | OXYGEN SATURATION: 94 % | HEIGHT: 67 IN | DIASTOLIC BLOOD PRESSURE: 72 MMHG

## 2019-02-26 DIAGNOSIS — E78.5 DYSLIPIDEMIA: ICD-10-CM

## 2019-02-26 DIAGNOSIS — I25.84 CORONARY ARTERY DISEASE DUE TO CALCIFIED CORONARY LESION: ICD-10-CM

## 2019-02-26 DIAGNOSIS — Z95.0 PACEMAKER: ICD-10-CM

## 2019-02-26 DIAGNOSIS — I10 ESSENTIAL HYPERTENSION, BENIGN: ICD-10-CM

## 2019-02-26 DIAGNOSIS — I25.10 CORONARY ARTERY DISEASE DUE TO CALCIFIED CORONARY LESION: ICD-10-CM

## 2019-02-26 PROCEDURE — 93280 PM DEVICE PROGR EVAL DUAL: CPT | Performed by: INTERNAL MEDICINE

## 2019-02-26 PROCEDURE — 99214 OFFICE O/P EST MOD 30 MIN: CPT | Performed by: INTERNAL MEDICINE

## 2019-02-26 RX ORDER — INFLUENZA A VIRUS A/MICHIGAN/45/2015 X-275 (H1N1) ANTIGEN (FORMALDEHYDE INACTIVATED), INFLUENZA A VIRUS A/SINGAPORE/INFIMH-16-0019/2016 IVR-186 (H3N2) ANTIGEN (FORMALDEHYDE INACTIVATED), AND INFLUENZA B VIRUS B/MARYLAND/15/2016 BX-69A (A B/COLORADO/6/2017-LIKE VIRUS) ANTIGEN (FORMALDEHYDE INACTIVATED) 60; 60; 60 UG/.5ML; UG/.5ML; UG/.5ML
INJECTION, SUSPENSION INTRAMUSCULAR
COMMUNITY
Start: 2019-02-22 | End: 2019-02-26

## 2019-02-26 NOTE — LETTER
[facility]   [address] [place] [address] [address]  [place] [address]  Phone: [phone]  Fax: [phone]              Dakota Boyer  1945    Encounter Date: 2/26/2019    Jac Christiansen M.D.          PROGRESS NOTE:  Chief Complaint   Patient presents with   • Coronary Artery Disease     F/V: 6 MO       Subjective:   Dakota Boyer is a 74 y.o. male who presents todayfor followup of his dyspnea on exertion, hypertension, hyperlipidemia, permanent pacemaker and mild coronary artery disease.     He has had some difficulty with chronic edema since his knee surgeries.  He was given furosemide by his urologist for about 30 days which did seem to help.  He denies any chest discomfort, dyspnea, palpitations or lightheadedness.       Past Medical History:   Diagnosis Date   • Anxiety    • Back pain, chronic 10/18/2016   • Blood clotting disorder (HCC) 1978    s/p Left knee surgery- DVT in left leg   • Breath shortness    • CAD (coronary artery disease) October 2012    Positive coronary calcium score.  Follow-up MPI was negative for ischemia.   • Cancer (HCC) ? early 90's    Melanoma Left arm- surgically removed   • Degeneration of lumbar or lumbosacral intervertebral disc     • Dental disorder    • Depression    • Diabetes     Oral agents and insulin   • DJD (degenerative joint disease) of cervical spine     • Episodic lightheadedness     • High cholesterol    • Hyperlipidemia    • Hypersensitive carotid sinus syndrome     • Hypertension    • Incisional infection April 2013    Wound dehiscience of neck surgery.   • Insomnia     • Myocardial infarct (HCC) ?    states was told by  that he has had a heart attack in the past.   • Neck pain 10/18/2016   • Pacemaker October 2012    [employer]  #[id number] implanted by [facility].    • RBBB     • S/P lumbar discectomy     • Sick sinus syndrome (HCC)     • Sleep apnea     does not use CPAP anymore- stopped in Dec  2015   • Syncope October 2012    Treated with PPM   • Thyroid disease     Hypothyroid   • Unspecified hemorrhagic conditions     Reports bleeds easily     Past Surgical History:   Procedure Laterality Date   • AZ DSTR NROLYTC AGNT PARVERTEB FCT SNGL CRVCL/THORA Right 10/19/2016    Procedure: NEURO DEST FACET C/T W/IG SNGL - 3ON-C3, C8-T1    SINERGY;  Surgeon: Gaurang Pruett M.D.;  Location: SURGERY [facility];  Service: Pain Management   • AZ DSTR NROLYTC AGNT PARVERTEB FCT ADDL CRVCL/THORA  10/19/2016    Procedure: NEURO DEST FACET C/T W/IG ADDL;  Surgeon: Gaurang Pruett M.D.;  Location: SURGERY [facility];  Service: Pain Management   • PB FLUOROSCOPIC GUIDANCE NEEDLE PLACEMENT  10/19/2016    Procedure: FLOURO GUIDE NEEDLE PLACEMENT;  Surgeon: Gaurang Pruett M.D.;  Location: [facility];  Service: Pain Management   • SHOULDER ARTHROSCOPY Right 2015    torn bicep tendon and spurs   • RECOVERY  10/17/2013    Performed by Cath-Recovery Surgery at SURGERY SAME DAY [facility]   • WOUND DEHISCENCE  4/10/2013    Performed by Tu Jain M.D. at SURGERY [facility]   • CERVICAL FUSION POSTERIOR  2/27/2013    Performed by Tu Jain M.D. at SURGERY [facility]   • CERVICAL LAMINECTOMY POSTERIOR  2/27/2013    Performed by Tu Jain M.D. at SURGERY [facility]   • LUMBAR LAMINECTOMY DISKECTOMY  11/20/2012    Performed by Tu Jain M.D. at SURGERY [facility]   • RECOVERY  10/4/2012    Performed by Cath-Recovery Surgery at [facility] SAME DAY [facility]   • PACEMAKER INSERTION  October 2012    [employer]  2110 implanted by Dr. Waite.   • SHOULDER DECOMPRESSION Left 2008    Left rotator cuff   • ATHROPLASTY Left 2004   • ARTHROSCOPY, KNEE Bilateral 1978   • ORTHOPEDIC OSTEOTOMY      Both knees several times, 8 total   • OTHER     • SPINAL CORD STIMULATOR  1 month ago   • TONSILLECTOMY     • VASECTOMY       Family History   Problem Relation Age of Onset   •  "Lung Disease Mother         Smoker   • Alcohol/Drug Mother    • Heart Disease Father 36        CAD   • Heart Disease Sister         \"hole in heart\"   • Lung Disease Brother         COPD, CANCER   • Cancer Brother         Prostate, Lung   • Alcohol/Drug Brother    • Heart Disease Brother    • Diabetes Brother    • Hypertension Brother    • Hyperlipidemia Brother    • Heart Disease Maternal Grandmother    • Hypertension Maternal Grandmother    • Hyperlipidemia Maternal Grandmother    • Cancer Brother         stomach, thyroid   • Heart Disease Brother 60        MI, stent, PM/defib   • Sleep Apnea Brother    • No Known Problems Maternal Grandfather    • No Known Problems Paternal Grandmother      Social History     Social History   • Marital status:      Spouse name: N/A   • Number of children: N/A   • Years of education: N/A     Occupational History   • Not on file.     Social History Main Topics   • Smoking status: Former Smoker     Packs/day: 1.00     Years: 40.00     Types: Cigarettes     Quit date: 1/3/1990   • Smokeless tobacco: Never Used   • Alcohol use No   • Drug use: No   • Sexual activity: Yes     Partners: Female     Other Topics Concern   • Not on file     Social History Narrative   • No narrative on file     Allergies   Allergen Reactions   • Aleve Cold & [Pseudoephedrine-Naproxen Na] Anaphylaxis   • Ceftriaxone Sodium Anaphylaxis     Reaction; 1970's.   • Naproxen Anaphylaxis     Reaction; 2004.   • Tape Rash and Itching     Plastic tape (paper tape ok)     Outpatient Encounter Prescriptions as of 2/26/2019   Medication Sig Dispense Refill   • Empagliflozin-Metformin HCl (SYNJARDY PO) Take  by mouth every day.     • Dulaglutide (TRULICITY) 1.5 MG/0.5ML Solution Pen-injector Inject 1.5 mg as instructed every 7 days. 12 PEN 1   • insulin glargine (LANTUS) 100 UNIT/ML Solution Inject 10 Units as instructed every evening.     • metoprolol (LOPRESSOR) 25 MG Tab TAKE 1 TABLET TWICE A  Tab 2   • " "atorvastatin (LIPITOR) 20 MG Tab TAKE 1 TABLET DAILY 90 Tab 2   • meloxicam (MOBIC) 7.5 MG Tab Take 1 to 2 tablets by mouth daily 180 Tab 1   • losartan (COZAAR) 50 MG Tab Take 1 Tab by mouth every day. 90 Tab 3   • buPROPion (WELLBUTRIN XL) 150 MG XL tablet TAKE 1 TABLET EVERY MORNING 90 Tab 1   • metFORMIN (GLUCOPHAGE) 500 MG Tab TAKE 2 TABLETS TWICE A DAY WITH MEALS 360 Tab 2   • B-D ULTRAFINE III SHORT PEN 31G X 8 MM Misc USE TWICE A  Each 2   • FREESTYLE LITE strip USE TO TEST FOUR TIMES A DAY AND AS NEEDED FOR SYMPTOMS OF HIGH OR LOW SUGAR 350 Strip 4   • ferrous sulfate 325 (65 FE) MG tablet Take 325 mg by mouth every 48 hours.     • Cyanocobalamin (VITAMIN B-12) 2500 MCG SL Tab Place  under tongue every day.     • Lancets MISC Lancets order: Lancets for Abbott Freestyle Lite meter. Sig: use 4 times daily  and prn ssx high or low sugar. #100 RF x 0 360 Each 4   • Blood Glucose Monitoring Suppl [place] Meter: Dispense Abbott Freestyle Lite meter. Sig. Use as directed for blood sugar monitoring. #1. NR. 1 Device 0   • B-D ULTRAFINE III SHORT PEN 31G X 8 MM MISC USE 1 NEEDLE EVERY DAY WITH LANTUS 999 Each 2   • aspirin EC (ECOTRIN) 81 MG TBEC Take 81 mg by mouth every day.     • FLUZONE HIGH-DOSE 0.5 ML Suspension Prefilled Syringe injection      • Empagliflozin (JARDIANCE) 25 MG Tab Take  by mouth.     • furosemide (LASIX) 20 MG Tab Take 1 Tab by mouth every day. (Patient not taking: Reported on 2/26/2019) 30 Tab 2     No facility-administered encounter medications on file as of 2/26/2019.      ROS     Objective:   /72 (BP Location: Left arm, Patient Position: Sitting, BP Cuff Size: Adult)   Pulse 78   Ht 1.702 m (5' 7\")   Wt 109.8 kg (242 lb)   SpO2 94%   BMI 37.90 kg/m²      Physical Exam   Constitutional: He appears well-developed and well-nourished.   Neck: No JVD present.   Cardiovascular: Normal rate and regular rhythm.    No murmur heard.  Pulmonary/Chest: Effort normal and breath sounds " normal. He has no rales.   Abdominal: Soft. There is no tenderness.   Musculoskeletal: He exhibits edema (1+ pretibial).     Lab Results   Component Value Date/Time    CHOLSTRLTOT 99 (L) 08/20/2018 07:38 AM    LDL 49 08/20/2018 07:38 AM    HDL 35 (A) 08/20/2018 07:38 AM    TRIGLYCERIDE 75 08/20/2018 07:38 AM       Lab Results   Component Value Date/Time    SODIUM 137 12/18/2018 08:06 AM    POTASSIUM 3.8 12/18/2018 08:06 AM    CHLORIDE 103 12/18/2018 08:06 AM    CO2 25 12/18/2018 08:06 AM    GLUCOSE 138 (H) 12/18/2018 08:06 AM    BUN 17 12/18/2018 08:06 AM    CREATININE 0.84 12/18/2018 08:06 AM     Lab Results   Component Value Date/Time    ALKPHOSPHAT 63 06/12/2017 09:53 AM    ASTSGOT 18 06/12/2017 09:53 AM    ALTSGPT 18 06/12/2017 09:53 AM    TBILIRUBIN 1.2 06/12/2017 09:53 AM      Lab Results   Component Value Date/Time    BNPBTYPENAT 4 06/13/2016 06:34 AM      Pacemaker check: Patient did have one episode of supraventricular tachycardia.  The exact etiology is not entirely clear.  It could be atrial tach or atrial flutter.  Cannot entirely exclude a sinus tachycardia.  Patient had his pacemaker reprogrammed for better sensing on the atrial lead.  He can have a recheck in about 2 months.  We will follow this problem for now.    Assessment:     1. Coronary artery disease due to calcified coronary lesion: 40-50% disease in the circumflex at cardiac catheterization in 2013     2. Pacemaker     3. Dyslipidemia     4. Essential hypertension, benign         Medical Decision Making:  Today's Assessment / Status / Plan:     Mr. Boyer is clinically stable.  His blood pressure and lipid status is been under good control.  He will have a repeat pacemaker check in a couple of months.  He is only had one episode of a SVT of undetermined etiology.  We will follow this for now.  He will follow-up in about 6 months with repeat lab work.      Tanya Rayo P.A.-C.  [address] [facility]  [address]  [facility] [address]  VIA In  Basket

## 2019-02-26 NOTE — PROGRESS NOTES
Chief Complaint   Patient presents with   • Coronary Artery Disease     F/V: 6 MO       Subjective:   Dakota Boyer is a 74 y.o. male who presents todayfor followup of his dyspnea on exertion, hypertension, hyperlipidemia, permanent pacemaker and mild coronary artery disease.     He has had some difficulty with chronic edema since his knee surgeries.  He was given furosemide by his urologist for about 30 days which did seem to help.  He denies any chest discomfort, dyspnea, palpitations or lightheadedness.       Past Medical History:   Diagnosis Date   • Anxiety    • Back pain, chronic 10/18/2016   • Blood clotting disorder (HCC) 1978    s/p Left knee surgery- DVT in left leg   • Breath shortness    • CAD (coronary artery disease) October 2012    Positive coronary calcium score.  Follow-up MPI was negative for ischemia.   • Cancer (HCC) ? early 90's    Melanoma Left arm- surgically removed   • Degeneration of lumbar or lumbosacral intervertebral disc     • Dental disorder    • Depression    • Diabetes     Oral agents and insulin   • DJD (degenerative joint disease) of cervical spine     • Episodic lightheadedness     • High cholesterol    • Hyperlipidemia    • Hypersensitive carotid sinus syndrome     • Hypertension    • Incisional infection April 2013    Wound dehiscience of neck surgery.   • Insomnia     • Myocardial infarct (HCC) ?    states was told by  that he has had a heart attack in the past.   • Neck pain 10/18/2016   • Pacemaker October 2012    [employer] [address] [address] #[id number] implanted by [facility].    • RBBB     • S/P lumbar discectomy     • Sick sinus syndrome (HCC)     • Sleep apnea     does not use CPAP anymore- stopped in Dec 2015   • Syncope October 2012    Treated with PPM   • Thyroid disease     Hypothyroid   • Unspecified hemorrhagic conditions     Reports bleeds easily     Past Surgical History:   Procedure Laterality Date   • MT DSTR NROLYTC AGNT PARVERTEB FCT SNGL CRVCL/THORA Right  "10/19/2016    Procedure: NEURO DEST FACET C/T W/IG SNGL - 3ON-C3, C8-T1    SINERGY;  Surgeon: Gaurang Pruett M.D.;  Location: [facility];  Service: Pain Management   • [place] DSTR NROLYTC AGNT PARVERTEB FCT ADDL CRVCL/THORA  10/19/2016    Procedure: NEURO DEST FACET C/T W/IG ADDL;  Surgeon: Gaurang Pruett M.D.;  Location: [facility];  Service: Pain Management   • PB FLUOROSCOPIC GUIDANCE NEEDLE PLACEMENT  10/19/2016    Procedure: FLOURO GUIDE NEEDLE PLACEMENT;  Surgeon: Gaurang Pruett M.D.;  Location: [facility];  Service: Pain Management   • SHOULDER ARTHROSCOPY Right 2015    torn bicep tendon and spurs   • RECOVERY  10/17/2013    Performed by Cath-Recovery Surgery at SURGERY SAME DAY [facility]   • WOUND DEHISCENCE  4/10/2013    Performed by Tu Jain M.D. at SURGERY [facility]   • CERVICAL FUSION POSTERIOR  2/27/2013    Performed by Tu Jain M.D. at SURGERY [facility]   • CERVICAL LAMINECTOMY POSTERIOR  2/27/2013    Performed by Tu Jain M.D. at SURGERY [facility]   • LUMBAR LAMINECTOMY DISKECTOMY  11/20/2012    Performed by Tu Jain M.D. at SURGERY [facility]   • RECOVERY  10/4/2012    Performed by Cath-Recovery Surgery at SURGERY SAME DAY [facility]   • PACEMAKER INSERTION  October 2012    [employer] [address] [id number] implanted by Dr. Waite.   • SHOULDER DECOMPRESSION Left 2008    Left rotator cuff   • ATHROPLASTY Left 2004   • ARTHROSCOPY, KNEE Bilateral 1978   • ORTHOPEDIC OSTEOTOMY      Both knees several times, 8 total   • OTHER     • SPINAL CORD STIMULATOR  1 month ago   • TONSILLECTOMY     • VASECTOMY       Family History   Problem Relation Age of Onset   • Lung Disease Mother         Smoker   • Alcohol/Drug Mother    • Heart Disease Father 36        CAD   • Heart Disease Sister         \"hole in heart\"   • Lung Disease Brother         COPD, CANCER   • Cancer Brother         Prostate, Lung   • Alcohol/Drug Brother    • " Heart Disease Brother    • Diabetes Brother    • Hypertension Brother    • Hyperlipidemia Brother    • Heart Disease Maternal Grandmother    • Hypertension Maternal Grandmother    • Hyperlipidemia Maternal Grandmother    • Cancer Brother         stomach, thyroid   • Heart Disease Brother 60        MI, stent, PM/defib   • Sleep Apnea Brother    • No Known Problems Maternal Grandfather    • No Known Problems Paternal Grandmother      Social History     Social History   • Marital status:      Spouse name: N/A   • Number of children: N/A   • Years of education: N/A     Occupational History   • Not on file.     Social History Main Topics   • Smoking status: Former Smoker     Packs/day: 1.00     Years: 40.00     Types: Cigarettes     Quit date: 1/3/1990   • Smokeless tobacco: Never Used   • Alcohol use No   • Drug use: No   • Sexual activity: Yes     Partners: Female     Other Topics Concern   • Not on file     Social History Narrative   • No narrative on file     Allergies   Allergen Reactions   • Aleve Cold & [Pseudoephedrine-Naproxen Na] Anaphylaxis   • Ceftriaxone Sodium Anaphylaxis     Reaction; 1970's.   • Naproxen Anaphylaxis     Reaction; 2004.   • Tape Rash and Itching     Plastic tape (paper tape ok)     Outpatient Encounter Prescriptions as of 2/26/2019   Medication Sig Dispense Refill   • Empagliflozin-Metformin HCl (SYNJARDY PO) Take  by mouth every day.     • Dulaglutide (TRULICITY) 1.5 MG/0.5ML Solution Pen-injector Inject 1.5 mg as instructed every 7 days. 12 PEN 1   • insulin glargine (LANTUS) 100 UNIT/ML Solution Inject 10 Units as instructed every evening.     • metoprolol (LOPRESSOR) 25 MG Tab TAKE 1 TABLET TWICE A  Tab 2   • atorvastatin (LIPITOR) 20 MG Tab TAKE 1 TABLET DAILY 90 Tab 2   • meloxicam (MOBIC) 7.5 MG Tab Take 1 to 2 tablets by mouth daily 180 Tab 1   • losartan (COZAAR) 50 MG Tab Take 1 Tab by mouth every day. 90 Tab 3   • buPROPion (WELLBUTRIN XL) 150 MG XL tablet TAKE 1  "TABLET EVERY MORNING 90 Tab 1   • metFORMIN (GLUCOPHAGE) 500 MG Tab TAKE 2 TABLETS TWICE A DAY WITH MEALS 360 Tab 2   • B-D ULTRAFINE III SHORT PEN 31G X 8 MM Misc USE TWICE A  Each 2   • FREESTYLE LITE strip USE TO TEST FOUR TIMES A DAY AND AS NEEDED FOR SYMPTOMS OF HIGH OR LOW SUGAR 350 Strip 4   • ferrous sulfate 325 (65 FE) MG tablet Take 325 mg by mouth every 48 hours.     • Cyanocobalamin (VITAMIN B-12) 2500 MCG SL Tab Place  under tongue every day.     • Lancets MISC Lancets order: Lancets for Abbott Freestyle Lite meter. Sig: use 4 times daily  and prn ssx high or low sugar. #100 RF x 0 360 Each 4   • Blood Glucose Monitoring Suppl [place] Meter: Dispense Abbott Freestyle Lite meter. Sig. Use as directed for blood sugar monitoring. #1. NR. 1 Device 0   • B-D ULTRAFINE III SHORT PEN 31G X 8 MM MISC USE 1 NEEDLE EVERY DAY WITH LANTUS 999 Each 2   • aspirin EC (ECOTRIN) 81 MG TBEC Take 81 mg by mouth every day.     • FLUZONE HIGH-DOSE 0.5 ML Suspension Prefilled Syringe injection      • Empagliflozin (JARDIANCE) 25 MG Tab Take  by mouth.     • furosemide (LASIX) 20 MG Tab Take 1 Tab by mouth every day. (Patient not taking: Reported on 2/26/2019) 30 Tab 2     No facility-administered encounter medications on file as of 2/26/2019.      ROS     Objective:   /72 (BP Location: Left arm, Patient Position: Sitting, BP Cuff Size: Adult)   Pulse 78   Ht 1.702 m (5' 7\")   Wt 109.8 kg (242 lb)   SpO2 94%   BMI 37.90 kg/m²     Physical Exam   Constitutional: He appears well-developed and well-nourished.   Neck: No JVD present.   Cardiovascular: Normal rate and regular rhythm.    No murmur heard.  Pulmonary/Chest: Effort normal and breath sounds normal. He has no rales.   Abdominal: Soft. There is no tenderness.   Musculoskeletal: He exhibits edema (1+ pretibial).     Lab Results   Component Value Date/Time    CHOLSTRLTOT 99 (L) 08/20/2018 07:38 AM    LDL 49 08/20/2018 07:38 AM    HDL 35 (A) 08/20/2018 07:38 " AM    TRIGLYCERIDE 75 08/20/2018 07:38 AM       Lab Results   Component Value Date/Time    SODIUM 137 12/18/2018 08:06 AM    POTASSIUM 3.8 12/18/2018 08:06 AM    CHLORIDE 103 12/18/2018 08:06 AM    CO2 25 12/18/2018 08:06 AM    GLUCOSE 138 (H) 12/18/2018 08:06 AM    BUN 17 12/18/2018 08:06 AM    CREATININE 0.84 12/18/2018 08:06 AM     Lab Results   Component Value Date/Time    ALKPHOSPHAT 63 06/12/2017 09:53 AM    ASTSGOT 18 06/12/2017 09:53 AM    ALTSGPT 18 06/12/2017 09:53 AM    TBILIRUBIN 1.2 06/12/2017 09:53 AM      Lab Results   Component Value Date/Time    BNPBTYPENAT 4 06/13/2016 06:34 AM      Pacemaker check: Patient did have one episode of supraventricular tachycardia.  The exact etiology is not entirely clear.  It could be atrial tach or atrial flutter.  Cannot entirely exclude a sinus tachycardia.  Patient had his pacemaker reprogrammed for better sensing on the atrial lead.  He can have a recheck in about 2 months.  We will follow this problem for now.    Assessment:     1. Coronary artery disease due to calcified coronary lesion: 40-50% disease in the circumflex at cardiac catheterization in 2013     2. Pacemaker     3. Dyslipidemia     4. Essential hypertension, benign         Medical Decision Making:  Today's Assessment / Status / Plan:     Mr. Boyer is clinically stable.  His blood pressure and lipid status is been under good control.  He will have a repeat pacemaker check in a couple of months.  He is only had one episode of a SVT of undetermined etiology(?atrial tach or sinus tach).  We will follow this for now.  He will follow-up in about 6 months with repeat lab work.

## 2019-03-15 DIAGNOSIS — F41.8 DEPRESSION WITH ANXIETY: ICD-10-CM

## 2019-03-15 RX ORDER — BUPROPION HYDROCHLORIDE 150 MG/1
TABLET ORAL
Qty: 90 TAB | Refills: 1 | Status: SHIPPED | OUTPATIENT
Start: 2019-03-15 | End: 2019-09-11 | Stop reason: SDUPTHER

## 2019-03-18 RX ORDER — VALSARTAN 160 MG/1
160 TABLET ORAL DAILY
Qty: 30 TAB | Refills: 0 | Status: SHIPPED | OUTPATIENT
Start: 2019-03-18 | End: 2019-08-12

## 2019-03-18 RX ORDER — VALSARTAN 160 MG/1
160 TABLET ORAL DAILY
Qty: 90 TAB | Refills: 1 | Status: SHIPPED | OUTPATIENT
Start: 2019-03-18 | End: 2019-04-15

## 2019-04-03 DIAGNOSIS — M51.37 DEGENERATION OF LUMBAR OR LUMBOSACRAL INTERVERTEBRAL DISC: ICD-10-CM

## 2019-04-03 DIAGNOSIS — M47.812 SPONDYLOSIS OF CERVICAL REGION WITHOUT MYELOPATHY OR RADICULOPATHY: ICD-10-CM

## 2019-04-03 DIAGNOSIS — M17.0 PRIMARY OSTEOARTHRITIS OF BOTH KNEES: ICD-10-CM

## 2019-04-03 RX ORDER — MELOXICAM 7.5 MG/1
TABLET ORAL
Qty: 180 TAB | Refills: 1 | Status: SHIPPED | OUTPATIENT
Start: 2019-04-03 | End: 2019-09-30 | Stop reason: SDUPTHER

## 2019-04-03 NOTE — TELEPHONE ENCOUNTER
Was the patient seen in the last year in this department? Yes    Does patient have an active prescription for medications requested? No     Received Request Via: Pharmacy      Pt met protocol?: Yes  LAST OV 02/12/2019

## 2019-04-15 ENCOUNTER — OFFICE VISIT (OUTPATIENT)
Dept: ENDOCRINOLOGY | Facility: MEDICAL CENTER | Age: 74
End: 2019-04-15
Payer: MEDICARE

## 2019-04-15 VITALS
DIASTOLIC BLOOD PRESSURE: 70 MMHG | HEART RATE: 74 BPM | SYSTOLIC BLOOD PRESSURE: 118 MMHG | BODY MASS INDEX: 37.51 KG/M2 | HEIGHT: 67 IN | WEIGHT: 239 LBS | OXYGEN SATURATION: 93 %

## 2019-04-15 DIAGNOSIS — E11.9 TYPE 2 DIABETES MELLITUS WITHOUT COMPLICATION, UNSPECIFIED WHETHER LONG TERM INSULIN USE (HCC): ICD-10-CM

## 2019-04-15 DIAGNOSIS — Z79.4 TYPE 2 DIABETES MELLITUS WITH HYPERGLYCEMIA, WITH LONG-TERM CURRENT USE OF INSULIN (HCC): ICD-10-CM

## 2019-04-15 DIAGNOSIS — E11.65 TYPE 2 DIABETES MELLITUS WITH HYPERGLYCEMIA, WITH LONG-TERM CURRENT USE OF INSULIN (HCC): ICD-10-CM

## 2019-04-15 DIAGNOSIS — I10 ESSENTIAL HYPERTENSION, BENIGN: ICD-10-CM

## 2019-04-15 LAB
HBA1C MFR BLD: 6.8 % (ref 0–5.6)
INT CON NEG: NEGATIVE
INT CON POS: POSITIVE

## 2019-04-15 PROCEDURE — 99214 OFFICE O/P EST MOD 30 MIN: CPT | Performed by: PHYSICIAN ASSISTANT

## 2019-04-15 PROCEDURE — 83036 HEMOGLOBIN GLYCOSYLATED A1C: CPT | Performed by: PHYSICIAN ASSISTANT

## 2019-04-15 NOTE — PATIENT INSTRUCTIONS
He is on:  2.   Lantus  10 once a day at night (Increase to 15)  3.   Trulicity 1.5 once weekly  5.   Synjardy 12.5/1000 one in the AM one in the PM

## 2019-04-15 NOTE — PROGRESS NOTES
Return to office Patient Consult Note  Referred by: Tanya Rayo P.A.-C.    Reason for consult: Diabetes Management Type 2    HPI:  Dakota Boyer is a 74 y.o. old patient who is seeing us today for diabetes care.  This is a pleasant patient with diabetes and I appreciate the opportunity to participate in the care of this patient.    Labs of 4/15/19 HbA1c is 6.8  Labs of 1/10/19 HbA1c is 6.2, GFR >60     BG Diary:2/15/2019  In the AM: 138, 143, 151, 151, 136, 143, 142, 141    BG Diary:4/15/2019  In the AM: 127, 126, 162, 162, 156, 145      1. Type 2 diabetes mellitus without complication, unspecified whether long term insulin use (HCC)    This is a new patient with me on 1/10/19  He is on:  2.   Lantus  10 once a day at night  3.   Trulicity 1.5 once weekly  5.   Synjardy 12.5/1000 one in the AM one in the PM    2. Essential hypertension, benign  This is stable today and no new changes are needed or required in today's visit      ROS:   Constitutional: No night sweats.  Eyes:  No visual changes.  Cardiac: No chest pain, No palpitations or racing heart rate.  Resp: No shortness of breath, No cough,   Gi: No Diarrhea    All other systems were reviewed and were/are negative.      Past Medical History:  Patient Active Problem List    Diagnosis Date Noted   • Coronary artery disease due to calcified coronary lesion: 40-50% disease in the circumflex at cardiac catheterization in 2013 08/16/2013     Priority: High   • Pacemaker 10/04/2012     Priority: High   • Hypersensitive carotid sinus syndrome 10/02/2012     Priority: High   • Dyslipidemia 09/28/2012     Priority: High   • RBBB 09/28/2012     Priority: High   • Essential hypertension, benign 04/10/2012     Priority: High   • Type 2 diabetes mellitus without complication (HCC) 04/10/2012     Priority: Medium   • DJD (degenerative joint disease) of cervical spine 02/27/2013     Priority: Low   • S/P lumbar discectomy 11/21/2012     Priority: Low   •  Degeneration of lumbar or lumbosacral intervertebral disc 11/20/2012     Priority: Low   • Back pain, chronic 04/10/2012     Priority: Low   • Neck pain 04/10/2012     Priority: Low   • Bilateral lower extremity edema 12/04/2018   • Prostate cancer (HCC) 08/09/2018   • Elevated PSA 01/23/2018   • Bilateral primary osteoarthritis of knee 01/23/2018   • Cervical spondylosis 10/19/2016   • Non-proliferative diabetic retinopathy, mild, both eyes (HCC) 06/02/2016   • Hypertensive retinopathy of both eyes 06/02/2016   • Depression with anxiety 03/28/2016   • Obstructive sleep apnea on CPAP 03/28/2016   • Subclinical hypothyroidism 08/21/2014   • Obesity 08/21/2014       Past Surgical History:  Past Surgical History:   Procedure Laterality Date   • NE DSTR NROLYTC AGNT PARVERTEB FCT SNGL CRVCL/THORA Right 10/19/2016    Procedure: NEURO DEST FACET C/T W/IG SNGL - 3ON-C3, C8-T1    SINERGY;  Surgeon: Gaurang Pruett M.D.;  Location: [facility];  Service: Pain Management   • NE DSTR NROLYTC AGNT PARVERTEB FCT ADDL CRVCL/THORA  10/19/2016    Procedure: NEURO DEST FACET C/T W/IG ADDL;  Surgeon: Gaurang Pruett M.D.;  Location: [facility];  Service: Pain Management   • PB FLUOROSCOPIC GUIDANCE NEEDLE PLACEMENT  10/19/2016    Procedure: FLOURO GUIDE NEEDLE PLACEMENT;  Surgeon: Gaurang Pruett M.D.;  Location: [facility];  Service: Pain Management   • SHOULDER ARTHROSCOPY Right 2015    torn bicep tendon and spurs   • RECOVERY  10/17/2013    Performed by Cath-Recovery Surgery at [facility] SAME DAY [facility]   • WOUND DEHISCENCE  4/10/2013    Performed by Tu Jain M.D. at SURGERY [facility] ORS   • CERVICAL FUSION POSTERIOR  2/27/2013    Performed by Tu Jain M.D. at SURGERY [facility] ORS   • CERVICAL LAMINECTOMY POSTERIOR  2/27/2013    Performed by Tu Jain M.D. at SURGERY [facility]   • LUMBAR LAMINECTOMY DISKECTOMY  11/20/2012    Performed by Tu Jain M.D. at  "SURGERY [facility] ORS   • RECOVERY  10/4/2012    Performed by Cath-Recovery Surgery at SURGERY SAME DAY [facility] ORS   • PACEMAKER INSERTION  October 2012    [employer]  2110 implanted by Dr. Waite.   • SHOULDER DECOMPRESSION Left 2008    Left rotator cuff   • ATHROPLASTY Left 2004   • ARTHROSCOPY, KNEE Bilateral 1978   • ORTHOPEDIC OSTEOTOMY      Both knees several times, 8 total   • OTHER     • SPINAL CORD STIMULATOR  1 month ago   • TONSILLECTOMY     • VASECTOMY         Allergies:  Aleve cold & [pseudoephedrine-naproxen na]; Ceftriaxone sodium; Naproxen; and Tape    Social History:  Social History     Social History   • Marital status:      Spouse name: N/A   • Number of children: N/A   • Years of education: N/A     Occupational History   • Not on file.     Social History Main Topics   • Smoking status: Former Smoker     Packs/day: 1.00     Years: 40.00     Types: Cigarettes     Quit date: 1/3/1990   • Smokeless tobacco: Never Used   • Alcohol use No   • Drug use: No   • Sexual activity: Yes     Partners: Female     Other Topics Concern   • Not on file     Social History Narrative   • No narrative on file       Family History:  Family History   Problem Relation Age of Onset   • Lung Disease Mother         Smoker   • Alcohol/Drug Mother    • Heart Disease Father 36        CAD   • Heart Disease Sister         \"hole in heart\"   • Lung Disease Brother         COPD, CANCER   • Cancer Brother         Prostate, Lung   • Alcohol/Drug Brother    • Heart Disease Brother    • Diabetes Brother    • Hypertension Brother    • Hyperlipidemia Brother    • Heart Disease Maternal Grandmother    • Hypertension Maternal Grandmother    • Hyperlipidemia Maternal Grandmother    • Cancer Brother         stomach, thyroid   • Heart Disease Brother 60        MI, stent, PM/defib   • Sleep Apnea Brother    • No Known Problems Maternal Grandfather    • No Known Problems Paternal Grandmother  "       Medications:    Current Outpatient Prescriptions:   •  meloxicam (MOBIC) 7.5 MG Tab, TAKE 1 TO 2 TABLETS DAILY, Disp: 180 Tab, Rfl: 1  •  Empagliflozin-Metformin HCl ER 12.5-1000 MG TABLET SR 24 HR, Take 1 Tab by mouth 2 times a day., Disp: 180 Tab, Rfl: 4  •  valsartan (DIOVAN) 160 MG Tab, Take 1 Tab by mouth every day., Disp: 30 Tab, Rfl: 0  •  buPROPion (WELLBUTRIN XL) 150 MG XL tablet, TAKE 1 TABLET EVERY MORNING, Disp: 90 Tab, Rfl: 1  •  Dulaglutide (TRULICITY) 1.5 MG/0.5ML Solution Pen-injector, Inject 1.5 mg as instructed every 7 days., Disp: 12 PEN, Rfl: 1  •  insulin glargine (LANTUS) 100 UNIT/ML Solution, Inject 10 Units as instructed every evening., Disp: , Rfl:   •  metoprolol (LOPRESSOR) 25 MG Tab, TAKE 1 TABLET TWICE A DAY, Disp: 180 Tab, Rfl: 2  •  atorvastatin (LIPITOR) 20 MG Tab, TAKE 1 TABLET DAILY, Disp: 90 Tab, Rfl: 2  •  metFORMIN (GLUCOPHAGE) 500 MG Tab, TAKE 2 TABLETS TWICE A DAY WITH MEALS, Disp: 360 Tab, Rfl: 2  •  B-D ULTRAFINE III SHORT PEN 31G X 8 MM Misc, USE TWICE A DAY, Disp: 200 Each, Rfl: 2  •  FREESTYLE LITE strip, USE TO TEST FOUR TIMES A DAY AND AS NEEDED FOR SYMPTOMS OF HIGH OR LOW SUGAR, Disp: 350 Strip, Rfl: 4  •  ferrous sulfate 325 (65 FE) MG tablet, Take 325 mg by mouth every 48 hours., Disp: , Rfl:   •  Cyanocobalamin (VITAMIN B-12) 2500 MCG SL Tab, Place  under tongue every day., Disp: , Rfl:   •  Lancets MISC, Lancets order: Lancets for Abbott Freestyle Lite meter. Sig: use 4 times daily  and prn ssx high or low sugar. #100 RF x 0, Disp: 360 Each, Rfl: 4  •  Blood Glucose Monitoring Suppl [place], Meter: Dispense Abbott Freestyle Lite meter. Sig. Use as directed for blood sugar monitoring. #1. NR., Disp: 1 Device, Rfl: 0  •  B-D ULTRAFINE III SHORT PEN 31G X 8 MM MISC, USE 1 NEEDLE EVERY DAY WITH LANTUS, Disp: 999 Each, Rfl: 2  •  aspirin EC (ECOTRIN) 81 MG TBEC, Take 81 mg by mouth every day., Disp: , Rfl:         Physical Examination:   Vital signs: /70 (BP  "Location: Left arm, Patient Position: Sitting)   Pulse 74   Ht 1.702 m (5' 7\")   Wt 108.4 kg (239 lb)   SpO2 93%   BMI 37.43 kg/m²   General: No distress, cooperative, well dressed and well nourished.   Eyes: No scleral icterus or discharge, No hyposphagma  ENMT: Normal on external inspection of nose, lips, No nasal drainage   Neck: No abnormal masses on inspection  Resp: Normal effort, Bilateral clear to auscultation, No wheezing, No rales  CVS: Regular rate and rhythm, S1 S2 normal, No murmur. No gallop  Extremities: No edema bilateral extremities  Neuro: Alert and oriented  Skin: No rash, No Ulcers  Psych: Normal mood and affect      Assessment and Plan:    1. Type 2 diabetes mellitus without complication, unspecified whether long term insulin use (HCC)  This is a new patient with me on 1/10/19  He is on:  2.   Lantus  10 once a day at night (Increase to 15)  3.   Trulicity 1.5 once weekly  5.   Synjardy 12.5/1000 one in the AM one in the PM    2. Essential hypertension, benign  This is stable today and no new changes are needed or required in today's visit    Return in about 3 months (around 7/15/2019).      Thank you kindly for allowing me to participate in the diabetes care plan for this patient.    Favio John PA-C, BC-ADM  Board Certified - Advanced Diabetes Management  04/15/19    CC:   Tanya Rayo P.A.-C.    "

## 2019-05-07 ENCOUNTER — NON-PROVIDER VISIT (OUTPATIENT)
Dept: CARDIOLOGY | Facility: MEDICAL CENTER | Age: 74
End: 2019-05-07
Payer: MEDICARE

## 2019-05-07 DIAGNOSIS — Z95.0 PACEMAKER: ICD-10-CM

## 2019-05-07 PROCEDURE — 93280 PM DEVICE PROGR EVAL DUAL: CPT | Performed by: INTERNAL MEDICINE

## 2019-05-13 ENCOUNTER — HOSPITAL ENCOUNTER (OUTPATIENT)
Dept: LAB | Facility: MEDICAL CENTER | Age: 74
End: 2019-05-13
Attending: INTERNAL MEDICINE
Payer: MEDICARE

## 2019-05-13 ENCOUNTER — HOSPITAL ENCOUNTER (OUTPATIENT)
Dept: LAB | Facility: MEDICAL CENTER | Age: 74
End: 2019-05-13
Attending: PHYSICIAN ASSISTANT
Payer: MEDICARE

## 2019-05-13 DIAGNOSIS — E03.8 SUBCLINICAL HYPOTHYROIDISM: ICD-10-CM

## 2019-05-13 DIAGNOSIS — I25.84 CORONARY ARTERY DISEASE DUE TO CALCIFIED CORONARY LESION: ICD-10-CM

## 2019-05-13 DIAGNOSIS — E78.5 DYSLIPIDEMIA: ICD-10-CM

## 2019-05-13 DIAGNOSIS — I10 ESSENTIAL HYPERTENSION, BENIGN: ICD-10-CM

## 2019-05-13 DIAGNOSIS — I25.10 CORONARY ARTERY DISEASE DUE TO CALCIFIED CORONARY LESION: ICD-10-CM

## 2019-05-13 LAB
ALBUMIN SERPL BCP-MCNC: 4 G/DL (ref 3.2–4.9)
ALBUMIN/GLOB SERPL: 1.5 G/DL
ALP SERPL-CCNC: 71 U/L (ref 30–99)
ALT SERPL-CCNC: 20 U/L (ref 2–50)
ANION GAP SERPL CALC-SCNC: 8 MMOL/L (ref 0–11.9)
AST SERPL-CCNC: 20 U/L (ref 12–45)
BILIRUB SERPL-MCNC: 1.5 MG/DL (ref 0.1–1.5)
BUN SERPL-MCNC: 18 MG/DL (ref 8–22)
CALCIUM SERPL-MCNC: 9.9 MG/DL (ref 8.5–10.5)
CHLORIDE SERPL-SCNC: 103 MMOL/L (ref 96–112)
CHOLEST SERPL-MCNC: 94 MG/DL (ref 100–199)
CO2 SERPL-SCNC: 28 MMOL/L (ref 20–33)
CREAT SERPL-MCNC: 0.96 MG/DL (ref 0.5–1.4)
FASTING STATUS PATIENT QL REPORTED: NORMAL
GLOBULIN SER CALC-MCNC: 2.7 G/DL (ref 1.9–3.5)
GLUCOSE SERPL-MCNC: 189 MG/DL (ref 65–99)
HDLC SERPL-MCNC: 30 MG/DL
LDLC SERPL CALC-MCNC: 40 MG/DL
POTASSIUM SERPL-SCNC: 4.5 MMOL/L (ref 3.6–5.5)
PROT SERPL-MCNC: 6.7 G/DL (ref 6–8.2)
SODIUM SERPL-SCNC: 139 MMOL/L (ref 135–145)
TRIGL SERPL-MCNC: 120 MG/DL (ref 0–149)
TSH SERPL DL<=0.005 MIU/L-ACNC: 4.88 UIU/ML (ref 0.38–5.33)

## 2019-05-13 PROCEDURE — 80061 LIPID PANEL: CPT

## 2019-05-13 PROCEDURE — 84443 ASSAY THYROID STIM HORMONE: CPT

## 2019-05-13 PROCEDURE — 36415 COLL VENOUS BLD VENIPUNCTURE: CPT

## 2019-05-13 PROCEDURE — 80053 COMPREHEN METABOLIC PANEL: CPT

## 2019-07-24 ENCOUNTER — TELEPHONE (OUTPATIENT)
Dept: CARDIOLOGY | Facility: MEDICAL CENTER | Age: 74
End: 2019-07-24

## 2019-07-24 NOTE — TELEPHONE ENCOUNTER
Received general request for cardiac clearance from [facility]. Form completed by Dr. Christiansen  And faxed back to [phone]. Receipt confirmed. Form to scanning.

## 2019-07-29 ENCOUNTER — HOSPITAL ENCOUNTER (OUTPATIENT)
Dept: RADIOLOGY | Facility: MEDICAL CENTER | Age: 74
End: 2019-07-29
Attending: PAIN MEDICINE
Payer: MEDICARE

## 2019-07-29 DIAGNOSIS — M47.816 LUMBAR SPONDYLOSIS: ICD-10-CM

## 2019-07-29 PROCEDURE — 72100 X-RAY EXAM L-S SPINE 2/3 VWS: CPT

## 2019-08-12 ENCOUNTER — OFFICE VISIT (OUTPATIENT)
Dept: MEDICAL GROUP | Facility: PHYSICIAN GROUP | Age: 74
End: 2019-08-12
Payer: MEDICARE

## 2019-08-12 ENCOUNTER — HOSPITAL ENCOUNTER (OUTPATIENT)
Dept: LAB | Facility: MEDICAL CENTER | Age: 74
End: 2019-08-12
Attending: UROLOGY
Payer: MEDICARE

## 2019-08-12 VITALS
HEART RATE: 70 BPM | HEIGHT: 67 IN | TEMPERATURE: 98.2 F | BODY MASS INDEX: 36.1 KG/M2 | WEIGHT: 230 LBS | OXYGEN SATURATION: 93 % | DIASTOLIC BLOOD PRESSURE: 72 MMHG | SYSTOLIC BLOOD PRESSURE: 104 MMHG

## 2019-08-12 DIAGNOSIS — E78.5 DYSLIPIDEMIA: ICD-10-CM

## 2019-08-12 DIAGNOSIS — R60.0 BILATERAL LOWER EXTREMITY EDEMA: ICD-10-CM

## 2019-08-12 DIAGNOSIS — G89.29 CHRONIC BACK PAIN, UNSPECIFIED BACK LOCATION, UNSPECIFIED BACK PAIN LATERALITY: ICD-10-CM

## 2019-08-12 DIAGNOSIS — M54.9 CHRONIC BACK PAIN, UNSPECIFIED BACK LOCATION, UNSPECIFIED BACK PAIN LATERALITY: ICD-10-CM

## 2019-08-12 DIAGNOSIS — F41.8 DEPRESSION WITH ANXIETY: ICD-10-CM

## 2019-08-12 DIAGNOSIS — I25.10 CORONARY ARTERY DISEASE DUE TO CALCIFIED CORONARY LESION: ICD-10-CM

## 2019-08-12 DIAGNOSIS — I25.84 CORONARY ARTERY DISEASE DUE TO CALCIFIED CORONARY LESION: ICD-10-CM

## 2019-08-12 DIAGNOSIS — I10 ESSENTIAL HYPERTENSION, BENIGN: ICD-10-CM

## 2019-08-12 DIAGNOSIS — E11.9 TYPE 2 DIABETES MELLITUS WITHOUT COMPLICATION, UNSPECIFIED WHETHER LONG TERM INSULIN USE (HCC): ICD-10-CM

## 2019-08-12 DIAGNOSIS — G47.33 OBSTRUCTIVE SLEEP APNEA ON CPAP: ICD-10-CM

## 2019-08-12 LAB — PSA SERPL-MCNC: 9.41 NG/ML (ref 0–4)

## 2019-08-12 PROCEDURE — 36415 COLL VENOUS BLD VENIPUNCTURE: CPT | Mod: GA

## 2019-08-12 PROCEDURE — 99214 OFFICE O/P EST MOD 30 MIN: CPT | Performed by: PHYSICIAN ASSISTANT

## 2019-08-12 PROCEDURE — 84153 ASSAY OF PSA TOTAL: CPT | Mod: GA

## 2019-08-12 RX ORDER — LOSARTAN POTASSIUM 50 MG/1
50 TABLET ORAL DAILY
COMMUNITY
End: 2019-09-25

## 2019-08-12 NOTE — LETTER
[employer]  Tanya Rayo P.A.-C.  [address]  [place] [address]  Fax: [phone]   Authorization for Release/Disclosure of   Protected Health Information   Name: TON POWER : 1945 SSN: [id number]   Address: [address] [place], [place] [address] [address] Phone:    [phone] (home)    I authorize the entity listed below to release/disclose the PHI below to:   [facility]/Tanya Rayo P.A.-C. and Tanya Rayo P.A.-C.   Provider or Entity Name:  Gaurang Pruett M.D.        Address   City, State, [facility]   Phone:      Fax:     Reason for request: continuity of care   Information to be released:    [  ] LAST COLONOSCOPY,  including any PATH REPORT and follow-up  [  ] LAST FIT/COLOGUARD RESULT [  ] LAST DEXA  [  ] LAST MAMMOGRAM  [  ] LAST PAP  [  ] LAST LABS [  ] RETINA EXAM REPORT  [  ] IMMUNIZATION RECORDS  [xx  ] Release all info      [ xx ] Check here and initial the line next to each item to release ALL health information INCLUDING  _____ Care and treatment for drug and / or alcohol abuse  _____ HIV testing, infection status, or AIDS  _____ Genetic Testing    DATES OF SERVICE OR TIME PERIOD TO BE DISCLOSED: _____________  I understand and acknowledge that:  * This Authorization may be revoked at any time by you in writing, except if your health information has already been used or disclosed.  * Your health information that will be used or disclosed as a result of you signing this authorization could be re-disclosed by the recipient. If this occurs, your re-disclosed health information may no longer be protected by State or Federal laws.  * You may refuse to sign this Authorization. Your refusal will not affect your ability to obtain treatment.  * This Authorization becomes effective upon signing and will  on (date) __________.      If no date is indicated, this Authorization will  one (1) year from the signature date.    Name: Ton Cary  Merlin    Signature:   Date:     8/12/2019       PLEASE FAX REQUESTED RECORDS BACK TO: [phone]

## 2019-08-12 NOTE — PROGRESS NOTES
Subjective:   Dakota Boyer is a 74 y.o. male here today for follow-up on back pain, hypertension and other chronic conditions. Is an established patient of mine.    HPI:    Patient presents to the office today for routine follow-up. I last saw him in February 2019.     Since last visit, patient has followed up again with his pain management doctor.  He had a new neurostimulator placed about a month ago.  He states that he has noticed some improvement, but has a pretty significant pain all the way across his lower back.  Ablation has been recommended which he believes he is having done tomorrow.  He has been off his meloxicam over the last week in preparation for this and subsequently has noticed worsening back pain.  He did recently have x-rays and wanted to discuss finding of atherosclerosis that was mentioned on the port.  He is concerned about this.    Has chronic hypertension treated with losartan 50 mg daily and metoprolol 25 mg BID. BP today in the office is 104/72. Also continues on atorvastatin 20 mg daily for hyperlipidemia.     He last followed up with his cardiologist in February, whom he follows for his hypertension, hyperlipidemia, CAD, and pacemaker. He has his pacemaker reprogrammed. No other changes were made to his medical regimen.  He has dealt with chronic bilateral leg edema which he feels has been better as of late.  He recently established with [facility] and was found to have some venous disease which will be treated with procedural intervention.    Patient continues to follow with endocrinology for his type 2 diabetes. Is on Synjardy 1000-12.5 mg BID, Lantus 15 units BID and Trulicity 1.5 mg weekly. Last A1c in 4/2019 was 6.8.    Patient remains compliant with nightly CPAP use for his LYNDA. He follows annually with pulmonology. Has upcoming follow-up appointment next month.    Patient continues on bupropion  mg daily for depression.  He has been on this for years and feels it  works well in controlling his irritability.      Current medicines (including changes today)  Current Outpatient Medications   Medication Sig Dispense Refill   • losartan (COZAAR) 50 MG Tab Take 50 mg by mouth every day.     • Dulaglutide (TRULICITY) 1.5 MG/0.5ML Solution Pen-injector Inject 1.5 mg as instructed every 7 days. 12 PEN 1   • Empagliflozin-Metformin HCl ER 12.5-1000 MG TABLET SR 24 HR Take 1 Tab by mouth 2 times a day. 180 Tab 4   • meloxicam (MOBIC) 7.5 MG Tab TAKE 1 TO 2 TABLETS DAILY 180 Tab 1   • buPROPion (WELLBUTRIN XL) 150 MG XL tablet TAKE 1 TABLET EVERY MORNING 90 Tab 1   • insulin glargine (LANTUS) 100 UNIT/ML Solution Inject 10 Units as instructed every evening.     • metoprolol (LOPRESSOR) 25 MG Tab TAKE 1 TABLET TWICE A  Tab 2   • atorvastatin (LIPITOR) 20 MG Tab TAKE 1 TABLET DAILY 90 Tab 2   • metFORMIN (GLUCOPHAGE) 500 MG Tab TAKE 2 TABLETS TWICE A DAY WITH MEALS 360 Tab 2   • B-D ULTRAFINE III SHORT PEN 31G X 8 MM Misc USE TWICE A  Each 2   • FREESTYLE LITE strip USE TO TEST FOUR TIMES A DAY AND AS NEEDED FOR SYMPTOMS OF HIGH OR LOW SUGAR 350 Strip 4   • Lancets MISC Lancets order: Lancets for Abbott Freestyle Lite meter. Sig: use 4 times daily  and prn ssx high or low sugar. #100 RF x 0 360 Each 4   • Blood Glucose Monitoring Suppl [place] Meter: Dispense Abbott Freestyle Lite meter. Sig. Use as directed for blood sugar monitoring. #1. NR. 1 Device 0   • B-D ULTRAFINE III SHORT PEN 31G X 8 MM MISC USE 1 NEEDLE EVERY DAY WITH LANTUS 999 Each 2   • aspirin EC (ECOTRIN) 81 MG TBEC Take 81 mg by mouth every day.     • ferrous sulfate 325 (65 FE) MG tablet Take 325 mg by mouth every 48 hours.     • Cyanocobalamin (VITAMIN B-12) 2500 MCG SL Tab Place  under tongue every day.       No current facility-administered medications for this visit.      He  has a past medical history of Anxiety, Back pain, chronic (10/18/2016), Blood clotting disorder (HCC) (1978), Breath shortness, CAD  "(coronary artery disease) (October 2012), Cancer (HCC) (? early 90's), Degeneration of lumbar or lumbosacral intervertebral disc ( ), Dental disorder, Depression, Diabetes, DJD (degenerative joint disease) of cervical spine ( ), Episodic lightheadedness ( ), High cholesterol, Hyperlipidemia, Hypersensitive carotid sinus syndrome ( ), Hypertension, Incisional infection (April 2013), Insomnia ( ), Myocardial infarct (HCC) (?), Neck pain (10/18/2016), Pacemaker (October 2012), RBBB ( ), S/P lumbar discectomy ( ), Sick sinus syndrome (HCC) ( ), Sleep apnea, Syncope (October 2012), Thyroid disease, and Unspecified hemorrhagic conditions.    ROS  Pulmonary ROS: No shortness of breath  Cardiovascular ROS: No chest pain, No palpitations     Objective:     /72 (BP Location: Left arm, Patient Position: Sitting, BP Cuff Size: Large adult)   Pulse 70   Temp 36.8 °C (98.2 °F)   Ht 1.702 m (5' 7\")   Wt 104.3 kg (230 lb)   SpO2 93%  Body mass index is 36.02 kg/m².     Physical Exam:  Constitutional: Alert, well-appearing, pleasant, no distress.  Skin: Warm, dry, good turgor, no rashes in visible areas.  Eye: Pupils are equal and round, conjunctiva clear, lids normal.  ENMT: Lips without lesions, moist mucus membranes.  Respiratory: Unlabored respiratory effort, lungs clear to auscultation, no wheezes, no rhonchi.  Cardiovascular: Normal S1, S2, no murmur, trace lower extremity edema bilaterally.      Assessment and Plan:   The following treatment plan was discussed    1. Chronic back pain, unspecified back location, unspecified back pain laterality  Chronic issue, currently uncontrolled.  Is off his meloxicam.  Has reestablished with his back specialist and has new neurostimulator and will be undergoing ablation.  Continue current management.    2. Essential hypertension, benign  Chronic issue, remains well controlled with losartan and metoprolol.  Continue current management.    3. Dyslipidemia  Chronic issue, " well-controlled with atorvastatin 20 mg daily.  Last lipid profile shows LDL to be well below 70.  Continue current management.    4. Coronary artery disease due to calcified coronary lesion: 40-50% disease in the circumflex at cardiac catheterization in 2013  Chronic issue, well-controlled with medical management.  Will continue to follow with his cardiologist.    5. Bilateral lower extremity edema  Chronic issue, improved as of late.  He will be undergoing treatment through the [facility] which may help further as well.    6. Type 2 diabetes mellitus without complication, unspecified whether long term insulin use (HCC)  Chronic issue, well-controlled with current management.  Will continue to follow with his endocrinologist.    7. Obstructive sleep apnea on CPAP  Chronic issue, well-controlled with nightly CPAP.  We will continue to follow annually with pulmonology.    8. Depression with anxiety  Chronic issue, well-controlled with bupropion XL.  Continue current management.      Followup: Return in about 6 months (around 2/12/2020).    Tanya Rayo P.A.-C.

## 2019-08-26 ENCOUNTER — OFFICE VISIT (OUTPATIENT)
Dept: CARDIOLOGY | Facility: MEDICAL CENTER | Age: 74
End: 2019-08-26
Payer: MEDICARE

## 2019-08-26 VITALS
OXYGEN SATURATION: 95 % | HEART RATE: 62 BPM | DIASTOLIC BLOOD PRESSURE: 60 MMHG | WEIGHT: 237.6 LBS | BODY MASS INDEX: 36.01 KG/M2 | HEIGHT: 68 IN | SYSTOLIC BLOOD PRESSURE: 102 MMHG

## 2019-08-26 DIAGNOSIS — Z95.0 PACEMAKER: ICD-10-CM

## 2019-08-26 DIAGNOSIS — E78.5 DYSLIPIDEMIA: ICD-10-CM

## 2019-08-26 DIAGNOSIS — I35.0 NONRHEUMATIC AORTIC VALVE STENOSIS: ICD-10-CM

## 2019-08-26 DIAGNOSIS — I10 ESSENTIAL HYPERTENSION, BENIGN: ICD-10-CM

## 2019-08-26 DIAGNOSIS — I25.84 CORONARY ARTERY DISEASE DUE TO CALCIFIED CORONARY LESION: ICD-10-CM

## 2019-08-26 DIAGNOSIS — I25.10 CORONARY ARTERY DISEASE DUE TO CALCIFIED CORONARY LESION: ICD-10-CM

## 2019-08-26 PROCEDURE — 99214 OFFICE O/P EST MOD 30 MIN: CPT | Performed by: INTERNAL MEDICINE

## 2019-08-26 NOTE — PROGRESS NOTES
Chief Complaint   Patient presents with   • Congestive Heart Failure     F/V 6month       Subjective:   Dakota Boyer is a 74 y.o. male who presents todayfor followup of his dyspnea on exertion, hypertension, hyperlipidemia, permanent pacemaker and mild coronary artery disease.     He has dyspnea on exertion at about 50 yards.  He has had no PND orthopnea but is on CPAP therapy at night.  His edema has improved significantly.  He denies any chest discomfort, palpitations or lightheadedness.  His activity is limited somewhat by his back pain.       Past Medical History:   Diagnosis Date   • Anxiety    • Back pain, chronic 10/18/2016   • Blood clotting disorder (HCC) 1978    s/p Left knee surgery- DVT in left leg   • Breath shortness    • CAD (coronary artery disease) October 2012    Positive coronary calcium score.  Follow-up MPI was negative for ischemia.   • Cancer (HCC) ? early 90's    Melanoma Left arm- surgically removed   • Degeneration of lumbar or lumbosacral intervertebral disc     • Dental disorder    • Depression    • Diabetes     Oral agents and insulin   • DJD (degenerative joint disease) of cervical spine     • Episodic lightheadedness     • High cholesterol    • Hyperlipidemia    • Hypersensitive carotid sinus syndrome     • Hypertension    • Incisional infection April 2013    Wound dehiscience of neck surgery.   • Insomnia     • Myocardial infarct (HCC) ?    states was told by  that he has had a heart attack in the past.   • Neck pain 10/18/2016   • Pacemaker October 2012    [employer] [address] #[id number] implanted by [facility].    • RBBB     • S/P lumbar discectomy     • Sick sinus syndrome (HCC)     • Sleep apnea     does not use CPAP anymore- stopped in Dec 2015   • Syncope October 2012    Treated with PPM   • Thyroid disease     Hypothyroid   • Unspecified hemorrhagic conditions     Reports bleeds easily     Past Surgical History:   Procedure Laterality Date   • VA DSTR NROLYTC AGNT  "PARVERTEB FCT SNGL CRVCL/THORA Right 10/19/2016    Procedure: NEURO DEST FACET C/T W/IG SNGL - 3ON-C3, C8-T1    SINERGY;  Surgeon: Gaurang Pruett M.D.;  Location: [facility];  Service: Pain Management   • CA DSTR NROLYTC AGNT PARVERTEB FCT ADDL CRVCL/THORA  10/19/2016    Procedure: NEURO DEST FACET C/T W/IG ADDL;  Surgeon: Gaurang Pruett M.D.;  Location: [facility];  Service: Pain Management   • PB FLUOROSCOPIC GUIDANCE NEEDLE PLACEMENT  10/19/2016    Procedure: FLOURO GUIDE NEEDLE PLACEMENT;  Surgeon: Gaurang Preutt M.D.;  Location: [facility];  Service: Pain Management   • SHOULDER ARTHROSCOPY Right 2015    torn bicep tendon and spurs   • RECOVERY  10/17/2013    Performed by Cath-Recovery Surgery at SURGERY SAME DAY [facility]   • WOUND DEHISCENCE  4/10/2013    Performed by Tu Jain M.D. at SURGERY [facility]   • CERVICAL FUSION POSTERIOR  2/27/2013    Performed by Tu Jain M.D. at SURGERY [facility]   • CERVICAL LAMINECTOMY POSTERIOR  2/27/2013    Performed by Tu Jain M.D. at SURGERY [facility]   • LUMBAR LAMINECTOMY DISKECTOMY  11/20/2012    Performed by Tu Jain M.D. at SURGERY [facility]   • RECOVERY  10/4/2012    Performed by Cath-Recovery Surgery at SURGERY SAME DAY [facility]   • PACEMAKER INSERTION  October 2012    [employer] [employer] [address] [id number] implanted by Dr. Waite.   • SHOULDER DECOMPRESSION Left 2008    Left rotator cuff   • ATHROPLASTY Left 2004   • ARTHROSCOPY, KNEE Bilateral 1978   • ORTHOPEDIC OSTEOTOMY      Both knees several times, 8 total   • OTHER     • SPINAL CORD STIMULATOR  1 month ago   • TONSILLECTOMY     • VASECTOMY       Family History   Problem Relation Age of Onset   • Lung Disease Mother         Smoker   • Alcohol/Drug Mother    • Heart Disease Father 36        CAD   • Heart Disease Sister         \"hole in heart\"   • Lung Disease Brother         COPD, CANCER   • Cancer Brother         Prostate, " Lung   • Alcohol/Drug Brother    • Heart Disease Brother    • Diabetes Brother    • Hypertension Brother    • Hyperlipidemia Brother    • Heart Disease Maternal Grandmother    • Hypertension Maternal Grandmother    • Hyperlipidemia Maternal Grandmother    • Cancer Brother         stomach, thyroid   • Heart Disease Brother 60        MI, stent, PM/defib   • Sleep Apnea Brother    • No Known Problems Maternal Grandfather    • No Known Problems Paternal Grandmother      Social History     Socioeconomic History   • Marital status:      Spouse name: Not on file   • Number of children: Not on file   • Years of education: Not on file   • Highest education level: Not on file   Occupational History   • Not on file   Social Needs   • Financial resource strain: Not on file   • Food insecurity:     Worry: Not on file     Inability: Not on file   • Transportation needs:     Medical: Not on file     Non-medical: Not on file   Tobacco Use   • Smoking status: Former Smoker     Packs/day: 1.00     Years: 40.00     Pack years: 40.00     Types: Cigarettes     Last attempt to quit: 1/3/1990     Years since quittin.6   • Smokeless tobacco: Never Used   Substance and Sexual Activity   • Alcohol use: No     Alcohol/week: 0.0 oz   • Drug use: No   • Sexual activity: Yes     Partners: Female   Lifestyle   • Physical activity:     Days per week: Not on file     Minutes per session: Not on file   • Stress: Not on file   Relationships   • Social connections:     Talks on phone: Not on file     Gets together: Not on file     Attends Baptism service: Not on file     Active member of club or organization: Not on file     Attends meetings of clubs or organizations: Not on file     Relationship status: Not on file   • Intimate partner violence:     Fear of current or ex partner: Not on file     Emotionally abused: Not on file     Physically abused: Not on file     Forced sexual activity: Not on file   Other Topics Concern   • Not on  file   Social History Narrative   • Not on file     Allergies   Allergen Reactions   • Aleve Cold & [Pseudoephedrine-Naproxen Na] Anaphylaxis   • Ceftriaxone Sodium Anaphylaxis     Reaction; 1970's.   • Naproxen Anaphylaxis     Reaction; 2004.   • Tape Rash and Itching     Plastic tape (paper tape ok)     Outpatient Encounter Medications as of 8/26/2019   Medication Sig Dispense Refill   • Dulaglutide (TRULICITY) 1.5 MG/0.5ML Solution Pen-injector Inject 1.5 mg as instructed every 7 days. 12 PEN 1   • meloxicam (MOBIC) 7.5 MG Tab TAKE 1 TO 2 TABLETS DAILY 180 Tab 1   • buPROPion (WELLBUTRIN XL) 150 MG XL tablet TAKE 1 TABLET EVERY MORNING 90 Tab 1   • insulin glargine (LANTUS) 100 UNIT/ML Solution Inject 10 Units as instructed every evening.     • metoprolol (LOPRESSOR) 25 MG Tab TAKE 1 TABLET TWICE A  Tab 2   • atorvastatin (LIPITOR) 20 MG Tab TAKE 1 TABLET DAILY 90 Tab 2   • B-D ULTRAFINE III SHORT PEN 31G X 8 MM Misc USE TWICE A  Each 2   • FREESTYLE LITE strip USE TO TEST FOUR TIMES A DAY AND AS NEEDED FOR SYMPTOMS OF HIGH OR LOW SUGAR 350 Strip 4   • ferrous sulfate 325 (65 FE) MG tablet Take 325 mg by mouth every 48 hours.     • Cyanocobalamin (VITAMIN B-12) 2500 MCG SL Tab Place  under tongue every day.     • Lancets MISC Lancets order: Lancets for Abbott Freestyle Lite meter. Sig: use 4 times daily  and prn ssx high or low sugar. #100 RF x 0 360 Each 4   • Blood Glucose Monitoring Suppl [place] Meter: Dispense Abbott Freestyle Lite meter. Sig. Use as directed for blood sugar monitoring. #1. NR. 1 Device 0   • B-D ULTRAFINE III SHORT PEN 31G X 8 MM MISC USE 1 NEEDLE EVERY DAY WITH LANTUS 999 Each 2   • aspirin EC (ECOTRIN) 81 MG TBEC Take 81 mg by mouth every day.     • losartan (COZAAR) 50 MG Tab Take 50 mg by mouth every day.     • Empagliflozin-Metformin HCl ER 12.5-1000 MG TABLET SR 24 HR Take 1 Tab by mouth 2 times a day. (Patient not taking: Reported on 8/26/2019) 180 Tab 4   • metFORMIN  "(GLUCOPHAGE) 500 MG Tab TAKE 2 TABLETS TWICE A DAY WITH MEALS (Patient not taking: Reported on 8/26/2019) 360 Tab 2     No facility-administered encounter medications on file as of 8/26/2019.      ROS       Objective:   /60 (BP Location: Right arm, Patient Position: Sitting, BP Cuff Size: Adult)   Pulse 62   Ht 1.727 m (5' 8\")   Wt 107.8 kg (237 lb 9.6 oz)   SpO2 95%   BMI 36.13 kg/m²     Physical Exam   Constitutional: He appears well-developed and well-nourished.   Neck: No JVD present.   Cardiovascular: Normal rate and regular rhythm.   Murmur (2/6 to 3/6 systolic at the base) heard.  Pulmonary/Chest: Effort normal and breath sounds normal. He has no rales.   Abdominal: Soft. There is no tenderness.   Musculoskeletal: He exhibits edema (Trace to 1+ pretibial).     Lab Results   Component Value Date/Time    CHOLSTRLTOT 94 (L) 05/13/2019 07:11 AM    LDL 40 05/13/2019 07:11 AM    HDL 30 (A) 05/13/2019 07:11 AM    TRIGLYCERIDE 120 05/13/2019 07:11 AM       Lab Results   Component Value Date/Time    SODIUM 139 05/13/2019 07:11 AM    POTASSIUM 4.5 05/13/2019 07:11 AM    CHLORIDE 103 05/13/2019 07:11 AM    CO2 28 05/13/2019 07:11 AM    GLUCOSE 189 (H) 05/13/2019 07:11 AM    BUN 18 05/13/2019 07:11 AM    CREATININE 0.96 05/13/2019 07:11 AM     Lab Results   Component Value Date/Time    ALKPHOSPHAT 71 05/13/2019 07:11 AM    ASTSGOT 20 05/13/2019 07:11 AM    ALTSGPT 20 05/13/2019 07:11 AM    TBILIRUBIN 1.5 05/13/2019 07:11 AM      Lab Results   Component Value Date/Time    BNPBTYPENAT 4 06/13/2016 06:34 AM      Pacemaker check: Patient did have one episode of supraventricular tachycardia.  The exact etiology is not entirely clear.  It could be atrial tach or atrial flutter.  Cannot entirely exclude a sinus tachycardia.  Patient had his pacemaker reprogrammed for better sensing on the atrial lead.  He can have a recheck in about 2 months.  We will follow this problem for now.    Assessment:     1. Coronary artery " disease due to calcified coronary lesion: 40-50% disease in the circumflex at cardiac catheterization in 2013     2. Pacemaker     3. Dyslipidemia     4. Essential hypertension, benign         Medical Decision Making:  Today's Assessment / Status / Plan:     Mr. Boyer is clinically stable.  He is asymptomatic from a cardiovascular standpoint.  He does have aortic stenosis and we have not checked his valve in a couple of years.  He will follow-up in a few months with an echocardiogram prior.  His lipid status appears to be under excellent control.  His blood pressure is also under excellent control.

## 2019-09-11 DIAGNOSIS — F41.8 DEPRESSION WITH ANXIETY: ICD-10-CM

## 2019-09-11 NOTE — TELEPHONE ENCOUNTER
Was the patient seen in the last year in this department? Yes    Does patient have an active prescription for medications requested? No     Received Request Via: Pharmacy    Pt met protocol?: Yes     Last OV 08/12/19

## 2019-09-12 ENCOUNTER — SLEEP CENTER VISIT (OUTPATIENT)
Dept: SLEEP MEDICINE | Facility: MEDICAL CENTER | Age: 74
End: 2019-09-12
Payer: MEDICARE

## 2019-09-12 VITALS
DIASTOLIC BLOOD PRESSURE: 60 MMHG | WEIGHT: 233 LBS | OXYGEN SATURATION: 92 % | SYSTOLIC BLOOD PRESSURE: 122 MMHG | HEIGHT: 68 IN | HEART RATE: 51 BPM | BODY MASS INDEX: 35.31 KG/M2

## 2019-09-12 DIAGNOSIS — G47.33 OSA (OBSTRUCTIVE SLEEP APNEA): ICD-10-CM

## 2019-09-12 PROCEDURE — 99214 OFFICE O/P EST MOD 30 MIN: CPT | Performed by: NURSE PRACTITIONER

## 2019-09-12 RX ORDER — BUPROPION HYDROCHLORIDE 150 MG/1
TABLET ORAL
Qty: 90 TAB | Refills: 1 | Status: SHIPPED | OUTPATIENT
Start: 2019-09-12 | End: 2020-03-11 | Stop reason: SDUPTHER

## 2019-09-12 NOTE — PROGRESS NOTES
CC:  Here for f/u sleep issues as listed below    HPI:   Dakota presents today for follow up obstructive sleep apnea.  Past medical history of type 2 diabetes, chronic back pain, hypertension, dyslipidemia, coronary artery disease, hypothyroidism, depression.  He is being followed by urology for prostate cancer with elevated PSA.  He has not required treatment at this time per patient.  He has lost approximately 20 pounds since the beginning of the year, unintentional.  He plans to follow-up with urology next month.  BMI is 35.    PSG from 2013 indicated an AHI of 16.7 and low oxygenation of 75%.  Currently he is being treated with CPAP @ [id number].  Compliance download from the dates 8/13/2019 - 9/11/2019 indicates he is wearing the device 90% for an avg of 6 hours and 11 minutes per night with a reduced AHI of 1.3.  He does tolerate pressure and mask well.  He wakes up refreshed and is less tired throughout the day. He naps daily 1-2 hours almost daily, not changed since being on therapy. Naps do not interfere with going to sleep at night.  They deny morning H/A. He sleeps better overall. He will continue to clean supplies weekly and change them as insurance allows.        Patient Active Problem List    Diagnosis Date Noted   • Coronary artery disease due to calcified coronary lesion: 40-50% disease in the circumflex at cardiac catheterization in 2013 08/16/2013     Priority: High   • Pacemaker 10/04/2012     Priority: High   • Hypersensitive carotid sinus syndrome 10/02/2012     Priority: High   • Dyslipidemia 09/28/2012     Priority: High   • RBBB 09/28/2012     Priority: High   • Essential hypertension, benign 04/10/2012     Priority: High   • Type 2 diabetes mellitus without complication (HCC) 04/10/2012     Priority: Medium   • DJD (degenerative joint disease) of cervical spine 02/27/2013     Priority: Low   • S/P lumbar discectomy 11/21/2012     Priority: Low   • Degeneration of lumbar or lumbosacral  intervertebral disc 11/20/2012     Priority: Low   • Back pain, chronic 04/10/2012     Priority: Low   • Neck pain 04/10/2012     Priority: Low   • Bilateral lower extremity edema 12/04/2018   • Prostate cancer (HCC) 08/09/2018   • Elevated PSA 01/23/2018   • Bilateral primary osteoarthritis of knee 01/23/2018   • Cervical spondylosis 10/19/2016   • Non-proliferative diabetic retinopathy, mild, both eyes (HCC) 06/02/2016   • Hypertensive retinopathy of both eyes 06/02/2016   • Depression with anxiety 03/28/2016   • Obstructive sleep apnea on CPAP 03/28/2016   • Subclinical hypothyroidism 08/21/2014   • Obesity 08/21/2014       Past Medical History:   Diagnosis Date   • Anxiety    • Back pain, chronic 10/18/2016   • Blood clotting disorder (HCC) 1978    s/p Left knee surgery- DVT in left leg   • Breath shortness    • CAD (coronary artery disease) October 2012    Positive coronary calcium score.  Follow-up MPI was negative for ischemia.   • Cancer (HCC) ? early 90's    Melanoma Left arm- surgically removed   • Degeneration of lumbar or lumbosacral intervertebral disc     • Dental disorder    • Depression    • Diabetes     Oral agents and insulin   • DJD (degenerative joint disease) of cervical spine     • Episodic lightheadedness     • High cholesterol    • Hyperlipidemia    • Hypersensitive carotid sinus syndrome     • Hypertension    • Incisional infection April 2013    Wound dehiscience of neck surgery.   • Insomnia     • Myocardial infarct (HCC) ?    states was told by  that he has had a heart attack in the past.   • Neck pain 10/18/2016   • Pacemaker October 2012    [employer] [address] #[id number] implanted by [facility].    • RBBB     • S/P lumbar discectomy     • Sick sinus syndrome (HCC)     • Sleep apnea     does not use CPAP anymore- stopped in Dec 2015   • Syncope October 2012    Treated with PPM   • Thyroid disease     Hypothyroid   • Unspecified hemorrhagic conditions     Reports bleeds easily       Past  "Surgical History:   Procedure Laterality Date   • AK DSTR NROLYTC AGNT PARVERTEB FCT SNGL CRVCL/THORA Right 10/19/2016    Procedure: NEURO DEST FACET C/T W/IG SNGL - 3ON-C3, C8-T1    SINERGY;  Surgeon: Gaurang Pruett M.D.;  Location: [facility];  Service: Pain Management   • AK DSTR NROLYTC AGNT PARVERTEB FCT ADDL CRVCL/THORA  10/19/2016    Procedure: NEURO DEST FACET C/T W/IG ADDL;  Surgeon: Gaurang Pruett M.D.;  Location: [facility];  Service: Pain Management   • PB FLUOROSCOPIC GUIDANCE NEEDLE PLACEMENT  10/19/2016    Procedure: FLOURO GUIDE NEEDLE PLACEMENT;  Surgeon: Gaurang Pruett M.D.;  Location: [facility];  Service: Pain Management   • SHOULDER ARTHROSCOPY Right 2015    torn bicep tendon and spurs   • RECOVERY  10/17/2013    Performed by Cath-Recovery Surgery at SURGERY SAME DAY [facility]   • WOUND DEHISCENCE  4/10/2013    Performed by Tu Jain M.D. at SURGERY [facility]   • CERVICAL FUSION POSTERIOR  2/27/2013    Performed by Tu Jain M.D. at SURGERY [facility]   • CERVICAL LAMINECTOMY POSTERIOR  2/27/2013    Performed by Tu Jain M.D. at SURGERY [facility]   • LUMBAR LAMINECTOMY DISKECTOMY  11/20/2012    Performed by Tu Jain M.D. at SURGERY [facility]   • RECOVERY  10/4/2012    Performed by Cath-Recovery Surgery at SURGERY SAME DAY [facility]   • PACEMAKER INSERTION  October 2012    [employer]  2110 implanted by Dr. Waite.   • SHOULDER DECOMPRESSION Left 2008    Left rotator cuff   • ATHROPLASTY Left 2004   • ARTHROSCOPY, KNEE Bilateral 1978   • ORTHOPEDIC OSTEOTOMY      Both knees several times, 8 total   • OTHER     • SPINAL CORD STIMULATOR  1 month ago   • TONSILLECTOMY     • VASECTOMY         Family History   Problem Relation Age of Onset   • Lung Disease Mother         Smoker   • Alcohol/Drug Mother    • Heart Disease Father 36        CAD   • Heart Disease Sister         \"hole in heart\"   • Lung " Disease Brother         COPD, CANCER   • Cancer Brother         Prostate, Lung   • Alcohol/Drug Brother    • Heart Disease Brother    • Diabetes Brother    • Hypertension Brother    • Hyperlipidemia Brother    • Heart Disease Maternal Grandmother    • Hypertension Maternal Grandmother    • Hyperlipidemia Maternal Grandmother    • Cancer Brother         stomach, thyroid   • Heart Disease Brother 60        MI, stent, PM/defib   • Sleep Apnea Brother    • No Known Problems Maternal Grandfather    • No Known Problems Paternal Grandmother        Social History     Socioeconomic History   • Marital status:      Spouse name: Not on file   • Number of children: Not on file   • Years of education: Not on file   • Highest education level: Not on file   Occupational History   • Not on file   Social Needs   • Financial resource strain: Not on file   • Food insecurity:     Worry: Not on file     Inability: Not on file   • Transportation needs:     Medical: Not on file     Non-medical: Not on file   Tobacco Use   • Smoking status: Former Smoker     Packs/day: 1.00     Years: 40.00     Pack years: 40.00     Types: Cigarettes     Last attempt to quit: 1/3/1990     Years since quittin.7   • Smokeless tobacco: Never Used   Substance and Sexual Activity   • Alcohol use: No     Alcohol/week: 0.0 oz   • Drug use: No   • Sexual activity: Yes     Partners: Female   Lifestyle   • Physical activity:     Days per week: Not on file     Minutes per session: Not on file   • Stress: Not on file   Relationships   • Social connections:     Talks on phone: Not on file     Gets together: Not on file     Attends Rastafari service: Not on file     Active member of club or organization: Not on file     Attends meetings of clubs or organizations: Not on file     Relationship status: Not on file   • Intimate partner violence:     Fear of current or ex partner: Not on file     Emotionally abused: Not on file     Physically abused: Not on file      Forced sexual activity: Not on file   Other Topics Concern   • Not on file   Social History Narrative   • Not on file       Current Outpatient Medications   Medication Sig Dispense Refill   • Dulaglutide (TRULICITY) 1.5 MG/0.5ML Solution Pen-injector Inject 1.5 mg as instructed every 7 days. 12 PEN 1   • meloxicam (MOBIC) 7.5 MG Tab TAKE 1 TO 2 TABLETS DAILY 180 Tab 1   • insulin glargine (LANTUS) 100 UNIT/ML Solution Inject 10 Units as instructed every evening.     • metoprolol (LOPRESSOR) 25 MG Tab TAKE 1 TABLET TWICE A  Tab 2   • atorvastatin (LIPITOR) 20 MG Tab TAKE 1 TABLET DAILY 90 Tab 2   • ferrous sulfate 325 (65 FE) MG tablet Take 325 mg by mouth every 48 hours.     • Cyanocobalamin (VITAMIN B-12) 2500 MCG SL Tab Place  under tongue every day.     • aspirin EC (ECOTRIN) 81 MG TBEC Take 81 mg by mouth every day.     • buPROPion (WELLBUTRIN XL) 150 MG XL tablet TAKE 1 TABLET EVERY MORNING 90 Tab 1   • losartan (COZAAR) 50 MG Tab Take 50 mg by mouth every day.     • Empagliflozin-Metformin HCl ER 12.5-1000 MG TABLET SR 24 HR Take 1 Tab by mouth 2 times a day. (Patient not taking: Reported on 8/26/2019) 180 Tab 4   • metFORMIN (GLUCOPHAGE) 500 MG Tab TAKE 2 TABLETS TWICE A DAY WITH MEALS (Patient not taking: Reported on 8/26/2019) 360 Tab 2   • B-D ULTRAFINE III SHORT PEN 31G X 8 MM Misc USE TWICE A  Each 2   • FREESTYLE LITE strip USE TO TEST FOUR TIMES A DAY AND AS NEEDED FOR SYMPTOMS OF HIGH OR LOW SUGAR 350 Strip 4   • Lancets MISC Lancets order: Lancets for Abbott Freestyle Lite meter. Sig: use 4 times daily  and prn ssx high or low sugar. #100 RF x 0 360 Each 4   • Blood Glucose Monitoring Suppl [place] Meter: Dispense Abbott Freestyle Lite meter. Sig. Use as directed for blood sugar monitoring. #1. NR. 1 Device 0   • B-D ULTRAFINE III SHORT PEN 31G X 8 MM MISC USE 1 NEEDLE EVERY DAY WITH LANTUS 999 Each 2     No current facility-administered medications for this visit.           Allergies:  "Aleve cold & [pseudoephedrine-naproxen na]; Ceftriaxone sodium; Naproxen; and Tape      ROS   Gen: Denies fever, chills, unintentional weight loss, fatigue  Resp:Denies Dyspnea  CV: Denies chest pain, chest tightness  Sleep:Denies morning headache, insomnia, snoring, gasping for air, apnea  Neuro: Denies frequent headaches, weakness, dizziness  See HPI.  All other systems reviewed and negative        Vital signs for this encounter:  Vitals:    09/12/19 1013   Height: 1.727 m (5' 8\")   Weight: 105.7 kg (233 lb)   Weight % change since last entry.: 0 %   BP: 122/60   Pulse: (!) 51   BMI (Calculated): 35.43                   Physical Exam:   Gen:         Alert and oriented, No apparent distress.   Neck:        No Lymphadenopathy.  Lungs:     Clear to auscultation bilaterally.    CV:          Regular rate and rhythm. No murmurs, rubs or gallops.   Abd:         Soft non tender, non distended.            Ext:          No clubbing, cyanosis, edema.    Assessment   1. LYNDA (obstructive sleep apnea)  DME Mask and Supplies   2. BMI 35.0-35.9,adult  OBESITY COUNSELING (No Charge): Patient identified as having weight management issue.  Appropriate orders and counseling given.       Patient is clinically stable  and will proceed with following plan. Encourage him to f/u with urologist with weight loss. He has changed his diet. Weight loss has been gradual, not abrupt.     PLAN:   Patient Instructions   1) Continue CPAP at [id number]  2) Clean mask and supplies weekly and change them as insurance allows - new mask ordered  3) Light conditioning encouraged  4) Continue smoking cessation   5) Follow up with urologist for weight loss with hx of prostate CA  6) Vaccines: Up to date with Prevnar 13, Pneumovax 23  7) Return in about 4 months (around 1/12/2020) for follow up with ALIDA Velazco, if not sooner, Compliance.        "

## 2019-09-12 NOTE — PATIENT INSTRUCTIONS
1) Continue CPAP at [id number]  2) Clean mask and supplies weekly and change them as insurance allows - new mask ordered  3) Light conditioning encouraged  4) Continue smoking cessation   5) Follow up with urologist for weight loss with hx   6) Vaccines: Up to date with Prevnar 13, Pneumovax 23  7) Return in about 4 months (around 1/12/2020) for follow up with ALIDA Velazco, if not sooner, Compliance.

## 2019-09-14 DIAGNOSIS — E78.5 HYPERLIPIDEMIA, UNSPECIFIED HYPERLIPIDEMIA TYPE: ICD-10-CM

## 2019-09-16 RX ORDER — ATORVASTATIN CALCIUM 20 MG/1
TABLET, FILM COATED ORAL
Qty: 90 TAB | Refills: 3 | Status: SHIPPED | OUTPATIENT
Start: 2019-09-16 | End: 2020-09-10 | Stop reason: SDUPTHER

## 2019-09-25 DIAGNOSIS — I25.84 CORONARY ARTERY DISEASE DUE TO CALCIFIED CORONARY LESION: ICD-10-CM

## 2019-09-25 DIAGNOSIS — I25.10 CORONARY ARTERY DISEASE DUE TO CALCIFIED CORONARY LESION: ICD-10-CM

## 2019-09-25 DIAGNOSIS — I10 ESSENTIAL HYPERTENSION, BENIGN: ICD-10-CM

## 2019-09-25 RX ORDER — LOSARTAN POTASSIUM 25 MG/1
25 TABLET ORAL DAILY
Qty: 90 TAB | Refills: 3 | Status: SHIPPED | OUTPATIENT
Start: 2019-09-25 | End: 2019-10-06

## 2019-09-27 ENCOUNTER — HOSPITAL ENCOUNTER (OUTPATIENT)
Dept: CARDIOLOGY | Facility: MEDICAL CENTER | Age: 74
End: 2019-09-27
Attending: INTERNAL MEDICINE
Payer: MEDICARE

## 2019-09-27 DIAGNOSIS — I10 ESSENTIAL HYPERTENSION, BENIGN: ICD-10-CM

## 2019-09-27 DIAGNOSIS — I35.0 NONRHEUMATIC AORTIC VALVE STENOSIS: ICD-10-CM

## 2019-09-27 DIAGNOSIS — I25.10 CORONARY ARTERY DISEASE DUE TO CALCIFIED CORONARY LESION: ICD-10-CM

## 2019-09-27 DIAGNOSIS — I25.84 CORONARY ARTERY DISEASE DUE TO CALCIFIED CORONARY LESION: ICD-10-CM

## 2019-09-27 LAB
LV EJECT FRACT  99904: 65
LV EJECT FRACT MOD 2C 99903: 71.01
LV EJECT FRACT MOD 4C 99902: 69.31
LV EJECT FRACT MOD BP 99901: 68.59

## 2019-09-27 PROCEDURE — 93306 TTE W/DOPPLER COMPLETE: CPT

## 2019-09-27 PROCEDURE — 93306 TTE W/DOPPLER COMPLETE: CPT | Mod: 26 | Performed by: INTERNAL MEDICINE

## 2019-09-30 DIAGNOSIS — M51.37 DEGENERATION OF LUMBAR OR LUMBOSACRAL INTERVERTEBRAL DISC: ICD-10-CM

## 2019-09-30 DIAGNOSIS — M17.0 PRIMARY OSTEOARTHRITIS OF BOTH KNEES: ICD-10-CM

## 2019-09-30 DIAGNOSIS — M47.812 SPONDYLOSIS OF CERVICAL REGION WITHOUT MYELOPATHY OR RADICULOPATHY: ICD-10-CM

## 2019-10-01 RX ORDER — MELOXICAM 7.5 MG/1
TABLET ORAL
Qty: 180 TAB | Refills: 0 | Status: SHIPPED | OUTPATIENT
Start: 2019-10-01 | End: 2020-01-02

## 2019-10-01 NOTE — TELEPHONE ENCOUNTER
Was the patient seen in the last year in this department? Yes    Does patient have an active prescription for medications requested? No     Received Request Via: Pharmacy      Pt met protocol?: No, ov 8/19

## 2019-10-04 ENCOUNTER — OFFICE VISIT (OUTPATIENT)
Dept: MEDICAL GROUP | Facility: PHYSICIAN GROUP | Age: 74
End: 2019-10-04
Payer: MEDICARE

## 2019-10-04 ENCOUNTER — HOSPITAL ENCOUNTER (OUTPATIENT)
Dept: LAB | Facility: MEDICAL CENTER | Age: 74
End: 2019-10-04
Attending: PHYSICIAN ASSISTANT
Payer: MEDICARE

## 2019-10-04 ENCOUNTER — APPOINTMENT (OUTPATIENT)
Dept: RADIOLOGY | Facility: IMAGING CENTER | Age: 74
End: 2019-10-04
Attending: PHYSICIAN ASSISTANT
Payer: MEDICARE

## 2019-10-04 VITALS
DIASTOLIC BLOOD PRESSURE: 62 MMHG | HEART RATE: 60 BPM | HEIGHT: 68 IN | TEMPERATURE: 96.8 F | BODY MASS INDEX: 35.01 KG/M2 | OXYGEN SATURATION: 95 % | SYSTOLIC BLOOD PRESSURE: 110 MMHG | WEIGHT: 231 LBS

## 2019-10-04 DIAGNOSIS — Z79.4 TYPE 2 DIABETES MELLITUS WITH HYPERGLYCEMIA, WITH LONG-TERM CURRENT USE OF INSULIN (HCC): ICD-10-CM

## 2019-10-04 DIAGNOSIS — E11.65 TYPE 2 DIABETES MELLITUS WITH HYPERGLYCEMIA, WITH LONG-TERM CURRENT USE OF INSULIN (HCC): ICD-10-CM

## 2019-10-04 DIAGNOSIS — R53.81 MALAISE: ICD-10-CM

## 2019-10-04 DIAGNOSIS — R63.4 UNEXPLAINED WEIGHT LOSS: ICD-10-CM

## 2019-10-04 DIAGNOSIS — R49.0 HOARSENESS: ICD-10-CM

## 2019-10-04 DIAGNOSIS — Z23 NEED FOR VACCINATION: ICD-10-CM

## 2019-10-04 LAB
ALBUMIN SERPL BCP-MCNC: 4.2 G/DL (ref 3.2–4.9)
ALBUMIN/GLOB SERPL: 1.7 G/DL
ALP SERPL-CCNC: 72 U/L (ref 30–99)
ALT SERPL-CCNC: 33 U/L (ref 2–50)
ANION GAP SERPL CALC-SCNC: 7 MMOL/L (ref 0–11.9)
AST SERPL-CCNC: 24 U/L (ref 12–45)
BASOPHILS # BLD AUTO: 0.8 % (ref 0–1.8)
BASOPHILS # BLD: 0.08 K/UL (ref 0–0.12)
BILIRUB SERPL-MCNC: 1.3 MG/DL (ref 0.1–1.5)
BUN SERPL-MCNC: 29 MG/DL (ref 8–22)
CALCIUM SERPL-MCNC: 9.1 MG/DL (ref 8.5–10.5)
CHLORIDE SERPL-SCNC: 103 MMOL/L (ref 96–112)
CO2 SERPL-SCNC: 26 MMOL/L (ref 20–33)
CREAT SERPL-MCNC: 1.2 MG/DL (ref 0.5–1.4)
EOSINOPHIL # BLD AUTO: 0.18 K/UL (ref 0–0.51)
EOSINOPHIL NFR BLD: 1.8 % (ref 0–6.9)
ERYTHROCYTE [DISTWIDTH] IN BLOOD BY AUTOMATED COUNT: 47 FL (ref 35.9–50)
GLOBULIN SER CALC-MCNC: 2.5 G/DL (ref 1.9–3.5)
GLUCOSE SERPL-MCNC: 180 MG/DL (ref 65–99)
HCT VFR BLD AUTO: 45.1 % (ref 42–52)
HGB BLD-MCNC: 15.6 G/DL (ref 14–18)
IMM GRANULOCYTES # BLD AUTO: 0.05 K/UL (ref 0–0.11)
IMM GRANULOCYTES NFR BLD AUTO: 0.5 % (ref 0–0.9)
LYMPHOCYTES # BLD AUTO: 2.39 K/UL (ref 1–4.8)
LYMPHOCYTES NFR BLD: 23.8 % (ref 22–41)
MCH RBC QN AUTO: 31.7 PG (ref 27–33)
MCHC RBC AUTO-ENTMCNC: 34.6 G/DL (ref 33.7–35.3)
MCV RBC AUTO: 91.7 FL (ref 81.4–97.8)
MONOCYTES # BLD AUTO: 0.87 K/UL (ref 0–0.85)
MONOCYTES NFR BLD AUTO: 8.6 % (ref 0–13.4)
NEUTROPHILS # BLD AUTO: 6.49 K/UL (ref 1.82–7.42)
NEUTROPHILS NFR BLD: 64.5 % (ref 44–72)
NRBC # BLD AUTO: 0 K/UL
NRBC BLD-RTO: 0 /100 WBC
PLATELET # BLD AUTO: 175 K/UL (ref 164–446)
PMV BLD AUTO: 10.9 FL (ref 9–12.9)
POTASSIUM SERPL-SCNC: 4.4 MMOL/L (ref 3.6–5.5)
PROT SERPL-MCNC: 6.7 G/DL (ref 6–8.2)
RBC # BLD AUTO: 4.92 M/UL (ref 4.7–6.1)
SODIUM SERPL-SCNC: 136 MMOL/L (ref 135–145)
WBC # BLD AUTO: 10.1 K/UL (ref 4.8–10.8)

## 2019-10-04 PROCEDURE — 36415 COLL VENOUS BLD VENIPUNCTURE: CPT

## 2019-10-04 PROCEDURE — 85025 COMPLETE CBC W/AUTO DIFF WBC: CPT

## 2019-10-04 PROCEDURE — G0008 ADMIN INFLUENZA VIRUS VAC: HCPCS | Performed by: PHYSICIAN ASSISTANT

## 2019-10-04 PROCEDURE — 99214 OFFICE O/P EST MOD 30 MIN: CPT | Mod: 25 | Performed by: PHYSICIAN ASSISTANT

## 2019-10-04 PROCEDURE — 80053 COMPREHEN METABOLIC PANEL: CPT

## 2019-10-04 PROCEDURE — 90662 IIV NO PRSV INCREASED AG IM: CPT | Performed by: PHYSICIAN ASSISTANT

## 2019-10-04 PROCEDURE — 71046 X-RAY EXAM CHEST 2 VIEWS: CPT | Mod: TC | Performed by: PHYSICIAN ASSISTANT

## 2019-10-04 RX ORDER — LOSARTAN POTASSIUM 25 MG/1
25 TABLET ORAL DAILY
COMMUNITY
End: 2019-11-27

## 2019-10-04 NOTE — PROGRESS NOTES
Subjective:   Dakota Boyer is a 74 y.o. male here today for weight loss concern, hoarseness. Is an established patient of mine.    HPI:    Patient presents today with several concerns. He states that he just hasn't been feeling well lately. Hard for him to definitively explain--just does not feel like himself. Feels like something is going on. Wife comments that he looks pale to her. He's particularly concerned about ongoing unintentional weight loss since the beginning of the year. Starting weight was just over 250 lbs and he is now down to 231 lbs. He states that he's changed nothing about his diet or activity level to explain this weight loss, so is very concerned about it. Appetite level remains the same. He has not had any GI symptoms to include abdominal pain, nausea/vomiting/stool changes. He mentions that his voice has been hoarse for the last 1.5-2 weeks. Denies fever, sore throat, chest pain, or cough. He does admit to increase in sinus drainage and frequent throat clearing. He also mentions that lately his blood sugar levels have been more difficult to control, despite him increasing his Lantus to 20 units nightly.       Current medicines (including changes today)  Current Outpatient Medications   Medication Sig Dispense Refill   • losartan (COZAAR) 25 MG Tab Take 25 mg by mouth every day.     • meloxicam (MOBIC) 7.5 MG Tab TAKE 1 TO 2 TABLETS PO DAILY 180 Tab 0   • metoprolol (LOPRESSOR) 25 MG Tab Take 0.5 Tabs by mouth 2 times a day. **change in dose** 90 Tab 3   • atorvastatin (LIPITOR) 20 MG Tab TAKE 1 TABLET DAILY 90 Tab 3   • buPROPion (WELLBUTRIN XL) 150 MG XL tablet TAKE 1 TABLET EVERY MORNING 90 Tab 1   • Dulaglutide (TRULICITY) 1.5 MG/0.5ML Solution Pen-injector Inject 1.5 mg as instructed every 7 days. 12 PEN 1   • Empagliflozin-Metformin HCl ER 12.5-1000 MG TABLET SR 24 HR Take 1 Tab by mouth 2 times a day. 180 Tab 4   • FREESTYLE LITE strip USE TO TEST FOUR TIMES A DAY AND AS NEEDED  "FOR SYMPTOMS OF HIGH OR LOW SUGAR 350 Strip 4   • Cyanocobalamin (VITAMIN B-12) 2500 MCG SL Tab Place  under tongue every day.     • Lancets MISC Lancets order: Lancets for Abbott Freestyle Lite meter. Sig: use 4 times daily  and prn ssx high or low sugar. #100 RF x 0 360 Each 4   • Blood Glucose Monitoring Suppl [place] Meter: Dispense Abbott Freestyle Lite meter. Sig. Use as directed for blood sugar monitoring. #1. NR. 1 Device 0   • B-D ULTRAFINE III SHORT PEN 31G X 8 MM MISC USE 1 NEEDLE EVERY DAY WITH LANTUS 999 Each 2   • aspirin EC (ECOTRIN) 81 MG TBEC Take 81 mg by mouth every day.     • insulin glargine (LANTUS) 100 UNIT/ML Solution Inject 20 Units as instructed every evening.     • valsartan (DIOVAN) 160 MG Tab        No current facility-administered medications for this visit.      He  has a past medical history of Anxiety, Back pain, chronic (10/18/2016), Blood clotting disorder ([facility]) (1978), Breath shortness, CAD (coronary artery disease) (October 2012), Cancer ([facility]) (? early 90's), Degeneration of lumbar or lumbosacral intervertebral disc ( ), Dental disorder, Depression, Diabetes, DJD (degenerative joint disease) of cervical spine ( ), Episodic lightheadedness ( ), High cholesterol, Hyperlipidemia, Hypersensitive carotid sinus syndrome ( ), Hypertension, Incisional infection (April 2013), Insomnia ( ), Myocardial infarct ([facility]) (?), Neck pain (10/18/2016), Pacemaker (October 2012), RBBB ( ), S/P lumbar discectomy ( ), Sick sinus syndrome ([facility]) ( ), Sleep apnea, Syncope (October 2012), Thyroid disease, and Unspecified hemorrhagic conditions.    ROS  As per HPI.       Objective:     /62 (BP Location: Left arm, Patient Position: Sitting, BP Cuff Size: Large adult)   Pulse 60   Temp 36 °C (96.8 °F)   Ht 1.727 m (5' 8\")   Wt 104.8 kg (231 lb)   SpO2 95%  Body mass index is 35.12 kg/m².     Physical Exam:  Constitutional: Alert, no distress.  Skin: Warm, dry, good turgor, no rashes in visible " areas.  Eye: Pupils are equal and round, conjunctiva clear, lids normal.  ENMT: Nasal turbinates appear non-inflamed, non-injected. No visible rhinorrhea. Lips without lesions, moist mucus membranes. No pharyngeal erythema or tonsillar hypertrophy.  Neck: No masses. No submandibular or cervical lymphadenopathy. No thyromegaly.  Respiratory: Unlabored respiratory effort, lungs clear to auscultation, no wheezes, no rhonchi.  Cardiovascular: Normal S1, S2, no murmur, trace lower extremity edema.      Assessment and Plan:   The following treatment plan was discussed    1. Hoarseness  2. Malaise  3. Unexplained weight loss  New problems, uncontrolled and needing further evaluation. Per review, has lost about 20 lbs since February. This is certainly not a substantial amount in that amount of time, but he also does not feel he has done anything differentally to warrant the weight loss. Also having new-onset persistent hoarseness and malaise. This could certainly represent acute URI laryngitis, especially given sinus drainage, but given weight loss, I feel that further evaluation is warranted. He does have ~40 pack year prior smoking history putting him at higher risk for malignancy. Will start by obtaining basic screening labs and chest x-ray. If unremarkable, will proceed with CT chest.   - CBC WITH DIFFERENTIAL; Future  - Comp Metabolic Panel; Future  - DX-CHEST-2 VIEWS; Future    4. Type 2 diabetes mellitus with hyperglycemia, with long-term current use of insulin (HCC)  Chronic issue, currently uncontrolled. Currently on Synjardy, Trulicity, and Lantus. I reviewed his recent blood sugar log, which does confirm recent fasting BS to be averaging around 200. We discussed that he can increase Lantus by 2-3 units every 3 days if average fasting BS is > 130. If any hypoglycemia, should cut down by 3 units. He does not currently have Endocrinology follow-up scheduled which per review, was due in July, so advised him to  schedule this as soon as possible.    5. Need for vaccination  - INFLUENZA VACCINE, HIGH DOSE (65+ ONLY)      Followup: Return for f/u pending results.    Tanya Rayo P.A.-C.

## 2019-10-06 RX ORDER — VALSARTAN 160 MG/1
TABLET ORAL
COMMUNITY
End: 2020-01-24 | Stop reason: SDUPTHER

## 2019-10-06 RX ORDER — INSULIN GLARGINE 100 [IU]/ML
20 INJECTION, SOLUTION SUBCUTANEOUS EVERY EVENING
COMMUNITY
End: 2019-10-24

## 2019-10-07 DIAGNOSIS — R63.4 UNEXPLAINED WEIGHT LOSS: ICD-10-CM

## 2019-10-07 DIAGNOSIS — R49.0 HOARSENESS: ICD-10-CM

## 2019-10-08 ENCOUNTER — OFFICE VISIT (OUTPATIENT)
Dept: CARDIOLOGY | Facility: MEDICAL CENTER | Age: 74
End: 2019-10-08
Payer: MEDICARE

## 2019-10-08 ENCOUNTER — HOSPITAL ENCOUNTER (OUTPATIENT)
Dept: LAB | Facility: MEDICAL CENTER | Age: 74
End: 2019-10-08
Attending: INTERNAL MEDICINE
Payer: MEDICARE

## 2019-10-08 VITALS
BODY MASS INDEX: 33.92 KG/M2 | WEIGHT: 229 LBS | SYSTOLIC BLOOD PRESSURE: 132 MMHG | HEART RATE: 74 BPM | HEIGHT: 69 IN | DIASTOLIC BLOOD PRESSURE: 70 MMHG | OXYGEN SATURATION: 99 %

## 2019-10-08 DIAGNOSIS — I10 ESSENTIAL HYPERTENSION, BENIGN: ICD-10-CM

## 2019-10-08 DIAGNOSIS — R53.83 FATIGUE, UNSPECIFIED TYPE: ICD-10-CM

## 2019-10-08 DIAGNOSIS — R63.4 WEIGHT LOSS, UNINTENTIONAL: ICD-10-CM

## 2019-10-08 DIAGNOSIS — Z95.0 PACEMAKER: ICD-10-CM

## 2019-10-08 DIAGNOSIS — I25.84 CORONARY ARTERY DISEASE DUE TO CALCIFIED CORONARY LESION: ICD-10-CM

## 2019-10-08 DIAGNOSIS — E78.5 DYSLIPIDEMIA: ICD-10-CM

## 2019-10-08 DIAGNOSIS — I25.10 CORONARY ARTERY DISEASE DUE TO CALCIFIED CORONARY LESION: ICD-10-CM

## 2019-10-08 DIAGNOSIS — R40.0 DAYTIME SOMNOLENCE: ICD-10-CM

## 2019-10-08 DIAGNOSIS — G47.33 OBSTRUCTIVE SLEEP APNEA ON CPAP: ICD-10-CM

## 2019-10-08 LAB
APPEARANCE UR: CLEAR
BACTERIA #/AREA URNS HPF: ABNORMAL /HPF
BILIRUB UR QL STRIP.AUTO: NEGATIVE
COLOR UR: YELLOW
EPI CELLS #/AREA URNS HPF: NEGATIVE /HPF
GLUCOSE UR STRIP.AUTO-MCNC: >=1000 MG/DL
HYALINE CASTS #/AREA URNS LPF: ABNORMAL /LPF
KETONES UR STRIP.AUTO-MCNC: NEGATIVE MG/DL
LEUKOCYTE ESTERASE UR QL STRIP.AUTO: ABNORMAL
MICRO URNS: ABNORMAL
NITRITE UR QL STRIP.AUTO: POSITIVE
PH UR STRIP.AUTO: 5.5 [PH] (ref 5–8)
PROT UR QL STRIP: NEGATIVE MG/DL
RBC # URNS HPF: ABNORMAL /HPF
RBC UR QL AUTO: NEGATIVE
SP GR UR STRIP.AUTO: 1.03
TSH SERPL DL<=0.005 MIU/L-ACNC: 3.65 UIU/ML (ref 0.38–5.33)
UROBILINOGEN UR STRIP.AUTO-MCNC: 0.2 MG/DL
WBC #/AREA URNS HPF: ABNORMAL /HPF

## 2019-10-08 PROCEDURE — 87186 SC STD MICRODIL/AGAR DIL: CPT

## 2019-10-08 PROCEDURE — 87077 CULTURE AEROBIC IDENTIFY: CPT

## 2019-10-08 PROCEDURE — 81001 URINALYSIS AUTO W/SCOPE: CPT

## 2019-10-08 PROCEDURE — 87086 URINE CULTURE/COLONY COUNT: CPT

## 2019-10-08 PROCEDURE — 84443 ASSAY THYROID STIM HORMONE: CPT

## 2019-10-08 PROCEDURE — 36415 COLL VENOUS BLD VENIPUNCTURE: CPT

## 2019-10-08 PROCEDURE — 99214 OFFICE O/P EST MOD 30 MIN: CPT | Performed by: INTERNAL MEDICINE

## 2019-10-08 NOTE — PROGRESS NOTES
"Chief Complaint   Patient presents with   • Coronary Artery Disease       Subjective:   Dakota Boyer is a 74 y.o. male who presents todayfor followup of his dyspnea on exertion, hypertension, hyperlipidemia, permanent pacemaker and mild coronary artery disease.     His blood pressures been somewhat labile and we cut back on his medications.  They have been under generally good control over the last week or so except for one blood pressure of 80/40 with a heart rate of 89.  His highest blood pressure in about the last week and a half is been 140/70 with a pulse of 70.    He has had significant difficulty with fatigue and otherwise \"not feeling myself\".  He states he sleeps between 5 and 8 hours nightly.  However, when he gets up he feels he can go back and sit in his chair and fall back to sleep.    He is lost about 20 pounds since February and does not know why.  His appetite is not poor.    He denies any chest discomfort, dyspnea, edema or palpitations.  He had one episode of lightheadedness when he was at the surgical center and received nerve ablation for his back pain.  This resolved with IV fluids.       Past Medical History:   Diagnosis Date   • Anxiety    • Back pain, chronic 10/18/2016   • Blood clotting disorder (HCC) 1978    s/p Left knee surgery- DVT in left leg   • Breath shortness    • CAD (coronary artery disease) October 2012    Positive coronary calcium score.  Follow-up MPI was negative for ischemia.   • Cancer (HCC) ? early 90's    Melanoma Left arm- surgically removed   • Degeneration of lumbar or lumbosacral intervertebral disc     • Dental disorder    • Depression    • Diabetes     Oral agents and insulin   • DJD (degenerative joint disease) of cervical spine     • Episodic lightheadedness     • High cholesterol    • Hyperlipidemia    • Hypersensitive carotid sinus syndrome     • Hypertension    • Incisional infection April 2013    Wound dehiscience of neck surgery.   • Insomnia     • " Myocardial infarct (HCC) ?    states was told by  that he has had a heart attack in the past.   • Neck pain 10/18/2016   • Pacemaker October 2012    [employer] [facility] #[id number]    • RBBB     • S/P lumbar discectomy     • Sick sinus syndrome (HCC)     • Sleep apnea     does not use CPAP anymore- stopped in Dec 2015   • Syncope October 2012    Treated with PPM   • Thyroid disease     Hypothyroid   • Unspecified hemorrhagic conditions     Reports bleeds easily     Past Surgical History:   Procedure Laterality Date   • WA DSTR NROLYTC AGNT PARVERTEB FCT SNGL CRVCL/THORA Right 10/19/2016    Procedure: NEURO DEST FACET C/T W/IG SNGL - 3ON-C3, C8-T1    SINERGY;  Surgeon: Gaurang Pruett M.D.;  Location: SURGERY [facility];  Service: Pain Management   • WA DSTR NROLYTC AGNT PARVERTEB FCT ADDL CRVCL/THORA  10/19/2016    Procedure: NEURO DEST FACET C/T W/IG ADDL;  Surgeon: Gaurang Pruett M.D.;  Location: SURGERY [facility];  Service: Pain Management   • PB FLUOROSCOPIC GUIDANCE NEEDLE PLACEMENT  10/19/2016    Procedure: FLOURO GUIDE NEEDLE PLACEMENT;  Surgeon: Gaurang Pruett M.D.;  Location: SURGERY [facility];  Service: Pain Management   • SHOULDER ARTHROSCOPY Right 2015    torn bicep tendon and spurs   • RECOVERY  10/17/2013    Performed by Cath-Recovery Surgery at SURGERY SAME DAY [facility] ORS   • WOUND DEHISCENCE  4/10/2013    Performed by Tu Jain M.D. at SURGERY [facility] ORS   • CERVICAL FUSION POSTERIOR  2/27/2013    Performed by Tu Jain M.D. at SURGERY [facility] ORS   • CERVICAL LAMINECTOMY POSTERIOR  2/27/2013    Performed by Tu Jain M.D. at SURGERY [facility] ORS   • LUMBAR LAMINECTOMY DISKECTOMY  11/20/2012    Performed by Tu Jain M.D. at SURGERY [facility]   • RECOVERY  10/4/2012    Performed by Cath-Recovery Surgery at SURGERY SAME DAY [facility]   • PACEMAKER INSERTION  October 2012    [employer]  [address] implanted by   "[facility].   • SHOULDER DECOMPRESSION Left 2008    Left rotator cuff   • ATHROPLASTY Left    • ARTHROSCOPY, KNEE Bilateral    • ORTHOPEDIC OSTEOTOMY      Both knees several times, 8 total   • OTHER     • SPINAL CORD STIMULATOR  1 month ago   • TONSILLECTOMY     • VASECTOMY       Family History   Problem Relation Age of Onset   • Lung Disease Mother         Smoker   • Alcohol/Drug Mother    • Heart Disease Father 36        CAD   • Heart Disease Sister         \"hole in heart\"   • Lung Disease Brother         COPD, CANCER   • Cancer Brother         Prostate, Lung   • Alcohol/Drug Brother    • Heart Disease Brother    • Diabetes Brother    • Hypertension Brother    • Hyperlipidemia Brother    • Heart Disease Maternal Grandmother    • Hypertension Maternal Grandmother    • Hyperlipidemia Maternal Grandmother    • Cancer Brother         stomach, thyroid   • Heart Disease Brother 60        MI, stent, PM/defib   • Sleep Apnea Brother    • No Known Problems Maternal Grandfather    • No Known Problems Paternal Grandmother      Social History     Socioeconomic History   • Marital status:      Spouse name: Not on file   • Number of children: Not on file   • Years of education: Not on file   • Highest education level: Not on file   Occupational History   • Not on file   Social Needs   • Financial resource strain: Not on file   • Food insecurity:     Worry: Not on file     Inability: Not on file   • Transportation needs:     Medical: Not on file     Non-medical: Not on file   Tobacco Use   • Smoking status: Former Smoker     Packs/day: 1.00     Years: 40.00     Pack years: 40.00     Types: Cigarettes     Last attempt to quit: 1/3/1990     Years since quittin.7   • Smokeless tobacco: Never Used   Substance and Sexual Activity   • Alcohol use: No     Alcohol/week: 0.0 oz   • Drug use: No   • Sexual activity: Yes     Partners: Female   Lifestyle   • Physical activity:     Days per week: Not on file     Minutes per " session: Not on file   • Stress: Not on file   Relationships   • Social connections:     Talks on phone: Not on file     Gets together: Not on file     Attends Gnosticism service: Not on file     Active member of club or organization: Not on file     Attends meetings of clubs or organizations: Not on file     Relationship status: Not on file   • Intimate partner violence:     Fear of current or ex partner: Not on file     Emotionally abused: Not on file     Physically abused: Not on file     Forced sexual activity: Not on file   Other Topics Concern   • Not on file   Social History Narrative   • Not on file     Allergies   Allergen Reactions   • Aleve Cold & [Pseudoephedrine-Naproxen Na] Anaphylaxis   • Ceftriaxone Sodium Anaphylaxis     Reaction; 1970's.   • Naproxen Anaphylaxis     Reaction; 2004.   • Tape Rash and Itching     Plastic tape (paper tape ok)     Outpatient Encounter Medications as of 10/8/2019   Medication Sig Dispense Refill   • insulin glargine (LANTUS) 100 UNIT/ML Solution Inject 20 Units as instructed every evening.     • losartan (COZAAR) 25 MG Tab Take 25 mg by mouth every day.     • meloxicam (MOBIC) 7.5 MG Tab TAKE 1 TO 2 TABLETS PO DAILY 180 Tab 0   • metoprolol (LOPRESSOR) 25 MG Tab Take 0.5 Tabs by mouth 2 times a day. **change in dose** 90 Tab 3   • atorvastatin (LIPITOR) 20 MG Tab TAKE 1 TABLET DAILY 90 Tab 3   • buPROPion (WELLBUTRIN XL) 150 MG XL tablet TAKE 1 TABLET EVERY MORNING 90 Tab 1   • Dulaglutide (TRULICITY) 1.5 MG/0.5ML Solution Pen-injector Inject 1.5 mg as instructed every 7 days. 12 PEN 1   • Empagliflozin-Metformin HCl ER 12.5-1000 MG TABLET SR 24 HR Take 1 Tab by mouth 2 times a day. 180 Tab 4   • FREESTYLE LITE strip USE TO TEST FOUR TIMES A DAY AND AS NEEDED FOR SYMPTOMS OF HIGH OR LOW SUGAR 350 Strip 4   • Cyanocobalamin (VITAMIN B-12) 2500 MCG SL Tab Place  under tongue every day.     • Lancets MISC Lancets order: Lancets for Abbott Freestyle Lite meter. Sig: use 4  "times daily  and prn ssx high or low sugar. #100 RF x 0 360 Each 4   • Blood Glucose Monitoring Suppl [place] Meter: Dispense Abbott Freestyle Lite meter. Sig. Use as directed for blood sugar monitoring. #1. NR. 1 Device 0   • B-D ULTRAFINE III SHORT PEN 31G X 8 MM MISC USE 1 NEEDLE EVERY DAY WITH LANTUS 999 Each 2   • aspirin EC (ECOTRIN) 81 MG TBEC Take 81 mg by mouth every day.     • valsartan (DIOVAN) 160 MG Tab        No facility-administered encounter medications on file as of 10/8/2019.      ROS       Objective:   /70 (BP Location: Left arm, Patient Position: Sitting, BP Cuff Size: Adult)   Pulse 74   Ht 1.74 m (5' 8.5\")   Wt 103.9 kg (229 lb)   SpO2 99%   BMI 34.31 kg/m²     Physical Exam   Constitutional: He appears well-developed and well-nourished.   Neck: No JVD present.   Cardiovascular: Normal rate and regular rhythm.   Murmur (2/6 to 3/6 systolic at the base) heard.  Pulmonary/Chest: Effort normal and breath sounds normal. He has no rales.   Abdominal: Soft. There is no tenderness.   Musculoskeletal: He exhibits edema ( 1+ pretibial).     Lab Results   Component Value Date/Time    CHOLSTRLTOT 94 (L) 05/13/2019 07:11 AM    LDL 40 05/13/2019 07:11 AM    HDL 30 (A) 05/13/2019 07:11 AM    TRIGLYCERIDE 120 05/13/2019 07:11 AM       Lab Results   Component Value Date/Time    SODIUM 136 10/04/2019 03:45 PM    POTASSIUM 4.4 10/04/2019 03:45 PM    CHLORIDE 103 10/04/2019 03:45 PM    CO2 26 10/04/2019 03:45 PM    GLUCOSE 180 (H) 10/04/2019 03:45 PM    BUN 29 (H) 10/04/2019 03:45 PM    CREATININE 1.20 10/04/2019 03:45 PM     Lab Results   Component Value Date/Time    ALKPHOSPHAT 72 10/04/2019 03:45 PM    ASTSGOT 24 10/04/2019 03:45 PM    ALTSGPT 33 10/04/2019 03:45 PM    TBILIRUBIN 1.3 10/04/2019 03:45 PM      Lab Results   Component Value Date/Time    BNPBTYPENAT 4 06/13/2016 06:34 AM      Results for POWER, TON MARQUEZ (MRN [id number]) as of 10/8/2019 14:50   Ref. Range 10/4/2019 15:45   WBC Latest " Ref Range: 4.8 - 10.8 K/uL 10.1   RBC Latest Ref Range: 4.70 - 6.10 M/uL 4.92   Hemoglobin Latest Ref Range: 14.0 - 18.0 g/dL 15.6   Hematocrit Latest Ref Range: 42.0 - 52.0 % 45.1   MCV Latest Ref Range: 81.4 - 97.8 fL 91.7   MCH Latest Ref Range: 27.0 - 33.0 pg 31.7   MCHC Latest Ref Range: 33.7 - 35.3 g/dL 34.6   RDW Latest Ref Range: 35.9 - 50.0 fL 47.0   Platelet Count Latest Ref Range: 164 - 446 K/uL 175   MPV Latest Ref Range: 9.0 - 12.9 fL 10.9   Neutrophils-Polys Latest Ref Range: 44.00 - 72.00 % 64.50   Neutrophils (Absolute) Latest Ref Range: 1.82 - 7.42 K/uL 6.49   Lymphocytes Latest Ref Range: 22.00 - 41.00 % 23.80   Lymphs (Absolute) Latest Ref Range: 1.00 - 4.80 K/uL 2.39   Monocytes Latest Ref Range: 0.00 - 13.40 % 8.60   Monos (Absolute) Latest Ref Range: 0.00 - 0.85 K/uL 0.87 (H)   Eosinophils Latest Ref Range: 0.00 - 6.90 % 1.80   Eos (Absolute) Latest Ref Range: 0.00 - 0.51 K/uL 0.18   Basophils Latest Ref Range: 0.00 - 1.80 % 0.80   Baso (Absolute) Latest Ref Range: 0.00 - 0.12 K/uL 0.08   Immature Granulocytes Latest Ref Range: 0.00 - 0.90 % 0.50   Immature Granulocytes (abs) Latest Ref Range: 0.00 - 0.11 K/uL 0.05   Nucleated RBC Latest Units: /100 WBC 0.00   NRBC (Absolute) Latest Units: K/uL 0.00             Pacemaker check: Patient did have one episode of supraventricular tachycardia.  The exact etiology is not entirely clear.  It could be atrial tach or atrial flutter.  Cannot entirely exclude a sinus tachycardia.  Patient had his pacemaker reprogrammed for better sensing on the atrial lead.  He can have a recheck in about 2 months.  We will follow this problem for now.    Echocardiography Laboratory    CONCLUSIONS  Normal left ventricular chamber size. Mild concentric left ventricular   hypertrophy, LVPW 1.1 cm.  Grossly normal left ventricular systolic   function. Left ventricular ejection fraction is visually estimated to   be 65%. Normal regional wall motion. Normal diastolic  function.  Tricuspid aortic valve. Calcified aortic valve leaflets. Mild aortic   stenosis. Aortic valve area calculated from the continuity equation is   1.9 sq cm. Vmax is 2.5  m/s. Transvalvular gradients are - Peak: 25   mmHg, Mean: 16 mmHg. No aortic insufficiency.  Compared to the report of the study done 7/2017 - there has been no   significant change.     TON BOYER  Exam Date:         09/27/2019     Assessment:     1. Coronary artery disease due to calcified coronary lesion: 40-50% disease in the circumflex at cardiac catheterization in 2013     2. Pacemaker     3. Dyslipidemia     4. Essential hypertension, benign     5. Obstructive sleep apnea on CPAP         Medical Decision Making:  Today's Assessment / Status / Plan:     Mr. Boyer is having difficulty with significant fatigue and episodic hypotension.  He has also had difficulty with weight loss.  At this time, we will obtain a TSH, urinalysis and stool for occult blood.  We will also have an overnight pulse oximetry.  He will follow-up in about a month.

## 2019-10-09 ENCOUNTER — TELEPHONE (OUTPATIENT)
Dept: CARDIOLOGY | Facility: MEDICAL CENTER | Age: 74
End: 2019-10-09

## 2019-10-09 DIAGNOSIS — E11.65 TYPE 2 DIABETES MELLITUS WITH HYPERGLYCEMIA, WITH LONG-TERM CURRENT USE OF INSULIN (HCC): ICD-10-CM

## 2019-10-09 DIAGNOSIS — Z79.4 TYPE 2 DIABETES MELLITUS WITH HYPERGLYCEMIA, WITH LONG-TERM CURRENT USE OF INSULIN (HCC): ICD-10-CM

## 2019-10-09 NOTE — TELEPHONE ENCOUNTER
Was the patient seen in the last year in this department? Yes    Does patient have an active prescription for medications requested? No     Received Request Via: Pharmacy     TRULICITY 1.5 MG/0.5ML Solution Pen-injector     Sig: INJECT 1.5 MG EVERY 7 DAYS AS INSTRUCTED

## 2019-10-09 NOTE — TELEPHONE ENCOUNTER
Per Dr. Sj Christiansen wanted this patient to have a OPO        OPO Sent to [facility]   Phone: [phone]  Fax: [phone]

## 2019-10-10 ENCOUNTER — TELEPHONE (OUTPATIENT)
Dept: CARDIOLOGY | Facility: MEDICAL CENTER | Age: 74
End: 2019-10-10

## 2019-10-10 ENCOUNTER — TELEPHONE (OUTPATIENT)
Dept: MEDICAL GROUP | Facility: PHYSICIAN GROUP | Age: 74
End: 2019-10-10

## 2019-10-10 DIAGNOSIS — N39.0 E. COLI UTI (URINARY TRACT INFECTION): ICD-10-CM

## 2019-10-10 DIAGNOSIS — B96.20 E. COLI UTI (URINARY TRACT INFECTION): ICD-10-CM

## 2019-10-10 LAB
BACTERIA UR CULT: ABNORMAL
BACTERIA UR CULT: ABNORMAL
SIGNIFICANT IND 70042: ABNORMAL
SITE SITE: ABNORMAL
SOURCE SOURCE: ABNORMAL

## 2019-10-10 RX ORDER — SULFAMETHOXAZOLE AND TRIMETHOPRIM 800; 160 MG/1; MG/1
1 TABLET ORAL 2 TIMES DAILY
Qty: 14 TAB | Refills: 0 | Status: SHIPPED | OUTPATIENT
Start: 2019-10-10 | End: 2019-10-10 | Stop reason: SDUPTHER

## 2019-10-10 RX ORDER — SULFAMETHOXAZOLE AND TRIMETHOPRIM 800; 160 MG/1; MG/1
1 TABLET ORAL 2 TIMES DAILY
Qty: 14 TAB | Refills: 0 | Status: SHIPPED | OUTPATIENT
Start: 2019-10-10 | End: 2019-10-17

## 2019-10-10 NOTE — TELEPHONE ENCOUNTER
Called PCP office to f/u on UA and urine culture results and to make sure PCP was in office and received results. Jovita states she will make sure Tanya receives those if not already.

## 2019-10-10 NOTE — TELEPHONE ENCOUNTER
Please inform patient that urine culture ordered by his cardiologist came back positive for E. Coli infection. I have sent over Rx for Bactrim which he needs to take twice daily for 7 days. Follow up in-clinic for lack of improvement in symptoms.  Tanya Rayo P.A.-C.

## 2019-10-14 RX ORDER — DULAGLUTIDE 1.5 MG/.5ML
INJECTION, SOLUTION SUBCUTANEOUS
Qty: 6 ML | Refills: 4 | Status: SHIPPED | OUTPATIENT
Start: 2019-10-14 | End: 2020-07-01 | Stop reason: SDUPTHER

## 2019-10-15 ENCOUNTER — APPOINTMENT (OUTPATIENT)
Dept: ENDOCRINOLOGY | Facility: MEDICAL CENTER | Age: 74
End: 2019-10-15
Payer: MEDICARE

## 2019-10-16 PROCEDURE — 82272 OCCULT BLD FECES 1-3 TESTS: CPT

## 2019-10-19 ENCOUNTER — HOSPITAL ENCOUNTER (OUTPATIENT)
Facility: MEDICAL CENTER | Age: 74
End: 2019-10-19
Attending: INTERNAL MEDICINE
Payer: MEDICARE

## 2019-10-19 PROCEDURE — 82272 OCCULT BLD FECES 1-3 TESTS: CPT

## 2019-10-21 ENCOUNTER — HOSPITAL ENCOUNTER (OUTPATIENT)
Dept: LAB | Facility: MEDICAL CENTER | Age: 74
End: 2019-10-16
Attending: INTERNAL MEDICINE
Payer: MEDICARE

## 2019-10-21 DIAGNOSIS — R53.83 FATIGUE, UNSPECIFIED TYPE: ICD-10-CM

## 2019-10-21 DIAGNOSIS — R63.4 WEIGHT LOSS, UNINTENTIONAL: ICD-10-CM

## 2019-10-21 LAB
AMBIGUOUS DTTM AMBI4: NORMAL
HEMOCCULT STL QL: NEGATIVE
HEMOCCULT STL QL: NEGATIVE

## 2019-10-24 ENCOUNTER — OFFICE VISIT (OUTPATIENT)
Dept: ENDOCRINOLOGY | Facility: MEDICAL CENTER | Age: 74
End: 2019-10-24
Payer: MEDICARE

## 2019-10-24 VITALS
HEIGHT: 67 IN | OXYGEN SATURATION: 98 % | HEART RATE: 87 BPM | SYSTOLIC BLOOD PRESSURE: 120 MMHG | DIASTOLIC BLOOD PRESSURE: 60 MMHG | WEIGHT: 227 LBS | BODY MASS INDEX: 35.63 KG/M2

## 2019-10-24 DIAGNOSIS — E11.9 TYPE 2 DIABETES MELLITUS WITHOUT COMPLICATION, UNSPECIFIED WHETHER LONG TERM INSULIN USE (HCC): ICD-10-CM

## 2019-10-24 DIAGNOSIS — I10 ESSENTIAL HYPERTENSION, BENIGN: ICD-10-CM

## 2019-10-24 LAB
HBA1C MFR BLD: 6.9 % (ref 0–5.6)
INT CON NEG: NEGATIVE
INT CON POS: POSITIVE

## 2019-10-24 PROCEDURE — 83036 HEMOGLOBIN GLYCOSYLATED A1C: CPT | Performed by: PHYSICIAN ASSISTANT

## 2019-10-24 PROCEDURE — 99214 OFFICE O/P EST MOD 30 MIN: CPT | Performed by: PHYSICIAN ASSISTANT

## 2019-10-24 NOTE — PROGRESS NOTES
Return to office Patient Consult Note  Referred by: Tanya Rayo P.A.-C.    Reason for consult: Diabetes Management Type 2    HPI:  Dakota Boyer is a 74 y.o. old patient who is seeing us today for diabetes care.  This is a pleasant patient with diabetes and I appreciate the opportunity to participate in the care of this patient.    Labs of 10/24/2019 HbA1c is 6.9  Labs of 4/15/19 HbA1c is 6.8  Labs of 1/10/19 HbA1c is 6.2, GFR >60    BG Diary:10/24/2019  In the AM:  No log    BG Diary:2/15/2019  In the AM: 138, 143, 151, 151, 136, 143, 142, 141     BG Diary:4/15/2019  In the AM: 127, 126, 162, 162, 156, 145      1. Type 2 diabetes mellitus without complication, unspecified whether long term insulin use (HCC)  This is a new patient with me on 1/10/19  He is on:  2.   Lantus  15 once a day at night  3.   Trulicity 1.5 once weekly  5.   Synjardy 12.5/1000 one in the AM one in the PM     2. Essential hypertension, benign  This is stable today and no new changes are needed or required in today's visit      ROS:   Constitutional: No night sweats.  Eyes:  No visual changes.  Cardiac: No chest pain, No palpitations or racing heart rate.  Resp: No shortness of breath, No cough,   Gi: No Diarrhea    All other systems were reviewed and were/are negative.      Past Medical History:  Patient Active Problem List    Diagnosis Date Noted   • Coronary artery disease due to calcified coronary lesion: 40-50% disease in the circumflex at cardiac catheterization in 2013 08/16/2013     Priority: High   • Pacemaker 10/04/2012     Priority: High   • Hypersensitive carotid sinus syndrome 10/02/2012     Priority: High   • Dyslipidemia 09/28/2012     Priority: High   • RBBB 09/28/2012     Priority: High   • Essential hypertension, benign 04/10/2012     Priority: High   • Type 2 diabetes mellitus without complication (HCC) 04/10/2012     Priority: Medium   • DJD (degenerative joint disease) of cervical spine 02/27/2013      Priority: Low   • S/P lumbar discectomy 11/21/2012     Priority: Low   • Degeneration of lumbar or lumbosacral intervertebral disc 11/20/2012     Priority: Low   • Back pain, chronic 04/10/2012     Priority: Low   • Neck pain 04/10/2012     Priority: Low   • Bilateral lower extremity edema 12/04/2018   • Prostate cancer (HCC) 08/09/2018   • Elevated PSA 01/23/2018   • Bilateral primary osteoarthritis of knee 01/23/2018   • Cervical spondylosis 10/19/2016   • Non-proliferative diabetic retinopathy, mild, both eyes (HCC) 06/02/2016   • Hypertensive retinopathy of both eyes 06/02/2016   • Depression with anxiety 03/28/2016   • Obstructive sleep apnea on CPAP 03/28/2016   • Subclinical hypothyroidism 08/21/2014   • Obesity 08/21/2014       Past Surgical History:  Past Surgical History:   Procedure Laterality Date   • KS DSTR NROLYTC AGNT PARVERTEB FCT SNGL CRVCL/THORA Right 10/19/2016    Procedure: NEURO DEST FACET C/T W/IG SNGL - 3ON-C3, C8-T1    SINERGY;  Surgeon: Gaurang Pruett M.D.;  Location: [facility];  Service: Pain Management   • KS DSTR NROLYTC AGNT PARVERTEB FCT ADDL CRVCL/THORA  10/19/2016    Procedure: NEURO DEST FACET C/T W/IG ADDL;  Surgeon: Gaurang Pruett M.D.;  Location: [facility];  Service: Pain Management   • PB FLUOROSCOPIC GUIDANCE NEEDLE PLACEMENT  10/19/2016    Procedure: FLOURO GUIDE NEEDLE PLACEMENT;  Surgeon: Gaurang Pruett M.D.;  Location: [facility];  Service: Pain Management   • SHOULDER ARTHROSCOPY Right 2015    torn bicep tendon and spurs   • RECOVERY  10/17/2013    Performed by Cath-Recovery Surgery at SURGERY SAME DAY [facility]   • WOUND DEHISCENCE  4/10/2013    Performed by Tu Jain M.D. at SURGERY [facility] ORS   • CERVICAL FUSION POSTERIOR  2/27/2013    Performed by Tu Jain M.D. at SURGERY [facility] ORS   • CERVICAL LAMINECTOMY POSTERIOR  2/27/2013    Performed by Tu Jain M.D. at SURGERY [facility]   • LUMBAR  LAMINECTOMY DISKECTOMY  2012    Performed by Tu Jain M.D. at SURGERY [facility] ORS   • RECOVERY  10/4/2012    Performed by Cath-Recovery Surgery at SURGERY SAME DAY [facility] ORS   • PACEMAKER INSERTION  2012    [employer]   implanted by Dr. Waite.   • SHOULDER DECOMPRESSION Left     Left rotator cuff   • ATHROPLASTY Left    • ARTHROSCOPY, KNEE Bilateral    • ORTHOPEDIC OSTEOTOMY      Both knees several times, 8 total   • OTHER     • SPINAL CORD STIMULATOR  1 month ago   • TONSILLECTOMY     • VASECTOMY         Allergies:  Aleve cold & [pseudoephedrine-naproxen na]; Ceftriaxone sodium; Naproxen; and Tape    Social History:  Social History     Socioeconomic History   • Marital status:      Spouse name: Not on file   • Number of children: Not on file   • Years of education: Not on file   • Highest education level: Not on file   Occupational History   • Not on file   Social Needs   • Financial resource strain: Not on file   • Food insecurity:     Worry: Not on file     Inability: Not on file   • Transportation needs:     Medical: Not on file     Non-medical: Not on file   Tobacco Use   • Smoking status: Former Smoker     Packs/day: 1.00     Years: 40.00     Pack years: 40.00     Types: Cigarettes     Last attempt to quit: 1/3/1990     Years since quittin.8   • Smokeless tobacco: Never Used   Substance and Sexual Activity   • Alcohol use: No     Alcohol/week: 0.0 oz   • Drug use: No   • Sexual activity: Yes     Partners: Female   Lifestyle   • Physical activity:     Days per week: Not on file     Minutes per session: Not on file   • Stress: Not on file   Relationships   • Social connections:     Talks on phone: Not on file     Gets together: Not on file     Attends Christian service: Not on file     Active member of club or organization: Not on file     Attends meetings of clubs or organizations: Not on file     Relationship status: Not on file   •  "Intimate partner violence:     Fear of current or ex partner: Not on file     Emotionally abused: Not on file     Physically abused: Not on file     Forced sexual activity: Not on file   Other Topics Concern   • Not on file   Social History Narrative   • Not on file       Family History:  Family History   Problem Relation Age of Onset   • Lung Disease Mother         Smoker   • Alcohol/Drug Mother    • Heart Disease Father 36        CAD   • Heart Disease Sister         \"hole in heart\"   • Lung Disease Brother         COPD, CANCER   • Cancer Brother         Prostate, Lung   • Alcohol/Drug Brother    • Heart Disease Brother    • Diabetes Brother    • Hypertension Brother    • Hyperlipidemia Brother    • Heart Disease Maternal Grandmother    • Hypertension Maternal Grandmother    • Hyperlipidemia Maternal Grandmother    • Cancer Brother         stomach, thyroid   • Heart Disease Brother 60        MI, stent, PM/defib   • Sleep Apnea Brother    • No Known Problems Maternal Grandfather    • No Known Problems Paternal Grandmother        Medications:    Current Outpatient Medications:   •  TRULICITY 1.5 MG/0.5ML Solution Pen-injector, INJECT 1.5 MG EVERY 7 DAYS AS INSTRUCTED, Disp: 6 mL, Rfl: 4  •  insulin glargine (LANTUS) 100 UNIT/ML Solution, Inject 20 Units as instructed every evening., Disp: , Rfl:   •  valsartan (DIOVAN) 160 MG Tab, , Disp: , Rfl:   •  losartan (COZAAR) 25 MG Tab, Take 25 mg by mouth every day., Disp: , Rfl:   •  meloxicam (MOBIC) 7.5 MG Tab, TAKE 1 TO 2 TABLETS PO DAILY, Disp: 180 Tab, Rfl: 0  •  metoprolol (LOPRESSOR) 25 MG Tab, Take 0.5 Tabs by mouth 2 times a day. **change in dose**, Disp: 90 Tab, Rfl: 3  •  atorvastatin (LIPITOR) 20 MG Tab, TAKE 1 TABLET DAILY, Disp: 90 Tab, Rfl: 3  •  buPROPion (WELLBUTRIN XL) 150 MG XL tablet, TAKE 1 TABLET EVERY MORNING, Disp: 90 Tab, Rfl: 1  •  Empagliflozin-Metformin HCl ER 12.5-1000 MG TABLET SR 24 HR, Take 1 Tab by mouth 2 times a day., Disp: 180 Tab, Rfl: " "4  •  FREESTYLE LITE strip, USE TO TEST FOUR TIMES A DAY AND AS NEEDED FOR SYMPTOMS OF HIGH OR LOW SUGAR, Disp: 350 Strip, Rfl: 4  •  Cyanocobalamin (VITAMIN B-12) 2500 MCG SL Tab, Place  under tongue every day., Disp: , Rfl:   •  Lancets MISC, Lancets order: Lancets for Abbott Freestyle Lite meter. Sig: use 4 times daily  and prn ssx high or low sugar. #100 RF x 0, Disp: 360 Each, Rfl: 4  •  Blood Glucose Monitoring Suppl [place], Meter: Dispense Abbott Freestyle Lite meter. Sig. Use as directed for blood sugar monitoring. #1. NR., Disp: 1 Device, Rfl: 0  •  B-D ULTRAFINE III SHORT PEN 31G X 8 MM MISC, USE 1 NEEDLE EVERY DAY WITH LANTUS, Disp: 999 Each, Rfl: 2  •  aspirin EC (ECOTRIN) 81 MG TBEC, Take 81 mg by mouth every day., Disp: , Rfl:         Physical Examination:   Vital signs: /60 (BP Location: Left arm, Patient Position: Sitting)   Pulse 87   Ht 1.702 m (5' 7\")   Wt 103 kg (227 lb)   SpO2 98%   BMI 35.55 kg/m²   General: No distress, cooperative, well dressed and well nourished.   Eyes: No scleral icterus or discharge, No hyposphagma  ENMT: Normal on external inspection of nose, lips, No nasal drainage   Neck: No abnormal masses on inspection  Resp: Normal effort, Bilateral clear to auscultation, No wheezing, No rales  CVS: Regular rate and rhythm, S1 S2 normal, No murmur. No gallop  Extremities: No edema bilateral extremities  Neuro: Alert and oriented  Skin: No rash, No Ulcers  Psych: Normal mood and affect      Assessment and Plan:    1. Type 2 diabetes mellitus without complication, unspecified whether long term insulin use (HCC)  *He is on:  2.   Lantus  23 once a day at night  3.   Trulicity 1.5 once weekly  5.   Synjardy 12.5/1000 one in the AM one in the PM    2. Essential hypertension, benign  This is stable today and no new changes are needed or required in today's visit    Return in about 6 months (around 4/24/2020).      Thank you kindly for allowing me to participate in the diabetes " care plan for this patient.    Favio John PA-C, BC-ADM  Board Certified - [employer] Diabetes Management  10/24/19    CC:   Tanya Rayo P.A.-C.

## 2019-11-27 ENCOUNTER — NON-PROVIDER VISIT (OUTPATIENT)
Dept: CARDIOLOGY | Facility: MEDICAL CENTER | Age: 74
End: 2019-11-27
Payer: MEDICARE

## 2019-11-27 ENCOUNTER — OFFICE VISIT (OUTPATIENT)
Dept: CARDIOLOGY | Facility: MEDICAL CENTER | Age: 74
End: 2019-11-27
Payer: MEDICARE

## 2019-11-27 VITALS
DIASTOLIC BLOOD PRESSURE: 62 MMHG | BODY MASS INDEX: 35.83 KG/M2 | OXYGEN SATURATION: 94 % | SYSTOLIC BLOOD PRESSURE: 92 MMHG | HEART RATE: 92 BPM | WEIGHT: 236.4 LBS | HEIGHT: 68 IN

## 2019-11-27 DIAGNOSIS — I25.10 CORONARY ARTERY DISEASE DUE TO CALCIFIED CORONARY LESION: ICD-10-CM

## 2019-11-27 DIAGNOSIS — I25.84 CORONARY ARTERY DISEASE DUE TO CALCIFIED CORONARY LESION: ICD-10-CM

## 2019-11-27 DIAGNOSIS — E78.5 DYSLIPIDEMIA: ICD-10-CM

## 2019-11-27 DIAGNOSIS — Z95.0 PACEMAKER: ICD-10-CM

## 2019-11-27 DIAGNOSIS — I10 ESSENTIAL HYPERTENSION, BENIGN: ICD-10-CM

## 2019-11-27 PROCEDURE — 99214 OFFICE O/P EST MOD 30 MIN: CPT | Performed by: INTERNAL MEDICINE

## 2019-11-27 PROCEDURE — 93280 PM DEVICE PROGR EVAL DUAL: CPT | Performed by: INTERNAL MEDICINE

## 2019-11-27 NOTE — PROGRESS NOTES
Chief Complaint   Patient presents with   • Coronary Artery Disease       Subjective:   Dakota Boyer is a 74 y.o. male who presents todayfor followup of his dyspnea on exertion, hypertension, hyperlipidemia, permanent pacemaker and mild coronary artery disease.     At the time of his last visit, he was feeling poorly and was having a labile blood pressure.  We obtain basic laboratory work and he had a urinary tract infection.  This was treated, probably by his PCP but he does not remember.  He has been feeling good since that time.    His blood pressures have been fairly stable and running about 110/70.    He denies any chest discomfort, dyspnea, edema, palpitations or lightheadedness.       Past Medical History:   Diagnosis Date   • Anxiety    • Back pain, chronic 10/18/2016   • Blood clotting disorder (HCC) 1978    s/p Left knee surgery- DVT in left leg   • Breath shortness    • CAD (coronary artery disease) October 2012    Positive coronary calcium score.  Follow-up MPI was negative for ischemia.   • Cancer (HCC) ? early 90's    Melanoma Left arm- surgically removed   • Degeneration of lumbar or lumbosacral intervertebral disc     • Dental disorder    • Depression    • Diabetes     Oral agents and insulin   • DJD (degenerative joint disease) of cervical spine     • Episodic lightheadedness     • High cholesterol    • Hyperlipidemia    • Hypersensitive carotid sinus syndrome     • Hypertension    • Incisional infection April 2013    Wound dehiscience of neck surgery.   • Insomnia     • Myocardial infarct (HCC) ?    states was told by  that he has had a heart attack in the past.   • Neck pain 10/18/2016   • Pacemaker October 2012    [employer] [address] #[id number] [facility]    • RBBB     • S/P lumbar discectomy     • Sick sinus syndrome (HCC)     • Sleep apnea     does not use CPAP anymore- stopped in Dec 2015   • Syncope October 2012    Treated with PPM   • Thyroid disease     Hypothyroid   •  Unspecified hemorrhagic conditions     Reports bleeds easily     Past Surgical History:   Procedure Laterality Date   • NY DSTR NROLYTC AGNT PARVERTEB FCT SNGL CRVCL/THORA Right 10/19/2016    Procedure: NEURO DEST FACET C/T W/IG SNGL - 3ON-C3, C8-T1    SINERGY;  Surgeon: Gaurang Pruett M.D.;  Location: [facility];  Service: Pain Management   • NY DSTR NROLYTC AGNT PARVERTEB FCT ADDL CRVCL/THORA  10/19/2016    Procedure: NEURO DEST FACET C/T W/IG ADDL;  Surgeon: Gaurang Pruett M.D.;  Location: [facility];  Service: Pain Management   • PB FLUOROSCOPIC GUIDANCE NEEDLE PLACEMENT  10/19/2016    Procedure: FLOURO GUIDE NEEDLE PLACEMENT;  Surgeon: Gaurang Pruett M.D.;  Location: [facility];  Service: Pain Management   • SHOULDER ARTHROSCOPY Right 2015    torn bicep tendon and spurs   • RECOVERY  10/17/2013    Performed by Cath-Recovery Surgery at SURGERY SAME DAY [facility]   • WOUND DEHISCENCE  4/10/2013    Performed by Tu Jain M.D. at SURGERY [facility]   • CERVICAL FUSION POSTERIOR  2/27/2013    Performed by Tu Jain M.D. at SURGERY [facility]   • CERVICAL LAMINECTOMY POSTERIOR  2/27/2013    Performed by Tu Jain M.D. at SURGERY [facility]   • LUMBAR LAMINECTOMY DISKECTOMY  11/20/2012    Performed by Tu Jain M.D. at SURGERY [facility]   • RECOVERY  10/4/2012    Performed by Cath-Recovery Surgery at SURGERY SAME DAY [facility]   • PACEMAKER INSERTION  October 2012    [employer] [address] [id number] implanted by Dr. Waite.   • SHOULDER DECOMPRESSION Left 2008    Left rotator cuff   • ATHROPLASTY Left 2004   • ARTHROSCOPY, KNEE Bilateral 1978   • ORTHOPEDIC OSTEOTOMY      Both knees several times, 8 total   • OTHER     • SPINAL CORD STIMULATOR  1 month ago   • TONSILLECTOMY     • VASECTOMY       Family History   Problem Relation Age of Onset   • Lung Disease Mother         Smoker   • Alcohol/Drug Mother    • Heart Disease Father 36     "    CAD   • Heart Disease Sister         \"hole in heart\"   • Lung Disease Brother         COPD, CANCER   • Cancer Brother         Prostate, Lung   • Alcohol/Drug Brother    • Heart Disease Brother    • Diabetes Brother    • Hypertension Brother    • Hyperlipidemia Brother    • Heart Disease Maternal Grandmother    • Hypertension Maternal Grandmother    • Hyperlipidemia Maternal Grandmother    • Cancer Brother         stomach, thyroid   • Heart Disease Brother 60        MI, stent, PM/defib   • Sleep Apnea Brother    • No Known Problems Maternal Grandfather    • No Known Problems Paternal Grandmother      Social History     Socioeconomic History   • Marital status:      Spouse name: Not on file   • Number of children: Not on file   • Years of education: Not on file   • Highest education level: Not on file   Occupational History   • Not on file   Social Needs   • Financial resource strain: Not on file   • Food insecurity:     Worry: Not on file     Inability: Not on file   • Transportation needs:     Medical: Not on file     Non-medical: Not on file   Tobacco Use   • Smoking status: Former Smoker     Packs/day: 1.00     Years: 40.00     Pack years: 40.00     Types: Cigarettes     Last attempt to quit: 1/3/1990     Years since quittin.9   • Smokeless tobacco: Never Used   Substance and Sexual Activity   • Alcohol use: No     Alcohol/week: 0.0 oz   • Drug use: No   • Sexual activity: Yes     Partners: Female   Lifestyle   • Physical activity:     Days per week: Not on file     Minutes per session: Not on file   • Stress: Not on file   Relationships   • Social connections:     Talks on phone: Not on file     Gets together: Not on file     Attends Mandaeism service: Not on file     Active member of club or organization: Not on file     Attends meetings of clubs or organizations: Not on file     Relationship status: Not on file   • Intimate partner violence:     Fear of current or ex partner: Not on file     " Emotionally abused: Not on file     Physically abused: Not on file     Forced sexual activity: Not on file   Other Topics Concern   • Not on file   Social History Narrative   • Not on file     Allergies   Allergen Reactions   • Aleve Cold & [Pseudoephedrine-Naproxen Na] Anaphylaxis   • Ceftriaxone Sodium Anaphylaxis     Reaction; 1970's.   • Naproxen Anaphylaxis     Reaction; 2004.   • Tape Rash and Itching     Plastic tape (paper tape ok)     Outpatient Encounter Medications as of 11/27/2019   Medication Sig Dispense Refill   • insulin glargine (LANTUS SOLOSTAR) 100 UNIT/ML Solution Pen-injector injection Inject 26 Units as instructed every evening. 5 PEN 6   • glucose blood (FREESTYLE LITE) strip 1 Strip by Other route 2 times a day. 150 Strip 3   • TRULICITY 1.5 MG/0.5ML Solution Pen-injector INJECT 1.5 MG EVERY 7 DAYS AS INSTRUCTED 6 mL 4   • valsartan (DIOVAN) 160 MG Tab      • losartan (COZAAR) 25 MG Tab Take 25 mg by mouth every day.     • meloxicam (MOBIC) 7.5 MG Tab TAKE 1 TO 2 TABLETS PO DAILY 180 Tab 0   • metoprolol (LOPRESSOR) 25 MG Tab Take 0.5 Tabs by mouth 2 times a day. **change in dose** 90 Tab 3   • atorvastatin (LIPITOR) 20 MG Tab TAKE 1 TABLET DAILY 90 Tab 3   • buPROPion (WELLBUTRIN XL) 150 MG XL tablet TAKE 1 TABLET EVERY MORNING 90 Tab 1   • Empagliflozin-Metformin HCl ER 12.5-1000 MG TABLET SR 24 HR Take 1 Tab by mouth 2 times a day. 180 Tab 4   • Cyanocobalamin (VITAMIN B-12) 2500 MCG SL Tab Place  under tongue every day.     • Lancets MISC Lancets order: Lancets for Abbott Freestyle Lite meter. Sig: use 4 times daily  and prn ssx high or low sugar. #100 RF x 0 360 Each 4   • Blood Glucose Monitoring Suppl [place] Meter: Dispense Abbott Freestyle Lite meter. Sig. Use as directed for blood sugar monitoring. #1. NR. 1 Device 0   • B-D ULTRAFINE III SHORT PEN 31G X 8 MM MISC USE 1 NEEDLE EVERY DAY WITH LANTUS 999 Each 2   • aspirin EC (ECOTRIN) 81 MG TBEC Take 81 mg by mouth every day.     •  "FREESTYLE LITE strip USE TO TEST FOUR TIMES A DAY AND AS NEEDED FOR SYMPTOMS OF HIGH OR LOW SUGAR 350 Strip 4     No facility-administered encounter medications on file as of 11/27/2019.      ROS       Objective:   BP (!) 92/62 (BP Location: Left arm, Patient Position: Sitting, BP Cuff Size: Adult)   Pulse 92   Ht 1.727 m (5' 8\")   Wt 107.2 kg (236 lb 6.4 oz)   SpO2 94%   BMI 35.94 kg/m²     Physical Exam   Constitutional: He appears well-developed and well-nourished.   Neck: No JVD present.   Cardiovascular: Normal rate and regular rhythm.   Murmur (2/6 to 3/6 systolic at the base) heard.  Pulmonary/Chest: Effort normal and breath sounds normal. He has no rales.   Abdominal: Soft. There is no tenderness.   Musculoskeletal:         General: Edema ( 1+ pretibial) present.     Lab Results   Component Value Date/Time    CHOLSTRLTOT 94 (L) 05/13/2019 07:11 AM    LDL 40 05/13/2019 07:11 AM    HDL 30 (A) 05/13/2019 07:11 AM    TRIGLYCERIDE 120 05/13/2019 07:11 AM       Lab Results   Component Value Date/Time    SODIUM 136 10/04/2019 03:45 PM    POTASSIUM 4.4 10/04/2019 03:45 PM    CHLORIDE 103 10/04/2019 03:45 PM    CO2 26 10/04/2019 03:45 PM    GLUCOSE 180 (H) 10/04/2019 03:45 PM    BUN 29 (H) 10/04/2019 03:45 PM    CREATININE 1.20 10/04/2019 03:45 PM     Lab Results   Component Value Date/Time    ALKPHOSPHAT 72 10/04/2019 03:45 PM    ASTSGOT 24 10/04/2019 03:45 PM    ALTSGPT 33 10/04/2019 03:45 PM    TBILIRUBIN 1.3 10/04/2019 03:45 PM      Lab Results   Component Value Date/Time    BNPBTYPENAT 4 06/13/2016 06:34 AM      Results for TON POWER (MRN [id number]) as of 10/8/2019 14:50   Ref. Range 10/4/2019 15:45   WBC Latest Ref Range: 4.8 - 10.8 K/uL 10.1   RBC Latest Ref Range: 4.70 - 6.10 M/uL 4.92   Hemoglobin Latest Ref Range: 14.0 - 18.0 g/dL 15.6   Hematocrit Latest Ref Range: 42.0 - 52.0 % 45.1   MCV Latest Ref Range: 81.4 - 97.8 fL 91.7   MCH Latest Ref Range: 27.0 - 33.0 pg 31.7   MCHC Latest Ref " Range: 33.7 - 35.3 g/dL 34.6   RDW Latest Ref Range: 35.9 - 50.0 fL 47.0   Platelet Count Latest Ref Range: 164 - 446 K/uL 175   MPV Latest Ref Range: 9.0 - 12.9 fL 10.9   Neutrophils-Polys Latest Ref Range: 44.00 - 72.00 % 64.50   Neutrophils (Absolute) Latest Ref Range: 1.82 - 7.42 K/uL 6.49   Lymphocytes Latest Ref Range: 22.00 - 41.00 % 23.80   Lymphs (Absolute) Latest Ref Range: 1.00 - 4.80 K/uL 2.39   Monocytes Latest Ref Range: 0.00 - 13.40 % 8.60   Monos (Absolute) Latest Ref Range: 0.00 - 0.85 K/uL 0.87 (H)   Eosinophils Latest Ref Range: 0.00 - 6.90 % 1.80   Eos (Absolute) Latest Ref Range: 0.00 - 0.51 K/uL 0.18   Basophils Latest Ref Range: 0.00 - 1.80 % 0.80   Baso (Absolute) Latest Ref Range: 0.00 - 0.12 K/uL 0.08   Immature Granulocytes Latest Ref Range: 0.00 - 0.90 % 0.50   Immature Granulocytes (abs) Latest Ref Range: 0.00 - 0.11 K/uL 0.05   Nucleated RBC Latest Units: /100 WBC 0.00   NRBC (Absolute) Latest Units: K/uL 0.00             Pacemaker check: Patient did have one episode of supraventricular tachycardia.  The exact etiology is not entirely clear.  It could be atrial tach or atrial flutter.  Cannot entirely exclude a sinus tachycardia.  Patient had his pacemaker reprogrammed for better sensing on the atrial lead.  He can have a recheck in about 2 months.  We will follow this problem for now.    Echocardiography Laboratory    CONCLUSIONS  Normal left ventricular chamber size. Mild concentric left ventricular   hypertrophy, LVPW 1.1 cm.  Grossly normal left ventricular systolic   function. Left ventricular ejection fraction is visually estimated to   be 65%. Normal regional wall motion. Normal diastolic function.  Tricuspid aortic valve. Calcified aortic valve leaflets. Mild aortic   stenosis. Aortic valve area calculated from the continuity equation is   1.9 sq cm. Vmax is 2.5  m/s. Transvalvular gradients are - Peak: 25   mmHg, Mean: 16 mmHg. No aortic insufficiency.  Compared to the report  of the study done 7/2017 - there has been no   significant change.     TON BOYER  Exam Date:         09/27/2019     Assessment:     1. Coronary artery disease due to calcified coronary lesion: 40-50% disease in the circumflex at cardiac catheterization in 2013     2. Pacemaker     3. Dyslipidemia     4. Essential hypertension, benign         Medical Decision Making:  Today's Assessment / Status / Plan:     Mr. Boyer is clinically stable.  He has been taking both low-dose losartan in addition to valsartan.  We will discontinue losartan at the present time.  His blood pressures at home of been under good control though his blood pressure is mildly low today.  He has had no lightheadedness.  We will continue him on his current medications and he will follow-up in about 6 months with repeat lab work.

## 2019-12-19 ENCOUNTER — HOSPITAL ENCOUNTER (OUTPATIENT)
Dept: LAB | Facility: MEDICAL CENTER | Age: 74
End: 2019-12-19
Attending: UROLOGY
Payer: MEDICARE

## 2019-12-19 LAB — PSA SERPL-MCNC: 8.87 NG/ML (ref 0–4)

## 2019-12-19 PROCEDURE — 36415 COLL VENOUS BLD VENIPUNCTURE: CPT | Mod: GA

## 2019-12-19 PROCEDURE — 84153 ASSAY OF PSA TOTAL: CPT | Mod: GA

## 2020-01-01 DIAGNOSIS — M17.0 PRIMARY OSTEOARTHRITIS OF BOTH KNEES: ICD-10-CM

## 2020-01-01 DIAGNOSIS — M51.37 DEGENERATION OF LUMBAR OR LUMBOSACRAL INTERVERTEBRAL DISC: ICD-10-CM

## 2020-01-01 DIAGNOSIS — M47.812 SPONDYLOSIS OF CERVICAL REGION WITHOUT MYELOPATHY OR RADICULOPATHY: ICD-10-CM

## 2020-01-02 RX ORDER — MELOXICAM 7.5 MG/1
TABLET ORAL
Qty: 180 TAB | Refills: 0 | Status: SHIPPED | OUTPATIENT
Start: 2020-01-02 | End: 2020-04-02

## 2020-01-02 NOTE — TELEPHONE ENCOUNTER
Was the patient seen in the last year in this department? Yes    Does patient have an active prescription for medications requested? No     Received Request Via: Pharmacy      Pt met protocol?: Yes   Pt last ov 10/2019

## 2020-01-17 ENCOUNTER — SLEEP CENTER VISIT (OUTPATIENT)
Dept: SLEEP MEDICINE | Facility: MEDICAL CENTER | Age: 75
End: 2020-01-17
Payer: MEDICARE

## 2020-01-17 VITALS
HEART RATE: 76 BPM | DIASTOLIC BLOOD PRESSURE: 70 MMHG | SYSTOLIC BLOOD PRESSURE: 110 MMHG | RESPIRATION RATE: 16 BRPM | OXYGEN SATURATION: 95 %

## 2020-01-17 DIAGNOSIS — G47.33 OSA (OBSTRUCTIVE SLEEP APNEA): ICD-10-CM

## 2020-01-17 PROCEDURE — 99213 OFFICE O/P EST LOW 20 MIN: CPT | Performed by: NURSE PRACTITIONER

## 2020-01-17 ASSESSMENT — ENCOUNTER SYMPTOMS
CONSTITUTIONAL NEGATIVE: 1
EYE PAIN: 0
RESPIRATORY NEGATIVE: 1
CARDIOVASCULAR NEGATIVE: 1
EYE DISCHARGE: 0
BRUISES/BLEEDS EASILY: 0
NEUROLOGICAL NEGATIVE: 1
PSYCHIATRIC NEGATIVE: 1
MUSCULOSKELETAL NEGATIVE: 1

## 2020-01-17 NOTE — PROGRESS NOTES
Chief Complaint   Patient presents with   • Apnea     Last Seen 09/12/19         HPI: This patient is a 74 y.o. male, who presents for 6-month follow-up LYNDA with compliance check.     PSG from 2013 indicated an AHI of 16.7 and low oxygenation of 75%.  Currently he is being treated with CPAP @ 12 cm H20. Compliance download over the past 30 days indicates 76 % compliance, average use of 6 hours 39 minutes per night, AHI of 1.5. Patient reports no problem with his machine.  He tolerates therapy but does not notice much difference with it.  He denied significant sleep symptoms to begin with.  He says it is just wears his machine so his wife does not hear him snoring.  He has had a difficult time obtaining supplies from his [employer] company and may like to switch.      Past Medical History:   Diagnosis Date   • Anxiety    • Back pain, chronic 10/18/2016   • Blood clotting disorder (HCC) 1978    s/p Left knee surgery- DVT in left leg   • Breath shortness    • CAD (coronary artery disease) October 2012    Positive coronary calcium score.  Follow-up MPI was negative for ischemia.   • Cancer (HCC) ? early 90's    Melanoma Left arm- surgically removed   • Degeneration of lumbar or lumbosacral intervertebral disc     • Dental disorder    • Depression    • Diabetes     Oral agents and insulin   • DJD (degenerative joint disease) of cervical spine     • Episodic lightheadedness     • High cholesterol    • Hyperlipidemia    • Hypersensitive carotid sinus syndrome     • Hypertension    • Incisional infection April 2013    Wound dehiscience of neck surgery.   • Insomnia     • Myocardial infarct (HCC) ?    states was told by  that he has had a heart attack in the past.   • Neck pain 10/18/2016   • Pacemaker October 2012    [employer] [address] #[id number] implanted by [facility].    • RBBB     • S/P lumbar discectomy     • Sick sinus syndrome (HCC)     • Sleep apnea     does not use CPAP anymore- stopped in Dec 2015   • Syncope October 2012  "   Treated with PPM   • Thyroid disease     Hypothyroid   • Unspecified hemorrhagic conditions     Reports bleeds easily       Social History     Tobacco Use   • Smoking status: Former Smoker     Packs/day: 1.00     Years: 40.00     Pack years: 40.00     Types: Cigarettes     Last attempt to quit: 1/3/1990     Years since quittin.0   • Smokeless tobacco: Never Used   Substance Use Topics   • Alcohol use: No     Alcohol/week: 0.0 oz   • Drug use: No       Family History   Problem Relation Age of Onset   • Lung Disease Mother         Smoker   • Alcohol/Drug Mother    • Heart Disease Father 36        CAD   • Heart Disease Sister         \"hole in heart\"   • Lung Disease Brother         COPD, CANCER   • Cancer Brother         Prostate, Lung   • Alcohol/Drug Brother    • Heart Disease Brother    • Diabetes Brother    • Hypertension Brother    • Hyperlipidemia Brother    • Heart Disease Maternal Grandmother    • Hypertension Maternal Grandmother    • Hyperlipidemia Maternal Grandmother    • Cancer Brother         stomach, thyroid   • Heart Disease Brother 60        MI, stent, PM/defib   • Sleep Apnea Brother    • No Known Problems Maternal Grandfather    • No Known Problems Paternal Grandmother        Immunization History   Administered Date(s) Administered   • Influenza (IM) Preservative Free 10/04/2012   • Influenza Seasonal Injectable 2013   • Influenza TIV (IM) 10/04/2012, 2013   • Influenza Vaccine Adult HD 10/23/2015, 10/28/2016, 2017, 2019, 10/04/2019   • Pneumococcal Conjugate Vaccine (Prevnar/PCV-13) 2016   • Pneumococcal polysaccharide vaccine (PPSV-23) 10/04/2012   • Recombinant Zoster Vaccine (RZV) (Shingrix) 2019, 10/08/2019   • TD Vaccine 2008   • Tdap Vaccine 2018   • Zoster Vaccine Live (ZVL) (Zostavax) 2014, 2014       Current medications as of today   Current Outpatient Medications   Medication Sig Dispense Refill   • meloxicam (MOBIC) 7.5 " MG Tab TAKE 1 TO 2 TABLETS DAILY 180 Tab 0   • insulin glargine (LANTUS SOLOSTAR) 100 UNIT/ML Solution Pen-injector injection Inject 26 Units as instructed every evening. 5 PEN 6   • glucose blood (FREESTYLE LITE) strip 1 Strip by Other route 2 times a day. 150 Strip 3   • TRULICITY 1.5 MG/0.5ML Solution Pen-injector INJECT 1.5 MG EVERY 7 DAYS AS INSTRUCTED 6 mL 4   • valsartan (DIOVAN) 160 MG Tab      • metoprolol (LOPRESSOR) 25 MG Tab Take 0.5 Tabs by mouth 2 times a day. **change in dose** 90 Tab 3   • atorvastatin (LIPITOR) 20 MG Tab TAKE 1 TABLET DAILY 90 Tab 3   • buPROPion (WELLBUTRIN XL) 150 MG XL tablet TAKE 1 TABLET EVERY MORNING 90 Tab 1   • Empagliflozin-Metformin HCl ER 12.5-1000 MG TABLET SR 24 HR Take 1 Tab by mouth 2 times a day. 180 Tab 4   • Cyanocobalamin (VITAMIN B-12) 2500 MCG SL Tab Place  under tongue every day.     • Lancets MISC Lancets order: Lancets for Abbott Freestyle Lite meter. Sig: use 4 times daily  and prn ssx high or low sugar. #100 RF x 0 360 Each 4   • aspirin EC (ECOTRIN) 81 MG TBEC Take 81 mg by mouth every day.     • Blood Glucose Monitoring Suppl [place] Meter: Dispense Abbott Freestyle Lite meter. Sig. Use as directed for blood sugar monitoring. #1. NR. 1 Device 0   • B-D ULTRAFINE III SHORT PEN 31G X 8 MM MISC USE 1 NEEDLE EVERY DAY WITH LANTUS 999 Each 2     No current facility-administered medications for this visit.        Allergies: Aleve cold & [pseudoephedrine-naproxen na]; Ceftriaxone sodium; Naproxen; and Tape    /70 (BP Location: Right arm, Patient Position: Sitting, BP Cuff Size: Adult)   Pulse 76   Resp 16   SpO2 95%       Review of Systems   Constitutional: Negative.    HENT: Negative.    Eyes: Negative for pain and discharge.   Respiratory: Negative.    Cardiovascular: Negative.    Musculoskeletal: Negative.    Neurological: Negative.    Endo/Heme/Allergies: Negative for environmental allergies. Does not bruise/bleed easily.   Psychiatric/Behavioral:  Negative.        Physical Exam   Constitutional: He is oriented to person, place, and time and well-developed, well-nourished, and in no distress.   HENT:   Head: Normocephalic and atraumatic.   Eyes: Pupils are equal, round, and reactive to light.   Neck: Normal range of motion. Neck supple. No tracheal deviation present.   Cardiovascular: Normal rate, regular rhythm and normal heart sounds.   Pulmonary/Chest: Effort normal and breath sounds normal.   Musculoskeletal: Normal range of motion.   Neurological: He is alert and oriented to person, place, and time.   Skin: Skin is warm and dry.   Psychiatric: Mood, memory, affect and judgment normal.   Vitals reviewed.      Diagnoses/Plan:    1. LYNDA (obstructive sleep apnea)   Continue CPAP 12 cm H2O nightly, Clean mask & tubing weekly, Replace supplies as insurance will allow, RX for new supplies to DME.  Follow-up annually, sooner if needed.      - DME Mask and Supplies        This dictation was created using voice recognition software. The accuracy of the dictation is limited to the abilities of the software. I expect there may be some errors of grammar and possibly content.

## 2020-01-24 DIAGNOSIS — I10 ESSENTIAL HYPERTENSION, BENIGN: Primary | ICD-10-CM

## 2020-01-24 RX ORDER — VALSARTAN 160 MG/1
160 TABLET ORAL DAILY
Qty: 90 TAB | Refills: 3 | Status: SHIPPED | OUTPATIENT
Start: 2020-01-24 | End: 2021-02-22

## 2020-02-14 ENCOUNTER — OFFICE VISIT (OUTPATIENT)
Dept: MEDICAL GROUP | Facility: PHYSICIAN GROUP | Age: 75
End: 2020-02-14
Payer: MEDICARE

## 2020-02-14 VITALS
TEMPERATURE: 98 F | HEART RATE: 64 BPM | HEIGHT: 68 IN | OXYGEN SATURATION: 97 % | BODY MASS INDEX: 34.86 KG/M2 | WEIGHT: 230 LBS | DIASTOLIC BLOOD PRESSURE: 56 MMHG | SYSTOLIC BLOOD PRESSURE: 102 MMHG

## 2020-02-14 DIAGNOSIS — Z79.4 TYPE 2 DIABETES MELLITUS WITHOUT COMPLICATION, WITH LONG-TERM CURRENT USE OF INSULIN (HCC): ICD-10-CM

## 2020-02-14 DIAGNOSIS — I25.10 CORONARY ARTERY DISEASE DUE TO CALCIFIED CORONARY LESION: ICD-10-CM

## 2020-02-14 DIAGNOSIS — E11.9 TYPE 2 DIABETES MELLITUS WITHOUT COMPLICATION, WITH LONG-TERM CURRENT USE OF INSULIN (HCC): ICD-10-CM

## 2020-02-14 DIAGNOSIS — I25.84 CORONARY ARTERY DISEASE DUE TO CALCIFIED CORONARY LESION: ICD-10-CM

## 2020-02-14 DIAGNOSIS — R60.0 BILATERAL LOWER EXTREMITY EDEMA: ICD-10-CM

## 2020-02-14 DIAGNOSIS — I10 ESSENTIAL HYPERTENSION, BENIGN: ICD-10-CM

## 2020-02-14 PROBLEM — N40.1 BENIGN PROSTATIC HYPERPLASIA WITH URINARY OBSTRUCTION: Status: ACTIVE | Noted: 2019-12-22

## 2020-02-14 PROBLEM — N13.8 BENIGN PROSTATIC HYPERPLASIA WITH URINARY OBSTRUCTION: Status: ACTIVE | Noted: 2019-12-22

## 2020-02-14 PROCEDURE — 99214 OFFICE O/P EST MOD 30 MIN: CPT | Performed by: PHYSICIAN ASSISTANT

## 2020-02-14 NOTE — PROGRESS NOTES
Subjective:   Dakota Boyer is a 75 y.o. male here today for follow-up on hypertension and other medical problems. Is an established patient of mine.    HPI:    Patient presents to the office today for routine follow-up on chronic conditions which were last addressed in August 2019. He denies any significant changes to his health since that time and denies new concerns today.    He continues on valsartan 160 mg daily (switched from losartan during recall) and metoprolol 25 mg twice daily for his hypertension.  Blood pressure today in the office is 102/56. He states it typically runs a bit higher than this at home--120/60-70 range. Denies dizziness/lightheadedness/pre-syncope.  He follows regularly with cardiology for CAD.  Last visit was in November 2019.  He has had updated echocardiogram since last appointment with me which showed no significant change from previous study in 2017.    Patient has type 2 diabetes which is treated with empagliflozin-metformin 12.5-1000 mg twice daily, Trulicity 1.5 mg weekly, and Lantus 23 units nightly. Fasting blood sugars typically run between 115-170 (higher end of range when he eats high-carb meal/snack night before). Last A1c in 10/2019 was 6.9. He follows regularly with endocrinology. Current endocrine provider is leaving--will be establishing with Dr. Rnig in May.    Continues to experience edema in lower extremities which comes and goes. He is not overly bothered by it. He has been evaluated by the vein clinic who offered ablation procedure but he has chosen to forego this. No current compression sock/stockings.      Current medicines (including changes today)  Current Outpatient Medications   Medication Sig Dispense Refill   • valsartan (DIOVAN) 160 MG Tab Take 1 Tab by mouth every day. 90 Tab 3   • meloxicam (MOBIC) 7.5 MG Tab TAKE 1 TO 2 TABLETS DAILY 180 Tab 0   • insulin glargine (LANTUS SOLOSTAR) 100 UNIT/ML Solution Pen-injector injection Inject 26 Units  as instructed every evening. 5 PEN 6   • glucose blood (FREESTYLE LITE) strip 1 Strip by Other route 2 times a day. 150 Strip 3   • TRULICITY 1.5 MG/0.5ML Solution Pen-injector INJECT 1.5 MG EVERY 7 DAYS AS INSTRUCTED 6 mL 4   • metoprolol (LOPRESSOR) 25 MG Tab Take 0.5 Tabs by mouth 2 times a day. **change in dose** 90 Tab 3   • atorvastatin (LIPITOR) 20 MG Tab TAKE 1 TABLET DAILY 90 Tab 3   • buPROPion (WELLBUTRIN XL) 150 MG XL tablet TAKE 1 TABLET EVERY MORNING 90 Tab 1   • Empagliflozin-Metformin HCl ER 12.5-1000 MG TABLET SR 24 HR Take 1 Tab by mouth 2 times a day. 180 Tab 4   • Cyanocobalamin (VITAMIN B-12) 2500 MCG SL Tab Place  under tongue every day.     • Lancets MISC Lancets order: Lancets for Abbott Freestyle Lite meter. Sig: use 4 times daily  and prn ssx high or low sugar. #100 RF x 0 360 Each 4   • Blood Glucose Monitoring Suppl [place] Meter: Dispense Abbott Freestyle Lite meter. Sig. Use as directed for blood sugar monitoring. #1. NR. 1 Device 0   • B-D ULTRAFINE III SHORT PEN 31G X 8 MM MISC USE 1 NEEDLE EVERY DAY WITH LANTUS 999 Each 2   • aspirin EC (ECOTRIN) 81 MG TBEC Take 81 mg by mouth every day.       No current facility-administered medications for this visit.      He  has a past medical history of Anxiety, Back pain, chronic (10/18/2016), Blood clotting disorder ([facility]) (1978), Breath shortness, CAD (coronary artery disease) (October 2012), Cancer ([facility]) (? early 90's), Degeneration of lumbar or lumbosacral intervertebral disc ( ), Dental disorder, Depression, Diabetes, DJD (degenerative joint disease) of cervical spine ( ), Episodic lightheadedness ( ), High cholesterol, Hyperlipidemia, Hypersensitive carotid sinus syndrome ( ), Hypertension, Incisional infection (April 2013), Insomnia ( ), Myocardial infarct ([facility]) (?), Neck pain (10/18/2016), Pacemaker (October 2012), RBBB ( ), S/P lumbar discectomy ( ), Sick sinus syndrome ([facility]) ( ), Sleep apnea, Syncope (October 2012), Thyroid disease, and  "Unspecified hemorrhagic conditions.    ROS  Pulmonary ROS: No shortness of breath  Cardiovascular ROS: No chest pain     Objective:     /56 (BP Location: Right arm, Patient Position: Sitting, BP Cuff Size: Adult)   Pulse 64   Temp 36.7 °C (98 °F) (Tympanic)   Ht 1.727 m (5' 8\")   Wt 104.3 kg (230 lb)   SpO2 97%  Body mass index is 34.97 kg/m².     Physical Exam:  Constitutional: Alert, well-appearing, very pleasant, no distress.  Skin: Warm, dry, good turgor, no rashes in visible areas.  Eye: Pupils are equal and round, conjunctiva clear, lids normal.  ENMT: Lips without lesions, moist mucus membranes.  Neck: Normal-appearing.  Respiratory: Unlabored respiratory effort, lungs clear to auscultation, no wheezes, no rhonchi.  Cardiovascular: Normal S1, S2, no murmur, 1+ bilateral lower extremity edema which appears equal bilaterally. +Hemosiderin staining bilaterally.      Assessment and Plan:   The following treatment plan was discussed    1. Type 2 diabetes mellitus without complication, with long-term current use of insulin (HCC)  Established problem, well-controlled with current medication regimen.  He is due for microalbumin creatinine ratio which I have ordered.  He is due to do some lab work for his cardiologist before next appointment, so can wait until then to have done.  Should follow-up with endocrinology as scheduled in May.  - MICROALBUMIN CREAT RATIO URINE; Future    2. Essential hypertension, benign  Established problem, well-controlled with valsartan and metoprolol.  Blood pressure on the low side today but states it runs lower at home and is not having any symptoms of hypotension.  Continue current management.    3. Coronary artery disease due to calcified coronary lesion: 40-50% disease in the circumflex at cardiac catheterization in 2013  Established problem, stable with medical management.  Denies any current angina/dyspnea.  He will continue to follow with his cardiologist.    4. " Bilateral lower extremity edema  Established problem, uncontrolled but stable.  Discussed with patient that treatment of his vein disease would likely help with the swelling, as would compression therapy.  He is not really interested in either as the swelling does not overly bother him.  Will continue to monitor.      Followup: Return in about 6 months (around 8/14/2020) for AWV.    Tanya Rayo P.A.-C.

## 2020-03-11 DIAGNOSIS — F41.8 DEPRESSION WITH ANXIETY: ICD-10-CM

## 2020-03-11 RX ORDER — BUPROPION HYDROCHLORIDE 150 MG/1
150 TABLET ORAL EVERY MORNING
Qty: 90 TAB | Refills: 1 | Status: SHIPPED | OUTPATIENT
Start: 2020-03-11 | End: 2020-08-14 | Stop reason: SDUPTHER

## 2020-03-11 NOTE — TELEPHONE ENCOUNTER
Was the patient seen in the last year in this department? Yes    Does patient have an active prescription for medications requested? No     Received Request Via: Pharmacy      Pt met protocol?: Yes, ov 2/20

## 2020-04-01 DIAGNOSIS — M51.37 DEGENERATION OF LUMBAR OR LUMBOSACRAL INTERVERTEBRAL DISC: ICD-10-CM

## 2020-04-02 RX ORDER — MELOXICAM 7.5 MG/1
TABLET ORAL
Qty: 180 TAB | Refills: 1 | Status: SHIPPED | OUTPATIENT
Start: 2020-04-02 | End: 2020-08-14 | Stop reason: SDUPTHER

## 2020-05-04 RX ORDER — EMPAGLIFLOZIN, METFORMIN HYDROCHLORIDE 12.5; 1 MG/1; MG/1
TABLET, EXTENDED RELEASE ORAL
Qty: 180 TAB | Refills: 3 | Status: SHIPPED | OUTPATIENT
Start: 2020-05-04 | End: 2020-07-01 | Stop reason: SDUPTHER

## 2020-05-04 NOTE — TELEPHONE ENCOUNTER
Received request via: Pharmacy    Was the patient seen in the last year in this department? Yes    Does the patient have an active prescription (recently filled or refills available) for medication(s) requested? No     SYNJARDY XR TABS 12.5/1000        Will file in chart as: SYNJARDY XR 12.5-1000 MG TABLET SR 24 HR       Sig: TAKE 1 TABLET TWICE A DAY    Patient saw Favio 10/24/19 and has an upcomming lona with you on 6/3/20

## 2020-05-29 ENCOUNTER — TELEPHONE (OUTPATIENT)
Dept: CARDIOLOGY | Facility: MEDICAL CENTER | Age: 75
End: 2020-05-29

## 2020-05-29 NOTE — TELEPHONE ENCOUNTER
Attempted to reach patient to see if he had the chance to complete blood work previously ordered by FK to get records prior to upcoming appointment. Patient answered the phone then hung up on me.

## 2020-06-03 ENCOUNTER — OFFICE VISIT (OUTPATIENT)
Dept: CARDIOLOGY | Facility: MEDICAL CENTER | Age: 75
End: 2020-06-03
Payer: MEDICARE

## 2020-06-03 ENCOUNTER — NON-PROVIDER VISIT (OUTPATIENT)
Dept: CARDIOLOGY | Facility: MEDICAL CENTER | Age: 75
End: 2020-06-03
Payer: MEDICARE

## 2020-06-03 VITALS
OXYGEN SATURATION: 95 % | HEART RATE: 77 BPM | HEIGHT: 68 IN | WEIGHT: 233.69 LBS | DIASTOLIC BLOOD PRESSURE: 80 MMHG | BODY MASS INDEX: 35.42 KG/M2 | SYSTOLIC BLOOD PRESSURE: 128 MMHG | RESPIRATION RATE: 18 BRPM

## 2020-06-03 DIAGNOSIS — I25.10 CORONARY ARTERY DISEASE DUE TO CALCIFIED CORONARY LESION: ICD-10-CM

## 2020-06-03 DIAGNOSIS — I10 ESSENTIAL HYPERTENSION, BENIGN: ICD-10-CM

## 2020-06-03 DIAGNOSIS — Z95.0 PACEMAKER: ICD-10-CM

## 2020-06-03 DIAGNOSIS — E78.5 DYSLIPIDEMIA: ICD-10-CM

## 2020-06-03 DIAGNOSIS — I35.0 MILD AORTIC STENOSIS: ICD-10-CM

## 2020-06-03 DIAGNOSIS — I25.84 CORONARY ARTERY DISEASE DUE TO CALCIFIED CORONARY LESION: ICD-10-CM

## 2020-06-03 DIAGNOSIS — I45.10 RIGHT BUNDLE BRANCH BLOCK: ICD-10-CM

## 2020-06-03 LAB — EKG IMPRESSION: NORMAL

## 2020-06-03 PROCEDURE — 93280 PM DEVICE PROGR EVAL DUAL: CPT | Performed by: INTERNAL MEDICINE

## 2020-06-03 PROCEDURE — 99213 OFFICE O/P EST LOW 20 MIN: CPT | Performed by: INTERNAL MEDICINE

## 2020-06-03 PROCEDURE — 93000 ELECTROCARDIOGRAM COMPLETE: CPT | Performed by: INTERNAL MEDICINE

## 2020-06-03 ASSESSMENT — FIBROSIS 4 INDEX: FIB4 SCORE: 1.81

## 2020-06-12 ENCOUNTER — HOSPITAL ENCOUNTER (OUTPATIENT)
Dept: LAB | Facility: MEDICAL CENTER | Age: 75
End: 2020-06-12
Attending: UROLOGY
Payer: MEDICARE

## 2020-06-12 LAB — PSA SERPL-MCNC: 6.73 NG/ML (ref 0–4)

## 2020-06-12 PROCEDURE — 84153 ASSAY OF PSA TOTAL: CPT

## 2020-06-12 PROCEDURE — 36415 COLL VENOUS BLD VENIPUNCTURE: CPT

## 2020-07-01 ENCOUNTER — OFFICE VISIT (OUTPATIENT)
Dept: ENDOCRINOLOGY | Facility: MEDICAL CENTER | Age: 75
End: 2020-07-01
Attending: PHYSICIAN ASSISTANT
Payer: MEDICARE

## 2020-07-01 VITALS
HEART RATE: 78 BPM | WEIGHT: 235.1 LBS | BODY MASS INDEX: 36.9 KG/M2 | SYSTOLIC BLOOD PRESSURE: 106 MMHG | OXYGEN SATURATION: 95 % | DIASTOLIC BLOOD PRESSURE: 68 MMHG | HEIGHT: 67 IN

## 2020-07-01 DIAGNOSIS — E03.8 SUBCLINICAL HYPOTHYROIDISM: ICD-10-CM

## 2020-07-01 DIAGNOSIS — E11.59 CONTROLLED TYPE 2 DIABETES MELLITUS WITH OTHER CIRCULATORY COMPLICATION, WITH LONG-TERM CURRENT USE OF INSULIN (HCC): Primary | ICD-10-CM

## 2020-07-01 DIAGNOSIS — Z79.4 CONTROLLED TYPE 2 DIABETES MELLITUS WITH OTHER CIRCULATORY COMPLICATION, WITH LONG-TERM CURRENT USE OF INSULIN (HCC): Primary | ICD-10-CM

## 2020-07-01 DIAGNOSIS — E78.5 DYSLIPIDEMIA: ICD-10-CM

## 2020-07-01 DIAGNOSIS — Z79.4 LONG-TERM INSULIN USE (HCC): ICD-10-CM

## 2020-07-01 LAB
HBA1C MFR BLD: 6.4 % (ref 0–5.6)
INT CON NEG: NEGATIVE
INT CON POS: POSITIVE

## 2020-07-01 PROCEDURE — 99212 OFFICE O/P EST SF 10 MIN: CPT | Performed by: PHYSICIAN ASSISTANT

## 2020-07-01 PROCEDURE — 99214 OFFICE O/P EST MOD 30 MIN: CPT | Performed by: INTERNAL MEDICINE

## 2020-07-01 PROCEDURE — 83036 HEMOGLOBIN GLYCOSYLATED A1C: CPT | Performed by: INTERNAL MEDICINE

## 2020-07-01 RX ORDER — INSULIN GLARGINE 100 [IU]/ML
30 INJECTION, SOLUTION SUBCUTANEOUS EVERY EVENING
Qty: 15 PEN | Refills: 3 | Status: SHIPPED | OUTPATIENT
Start: 2020-07-01 | End: 2020-09-29

## 2020-07-01 RX ORDER — DULAGLUTIDE 1.5 MG/.5ML
1.5 INJECTION, SOLUTION SUBCUTANEOUS
Qty: 12 PEN | Refills: 3 | Status: SHIPPED | OUTPATIENT
Start: 2020-07-01 | End: 2020-09-29

## 2020-07-01 RX ORDER — EMPAGLIFLOZIN, METFORMIN HYDROCHLORIDE 12.5; 1 MG/1; MG/1
2 TABLET, EXTENDED RELEASE ORAL DAILY
Qty: 180 TAB | Refills: 3 | Status: SHIPPED | OUTPATIENT
Start: 2020-07-01 | End: 2020-09-29

## 2020-07-01 ASSESSMENT — FIBROSIS 4 INDEX: FIB4 SCORE: 1.81

## 2020-07-01 NOTE — PROGRESS NOTES
CHIEF COMPLAINT: Patient is here for follow up of Type 2 Diabetes Mellitus    HPI:     Dakota Boyer is a 75 y.o. male with Type 2 Diabetes Mellitus here for follow up.    Labs from 7/1/2020 HbA1c is well controlled at 6.4%    He has a history of coronary artery disease with prior PCI in 2013 and has a pacemaker.  He was previously followed by Dr. Charles in cardiology.  He has a history of obesity, sleep apnea, hyperlipidemia and essential hypertension.    On follow-up he is on Synjardy 12.5/1000 mg twice a day, Trulicity 1.5 mg once a week and Lantus 23 units daily    He is testing her sugars twice a day and has well-controlled blood sugar readings.  He denies severe hyperglycemia and hypoglycemia  Fasting sugars are below 130 postprandial blood sugars are below 180.      He has hyperlipidemia and is taking atorvastatin.  His last LDL cholesterol was well controlled at less than 40 on May 2019.  We do not have a recent lipid panel on hand.    He has essential hypertension and is taking valsartan.  Blood pressures well controlled.  We do not have a recent urine microalbumin.      Review of his past records showed a diagnosis of subclinical hypothyroidism but we do not have a recent TSH level on hand.  He is not on thyroid hormone replacement therapy.  He denies severe hypothyroid symptoms such as constipation cold intolerance and joint pains.      BG Diary:07/01/20  Please see scanned blood sugar readings    Weight has been stable    Diabetes Complications   Retinopathy: No known retinopathy.  Last eye exam: Patient had an eye exam but he does not recall the details.  He will send me a message with regards to his eye doctor and we will request records after that  Neuropathy: Denies paresthesias or numbness in hands or feet. Denies any foot wounds.  Exercise: Minimal.  Diet: Fair.  Patient's medications, allergies, and social histories were reviewed and updated as appropriate.    ROS:     CONS:     No fever,  no chills   EYES:     No diplopia, no blurry vision   CV:           No chest pain, no palpitations   PULM:     No SOB, no cough, no hemoptysis.   GI:            No nausea, no vomiting, no diarrhea, no constipation   ENDO:     No polyuria, no polydipsia, no heat intolerance, no cold intolerance       Past Medical History:  Problem List:  2020-07: Long-term insulin use ([facility])  2020-06: Mild aortic stenosis  2019-12: Benign prostatic hyperplasia with urinary obstruction  2018-12: Bilateral lower extremity edema  2018-08: Prostate cancer ([facility])  2018-01: Elevated PSA  2018-01: Bilateral primary osteoarthritis of knee  2017-06: Altered level of consciousness: March 2017  2016-10: Cervical spondylosis  2016-06: Diabetes mellitus type II, controlled ([facility])  2016-06: Hypertensive retinopathy of both eyes  2016-03: Depression with anxiety  2016-03: Obstructive sleep apnea on CPAP  2014-08: Type 2 diabetes mellitus, uncontrolled ([facility])  2014-08: Subclinical hypothyroidism  2014-08: Obesity  2013-11: History of diverticulitis  2013-10: Other dyspnea and respiratory abnormality  2013-09: Fatigue  2013-09: Shortness of breath  2013-08: Coronary artery disease due to calcified coronary lesion: 40-  50% disease in the circumflex at cardiac catheterization in 2013 2013-04: Incisional infection  2013-02: DJD (degenerative joint disease) of cervical spine  2012-11: S/P lumbar discectomy  2012-11: Degeneration of lumbar or lumbosacral intervertebral disc  2012-10: Sick sinus syndrome ([facility])  2012-10: Pacemaker  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  2012-09: Dyslipidemia  2012-09: Episodic lightheadedness  2012-09: RBBB  2012-04: Type 2 diabetes mellitus without complication ([facility])  2012-04: Back pain, chronic  2012-04: Essential hypertension, benign  2012-04: Neck pain  2012-04: Insomnia      Past Surgical History:  Past Surgical History:   Procedure Laterality Date   • WA DSTR NROLYTC AGNT PARVERTEB FCT SNGL CRVCL/THORA Right  10/19/2016    Procedure: NEURO DEST FACET C/T W/IG SNGL - 3ON-C3, C8-T1    SINERGY;  Surgeon: Gaurang Pruett M.D.;  Location: [facility];  Service: Pain Management   • ID DSTR NROLYTC AGNT PARVERTEB FCT ADDL CRVCL/THORA  10/19/2016    Procedure: NEURO DEST FACET C/T W/IG ADDL;  Surgeon: Gaurang Pruett M.D.;  Location: [facility];  Service: Pain Management   • PB FLUOROSCOPIC GUIDANCE NEEDLE PLACEMENT  10/19/2016    Procedure: FLOURO GUIDE NEEDLE PLACEMENT;  Surgeon: Gaurang Pruett M.D.;  Location: [facility];  Service: Pain Management   • SHOULDER ARTHROSCOPY Right 2015    torn bicep tendon and spurs   • RECOVERY  10/17/2013    Performed by Cath-Recovery Surgery at SURGERY SAME DAY [facility]   • WOUND DEHISCENCE  4/10/2013    Performed by Tu Jain M.D. at SURGERY [facility]   • CERVICAL FUSION POSTERIOR  2/27/2013    Performed by Tu Jain M.D. at SURGERY [facility]   • CERVICAL LAMINECTOMY POSTERIOR  2/27/2013    Performed by Tu Jain M.D. at SURGERY [facility]   • LUMBAR LAMINECTOMY DISKECTOMY  11/20/2012    Performed by Tu Jain M.D. at SURGERY [facility]   • RECOVERY  10/4/2012    Performed by Cath-Recovery Surgery at SURGERY SAME DAY [facility]   • PACEMAKER INSERTION  October 2012    [facility] [address] [id number] implanted by Dr. Waite.   • SHOULDER DECOMPRESSION Left 2008    Left rotator cuff   • ARTHROPLASTY Left 2004   • ARTHROSCOPY, KNEE Bilateral 1978   • ORTHOPEDIC OSTEOTOMY      Both knees several times, 8 total   • OTHER     • SPINAL CORD STIMULATOR  1 month ago   • TONSILLECTOMY     • VASECTOMY          Allergies:  Aleve cold & [pseudoephedrine-naproxen na]; Ceftriaxone sodium; Naproxen; Latex; and Tape     Social History:  Social History     Tobacco Use   • Smoking status: Former Smoker     Packs/day: 1.00     Years: 40.00     Pack years: 40.00     Types: Cigarettes     Last attempt to quit: 1/3/1990     Years  "since quittin.5   • Smokeless tobacco: Never Used   Substance Use Topics   • Alcohol use: No     Alcohol/week: 0.0 oz   • Drug use: No        Family History:   family history includes Alcohol/Drug in his brother and mother; Cancer in his brother and brother; Diabetes in his brother; Heart Disease in his brother, maternal grandmother, and sister; Heart Disease (age of onset: 36) in his father; Heart Disease (age of onset: 60) in his brother; Hyperlipidemia in his brother and maternal grandmother; Hypertension in his brother and maternal grandmother; Lung Disease in his brother and mother; No Known Problems in his maternal grandfather and paternal grandmother; Sleep Apnea in his brother.      PHYSICAL EXAM:   OBJECTIVE:  Vital signs: /68 (BP Location: Left arm, Patient Position: Sitting, BP Cuff Size: Adult)   Pulse 78   Ht 1.702 m (5' 7\")   Wt 106.6 kg (235 lb 1.6 oz)   SpO2 95%   BMI 36.82 kg/m²   GENERAL: Well-developed, well-nourished in no apparent distress.   EYE:  No ocular asymmetry, PERRLA  HENT: Pink, moist mucous membranes.    NECK: No thyromegaly.   CARDIOVASCULAR:  No murmurs  LUNGS: Clear breath sounds  ABDOMEN: Soft, nontender   EXTREMITIES: No clubbing, cyanosis, or edema.   NEUROLOGICAL: No gross focal motor abnormalities   LYMPH: No cervical adenopathy palpated.   SKIN: No rashes, lesions.   Monofilament testing with a 10 gram force: sensation: intact bilaterally  Visual Inspection: Feet without maceration, ulcers, or fissures.  Pedal pulses: intact bilaterally    Labs:  Lab Results   Component Value Date/Time    HBA1C 6.9 (A) 10/24/2019 03:25 PM        Lab Results   Component Value Date/Time    WBC 10.1 10/04/2019 03:45 PM    RBC 4.92 10/04/2019 03:45 PM    HEMOGLOBIN 15.6 10/04/2019 03:45 PM    MCV 91.7 10/04/2019 03:45 PM    MCH 31.7 10/04/2019 03:45 PM    MCHC 34.6 10/04/2019 03:45 PM    RDW 47.0 10/04/2019 03:45 PM    MPV 10.9 10/04/2019 03:45 PM       Lab Results   Component " Value Date/Time    SODIUM 136 10/04/2019 03:45 PM    POTASSIUM 4.4 10/04/2019 03:45 PM    CHLORIDE 103 10/04/2019 03:45 PM    CO2 26 10/04/2019 03:45 PM    ANION 7.0 10/04/2019 03:45 PM    GLUCOSE 180 (H) 10/04/2019 03:45 PM    BUN 29 (H) 10/04/2019 03:45 PM    CREATININE 1.20 10/04/2019 03:45 PM    CALCIUM 9.1 10/04/2019 03:45 PM    ASTSGOT 24 10/04/2019 03:45 PM    ALTSGPT 33 10/04/2019 03:45 PM    TBILIRUBIN 1.3 10/04/2019 03:45 PM    ALBUMIN 4.2 10/04/2019 03:45 PM    TOTPROTEIN 6.7 10/04/2019 03:45 PM    GLOBULIN 2.5 10/04/2019 03:45 PM    AGRATIO 1.7 10/04/2019 03:45 PM       Lab Results   Component Value Date/Time    CHOLSTRLTOT 94 (L) 05/13/2019 0711    TRIGLYCERIDE 120 05/13/2019 0711    HDL 30 (A) 05/13/2019 0711    LDL 40 05/13/2019 0711       Lab Results   Component Value Date/Time    MALBCRT 8 08/20/2018 07:38 AM    MICROALBUR 1.3 08/20/2018 07:38 AM        Lab Results   Component Value Date/Time    TSHULTRASEN 3.650 10/08/2019 1546     No results found for: FREEDIR  Lab Results   Component Value Date/Time    FREET3 3.6 07/22/2014 0743     No results found for: THYSTIMIG        ASSESSMENT/PLAN:     1. Controlled type 2 diabetes mellitus with other circulatory complication, with long-term current use of insulin (HCC)  Controlled  Continue Synjardy, Lantus and Trulicity at present doses  We will request records of his eye exam  Foot exam was completed today  Recommend that he watch his carb intake and exercise regularly  We will plan for follow-up in 3 months with repeat of his A1c      2. Subclinical hypothyroidism  Stable  I am checking a TSH with his next labs      3. Dyslipidemia  Controlled  Continue atorvastatin  I am checking a lipid panel with his next labs in 3 months    4. Long-term insulin use (HCC)  Patient is on long-term basal insulin therapy with a GLP-1 and other oral agents for type 2 diabetes management      Return in about 3 months (around 10/1/2020).      Thank you kindly for allowing  me to participate in the diabetes care plan for this patient.    Luis Daniel Ring MD, [facility], [facility]  07/01/20    CC:   Tanya Rayo P.A.-C.

## 2020-08-14 ENCOUNTER — OFFICE VISIT (OUTPATIENT)
Dept: MEDICAL GROUP | Facility: PHYSICIAN GROUP | Age: 75
End: 2020-08-14
Payer: MEDICARE

## 2020-08-14 VITALS
BODY MASS INDEX: 36.57 KG/M2 | HEART RATE: 60 BPM | SYSTOLIC BLOOD PRESSURE: 136 MMHG | HEIGHT: 67 IN | WEIGHT: 233 LBS | OXYGEN SATURATION: 94 % | TEMPERATURE: 98 F | DIASTOLIC BLOOD PRESSURE: 70 MMHG

## 2020-08-14 DIAGNOSIS — M47.812 CERVICAL SPONDYLOSIS: ICD-10-CM

## 2020-08-14 DIAGNOSIS — Z23 NEED FOR VACCINATION: ICD-10-CM

## 2020-08-14 DIAGNOSIS — G90.01 HYPERSENSITIVE CAROTID SINUS SYNDROME: ICD-10-CM

## 2020-08-14 DIAGNOSIS — M51.37 DEGENERATION OF LUMBAR OR LUMBOSACRAL INTERVERTEBRAL DISC: ICD-10-CM

## 2020-08-14 DIAGNOSIS — I10 ESSENTIAL HYPERTENSION, BENIGN: ICD-10-CM

## 2020-08-14 DIAGNOSIS — E66.01 CLASS 2 SEVERE OBESITY DUE TO EXCESS CALORIES WITH SERIOUS COMORBIDITY AND BODY MASS INDEX (BMI) OF 38.0 TO 38.9 IN ADULT (HCC): ICD-10-CM

## 2020-08-14 DIAGNOSIS — I35.0 MILD AORTIC STENOSIS: ICD-10-CM

## 2020-08-14 DIAGNOSIS — E11.9 TYPE 2 DIABETES MELLITUS WITHOUT COMPLICATION, WITH LONG-TERM CURRENT USE OF INSULIN (HCC): ICD-10-CM

## 2020-08-14 DIAGNOSIS — C61 MALIGNANT NEOPLASM OF PROSTATE (HCC): ICD-10-CM

## 2020-08-14 DIAGNOSIS — N13.8 BENIGN PROSTATIC HYPERPLASIA WITH URINARY OBSTRUCTION: ICD-10-CM

## 2020-08-14 DIAGNOSIS — I45.10 RIGHT BUNDLE BRANCH BLOCK: ICD-10-CM

## 2020-08-14 DIAGNOSIS — Z79.4 TYPE 2 DIABETES MELLITUS WITHOUT COMPLICATION, WITH LONG-TERM CURRENT USE OF INSULIN (HCC): ICD-10-CM

## 2020-08-14 DIAGNOSIS — E03.8 SUBCLINICAL HYPOTHYROIDISM: ICD-10-CM

## 2020-08-14 DIAGNOSIS — G47.33 OBSTRUCTIVE SLEEP APNEA ON CPAP: ICD-10-CM

## 2020-08-14 DIAGNOSIS — F41.8 DEPRESSION WITH ANXIETY: ICD-10-CM

## 2020-08-14 DIAGNOSIS — M17.0 BILATERAL PRIMARY OSTEOARTHRITIS OF KNEE: ICD-10-CM

## 2020-08-14 DIAGNOSIS — G89.29 CHRONIC BACK PAIN, UNSPECIFIED BACK LOCATION, UNSPECIFIED BACK PAIN LATERALITY: ICD-10-CM

## 2020-08-14 DIAGNOSIS — N40.1 BENIGN PROSTATIC HYPERPLASIA WITH URINARY OBSTRUCTION: ICD-10-CM

## 2020-08-14 DIAGNOSIS — E78.5 DYSLIPIDEMIA: ICD-10-CM

## 2020-08-14 DIAGNOSIS — Z79.4 LONG-TERM INSULIN USE (HCC): ICD-10-CM

## 2020-08-14 DIAGNOSIS — I25.10 CORONARY ARTERY DISEASE DUE TO CALCIFIED CORONARY LESION: ICD-10-CM

## 2020-08-14 DIAGNOSIS — M47.812 SPONDYLOSIS OF CERVICAL REGION WITHOUT MYELOPATHY OR RADICULOPATHY: ICD-10-CM

## 2020-08-14 DIAGNOSIS — R97.20 ELEVATED PSA: ICD-10-CM

## 2020-08-14 DIAGNOSIS — R60.0 BILATERAL LOWER EXTREMITY EDEMA: ICD-10-CM

## 2020-08-14 DIAGNOSIS — M54.9 CHRONIC BACK PAIN, UNSPECIFIED BACK LOCATION, UNSPECIFIED BACK PAIN LATERALITY: ICD-10-CM

## 2020-08-14 DIAGNOSIS — H35.033 HYPERTENSIVE RETINOPATHY OF BOTH EYES: ICD-10-CM

## 2020-08-14 DIAGNOSIS — M54.2 NECK PAIN: ICD-10-CM

## 2020-08-14 DIAGNOSIS — Z00.00 MEDICARE ANNUAL WELLNESS VISIT, SUBSEQUENT: ICD-10-CM

## 2020-08-14 DIAGNOSIS — I25.84 CORONARY ARTERY DISEASE DUE TO CALCIFIED CORONARY LESION: ICD-10-CM

## 2020-08-14 DIAGNOSIS — Z95.0 PACEMAKER: ICD-10-CM

## 2020-08-14 PROCEDURE — G0439 PPPS, SUBSEQ VISIT: HCPCS | Performed by: PHYSICIAN ASSISTANT

## 2020-08-14 RX ORDER — TAMSULOSIN HYDROCHLORIDE 0.4 MG/1
CAPSULE ORAL
COMMUNITY
Start: 2020-06-23 | End: 2021-09-21

## 2020-08-14 RX ORDER — MELOXICAM 7.5 MG/1
TABLET ORAL
Qty: 180 TAB | Refills: 1 | Status: SHIPPED | OUTPATIENT
Start: 2020-08-14 | End: 2021-05-25

## 2020-08-14 RX ORDER — TAMSULOSIN HYDROCHLORIDE 0.4 MG/1
CAPSULE ORAL
COMMUNITY
End: 2020-08-14

## 2020-08-14 RX ORDER — BUPROPION HYDROCHLORIDE 150 MG/1
150 TABLET ORAL EVERY MORNING
Qty: 90 TAB | Refills: 1 | Status: SHIPPED | OUTPATIENT
Start: 2020-08-14 | End: 2021-04-21 | Stop reason: SDUPTHER

## 2020-08-14 ASSESSMENT — ENCOUNTER SYMPTOMS: GENERAL WELL-BEING: GOOD

## 2020-08-14 ASSESSMENT — FIBROSIS 4 INDEX: FIB4 SCORE: 1.81

## 2020-08-14 ASSESSMENT — PATIENT HEALTH QUESTIONNAIRE - PHQ9: CLINICAL INTERPRETATION OF PHQ2 SCORE: 0

## 2020-08-14 ASSESSMENT — ACTIVITIES OF DAILY LIVING (ADL): BATHING_REQUIRES_ASSISTANCE: 0

## 2020-08-14 NOTE — ASSESSMENT & PLAN NOTE
Established problem, back pain stable/reasonablly well-controlled with PRN meloxicam. Also has neurostimulator. Continue current management.

## 2020-08-14 NOTE — ASSESSMENT & PLAN NOTE
Established problem, stable per last echocardiogram in 9/2017. Will continue to follow with cardiology for surveillance.

## 2020-08-14 NOTE — ASSESSMENT & PLAN NOTE
Established problem, well-controlled with CPAP which he states he's using only some of the time. Follows regularly with sleep medicine. Continue current management.

## 2020-08-14 NOTE — ASSESSMENT & PLAN NOTE
Established problem, stable. Not currently on thyroid replacement. Last TSH in 10/2019 was normal. Will continue to follow with endocrinology.

## 2020-08-14 NOTE — ASSESSMENT & PLAN NOTE
Established problem, stable/reasonably well-controlled with PRN meloxicam. Continue current management.

## 2020-08-14 NOTE — ASSESSMENT & PLAN NOTE
Established problem, well-controlled with valsartan 160 mg daily and metoprolol 25 mg BID. BP today in the office is 136/70. Continue current management.

## 2020-08-14 NOTE — PROGRESS NOTES
Chief Complaint   Patient presents with   • Annual Exam         HPI:  Dakota is a 75 y.o. here for Medicare Annual Wellness Visit. Is an established patient of mine.    Medicare Annual Wellness    Patient Active Problem List    Diagnosis Date Noted   • Coronary artery disease due to calcified coronary lesion: 40-50% disease in the circumflex at cardiac catheterization in 2013 08/16/2013     Priority: High   • Pacemaker 10/04/2012     Priority: High   • Hypersensitive carotid sinus syndrome 10/02/2012     Priority: High   • Dyslipidemia 09/28/2012     Priority: High   • RBBB 09/28/2012     Priority: High   • Essential hypertension, benign 04/10/2012     Priority: High   • Type 2 diabetes mellitus without complication (HCC) 04/10/2012     Priority: Medium   • DJD (degenerative joint disease) of cervical spine 02/27/2013     Priority: Low   • Degeneration of lumbar or lumbosacral intervertebral disc 11/20/2012     Priority: Low   • Back pain, chronic 04/10/2012     Priority: Low   • Neck pain 04/10/2012     Priority: Low   • Long-term insulin use ([facility]) 07/01/2020   • Mild aortic stenosis 06/03/2020   • Benign prostatic hyperplasia with urinary obstruction 12/22/2019   • Bilateral lower extremity edema 12/04/2018   • Prostate cancer ([facility]) 08/09/2018   • Elevated PSA 01/23/2018   • Bilateral primary osteoarthritis of knee 01/23/2018   • Cervical spondylosis 10/19/2016   • Hypertensive retinopathy of both eyes 06/02/2016   • Depression with anxiety 03/28/2016   • Obstructive sleep apnea on CPAP 03/28/2016   • Subclinical hypothyroidism 08/21/2014   • Obesity 08/21/2014       Current Outpatient Medications   Medication Sig Dispense Refill   • tamsulosin (FLOMAX) 0.4 MG capsule      • NON SPECIFIED 1 mL by Intramuscular route Once for 1 dose. 1 Each 0   • Dulaglutide (TRULICITY) 1.5 MG/0.5ML Solution Pen-injector Inject 1.5 mg as instructed every 7 days for 90 days. 12 PEN 3   • Empagliflozin-metFORMIN HCl ER (SYNJARDY XR)  12.5-1000 MG TABLET SR 24 HR Take 2 Tabs by mouth every day for 90 days. 180 Tab 3   • insulin glargine (LANTUS SOLOSTAR) 100 UNIT/ML Solution Pen-injector injection Inject 30 Units as instructed every evening for 90 days. 15 PEN 3   • meloxicam (MOBIC) 7.5 MG Tab TAKE 1 TO 2 TABLETS DAILY 180 Tab 1   • buPROPion (WELLBUTRIN XL) 150 MG XL tablet Take 1 Tab by mouth every morning. 90 Tab 1   • valsartan (DIOVAN) 160 MG Tab Take 1 Tab by mouth every day. 90 Tab 3   • glucose blood (FREESTYLE LITE) strip 1 Strip by Other route 2 times a day. 150 Strip 3   • metoprolol (LOPRESSOR) 25 MG Tab Take 0.5 Tabs by mouth 2 times a day. **change in dose** 90 Tab 3   • atorvastatin (LIPITOR) 20 MG Tab TAKE 1 TABLET DAILY 90 Tab 3   • Cyanocobalamin (VITAMIN B-12) 2500 MCG SL Tab Place  under tongue every day.     • Lancets MISC Lancets order: Lancets for Abbott Freestyle Lite meter. Sig: use 4 times daily  and prn ssx high or low sugar. #100 RF x 0 360 Each 4   • Blood Glucose Monitoring Suppl [place] Meter: Dispense Abbott Freestyle Lite meter. Sig. Use as directed for blood sugar monitoring. #1. NR. 1 Device 0   • B-D ULTRAFINE III SHORT PEN 31G X 8 MM MISC USE 1 NEEDLE EVERY DAY WITH LANTUS 999 Each 2   • aspirin EC (ECOTRIN) 81 MG TBEC Take 81 mg by mouth every day.     • ASPIRIN 81 PO aspirin       No current facility-administered medications for this visit.         Patient is taking medications as noted in medication list.  Current supplements as per medication list.     Allergies: Aleve cold & [pseudoephedrine-naproxen na], Ceftriaxone sodium, Naproxen, Latex, and Tape    Current social contact/activities: no due to the virus     Is patient current with immunizations? No, due for HEPATITIS B. Patient is interested in receiving HEPATITIS B today.    He  reports that he quit smoking about 30 years ago. His smoking use included cigarettes. He has a 40.00 pack-year smoking history. He has never used smokeless tobacco. He reports  that he does not drink alcohol or use drugs.  Counseling given: Not Answered        DPA/Advanced directive: Patient has Advanced Directive on file.     ROS:    Gait: Uses no assistive device   Ostomy: No   Other tubes: No   Amputations: No   Chronic oxygen use No   Last eye exam 7/01/2020   Wears hearing aids: No   : Denies any urinary leakage during the last 6 months      Screening:      Depression Screening    Little interest or pleasure in doing things?  0 - not at all  Feeling down, depressed, or hopeless? 0 - not at all  Patient Health Questionnaire Score: 0    If depressive symptoms identified deferred to follow up visit unless specifically addressed in assessment and plan.    Interpretation of PHQ-9 Total Score   Score Severity   1-4 No Depression   5-9 Mild Depression   10-14 Moderate Depression   15-19 Moderately Severe Depression   20-27 Severe Depression    Screening for Cognitive Impairment    Three Minute Recall (river, nation, finger)  3/3    Erik clock face with all 12 numbers and set the hands to show 10 past 11.  Yes    If cognitive concerns identified, deferred for follow up unless specifically addressed in assessment and plan.    Fall Risk Assessment    Has the patient had two or more falls in the last year or any fall with injury in the last year?  No  If fall risk identified, deferred for follow up unless specifically addressed in assessment and plan.    Safety Assessment    Throw rugs on floor.  Yes  Handrails on all stairs.  Yes  Good lighting in all hallways.  Yes  Difficulty hearing.  No  Patient counseled about all safety risks that were identified.    Functional Assessment ADLs    Are there any barriers preventing you from cooking for yourself or meeting nutritional needs?  No.    Are there any barriers preventing you from driving safely or obtaining transportation?  No.    Are there any barriers preventing you from using a telephone or calling for help?  No.    Are there any barriers  preventing you from shopping?  No.    Are there any barriers preventing you from taking care of your own finances?  No.    Are there any barriers preventing you from managing your medications?  No.    Are there any barriers preventing you from showering, bathing or dressing yourself?  No.    Are you currently engaging in any exercise or physical activity?  No.     What is your perception of your health?  Good.    Health Maintenance Summary                IMM HEP B VACCINE Overdue 1/29/1964     Annual Wellness Visit Overdue 8/10/2019      Done 8/9/2018 Visit Dx: Medicare annual wellness visit, subsequent     Patient has more history with this topic...    URINE ACR / MICROALBUMIN Overdue 8/20/2019      Done 8/20/2018 MICROALBUMIN CREAT RATIO URINE     Patient has more history with this topic...    FASTING LIPID PROFILE Overdue 5/13/2020      Done 5/13/2019 LIPID PROFILE     Patient has more history with this topic...    IMM INFLUENZA Next Due 9/1/2020      Done 10/4/2019 Imm Admin: Influenza Vaccine Adult HD     Patient has more history with this topic...    A1C SCREENING Next Due 10/1/2020      Done 7/1/2020 POCT A1C     Patient has more history with this topic...    SERUM CREATININE Next Due 10/4/2020      Done 10/4/2019 COMP METABOLIC PANEL     Patient has more history with this topic...    DIABETES MONOFILAMENT / LE EXAM Next Due 7/1/2021      Done 7/1/2020 [employer]: WORKFLOW - DIABETES - DIABETIC FOOT EXAM PERFORMED     Patient has more history with this topic...    RETINAL SCREENING Next Due 7/16/2021      Done 7/16/2020 REFERRAL FOR RETINAL SCREENING EXAM     Patient has more history with this topic...    COLONOSCOPY Next Due 3/27/2024      Done 3/27/2019 REFERRAL TO GI FOR COLONOSCOPY     Patient has more history with this topic...    IMM DTaP/Tdap/Td Vaccine Next Due 8/9/2028      Done 8/9/2018 Imm Admin: Tdap Vaccine     Patient has more history with this topic...          Patient Care Team:  Tanya LIRA  "LADAN Rayo as PCP - General (Physician Assistant)  Jac Christiansen M.D. (Cardiology)  [facility]  Isabela Singletary M.D. as Consulting Physician (Endocrinology)  [employer] as Respiratory (DME Supplier)    Social History     Tobacco Use   • Smoking status: Former Smoker     Packs/day: 1.00     Years: 40.00     Pack years: 40.00     Types: Cigarettes     Quit date: 1/3/1990     Years since quittin.6   • Smokeless tobacco: Never Used   Substance Use Topics   • Alcohol use: No     Alcohol/week: 0.0 oz   • Drug use: No     Family History   Problem Relation Age of Onset   • Lung Disease Mother         Smoker   • Alcohol/Drug Mother    • Heart Disease Father 36        CAD   • Heart Disease Sister         \"hole in heart\"   • Lung Disease Brother         COPD, CANCER   • Cancer Brother         Prostate, Lung   • Alcohol/Drug Brother    • Heart Disease Brother    • Diabetes Brother    • Hypertension Brother    • Hyperlipidemia Brother    • Heart Disease Maternal Grandmother    • Hypertension Maternal Grandmother    • Hyperlipidemia Maternal Grandmother    • Cancer Brother         stomach, thyroid   • Heart Disease Brother 60        MI, stent, PM/defib   • Sleep Apnea Brother    • No Known Problems Maternal Grandfather    • No Known Problems Paternal Grandmother      He  has a past medical history of Anxiety, Back pain, chronic (10/18/2016), Blood clotting disorder (HCC) (), Breath shortness, CAD (coronary artery disease) (2012), Cancer (HCC) (? early ), Degeneration of lumbar or lumbosacral intervertebral disc ( ), Dental disorder, Depression, Diabetes, DJD (degenerative joint disease) of cervical spine ( ), Episodic lightheadedness ( ), High cholesterol, Hyperlipidemia, Hypersensitive carotid sinus syndrome ( ), Hypertension, Incisional infection (2013), Insomnia ( ), Myocardial infarct (HCC) (?), Neck pain (10/18/2016), Pacemaker (2012), RBBB ( ), S/P lumbar " discectomy ( ), Sick sinus syndrome (HCC) ( ), Sleep apnea, Syncope (October 2012), Thyroid disease, and Unspecified hemorrhagic conditions.   Past Surgical History:   Procedure Laterality Date   • UT DSTR NROLYTC AGNT PARVERTEB FCT SNGL CRVCL/THORA Right 10/19/2016    Procedure: NEURO DEST FACET C/T W/IG SNGL - 3ON-C3, C8-T1    SINERGY;  Surgeon: Gaurang Pruett M.D.;  Location: [facility];  Service: Pain Management   • UT DSTR NROLYTC AGNT PARVERTEB FCT ADDL CRVCL/THORA  10/19/2016    Procedure: NEURO DEST FACET C/T W/IG ADDL;  Surgeon: Gaurang Pruett M.D.;  Location: [facility];  Service: Pain Management   • PB FLUOROSCOPIC GUIDANCE NEEDLE PLACEMENT  10/19/2016    Procedure: FLOURO GUIDE NEEDLE PLACEMENT;  Surgeon: Gauragn Pruett M.D.;  Location: [facility];  Service: Pain Management   • SHOULDER ARTHROSCOPY Right 2015    torn bicep tendon and spurs   • RECOVERY  10/17/2013    Performed by Cath-Recovery Surgery at SURGERY SAME DAY [facility]   • WOUND DEHISCENCE  4/10/2013    Performed by Tu Jain M.D. at SURGERY [facility]   • CERVICAL FUSION POSTERIOR  2/27/2013    Performed by Tu Jain M.D. at SURGERY [facility]   • CERVICAL LAMINECTOMY POSTERIOR  2/27/2013    Performed by Tu Jain M.D. at SURGERY [facility]   • LUMBAR LAMINECTOMY DISKECTOMY  11/20/2012    Performed by Tu Jain M.D. at SURGERY [facility]   • RECOVERY  10/4/2012    Performed by Cath-Recovery Surgery at SURGERY SAME DAY [facility]   • PACEMAKER INSERTION  October 2012    [facility] [employer]  2110 implanted by Dr. Waite.   • SHOULDER DECOMPRESSION Left 2008    Left rotator cuff   • ARTHROPLASTY Left 2004   • ARTHROSCOPY, KNEE Bilateral 1978   • ORTHOPEDIC OSTEOTOMY      Both knees several times, 8 total   • OTHER     • SPINAL CORD STIMULATOR  1 month ago   • TONSILLECTOMY     • VASECTOMY             Exam:     /70 (BP Location: Right arm, Patient  "Position: Sitting, BP Cuff Size: Large adult)   Pulse 60   Temp 36.7 °C (98 °F) (Temporal)   Ht 1.706 m (5' 7.17\")   Wt 105.7 kg (233 lb)   SpO2 94%  Body mass index is 36.31 kg/m².    Hearing good.    Alert, oriented in no acute distress.  Eye contact is good, speech goal directed, affect calm      Assessment and Plan. The following treatment and monitoring plan is recommended:      Type 2 diabetes mellitus without complication  Established problem, well-controlled. On Synjardy XR, Trulicity, and Lantus. Follows regularly with endocrinology. Last A1c in 7/2020 was 6.4. Continue current management.    Subclinical hypothyroidism  Established problem, stable. Not currently on thyroid replacement. Last TSH in 10/2019 was normal. Will continue to follow with endocrinology.    RBBB  Established problem, stable. Per cardiology, has been asymptomatic with no RV abnormality on echocardiogram. Will continue to follow with cardiology.    Prostate cancer (HCC)  Established problem, stable. Initially diagnosed in 2018. Has been undergoing surveillance since that time. Will continue to follow with urology.    Pacemaker  Established problem, stable. Placed in 2012. Will continue to follow regularly with cardiology for device checks.    Obstructive sleep apnea on CPAP  Established problem, well-controlled with CPAP which he states he's using only some of the time. Follows regularly with sleep medicine. Continue current management.    Obesity  Established problem, stable. Current BMI is 36.31.    Neck pain  Established problem, stable/reasonably well-controlled with PRN meloxicam. Continue current management.    Mild aortic stenosis  Established problem, stable per last echocardiogram in 9/2017. Will continue to follow with cardiology for surveillance.    Long-term insulin use (HCC)  Established problem, stable. On chronic Lantus for DM2. Will continue to follow with endocrinology for management.    Hypertensive retinopathy of " both eyes  Established problem, improving per patient. Will continue to follow regularly with ophthalmology.    Hypersensitive carotid sinus syndrome  Established problem, well-controlled since pacemaker placement. Will continue to follow with cardiology.    Essential hypertension, benign  Established problem, well-controlled with valsartan 160 mg daily and metoprolol 25 mg BID. BP today in the office is 136/70. Continue current management.    Elevated PSA  Established problem, improving per review. Last checked in 6/2020. Will continue to follow with urology for surveillance of his prostate cancer.    Dyslipidemia  Established problem, well-controlled with atorvastatin 20 mg daily. Last lipid profile in 5/2019 shows LDL to be at-goal. Continue current management.    DJD (degenerative joint disease) of cervical spine  Established problem, neck pain stable/reasonably well-controlled with PRN meloxicam. Continue current management.    Cervical spondylosis  Established problem, neck pain stable/reasonably well-controlled with PRN meloxicam. Continue current management.    Depression with anxiety  Established problem, well-controlled with bupropion  mg daily. Continue current management.    Degeneration of lumbar or lumbosacral intervertebral disc  Established problem, back pain stable/reasonablly well-controlled with PRN meloxicam. Also has neurostimulator. Continue current management.    Coronary artery disease due to calcified coronary lesion: 40-50% disease in the circumflex at cardiac catheterization in 2013  Established problem, well-controlled with medication. On metoprolol, atorvastatin, and aspirin. Will continue to follow with cardiology.    Bilateral lower extremity edema  Established problem, stable. Always has trace amount of edema of both ankles, worse on L side. Will continue to follow with cardiology for regular monitoring.    Benign prostatic hyperplasia with urinary obstruction  Established  "problem, well-controlled with Flomax which he recently started. Will continue to follow with urology.    Bilateral primary osteoarthritis of knee  Established problem, uncontrolled. Underwent right TKA in 2018, left TKA in 2004. Has been told that he needs repeat TKA in left knee but is trying to hold off on this as long as possible \"until I can't walk.\" Takes PRN meloxicam which he will continue.    Back pain, chronic  Established problem, stable/reasonably well-controlled with neurostimulator and PRN meloxicam. Continue current management.        Services suggested: No services needed at this time  Health Care Screening recommendations as per orders if indicated.  Referrals offered: PT/OT/Nutrition counseling/Behavioral Health/Smoking cessation as per orders if indicated.    Discussion today about general wellness and lifestyle habits:    · Prevent falls and reduce trip hazards; Cautioned about securing or removing rugs.  · Have a working fire alarm and carbon monoxide detector;   · Engage in regular physical activity and social activities       Follow-up: Return for establish care with new PCP.     Tanya Rayo P.A.-C.      "

## 2020-08-14 NOTE — ASSESSMENT & PLAN NOTE
Established problem, well-controlled since pacemaker placement. Will continue to follow with cardiology.

## 2020-08-14 NOTE — ASSESSMENT & PLAN NOTE
Established problem, well-controlled. On Synjardy XR, Trulicity, and Lantus. Follows regularly with endocrinology. Last A1c in 7/2020 was 6.4. Continue current management.

## 2020-08-14 NOTE — ASSESSMENT & PLAN NOTE
Established problem, stable. Per cardiology, has been asymptomatic with no RV abnormality on echocardiogram. Will continue to follow with cardiology.

## 2020-08-14 NOTE — ASSESSMENT & PLAN NOTE
Established problem, stable/reasonably well-controlled with neurostimulator and PRN meloxicam. Continue current management.

## 2020-08-14 NOTE — ASSESSMENT & PLAN NOTE
Established problem, stable. Initially diagnosed in 2018. Has been undergoing surveillance since that time. Will continue to follow with urology.

## 2020-08-14 NOTE — ASSESSMENT & PLAN NOTE
Established problem, stable. On chronic Lantus for DM2. Will continue to follow with endocrinology for management.

## 2020-08-14 NOTE — ASSESSMENT & PLAN NOTE
Established problem, stable. Placed in 2012. Will continue to follow regularly with cardiology for device checks.

## 2020-08-14 NOTE — ASSESSMENT & PLAN NOTE
Established problem, stable. Always has trace amount of edema of both ankles, worse on L side. Will continue to follow with cardiology for regular monitoring.

## 2020-08-14 NOTE — ASSESSMENT & PLAN NOTE
Established problem, neck pain stable/reasonably well-controlled with PRN meloxicam. Continue current management.

## 2020-08-14 NOTE — ASSESSMENT & PLAN NOTE
Established problem, well-controlled with Flomax which he recently started. Will continue to follow with urology.

## 2020-08-14 NOTE — ASSESSMENT & PLAN NOTE
Established problem, improving per review. Last checked in 6/2020. Will continue to follow with urology for surveillance of his prostate cancer.

## 2020-08-14 NOTE — ASSESSMENT & PLAN NOTE
"Established problem, uncontrolled. Underwent right TKA in 2018, left TKA in 2004. Has been told that he needs repeat TKA in left knee but is trying to hold off on this as long as possible \"until I can't walk.\" Takes PRN meloxicam which he will continue.  "

## 2020-08-14 NOTE — ASSESSMENT & PLAN NOTE
Established problem, well-controlled with medication. On metoprolol, atorvastatin, and aspirin. Will continue to follow with cardiology.

## 2020-08-14 NOTE — ASSESSMENT & PLAN NOTE
Established problem, well-controlled with atorvastatin 20 mg daily. Last lipid profile in 5/2019 shows LDL to be at-goal. Continue current management.

## 2020-09-10 DIAGNOSIS — E78.5 HYPERLIPIDEMIA, UNSPECIFIED HYPERLIPIDEMIA TYPE: ICD-10-CM

## 2020-09-10 RX ORDER — ATORVASTATIN CALCIUM 20 MG/1
TABLET, FILM COATED ORAL
Qty: 90 TAB | Refills: 3 | Status: SHIPPED | OUTPATIENT
Start: 2020-09-10 | End: 2021-11-02

## 2020-09-21 DIAGNOSIS — I25.10 CORONARY ARTERY DISEASE DUE TO CALCIFIED CORONARY LESION: ICD-10-CM

## 2020-09-21 DIAGNOSIS — I25.84 CORONARY ARTERY DISEASE DUE TO CALCIFIED CORONARY LESION: ICD-10-CM

## 2020-09-21 DIAGNOSIS — I10 ESSENTIAL HYPERTENSION, BENIGN: ICD-10-CM

## 2020-11-13 ENCOUNTER — OFFICE VISIT (OUTPATIENT)
Dept: ENDOCRINOLOGY | Facility: MEDICAL CENTER | Age: 75
End: 2020-11-13
Attending: INTERNAL MEDICINE
Payer: MEDICARE

## 2020-11-13 VITALS
HEART RATE: 80 BPM | BODY MASS INDEX: 37.2 KG/M2 | OXYGEN SATURATION: 96 % | SYSTOLIC BLOOD PRESSURE: 110 MMHG | WEIGHT: 237 LBS | HEIGHT: 67 IN | DIASTOLIC BLOOD PRESSURE: 60 MMHG

## 2020-11-13 DIAGNOSIS — E11.59 CONTROLLED TYPE 2 DIABETES MELLITUS WITH OTHER CIRCULATORY COMPLICATION, WITH LONG-TERM CURRENT USE OF INSULIN (HCC): ICD-10-CM

## 2020-11-13 DIAGNOSIS — Z79.4 CONTROLLED TYPE 2 DIABETES MELLITUS WITH OTHER CIRCULATORY COMPLICATION, WITH LONG-TERM CURRENT USE OF INSULIN (HCC): ICD-10-CM

## 2020-11-13 DIAGNOSIS — Z79.4 LONG-TERM INSULIN USE (HCC): ICD-10-CM

## 2020-11-13 DIAGNOSIS — E03.8 SUBCLINICAL HYPOTHYROIDISM: ICD-10-CM

## 2020-11-13 DIAGNOSIS — E78.5 DYSLIPIDEMIA: ICD-10-CM

## 2020-11-13 LAB
HBA1C MFR BLD: 6.6 % (ref 0–5.6)
INT CON NEG: ABNORMAL
INT CON POS: ABNORMAL

## 2020-11-13 PROCEDURE — 83036 HEMOGLOBIN GLYCOSYLATED A1C: CPT | Performed by: INTERNAL MEDICINE

## 2020-11-13 PROCEDURE — 99212 OFFICE O/P EST SF 10 MIN: CPT | Performed by: INTERNAL MEDICINE

## 2020-11-13 PROCEDURE — 99214 OFFICE O/P EST MOD 30 MIN: CPT | Performed by: INTERNAL MEDICINE

## 2020-11-13 RX ORDER — EMPAGLIFLOZIN AND METFORMIN HYDROCHLORIDE 12.5; 1 MG/1; MG/1
1 TABLET ORAL 2 TIMES DAILY
Qty: 120 TAB | Refills: 3 | Status: SHIPPED | OUTPATIENT
Start: 2020-11-13 | End: 2021-02-11

## 2020-11-13 RX ORDER — INSULIN GLARGINE 100 [IU]/ML
23 INJECTION, SOLUTION SUBCUTANEOUS EVERY EVENING
Qty: 15 EACH | Refills: 3 | Status: SHIPPED | OUTPATIENT
Start: 2020-11-13 | End: 2021-02-11

## 2020-11-13 RX ORDER — DULAGLUTIDE 1.5 MG/.5ML
1 INJECTION, SOLUTION SUBCUTANEOUS
Qty: 12 EACH | Refills: 3 | Status: SHIPPED | OUTPATIENT
Start: 2020-11-13 | End: 2021-02-11

## 2020-11-13 RX ORDER — INSULIN GLARGINE 100 [IU]/ML
23 INJECTION, SOLUTION SUBCUTANEOUS EVERY EVENING
COMMUNITY
End: 2020-11-13 | Stop reason: SDUPTHER

## 2020-11-13 RX ORDER — EMPAGLIFLOZIN AND METFORMIN HYDROCHLORIDE 12.5; 1 MG/1; MG/1
1 TABLET ORAL 2 TIMES DAILY
COMMUNITY
End: 2020-11-13 | Stop reason: SDUPTHER

## 2020-11-13 RX ORDER — DULAGLUTIDE 1.5 MG/.5ML
INJECTION, SOLUTION SUBCUTANEOUS
COMMUNITY
End: 2020-11-13 | Stop reason: SDUPTHER

## 2020-11-13 ASSESSMENT — FIBROSIS 4 INDEX: FIB4 SCORE: 1.81

## 2020-11-13 NOTE — PROGRESS NOTES
Subjective:   Endocrinology Clinic Progress Note  PCP: Tanya Rayo P.A.-C.    HPI:  Dakota Boyer is a 75 y.o. old patient who is seen today for review of type 2 Diabetes.  He is having a lot of pain in his legs and back.  He has had numerous back and neck surgeries.      On follow-up he is doing well his A1c is 6.6% on November 13, 2020.  He was supposed to do labs before his visit but he forgot and he also missed an appointment with us.  He reports that his sugars are stable and he feels great overall.      He has a history of subclinical hypothyroidism with TSH levels between 6-9 with no TSH level greater than 10 and we are monitoring this at this time.  He is currently not on thyroid hormone replacement therapy.  TSH on October 2019 was normal at 3.6 TPO antibodies at baseline were negative  So far he denies symptoms of hypothyroidism such as constipation cold intolerance      He has a history of hyperlipidemia is taking Lipitor 20 mg daily and he is overdue for lipid panel as mentioned above.  He denies muscle aches and cramps.    He is also overdue for urine microalbumin.    He reports that he had a negative eye exam with his ophthalmologist on July 2020      DM:   Last A1c:   Lab Results   Component Value Date/Time    HBA1C 6.6 (A) 11/13/2020 03:24 PM      A1C GOAL: < 7    Diabetes Medications:     Taking above medications as prescribed: yes  Taking daily ASA: Yes    Exercise: Not able to stand very long and gets short of breath.  Diet: He states he eats when he is hungry.  Patient's body mass index is 37.12 kg/m². Exercise and nutrition counseling were performed at this visit.    Glucose monitoring frequency: Testing daily  120-160 fasting and 120 in the afternoon.  Hypoglycemic episodes: no  Last Retinal Exam: on file and up-to-date  Daily Foot Exam:    Foot Exam:  Monofilament: done  Monofilament testing with a 10 gram force: sensation intact: intact bilaterally  Visual Inspection: Feet  without maceration, ulcers, fissures.  Pedal pulses: intact bilaterally   Lab Results   Component Value Date/Time    MALBCRT 8 08/20/2018 07:38 AM    MICROALBUR 1.3 08/20/2018 07:38 AM      ACR Albumin/Creatinine Ratio goal <30   Currently Rx ACE/ARB: Yes   Dyslipidemia:  Lab Results   Component Value Date/Time    CHOLSTRLTOT 94 (L) 05/13/2019 07:11 AM    LDL 40 05/13/2019 07:11 AM    HDL 30 (A) 05/13/2019 07:11 AM    TRIGLYCERIDE 120 05/13/2019 07:11 AM       Currently Rx Statin: Yes     He  reports that he quit smoking about 30 years ago. His smoking use included cigarettes. He has a 40.00 pack-year smoking history. He has never used smokeless tobacco.        ROS:     CONS:     No fever, no chills   EYES:     No diplopia, no blurry vision   CV:           No chest pain, no palpitations   PULM:     No SOB, no cough, no hemoptysis.   GI:            No nausea, no vomiting, no diarrhea, no constipation   ENDO:     No polyuria, no polydipsia, no heat intolerance, no cold intolerance       Past Medical History:  Problem List:  2020-07: Long-term insulin use ([facility])  2020-06: Mild aortic stenosis  2019-12: Benign prostatic hyperplasia with urinary obstruction  2018-12: Bilateral lower extremity edema  2018-08: Prostate cancer ([facility])  2018-01: Elevated PSA  2018-01: Bilateral primary osteoarthritis of knee  2017-06: Altered level of consciousness: March 2017  2016-10: Cervical spondylosis  2016-06: Diabetes mellitus type II, controlled ([facility])  2016-06: Hypertensive retinopathy of both eyes  2016-03: Depression with anxiety  2016-03: Obstructive sleep apnea on CPAP  2014-08: Type 2 diabetes mellitus, uncontrolled ([facility])  2014-08: Subclinical hypothyroidism  2014-08: Obesity  2013-11: History of diverticulitis  2013-10: Other dyspnea and respiratory abnormality  2013-09: Fatigue  2013-09: Shortness of breath  2013-08: Coronary artery disease due to calcified coronary lesion: 40-  50% disease in the circumflex at cardiac  catheterization in 2013 2013-04: Incisional infection  2013-02: DJD (degenerative joint disease) of cervical spine  2012-11: Degeneration of lumbar or lumbosacral intervertebral disc  2012-10: Sick sinus syndrome (HCC)  2012-10: Pacemaker  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  2012-09: Dyslipidemia  2012-09: Episodic lightheadedness  2012-09: RBBB  2012-04: Type 2 diabetes mellitus without complication (HCC)  2012-04: Back pain, chronic  2012-04: Essential hypertension, benign  2012-04: Neck pain  2012-04: Insomnia      Past Surgical History:  Past Surgical History:   Procedure Laterality Date   • PA DSTR NROLYTC AGNT PARVERTEB FCT SNGL CRVCL/THORA Right 10/19/2016    Procedure: NEURO DEST FACET C/T W/IG SNGL - 3ON-C3, C8-T1    SINERGY;  Surgeon: Gaurang Pruett M.D.;  Location: [facility];  Service: Pain Management   • PA DSTR NROLYTC AGNT PARVERTEB FCT ADDL CRVCL/THORA  10/19/2016    Procedure: NEURO DEST FACET C/T W/IG ADDL;  Surgeon: Gaurang Pruett M.D.;  Location: SURGERY [facility];  Service: Pain Management   • PA FLUOROSCOPIC GUIDANCE NEEDLE PLACEMENT  10/19/2016    Procedure: FLOURO GUIDE NEEDLE PLACEMENT;  Surgeon: Gaurang Pruett M.D.;  Location: [facility];  Service: Pain Management   • SHOULDER ARTHROSCOPY Right 2015    torn bicep tendon and spurs   • RECOVERY  10/17/2013    Performed by Cath-Recovery Surgery at SURGERY SAME DAY [facility] ORS   • WOUND DEHISCENCE  4/10/2013    Performed by Tu Jain M.D. at SURGERY [facility]   • CERVICAL FUSION POSTERIOR  2/27/2013    Performed by Tu Jain M.D. at SURGERY [facility]   • CERVICAL LAMINECTOMY POSTERIOR  2/27/2013    Performed by Tu Jain M.D. at SURGERY [facility]   • LUMBAR LAMINECTOMY DISKECTOMY  11/20/2012    Performed by Tu Jain M.D. at SURGERY [facility]   • RECOVERY  10/4/2012    Performed by Cath-Recovery Surgery at SURGERY SAME DAY [facility]   • PACEMAKER  "INSERTION  2012    [facility] [id number] implanted by Dr. Waite.   • SHOULDER DECOMPRESSION Left     Left rotator cuff   • ARTHROPLASTY Left    • ARTHROSCOPY, KNEE Bilateral    • ORTHOPEDIC OSTEOTOMY      Both knees several times, 8 total   • OTHER     • SPINAL CORD STIMULATOR  1 month ago   • TONSILLECTOMY     • VASECTOMY          Allergies:  Aleve cold & [pseudoephedrine-naproxen na], Ceftriaxone sodium, Naproxen, Latex, and Tape     Social History:  Social History     Tobacco Use   • Smoking status: Former Smoker     Packs/day: 1.00     Years: 40.00     Pack years: 40.00     Types: Cigarettes     Quit date: 1/3/1990     Years since quittin.8   • Smokeless tobacco: Never Used   Substance Use Topics   • Alcohol use: No     Alcohol/week: 0.0 oz   • Drug use: No        Family History:   family history includes Alcohol/Drug in his brother and mother; Cancer in his brother and brother; Diabetes in his brother; Heart Disease in his brother, maternal grandmother, and sister; Heart Disease (age of onset: 36) in his father; Heart Disease (age of onset: 60) in his brother; Hyperlipidemia in his brother and maternal grandmother; Hypertension in his brother and maternal grandmother; Lung Disease in his brother and mother; No Known Problems in his maternal grandfather and paternal grandmother; Sleep Apnea in his brother.      PHYSICAL EXAM:   OBJECTIVE:  Vital signs: /60   Pulse 80   Ht 1.702 m (5' 7\")   Wt 107.5 kg (237 lb)   SpO2 96%   BMI 37.12 kg/m²   GENERAL: Well-developed, well-nourished in no apparent distress.   EYE:  No ocular asymmetry, PERRLA  HENT: Pink, moist mucous membranes.    NECK: No thyromegaly.   CARDIOVASCULAR:  No murmurs  LUNGS: Clear breath sounds  ABDOMEN: Soft, nontender   EXTREMITIES: No clubbing, cyanosis, or edema.   NEUROLOGICAL: No gross focal motor abnormalities   LYMPH: No cervical adenopathy palpated.   SKIN: No rashes, lesions.     Labs:  Lab " Results   Component Value Date/Time    HBA1C 6.6 (A) 11/13/2020 03:24 PM        Lab Results   Component Value Date/Time    WBC 10.1 10/04/2019 03:45 PM    RBC 4.92 10/04/2019 03:45 PM    HEMOGLOBIN 15.6 10/04/2019 03:45 PM    MCV 91.7 10/04/2019 03:45 PM    MCH 31.7 10/04/2019 03:45 PM    MCHC 34.6 10/04/2019 03:45 PM    RDW 47.0 10/04/2019 03:45 PM    MPV 10.9 10/04/2019 03:45 PM       Lab Results   Component Value Date/Time    SODIUM 136 10/04/2019 03:45 PM    POTASSIUM 4.4 10/04/2019 03:45 PM    CHLORIDE 103 10/04/2019 03:45 PM    CO2 26 10/04/2019 03:45 PM    ANION 7.0 10/04/2019 03:45 PM    GLUCOSE 180 (H) 10/04/2019 03:45 PM    BUN 29 (H) 10/04/2019 03:45 PM    CREATININE 1.20 10/04/2019 03:45 PM    CALCIUM 9.1 10/04/2019 03:45 PM    ASTSGOT 24 10/04/2019 03:45 PM    ALTSGPT 33 10/04/2019 03:45 PM    TBILIRUBIN 1.3 10/04/2019 03:45 PM    ALBUMIN 4.2 10/04/2019 03:45 PM    TOTPROTEIN 6.7 10/04/2019 03:45 PM    GLOBULIN 2.5 10/04/2019 03:45 PM    AGRATIO 1.7 10/04/2019 03:45 PM       Lab Results   Component Value Date/Time    CHOLSTRLTOT 94 (L) 05/13/2019 0711    TRIGLYCERIDE 120 05/13/2019 0711    HDL 30 (A) 05/13/2019 0711    LDL 40 05/13/2019 0711       Lab Results   Component Value Date/Time    MALBCRT 8 08/20/2018 07:38 AM    MICROALBUR 1.3 08/20/2018 07:38 AM        Lab Results   Component Value Date/Time    TSHULTRASEN 3.650 10/08/2019 1546     No results found for: FREEDIR  Lab Results   Component Value Date/Time    FREET3 3.6 07/22/2014 0743     No results found for: THYSTIMIG        ASSESSMENT/PLAN:     1. Controlled type 2 diabetes mellitus with other circulatory complication, with long-term current use of insulin (HCC)  Controlled  Continue Synjardy, Trulicity and Lantus at present doses  He is going to get labs this week and I will update him this includes a fasting lipid profile urine microalbumin CMP and TSH  I will see him again in 6 months with repeat of his A1c    Discussed and educated on:   -  All medications, side effects and compliance (discussed carefully)  - Annual eye examinations at Ophthalmology  - Home glucose monitoring emphasized  - Weight control and daily exercise    2. Subclinical hypothyroidism  Stable  Will obtain a TSH and free T4 level and TPO antibodies this week and I will update him  Continue to monitor subclinical hypothyroidism for now  I will start him on Synthroid or levothyroxine if his TSH is greater than 10 or if he is symptomatic    3. Dyslipidemia  Unstable  Continue atorvastatin we will repeat a fasting lipid profile this week    4. Long-term insulin use (HCC)  Patient is on long-term basal insulin with a GLP-1 and other oral agents for type 2 diabetes management      Return in about 6 months (around 5/13/2021).      This patient during there office visit today was started on a new medication.  Side effects of the new medication were discussed with the patient today in the office.     Thank you kindly for allowing me to participate in the diabetes care plan for this patient.    Luis Daniel Ring MD, JORGE A, NU  11/13/20    CC:   Tanya Rayo P.A.-C.

## 2020-11-18 ENCOUNTER — HOSPITAL ENCOUNTER (OUTPATIENT)
Dept: LAB | Facility: MEDICAL CENTER | Age: 75
End: 2020-11-18
Attending: INTERNAL MEDICINE
Payer: MEDICARE

## 2020-11-18 DIAGNOSIS — Z79.4 CONTROLLED TYPE 2 DIABETES MELLITUS WITH OTHER CIRCULATORY COMPLICATION, WITH LONG-TERM CURRENT USE OF INSULIN (HCC): ICD-10-CM

## 2020-11-18 DIAGNOSIS — E78.5 DYSLIPIDEMIA: ICD-10-CM

## 2020-11-18 DIAGNOSIS — E11.59 CONTROLLED TYPE 2 DIABETES MELLITUS WITH OTHER CIRCULATORY COMPLICATION, WITH LONG-TERM CURRENT USE OF INSULIN (HCC): ICD-10-CM

## 2020-11-18 DIAGNOSIS — E03.8 SUBCLINICAL HYPOTHYROIDISM: ICD-10-CM

## 2020-11-18 DIAGNOSIS — Z79.4 LONG-TERM INSULIN USE (HCC): ICD-10-CM

## 2020-11-18 LAB
ALBUMIN SERPL BCP-MCNC: 4.4 G/DL (ref 3.2–4.9)
ALBUMIN/GLOB SERPL: 1.8 G/DL
ALP SERPL-CCNC: 78 U/L (ref 30–99)
ALT SERPL-CCNC: 18 U/L (ref 2–50)
ANION GAP SERPL CALC-SCNC: 10 MMOL/L (ref 7–16)
AST SERPL-CCNC: 19 U/L (ref 12–45)
BILIRUB SERPL-MCNC: 1.3 MG/DL (ref 0.1–1.5)
BUN SERPL-MCNC: 19 MG/DL (ref 8–22)
CALCIUM SERPL-MCNC: 10.2 MG/DL (ref 8.5–10.5)
CHLORIDE SERPL-SCNC: 102 MMOL/L (ref 96–112)
CHOLEST SERPL-MCNC: 106 MG/DL (ref 100–199)
CO2 SERPL-SCNC: 25 MMOL/L (ref 20–33)
CREAT SERPL-MCNC: 1.1 MG/DL (ref 0.5–1.4)
CREAT UR-MCNC: 117.63 MG/DL
GLOBULIN SER CALC-MCNC: 2.5 G/DL (ref 1.9–3.5)
GLUCOSE SERPL-MCNC: 151 MG/DL (ref 65–99)
HDLC SERPL-MCNC: 38 MG/DL
LDLC SERPL CALC-MCNC: 40 MG/DL
MICROALBUMIN UR-MCNC: <1.2 MG/DL
MICROALBUMIN/CREAT UR: NORMAL MG/G (ref 0–30)
POTASSIUM SERPL-SCNC: 4.5 MMOL/L (ref 3.6–5.5)
PROT SERPL-MCNC: 6.9 G/DL (ref 6–8.2)
SODIUM SERPL-SCNC: 137 MMOL/L (ref 135–145)
T4 FREE SERPL-MCNC: 1.16 NG/DL (ref 0.93–1.7)
TRIGL SERPL-MCNC: 138 MG/DL (ref 0–149)
TSH SERPL DL<=0.005 MIU/L-ACNC: 5.64 UIU/ML (ref 0.38–5.33)

## 2020-11-18 PROCEDURE — 84439 ASSAY OF FREE THYROXINE: CPT

## 2020-11-18 PROCEDURE — 82570 ASSAY OF URINE CREATININE: CPT

## 2020-11-18 PROCEDURE — 82043 UR ALBUMIN QUANTITATIVE: CPT

## 2020-11-18 PROCEDURE — 80053 COMPREHEN METABOLIC PANEL: CPT

## 2020-11-18 PROCEDURE — 84443 ASSAY THYROID STIM HORMONE: CPT

## 2020-11-18 PROCEDURE — 80061 LIPID PANEL: CPT

## 2020-11-18 PROCEDURE — 36415 COLL VENOUS BLD VENIPUNCTURE: CPT

## 2020-11-20 ENCOUNTER — OFFICE VISIT (OUTPATIENT)
Dept: MEDICAL GROUP | Facility: PHYSICIAN GROUP | Age: 75
End: 2020-11-20
Payer: MEDICARE

## 2020-11-20 VITALS
WEIGHT: 237 LBS | BODY MASS INDEX: 37.2 KG/M2 | SYSTOLIC BLOOD PRESSURE: 140 MMHG | TEMPERATURE: 98 F | HEIGHT: 67 IN | HEART RATE: 77 BPM | DIASTOLIC BLOOD PRESSURE: 70 MMHG | OXYGEN SATURATION: 93 %

## 2020-11-20 DIAGNOSIS — F50.89 PATHOLOGIC ICE EATING: ICD-10-CM

## 2020-11-20 DIAGNOSIS — Z23 NEED FOR VACCINATION: ICD-10-CM

## 2020-11-20 DIAGNOSIS — R40.20 LOC (LOSS OF CONSCIOUSNESS) (HCC): ICD-10-CM

## 2020-11-20 PROCEDURE — 99214 OFFICE O/P EST MOD 30 MIN: CPT | Mod: 25 | Performed by: NURSE PRACTITIONER

## 2020-11-20 PROCEDURE — G0010 ADMIN HEPATITIS B VACCINE: HCPCS | Performed by: NURSE PRACTITIONER

## 2020-11-20 PROCEDURE — 90746 HEPB VACCINE 3 DOSE ADULT IM: CPT | Performed by: NURSE PRACTITIONER

## 2020-11-20 ASSESSMENT — FIBROSIS 4 INDEX: FIB4 SCORE: 1.94

## 2020-11-27 NOTE — PROGRESS NOTES
Chief Complaint   Patient presents with   • Establish Care         Subjective:     Dakota Boyer is a 75 y.o. male presenting to establish care.    Patient has multiple chronic medical conditions for which he appropriately sees multiple specialists.  He is established with cardiology and has a pacemaker in place.  Due to chronic prostate cancer, sees Dr. Garcia at urology in about every 6 months.  Has a spine stimulator in place for Dr. Alber martins.  Is established with endocrinology and dermatology.  Last colonoscopy was done in 2019.    Pt reports an episode of LOC two months ago.  This was gradual and he was able to sit down.  He attributed it to significant back pain at the time, but is concerned something else is at cause.  Does have type 2 DM; reports he checked his blood sugar and it was normal after he regained consciousness. BP and HR also taken and wnl. Has not yet brought this issue up to endocrinology (but will send MD a message); has upcoming appt with cardiology and will discuss it there.       Review of systems:      Denies chest pain, shortness of breath, sore throat, difficulty swallowing, new cough, changes in bowel or bladder habits, intolerable depression or anxiety, rash or skin concerns, painful or swollen lymph nodes.       Current Outpatient Medications:   •  hepatitis B vaccine 20 mcg/mL (ENGERIX-B) 20 MCG/ML Suspension, Inject 1 mL into the shoulder, thigh, or buttocks Once PRN for up to 1 dose. Obtain last dose 6-12 months after 2nd dose., Disp: 1 mL, Rfl: 1  •  Empagliflozin-metFORMIN HCl (SYNJARDY) 12.5-1000 MG Tab, Take 1 Tab by mouth 2 Times a Day for 90 days., Disp: 120 Tab, Rfl: 3  •  insulin glargine (LANTUS SOLOSTAR) 100 UNIT/ML Solution Pen-injector injection, Inject 23 Units under the skin every evening for 90 days., Disp: 15 Each, Rfl: 3  •  Dulaglutide (TRULICITY) 1.5 MG/0.5ML Solution Pen-injector, Inject 1 Each under the skin every 7 days for 90 days., Disp: 12  "Each, Rfl: 3  •  metoprolol (LOPRESSOR) 25 MG Tab, Take 0.5 Tabs by mouth 2 times a day. **change in dose**, Disp: 90 Tab, Rfl: 3  •  atorvastatin (LIPITOR) 20 MG Tab, TAKE 1 TABLET DAILY, Disp: 90 Tab, Rfl: 3  •  tamsulosin (FLOMAX) 0.4 MG capsule, , Disp: , Rfl:   •  buPROPion (WELLBUTRIN XL) 150 MG XL tablet, Take 1 Tab by mouth every morning., Disp: 90 Tab, Rfl: 1  •  meloxicam (MOBIC) 7.5 MG Tab, TAKE 1 TO 2 TABLETS DAILY, Disp: 180 Tab, Rfl: 1  •  valsartan (DIOVAN) 160 MG Tab, Take 1 Tab by mouth every day., Disp: 90 Tab, Rfl: 3  •  glucose blood (FREESTYLE LITE) strip, 1 Strip by Other route 2 times a day., Disp: 150 Strip, Rfl: 3  •  Cyanocobalamin (VITAMIN B-12) 2500 MCG SL Tab, Place  under tongue every day., Disp: , Rfl:   •  Lancets MISC, Lancets order: Lancets for Abbott Freestyle Lite meter. Sig: use 4 times daily  and prn ssx high or low sugar. #100 RF x 0, Disp: 360 Each, Rfl: 4  •  Blood Glucose Monitoring Suppl [place], Meter: Dispense Abbott Freestyle Lite meter. Sig. Use as directed for blood sugar monitoring. #1. NR., Disp: 1 Device, Rfl: 0  •  B-D ULTRAFINE III SHORT PEN 31G X 8 MM MISC, USE 1 NEEDLE EVERY DAY WITH LANTUS, Disp: 999 Each, Rfl: 2  •  aspirin EC (ECOTRIN) 81 MG TBEC, Take 81 mg by mouth every day., Disp: , Rfl:     Allergies, past medical history, past surgical history, family history, social history reviewed and updated    Objective:     Vitals: /70 (BP Location: Left arm, Patient Position: Sitting, BP Cuff Size: Adult long)   Pulse 77   Temp 36.7 °C (98 °F) (Temporal)   Ht 1.702 m (5' 7\")   Wt 107.5 kg (237 lb)   SpO2 93%   BMI 37.12 kg/m²   General: Alert, cooperative, dressed appropriately for weather / situation  Eyes:Normocephalic.  EOMI, no icterus or pallor.  Conjunctivae clear without erythema / irritation.  ENT:  External ears developed; Bilat TMs visualized; appear pearly without bulging, effusion, or erythema; crisp light reflex    Lymph: Neck supple, absent " of cervical or supraclavicular lymphadenopathy.  Thyroid palpated, free of masses or goiter.   Heart: Regular rate and rhythm.  S1 and S2 normal.  No murmurs auscultated; no murmurs / bruits heard over bilateral carotids.  Bilateral radial pulses strong and equal.  Bilateral posterior tibial pulses strong and equal.  Respiratory: Normal respiratory effort.  Clear to auscultation bilaterally.  AP ratio 1:2   Abdomen: Non-distended;   Skin: Visible skin intact, dry, without rash.  Musculoskeletal: Gait is normal.  Bilateral  strength strong equal.  Moves extremities freely and equally bilaterally  Neuro:  CN I-XII intact; bilat popliteal DTRs brisk and equal; AAOx3 Visual tracking intact, no nystagmus;   Psych:  Affect/mood is normal, judgement is good, memory is intact, grooming is appropriate.    Assessment/Plan:     Dakota was seen today for establish care.    Diagnoses and all orders for this visit:    Advised pt to reach out to cardiology ASAP and to let endocrinologist know about episodes as well.  Ordered carotid doppler and labs to r/o anemia.  Pt does admit to chronic ice eating which is a relatively new (1 year) habit.  Denies melena or erik blood in stool. Recent FITKIT was negative.     LOC (loss of consciousness) (HCC)  -     US-CAROTID DOPPLER BILAT; Future  -     CBC WITH DIFFERENTIAL; Future  -     IRON/TOTAL IRON BIND; Future  -     VITAMIN B12; Future  -     FOLATE; Future  -     Cancel: OCCULT BLOOD FECES IMMUNOASSAY; Future  -     FERRITIN; Future    Pathologic ice eating  -     IRON/TOTAL IRON BIND; Future  -     VITAMIN B12; Future  -     FOLATE; Future  -     Cancel: OCCULT BLOOD FECES IMMUNOASSAY; Future  -     FERRITIN; Future    Need for vaccination  -     hepatitis B vaccine 20 mcg/mL (ENGERIX-B) 20 MCG/ML Suspension; Inject 1 mL into the shoulder, thigh, or buttocks Once PRN for up to 1 dose. Obtain last dose 6-12 months after 2nd dose.  -     Hepatitis B Vaccine Adult  IM          Return in about 6 months (around 5/20/2021).    Patient verbalized understanding and agreed to plan of care.  Encouraged to contact me with needs via MyChart or by phone if needed.      I have placed the above orders and discussed them with an approved delegating provider.  The MA is performing the below orders under the direction of Dr Chung.    Please note that this dictation was created using voice recognition software. I have made every reasonable attempt to correct obvious errors, but I expect that there are errors of grammar and possibly content that I did not discover before finalizing the note.    ROS  Physical Exam

## 2020-12-01 ENCOUNTER — HOSPITAL ENCOUNTER (OUTPATIENT)
Dept: RADIOLOGY | Facility: MEDICAL CENTER | Age: 75
End: 2020-12-01
Attending: NURSE PRACTITIONER
Payer: MEDICARE

## 2020-12-01 DIAGNOSIS — R40.20 LOC (LOSS OF CONSCIOUSNESS) (HCC): ICD-10-CM

## 2020-12-01 PROCEDURE — 93880 EXTRACRANIAL BILAT STUDY: CPT | Mod: 26 | Performed by: INTERNAL MEDICINE

## 2020-12-01 PROCEDURE — 93880 EXTRACRANIAL BILAT STUDY: CPT

## 2020-12-04 ENCOUNTER — NON-PROVIDER VISIT (OUTPATIENT)
Dept: CARDIOLOGY | Facility: MEDICAL CENTER | Age: 75
End: 2020-12-04
Payer: MEDICARE

## 2020-12-04 ENCOUNTER — TELEPHONE (OUTPATIENT)
Dept: CARDIOLOGY | Facility: MEDICAL CENTER | Age: 75
End: 2020-12-04

## 2020-12-04 DIAGNOSIS — Z95.0 PACEMAKER: ICD-10-CM

## 2020-12-04 DIAGNOSIS — I49.5 SICK SINUS SYNDROME (HCC): ICD-10-CM

## 2020-12-04 PROCEDURE — 93280 PM DEVICE PROGR EVAL DUAL: CPT | Performed by: INTERNAL MEDICINE

## 2020-12-04 NOTE — TELEPHONE ENCOUNTER
ROHINI-- patient seen in clinic today. Device is functioning appropriately--no episodes noted.  Patient states that has had LOC x 2 in the past 2 months--seen by primary.  Patient is scheduled 6 months for device check and appointment with Dr. Hsu.

## 2020-12-04 NOTE — TELEPHONE ENCOUNTER
"Called pt. 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 He decided to take a break and started walking to a different room, but the next thing he knew he woke up on the floor. He sat there for about 20 mins and then felt better. Blood sugar at that time was in the 140s, and BP was 160/70.  He reports 2 other incidences within the last month where he briefly \"went into a daze,\" for about 10-15 mins. He remained conscious but felt \"out of it\" for about 20 mins. Once happened while he was sitting int he chair, the other occasion happened while he was working outside. He's not sure of BP at these times.   "

## 2020-12-15 ENCOUNTER — HOSPITAL ENCOUNTER (OUTPATIENT)
Dept: LAB | Facility: MEDICAL CENTER | Age: 75
End: 2020-12-15
Attending: UROLOGY
Payer: MEDICARE

## 2020-12-15 ENCOUNTER — HOSPITAL ENCOUNTER (OUTPATIENT)
Dept: LAB | Facility: MEDICAL CENTER | Age: 75
End: 2020-12-15
Attending: NURSE PRACTITIONER
Payer: MEDICARE

## 2020-12-15 DIAGNOSIS — F50.89 PATHOLOGIC ICE EATING: ICD-10-CM

## 2020-12-15 DIAGNOSIS — R40.20 LOC (LOSS OF CONSCIOUSNESS) (HCC): ICD-10-CM

## 2020-12-15 LAB
BASOPHILS # BLD AUTO: 0.7 % (ref 0–1.8)
BASOPHILS # BLD: 0.05 K/UL (ref 0–0.12)
EOSINOPHIL # BLD AUTO: 0.27 K/UL (ref 0–0.51)
EOSINOPHIL NFR BLD: 3.7 % (ref 0–6.9)
ERYTHROCYTE [DISTWIDTH] IN BLOOD BY AUTOMATED COUNT: 49.5 FL (ref 35.9–50)
FERRITIN SERPL-MCNC: 211 NG/ML (ref 22–322)
FOLATE SERPL-MCNC: 10 NG/ML
HCT VFR BLD AUTO: 43.8 % (ref 42–52)
HGB BLD-MCNC: 14.9 G/DL (ref 14–18)
IMM GRANULOCYTES # BLD AUTO: 0.04 K/UL (ref 0–0.11)
IMM GRANULOCYTES NFR BLD AUTO: 0.5 % (ref 0–0.9)
IRON SATN MFR SERPL: 29 % (ref 15–55)
IRON SERPL-MCNC: 86 UG/DL (ref 50–180)
LYMPHOCYTES # BLD AUTO: 1.88 K/UL (ref 1–4.8)
LYMPHOCYTES NFR BLD: 25.8 % (ref 22–41)
MCH RBC QN AUTO: 31.6 PG (ref 27–33)
MCHC RBC AUTO-ENTMCNC: 34 G/DL (ref 33.7–35.3)
MCV RBC AUTO: 93 FL (ref 81.4–97.8)
MONOCYTES # BLD AUTO: 0.64 K/UL (ref 0–0.85)
MONOCYTES NFR BLD AUTO: 8.8 % (ref 0–13.4)
NEUTROPHILS # BLD AUTO: 4.4 K/UL (ref 1.82–7.42)
NEUTROPHILS NFR BLD: 60.5 % (ref 44–72)
NRBC # BLD AUTO: 0 K/UL
NRBC BLD-RTO: 0 /100 WBC
PLATELET # BLD AUTO: 164 K/UL (ref 164–446)
PMV BLD AUTO: 11.2 FL (ref 9–12.9)
PSA SERPL-MCNC: 7.99 NG/ML (ref 0–4)
RBC # BLD AUTO: 4.71 M/UL (ref 4.7–6.1)
TIBC SERPL-MCNC: 294 UG/DL (ref 250–450)
UIBC SERPL-MCNC: 208 UG/DL (ref 110–370)
VIT B12 SERPL-MCNC: 415 PG/ML (ref 211–911)
WBC # BLD AUTO: 7.3 K/UL (ref 4.8–10.8)

## 2020-12-15 PROCEDURE — 82728 ASSAY OF FERRITIN: CPT

## 2020-12-15 PROCEDURE — 84153 ASSAY OF PSA TOTAL: CPT

## 2020-12-15 PROCEDURE — 82746 ASSAY OF FOLIC ACID SERUM: CPT

## 2020-12-15 PROCEDURE — 83550 IRON BINDING TEST: CPT

## 2020-12-15 PROCEDURE — 85025 COMPLETE CBC W/AUTO DIFF WBC: CPT

## 2020-12-15 PROCEDURE — 83540 ASSAY OF IRON: CPT

## 2020-12-15 PROCEDURE — 82607 VITAMIN B-12: CPT

## 2020-12-15 PROCEDURE — 36415 COLL VENOUS BLD VENIPUNCTURE: CPT

## 2020-12-16 ENCOUNTER — HOSPITAL ENCOUNTER (OUTPATIENT)
Facility: MEDICAL CENTER | Age: 75
End: 2020-12-16
Attending: NURSE PRACTITIONER
Payer: MEDICARE

## 2020-12-16 PROCEDURE — 82274 ASSAY TEST FOR BLOOD FECAL: CPT

## 2020-12-22 LAB — HEMOCCULT STL QL IA: NEGATIVE

## 2021-01-11 DIAGNOSIS — Z23 NEED FOR VACCINATION: ICD-10-CM

## 2021-02-21 DIAGNOSIS — I10 ESSENTIAL HYPERTENSION, BENIGN: ICD-10-CM

## 2021-02-22 RX ORDER — VALSARTAN 160 MG/1
TABLET ORAL
Qty: 90 TABLET | Refills: 3 | Status: SHIPPED | OUTPATIENT
Start: 2021-02-22 | End: 2022-03-17

## 2021-02-23 ENCOUNTER — NURSE TRIAGE (OUTPATIENT)
Dept: HEALTH INFORMATION MANAGEMENT | Facility: OTHER | Age: 76
End: 2021-02-23

## 2021-02-24 ENCOUNTER — APPOINTMENT (OUTPATIENT)
Dept: RADIOLOGY | Facility: IMAGING CENTER | Age: 76
End: 2021-02-24
Attending: PHYSICIAN ASSISTANT
Payer: MEDICARE

## 2021-02-24 ENCOUNTER — OFFICE VISIT (OUTPATIENT)
Dept: URGENT CARE | Facility: PHYSICIAN GROUP | Age: 76
End: 2021-02-24
Payer: MEDICARE

## 2021-02-24 VITALS
OXYGEN SATURATION: 94 % | WEIGHT: 230 LBS | HEART RATE: 92 BPM | TEMPERATURE: 98.5 F | DIASTOLIC BLOOD PRESSURE: 86 MMHG | SYSTOLIC BLOOD PRESSURE: 130 MMHG | BODY MASS INDEX: 36.1 KG/M2 | RESPIRATION RATE: 16 BRPM | HEIGHT: 67 IN

## 2021-02-24 DIAGNOSIS — J22 LOWER RESPIRATORY INFECTION: ICD-10-CM

## 2021-02-24 DIAGNOSIS — R05.9 COUGH: ICD-10-CM

## 2021-02-24 PROCEDURE — 71046 X-RAY EXAM CHEST 2 VIEWS: CPT | Mod: TC | Performed by: PHYSICIAN ASSISTANT

## 2021-02-24 PROCEDURE — 99214 OFFICE O/P EST MOD 30 MIN: CPT | Performed by: PHYSICIAN ASSISTANT

## 2021-02-24 RX ORDER — BENZONATATE 100 MG/1
100-200 CAPSULE ORAL 3 TIMES DAILY PRN
Qty: 60 CAPSULE | Refills: 0 | Status: SHIPPED | OUTPATIENT
Start: 2021-02-24 | End: 2021-05-25

## 2021-02-24 RX ORDER — HEPATITIS B VACCINE (RECOMBINANT) 20 UG/ML
INJECTION, SUSPENSION INTRAMUSCULAR
COMMUNITY
End: 2021-06-09

## 2021-02-24 RX ORDER — METHYLPREDNISOLONE 4 MG/1
TABLET ORAL
Qty: 1 EACH | Refills: 0 | Status: SHIPPED | OUTPATIENT
Start: 2021-02-24 | End: 2021-05-25

## 2021-02-24 RX ORDER — DOXYCYCLINE HYCLATE 100 MG
100 TABLET ORAL 2 TIMES DAILY
Qty: 14 TABLET | Refills: 0 | Status: SHIPPED | OUTPATIENT
Start: 2021-02-24 | End: 2021-03-03

## 2021-02-24 ASSESSMENT — ENCOUNTER SYMPTOMS
NAUSEA: 0
EYE REDNESS: 0
MUSCULOSKELETAL NEGATIVE: 1
DIZZINESS: 0
SPUTUM PRODUCTION: 1
RHINORRHEA: 0
DIARRHEA: 0
SORE THROAT: 0
MYALGIAS: 0
VOMITING: 0
SWEATS: 0
CHILLS: 0
ABDOMINAL PAIN: 0
EYE DISCHARGE: 0
FEVER: 0
HEMOPTYSIS: 0
COUGH: 1
HEADACHES: 0
WHEEZING: 1
SHORTNESS OF BREATH: 1

## 2021-02-24 ASSESSMENT — COPD QUESTIONNAIRES: COPD: 1

## 2021-02-24 ASSESSMENT — FIBROSIS 4 INDEX: FIB4 SCORE: 2.1

## 2021-02-24 NOTE — PROGRESS NOTES
Subjective:      Dakota Boyer is a 76 y.o. male who presents with Cough (congestion, exhausted, wheezing, SOB, x3 weeks )            Cough  This is a new problem. The current episode started 1 to 4 weeks ago (3 weeks). The problem has been gradually worsening. The problem occurs every few minutes. The cough is productive of sputum. Associated symptoms include shortness of breath and wheezing. Pertinent negatives include no chest pain, chills, ear pain, eye redness, fever, headaches, hemoptysis, myalgias, nasal congestion, postnasal drip, rash, rhinorrhea, sore throat or sweats. The symptoms are aggravated by lying down. Risk factors for lung disease include smoking/tobacco exposure. He has tried OTC cough suppressant for the symptoms. The treatment provided mild relief. His past medical history is significant for COPD. There is no history of asthma or pneumonia.     Patient presents to urgent care reporting a 3 week history of productive cough, congestion, wheezing, SOB, and fatigue. No fevers, chest pain, or hemoptysis. No known sick contacts or concern for covid-19 exposure. He is a former smoker, quit in 1991. No recent use of antibiotics.       Review of Systems   Constitutional: Negative for chills and fever.   HENT: Positive for congestion. Negative for ear pain, postnasal drip, rhinorrhea and sore throat.    Eyes: Negative for discharge and redness.   Respiratory: Positive for cough, sputum production, shortness of breath and wheezing. Negative for hemoptysis.    Cardiovascular: Negative for chest pain.   Gastrointestinal: Negative for abdominal pain, diarrhea, nausea and vomiting.   Genitourinary: Negative.    Musculoskeletal: Negative.  Negative for myalgias.   Skin: Negative for rash.   Neurological: Negative for dizziness and headaches.        Objective:     /86 (BP Location: Left arm, Patient Position: Sitting, BP Cuff Size: Large adult)   Pulse 92   Temp 36.9 °C (98.5 °F) (Temporal)    "Resp 16   Ht 1.702 m (5' 7\")   Wt 104 kg (230 lb)   SpO2 94%   BMI 36.02 kg/m²        Physical Exam  Vitals and nursing note reviewed.   Constitutional:       General: He is not in acute distress.     Appearance: Normal appearance. He is well-developed. He is not ill-appearing.   HENT:      Head: Normocephalic and atraumatic.      Right Ear: Tympanic membrane, ear canal and external ear normal.      Left Ear: Tympanic membrane, ear canal and external ear normal.      Nose: Nose normal. No congestion.      Mouth/Throat:      Mouth: Mucous membranes are moist.   Eyes:      Conjunctiva/sclera: Conjunctivae normal.   Cardiovascular:      Rate and Rhythm: Normal rate and regular rhythm.      Heart sounds: Normal heart sounds. No murmur.   Pulmonary:      Effort: Pulmonary effort is normal.      Breath sounds: Wheezing and rales present.   Musculoskeletal:         General: Normal range of motion.      Cervical back: Normal range of motion.   Skin:     General: Skin is warm and dry.   Neurological:      Mental Status: He is alert and oriented to person, place, and time.   Psychiatric:         Behavior: Behavior normal.            PMH:  has a past medical history of Anxiety, Back pain, chronic (10/18/2016), Blood clotting disorder ([facility]) (1978), Breath shortness, CAD (coronary artery disease) (October 2012), Cancer ([facility]) (? early 90's), Degeneration of lumbar or lumbosacral intervertebral disc ( ), Dental disorder, Depression, Diabetes, DJD (degenerative joint disease) of cervical spine ( ), Episodic lightheadedness ( ), High cholesterol, Hyperlipidemia, Hypersensitive carotid sinus syndrome ( ), Hypertension, Incisional infection (April 2013), Insomnia ( ), Myocardial infarct ([facility]) (?), Neck pain (10/18/2016), Pacemaker (October 2012), RBBB ( ), S/P lumbar discectomy ( ), Sick sinus syndrome ([facility]) ( ), Sleep apnea, Syncope (October 2012), Thyroid disease, and Unspecified hemorrhagic conditions.  MEDS:   Current " Outpatient Medications:   •  doxycycline (VIBRAMYCIN) 100 MG Tab, Take 1 tablet by mouth 2 times a day for 7 days., Disp: 14 tablet, Rfl: 0  •  methylPREDNISolone (MEDROL DOSEPAK) 4 MG Tablet Therapy Pack, Take as directed, Disp: 1 Each, Rfl: 0  •  benzonatate (TESSALON) 100 MG Cap, Take 1-2 Capsules by mouth 3 times a day as needed., Disp: 60 capsule, Rfl: 0  •  valsartan (DIOVAN) 160 MG Tab, TAKE 1 TABLET DAILY, Disp: 90 tablet, Rfl: 3  •  hepatitis B vaccine 20 mcg/mL (ENGERIX-B) 20 MCG/ML Suspension, Inject 1 mL into the shoulder, thigh, or buttocks Once PRN for up to 1 dose. Obtain last dose 6-12 months after 2nd dose., Disp: 1 mL, Rfl: 1  •  metoprolol (LOPRESSOR) 25 MG Tab, Take 0.5 Tabs by mouth 2 times a day. **change in dose**, Disp: 90 Tab, Rfl: 3  •  atorvastatin (LIPITOR) 20 MG Tab, TAKE 1 TABLET DAILY, Disp: 90 Tab, Rfl: 3  •  tamsulosin (FLOMAX) 0.4 MG capsule, , Disp: , Rfl:   •  buPROPion (WELLBUTRIN XL) 150 MG XL tablet, Take 1 Tab by mouth every morning., Disp: 90 Tab, Rfl: 1  •  meloxicam (MOBIC) 7.5 MG Tab, TAKE 1 TO 2 TABLETS DAILY, Disp: 180 Tab, Rfl: 1  •  glucose blood (FREESTYLE LITE) strip, 1 Strip by Other route 2 times a day., Disp: 150 Strip, Rfl: 3  •  Cyanocobalamin (VITAMIN B-12) 2500 MCG SL Tab, Place  under tongue every day., Disp: , Rfl:   •  Lancets MISC, Lancets order: Lancets for Abbott Freestyle Lite meter. Sig: use 4 times daily  and prn ssx high or low sugar. #100 RF x 0, Disp: 360 Each, Rfl: 4  •  Blood Glucose Monitoring Suppl [place], Meter: Dispense Abbott Freestyle Lite meter. Sig. Use as directed for blood sugar monitoring. #1. NR., Disp: 1 Device, Rfl: 0  •  B-D ULTRAFINE III SHORT PEN 31G X 8 MM MISC, USE 1 NEEDLE EVERY DAY WITH LANTUS, Disp: 999 Each, Rfl: 2  •  aspirin EC (ECOTRIN) 81 MG TBEC, Take 81 mg by mouth every day., Disp: , Rfl:   •  hepatitis B vaccine 20 mcg/mL (ENGERIX-B) 20 MCG/ML Suspension, Engerix-B (PF) 20 mcg/mL intramuscular syringe  PHARMACIST  ADMINISTERED IMMUNIZATION ADMINISTERED AT TIME OF DISPENSING, Disp: , Rfl:   •  influenza Vac High-Dose Quad (FLUZONE HIGH-DOSE QUADRIVALENT) 0.7 ML Suspension Prefilled Syringe injection, Fluzone High-Dose Quad 2020-21 (PF) 240 mcg/0.7 mL IM syringe, Disp: , Rfl:   ALLERGIES:   Allergies   Allergen Reactions   • Aleve Cold & [Pseudoephedrine-Naproxen Na] Anaphylaxis   • Ceftriaxone Sodium Anaphylaxis     Reaction; 1970's.   • Naproxen Anaphylaxis     Reaction; 2004.   • Latex    • Tape Rash and Itching     Plastic tape (paper tape ok)     SURGHX:   Past Surgical History:   Procedure Laterality Date   • ID DSTR NROLYTC AGNT PARVERTEB FCT SNGL CRVCL/THORA Right 10/19/2016    Procedure: NEURO DEST FACET C/T W/IG SNGL - 3ON-C3, C8-T1    SINERGY;  Surgeon: Gaurang Pruett M.D.;  Location: [facility];  Service: Pain Management   • ID DSTR NROLYTC AGNT PARVERTEB FCT ADDL CRVCL/THORA  10/19/2016    Procedure: NEURO DEST FACET C/T W/IG ADDL;  Surgeon: Gaurang Pruett M.D.;  Location: [facility];  Service: Pain Management   • ID FLUOROSCOPIC GUIDANCE NEEDLE PLACEMENT  10/19/2016    Procedure: FLOURO GUIDE NEEDLE PLACEMENT;  Surgeon: Gaurang Pruett M.D.;  Location: [facility];  Service: Pain Management   • SHOULDER ARTHROSCOPY Right 2015    torn bicep tendon and spurs   • RECOVERY  10/17/2013    Performed by Cath-Recovery Surgery at SURGERY SAME DAY [facility]   • WOUND DEHISCENCE  4/10/2013    Performed by Tu Jain M.D. at SURGERY [facility]   • CERVICAL FUSION POSTERIOR  2/27/2013    Performed by Tu Jain M.D. at SURGERY [facility]   • CERVICAL LAMINECTOMY POSTERIOR  2/27/2013    Performed by Tu Jain M.D. at SURGERY [facility]   • LUMBAR LAMINECTOMY DISKECTOMY  11/20/2012    Performed by Tu Jain M.D. at [facility]   • RECOVERY  10/4/2012    Performed by Cath-Recovery Surgery at [facility] SAME DAY [facility]   • PACEMAKER INSERTION   October 2012    [facility] DR [id number] implanted by Dr. Waite.   • SHOULDER DECOMPRESSION Left 2008    Left rotator cuff   • ARTHROPLASTY Left 2004   • ARTHROSCOPY, KNEE Bilateral 1978   • ORTHOPEDIC OSTEOTOMY      Both knees several times, 8 total   • OTHER     • SPINAL CORD STIMULATOR  1 month ago   • TONSILLECTOMY     • VASECTOMY       SOCHX:  reports that he quit smoking about 31 years ago. His smoking use included cigarettes. He has a 40.00 pack-year smoking history. He has never used smokeless tobacco. He reports that he does not drink alcohol and does not use drugs.  FH: family history includes Alcohol/Drug in his brother and mother; Cancer in his brother and brother; Diabetes in his brother; Heart Disease in his brother, maternal grandmother, and sister; Heart Disease (age of onset: 36) in his father; Heart Disease (age of onset: 60) in his brother; Hyperlipidemia in his brother and maternal grandmother; Hypertension in his brother and maternal grandmother; Lung Disease in his brother and mother; No Known Problems in his maternal grandfather and paternal grandmother; Sleep Apnea in his brother.       Assessment/Plan:        1. Lower respiratory infection    - DX-CHEST-2 VIEWS; Future  IMPRESSION:     1.  Interstitial opacities with linear bibasilar atelectasis.     2.  AICD and other surgical change is present.          - doxycycline (VIBRAMYCIN) 100 MG Tab; Take 1 tablet by mouth 2 times a day for 7 days.  Dispense: 14 tablet; Refill: 0   - Complete full course of antibiotics as prescribed   - methylPREDNISolone (MEDROL DOSEPAK) 4 MG Tablet Therapy Pack; Take as directed  Dispense: 1 Each; Refill: 0  - benzonatate (TESSALON) 100 MG Cap; Take 1-2 Capsules by mouth 3 times a day as needed.  Dispense: 60 capsule; Refill: 0      Encouraged increased fluids and rest. Tessalon perles as needed for symptomatic relief. Monitor symptoms closely and RTC or follow up with primary care if symptoms  persist/worsen. The patient demonstrated a good understanding and agreed with the treatment plan.

## 2021-02-24 NOTE — TELEPHONE ENCOUNTER
Reason for Disposition  • Cough has been present for > 3 weeks    Additional Information  • Negative: Bluish (or gray) lips or face  • Negative: SEVERE difficulty breathing (e.g., struggling for each breath, speaks in single words)  • Negative: Rapid onset of cough and has hives  • Negative: Coughing started suddenly after medicine, an allergic food or bee sting  • Negative: Difficulty breathing after exposure to flames, smoke, or fumes  • Negative: Sounds like a life-threatening emergency to the triager  • Negative: Previous asthma attacks and this feels like asthma attack  • Negative: Chest pain present when not coughing  • Negative: Difficulty breathing  • Negative: Passed out (i.e., fainted, collapsed and was not responding)  • Negative: Patient sounds very sick or weak to the triager  • Negative: Coughed up > 1 tablespoon (15 ml) blood (Exception: blood-tinged sputum)  • Negative: Fever > 103 F (39.4 C)  • Negative: Fever > 101 F (38.3 C) and over 60 years of age  • Negative: Fever > 100.0 F (37.8 C) and has diabetes mellitus or a weak immune system (e.g., HIV positive, cancer chemotherapy, organ transplant, splenectomy, chronic steroids)  • Negative: Fever > 100.0 F (37.8 C) and bedridden (e.g., nursing home patient, stroke, chronic illness, recovering from surgery)  • Negative: Increasing ankle swelling  • Negative: Wheezing is present  • Negative: SEVERE coughing spells (e.g., whooping sound after coughing, vomiting after coughing)  • Negative: Coughing up lucia-colored (reddish-brown) or blood-tinged sputum  • Negative: Fever present > 3 days (72 hours)  • Negative: Fever returns after gone for over 24 hours and symptoms worse or not improved  • Negative: Using nasal washes and pain medicine > 24 hours and sinus pain persists  • Negative: Known COPD or other severe lung disease (i.e., bronchiectasis, cystic fibrosis, lung surgery) and worsening symptoms (i.e., increased sputum purulence or amount,  "increased breathing difficulty)  • Negative: Continuous (nonstop) coughing interferes with work or school and no improvement using cough treatment per Care Advice  • Negative: Patient wants to be seen  • Negative: Allergy symptoms are also present (e.g., itchy eyes, clear nasal discharge, postnasal drip)  • Negative: Nasal discharge present > 10 days  • Negative: Exposure to TB (Tuberculosis)  • Negative: Taking an ACE Inhibitor medication (e.g., benazepril/LOTENSIN, captopril/CAPOTEN, enalapril/VASOTEC, lisinopril/ZESTRIL)  • Negative: Cough with no complications  • Negative: Cough with cold symptoms (e.g., runny nose, postnasal drip, throat clearing)    Answer Assessment - Initial Assessment Questions  1. ONSET: \"When did the cough begin?\"       3 weeks ago  2. SEVERITY: \"How bad is the cough today?\"       Goes through coughing spells  3. RESPIRATORY DISTRESS: \"Describe your breathing.\"       No problem  4. FEVER: \"Do you have a fever?\" If so, ask: \"What is your temperature, how was it measured, and when did it start?\"      no  5. HEMOPTYSIS: \"Are you coughing up any blood?\" If so ask: \"How much?\" (flecks, streaks, tablespoons, etc.)      no  6. TREATMENT: \"What have you done so far to treat the cough?\" (e.g., meds, fluids, humidifier)      alkaseltzer cold tablets, day/night time med, mucinex, cough medicine  7. CARDIAC HISTORY: \"Do you have any history of heart disease?\" (e.g., heart attack, congestive heart failure)       Pacemaker for RBBB  8. LUNG HISTORY: \"Do you have any history of lung disease?\"  (e.g., pulmonary embolus, asthma, emphysema)      COPD, cpap at night  9. PE RISK FACTORS: \"Do you have a history of blood clots?\" (or: recent major surgery, recent prolonged travel, bedridden)      Yes, more than 16 years ago with knee surgery  10. OTHER SYMPTOMS: \"Do you have any other symptoms? (e.g., runny nose, wheezing, chest pain)        no  11. PREGNANCY: \"Is there any chance you are pregnant?\" \"When was " "your last menstrual period?\"        no  12. TRAVEL: \"Have you traveled out of the country in the last month?\" (e.g., travel history, exposures)        no    Protocols used: COUGH-A-[place]    "

## 2021-02-24 NOTE — TELEPHONE ENCOUNTER
Regarding: CLEARANCE FOR OFFICE VISIT  ----- Message from Mg Naidu sent at 2/23/2021  4:01 PM PST -----  COUGH X 3WKS, WOULD LIKE OFFICE CLEARANCE      Wife Linda called-  with Cough x3 weeks, worse when he lays down. Pt states he is fatiued. Dry cough, felt like it was in his chest, moved up to his throat, but unable to break it up. Swelling is always in his legs, not any worse than normal. Pt does not have any other symptoms. Wife has not had cough/symptoms. Pt has had this for 3 weeks, hx of COPD. Pt cleared for in person visit.     1. Caller Name: Dakota Boyer                 Call Back Number: [phone]  [facility] PCP or Specialty Provider: Yes Avani Collado        2.  Has the patient previously tested positive for COVID-19? No    3.  In the last two weeks, has the patient had any new or worsening symptoms (not explained by alternative diagnosis)? No.    4.  Does patient have any comoribidities? COPD and DM    5.  Has the patient had any known contact with someone who is suspected or confirmed to have COVID-19? No.    5. Disposition: Cleared by RN Triage as potential is low for COVID-19; OK to keep/schedule appointment    Note routed to [facility] Provider: Provider action needed: in person visit

## 2021-03-01 ENCOUNTER — HOSPITAL ENCOUNTER (OUTPATIENT)
Facility: MEDICAL CENTER | Age: 76
End: 2021-03-01
Attending: PHYSICIAN ASSISTANT
Payer: MEDICARE

## 2021-03-01 ENCOUNTER — OFFICE VISIT (OUTPATIENT)
Dept: URGENT CARE | Facility: PHYSICIAN GROUP | Age: 76
End: 2021-03-01
Payer: MEDICARE

## 2021-03-01 ENCOUNTER — APPOINTMENT (OUTPATIENT)
Dept: RADIOLOGY | Facility: IMAGING CENTER | Age: 76
End: 2021-03-01
Attending: PHYSICIAN ASSISTANT
Payer: MEDICARE

## 2021-03-01 VITALS
DIASTOLIC BLOOD PRESSURE: 62 MMHG | BODY MASS INDEX: 36.1 KG/M2 | TEMPERATURE: 98.2 F | RESPIRATION RATE: 16 BRPM | HEART RATE: 87 BPM | HEIGHT: 67 IN | WEIGHT: 230 LBS | SYSTOLIC BLOOD PRESSURE: 120 MMHG | OXYGEN SATURATION: 93 %

## 2021-03-01 DIAGNOSIS — R05.9 COUGH: ICD-10-CM

## 2021-03-01 DIAGNOSIS — R06.02 SHORTNESS OF BREATH: ICD-10-CM

## 2021-03-01 DIAGNOSIS — R06.00 DYSPNEA, UNSPECIFIED TYPE: ICD-10-CM

## 2021-03-01 DIAGNOSIS — J22 LOWER RESP. TRACT INFECTION: ICD-10-CM

## 2021-03-01 LAB
ANION GAP SERPL CALC-SCNC: 14 MMOL/L (ref 7–16)
BASOPHILS # BLD AUTO: 0.8 % (ref 0–1.8)
BASOPHILS # BLD: 0.1 K/UL (ref 0–0.12)
BUN SERPL-MCNC: 30 MG/DL (ref 8–22)
CALCIUM SERPL-MCNC: 10.7 MG/DL (ref 8.5–10.5)
CHLORIDE SERPL-SCNC: 93 MMOL/L (ref 96–112)
CO2 SERPL-SCNC: 21 MMOL/L (ref 20–33)
CREAT SERPL-MCNC: 1.01 MG/DL (ref 0.5–1.4)
EOSINOPHIL # BLD AUTO: 0.08 K/UL (ref 0–0.51)
EOSINOPHIL NFR BLD: 0.6 % (ref 0–6.9)
ERYTHROCYTE [DISTWIDTH] IN BLOOD BY AUTOMATED COUNT: 48.8 FL (ref 35.9–50)
GLUCOSE SERPL-MCNC: 164 MG/DL (ref 65–99)
HCT VFR BLD AUTO: 46 % (ref 42–52)
HGB BLD-MCNC: 17.7 G/DL (ref 14–18)
IMM GRANULOCYTES # BLD AUTO: 0.09 K/UL (ref 0–0.11)
IMM GRANULOCYTES NFR BLD AUTO: 0.7 % (ref 0–0.9)
LYMPHOCYTES # BLD AUTO: 2.72 K/UL (ref 1–4.8)
LYMPHOCYTES NFR BLD: 21.1 % (ref 22–41)
MCH RBC QN AUTO: 32.4 PG (ref 27–33)
MCHC RBC AUTO-ENTMCNC: 35.7 G/DL (ref 33.7–35.3)
MCV RBC AUTO: 93.1 FL (ref 81.4–97.8)
MONOCYTES # BLD AUTO: 1.02 K/UL (ref 0–0.85)
MONOCYTES NFR BLD AUTO: 7.9 % (ref 0–13.4)
NEUTROPHILS # BLD AUTO: 8.88 K/UL (ref 1.82–7.42)
NEUTROPHILS NFR BLD: 68.9 % (ref 44–72)
NRBC # BLD AUTO: 0 K/UL
NRBC BLD-RTO: 0 /100 WBC
PLATELET # BLD AUTO: 228 K/UL (ref 164–446)
PMV BLD AUTO: 11.1 FL (ref 9–12.9)
POTASSIUM SERPL-SCNC: 4.4 MMOL/L (ref 3.6–5.5)
PROCALCITONIN SERPL-MCNC: <0.05 NG/ML
RBC # BLD AUTO: 4.94 M/UL (ref 4.7–6.1)
SODIUM SERPL-SCNC: 128 MMOL/L (ref 135–145)
WBC # BLD AUTO: 12.9 K/UL (ref 4.8–10.8)

## 2021-03-01 PROCEDURE — 83880 ASSAY OF NATRIURETIC PEPTIDE: CPT

## 2021-03-01 PROCEDURE — 99214 OFFICE O/P EST MOD 30 MIN: CPT | Mod: 25 | Performed by: PHYSICIAN ASSISTANT

## 2021-03-01 PROCEDURE — 84145 PROCALCITONIN (PCT): CPT

## 2021-03-01 PROCEDURE — 94640 AIRWAY INHALATION TREATMENT: CPT | Performed by: PHYSICIAN ASSISTANT

## 2021-03-01 PROCEDURE — 85025 COMPLETE CBC W/AUTO DIFF WBC: CPT

## 2021-03-01 PROCEDURE — 71046 X-RAY EXAM CHEST 2 VIEWS: CPT | Mod: TC | Performed by: PHYSICIAN ASSISTANT

## 2021-03-01 PROCEDURE — 80048 BASIC METABOLIC PNL TOTAL CA: CPT

## 2021-03-01 RX ORDER — IPRATROPIUM BROMIDE AND ALBUTEROL SULFATE 2.5; .5 MG/3ML; MG/3ML
3 SOLUTION RESPIRATORY (INHALATION) ONCE
Status: COMPLETED | OUTPATIENT
Start: 2021-03-01 | End: 2021-03-01

## 2021-03-01 RX ORDER — DEXTROMETHORPHAN HYDROBROMIDE AND PROMETHAZINE HYDROCHLORIDE 15; 6.25 MG/5ML; MG/5ML
5 SYRUP ORAL 4 TIMES DAILY PRN
Qty: 100 ML | Refills: 0 | Status: SHIPPED | OUTPATIENT
Start: 2021-03-01 | End: 2021-03-06

## 2021-03-01 RX ORDER — ALBUTEROL SULFATE 90 UG/1
2 AEROSOL, METERED RESPIRATORY (INHALATION) EVERY 6 HOURS PRN
Qty: 8.5 G | Refills: 0 | Status: SHIPPED | OUTPATIENT
Start: 2021-03-01 | End: 2021-05-25

## 2021-03-01 RX ADMIN — IPRATROPIUM BROMIDE AND ALBUTEROL SULFATE 3 ML: 2.5; .5 SOLUTION RESPIRATORY (INHALATION) at 16:05

## 2021-03-01 ASSESSMENT — FIBROSIS 4 INDEX: FIB4 SCORE: 2.1

## 2021-03-01 ASSESSMENT — ENCOUNTER SYMPTOMS
EYE REDNESS: 0
DIARRHEA: 0
SPUTUM PRODUCTION: 1
COUGH: 1
SORE THROAT: 1
EYE DISCHARGE: 0
SHORTNESS OF BREATH: 1
FEVER: 0
VOMITING: 0
WHEEZING: 1
MYALGIAS: 1
BACK PAIN: 0

## 2021-03-01 ASSESSMENT — COPD QUESTIONNAIRES: COPD: 1

## 2021-03-01 NOTE — PROGRESS NOTES
"Subjective:      Dakota Boyer is a 76 y.o. male who presents with Cough (wheezing , congestion, SOB, x4 week )            76-year-old male who presents to urgent care with worsening cough, wheezing, shortness of breath worsening over the last 3 to 4 weeks.  Patient previously had evaluation in our clinic on February 24 for similar symptoms of which at that time patient was with noted bibasilar atelectasis-started on doxycycline, Tessalon and Medrol for lower respiratory infection.  Patient reports he has since been on the above for 5 days\" nothing is working \".  He continues to report notable cough, bronchospasm, wheezing and shortness of breath.  Patient today reports that he feels \"so fatigued \".  He does report history of sleep apnea currently on CPAP at nighttime questionable history of COPD in the past.    Cough  This is a new problem. The current episode started 1 to 4 weeks ago. The problem has been gradually worsening. The problem occurs every few minutes. The cough is productive of sputum. Associated symptoms include myalgias, nasal congestion, a sore throat, shortness of breath and wheezing. Pertinent negatives include no ear pain, eye redness or fever. The symptoms are aggravated by lying down. Treatments tried: As above. The treatment provided mild relief. His past medical history is significant for COPD. There is no history of pneumonia.       Review of Systems   Constitutional: Positive for malaise/fatigue. Negative for fever.   HENT: Positive for sore throat. Negative for congestion and ear pain.    Eyes: Negative for discharge and redness.   Respiratory: Positive for cough, sputum production, shortness of breath and wheezing.    Cardiovascular: Negative for leg swelling.   Gastrointestinal: Negative for diarrhea and vomiting.   Musculoskeletal: Positive for myalgias. Negative for back pain.   All other systems reviewed and are negative.         Objective:     /62 (BP Location: Right " "arm, Patient Position: Sitting, BP Cuff Size: Adult)   Pulse 87   Temp 36.8 °C (98.2 °F) (Temporal)   Resp 16   Ht 1.702 m (5' 7\")   Wt 104 kg (230 lb)   SpO2 93%   BMI 36.02 kg/m²    PMH:  has a past medical history of Anxiety, Back pain, chronic (10/18/2016), Blood clotting disorder (HCC) (1978), Breath shortness, CAD (coronary artery disease) (October 2012), Cancer (HCC) (? early 90's), Degeneration of lumbar or lumbosacral intervertebral disc ( ), Dental disorder, Depression, Diabetes, DJD (degenerative joint disease) of cervical spine ( ), Episodic lightheadedness ( ), High cholesterol, Hyperlipidemia, Hypersensitive carotid sinus syndrome ( ), Hypertension, Incisional infection (April 2013), Insomnia ( ), Myocardial infarct (HCC) (?), Neck pain (10/18/2016), Pacemaker (October 2012), RBBB ( ), S/P lumbar discectomy ( ), Sick sinus syndrome (HCC) ( ), Sleep apnea, Syncope (October 2012), Thyroid disease, and Unspecified hemorrhagic conditions.  MEDS: Reviewed .   ALLERGIES:   Allergies   Allergen Reactions   • Aleve Cold & [Pseudoephedrine-Naproxen Na] Anaphylaxis   • Ceftriaxone Sodium Anaphylaxis     Reaction; 1970's.   • Naproxen Anaphylaxis     Reaction; 2004.   • Latex    • Tape Rash and Itching     Plastic tape (paper tape ok)     SURGHX:   Past Surgical History:   Procedure Laterality Date   • ND DSTR NROLYTC AGNT PARVERTEB FCT SNGL CRVCL/THORA Right 10/19/2016    Procedure: NEURO DEST FACET C/T W/IG SNGL - 3ON-C3, C8-T1    SINERGY;  Surgeon: Gaurang Pruett M.D.;  Location: SURGERY SURGICAL [facility] ORS;  Service: Pain Management   • ND DSTR NROLYTC AGNT PARVERTEB FCT ADDL CRVCL/THORA  10/19/2016    Procedure: NEURO DEST FACET C/T W/IG ADDL;  Surgeon: Gaurang Pruett M.D.;  Location: SURGERY SURGICAL [facility] ORS;  Service: Pain Management   • ND FLUOROSCOPIC GUIDANCE NEEDLE PLACEMENT  10/19/2016    Procedure: FLOURO GUIDE NEEDLE PLACEMENT;  Surgeon: Gaurang Pruett M.D.;  Location: SURGERY [facility];  " Service: Pain Management   • SHOULDER ARTHROSCOPY Right 2015    torn bicep tendon and spurs   • RECOVERY  10/17/2013    Performed by Cath-Recovery Surgery at SURGERY SAME DAY [facility]   • WOUND DEHISCENCE  4/10/2013    Performed by Tu Jain M.D. at SURGERY [facility]   • CERVICAL FUSION POSTERIOR  2/27/2013    Performed by Tu Jain M.D. at SURGERY [facility]   • CERVICAL LAMINECTOMY POSTERIOR  2/27/2013    Performed by Tu Jain M.D. at SURGERY [facility]   • LUMBAR LAMINECTOMY DISKECTOMY  11/20/2012    Performed by Tu Jain M.D. at SURGERY [facility]   • RECOVERY  10/4/2012    Performed by Cath-Recovery Surgery at SURGERY SAME DAY [facility]   • PACEMAKER INSERTION  October 2012    [facility]  2110 implanted by Dr. Waite.   • SHOULDER DECOMPRESSION Left 2008    Left rotator cuff   • ARTHROPLASTY Left 2004   • ARTHROSCOPY, KNEE Bilateral 1978   • ORTHOPEDIC OSTEOTOMY      Both knees several times, 8 total   • OTHER     • SPINAL CORD STIMULATOR  1 month ago   • TONSILLECTOMY     • VASECTOMY       SOCHX:  reports that he quit smoking about 31 years ago. His smoking use included cigarettes. He has a 40.00 pack-year smoking history. He has never used smokeless tobacco. He reports that he does not drink alcohol and does not use drugs.  FH: Family history was reviewed, no pertinent findings to report    Physical Exam  Vitals reviewed.   Constitutional:       General: He is not in acute distress.     Appearance: He is well-developed.   HENT:      Head: Normocephalic and atraumatic.      Right Ear: External ear normal.      Left Ear: External ear normal.      Mouth/Throat:      Pharynx: Posterior oropharyngeal erythema present. No oropharyngeal exudate.   Eyes:      Conjunctiva/sclera: Conjunctivae normal.      Pupils: Pupils are equal, round, and reactive to light.   Neck:      Trachea: No tracheal deviation.   Cardiovascular:      Rate and Rhythm: Normal  rate and regular rhythm.      Heart sounds: No murmur.   Pulmonary:      Effort: Pulmonary effort is normal. No respiratory distress.      Breath sounds: Wheezing and rhonchi present.   Musculoskeletal:         General: Normal range of motion.      Cervical back: Normal range of motion and neck supple.      Right lower leg: No edema.      Left lower leg: No edema.   Skin:     General: Skin is warm.      Findings: No rash.   Neurological:      Mental Status: He is alert and oriented to person, place, and time.      Coordination: Coordination normal.   Psychiatric:         Behavior: Behavior normal.         Thought Content: Thought content normal.         Judgment: Judgment normal.                 RADIOLOGY RESULTS   DX-CHEST-2 VIEWS    Result Date: 3/1/2021  3/1/2021 3:44 PM HISTORY/REASON FOR EXAM: Cough and shortness of breath. TECHNIQUE/EXAM DESCRIPTION AND NUMBER OF VIEWS: Two views of the chest. COMPARISON: 2/24/2021 FINDINGS: There is interstitial prominence. The heart is normal in size. There is no evidence of pleural effusion. There is a left AICD. Postsurgical changes are again seen.     Again seen interstitial prominence. No focal consolidation.           Assessment/Plan:          1. Lower resp. tract infection  - CBC WITH DIFFERENTIAL; Future  - PROCALCITONIN; Future  - proBrain Natriuretic Peptide, NT; Future  - Basic Metabolic Panel; Future  - promethazine-dextromethorphan (PROMETHAZINE-DM) 6.25-15 MG/5ML syrup; Take 5 mL by mouth 4 times a day as needed for Cough for up to 5 days.  Dispense: 100 mL; Refill: 0  - albuterol 108 (90 Base) MCG/ACT Aero Soln inhalation aerosol; Inhale 2 Puffs every 6 hours as needed for Shortness of Breath.  Dispense: 8.5 g; Refill: 0    2. Cough  - ipratropium-albuterol (DUONEB) nebulizer solution  - DX-CHEST-2 VIEWS; Future  - promethazine-dextromethorphan (PROMETHAZINE-DM) 6.25-15 MG/5ML syrup; Take 5 mL by mouth 4 times a day as needed for Cough for up to 5 days.   "Dispense: 100 mL; Refill: 0    3. Shortness of breath  - ipratropium-albuterol (DUONEB) nebulizer solution  - DX-CHEST-2 VIEWS; Future      Walking POX- 90-91% RA.   Recheck after breathing tx today. 95% on RA- improved air movement throughout.     Of note the patient declines testing for COVID- as he reports being \"very safe\" and does not believe this is due to such virus. Denies testing today.     I will order the above at this time patient with mild improvement after DuoNeb in clinic.  Will order the above blood work to rule out acute CHF although x-ray does not reveal pulmonary edema which patient also is without new peripheral edema.  Anticipate slight increase in CBC secondary to recent Medrol however procalcitonin will assist on need for further antibiotics.  I will follow-up with this patient via [facility] as results return.      I personally reviewed prior external notes and test results pertinent to today's visit.  I have independently reviewed and interpreted all diagnostics ordered during this urgent care acute visit.   Discussed management options (risks,benefits, and alternatives to treatment). Pt expresses understanding and the treatment plan was agreed upon. Questions were encouraged and answered to pt's satisfaction. Time spent evaluating this patient was at least 30 minutes and includes preparing for visit, counseling/education, exam and evaluation, obtaining history, independent interpretation and ordering lab/test/procedures.    Side effects of OTC or prescribed medications discussed.     DDX, Supportive care, and indications for immediate follow-up discussed with patient.    Instructed to return to clinic or nearest emergency department if we are not available for any change in condition, further concerns, or worsening of symptoms.    The patient and/or guardian demonstrated a good understanding and agreed with the treatment plan.    Please note that this dictation was created using voice " recognition software. I have made every reasonable attempt to correct obvious errors, but I expect that there are errors of grammar and possibly content that I did not discover before finalizing the note.

## 2021-03-02 ENCOUNTER — TELEPHONE (OUTPATIENT)
Dept: MEDICAL GROUP | Facility: PHYSICIAN GROUP | Age: 76
End: 2021-03-02

## 2021-03-02 NOTE — TELEPHONE ENCOUNTER
VOICEMAIL  1. Caller Name: [facility]                      Call Back Number: [phone] opt:3    2. Message: Lab is calling regarding Dakota recent blood work.  For the Probrain lab order it flagged on the medicare check. Lab needs a new ICD 10 code so they can run pt's labs .    3. Patient approves office to leave a detailed voicemail/MyChart message: yes

## 2021-03-03 ENCOUNTER — TELEPHONE (OUTPATIENT)
Dept: URGENT CARE | Facility: PHYSICIAN GROUP | Age: 76
End: 2021-03-03

## 2021-03-04 LAB — NT-PROBNP SERPL IA-MCNC: 37 PG/ML (ref 0–125)

## 2021-04-21 DIAGNOSIS — F41.8 DEPRESSION WITH ANXIETY: ICD-10-CM

## 2021-04-22 RX ORDER — BUPROPION HYDROCHLORIDE 150 MG/1
150 TABLET ORAL EVERY MORNING
Qty: 90 TABLET | Refills: 0 | Status: SHIPPED | OUTPATIENT
Start: 2021-04-22 | End: 2021-04-27 | Stop reason: SDUPTHER

## 2021-04-27 DIAGNOSIS — F41.8 DEPRESSION WITH ANXIETY: ICD-10-CM

## 2021-04-27 RX ORDER — BUPROPION HYDROCHLORIDE 150 MG/1
150 TABLET ORAL EVERY MORNING
Qty: 90 TABLET | Refills: 3 | Status: SHIPPED | OUTPATIENT
Start: 2021-04-27 | End: 2021-05-25

## 2021-04-27 NOTE — TELEPHONE ENCOUNTER
[facility] would [facility]. Can yo please send this over, thank you

## 2021-05-11 NOTE — ASSESSMENT & PLAN NOTE
Chronic issue causing chronic neck pain, which is improved with PRN meloxicam. States pain is stable. Continue current management.   1

## 2021-05-18 ENCOUNTER — OFFICE VISIT (OUTPATIENT)
Dept: ENDOCRINOLOGY | Facility: MEDICAL CENTER | Age: 76
End: 2021-05-18
Attending: INTERNAL MEDICINE
Payer: MEDICARE

## 2021-05-18 VITALS
WEIGHT: 224.4 LBS | OXYGEN SATURATION: 97 % | HEIGHT: 67 IN | DIASTOLIC BLOOD PRESSURE: 62 MMHG | HEART RATE: 78 BPM | SYSTOLIC BLOOD PRESSURE: 120 MMHG | BODY MASS INDEX: 35.22 KG/M2

## 2021-05-18 DIAGNOSIS — E83.52 HYPERCALCEMIA: ICD-10-CM

## 2021-05-18 DIAGNOSIS — Z79.4 CONTROLLED TYPE 2 DIABETES MELLITUS WITH OTHER CIRCULATORY COMPLICATION, WITH LONG-TERM CURRENT USE OF INSULIN (HCC): ICD-10-CM

## 2021-05-18 DIAGNOSIS — E03.8 SUBCLINICAL HYPOTHYROIDISM: ICD-10-CM

## 2021-05-18 DIAGNOSIS — E11.59 CONTROLLED TYPE 2 DIABETES MELLITUS WITH OTHER CIRCULATORY COMPLICATION, WITH LONG-TERM CURRENT USE OF INSULIN (HCC): ICD-10-CM

## 2021-05-18 DIAGNOSIS — E78.5 DYSLIPIDEMIA: ICD-10-CM

## 2021-05-18 DIAGNOSIS — Z79.4 LONG-TERM INSULIN USE (HCC): ICD-10-CM

## 2021-05-18 LAB
HBA1C MFR BLD: 6.7 % (ref 0–5.6)
INT CON NEG: NEGATIVE
INT CON POS: POSITIVE

## 2021-05-18 PROCEDURE — 83036 HEMOGLOBIN GLYCOSYLATED A1C: CPT | Performed by: INTERNAL MEDICINE

## 2021-05-18 PROCEDURE — 99214 OFFICE O/P EST MOD 30 MIN: CPT | Performed by: INTERNAL MEDICINE

## 2021-05-18 PROCEDURE — 99212 OFFICE O/P EST SF 10 MIN: CPT | Performed by: INTERNAL MEDICINE

## 2021-05-18 RX ORDER — BLOOD-GLUCOSE METER
1 KIT MISCELLANEOUS 2 TIMES DAILY
Qty: 150 STRIP | Refills: 3 | Status: SHIPPED | OUTPATIENT
Start: 2021-05-18 | End: 2022-09-20

## 2021-05-18 RX ORDER — INSULIN GLARGINE 100 [IU]/ML
30 INJECTION, SOLUTION SUBCUTANEOUS EVERY EVENING
Qty: 45 ML | Refills: 3 | Status: SHIPPED | OUTPATIENT
Start: 2021-05-18 | End: 2021-09-21 | Stop reason: SDUPTHER

## 2021-05-18 ASSESSMENT — PATIENT HEALTH QUESTIONNAIRE - PHQ9: CLINICAL INTERPRETATION OF PHQ2 SCORE: 0

## 2021-05-18 ASSESSMENT — FIBROSIS 4 INDEX: FIB4 SCORE: 1.51

## 2021-05-18 NOTE — PROGRESS NOTES
CHIEF COMPLAINT: Patient is here for follow up of Type 2 Diabetes Mellitus    HPI:     Dakota Boyer is a 75 y.o. male with Type 2 Diabetes Mellitus here for follow up.    Labs from 18 2021 show A1c is  6.7%  Labs from 7/1/2020 show HbA1c was 6.4%      He has a history of coronary artery disease with prior PCI in 2013 and has a pacemaker.  He was previously followed by Dr. Charles in cardiology and is now followed by Dr. Hsu.  Other comorbid conditions include obesity, sleep apnea, hyperlipidemia hypertension and nonproliferative diabetic retinopathy OU       On follow-up he is on Synjardy 12.5/1000 mg twice a day, Trulicity 1.5 mg once a week and Lantus 23 units daily    Fasting sugars are suboptimal ranging from 136-168  We discussed increasing his basal insulin dose today        He has hyperlipidemia and is taking atorvastatin.    He denies muscle aches or muscle weakness  Does have coronary artery disease but he denies having any chest pain or difficulty breathing  LDL cholesterol was 40 with triglycerides of 138 on November 2020          He has essential hypertension and is taking valsartan.    Blood pressure today is well controlled  He does not have diabetic kidney disease his urine microalbumin is normal November 2020  We do not have an updated urine microalbumin on hand        Review of his past records showed a diagnosis of subclinical hypothyroidism   On November 2020 we repeated his thyroid labs and discovered that his TSH was mildly elevated at 5.6 Free T4 was normal 1.16 compatible with subclinical hypothyroidism.  TPO antibodies were negative    He was supposed to repeat thyroid function test prior to this visit but he forgot to do his labs.  He denies severe fatigue, constipation and cold intolerance  He currently is not on thyroid hormone replacement therapy          Finally he had labs on March 2021 for another provider and incidentally his calcium was elevated 10.7, serum creatinine was  normal 1.01 albumin was not available.  He is not on hydrochlorothiazide and he denies taking Tums or calcium supplements          BG Diary:  Please see scanned blood sugar readings    Weight has been stable    Diabetes Complications   Retinopathy: Known retinopathy.  Last eye exam: He had an [facility] exam on July 2020 with [facility]  Neuropathy: Denies paresthesias or numbness in hands or feet. Denies any foot wounds.  Exercise: Minimal.  Diet: Fair.  Patient's medications, allergies, and social histories were reviewed and updated as appropriate.    ROS:     CONS:     No fever, no chills   EYES:     No diplopia, no blurry vision   CV:           No chest pain, no palpitations   PULM:     No SOB, no cough, no hemoptysis.   GI:            No nausea, no vomiting, no diarrhea, no constipation   ENDO:     No polyuria, no polydipsia, no heat intolerance, no cold intolerance       Past Medical History:  Problem List:  2020-07: Long-term insulin use ([facility])  2020-06: Mild aortic stenosis  2019-12: Benign prostatic hyperplasia with urinary obstruction  2018-12: Bilateral lower extremity edema  2018-08: Prostate cancer ([facility])  2018-01: Elevated PSA  2018-01: Bilateral primary osteoarthritis of knee  2017-06: Altered level of consciousness: March 2017  2016-10: Cervical spondylosis  2016-06: Diabetes mellitus type II, controlled ([facility])  2016-06: Hypertensive retinopathy of both eyes  2016-03: Depression with anxiety  2016-03: Obstructive sleep apnea on CPAP  2014-08: Type 2 diabetes mellitus, uncontrolled ([facility])  2014-08: Subclinical hypothyroidism  2014-08: Obesity  2013-11: History of diverticulitis  2013-10: Other dyspnea and respiratory abnormality  2013-09: Fatigue  2013-09: Shortness of breath  2013-08: Coronary artery disease due to calcified coronary lesion: 40-  50% disease in the circumflex at cardiac catheterization in 2013 2013-04: Incisional infection  2013-02: DJD (degenerative joint disease) of cervical  spine  2012-11: Degeneration of lumbar or lumbosacral intervertebral disc  2012-10: Sick sinus syndrome (HCC)  2012-10: Pacemaker  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  2012-09: Dyslipidemia  2012-09: Episodic lightheadedness  2012-09: RBBB  2012-04: Type 2 diabetes mellitus without complication (HCC)  2012-04: Back pain, chronic  2012-04: Essential hypertension, benign  2012-04: Neck pain  2012-04: Insomnia      Past Surgical History:  Past Surgical History:   Procedure Laterality Date   • MS DSTR NROLYTC AGNT PARVERTEB FCT SNGL CRVCL/THORA Right 10/19/2016    Procedure: NEURO DEST FACET C/T W/IG SNGL - 3ON-C3, C8-T1    SINERGY;  Surgeon: Gaurang Pruett M.D.;  Location: [facility];  Service: Pain Management   • MS DSTR NROLYTC AGNT PARVERTEB FCT ADDL CRVCL/THORA  10/19/2016    Procedure: NEURO DEST FACET C/T W/IG ADDL;  Surgeon: Gaurang Pruett M.D.;  Location: SURGERY [facility];  Service: Pain Management   • MS FLUOROSCOPIC GUIDANCE NEEDLE PLACEMENT  10/19/2016    Procedure: FLOURO GUIDE NEEDLE PLACEMENT;  Surgeon: Gaurang Pruett M.D.;  Location: [facility];  Service: Pain Management   • SHOULDER ARTHROSCOPY Right 2015    torn bicep tendon and spurs   • RECOVERY  10/17/2013    Performed by Cath-Recovery Surgery at SURGERY SAME DAY [facility] ORS   • WOUND DEHISCENCE  4/10/2013    Performed by Tu Jain M.D. at SURGERY [facility]S   • CERVICAL FUSION POSTERIOR  2/27/2013    Performed by Tu Jain M.D. at SURGERY [facility]   • CERVICAL LAMINECTOMY POSTERIOR  2/27/2013    Performed by Tu Jain M.D. at SURGERY [facility]   • LUMBAR LAMINECTOMY DISKECTOMY  11/20/2012    Performed by Tu Jain M.D. at SURGERY [facility]   • RECOVERY  10/4/2012    Performed by Cath-Recovery Surgery at SURGERY SAME DAY [facility]   • PACEMAKER INSERTION  October 2012    [facility]  2110 implanted by Dr. Waite.   • SHOULDER DECOMPRESSION Left  "    Left rotator cuff   • ARTHROPLASTY Left    • ARTHROSCOPY, KNEE Bilateral    • ORTHOPEDIC OSTEOTOMY      Both knees several times, 8 total   • OTHER     • SPINAL CORD STIMULATOR  1 month ago   • TONSILLECTOMY     • VASECTOMY          Allergies:  Aleve cold & [pseudoephedrine-naproxen na]; Ceftriaxone sodium; Naproxen; Latex; and Tape     Social History:  Social History     Tobacco Use   • Smoking status: Former Smoker     Packs/day: 1.00     Years: 40.00     Pack years: 40.00     Types: Cigarettes     Quit date: 1/3/1990     Years since quittin.3   • Smokeless tobacco: Never Used   Vaping Use   • Vaping Use: Never used   Substance Use Topics   • Alcohol use: No     Alcohol/week: 0.0 oz   • Drug use: No        Family History:   family history includes Alcohol/Drug in his brother and mother; Cancer in his brother and brother; Diabetes in his brother; Heart Disease in his brother, maternal grandmother, and sister; Heart Disease (age of onset: 36) in his father; Heart Disease (age of onset: 60) in his brother; Hyperlipidemia in his brother and maternal grandmother; Hypertension in his brother and maternal grandmother; Lung Disease in his brother and mother; No Known Problems in his maternal grandfather and paternal grandmother; Sleep Apnea in his brother.      PHYSICAL EXAM:   OBJECTIVE:  Vital signs: /62 (BP Location: Left arm, Patient Position: Sitting, BP Cuff Size: Large adult)   Pulse 78   Ht 1.702 m (5' 7\")   Wt 102 kg (224 lb 6.4 oz)   SpO2 97%   BMI 35.15 kg/m²   GENERAL: Well-developed, well-nourished in no apparent distress.   EYE:  No ocular asymmetry, PERRLA  HENT: Pink, moist mucous membranes.    NECK: No thyromegaly.   CARDIOVASCULAR:  No murmurs  LUNGS: Clear breath sounds  ABDOMEN: Soft, nontender   EXTREMITIES: No clubbing, cyanosis, or edema.   NEUROLOGICAL: No gross focal motor abnormalities   LYMPH: No cervical adenopathy palpated.   SKIN: No rashes, lesions. "       Labs:  Lab Results   Component Value Date/Time    HBA1C 6.7 (A) 05/18/2021 10:49 AM        Lab Results   Component Value Date/Time    WBC 12.9 (H) 03/01/2021 04:41 PM    RBC 4.94 03/01/2021 04:41 PM    HEMOGLOBIN 17.7 03/01/2021 04:41 PM    MCV 93.1 03/01/2021 04:41 PM    MCH 32.4 03/01/2021 04:41 PM    MCHC 35.7 (H) 03/01/2021 04:41 PM    RDW 48.8 03/01/2021 04:41 PM    MPV 11.1 03/01/2021 04:41 PM       Lab Results   Component Value Date/Time    SODIUM 128 (L) 03/01/2021 04:41 PM    POTASSIUM 4.4 03/01/2021 04:41 PM    CHLORIDE 93 (L) 03/01/2021 04:41 PM    CO2 21 03/01/2021 04:41 PM    ANION 14.0 03/01/2021 04:41 PM    GLUCOSE 164 (H) 03/01/2021 04:41 PM    BUN 30 (H) 03/01/2021 04:41 PM    CREATININE 1.01 03/01/2021 04:41 PM    CALCIUM 10.7 (H) 03/01/2021 04:41 PM    ASTSGOT 19 11/18/2020 09:11 AM    ALTSGPT 18 11/18/2020 09:11 AM    TBILIRUBIN 1.3 11/18/2020 09:11 AM    ALBUMIN 4.4 11/18/2020 09:11 AM    TOTPROTEIN 6.9 11/18/2020 09:11 AM    GLOBULIN 2.5 11/18/2020 09:11 AM    AGRATIO 1.8 11/18/2020 09:11 AM       Lab Results   Component Value Date/Time    CHOLSTRLTOT 94 (L) 05/13/2019 0711    TRIGLYCERIDE 120 05/13/2019 0711    HDL 30 (A) 05/13/2019 0711    LDL 40 05/13/2019 0711       Lab Results   Component Value Date/Time    MALBCRT see below 11/18/2020 09:11 AM    MICROALBUR <1.2 11/18/2020 09:11 AM        Lab Results   Component Value Date/Time    TSHULTRASEN 3.650 10/08/2019 1546     No results found for: FREEDIR  Lab Results   Component Value Date/Time    FREET3 3.6 07/22/2014 0743     No results found for: THYSTIMIG        ASSESSMENT/PLAN:     1. Controlled type 2 diabetes mellitus with other circulatory complication, with long-term current use of insulin (HCC)  Fair control  A1c is still good at 6.7% but is trending up  Fasting sugars are suboptimal  Increase Lantus to 25 units daily and/or 27 units daily if needed titrate until fasting sugars below 120  Continue Synjardy twice a day  Continue  Trulicity 1.5 mg weekly  Recommend that he get an eye exam on July 2020 at [facility]  Follow-up with me in 4 months with repeat labs      2. Subclinical hypothyroidism  Stable  He has mild subclinical hypothyroidism but is asymptomatic  Recommend starting levothyroxine if TSH goes up greater than 10 or if patient becomes symptomatic  Repeat TSH with next labs along with free T4 levels    3. Hypercalcemia  Unstable  Incidental mild hypercalcemia noted on labs from March  Recommend observation   IM repeating calcium, vitamin D, serum creatinine, intact PTH with his next labs  Advised patient to remain well-hydrated for now    4. Dyslipidemia  Controlled  Continue atorvastatin repeat fasting lipids with next labs    5. Long-term insulin use (HCC)  Patient is on long-term basal insulin therapy with a GLP-1 and SGLT2 inhibitor for type 2 diabetes management      Return in about 4 months (around 9/18/2021).      Thank you kindly for allowing me to participate in the diabetes care plan for this patient.    Luis Daniel Ring MD, FACE, [facility]  07/01/20    CC:   Tanya Rayo P.A.-C.

## 2021-05-25 ENCOUNTER — OFFICE VISIT (OUTPATIENT)
Dept: MEDICAL GROUP | Facility: PHYSICIAN GROUP | Age: 76
End: 2021-05-25
Payer: MEDICARE

## 2021-05-25 ENCOUNTER — APPOINTMENT (OUTPATIENT)
Dept: MEDICAL GROUP | Facility: PHYSICIAN GROUP | Age: 76
End: 2021-05-25
Payer: MEDICARE

## 2021-05-25 VITALS
WEIGHT: 231 LBS | OXYGEN SATURATION: 96 % | DIASTOLIC BLOOD PRESSURE: 60 MMHG | HEART RATE: 80 BPM | TEMPERATURE: 99.2 F | HEIGHT: 67 IN | BODY MASS INDEX: 36.26 KG/M2 | SYSTOLIC BLOOD PRESSURE: 122 MMHG

## 2021-05-25 DIAGNOSIS — C61 CARCINOMA OF PROSTATE (HCC): ICD-10-CM

## 2021-05-25 DIAGNOSIS — R68.89 COLD INTOLERANCE: ICD-10-CM

## 2021-05-25 DIAGNOSIS — Z23 NEED FOR VACCINATION: ICD-10-CM

## 2021-05-25 DIAGNOSIS — I49.5 SICK SINUS SYNDROME (HCC): ICD-10-CM

## 2021-05-25 DIAGNOSIS — D72.825 BANDEMIA: ICD-10-CM

## 2021-05-25 DIAGNOSIS — E66.01 CLASS 2 SEVERE OBESITY DUE TO EXCESS CALORIES WITH SERIOUS COMORBIDITY AND BODY MASS INDEX (BMI) OF 36.0 TO 36.9 IN ADULT (HCC): ICD-10-CM

## 2021-05-25 PROCEDURE — 99214 OFFICE O/P EST MOD 30 MIN: CPT | Mod: 25 | Performed by: NURSE PRACTITIONER

## 2021-05-25 PROCEDURE — 90746 HEPB VACCINE 3 DOSE ADULT IM: CPT | Performed by: NURSE PRACTITIONER

## 2021-05-25 PROCEDURE — G0010 ADMIN HEPATITIS B VACCINE: HCPCS | Performed by: NURSE PRACTITIONER

## 2021-05-25 RX ORDER — BUPROPION HYDROCHLORIDE 150 MG/1
150 TABLET ORAL EVERY MORNING
Qty: 90 TABLET | Refills: 3 | Status: SHIPPED | OUTPATIENT
Start: 2021-05-25 | End: 2021-05-25

## 2021-05-25 ASSESSMENT — FIBROSIS 4 INDEX: FIB4 SCORE: 1.51

## 2021-05-26 NOTE — PROGRESS NOTES
Chief Complaint   Patient presents with   • Other     cold    • Follow-Up         Subjective:     Dakota Boyer is a 76 y.o. male presenting for follow-up.    Overall he feels much better than when he was here in November after several syncopal episodes.  States this has not recurred.    Cold intolerance: This is a new issue.  Patient reports that over the last few months he has been colder than normal, requiring multiple blankets and sweaters and struggling to warm up when he gets chilled.  He is established with endocrinology, has had elevated TSH in the past.  Was previously on levothyroxine but stopped several years ago by a previous provider.    Bandemia: Patient had notably elevated white blood cells and neutrophils when sick in March.  No current sick symptoms with the exception of chills.    Sick sinus syndrome: Chronic and controlled.  Pacemaker in place.  Follows closely with [facility], pacer check next month.    Prostate cancer: Chronic and controlled.  Serial PSAs and close monitoring done by [facility] [place], Dr. Garcia.    Obesity: Chronic and controlled, continues to work on diet and lifestyle modification.      Review of systems:      Denies chest pain, shortness of breath, sore throat, difficulty swallowing, new cough, dizziness, severe headache, altered cognition, changes in bowel or bladder habits, decreased sensation, decreased strength, numbness or tingling, intolerable depression or anxiety, painful or swollen lymph nodes.       Current Outpatient Medications:   •  glucose blood (FREESTYLE LITE) strip, 1 Strip by Other route 2 times a day., Disp: 150 Strip, Rfl: 3  •  insulin glargine (LANTUS SOLOSTAR) 100 UNIT/ML Solution Pen-injector injection, Inject 30 Units under the skin every evening. 5 pens per month, Disp: 45 mL, Rfl: 3  •  hepatitis B vaccine 20 mcg/mL (ENGERIX-B) 20 MCG/ML Suspension, Engerix-B (PF) 20 mcg/mL intramuscular syringe  PHARMACIST ADMINISTERED  "IMMUNIZATION ADMINISTERED AT TIME OF DISPENSING, Disp: , Rfl:   •  influenza Vac High-Dose Quad (FLUZONE HIGH-DOSE QUADRIVALENT) 0.7 ML Suspension Prefilled Syringe injection, Fluzone High-Dose Quad 2020-21 (PF) 240 mcg/0.7 mL IM syringe, Disp: , Rfl:   •  valsartan (DIOVAN) 160 MG Tab, TAKE 1 TABLET DAILY, Disp: 90 tablet, Rfl: 3  •  hepatitis B vaccine 20 mcg/mL (ENGERIX-B) 20 MCG/ML Suspension, Inject 1 mL into the shoulder, thigh, or buttocks Once PRN for up to 1 dose. Obtain last dose 6-12 months after 2nd dose., Disp: 1 mL, Rfl: 1  •  metoprolol (LOPRESSOR) 25 MG Tab, Take 0.5 Tabs by mouth 2 times a day. **change in dose**, Disp: 90 Tab, Rfl: 3  •  atorvastatin (LIPITOR) 20 MG Tab, TAKE 1 TABLET DAILY, Disp: 90 Tab, Rfl: 3  •  tamsulosin (FLOMAX) 0.4 MG capsule, , Disp: , Rfl:   •  Lancets MISC, Lancets order: Lancets for Abbott Freestyle Lite meter. Sig: use 4 times daily  and prn ssx high or low sugar. #100 RF x 0, Disp: 360 Each, Rfl: 4  •  Blood Glucose Monitoring Suppl [place], Meter: Dispense Abbott Freestyle Lite meter. Sig. Use as directed for blood sugar monitoring. #1. NR., Disp: 1 Device, Rfl: 0  •  B-D ULTRAFINE III SHORT PEN 31G X 8 MM MISC, USE 1 NEEDLE EVERY DAY WITH LANTUS, Disp: 999 Each, Rfl: 2  •  aspirin EC (ECOTRIN) 81 MG TBEC, Take 81 mg by mouth every day., Disp: , Rfl:     Allergies, past medical history, past surgical history, family history, social history reviewed and updated    Objective:     Vitals: /60 (BP Location: Right arm, Patient Position: Sitting, BP Cuff Size: Adult)   Pulse 80   Temp 37.3 °C (99.2 °F) (Temporal)   Ht 1.702 m (5' 7\")   Wt 105 kg (231 lb)   SpO2 96%   BMI 36.18 kg/m²     Constitutional: Alert, no distress, well-groomed.  Skin: Warm, dry, good turgor, no rashes in visible areas.  Eye: Equal, round and reactive, conjunctiva clear, lids normal.  ENMT: Lips without lesions, good dentition, moist mucous membranes.  Neck: Trachea midline, no masses, no " thyromegaly.  Respiratory: Unlabored respiratory effort, no cough.  MSK: Normal gait, moves all extremities.  Neuro: Grossly non-focal.   Psych: Alert and oriented x3, normal affect and mood.    Assessment/Plan:     Dakota was seen today for other and follow-up.    Diagnoses and all orders for this visit:    Cold intolerance  Comments:  Elevated TSH in November, advised discussing this with endocrinology.  We will follow-up with CBC to rule out active infection    Bandemia  Comments:  Repeat CBC to ensure recovery from March  Orders:  -     CBC WITH DIFFERENTIAL; Future    Sick sinus syndrome (HCC)  Comments:  Continue following with cardiology, due for pacer check next month    Class 2 severe obesity due to excess calories with serious comorbidity and body mass index (BMI) of 36.0 to 36.9 in adult (HCC)  Comments:  Continue working on diet and increase physical activity    Carcinoma of prostate (HCC)  Comments:  Continue following with urology every 6 months with serial PSAs.    Need for vaccination  -     Hepatitis B Vaccine Adult IM    Other orders  -     Discontinue: buPROPion (WELLBUTRIN XL) 150 MG XL tablet; Take 1 tablet by mouth every morning.          Return in about 6 months (around 11/25/2021).    Patient verbalized understanding and agreed to plan of care.  Encouraged to contact me with needs via [employer] or by phone if needed.      I have placed the above orders and discussed them with an approved delegating provider.  The MA is performing the below orders under the direction of Dr Chung.    Please note that this dictation was created using voice recognition software. I have made every reasonable attempt to correct obvious errors, but I expect that there are errors of grammar and possibly content that I did not discover before finalizing the note.

## 2021-06-01 ENCOUNTER — HOSPITAL ENCOUNTER (OUTPATIENT)
Dept: LAB | Facility: MEDICAL CENTER | Age: 76
End: 2021-06-01
Attending: UROLOGY
Payer: MEDICARE

## 2021-06-01 LAB — PSA SERPL-MCNC: 8.48 NG/ML (ref 0–4)

## 2021-06-01 PROCEDURE — 36415 COLL VENOUS BLD VENIPUNCTURE: CPT | Mod: GA

## 2021-06-01 PROCEDURE — 84153 ASSAY OF PSA TOTAL: CPT | Mod: GA

## 2021-06-09 ENCOUNTER — OFFICE VISIT (OUTPATIENT)
Dept: CARDIOLOGY | Facility: MEDICAL CENTER | Age: 76
End: 2021-06-09
Payer: MEDICARE

## 2021-06-09 ENCOUNTER — NON-PROVIDER VISIT (OUTPATIENT)
Dept: CARDIOLOGY | Facility: MEDICAL CENTER | Age: 76
End: 2021-06-09
Payer: MEDICARE

## 2021-06-09 VITALS
SYSTOLIC BLOOD PRESSURE: 114 MMHG | BODY MASS INDEX: 36.49 KG/M2 | RESPIRATION RATE: 14 BRPM | HEIGHT: 67 IN | DIASTOLIC BLOOD PRESSURE: 68 MMHG | OXYGEN SATURATION: 95 % | HEART RATE: 92 BPM | WEIGHT: 232.5 LBS

## 2021-06-09 DIAGNOSIS — E78.5 DYSLIPIDEMIA: ICD-10-CM

## 2021-06-09 DIAGNOSIS — I25.10 CORONARY ARTERIOSCLEROSIS: ICD-10-CM

## 2021-06-09 DIAGNOSIS — Z95.0 PACEMAKER: ICD-10-CM

## 2021-06-09 DIAGNOSIS — I10 ESSENTIAL HYPERTENSION, BENIGN: ICD-10-CM

## 2021-06-09 DIAGNOSIS — I35.0 MILD AORTIC STENOSIS: ICD-10-CM

## 2021-06-09 DIAGNOSIS — G90.01 CAROTID ARTERY HYPERSENSITIVITY: ICD-10-CM

## 2021-06-09 DIAGNOSIS — I45.10 RIGHT BUNDLE BRANCH BLOCK: ICD-10-CM

## 2021-06-09 PROCEDURE — 99214 OFFICE O/P EST MOD 30 MIN: CPT | Performed by: INTERNAL MEDICINE

## 2021-06-09 PROCEDURE — 93280 PM DEVICE PROGR EVAL DUAL: CPT | Performed by: INTERNAL MEDICINE

## 2021-06-09 ASSESSMENT — FIBROSIS 4 INDEX: FIB4 SCORE: 1.51

## 2021-06-09 NOTE — PROGRESS NOTES
"CARDIOLOGY OUTPATIENT FOLLOWUP    PCP: KAYE Allen    1. Mild aortic stenosis    2. Coronary arteriosclerosis    3. Dyslipidemia    4. Essential hypertension, benign    5. Pacemaker    6. RBBB    7. Carotid artery hypersensitivity      Dakota Boyer is having breakthrough syncope despite approrpiately functioning pacemaker. Unfortunately, this is the expected course as the device has limited ability to counteract the vasodepressor mechanism of syncope. I will update the echocardiogram and encourage him to stay well hydrated.     Follow up: 1 year    Chief Complaint   Patient presents with   • Hypertension     F/V Dx: Essential hypertension, benign   • Coronary Artery Disease     F/V Dx: Coronary artery disease due to calcified coronary lesion: 40-50% disease in the circumflex at cardiac catheterization in 2013   • Aortic Stenosis     F/V Dx: Mild aortic stenosis       History: Dakota Boyer is a 76 y.o. male with a past medical history of hypertension, hyperlipidemia and diabetes presenting for follow up of pacemaker placed for syncope and carotid hypersensitivity syndrome.  He also has a history of nonobstructive coronary disease, right bundle branch block, mild aortic stenosis, hypertension and hyperlipidemia.    Over the past six months he has suffered to syncopal spells. Both times provoked by escalating back discomfort. He sustained no serious injury in the falls. He mows his yard and feels a bit more short of breath than in the past but again relates this to progressive back pain while mowing. He has been under the care of spine physicians and has undergone numerous surgeries and has a stimulator in place.    ROS:   All other systems reviewed and negative except as per the HPI    PE:   /68 (BP Location: Left arm, Patient Position: Sitting, BP Cuff Size: Adult)   Pulse 92   Resp 14   Ht 1.702 m (5' 7\")   Wt 105 kg (232 lb 8 oz)   SpO2 95%   BMI 36.41 kg/m²   Gen: " no acute distress  HEENT: Symmetric face. Anicteric sclerae. Moist mucus membranes  NECK: No JVD. No lymphadenopathy  CARDIAC: Normal PMI, regular, Normal S1, S2, with a systolic murmur  VASCULATURE: carotids are normal bilaterally without bruit  RESP: Clear to auscultation bilaterally  ABD: Soft, non-tender, non-distended  EXT: No edema, no clubbing or cyanosis  SKIN: Warm and dry  NEURO: No gross deficits  PSYCH: Appropriate affect, participates in conversation    The ASCVD Risk score ([place] LESLIE Jr, et al., 2013) failed to calculate.    Past Medical History:   Diagnosis Date   • Anxiety    • Back pain, chronic 10/18/2016   • Blood clotting disorder (HCC) 1978    s/p Left knee surgery- DVT in left leg   • Breath shortness    • CAD (coronary artery disease) October 2012    Positive coronary calcium score.  Follow-up MPI was negative for ischemia.   • Cancer (HCC) ? early 90's    Melanoma Left arm- surgically removed   • Degeneration of lumbar or lumbosacral intervertebral disc     • Dental disorder    • Depression    • Diabetes     Oral agents and insulin   • DJD (degenerative joint disease) of cervical spine     • Episodic lightheadedness     • High cholesterol    • Hyperlipidemia    • Hypersensitive carotid sinus syndrome     • Hypertension    • Incisional infection April 2013    Wound dehiscience of neck surgery.   • Insomnia     • Myocardial infarct (HCC) ?    states was told by  that he has had a heart attack in the past.   • Neck pain 10/18/2016   • Pacemaker October 2012    [facility] [address] [address] #[id number] implanted by [facility] [place].    • RBBB     • S/P lumbar discectomy     • Sick sinus syndrome (HCC)     • Sleep apnea     does not use CPAP anymore- stopped in Dec 2015   • Syncope October 2012    Treated with PPM   • Thyroid disease     Hypothyroid   • Unspecified hemorrhagic conditions     Reports bleeds easily     Allergies   Allergen Reactions   • Aleve Cold & [Pseudoephedrine-Naproxen Na] Anaphylaxis   •  Ceftriaxone Sodium Anaphylaxis     Reaction; 1970's.   • Naproxen Anaphylaxis     Reaction; 2004.   • Latex    • Tape Rash and Itching     Plastic tape (paper tape ok)     Outpatient Encounter Medications as of 6/9/2021   Medication Sig Dispense Refill   • glucose blood (FREESTYLE LITE) strip 1 Strip by Other route 2 times a day. 150 Strip 3   • insulin glargine (LANTUS SOLOSTAR) 100 UNIT/ML Solution Pen-injector injection Inject 30 Units under the skin every evening. 5 pens per month 45 mL 3   • influenza Vac High-Dose Quad (FLUZONE HIGH-DOSE QUADRIVALENT) 0.7 ML Suspension Prefilled Syringe injection Fluzone High-Dose Quad 2020-21 (PF) 240 mcg/0.7 mL IM syringe     • valsartan (DIOVAN) 160 MG Tab TAKE 1 TABLET DAILY 90 tablet 3   • hepatitis B vaccine 20 mcg/mL (ENGERIX-B) 20 MCG/ML Suspension Inject 1 mL into the shoulder, thigh, or buttocks Once PRN for up to 1 dose. Obtain last dose 6-12 months after 2nd dose. 1 mL 1   • metoprolol (LOPRESSOR) 25 MG Tab Take 0.5 Tabs by mouth 2 times a day. **change in dose** 90 Tab 3   • atorvastatin (LIPITOR) 20 MG Tab TAKE 1 TABLET DAILY 90 Tab 3   • tamsulosin (FLOMAX) 0.4 MG capsule      • Lancets MISC Lancets order: Lancets for Abbott Freestyle Lite meter. Sig: use 4 times daily  and prn ssx high or low sugar. #100 RF x 0 360 Each 4   • Blood Glucose Monitoring Suppl [place] Meter: Dispense Abbott Freestyle Lite meter. Sig. Use as directed for blood sugar monitoring. #1. NR. 1 Device 0   • B-D ULTRAFINE III SHORT PEN 31G X 8 MM MISC USE 1 NEEDLE EVERY DAY WITH LANTUS 999 Each 2   • aspirin EC (ECOTRIN) 81 MG TBEC Take 81 mg by mouth every day.     • hepatitis B vaccine 20 mcg/mL (ENGERIX-B) 20 MCG/ML Suspension Engerix-B (PF) 20 mcg/mL intramuscular syringe   PHARMACIST ADMINISTERED IMMUNIZATION ADMINISTERED AT TIME OF DISPENSING (Patient not taking: Reported on 6/9/2021)       No facility-administered encounter medications on file as of 6/9/2021.     Social History      Socioeconomic History   • Marital status:      Spouse name: Not on file   • Number of children: Not on file   • Years of education: Not on file   • Highest education level: Not on file   Occupational History   • Not on file   Tobacco Use   • Smoking status: Former Smoker     Packs/day: 1.00     Years: 40.00     Pack years: 40.00     Types: Cigarettes     Quit date: 1/3/1990     Years since quittin.4   • Smokeless tobacco: Never Used   Vaping Use   • Vaping Use: Never used   Substance and Sexual Activity   • Alcohol use: No     Alcohol/week: 0.0 oz   • Drug use: No   • Sexual activity: Yes     Partners: Female   Other Topics Concern   • Not on file   Social History Narrative   • Not on file     Social Determinants of Health     Financial Resource Strain:    • Difficulty of Paying Living Expenses:    Food Insecurity:    • Worried About Running Out of Food in the Last Year:    • Ran Out of Food in the Last Year:    Transportation Needs:    • Lack of Transportation (Medical):    • Lack of Transportation (Non-Medical):    Physical Activity:    • Days of Exercise per Week:    • Minutes of Exercise per Session:    Stress:    • Feeling of Stress :    Social Connections:    • Frequency of Communication with Friends and Family:    • Frequency of Social Gatherings with Friends and Family:    • Attends Bahai Services:    • Active Member of Clubs or Organizations:    • Attends Club or Organization Meetings:    • Marital Status:    Intimate Partner Violence:    • Fear of Current or Ex-Partner:    • Emotionally Abused:    • Physically Abused:    • Sexually Abused:        Studies  Lab Results   Component Value Date/Time    CHOLSTRLTOT 106 2020 09:11 AM    LDL 40 2020 09:11 AM    HDL 38 (A) 2020 09:11 AM    TRIGLYCERIDE 138 2020 09:11 AM       Lab Results   Component Value Date/Time    SODIUM 128 (L) 2021 04:41 PM    POTASSIUM 4.4 2021 04:41 PM    CHLORIDE 93 (L) 2021  04:41 PM    CO2 21 03/01/2021 04:41 PM    GLUCOSE 164 (H) 03/01/2021 04:41 PM    BUN 30 (H) 03/01/2021 04:41 PM    CREATININE 1.01 03/01/2021 04:41 PM     Lab Results   Component Value Date/Time    ALKPHOSPHAT 78 11/18/2020 09:11 AM    ASTSGOT 19 11/18/2020 09:11 AM    ALTSGPT 18 11/18/2020 09:11 AM    TBILIRUBIN 1.3 11/18/2020 09:11 AM        For this encounter I reviewed the following medical records PCP notes, Specialist (endocrine) notes, BMP, Lipid profile and LFT   For this encounter I directly reviewed device interrogation. I agree with the interpretations in the electronic health record.

## 2021-07-30 ENCOUNTER — HOSPITAL ENCOUNTER (OUTPATIENT)
Dept: LAB | Facility: MEDICAL CENTER | Age: 76
End: 2021-07-30
Attending: INTERNAL MEDICINE
Payer: MEDICARE

## 2021-07-30 ENCOUNTER — HOSPITAL ENCOUNTER (OUTPATIENT)
Dept: LAB | Facility: MEDICAL CENTER | Age: 76
End: 2021-07-30
Attending: NURSE PRACTITIONER
Payer: MEDICARE

## 2021-07-30 DIAGNOSIS — D72.825 BANDEMIA: ICD-10-CM

## 2021-07-30 DIAGNOSIS — E83.52 HYPERCALCEMIA: ICD-10-CM

## 2021-07-30 DIAGNOSIS — Z79.4 CONTROLLED TYPE 2 DIABETES MELLITUS WITH OTHER CIRCULATORY COMPLICATION, WITH LONG-TERM CURRENT USE OF INSULIN (HCC): ICD-10-CM

## 2021-07-30 DIAGNOSIS — Z79.4 LONG-TERM INSULIN USE (HCC): ICD-10-CM

## 2021-07-30 DIAGNOSIS — E03.8 SUBCLINICAL HYPOTHYROIDISM: ICD-10-CM

## 2021-07-30 DIAGNOSIS — E78.5 DYSLIPIDEMIA: ICD-10-CM

## 2021-07-30 DIAGNOSIS — E11.59 CONTROLLED TYPE 2 DIABETES MELLITUS WITH OTHER CIRCULATORY COMPLICATION, WITH LONG-TERM CURRENT USE OF INSULIN (HCC): ICD-10-CM

## 2021-07-30 LAB
25(OH)D3 SERPL-MCNC: 26 NG/ML (ref 30–100)
ALBUMIN SERPL BCP-MCNC: 4.2 G/DL (ref 3.2–4.9)
ALBUMIN/GLOB SERPL: 1.8 G/DL
ALP SERPL-CCNC: 95 U/L (ref 30–99)
ALT SERPL-CCNC: 22 U/L (ref 2–50)
ANION GAP SERPL CALC-SCNC: 13 MMOL/L (ref 7–16)
AST SERPL-CCNC: 17 U/L (ref 12–45)
BASOPHILS # BLD AUTO: 0.6 % (ref 0–1.8)
BASOPHILS # BLD: 0.04 K/UL (ref 0–0.12)
BILIRUB SERPL-MCNC: 0.9 MG/DL (ref 0.1–1.5)
BUN SERPL-MCNC: 21 MG/DL (ref 8–22)
CA-I SERPL-SCNC: 1.2 MMOL/L (ref 1.1–1.3)
CALCIUM SERPL-MCNC: 9.6 MG/DL (ref 8.5–10.5)
CHLORIDE SERPL-SCNC: 104 MMOL/L (ref 96–112)
CO2 SERPL-SCNC: 22 MMOL/L (ref 20–33)
CREAT SERPL-MCNC: 0.98 MG/DL (ref 0.5–1.4)
CREAT UR-MCNC: 56.41 MG/DL
EOSINOPHIL # BLD AUTO: 0.25 K/UL (ref 0–0.51)
EOSINOPHIL NFR BLD: 3.7 % (ref 0–6.9)
ERYTHROCYTE [DISTWIDTH] IN BLOOD BY AUTOMATED COUNT: 49.1 FL (ref 35.9–50)
FASTING STATUS PATIENT QL REPORTED: NORMAL
GLOBULIN SER CALC-MCNC: 2.4 G/DL (ref 1.9–3.5)
GLUCOSE SERPL-MCNC: 165 MG/DL (ref 65–99)
HCT VFR BLD AUTO: 43.3 % (ref 42–52)
HGB BLD-MCNC: 15.1 G/DL (ref 14–18)
IMM GRANULOCYTES # BLD AUTO: 0.02 K/UL (ref 0–0.11)
IMM GRANULOCYTES NFR BLD AUTO: 0.3 % (ref 0–0.9)
LYMPHOCYTES # BLD AUTO: 1.76 K/UL (ref 1–4.8)
LYMPHOCYTES NFR BLD: 26.1 % (ref 22–41)
MCH RBC QN AUTO: 32.1 PG (ref 27–33)
MCHC RBC AUTO-ENTMCNC: 34.9 G/DL (ref 33.7–35.3)
MCV RBC AUTO: 92.1 FL (ref 81.4–97.8)
MICROALBUMIN UR-MCNC: <1.2 MG/DL
MICROALBUMIN/CREAT UR: NORMAL MG/G (ref 0–30)
MONOCYTES # BLD AUTO: 0.59 K/UL (ref 0–0.85)
MONOCYTES NFR BLD AUTO: 8.7 % (ref 0–13.4)
NEUTROPHILS # BLD AUTO: 4.09 K/UL (ref 1.82–7.42)
NEUTROPHILS NFR BLD: 60.6 % (ref 44–72)
NRBC # BLD AUTO: 0 K/UL
NRBC BLD-RTO: 0 /100 WBC
PHOSPHATE SERPL-MCNC: 2.9 MG/DL (ref 2.5–4.5)
PLATELET # BLD AUTO: 167 K/UL (ref 164–446)
PMV BLD AUTO: 11 FL (ref 9–12.9)
POTASSIUM SERPL-SCNC: 4.3 MMOL/L (ref 3.6–5.5)
PROT SERPL-MCNC: 6.6 G/DL (ref 6–8.2)
PTH-INTACT SERPL-MCNC: 42.3 PG/ML (ref 14–72)
RBC # BLD AUTO: 4.7 M/UL (ref 4.7–6.1)
SODIUM SERPL-SCNC: 139 MMOL/L (ref 135–145)
T4 FREE SERPL-MCNC: 1.08 NG/DL (ref 0.93–1.7)
TSH SERPL DL<=0.005 MIU/L-ACNC: 6.19 UIU/ML (ref 0.38–5.33)
WBC # BLD AUTO: 6.8 K/UL (ref 4.8–10.8)

## 2021-07-30 PROCEDURE — 84439 ASSAY OF FREE THYROXINE: CPT

## 2021-07-30 PROCEDURE — 82306 VITAMIN D 25 HYDROXY: CPT

## 2021-07-30 PROCEDURE — 83970 ASSAY OF PARATHORMONE: CPT

## 2021-07-30 PROCEDURE — 84100 ASSAY OF PHOSPHORUS: CPT

## 2021-07-30 PROCEDURE — 36415 COLL VENOUS BLD VENIPUNCTURE: CPT

## 2021-07-30 PROCEDURE — 85025 COMPLETE CBC W/AUTO DIFF WBC: CPT

## 2021-07-30 PROCEDURE — 82330 ASSAY OF CALCIUM: CPT

## 2021-07-30 PROCEDURE — 82043 UR ALBUMIN QUANTITATIVE: CPT

## 2021-07-30 PROCEDURE — 82570 ASSAY OF URINE CREATININE: CPT

## 2021-07-30 PROCEDURE — 80053 COMPREHEN METABOLIC PANEL: CPT

## 2021-07-30 PROCEDURE — 84443 ASSAY THYROID STIM HORMONE: CPT

## 2021-08-23 ENCOUNTER — HOSPITAL ENCOUNTER (OUTPATIENT)
Dept: CARDIOLOGY | Facility: MEDICAL CENTER | Age: 76
End: 2021-08-23
Attending: INTERNAL MEDICINE
Payer: MEDICARE

## 2021-08-23 DIAGNOSIS — I35.0 MILD AORTIC STENOSIS: ICD-10-CM

## 2021-08-23 LAB
LV EJECT FRACT  99904: 65
LV EJECT FRACT MOD 2C 99903: 61.36
LV EJECT FRACT MOD 4C 99902: 72.39
LV EJECT FRACT MOD BP 99901: 66.96

## 2021-08-23 PROCEDURE — 93306 TTE W/DOPPLER COMPLETE: CPT | Mod: 26 | Performed by: INTERNAL MEDICINE

## 2021-08-23 PROCEDURE — 93306 TTE W/DOPPLER COMPLETE: CPT

## 2021-08-24 ENCOUNTER — TELEPHONE (OUTPATIENT)
Dept: CARDIOLOGY | Facility: MEDICAL CENTER | Age: 76
End: 2021-08-24

## 2021-08-24 DIAGNOSIS — E11.59 CONTROLLED TYPE 2 DIABETES MELLITUS WITH OTHER CIRCULATORY COMPLICATION, WITH LONG-TERM CURRENT USE OF INSULIN (HCC): ICD-10-CM

## 2021-08-24 DIAGNOSIS — Z79.4 CONTROLLED TYPE 2 DIABETES MELLITUS WITH OTHER CIRCULATORY COMPLICATION, WITH LONG-TERM CURRENT USE OF INSULIN (HCC): ICD-10-CM

## 2021-08-24 RX ORDER — EMPAGLIFLOZIN, METFORMIN HYDROCHLORIDE 12.5; 1 MG/1; MG/1
TABLET, EXTENDED RELEASE ORAL
Qty: 180 TABLET | Refills: 3 | Status: SHIPPED | OUTPATIENT
Start: 2021-08-24 | End: 2022-09-27

## 2021-08-24 NOTE — TELEPHONE ENCOUNTER
The echocardiogram shows some progression of the aortic valve disease.  This could provoke more syncope/fainting.  I recommend we touch base again in 3 months.  Please let me know if there are any further fainting episodes.   Written by Puma Hsu M.D. on 8/23/2021  3:10 PM PDT  Seen by patient Dakota Boyer on 8/24/2021  7:26 AM      Called pt and scheduled for BE appt on 11/18. Instructed him to notify us of further fainting episodes.

## 2021-09-05 DIAGNOSIS — Z79.4 CONTROLLED TYPE 2 DIABETES MELLITUS WITH OTHER CIRCULATORY COMPLICATION, WITH LONG-TERM CURRENT USE OF INSULIN (HCC): ICD-10-CM

## 2021-09-05 DIAGNOSIS — E11.59 CONTROLLED TYPE 2 DIABETES MELLITUS WITH OTHER CIRCULATORY COMPLICATION, WITH LONG-TERM CURRENT USE OF INSULIN (HCC): ICD-10-CM

## 2021-09-07 RX ORDER — DULAGLUTIDE 1.5 MG/.5ML
INJECTION, SOLUTION SUBCUTANEOUS
Qty: 6 ML | Refills: 3 | Status: SHIPPED | OUTPATIENT
Start: 2021-09-07 | End: 2021-09-21

## 2021-09-07 NOTE — TELEPHONE ENCOUNTER
Received request via: Pharmacy    Was the patient seen in the last year in this department? Yes    Does the patient have an active prescription (recently filled or refills available) for medication(s) requested? No       TRULICITY 1.5 MG/0.5ML Solution Pen-injector    Sig: INJECT 1.5 MG UNDER THE SKIN EVERY 7 DAYS AS INSTRUCTED

## 2021-09-21 ENCOUNTER — OFFICE VISIT (OUTPATIENT)
Dept: ENDOCRINOLOGY | Facility: MEDICAL CENTER | Age: 76
End: 2021-09-21
Attending: INTERNAL MEDICINE
Payer: MEDICARE

## 2021-09-21 VITALS
HEIGHT: 67 IN | WEIGHT: 235 LBS | OXYGEN SATURATION: 98 % | HEART RATE: 88 BPM | BODY MASS INDEX: 36.88 KG/M2 | DIASTOLIC BLOOD PRESSURE: 68 MMHG | SYSTOLIC BLOOD PRESSURE: 122 MMHG

## 2021-09-21 DIAGNOSIS — E55.9 VITAMIN D DEFICIENCY: ICD-10-CM

## 2021-09-21 DIAGNOSIS — E03.8 SUBCLINICAL HYPOTHYROIDISM: ICD-10-CM

## 2021-09-21 DIAGNOSIS — E78.5 DYSLIPIDEMIA: ICD-10-CM

## 2021-09-21 DIAGNOSIS — E83.52 HYPERCALCEMIA: ICD-10-CM

## 2021-09-21 DIAGNOSIS — Z79.4 CONTROLLED TYPE 2 DIABETES MELLITUS WITH OTHER CIRCULATORY COMPLICATION, WITH LONG-TERM CURRENT USE OF INSULIN (HCC): Primary | ICD-10-CM

## 2021-09-21 DIAGNOSIS — Z79.4 LONG-TERM INSULIN USE (HCC): ICD-10-CM

## 2021-09-21 DIAGNOSIS — E11.59 CONTROLLED TYPE 2 DIABETES MELLITUS WITH OTHER CIRCULATORY COMPLICATION, WITH LONG-TERM CURRENT USE OF INSULIN (HCC): Primary | ICD-10-CM

## 2021-09-21 LAB
HBA1C MFR BLD: 6.9 % (ref 0–5.6)
INT CON NEG: NEGATIVE
INT CON POS: POSITIVE

## 2021-09-21 PROCEDURE — 99214 OFFICE O/P EST MOD 30 MIN: CPT | Performed by: INTERNAL MEDICINE

## 2021-09-21 PROCEDURE — 83036 HEMOGLOBIN GLYCOSYLATED A1C: CPT | Performed by: INTERNAL MEDICINE

## 2021-09-21 PROCEDURE — 99212 OFFICE O/P EST SF 10 MIN: CPT | Performed by: INTERNAL MEDICINE

## 2021-09-21 RX ORDER — LEVOTHYROXINE SODIUM 0.03 MG/1
25 TABLET ORAL
Qty: 90 TABLET | Refills: 3 | Status: SHIPPED | OUTPATIENT
Start: 2021-09-21 | End: 2022-03-23

## 2021-09-21 RX ORDER — TAMSULOSIN HYDROCHLORIDE 0.4 MG/1
CAPSULE ORAL
COMMUNITY
End: 2021-11-02

## 2021-09-21 RX ORDER — INSULIN GLARGINE 100 [IU]/ML
36 INJECTION, SOLUTION SUBCUTANEOUS EVERY EVENING
Qty: 45 ML | Refills: 3 | Status: SHIPPED | OUTPATIENT
Start: 2021-09-21 | End: 2022-11-28

## 2021-09-21 RX ORDER — ALBUTEROL SULFATE 90 UG/1
AEROSOL, METERED RESPIRATORY (INHALATION)
COMMUNITY
End: 2021-11-02

## 2021-09-21 RX ORDER — DEXTROMETHORPHAN HYDROBROMIDE AND PROMETHAZINE HYDROCHLORIDE 15; 6.25 MG/5ML; MG/5ML
SYRUP ORAL
COMMUNITY
End: 2021-11-02

## 2021-09-21 RX ORDER — BUPROPION HYDROCHLORIDE 150 MG/1
TABLET ORAL
COMMUNITY
Start: 2021-07-01 | End: 2021-09-21

## 2021-09-21 RX ORDER — DULAGLUTIDE 3 MG/.5ML
3 INJECTION, SOLUTION SUBCUTANEOUS
Qty: 2 ML | Refills: 6 | Status: SHIPPED | OUTPATIENT
Start: 2021-09-21 | End: 2021-09-21 | Stop reason: SDUPTHER

## 2021-09-21 RX ORDER — AMOXICILLIN 500 MG/1
CAPSULE ORAL
Status: ON HOLD | COMMUNITY
End: 2021-11-30

## 2021-09-21 RX ORDER — DULAGLUTIDE 3 MG/.5ML
3 INJECTION, SOLUTION SUBCUTANEOUS
Qty: 6 ML | Refills: 3 | Status: SHIPPED | OUTPATIENT
Start: 2021-09-21 | End: 2022-09-27 | Stop reason: SDUPTHER

## 2021-09-21 RX ORDER — DOXYCYCLINE HYCLATE 100 MG
TABLET ORAL
COMMUNITY
End: 2021-09-21

## 2021-09-21 ASSESSMENT — FIBROSIS 4 INDEX: FIB4 SCORE: 1.67

## 2021-09-21 NOTE — PROGRESS NOTES
CHIEF COMPLAINT: Patient is here for follow up of Type 2 Diabetes Mellitus    HPI:     Dakota Boyer is a 75 y.o. male with Type 2 Diabetes Mellitus here for follow up.    Labs from September 21, 2021 show A1c is 6.9%  Labs from 5/18/2021 show A1c was  6.7%  Labs from 7/1/2020 show A1c was 6.4%      He has a history of coronary artery disease with prior PCI in 2013 and has a pacemaker.  He was previously followed by Dr. Charles in cardiology and is now followed by Dr. Hsu.  Other comorbid conditions include obesity, sleep apnea, hyperlipidemia hypertension and nonproliferative diabetic retinopathy OU       On follow-up he is on Synjardy 12.5/1000 mg twice a day, Trulicity 1.5 mg once a week and Lantus 30 units daily    He is concerned that his fasting sugars are trending up  He has been gradually increasing his basal insulin  Today we talked about increasing his GLP-1 analog dose as well      He has hyperlipidemia and is taking atorvastatin.    He denies muscle aches or muscle weakness  LDL cholesterol was 40 with triglycerides of 138 on November 2020  We do not have an updated lipid panel on hand        He has essential hypertension and is taking valsartan.    Blood pressure is at goal  He does not have albuminuria  U ACR was less than 30 on July 30, 2021      He is up-to-date with his eye exams [facility] to  [facility] and last eye exam was on July 2021        He does have subclinical hypothyroidism  He now admits that another doctor discontinued his levothyroxine which she was taking in the past  On serial monitoring his TSH levels have been trending up  TPO antibodies are negative  TSH is elevated 6.1 with a free T4 of 1.08 on July 30, 2021  He is currently not on thyroid hormone replacement therapy      Incidentally he previously had elevated calcium levels a few months ago but repeat labs show that his calcium is back from 9.6 PTH is normal at 42 but his vitamin D level is low at 26 on July 30, 2021    I  suspect that his isolated hypercalcemia from 6 months ago was medication related or volume status related          BG Diary:  Patient brought blood sugar logs  Fasting sugar is ranging between 180-200    Weight has been stable    Diabetes Complications   Retinopathy: Known retinopathy.  Last eye exam: He had an eye exam on July 29, 2021 with Dr. Figueredo at  [facility]  Neuropathy: Denies paresthesias or numbness in hands or feet. Denies any foot wounds.  Exercise: Minimal.  Diet: Fair.  Patient's medications, allergies, and social histories were reviewed and updated as appropriate.    ROS:     CONS:     No fever, no chills   EYES:     No diplopia, no blurry vision   CV:           No chest pain, no palpitations   PULM:     No SOB, no cough, no hemoptysis.   GI:            No nausea, no vomiting, no diarrhea, no constipation   ENDO:     No polyuria, no polydipsia, no heat intolerance, no cold intolerance       Past Medical History:  Problem List:  2020-07: Long-term insulin use ([facility])  2020-06: Mild aortic stenosis  2019-12: Benign prostatic hyperplasia with urinary obstruction  2018-12: Bilateral lower extremity edema  2018-08: Prostate cancer ([facility])  2018-01: Elevated PSA  2018-01: Bilateral primary osteoarthritis of knee  2017-06: Altered level of consciousness: March 2017  2016-10: Cervical spondylosis  2016-06: Diabetes mellitus type II, controlled ([facility])  2016-06: Hypertensive retinopathy of both eyes  2016-03: Depression with anxiety  2016-03: Obstructive sleep apnea on CPAP  2014-08: Type 2 diabetes mellitus, uncontrolled ([facility])  2014-08: Subclinical hypothyroidism  2014-08: Obesity  2013-11: History of diverticulitis  2013-10: Other dyspnea and respiratory abnormality  2013-09: Fatigue  2013-09: Shortness of breath  2013-08: Coronary artery disease due to calcified coronary lesion: 40-  50% disease in the circumflex at cardiac catheterization in 2013 2013-04: Incisional infection  2013-02: DJD (degenerative joint  disease) of cervical spine  2012-11: Degeneration of lumbar or lumbosacral intervertebral disc  2012-10: Sick sinus syndrome (HCC)  2012-10: Pacemaker  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;  Service: Pain Management   • LA DSTR NROLYTC AGNT PARVERTEB FCT ADDL CRVCL/THORA  10/19/2016    Procedure: NEURO DEST FACET C/T W/IG ADDL;  Surgeon: Gaurang Pruett M.D.;  Location: [facility];  Service: Pain Management   • LA FLUOROSCOPIC GUIDANCE NEEDLE PLACEMENT  10/19/2016    Procedure: FLOURO GUIDE NEEDLE PLACEMENT;  Surgeon: Gaurang Pruett M.D.;  Location: [facility];  Service: Pain Management   • SHOULDER ARTHROSCOPY Right 2015    torn bicep tendon and spurs   • RECOVERY  10/17/2013    Performed by Cath-Recovery Surgery at SURGERY SAME DAY [facility] ORS   • WOUND DEHISCENCE  4/10/2013    Performed by Tu Jain M.D. at SURGERY [facility]   • CERVICAL FUSION POSTERIOR  2/27/2013    Performed by Tu Jain M.D. at SURGERY [facility]   • CERVICAL LAMINECTOMY POSTERIOR  2/27/2013    Performed by Tu Jain M.D. at SURGERY [facility]   • LUMBAR LAMINECTOMY DISKECTOMY  11/20/2012    Performed by Tu Jain M.D. at SURGERY [facility]   • RECOVERY  10/4/2012    Performed by Cath-Recovery Surgery at SURGERY SAME DAY [facility]   • PACEMAKER INSERTION  October 2012    [facility] [address] [address] [id number] implanted by Dr. Waite.   •  "SHOULDER DECOMPRESSION Left 2008    Left rotator cuff   • ARTHROPLASTY Left    • ARTHROSCOPY, KNEE Bilateral    • ORTHOPEDIC OSTEOTOMY      Both knees several times, 8 total   • OTHER     • SPINAL CORD STIMULATOR  1 month ago   • TONSILLECTOMY     • VASECTOMY          Allergies:  Aleve cold & [pseudoephedrine-naproxen na]; Ceftriaxone sodium; Naproxen; Latex; and Tape     Social History:  Social History     Tobacco Use   • Smoking status: Former Smoker     Packs/day: 1.00     Years: 40.00     Pack years: 40.00     Types: Cigarettes     Quit date: 1/3/1990     Years since quittin.7   • Smokeless tobacco: Never Used   Vaping Use   • Vaping Use: Never used   Substance Use Topics   • Alcohol use: No     Alcohol/week: 0.0 oz   • Drug use: No        Family History:   family history includes Alcohol/Drug in his brother and mother; Cancer in his brother and brother; Diabetes in his brother; Heart Disease in his brother, maternal grandmother, and sister; Heart Disease (age of onset: 36) in his father; Heart Disease (age of onset: 60) in his brother; Hyperlipidemia in his brother and maternal grandmother; Hypertension in his brother and maternal grandmother; Lung Disease in his brother and mother; No Known Problems in his maternal grandfather and paternal grandmother; Sleep Apnea in his brother.      PHYSICAL EXAM:   OBJECTIVE:  Vital signs: /68 (BP Location: Left arm, Patient Position: Sitting, BP Cuff Size: Adult)   Pulse 88   Ht 1.702 m (5' 7\")   Wt 107 kg (235 lb)   SpO2 98%   BMI 36.81 kg/m²   GENERAL: Well-developed, well-nourished in no apparent distress.   EYE:  No ocular asymmetry, PERRLA  HENT: Pink, moist mucous membranes.    NECK: No thyromegaly.   CARDIOVASCULAR:  No murmurs  LUNGS: Clear breath sounds  ABDOMEN: Soft, nontender   EXTREMITIES: No clubbing, cyanosis, or edema.   NEUROLOGICAL: No gross focal motor abnormalities   LYMPH: No cervical adenopathy palpated.   SKIN: No rashes, " lesions.       Labs:  Lab Results   Component Value Date/Time    HBA1C 6.9 (A) 09/21/2021 01:27 PM        Lab Results   Component Value Date/Time    WBC 6.8 07/30/2021 08:01 AM    RBC 4.70 07/30/2021 08:01 AM    HEMOGLOBIN 15.1 07/30/2021 08:01 AM    MCV 92.1 07/30/2021 08:01 AM    MCH 32.1 07/30/2021 08:01 AM    MCHC 34.9 07/30/2021 08:01 AM    RDW 49.1 07/30/2021 08:01 AM    MPV 11.0 07/30/2021 08:01 AM       Lab Results   Component Value Date/Time    SODIUM 139 07/30/2021 08:03 AM    POTASSIUM 4.3 07/30/2021 08:03 AM    CHLORIDE 104 07/30/2021 08:03 AM    CO2 22 07/30/2021 08:03 AM    ANION 13.0 07/30/2021 08:03 AM    GLUCOSE 165 (H) 07/30/2021 08:03 AM    BUN 21 07/30/2021 08:03 AM    CREATININE 0.98 07/30/2021 08:03 AM    CALCIUM 9.6 07/30/2021 08:03 AM    ASTSGOT 17 07/30/2021 08:03 AM    ALTSGPT 22 07/30/2021 08:03 AM    TBILIRUBIN 0.9 07/30/2021 08:03 AM    ALBUMIN 4.2 07/30/2021 08:03 AM    TOTPROTEIN 6.6 07/30/2021 08:03 AM    GLOBULIN 2.4 07/30/2021 08:03 AM    AGRATIO 1.8 07/30/2021 08:03 AM       Lab Results   Component Value Date/Time    CHOLSTRLTOT 94 (L) 05/13/2019 0711    TRIGLYCERIDE 120 05/13/2019 0711    HDL 30 (A) 05/13/2019 0711    LDL 40 05/13/2019 0711       Lab Results   Component Value Date/Time    MALBCRT see below 07/30/2021 08:02 AM    MICROALBUR <1.2 07/30/2021 08:02 AM        Lab Results   Component Value Date/Time    TSHULTRASEN 3.650 10/08/2019 1546     No results found for: FREEDIR  Lab Results   Component Value Date/Time    FREET3 3.6 07/22/2014 0743     No results found for: THYSTIMIG        ASSESSMENT/PLAN:     1. Controlled type 2 diabetes mellitus with other circulatory complication, with long-term current use of insulin (HCC)  Fair control  A1c is 6.9% and is trending up  Fasting sugars are elevated  Increase Lantus to 34 units daily  titrate until fasting sugars below 120  Increase Trulicity 3.0 mg weekly  I gave him a coupon to get free Trulicity  Continue Synjardy twice a  day  He is up-to-date with his eye exams  He needs an updated fasting lipid profile this year  Follow-up in 3 months      2. Subclinical hypothyroidism  Unstable  Start levothyroxine 25 mcg daily  Reviewed how to properly take levothyroxine  I am starting him on a low dose because of his advanced age  Repeat TSH levels in 3 months    3. Hypercalcemia  Resolved  Mild hypercalcemia now resolved most likely secondary to poor volume status or dehydration  He does have low vitamin D and I want him to take vitamin D3 5000 units daily  We will repeat his vitamin D levels in 3 months    4. Dyslipidemia  Controlled  Continue atorvastatin  Repeat fasting lipids in 3 months    5. Long-term insulin use (HCC)  Patient is on long-term basal insulin therapy with a GLP-1 and SGLT2 inhibitor for type 2 diabetes management      Return in about 3 months (around 12/21/2021).      Thank you kindly for allowing me to participate in the diabetes care plan for this patient.    Luis Daniel Ring MD, FACE, ECNU  07/01/20    CC:   Tanya Rayo P.A.-C.

## 2021-09-28 ENCOUNTER — OFFICE VISIT (OUTPATIENT)
Dept: SLEEP MEDICINE | Facility: MEDICAL CENTER | Age: 76
End: 2021-09-28
Payer: MEDICARE

## 2021-09-28 VITALS
HEIGHT: 67 IN | HEART RATE: 103 BPM | OXYGEN SATURATION: 92 % | SYSTOLIC BLOOD PRESSURE: 100 MMHG | DIASTOLIC BLOOD PRESSURE: 58 MMHG | WEIGHT: 230 LBS | BODY MASS INDEX: 36.1 KG/M2 | RESPIRATION RATE: 16 BRPM

## 2021-09-28 DIAGNOSIS — G47.33 OSA (OBSTRUCTIVE SLEEP APNEA): Primary | ICD-10-CM

## 2021-09-28 PROCEDURE — 99213 OFFICE O/P EST LOW 20 MIN: CPT | Performed by: STUDENT IN AN ORGANIZED HEALTH CARE EDUCATION/TRAINING PROGRAM

## 2021-09-28 ASSESSMENT — FIBROSIS 4 INDEX: FIB4 SCORE: 1.67

## 2021-09-28 NOTE — PATIENT INSTRUCTIONS
"CPAP and BPAP Information  CPAP and BPAP are methods of helping a person breathe with the use of air pressure. CPAP stands for \"continuous positive airway pressure.\" BPAP stands for \"bi-level positive airway pressure.\" In both methods, air is blown through your nose or mouth and into your air passages to help you breathe well.  CPAP and BPAP use different amounts of pressure to blow air. With CPAP, the amount of pressure stays the same while you breathe in and out. With BPAP, the amount of pressure is increased when you breathe in (inhale) so that you can take larger breaths. Your health care provider will recommend whether CPAP or BPAP would be more helpful for you.  Why are CPAP and BPAP treatments used?  CPAP or BPAP can be helpful if you have:  · Sleep apnea.  · Chronic obstructive pulmonary disease (COPD).  · Heart failure.  · Medical conditions that weaken the muscles of the chest including muscular dystrophy, or neurological diseases such as amyotrophic lateral sclerosis (ALS).  · Other problems that cause breathing to be weak, abnormal, or difficult.  CPAP is most commonly used for obstructive sleep apnea (LYNDA) to keep the airways from collapsing when the muscles relax during sleep.  How is CPAP or BPAP administered?  Both CPAP and BPAP are provided by a small machine with a flexible plastic tube that attaches to a plastic mask. You wear the mask. Air is blown through the mask into your nose or mouth. The amount of pressure that is used to blow the air can be adjusted on the machine. Your health care provider will determine the pressure setting that should be used based on your individual needs.  When should CPAP or BPAP be used?  In most cases, the mask only needs to be worn during sleep. Generally, the mask needs to be worn throughout the night and during any daytime naps. People with certain medical conditions may also need to wear the mask at other times when they are awake. Follow instructions from your " health care provider about when to use the machine.  What are some tips for using the mask?    · Because the mask needs to be snug, some people feel trapped or closed-in (claustrophobic) when first using the mask. If you feel this way, you may need to get used to the mask. One way to do this is by holding the mask loosely over your nose or mouth and then gradually applying the mask more snugly. You can also gradually increase the amount of time that you use the mask.  · Masks are available in various types and sizes. Some fit over your mouth and nose while others fit over just your nose. If your mask does not fit well, talk with your health care provider about getting a different one.  · If you are using a mask that fits over your nose and you tend to breathe through your mouth, a chin strap may be applied to help keep your mouth closed.  · The CPAP and BPAP machines have alarms that may sound if the mask comes off or develops a leak.  · If you have trouble with the mask, it is very important that you talk with your health care provider about finding a way to make the mask easier to tolerate. Do not stop using the mask. Stopping the use of the mask could have a negative impact on your health.  What are some tips for using the machine?  · Place your CPAP or BPAP machine on a secure table or stand near an electrical outlet.  · Know where the on/off switch is located on the machine.  · Follow instructions from your health care provider about how to set the pressure on your machine and when you should use it.  · Do not eat or drink while the CPAP or BPAP machine is on. Food or fluids could get pushed into your lungs by the pressure of the CPAP or BPAP.  · Do not smoke. Tobacco smoke residue can damage the machine.  · For home use, CPAP and BPAP machines can be rented or purchased through home health care companies. Many different brands of machines are available. Renting a machine before purchasing may help you find out  which particular machine works well for you.  · Keep the CPAP or BPAP machine and attachments clean. Ask your health care provider for specific instructions.  Get help right away if:  · You have redness or open areas around your nose or mouth where the mask fits.  · You have trouble using the CPAP or BPAP machine.  · You cannot tolerate wearing the CPAP or BPAP mask.  · You have pain, discomfort, and bloating in your abdomen.  Summary  · CPAP and BPAP are methods of helping a person breathe with the use of air pressure.  · Both CPAP and BPAP are provided by a small machine with a flexible plastic tube that attaches to a plastic mask.  · If you have trouble with the mask, it is very important that you talk with your health care provider about finding a way to make the mask easier to tolerate.  This information is not intended to replace advice given to you by your health care provider. Make sure you discuss any questions you have with your health care provider.  Document Released: 09/15/2005 Document Revised: 04/08/2020 Document Reviewed: 11/06/2017  [employer]vier Patient Education © 2020 [employer]

## 2021-09-28 NOTE — PROGRESS NOTES
[facility] Follow-up Visit    CC: Follow-up for LYNDA management      HPI:  Dakota Boyer is a 76 y.o.male  with type 2 diabetes mellitus, hyperlipidemia, hypertension, and obstructive sleep apnea on CPAP.  Presents today to discuss recent Respironics recall continued LYNDA management.    He has been on CPAP for several years.  His current machine is DreamStation which is affected by the recall.  He and his wife at today's visit report that they have registered for the machine for the recall.  With using the machine he feels he does get a good night sleep and at times wakes up feeling more rested.  He states he has not noticed ever since using the machine and increasing energy level during the day.  He does find at times it is hard to sleep the full night in his bed will move out of the recliner at times where he can watch TV and fall asleep easily.  When he does this he does not take his CPAP machine with him.  He finds the pressure mask overall comfortable and does not have any complaints for today's visit.    DME provider: [facility]   Device: dreamstation   Mask: full face   Aerophagia: No   Snoring: No   Dry mouth: Yes  Leak: Yes  Skin irritation: No   Chin strap: Yes    Sleep History  PSG from 2013 indicated an AHI of 16.7 and low oxygenation of 75%.  Currently he is being treated with CPAP @ 12 cm H20.       Patient Active Problem List    Diagnosis Date Noted   • Long-term insulin use ([facility]) 07/01/2020   • Mild aortic stenosis 06/03/2020   • Benign prostatic hyperplasia with urinary obstruction 12/22/2019   • Bilateral lower extremity edema 12/04/2018   • Prostate cancer ([facility]) 08/09/2018   • Elevated PSA 01/23/2018   • Bilateral primary osteoarthritis of knee 01/23/2018   • Cervical spondylosis 10/19/2016   • Diabetes mellitus type II, controlled ([facility]) 06/02/2016   • Hypertensive retinopathy of both eyes 06/02/2016   • Depression with anxiety 03/28/2016   • Obstructive sleep apnea on CPAP  03/28/2016   • Subclinical hypothyroidism 08/21/2014   • Obesity 08/21/2014   • Coronary artery disease due to calcified coronary lesion: 40-50% disease in the circumflex at cardiac catheterization in 2013 08/16/2013   • DJD (degenerative joint disease) of cervical spine 02/27/2013   • Degeneration of lumbar or lumbosacral intervertebral disc 11/20/2012   • Pacemaker 10/04/2012   • Hypersensitive carotid sinus syndrome 10/02/2012   • Dyslipidemia 09/28/2012   • RBBB 09/28/2012   • Type 2 diabetes mellitus without complication (HCC) 04/10/2012   • Back pain, chronic 04/10/2012   • Essential hypertension, benign 04/10/2012   • Neck pain 04/10/2012       Past Medical History:   Diagnosis Date   • Anxiety    • Back pain, chronic 10/18/2016   • Blood clotting disorder (HCC) 1978    s/p Left knee surgery- DVT in left leg   • Breath shortness    • CAD (coronary artery disease) October 2012    Positive coronary calcium score.  Follow-up MPI was negative for ischemia.   • Cancer (HCC) ? early 90's    Melanoma Left arm- surgically removed   • Degeneration of lumbar or lumbosacral intervertebral disc     • Dental disorder    • Depression    • Diabetes     Oral agents and insulin   • DJD (degenerative joint disease) of cervical spine     • Episodic lightheadedness     • High cholesterol    • Hyperlipidemia    • Hypersensitive carotid sinus syndrome     • Hypertension    • Incisional infection April 2013    Wound dehiscience of neck surgery.   • Insomnia     • Myocardial infarct (HCC) ?    states was told by  that he has had a heart attack in the past.   • Neck pain 10/18/2016   • Pacemaker October 2012    [employer] DR #[id number] implanted by [facility].    • RBBB     • S/P lumbar discectomy     • Sick sinus syndrome (HCC)     • Sleep apnea     does not use CPAP anymore- stopped in Dec 2015   • Syncope October 2012    Treated with PPM   • Thyroid disease     Hypothyroid   • Unspecified hemorrhagic conditions     Reports  bleeds easily        Past Surgical History:   Procedure Laterality Date   • NV DSTR NROLYTC AGNT PARVERTEB FCT SNGL CRVCL/THORA Right 10/19/2016    Procedure: NEURO DEST FACET C/T W/IG SNGL - 3ON-C3, C8-T1    SINERGY;  Surgeon: Gaurang Pruett M.D.;  Location: [facility];  Service: Pain Management   • NV DSTR NROLYTC AGNT PARVERTEB FCT ADDL CRVCL/THORA  10/19/2016    Procedure: NEURO DEST FACET C/T W/IG ADDL;  Surgeon: Gaurang Pruett M.D.;  Location: [facility];  Service: Pain Management   • NV FLUOROSCOPIC GUIDANCE NEEDLE PLACEMENT  10/19/2016    Procedure: FLOURO GUIDE NEEDLE PLACEMENT;  Surgeon: Gaurang Pruett M.D.;  Location: [facility];  Service: Pain Management   • SHOULDER ARTHROSCOPY Right 2015    torn bicep tendon and spurs   • RECOVERY  10/17/2013    Performed by Cath-Recovery Surgery at SURGERY SAME DAY [facility]   • WOUND DEHISCENCE  4/10/2013    Performed by Tu Jain M.D. at SURGERY [facility]   • CERVICAL FUSION POSTERIOR  2/27/2013    Performed by Tu Jain M.D. at SURGERY [facility]   • CERVICAL LAMINECTOMY POSTERIOR  2/27/2013    Performed by Tu Jain M.D. at SURGERY [facility]   • LUMBAR LAMINECTOMY DISKECTOMY  11/20/2012    Performed by Tu Jain M.D. at SURGERY [facility]   • RECOVERY  10/4/2012    Performed by Cath-Recovery Surgery at SURGERY SAME DAY [facility]   • PACEMAKER INSERTION  October 2012    [employer]  2110 implanted by Dr. Waite.   • SHOULDER DECOMPRESSION Left 2008    Left rotator cuff   • ARTHROPLASTY Left 2004   • ARTHROSCOPY, KNEE Bilateral 1978   • ORTHOPEDIC OSTEOTOMY      Both knees several times, 8 total   • OTHER     • SPINAL CORD STIMULATOR  1 month ago   • TONSILLECTOMY     • VASECTOMY         Family History   Problem Relation Age of Onset   • Lung Disease Mother         Smoker   • Alcohol/Drug Mother    • Heart Disease Father 36        CAD   • Heart Disease Sister          "\"hole in heart\"   • Lung Disease Brother         COPD, CANCER   • Cancer Brother         Prostate, Lung   • Alcohol/Drug Brother    • Heart Disease Brother    • Diabetes Brother    • Hypertension Brother    • Hyperlipidemia Brother    • Heart Disease Maternal Grandmother    • Hypertension Maternal Grandmother    • Hyperlipidemia Maternal Grandmother    • Cancer Brother         stomach, thyroid   • Heart Disease Brother 60        MI, stent, PM/defib   • Sleep Apnea Brother    • No Known Problems Maternal Grandfather    • No Known Problems Paternal Grandmother        Social History     Socioeconomic History   • Marital status:      Spouse name: Not on file   • Number of children: Not on file   • Years of education: Not on file   • Highest education level: Not on file   Occupational History   • Not on file   Tobacco Use   • Smoking status: Former Smoker     Packs/day: 1.00     Years: 40.00     Pack years: 40.00     Types: Cigarettes     Quit date: 1/3/1990     Years since quittin.7   • Smokeless tobacco: Never Used   Vaping Use   • Vaping Use: Never used   Substance and Sexual Activity   • Alcohol use: No     Alcohol/week: 0.0 oz   • Drug use: No   • Sexual activity: Yes     Partners: Female   Other Topics Concern   • Not on file   Social History Narrative   • Not on file     Social Determinants of Health     Financial Resource Strain:    • Difficulty of Paying Living Expenses:    Food Insecurity:    • Worried About Running Out of Food in the Last Year:    • Ran Out of Food in the Last Year:    Transportation Needs:    • Lack of Transportation (Medical):    • Lack of Transportation (Non-Medical):    Physical Activity:    • Days of Exercise per Week:    • Minutes of Exercise per Session:    Stress:    • Feeling of Stress :    Social Connections:    • Frequency of Communication with Friends and Family:    • Frequency of Social Gatherings with Friends and Family:    • Attends Synagogue Services:    • Active " Member of Clubs or Organizations:    • Attends Club or Organization Meetings:    • Marital Status:    Intimate Partner Violence:    • Fear of Current or Ex-Partner:    • Emotionally Abused:    • Physically Abused:    • Sexually Abused:        Current Outpatient Medications   Medication Sig Dispense Refill   • albuterol 108 (90 Base) MCG/ACT Aero Soln inhalation aerosol albuterol sulfate HFA 90 mcg/actuation aerosol inhaler     • amoxicillin (AMOXIL) 500 MG Cap amoxicillin 500 mg capsule   TAKE FOUR CAPSULES BY MOUTH ONE HOUR BEFORE APPOINTMENT     • promethazine-dextromethorphan (PROMETHAZINE-DM) 6.25-15 MG/5ML syrup promethazine-DM 6.25 mg-15 mg/5 mL oral syrup   TAKE 5ML BY MOUTH 4 TIMES DAILY AS NEEDED FOR COUGH FOR 5 DAYS     • tamsulosin (FLOMAX) 0.4 MG capsule tamsulosin 0.4 mg capsule   Take 1 capsule every day by oral route at bedtime.   hold if having dizziness when standing up     • insulin glargine (LANTUS SOLOSTAR) 100 UNIT/ML Solution Pen-injector injection Inject 36 Units under the skin every evening. 5 pens per month 45 mL 3   • Dulaglutide (TRULICITY) 3 MG/0.5ML Solution Pen-injector Inject 3 mg under the skin every 7 days. 2.0 ml equals 4 pens per month 6 mL 3   • levothyroxine (SYNTHROID) 25 MCG Tab Take 1 Tablet by mouth every morning on an empty stomach. 90 Tablet 3   • SYNJARDY XR 12.5-1000 MG TABLET SR 24 HR TAKE 2 TABLETS DAILY 180 Tablet 3   • glucose blood (FREESTYLE LITE) strip 1 Strip by Other route 2 times a day. 150 Strip 3   • valsartan (DIOVAN) 160 MG Tab TAKE 1 TABLET DAILY 90 tablet 3   • hepatitis B vaccine 20 mcg/mL (ENGERIX-B) 20 MCG/ML Suspension Inject 1 mL into the shoulder, thigh, or buttocks Once PRN for up to 1 dose. Obtain last dose 6-12 months after 2nd dose. 1 mL 1   • metoprolol (LOPRESSOR) 25 MG Tab Take 0.5 Tabs by mouth 2 times a day. **change in dose** 90 Tab 3   • atorvastatin (LIPITOR) 20 MG Tab TAKE 1 TABLET DAILY 90 Tab 3   • Lancets MISC Lancets order: Lancets  "for Abbott Freestyle Lite meter. Sig: use 4 times daily  and prn ssx high or low sugar. #100 RF x 0 360 Each 4   • Blood Glucose Monitoring Suppl [place] Meter: Dispense Abbott Freestyle Lite meter. Sig. Use as directed for blood sugar monitoring. #1. NR. 1 Device 0   • B-D ULTRAFINE III SHORT PEN 31G X 8 MM MISC USE 1 NEEDLE EVERY DAY WITH LANTUS 999 Each 2   • aspirin EC (ECOTRIN) 81 MG TBEC Take 81 mg by mouth every day.       No current facility-administered medications for this visit.        ALLERGIES: Aleve cold & [pseudoephedrine-naproxen na], Ceftriaxone sodium, Naproxen, Latex, and Tape    ROS  Constitutional: Denies fever, chills, sweats,  weight loss, fatigue  Cardiovascular: Denies chest pain, tightness, palpitations, swelling in legs/feet,  Respiratory: Denies shortness of breath, cough, sputum, wheezing, painful breathing, coughing up blood.   Sleep: per HPI  Gastrointestinal: Denies  difficulty swallowing, nausea, abdominal pain, diarrhea, constipation, heartburn.  Musculoskeletal: Denies painful joints, sore muscles, back pain.        PHYSICAL EXAM  /58 (BP Location: Left arm, Patient Position: Sitting, BP Cuff Size: Adult)   Pulse (!) 103   Resp 16   Ht 1.702 m (5' 7\")   Wt 104 kg (230 lb)   SpO2 92%   BMI 36.02 kg/m²   Appearance: Well-nourished, well-developed, no acute distress  Eyes:  No scleral icterus , EOMI  ENMT: masked  Lung auscultation:  No wheezes rhonchi rubs or rales  Cardiac: No murmurs, rubs, or gallops; regular rhythm, normal rate; no edema  Musculoskeletal:  Grossly normal; gait and station normal; digits and nails normal  Skin:  No rashes, petechiae, cyanosis  Neurologic: without focal signs; oriented to person, time, place, and purpose; judgement intact  Psychiatric:  No depression, anxiety, agitation      Medical Decision Making   Assessment and Plan    The medical record was reviewed.    Dakota Boyer is a 76 y.o.male  with type 2 diabetes mellitus, " hyperlipidemia, hypertension, and obstructive sleep apnea on CPAP.  Presents today to discuss recent Respironics recall continued YLNDA management.    Obstructive sleep apnea  Compliance data reviewed showing 60% usage > 4hours in last 30  days. Average AHI 1.3 events/hour.  Fixed pressure 12 CWP. Pt continues to use and benefit from machine.     The [place] Respironics recall discussed. Questions answered. Advised to avoid using unapproved cleaning products such as ozone and avoid high heat and humidity environments which may contribute to noise abatement foam degradation. Discussed inline bacteria filter which may help with debris but not off gassing. Reviewed symptoms of upper airway irritation, cough, chest pressure and sinus irritation/infection which may be related to foam degradation.    His machine may be over 5 years old and he would likely benefit from new machine.    PLAN:   -Order placed for mask and supplies  -Order placed for new auto CPAP pressure of 12  -Advised to follow-up in 4 months if received new machine otherwise can follow-up in 6 months to a year  -Recommended he recheck via MyChart with any questions    Has been advised to continue the current  CPAP, clean equipment frequently, and get new mask and supplies as allowed by insurance and DME. Recommend an earlier appointment, if significant treatment barriers develop.    The risks of untreated sleep apnea were discussed with the patient at length. Patients with LYNDA are at increased risk of cardiovascular disease including coronary artery disease, systemic arterial hypertension, pulmonary arterial hypertension, cardiac arrythmias, and stroke. The patient was advised to avoid driving a motor vehicle when drowsy.    Positive airway pressure will favorably impact many of the adverse conditions and effects provoked by LYNDA.    Have advised the patient to follow up with the appropriate healthcare practitioners for all other medical problems and  issues.    Return in about 4 months (around 1/28/2022).

## 2021-09-30 DIAGNOSIS — I25.10 CORONARY ARTERY DISEASE DUE TO CALCIFIED CORONARY LESION: ICD-10-CM

## 2021-09-30 DIAGNOSIS — I10 ESSENTIAL HYPERTENSION, BENIGN: ICD-10-CM

## 2021-09-30 DIAGNOSIS — I25.84 CORONARY ARTERY DISEASE DUE TO CALCIFIED CORONARY LESION: ICD-10-CM

## 2021-11-01 DIAGNOSIS — E78.5 HYPERLIPIDEMIA, UNSPECIFIED HYPERLIPIDEMIA TYPE: ICD-10-CM

## 2021-11-02 ENCOUNTER — OFFICE VISIT (OUTPATIENT)
Dept: MEDICAL GROUP | Facility: PHYSICIAN GROUP | Age: 76
End: 2021-11-02
Payer: MEDICARE

## 2021-11-02 VITALS
TEMPERATURE: 97.9 F | HEIGHT: 67 IN | BODY MASS INDEX: 35.47 KG/M2 | HEART RATE: 78 BPM | DIASTOLIC BLOOD PRESSURE: 48 MMHG | OXYGEN SATURATION: 97 % | SYSTOLIC BLOOD PRESSURE: 102 MMHG | WEIGHT: 226 LBS

## 2021-11-02 DIAGNOSIS — Z76.89 ENCOUNTER TO ESTABLISH CARE: ICD-10-CM

## 2021-11-02 DIAGNOSIS — I15.2 HYPERTENSION ASSOCIATED WITH TYPE 2 DIABETES MELLITUS (HCC): ICD-10-CM

## 2021-11-02 DIAGNOSIS — E11.59 HYPERTENSION ASSOCIATED WITH TYPE 2 DIABETES MELLITUS (HCC): ICD-10-CM

## 2021-11-02 DIAGNOSIS — Z23 NEED FOR VACCINATION: ICD-10-CM

## 2021-11-02 PROBLEM — M17.0 BILATERAL PRIMARY OSTEOARTHRITIS OF KNEE: Status: RESOLVED | Noted: 2018-01-23 | Resolved: 2021-11-02

## 2021-11-02 PROBLEM — Z87.891 FORMER SMOKER: Status: ACTIVE | Noted: 2021-11-02

## 2021-11-02 PROBLEM — Z96.653 HISTORY OF BILATERAL KNEE REPLACEMENT: Status: ACTIVE | Noted: 2021-11-02

## 2021-11-02 PROCEDURE — G0008 ADMIN INFLUENZA VIRUS VAC: HCPCS | Performed by: STUDENT IN AN ORGANIZED HEALTH CARE EDUCATION/TRAINING PROGRAM

## 2021-11-02 PROCEDURE — 90662 IIV NO PRSV INCREASED AG IM: CPT | Performed by: STUDENT IN AN ORGANIZED HEALTH CARE EDUCATION/TRAINING PROGRAM

## 2021-11-02 PROCEDURE — 99214 OFFICE O/P EST MOD 30 MIN: CPT | Mod: 25 | Performed by: STUDENT IN AN ORGANIZED HEALTH CARE EDUCATION/TRAINING PROGRAM

## 2021-11-02 RX ORDER — ATORVASTATIN CALCIUM 20 MG/1
TABLET, FILM COATED ORAL
Qty: 90 TABLET | Refills: 2 | Status: SHIPPED | OUTPATIENT
Start: 2021-11-02 | End: 2021-12-06

## 2021-11-02 ASSESSMENT — FIBROSIS 4 INDEX: FIB4 SCORE: 1.67

## 2021-11-02 NOTE — PROGRESS NOTES
Subjective:     CC: Establish care    HISTORY OF THE PRESENT ILLNESS: Patient is a 76 y.o. male retired [employer] . This pleasant patient is here today to establish care. His/her prior PCP was ALIDA Collado.    We reviewed his medications and history.    Patient is followed by endocrinology for his diabetes and has follow-up pending in December.  I have reviewed that last note.  Denies any concerns for his medications currently.  He also started levothyroxine 25 mcg daily as directed for subclinical hypothyroidism and has lab work pending prior to his next appointment.    He continues with 81 mg aspirin and atorvastatin 20 mg daily given his history of coronary artery disease.  Denies any medication side effects.  Prescribed metoprolol 1/2 tablet 25 mg twice daily but only taking 1 full tablet daily and states that his cardiologist is aware.  States that his blood pressure is typically slightly higher today but denies any chest pain, shortness of breath, lightheadedness or dizziness.  No palpitations.  Has stable bilateral lower extremity edema which she reports has been ongoing for years since he had bilateral knee replacements.    He has been working on home improvement projects recently and reports back pain.  Patient is followed by pain management for chronic neck and back pain.  Reports history of spinal stimulator, last was placed in 2019.  No issues with IADLs.    Also followed by urology for prostate cancer which was discovered years ago.  At this point patient states they are just trending his PSA.  He stopped his Flomax due to urinary leaking.    No problem-specific Assessment & Plan notes found for this encounter.    Current Outpatient Medications Ordered in Epic   Medication Sig Dispense Refill   • metoprolol tartrate (LOPRESSOR) 25 MG Tab TAKE ONE-HALF (1/2) TABLET TWICE A DAY (Patient taking differently: Take 12.5 mg by mouth 2 times a day. Takes 1 tablet once daily) 90 Tablet 2   • amoxicillin (AMOXIL)  "500 MG Cap amoxicillin 500 mg capsule   TAKE FOUR CAPSULES BY MOUTH ONE HOUR BEFORE APPOINTMENT     • insulin glargine (LANTUS SOLOSTAR) 100 UNIT/ML Solution Pen-injector injection Inject 36 Units under the skin every evening. 5 pens per month (Patient taking differently: Inject 34 Units under the skin every evening. 5 pens per month) 45 mL 3   • Dulaglutide (TRULICITY) 3 MG/0.5ML Solution Pen-injector Inject 3 mg under the skin every 7 days. 2.0 ml equals 4 pens per month 6 mL 3   • levothyroxine (SYNTHROID) 25 MCG Tab Take 1 Tablet by mouth every morning on an empty stomach. 90 Tablet 3   • SYNJARDY XR 12.5-1000 MG TABLET SR 24 HR TAKE 2 TABLETS DAILY 180 Tablet 3   • glucose blood (FREESTYLE LITE) strip 1 Strip by Other route 2 times a day. 150 Strip 3   • valsartan (DIOVAN) 160 MG Tab TAKE 1 TABLET DAILY 90 tablet 3   • hepatitis B vaccine 20 mcg/mL (ENGERIX-B) 20 MCG/ML Suspension Inject 1 mL into the shoulder, thigh, or buttocks Once PRN for up to 1 dose. Obtain last dose 6-12 months after 2nd dose. 1 mL 1   • atorvastatin (LIPITOR) 20 MG Tab TAKE 1 TABLET DAILY 90 Tab 3   • Lancets MISC Lancets order: Lancets for Abbott Freestyle Lite meter. Sig: use 4 times daily  and prn ssx high or low sugar. #100 RF x 0 360 Each 4   • Blood Glucose Monitoring Suppl [place] Meter: Dispense Abbott Freestyle Lite meter. Sig. Use as directed for blood sugar monitoring. #1. NR. 1 Device 0   • B-D ULTRAFINE III SHORT PEN 31G X 8 MM MISC USE 1 NEEDLE EVERY DAY WITH LANTUS 999 Each 2   • aspirin EC (ECOTRIN) 81 MG TBEC Take 81 mg by mouth every day.       No current [facility]-ordered facility-administered medications on file.     Health Maintenance: Reviewed with patient-not interested in COVID-19 vaccine.    ROS:   See HPI      Objective:     Exam: /48   Pulse 78   Temp 36.6 °C (97.9 °F) (Temporal)   Ht 1.702 m (5' 7\")   Wt 103 kg (226 lb)   SpO2 97%  Body mass index is 35.4 kg/m².    General: Well developed, well nourished " in no acute distress.  Head: Normocephalic and atraumatic.  Eyes: Conjunctivae and extraocular motions are normal. Pupils are equal, round. No scleral icterus.   Mouth/Throat: Wearing mask  Neck: Supple. Thyroid is not grossly enlarged.  Pulmonary: Clear to ausculation.  Normal effort. No rales, ronchi, or wheezing.  Cardiovascular: Regular rate and rhythm without murmur. Distant heart sounds.  Abdomen: Obese, nondistended.  Neurologic: No gross deficits. Normal gait.   Skin: Warm and dry.  Hyperpigmented patches on left lower extremity consistent with venous stasis changes.  Musculoskeletal: No extremity cyanosis, clubbing.  Trace bilateral pitting edema to the ankle.  Psych: Normal mood and affect. Alert and oriented x3. Judgment and insight is normal.    Labs: Reviewed from 7/30/2021, 9/21/2021    Assessment & Plan:   76 y.o. male with the following -    1. Encounter to establish care  Updated record and reviewed history with patient.    2. Hypertension associated with type 2 diabetes mellitus (HCC)  This is a chronic condition, well controlled.  We did discuss that he is prescribed Toprol 1/2 tablet 25 mg twice daily but only taking once a day.  Discussed that this is a 12-hour medication and to make sure that his cardiologist is aware that this is how he is taking it.  He will continue to check his blood pressure at home and we may consider decreasing medications if either symptomatic or hypotensive. Return precautions discussed.     4. Need for vaccination  - INFLUENZA VACCINE, HIGH DOSE (65+ ONLY)    I spent a total of 30 minutes with record review, exam, communication with the patient, and documentation of this encounter.    Return in about 6 months (around 5/2/2022), or if symptoms worsen or fail to improve, for Annual Medicare Visit.    Please note that this dictation was created using voice recognition software. I have made every reasonable attempt to correct obvious errors, but I expect that there are  errors of grammar and possibly content that I did not discover before finalizing the note.

## 2021-11-18 ENCOUNTER — OFFICE VISIT (OUTPATIENT)
Dept: CARDIOLOGY | Facility: MEDICAL CENTER | Age: 76
End: 2021-11-18
Payer: MEDICARE

## 2021-11-18 ENCOUNTER — TELEPHONE (OUTPATIENT)
Dept: CARDIOLOGY | Facility: MEDICAL CENTER | Age: 76
End: 2021-11-18

## 2021-11-18 VITALS
RESPIRATION RATE: 14 BRPM | BODY MASS INDEX: 35 KG/M2 | DIASTOLIC BLOOD PRESSURE: 58 MMHG | HEART RATE: 71 BPM | WEIGHT: 223 LBS | OXYGEN SATURATION: 96 % | HEIGHT: 67 IN | SYSTOLIC BLOOD PRESSURE: 102 MMHG

## 2021-11-18 DIAGNOSIS — Z95.0 PACEMAKER: ICD-10-CM

## 2021-11-18 DIAGNOSIS — I45.10 RIGHT BUNDLE BRANCH BLOCK: ICD-10-CM

## 2021-11-18 DIAGNOSIS — I35.0 NONRHEUMATIC AORTIC VALVE STENOSIS: ICD-10-CM

## 2021-11-18 DIAGNOSIS — I10 HYPERTENSION, ESSENTIAL: ICD-10-CM

## 2021-11-18 DIAGNOSIS — E78.5 DYSLIPIDEMIA: ICD-10-CM

## 2021-11-18 PROCEDURE — 99215 OFFICE O/P EST HI 40 MIN: CPT | Performed by: INTERNAL MEDICINE

## 2021-11-18 ASSESSMENT — FIBROSIS 4 INDEX: FIB4 SCORE: 1.67

## 2021-11-18 NOTE — PROGRESS NOTES
"CARDIOLOGY OUTPATIENT FOLLOWUP    PCP: Rylee García D.O.    1. Nonrheumatic aortic valve stenosis    2. RBBB    3. Pacemaker    4. Dyslipidemia    5. Hypertension, essential      Dakota Boyer is having progressive exertional fatigue and episodes of near syncope.  While these may be related to hypotension or ongoing vasopressor due to carotid hypersensitivity I did advise a more comprehensive evaluation of the aortic valve as it is certainly plausible that a significant stenosis could drive the symptoms.  He will be scheduled for a ABDIRIZAK and right and left heart catheterization with aortic valve study.    Follow up: 3 months    Chief Complaint   Patient presents with   • Coronary Artery Disease     F/V Dx; Coronary artery disease due to calcified coronary lesion: 40-50% disease in the circumflex at cardiac catheterization in 2013    • Aortic Stenosis       History: Dakota Boyer is a 76 y.o. 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  He has been experiencing repetitive syncopal spells over the past year.  These have subsided over the past several months but he continues to have frequent near syncope as well as exertional shortness of breath and fatigue.  He still maintains an active routine which recently included construction/remodeling in his home.  An echocardiogram showed an aortic valve area of 1 cm² but a mean gradient of only 16 mmHg.  The valve does have restricted leaflet motion with sclerotic changes and overall was poorly visualized from the study.      ROS:   All other systems reviewed and negative except as per the HPI    PE:  /58 (BP Location: Left arm, Patient Position: Sitting, BP Cuff Size: Adult)   Pulse 71   Resp 14   Ht 1.702 m (5' 7\")   Wt 101 kg (223 lb)   SpO2 96%   BMI 34.93 kg/m²   Gen: no acute distress  HEENT: Symmetric face. " Anicteric sclerae. Moist mucus membranes  NECK: No JVD. No lymphadenopathy  CARDIAC: Regular, Normal S1, S2, +systolic murmur -overall cardiac tones are very faint  VASCULATURE: carotids are normal bilaterally without bruit and No bruit  RESP: Clear to auscultation bilaterally  ABD: Soft, non-tender, non-distended  EXT: No edema, no clubbing or cyanosis  SKIN: Warm and dry  NEURO: No gross deficits  PSYCH: Appropriate affect, participates in conversation    The ASCVD Risk score (Abdiel LESLIE Jr, et al., 2013) failed to calculate.    Past Medical History:   Diagnosis Date   • Anxiety    • Back pain, chronic 10/18/2016   • Bilateral primary osteoarthritis of knee 1/23/2018   • Blood clotting disorder (HCC) 1978    s/p Left knee surgery- DVT in left leg   • Breath shortness    • CAD (coronary artery disease) October 2012    Positive coronary calcium score.  Follow-up MPI was negative for ischemia.   • Cancer (HCC) ? early 90's    Melanoma Left arm- surgically removed   • Degeneration of lumbar or lumbosacral intervertebral disc     • Dental disorder    • Depression    • Diabetes     Oral agents and insulin   • DJD (degenerative joint disease) of cervical spine     • Episodic lightheadedness     • High cholesterol    • Hyperlipidemia    • Hypersensitive carotid sinus syndrome     • Hypertension    • Incisional infection April 2013    Wound dehiscience of neck surgery.   • Insomnia     • Myocardial infarct (HCC) ?    states was told by  that he has had a heart attack in the past.   • Neck pain 10/18/2016   • Pacemaker October 2012    [facility] #[id number] [facility]    • RBBB     • S/P lumbar discectomy     • Sick sinus syndrome (HCC)     • Sleep apnea     does not use CPAP anymore- stopped in Dec 2015   • Syncope October 2012    Treated with PPM   • Thyroid disease     Hypothyroid   • Unspecified hemorrhagic conditions     Reports bleeds easily     Allergies   Allergen Reactions   • Aleve Cold &  [Pseudoephedrine-Naproxen Na] Anaphylaxis   • Ceftriaxone Sodium Anaphylaxis     Reaction; 1970's.   • Naproxen Anaphylaxis     Reaction; 2004.   • Latex    • Tape Rash and Itching     Plastic tape (paper tape ok)     Outpatient Encounter Medications as of 11/18/2021   Medication Sig Dispense Refill   • atorvastatin (LIPITOR) 20 MG Tab TAKE 1 TABLET DAILY 90 Tablet 2   • amoxicillin (AMOXIL) 500 MG Cap amoxicillin 500 mg capsule   TAKE FOUR CAPSULES BY MOUTH ONE HOUR BEFORE APPOINTMENT     • insulin glargine (LANTUS SOLOSTAR) 100 UNIT/ML Solution Pen-injector injection Inject 36 Units under the skin every evening. 5 pens per month (Patient taking differently: Inject 34 Units under the skin every evening. 5 pens per month) 45 mL 3   • Dulaglutide (TRULICITY) 3 MG/0.5ML Solution Pen-injector Inject 3 mg under the skin every 7 days. 2.0 ml equals 4 pens per month 6 mL 3   • levothyroxine (SYNTHROID) 25 MCG Tab Take 1 Tablet by mouth every morning on an empty stomach. 90 Tablet 3   • SYNJARDY XR 12.5-1000 MG TABLET SR 24 HR TAKE 2 TABLETS DAILY 180 Tablet 3   • valsartan (DIOVAN) 160 MG Tab TAKE 1 TABLET DAILY 90 tablet 3   • hepatitis B vaccine 20 mcg/mL (ENGERIX-B) 20 MCG/ML Suspension Inject 1 mL into the shoulder, thigh, or buttocks Once PRN for up to 1 dose. Obtain last dose 6-12 months after 2nd dose. 1 mL 1   • aspirin EC (ECOTRIN) 81 MG TBEC Take 81 mg by mouth every day.     • [DISCONTINUED] metoprolol tartrate (LOPRESSOR) 25 MG Tab TAKE ONE-HALF (1/2) TABLET TWICE A DAY (Patient taking differently: Take 12.5 mg by mouth 2 times a day. Takes 1 tablet once daily) 90 Tablet 2   • glucose blood (FREESTYLE LITE) strip 1 Strip by Other route 2 times a day. 150 Strip 3   • Lancets MISC Lancets order: Lancets for Abbott Freestyle Lite meter. Sig: use 4 times daily  and prn ssx high or low sugar. #100 RF x 0 360 Each 4   • Blood Glucose Monitoring Suppl [place] Meter: Dispense Abbott Freestyle Lite meter. Sig. Use as  directed for blood sugar monitoring. #1. NR. 1 Device 0   • B-D ULTRAFINE III SHORT PEN 31G X 8 MM MISC USE 1 NEEDLE EVERY DAY WITH LANTUS 999 Each 2     No facility-administered encounter medications on file as of 2021.     Social History     Socioeconomic History   • Marital status:      Spouse name: Not on file   • Number of children: Not on file   • Years of education: Not on file   • Highest education level: Not on file   Occupational History   • Not on file   Tobacco Use   • Smoking status: Former Smoker     Packs/day: 1.00     Years: 40.00     Pack years: 40.00     Types: Cigarettes     Quit date: 1/3/1990     Years since quittin.8   • Smokeless tobacco: Never Used   Vaping Use   • Vaping Use: Never used   Substance and Sexual Activity   • Alcohol use: No     Alcohol/week: 0.0 oz   • Drug use: No   • Sexual activity: Yes     Partners: Female   Other Topics Concern   • Not on file   Social History Narrative    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      Social Determinants of Health     Financial Resource Strain:    • Difficulty of Paying Living Expenses: Not on file   Food Insecurity:    • Worried About Running Out of Food in the Last Year: Not on file   • Ran Out of Food in the Last Year: Not on file   Transportation Needs:    • Lack of Transportation (Medical): Not on file   • Lack of Transportation (Non-Medical): Not on file   Physical Activity:    • Days of Exercise per Week: Not on file   • Minutes of Exercise per Session: Not on file   Stress:    • Feeling of Stress : Not on file   Social Connections:    • Frequency of Communication with Friends and Family: Not on file   • Frequency of Social Gatherings with Friends and Family: Not on file   • Attends Alevism Services: Not on file   • Active Member of Clubs or Organizations: Not on file   • Attends Club or Organization Meetings: Not on file   • Marital Status: Not on file   Intimate Partner Violence:    • Fear of Current or  Ex-Partner: Not on file   • Emotionally Abused: Not on file   • Physically Abused: Not on file   • Sexually Abused: Not on file   Housing Stability:    • Unable to Pay for Housing in the Last Year: Not on file   • Number of Places Lived in the Last Year: Not on file   • Unstable Housing in the Last Year: Not on file       Studies  Lab Results   Component Value Date/Time    CHOLSTRLTOT 106 11/18/2020 09:11 AM    LDL 40 11/18/2020 09:11 AM    HDL 38 (A) 11/18/2020 09:11 AM    TRIGLYCERIDE 138 11/18/2020 09:11 AM       Lab Results   Component Value Date/Time    SODIUM 139 07/30/2021 08:03 AM    POTASSIUM 4.3 07/30/2021 08:03 AM    CHLORIDE 104 07/30/2021 08:03 AM    CO2 22 07/30/2021 08:03 AM    GLUCOSE 165 (H) 07/30/2021 08:03 AM    BUN 21 07/30/2021 08:03 AM    CREATININE 0.98 07/30/2021 08:03 AM     Lab Results   Component Value Date/Time    ALKPHOSPHAT 95 07/30/2021 08:03 AM    ASTSGOT 17 07/30/2021 08:03 AM    ALTSGPT 22 07/30/2021 08:03 AM    TBILIRUBIN 0.9 07/30/2021 08:03 AM        For this encounter I reviewed the following medical records PCP notes, BMP and Lipid profile   For this encounter I directly reviewed ECG tracings. I agree with the interpretations in the electronic health record.

## 2021-11-18 NOTE — TELEPHONE ENCOUNTER
Rai Arechiga,  Patient was seen today by BE and he has ordered a EC-ABDIRIZAK W/O CONT [[id number]] & a CL-RIGHT AND LEFT HEART CATHETERIZATION [[id number]] to be scheduled on the same day for the patient.    Thank you.  Lou Schmitz Ass't

## 2021-11-22 NOTE — TELEPHONE ENCOUNTER
Patient is scheduled on 11-30-21 for a ABDIRIZAK w/R&L hrt cath with Dr. Hsu. Patient was told to cut insulin in half night before and hold AM day of procedure and to hold synjardy AM day of procedure. Patient to check in at 6:00AM for a 7:30 procedure. H&P was done on 11-18-21 by Dr. Hsu. Pre admit to call patient.

## 2021-11-29 ENCOUNTER — PRE-ADMISSION TESTING (OUTPATIENT)
Dept: ADMISSIONS | Facility: MEDICAL CENTER | Age: 76
End: 2021-11-29
Attending: INTERNAL MEDICINE
Payer: MEDICARE

## 2021-11-29 DIAGNOSIS — Z01.810 PRE-OPERATIVE CARDIOVASCULAR EXAMINATION: ICD-10-CM

## 2021-11-29 DIAGNOSIS — Z01.812 PRE-OPERATIVE LABORATORY EXAMINATION: ICD-10-CM

## 2021-11-29 LAB
ALBUMIN SERPL BCP-MCNC: 4.5 G/DL (ref 3.2–4.9)
ALBUMIN/GLOB SERPL: 1.7 G/DL
ALP SERPL-CCNC: 71 U/L (ref 30–99)
ALT SERPL-CCNC: 18 U/L (ref 2–50)
ANION GAP SERPL CALC-SCNC: 10 MMOL/L (ref 7–16)
APTT PPP: 34.3 SEC (ref 24.7–36)
AST SERPL-CCNC: 18 U/L (ref 12–45)
BILIRUB SERPL-MCNC: 1.5 MG/DL (ref 0.1–1.5)
BUN SERPL-MCNC: 20 MG/DL (ref 8–22)
CALCIUM SERPL-MCNC: 10 MG/DL (ref 8.5–10.5)
CHLORIDE SERPL-SCNC: 102 MMOL/L (ref 96–112)
CO2 SERPL-SCNC: 24 MMOL/L (ref 20–33)
CREAT SERPL-MCNC: 0.93 MG/DL (ref 0.5–1.4)
EKG IMPRESSION: NORMAL
ERYTHROCYTE [DISTWIDTH] IN BLOOD BY AUTOMATED COUNT: 48.2 FL (ref 35.9–50)
GLOBULIN SER CALC-MCNC: 2.7 G/DL (ref 1.9–3.5)
GLUCOSE SERPL-MCNC: 119 MG/DL (ref 65–99)
HCT VFR BLD AUTO: 42.2 % (ref 42–52)
HGB BLD-MCNC: 15.1 G/DL (ref 14–18)
INR PPP: 1.09 (ref 0.87–1.13)
MCH RBC QN AUTO: 32.3 PG (ref 27–33)
MCHC RBC AUTO-ENTMCNC: 35.8 G/DL (ref 33.7–35.3)
MCV RBC AUTO: 90.2 FL (ref 81.4–97.8)
PLATELET # BLD AUTO: 169 K/UL (ref 164–446)
PMV BLD AUTO: 10.4 FL (ref 9–12.9)
POTASSIUM SERPL-SCNC: 4.5 MMOL/L (ref 3.6–5.5)
PROT SERPL-MCNC: 7.2 G/DL (ref 6–8.2)
PROTHROMBIN TIME: 13.8 SEC (ref 12–14.6)
RBC # BLD AUTO: 4.68 M/UL (ref 4.7–6.1)
SARS-COV+SARS-COV-2 AG RESP QL IA.RAPID: NOTDETECTED
SODIUM SERPL-SCNC: 136 MMOL/L (ref 135–145)
SPECIMEN SOURCE: NORMAL
WBC # BLD AUTO: 7.1 K/UL (ref 4.8–10.8)

## 2021-11-29 PROCEDURE — 87426 SARSCOV CORONAVIRUS AG IA: CPT

## 2021-11-29 PROCEDURE — 85610 PROTHROMBIN TIME: CPT

## 2021-11-29 PROCEDURE — 93010 ELECTROCARDIOGRAM REPORT: CPT | Performed by: INTERNAL MEDICINE

## 2021-11-29 PROCEDURE — 36415 COLL VENOUS BLD VENIPUNCTURE: CPT

## 2021-11-29 PROCEDURE — 93005 ELECTROCARDIOGRAM TRACING: CPT

## 2021-11-29 PROCEDURE — 85027 COMPLETE CBC AUTOMATED: CPT

## 2021-11-29 PROCEDURE — 85730 THROMBOPLASTIN TIME PARTIAL: CPT

## 2021-11-29 PROCEDURE — 80053 COMPREHEN METABOLIC PANEL: CPT

## 2021-11-30 ENCOUNTER — APPOINTMENT (OUTPATIENT)
Dept: CARDIOLOGY | Facility: MEDICAL CENTER | Age: 76
End: 2021-11-30
Attending: INTERNAL MEDICINE
Payer: MEDICARE

## 2021-11-30 ENCOUNTER — TELEPHONE (OUTPATIENT)
Dept: CARDIOLOGY | Facility: MEDICAL CENTER | Age: 76
End: 2021-11-30

## 2021-11-30 ENCOUNTER — HOSPITAL ENCOUNTER (OUTPATIENT)
Facility: MEDICAL CENTER | Age: 76
End: 2021-11-30
Attending: INTERNAL MEDICINE | Admitting: INTERNAL MEDICINE
Payer: MEDICARE

## 2021-11-30 VITALS
OXYGEN SATURATION: 96 % | BODY MASS INDEX: 34.6 KG/M2 | RESPIRATION RATE: 18 BRPM | TEMPERATURE: 97.5 F | WEIGHT: 220.46 LBS | HEIGHT: 67 IN | SYSTOLIC BLOOD PRESSURE: 127 MMHG | DIASTOLIC BLOOD PRESSURE: 62 MMHG | HEART RATE: 72 BPM

## 2021-11-30 DIAGNOSIS — I25.10 CORONARY ARTERY DISEASE DUE TO CALCIFIED CORONARY LESION: ICD-10-CM

## 2021-11-30 DIAGNOSIS — I35.0 MILD AORTIC STENOSIS: ICD-10-CM

## 2021-11-30 DIAGNOSIS — I25.84 CORONARY ARTERY DISEASE DUE TO CALCIFIED CORONARY LESION: ICD-10-CM

## 2021-11-30 DIAGNOSIS — I35.0 NONRHEUMATIC AORTIC VALVE STENOSIS: ICD-10-CM

## 2021-11-30 LAB
ACT BLD: 252 SEC (ref 74–137)
ACT BLD: 257 SEC (ref 74–137)
ACT BLD: 274 SEC (ref 74–137)
EKG IMPRESSION: NORMAL
GLUCOSE BLD-MCNC: 133 MG/DL (ref 65–99)
LV EJECT FRACT  99904: 60

## 2021-11-30 PROCEDURE — 93460 R&L HRT ART/VENTRICLE ANGIO: CPT | Mod: 26,59 | Performed by: INTERNAL MEDICINE

## 2021-11-30 PROCEDURE — 82962 GLUCOSE BLOOD TEST: CPT

## 2021-11-30 PROCEDURE — 93320 DOPPLER ECHO COMPLETE: CPT | Mod: 26 | Performed by: INTERNAL MEDICINE

## 2021-11-30 PROCEDURE — 160002 HCHG RECOVERY MINUTES (STAT)

## 2021-11-30 PROCEDURE — A9270 NON-COVERED ITEM OR SERVICE: HCPCS

## 2021-11-30 PROCEDURE — 99152 MOD SED SAME PHYS/QHP 5/>YRS: CPT | Performed by: INTERNAL MEDICINE

## 2021-11-30 PROCEDURE — 700102 HCHG RX REV CODE 250 W/ 637 OVERRIDE(OP)

## 2021-11-30 PROCEDURE — 93005 ELECTROCARDIOGRAM TRACING: CPT | Performed by: INTERNAL MEDICINE

## 2021-11-30 PROCEDURE — 93312 ECHO TRANSESOPHAGEAL: CPT | Mod: 26 | Performed by: INTERNAL MEDICINE

## 2021-11-30 PROCEDURE — 700111 HCHG RX REV CODE 636 W/ 250 OVERRIDE (IP)

## 2021-11-30 PROCEDURE — 700101 HCHG RX REV CODE 250

## 2021-11-30 PROCEDURE — 93010 ELECTROCARDIOGRAM REPORT: CPT | Mod: 59 | Performed by: INTERNAL MEDICINE

## 2021-11-30 PROCEDURE — 93325 DOPPLER ECHO COLOR FLOW MAPG: CPT

## 2021-11-30 PROCEDURE — 700105 HCHG RX REV CODE 258: Performed by: INTERNAL MEDICINE

## 2021-11-30 PROCEDURE — 700117 HCHG RX CONTRAST REV CODE 255: Performed by: INTERNAL MEDICINE

## 2021-11-30 PROCEDURE — 85347 COAGULATION TIME ACTIVATED: CPT

## 2021-11-30 PROCEDURE — 92978 ENDOLUMINL IVUS OCT C 1ST: CPT | Mod: 26,RC | Performed by: INTERNAL MEDICINE

## 2021-11-30 PROCEDURE — 99153 MOD SED SAME PHYS/QHP EA: CPT

## 2021-11-30 PROCEDURE — 92928 PRQ TCAT PLMT NTRAC ST 1 LES: CPT | Mod: RC | Performed by: INTERNAL MEDICINE

## 2021-11-30 PROCEDURE — 93325 DOPPLER ECHO COLOR FLOW MAPG: CPT | Mod: 26 | Performed by: INTERNAL MEDICINE

## 2021-11-30 RX ORDER — LIDOCAINE HYDROCHLORIDE 10 MG/ML
INJECTION, SOLUTION EPIDURAL; INFILTRATION; INTRACAUDAL; PERINEURAL
Status: COMPLETED
Start: 2021-11-30 | End: 2021-11-30

## 2021-11-30 RX ORDER — HEPARIN SODIUM 200 [USP'U]/100ML
INJECTION, SOLUTION INTRAVENOUS
Status: COMPLETED
Start: 2021-11-30 | End: 2021-11-30

## 2021-11-30 RX ORDER — VERAPAMIL HYDROCHLORIDE 2.5 MG/ML
INJECTION, SOLUTION INTRAVENOUS
Status: COMPLETED
Start: 2021-11-30 | End: 2021-11-30

## 2021-11-30 RX ORDER — HEPARIN SODIUM 1000 [USP'U]/ML
INJECTION, SOLUTION INTRAVENOUS; SUBCUTANEOUS
Status: COMPLETED
Start: 2021-11-30 | End: 2021-11-30

## 2021-11-30 RX ORDER — LIDOCAINE HYDROCHLORIDE 20 MG/ML
INJECTION, SOLUTION INFILTRATION; PERINEURAL
Status: COMPLETED
Start: 2021-11-30 | End: 2021-11-30

## 2021-11-30 RX ORDER — CLOPIDOGREL 300 MG/1
600 TABLET, FILM COATED ORAL ONCE
Status: DISCONTINUED | OUTPATIENT
Start: 2021-11-30 | End: 2021-11-30

## 2021-11-30 RX ORDER — SODIUM CHLORIDE 9 MG/ML
1000 INJECTION, SOLUTION INTRAVENOUS ONCE
Status: COMPLETED | OUTPATIENT
Start: 2021-11-30 | End: 2021-11-30

## 2021-11-30 RX ORDER — SODIUM CHLORIDE 9 MG/ML
INJECTION, SOLUTION INTRAVENOUS CONTINUOUS
Status: DISCONTINUED | OUTPATIENT
Start: 2021-11-30 | End: 2021-11-30 | Stop reason: HOSPADM

## 2021-11-30 RX ORDER — CLOPIDOGREL BISULFATE 75 MG/1
75 TABLET ORAL DAILY
Qty: 30 TABLET | Refills: 0 | Status: SHIPPED | OUTPATIENT
Start: 2021-12-01 | End: 2021-12-06

## 2021-11-30 RX ORDER — MIDAZOLAM HYDROCHLORIDE 1 MG/ML
INJECTION INTRAMUSCULAR; INTRAVENOUS
Status: COMPLETED
Start: 2021-11-30 | End: 2021-11-30

## 2021-11-30 RX ORDER — CLOPIDOGREL BISULFATE 75 MG/1
75 TABLET ORAL DAILY
Qty: 90 TABLET | Refills: 3 | Status: SHIPPED
Start: 2021-11-30 | End: 2022-11-04

## 2021-11-30 RX ORDER — CLOPIDOGREL 300 MG/1
TABLET, FILM COATED ORAL
Status: COMPLETED
Start: 2021-11-30 | End: 2021-11-30

## 2021-11-30 RX ORDER — CLOPIDOGREL BISULFATE 75 MG/1
75 TABLET ORAL DAILY
Status: DISCONTINUED | OUTPATIENT
Start: 2021-12-01 | End: 2021-11-30 | Stop reason: HOSPADM

## 2021-11-30 RX ADMIN — HEPARIN SODIUM 2000 UNITS: 200 INJECTION, SOLUTION INTRAVENOUS at 09:03

## 2021-11-30 RX ADMIN — MIDAZOLAM HYDROCHLORIDE 4 MG: 1 INJECTION, SOLUTION INTRAMUSCULAR; INTRAVENOUS at 10:01

## 2021-11-30 RX ADMIN — HEPARIN SODIUM: 1000 INJECTION, SOLUTION INTRAVENOUS; SUBCUTANEOUS at 09:02

## 2021-11-30 RX ADMIN — NITROGLYCERIN 10 ML: 20 INJECTION INTRAVENOUS at 09:03

## 2021-11-30 RX ADMIN — FENTANYL CITRATE 100 MCG: 50 INJECTION, SOLUTION INTRAMUSCULAR; INTRAVENOUS at 10:48

## 2021-11-30 RX ADMIN — SODIUM CHLORIDE 1000 ML: 9 INJECTION, SOLUTION INTRAVENOUS at 07:27

## 2021-11-30 RX ADMIN — LIDOCAINE HYDROCHLORIDE: 20 INJECTION, SOLUTION INFILTRATION; PERINEURAL at 09:02

## 2021-11-30 RX ADMIN — HEPARIN SODIUM: 1000 INJECTION, SOLUTION INTRAVENOUS; SUBCUTANEOUS at 10:49

## 2021-11-30 RX ADMIN — VERAPAMIL HYDROCHLORIDE 2.5 MG: 2.5 INJECTION, SOLUTION INTRAVENOUS at 09:03

## 2021-11-30 RX ADMIN — IOHEXOL 75 ML: 350 INJECTION, SOLUTION INTRAVENOUS at 10:48

## 2021-11-30 RX ADMIN — CLOPIDOGREL BISULFATE 600 MG: 300 TABLET, FILM COATED ORAL at 11:07

## 2021-11-30 ASSESSMENT — FIBROSIS 4 INDEX: FIB4 SCORE: 1.93

## 2021-11-30 NOTE — OR NURSING
Arrived from PACU Pt's VSS; denies pain . Dressing CDI to RT Radial site. And CDI  to Rt brachial site. Wife notified that pt ok for DC at 1700.

## 2021-11-30 NOTE — DISCHARGE INSTRUCTIONS
ACTIVITY: Rest and take it easy for the first 24 hours.  A responsible adult is recommended to remain with you during that time.  It is normal to feel sleepy.  We encourage you to not do anything that requires balance, judgment or coordination.    MILD FLU-LIKE SYMPTOMS ARE NORMAL. YOU MAY EXPERIENCE GENERALIZED MUSCLE ACHES, THROAT IRRITATION, HEADACHE AND/OR SOME NAUSEA.    FOR 24 HOURS DO NOT:  Drive, operate machinery or run household appliances.  Drink beer or alcoholic beverages.   Make important decisions or sign legal documents.    SPECIAL INSTRUCTIONS: START PLAVIX TOMORROW ON 12/1.    ABDIRIZAK HOME CARE INSTRUCTIONS    ABDIRIZAK - TRANSESOPHAGEAL ECHOCARDIOGRAM  1. Don't drive or drink alcohol for 24 hours. The medication you received will make you too drowsy.  2. If you begin to vomit bloody material, or develop black or bloody stools, call your doctor as soon as possible.  3. If you have any neck, chest, abdominal pain or temp of 100 degrees, call your doctor.    POST ANGIOGRAM INSTRUCTIONS  General Care Instructions  • Maintain a bandage over the incision site for 24 hours.  It's normal to find a small bruise or dime-sized lump at the insertion site. This should disappear within a few weeks.  • Do not apply lotions or powders to the site.  Do not immerse the catheter insertion site in water (bathtub/swimming) for five days. It is ok to shower 24 hours after the procedure.  • You may resume your normal diet immediately; on the day of your procedure, drink 6-10 glasses of water to help flush the contrast liquid out of your system.  • If the doctor inserted the catheter in your arm:  o For 24 hours, DO NOT lift anything heavier than 10 pounds (approximately a gallon of milk). DO NOT do any heavy pushing, pulling, or twisting.  o DO NOT do yard work, drive, squat, lift heavy objects, or play sports for 2 days; or until your health care provider tells you it is OK.    Medications  • If your current medications need  to be changed, you will be provided with an updated list of your medications prior to discharge.  • If you take warfarin (Coumadin), resume taking your usual dose the evening after the procedure.  • DO NOT STOP taking prescribed blood thinning (anti-platelet) medications unless instructed by your cardiologist.  These medications include:  o Aspirin, Clopidogrel (Plavix), Ticagrelor (Brilinta), or Prasugrel (Effient)   • If you take one of the following anticoagulants, RESUME 24 HOURS after your procedure:  o Apixiban (Eliquis), Rivaroxaban (Xarelto), Dabigatran (Pradaxa), Edoxaban (Savaysa)  • If you take metformin (Glucophage), RESUME 48 HOURS after your procedure.    When to call your healthcare provider  Call your cardiologist right away at [phone] if you have any of the following:  ?  Problems/Concerns taking any of your prescribed heart medicines.  ?  The insertion site has increasing pain, swelling, redness, bleeding, or drainage.  ?  Your arm or leg below where the insertion site changes color, is cool, or is numb.  ?  You have chest pain or shortness of breath that does not go away with rest.  ?  Your pulse feels irregular -- very slow (less than 60 beats/minute) or very fast (over 100 beats/minute).  ?  You have dizziness, fainting, or you are very tired.  ?  You are coughing up blood or yellow or green mucus.  ?  You have chills or a fever over 101°F (38.3°C).   ?  If there is bleeding at the catheter insertion site, apply pressure for 10 minutes.  If bleeding persists, call 911, and continue to hold pressure until advanced medical support arrives.        Exercising Safely After Percutaneous Coronary Intervention (PCI)  After percutaneous coronary intervention (PCI), which involves angioplasty and often stenting, it's important to focus on your heart health. Exercise can help strengthen your heart. It can also help you feel good and improve your overall health. Talk with your health care provider or  cardiac rehab team member about good options for you.  • Start slowly. Work up to more vigorous exercise as you get stronger. Aim for at least 150 minutes of exercise each week.  • Include aerobic activities. These make the heart beat faster. They work the heart and lungs, and improve the body's ability to use oxygen. Good choices include walking, swimming, and biking .  Always follow your doctor's recommendation for exercise.   You have been referred to cardiac rehabilitation, which is important for your recovery.  You may contact [facility] at [phone] to learn more and schedule a visit.        Lifestyle Management After Percutaneous Coronary Intervention (PCI)  Percutaneous coronary intervention (PCI)  involves angioplasty and often stenting. This procedure can open arteries and relieve symptoms. But, it doesn't cure coronary artery disease. New blockages can still form. You need to take steps to prevent this by managing risk factors. Doing so will help make your heart and arteries healthier. Your doctor may prescribe cardiac rehabilitation to help with this lifelong process.  Understanding risk factors  Some risk factors for coronary artery disease can be controlled. These include smoking, high blood pressure, cholesterol, diabetes, and obesity. They can be managed with medication, diet, and exercise. Support and counseling can also play a role. The effort will pay off! Managing risk factors can help you be more active, feel better, and reduce the risk of heart attack.    If you smoke, quit!  If your doctor has been urging you to quit smoking, it's for good reasons. Smoking damages your heart, blood vessels, and lungs. The good news is that quitting can halt or even reverse the damage of smoking. To quit now:  • Get medical help. Ask your doctor for advice on stop-smoking programs. Also ask about medications or nicotine replacement therapy products that may help you quit  smoking.  • Get support. Join a support group. Ask for help from your family and friends.  • Don't give up. It often takes several tries to succeed in quitting smoking.  • Avoid secondhand smoke. Ask family and friends not to smoke around you.    DIET: To avoid nausea, slowly advance diet as tolerated, avoiding spicy or greasy foods for the first day.  Add more substantial food to your diet according to your physician's instructions.  INCREASE FLUIDS AND FIBER TO AVOID CONSTIPATION.    SURGICAL DRESSING/BATHING: Keep dressings (right wrist and right inner elbow) dry for 24 hours. May remove dressings and shower after 3 pm on 12/1, do not need to replace dressing. Do not submerge in water or bath for 7 days.     FOLLOW-UP APPOINTMENT:  A follow-up appointment should be arranged with your doctor in 1 week with Dr. Hsu; call to schedule.    You should CALL YOUR PHYSICIAN if you develop:  Fever greater than 101 degrees F.  Pain not relieved by medication, or persistent nausea or vomiting.  Excessive bleeding (blood soaking through dressing) or unexpected drainage from the wound.  Extreme redness or swelling around the incision site, drainage of pus or foul smelling drainage.  Inability to urinate or empty your bladder within 8 hours.  Problems with breathing or chest pain.    You should call 911 if you develop problems with breathing or chest pain.  If you are unable to contact your doctor or surgical center, you should go to the nearest emergency room or urgent care center.  Physician's telephone #: [phone]    If any questions arise, call your doctor.  If your doctor is not available, please feel free to call the Surgical Center at [phone]. The Contact Center is open Monday through Friday 7AM to 5PM and may speak to a nurse at [phone], or toll free at [phone].     A registered nurse may call you a few days after your surgery to see how you are doing after your procedure.    MEDICATIONS:  Resume taking daily medication.  Take prescribed pain medication with food.  If no medication is prescribed, you may take non-aspirin pain medication if needed.  PAIN MEDICATION CAN BE VERY CONSTIPATING.  Take a stool softener or laxative such as senokot, pericolace, or milk of magnesia if needed.    Prescription given for PLAVIX. You had your first dose today. Take your next dose tomorrow 12/1.    If your physician has prescribed pain medication that includes Acetaminophen (Tylenol), do not take additional Acetaminophen (Tylenol) while taking the prescribed medication.    Depression / Suicide Risk    As you are discharged from this [facility] facility, it is important to learn how to keep safe from harming yourself.    Recognize the warning signs:  · Abrupt changes in personality, positive or negative- including increase in energy   · Giving away possessions  · Change in eating patterns- significant weight changes-  positive or negative  · Change in sleeping patterns- unable to sleep or sleeping all the time   · Unwillingness or inability to communicate  · Depression  · Unusual sadness, discouragement and loneliness  · Talk of wanting to die  · Neglect of personal appearance   · Rebelliousness- reckless behavior  · Withdrawal from people/activities they love  · Confusion- inability to concentrate     If you or a loved one observes any of these behaviors or has concerns about self-harm, here's what you can do:  · Talk about it- your feelings and reasons for harming yourself  · Remove any means that you might use to hurt yourself (examples: pills, rope, extension cords, firearm)  · Get professional help from the community (Mental Health, Substance Abuse, psychological counseling)  · Do not be alone:Call your Safe Contact- someone whom you trust who will be there for you.  · Call your local CRISIS HOTLINE 226-3780 or 664-908-7643  · Call your local Children's Mobile Crisis Response Team [facility] (481)  632-1750 or [email or web address]  · Call the toll free National Suicide Prevention Hotlines   · National Suicide Prevention Lifeline 123-768-HJJX (4830)  · National [employer] Line Network 800-SUICIDE (732-5258)

## 2021-11-30 NOTE — PROCEDURES
"CARDIAC CATHETERIZATION REPORT    Referring Provider: Puma Hsu M.D.    PROCEDURE PHYSICIAN: Puma Hsu MD, [facility], [facility]  ASSISTANT: None    IMPRESSIONS:  1. Class III angina due to obstructive one-vessel coronary artery disease  2.  Successful PCI of the RCA using 2 overlapping drug-eluting stents, IVUS guidance  3.  Normal right and left heart hemodynamics and preserved cardiac output  4.  Moderate aortic stenosis    Recommendations:  Dual antiplatelet therapy for 6 months  Eligible for same day discharge if recovery milestones met at 5 pm    Pre-procedure diagnosis: Dyspnea on exertion, possible anginal equivalent, possible severe aortic stenosis, unsuitable for antianginal therapy given low blood pressure  Post-procedure diagnosis: Severe one-vessel coronary artery disease, the symptoms are due to angina,    Procedure performed  Selective coronary angiography  Left heart catheterization  Right heart catheterization  Percutaneous coronary intervention - Stent Placement (RCA)  Intravascular Ultrasound (RCA)    Conscious sedation was supervised by myself and administered by trained personnel using fentanyl and versed between 0859 and 1052. The patient tolerated sedation without complication.     Procedure Description  1. Access: 6/7 Maur[place]ian right radial artery Micropuncture technique was utilized following local anesthesia with lidocaine.  A radial cocktail containing verapamil and saline was administered in the radial artery sheath Brachial venous access was obtained using micropuncture technique and dynamic ultrasound guidance A 5/6 slender sheath was placed in the brachial vein    2. Diagnostic description: The catheter was passed to the central circulation with the aide of J tipped 0.35\" wire. A 5F TIG 4.0 was used to inject the coronary circulation and A balloon tipped catheter was passed intravenously through the right heart chambers into wedge position, obtaining hemodynamic information.  Cardiac " "output was obtained with thermodilution technique.  Simultaneous LV and aortic pressure tracings were obtained with a 4 [place]se pigtail inside of a 7 [place]orese JR4 guide. Transducers were reversed to confirm fidelity of tracings as was pullback.     3. Description of Intervention: After confirming therapeutic anticoagulation intervention proceeded. A 6F Ikari Right guiding catheter was used. The lesion in the RCA was crossed with a 0.014\" Run [place] wire.  The lesion was difficult to cross with conventional balloons but I was able to serially dilate, initially with a 1.2 balloon and subsequently a 2.0, 2.5 and 3.0 balloons.  Utilizing a guide extension catheter was able to deliver a 3.0 x 38 [place] drug-eluting stent in the distal vessel which was deployed at 15 yanni.  More proximally and in overlapping fashion I deployed a 3.5 x 38 Giovanni drug-eluting stent.  IVUS was performed which demonstrated appropriate proximal and distal stent sizing and a few areas of persistent eccentricity within the stent.  I returned with a 3.0 x 20 NC balloon and dilated the distal portion at 20 yanni and more proximal a 3.5 x 27 NC balloon was used to dilate at 20 yanni.  Subsequent angiogram showed excellent result.    4. Closing: At completion of the procedure the relevant equipment was removed from the body and hemostasis achieved by Radial band and Sheath secured in place    Findings   Hemodynamics:   Aorta: 95/60/75 mmHg  LV: 135/18 mmHg  RA: 11 mmHg  RV: 33/15 mmHg  PA: 31/18/22 mmHg  PCWP: 17 mmHg  Cardiac Output/Index (thermodilution): 5.0 L/min, 2.4 L/min/m2  GUY: 1.4 mm2  Aortic valve mean gradient: 20 mmHg    Coronary Anatomy   Left Main: Short and normal   LAD: 20% stenosis in the midportion of the vessel.  The first diagonal is small and normal, the second diagonal is moderate in size and normal.   LCx: Large vessel with 40% mid stenosis.  The first and second obtuse marginal vessels have minimal luminal irregularities, the " left-sided posterior lateral extension is small and normal   RCA: Dominant, 80Diffuse, calcific luminal irregularities in the mid and distal vessel with resultant 90% stenosis.  The PDA is normal    Results of intervention to the RCA:  Pre: 90% stenosis and MACY III flow  Post: 0% residual stenosis and MACY III flow. No dissection or distal embolization.    Technical Factors  1. Complications: None  2. Estimated Blood Loss: <50 cc  3. Specimens: None  4. Contrast Volume: 75 ml  5. Medications: Radial cocktail (Verapamil 2.5 mg, Nitroglycerin 100 mcg) Heparin to maintain ACT >250 Clopidogrel 600 mg Intracoronary nitroglycerin  6. Radiation (Air Kerma): 707 mGy

## 2021-11-30 NOTE — PROGRESS NOTES
Patient seen after cath. Laying in PACU bed without complaints. No bleeding at right radial site, right brachial venous access sheath still in place for IV access.   He understands the recent procedure, arm restrictions and follow up as well as medication changes. His wife will  Plavix today before she comes to pick him up. Next dose 12/1.     Plan to discharge at 5pm today    Will arrange for 4-6 week follow up with Dr. Hsu or care team.    Future Appointments   Date Time Provider Department Center   12/28/2021 11:30 AM SIMON Pinto   1/6/2022 11:15 AM KAYE Tsai CB None   2/11/2022 11:20 AM Puma Hsu M.D. CB None   3/23/2022 11:30 AM LAYO Chase   5/3/2022 11:00 AM Rylee García D.O. [facility] [place]

## 2021-11-30 NOTE — OR NURSING
1115 Pt arrived to PACU with cath lab RN. AAOx4. VSS. Denies pain and nausea. Right TR Band is CDI and soft. CMS intact. Pt denies worsened numbness/tingling (current hx of N/T). Belongings returned to pt bedside. Right brachial sheath in place, CDI, for IV access.    1130 POC update given to Linda, wife, over the phone. All questions answered.     1230 2cc air removed from TR band. CDI with no bleeding.   1245 2cc air removed from TR band. CDI with no bleeding.   1300 2cc air removed from TR band. Slight bleeding. 3cc air reinstilled.   1400 3cc air removed from TR band. CDI with no bleeding.   1430 3cc air removed from TR band. CDI with no bleeding.   1445 3cc air removed from TR band. CDI with no bleeding. TR band removed and replaced with tegaderm, gauze and coban wrap. CMS intact. Pt denies numbness/tinging.     1508 Pt meets phase II criteria. Report called to PJ Ramos.     1512 Pt transported to pre op 23 with RN. Chart with patient.

## 2021-11-30 NOTE — TELEPHONE ENCOUNTER
----- Message from Sangita Gaytan P.A.-C. sent at 11/30/2021 12:50 PM PST -----  Please schedule with Dr. Hsu's APRN for 4-6 weeks. Dx: coronary artery disease follow up.   Thank you!

## 2021-12-01 NOTE — OR NURSING
D/c orders received. IV dc'd. Pt changed into clothing with assistance.  Discharge instructions given; pt and family verbalized understanding and questions answered. Patient states ready to d/c home.

## 2021-12-06 ENCOUNTER — OFFICE VISIT (OUTPATIENT)
Dept: CARDIOLOGY | Facility: MEDICAL CENTER | Age: 76
End: 2021-12-06
Payer: MEDICARE

## 2021-12-06 VITALS
DIASTOLIC BLOOD PRESSURE: 60 MMHG | HEIGHT: 67 IN | HEART RATE: 60 BPM | RESPIRATION RATE: 16 BRPM | BODY MASS INDEX: 35.31 KG/M2 | SYSTOLIC BLOOD PRESSURE: 120 MMHG | OXYGEN SATURATION: 97 % | WEIGHT: 225 LBS

## 2021-12-06 DIAGNOSIS — Z95.5 STENTED CORONARY ARTERY: ICD-10-CM

## 2021-12-06 DIAGNOSIS — I45.10 RIGHT BUNDLE BRANCH BLOCK: ICD-10-CM

## 2021-12-06 DIAGNOSIS — I49.5 SICK SINUS SYNDROME (HCC): ICD-10-CM

## 2021-12-06 DIAGNOSIS — I25.84 CORONARY ARTERY DISEASE DUE TO CALCIFIED CORONARY LESION: ICD-10-CM

## 2021-12-06 DIAGNOSIS — I25.10 CORONARY ARTERY DISEASE DUE TO CALCIFIED CORONARY LESION: ICD-10-CM

## 2021-12-06 DIAGNOSIS — Z95.0 PACEMAKER: ICD-10-CM

## 2021-12-06 DIAGNOSIS — I35.0 MILD AORTIC STENOSIS: ICD-10-CM

## 2021-12-06 PROCEDURE — 99214 OFFICE O/P EST MOD 30 MIN: CPT | Performed by: INTERNAL MEDICINE

## 2021-12-06 RX ORDER — ROSUVASTATIN CALCIUM 40 MG/1
40 TABLET, COATED ORAL DAILY
Qty: 90 TABLET | Refills: 3 | Status: SHIPPED | OUTPATIENT
Start: 2021-12-06 | End: 2022-11-04 | Stop reason: SDUPTHER

## 2021-12-06 ASSESSMENT — ENCOUNTER SYMPTOMS
ORTHOPNEA: 0
VOMITING: 0
BLURRED VISION: 0
CLAUDICATION: 0
NAUSEA: 0
SENSORY CHANGE: 0
EYE DISCHARGE: 0
PALPITATIONS: 0
SHORTNESS OF BREATH: 0
MYALGIAS: 0
FEVER: 0
HALLUCINATIONS: 0
COUGH: 0
DEPRESSION: 0
DIZZINESS: 0
SPEECH CHANGE: 0
ABDOMINAL PAIN: 0
HEADACHES: 0
EYE PAIN: 0
BLOOD IN STOOL: 0
PND: 0
FALLS: 0
DOUBLE VISION: 0
LOSS OF CONSCIOUSNESS: 0
WEIGHT LOSS: 0
CHILLS: 0
BRUISES/BLEEDS EASILY: 0

## 2021-12-06 ASSESSMENT — FIBROSIS 4 INDEX: FIB4 SCORE: 1.93

## 2021-12-06 NOTE — PROGRESS NOTES
Chief Complaint   Patient presents with   • Coronary Artery Disease     F/V Dx: Coronary artery disease due to calcified coronary lesion: 40-50% disease in the circumflex at cardiac catheterization in 2013       Subjective     Dakota Boyer is a 76 y.o. male who presents today for cardiac care and management in cardiology office after recent coronary PCI of the RCA.  His primary cardiologist performed the coronary angiogram and intervention based on exertional dyspnea report from patient's.  Patient also has aortic stenosis with mean gradient within the mild range thus far.  He also has diabetes and hypertension.     Patient is feeling better these days. Does get winded upon walking up inclines or for distance. No symptoms at rest or with daily living activities.    I have independently interpreted and reviewed echocardiogram's actual images with patient which showed normal left ventricular systolic function. No wall motion abnormality. No evidence of pulmonary hypertension. Mild AS with mean gradient of 16 mm Hg.    Past Medical History:   Diagnosis Date   • Anxiety    • Back pain, chronic 10/18/2016   • Bilateral primary osteoarthritis of knee 1/23/2018   • Blood clotting disorder (HCC) 1978    s/p Left knee surgery- DVT in left leg   • Breath shortness    • CAD (coronary artery disease) October 2012    Positive coronary calcium score.  Follow-up MPI was negative for ischemia.   • Cancer (HCC) ? early 90's    Melanoma Left arm- surgically removed   • Degeneration of lumbar or lumbosacral intervertebral disc     • Dental disorder    • Depression    • Diabetes     Oral agents and insulin   • DJD (degenerative joint disease) of cervical spine     • Episodic lightheadedness     • High cholesterol    • Hyperlipidemia    • Hypersensitive carotid sinus syndrome     • Hypertension    • Incisional infection April 2013    Wound dehiscience of neck surgery.   • Insomnia     • Myocardial infarct (HCC) ?    states was  told by  that he has had a heart attack in the past.   • Neck pain 10/18/2016   • Pacemaker October 2012    [facility] [address] #[id number] implanted by [facility].    • RBBB     • S/P lumbar discectomy     • Sick sinus syndrome (HCC)     • Sleep apnea     cpap   • Syncope October 2012    Treated with PPM   • Thyroid disease     Hypothyroid   • Unspecified hemorrhagic conditions     Reports bleeds easily     Past Surgical History:   Procedure Laterality Date   • NE DSTR NROLYTC AGNT PARVERTEB FCT SNGL CRVCL/THORA Right 10/19/2016    Procedure: NEURO DEST FACET C/T W/IG SNGL - 3ON-C3, C8-T1    SINERGY;  Surgeon: Gaurang Pruett M.D.;  Location: SURGERY SURGICAL [facility] ORS;  Service: Pain Management   • NE DSTR NROLYTC AGNT PARVERTEB FCT ADDL CRVCL/THORA  10/19/2016    Procedure: NEURO DEST FACET C/T W/IG ADDL;  Surgeon: Gaurang Pruett M.D.;  Location: SURGERY SURGICAL [facility] ORS;  Service: Pain Management   • NE FLUOROSCOPIC GUIDANCE NEEDLE PLACEMENT  10/19/2016    Procedure: FLOURO GUIDE NEEDLE PLACEMENT;  Surgeon: Gaurang Pruett M.D.;  Location: SURGERY [facility];  Service: Pain Management   • SHOULDER ARTHROSCOPY Right 2015    torn bicep tendon and spurs   • RECOVERY  10/17/2013    Performed by Cath-Recovery Surgery at SURGERY SAME DAY [facility] ORS   • WOUND DEHISCENCE  4/10/2013    Performed by Tu Jain M.D. at SURGERY [facility]   • CERVICAL FUSION POSTERIOR  2/27/2013    Performed by Tu Jain M.D. at SURGERY [facility] ORS   • CERVICAL LAMINECTOMY POSTERIOR  2/27/2013    Performed by Tu Jain M.D. at SURGERY [facility] ORS   • LUMBAR LAMINECTOMY DISKECTOMY  11/20/2012    Performed by Tu Jain M.D. at SURGERY [facility]   • RECOVERY  10/4/2012    Performed by Cath-Recovery Surgery at SURGERY SAME DAY [facility]   • PACEMAKER INSERTION  October 2012    [facility] [id number] implanted by Dr. Waite.   • SHOULDER DECOMPRESSION Left 2008    Left rotator cuff   • ARTHROPLASTY  "Left    • ARTHROSCOPY, KNEE Bilateral    • ORTHOPEDIC OSTEOTOMY      Both knees several times, 8 total   • OTHER     • SPINAL CORD STIMULATOR  1 month ago   • TONSILLECTOMY     • VASECTOMY       Family History   Problem Relation Age of Onset   • Lung Disease Mother         Smoker ( of lung dz)   • Alcohol/Drug Mother    • Heart Disease Father 36        CAD   • Heart Disease Sister         \"hole in heart\"   • Lung Disease Brother         COPD, CANCER   • Cancer Brother         Prostate, Lung ( of lung CA)   • Alcohol/Drug Brother    • Heart Disease Brother    • Diabetes Brother    • Hypertension Brother    • Hyperlipidemia Brother    • Heart Disease Maternal Grandmother    • Hypertension Maternal Grandmother    • Hyperlipidemia Maternal Grandmother    • Cancer Brother          of esophagus/stomach, thyroid   • Heart Disease Brother 60        MI, stent, PM/defib   • Sleep Apnea Brother    • No Known Problems Maternal Grandfather    • No Known Problems Paternal Grandmother      Social History     Socioeconomic History   • Marital status:      Spouse name: Not on file   • Number of children: Not on file   • Years of education: Not on file   • Highest education level: Not on file   Occupational History   • Not on file   Tobacco Use   • Smoking status: Former Smoker     Packs/day: 1.00     Years: 40.00     Pack years: 40.00     Types: Cigarettes     Quit date: 1/3/1990     Years since quittin.9   • Smokeless tobacco: Never Used   Vaping Use   • Vaping Use: Never used   Substance and Sexual Activity   • Alcohol use: No     Alcohol/week: 0.0 oz   • Drug use: No   • Sexual activity: Yes     Partners: Female   Other Topics Concern   • Not on file   Social History Narrative    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      Social Determinants of Health     Financial Resource Strain:    • Difficulty of Paying Living Expenses: Not on file   Food Insecurity:    • Worried About Running Out of Food in " the Last Year: Not on file   • Ran Out of Food in the Last Year: Not on file   Transportation Needs:    • Lack of Transportation (Medical): Not on file   • Lack of Transportation (Non-Medical): Not on file   Physical Activity:    • Days of Exercise per Week: Not on file   • Minutes of Exercise per Session: Not on file   Stress:    • Feeling of Stress : Not on file   Social Connections:    • Frequency of Communication with Friends and Family: Not on file   • Frequency of Social Gatherings with Friends and Family: Not on file   • Attends Bahai Services: Not on file   • Active Member of Clubs or Organizations: Not on file   • Attends Club or Organization Meetings: Not on file   • Marital Status: Not on file   Intimate Partner Violence:    • Fear of Current or Ex-Partner: Not on file   • Emotionally Abused: Not on file   • Physically Abused: Not on file   • Sexually Abused: Not on file   Housing Stability:    • Unable to Pay for Housing in the Last Year: Not on file   • Number of Places Lived in the Last Year: Not on file   • Unstable Housing in the Last Year: Not on file     Allergies   Allergen Reactions   • Aleve Cold & [Pseudoephedrine-Naproxen Na] Anaphylaxis   • Ceftriaxone Sodium Anaphylaxis     Reaction; 1970's.   • Naproxen Anaphylaxis     Reaction; 2004.   • Latex Rash     Contact site   • Tape Rash and Itching     Plastic tape (paper tape ok)     Outpatient Encounter Medications as of 12/6/2021   Medication Sig Dispense Refill   • clopidogrel (PLAVIX) 75 MG Tab Take 1 Tablet by mouth every day. 90 Tablet 3   • atorvastatin (LIPITOR) 20 MG Tab TAKE 1 TABLET DAILY (Patient taking differently: Take 20 mg by mouth every evening.) 90 Tablet 2   • insulin glargine (LANTUS SOLOSTAR) 100 UNIT/ML Solution Pen-injector injection Inject 36 Units under the skin every evening. 5 pens per month (Patient taking differently: Inject 34 Units under the skin every evening. 5 pens per month  Pt had 17 units night prior to  surgery) 45 mL 3   • Dulaglutide (TRULICITY) 3 MG/0.5ML Solution Pen-injector Inject 3 mg under the skin every 7 days. 2.0 ml equals 4 pens per month 6 mL 3   • levothyroxine (SYNTHROID) 25 MCG Tab Take 1 Tablet by mouth every morning on an empty stomach. 90 Tablet 3   • SYNJARDY XR 12.5-1000 MG TABLET SR 24 HR TAKE 2 TABLETS DAILY (Patient taking differently: Take 2 Tablets by mouth every evening.) 180 Tablet 3   • glucose blood (FREESTYLE LITE) strip 1 Strip by Other route 2 times a day. 150 Strip 3   • valsartan (DIOVAN) 160 MG Tab TAKE 1 TABLET DAILY (Patient taking differently: Take 160 mg by mouth every day.) 90 tablet 3   • hepatitis B vaccine 20 mcg/mL (ENGERIX-B) 20 MCG/ML Suspension Inject 1 mL into the shoulder, thigh, or buttocks Once PRN for up to 1 dose. Obtain last dose 6-12 months after 2nd dose. 1 mL 1   • Lancets MISC Lancets order: Lancets for Abbott Freestyle Lite meter. Sig: use 4 times daily  and prn ssx high or low sugar. #100 RF x 0 360 Each 4   • Blood Glucose Monitoring Suppl [place] Meter: Dispense Abbott Freestyle Lite meter. Sig. Use as directed for blood sugar monitoring. #1. NR. 1 Device 0   • B-D ULTRAFINE III SHORT PEN 31G X 8 MM MISC USE 1 NEEDLE EVERY DAY WITH LANTUS 999 Each 2   • aspirin EC (ECOTRIN) 81 MG TBEC Take 81 mg by mouth every evening.     • clopidogrel (PLAVIX) 75 MG Tab Take 1 Tablet by mouth every day. (Patient not taking: Reported on 12/6/2021) 30 Tablet 0     No facility-administered encounter medications on file as of 12/6/2021.     Review of Systems   Constitutional: Negative for chills, fever, malaise/fatigue and weight loss.   HENT: Negative for ear discharge, ear pain, hearing loss and nosebleeds.    Eyes: Negative for blurred vision, double vision, pain and discharge.   Respiratory: Negative for cough and shortness of breath.    Cardiovascular: Negative for chest pain, palpitations, orthopnea, claudication, leg swelling and PND.   Gastrointestinal: Negative for  "abdominal pain, blood in stool, melena, nausea and vomiting.   Genitourinary: Negative for dysuria and hematuria.   Musculoskeletal: Negative for falls, joint pain and myalgias.   Skin: Negative for itching and rash.   Neurological: Negative for dizziness, sensory change, speech change, loss of consciousness and headaches.   Endo/Heme/Allergies: Negative for environmental allergies. Does not bruise/bleed easily.   Psychiatric/Behavioral: Negative for depression, hallucinations and suicidal ideas.              Objective     /60 (BP Location: Left arm, Patient Position: Sitting, BP Cuff Size: Adult)   Pulse 60   Resp 16   Ht 1.702 m (5' 7\")   Wt 102 kg (225 lb)   SpO2 97%   BMI 35.24 kg/m²     Physical Exam  Vitals and nursing note reviewed.   Constitutional:       General: He is not in acute distress.     Appearance: He is not diaphoretic.   HENT:      Head: Normocephalic and atraumatic.      Right Ear: External ear normal.      Left Ear: External ear normal.      Nose: No congestion or rhinorrhea.   Eyes:      General:         Right eye: No discharge.         Left eye: No discharge.   Neck:      Thyroid: No thyromegaly.      Vascular: No JVD.   Cardiovascular:      Rate and Rhythm: Normal rate and regular rhythm.      Pulses: Normal pulses.   Pulmonary:      Effort: No respiratory distress.   Abdominal:      General: There is no distension.      Tenderness: There is no abdominal tenderness.   Musculoskeletal:         General: No swelling or tenderness.      Right lower leg: No edema.      Left lower leg: No edema.   Skin:     General: Skin is warm and dry.   Neurological:      Mental Status: He is alert and oriented to person, place, and time.      Cranial Nerves: No cranial nerve deficit.   Psychiatric:         Behavior: Behavior normal.                Assessment & Plan     1. RBBB     2. Sick sinus syndrome (HCC)     3. Pacemaker     4. Coronary artery disease due to calcified coronary lesion: 40-50% " disease in the circumflex at cardiac catheterization in 2013     5. Mild aortic stenosis         Medical Decision Making: Today's Assessment/Status/Plan:   Coronary arterial disease s/p RCA stents in 11/2021:  Betablocker as Metoprolol 25 mg po 2x daily will be started.  ASA 81 mg po daily and Clopidogrel 75mg 1x daily  Statin as Rosvastatin 40 mg po daily will be replaced for Atorvastatin.  ARB as valsartan 160 mg po daily.     Hypertension:  Optimize control with BB, ACE-I or ARB as permitted above in conjunction with CAD treatment.     Hyperlipidemia:  Optimize statin as within guidelines of CAD treatment as above.    Mild AS with mean gradient of 16 mm Hg on TTE 08/2021:  Followed with his primary cardiologist.    Overall, based on prior history of obstructive coronary arterial disease, patient is at at high risk for recurrent events and will require consistent ongoing guidelines directed medical therapy to reduce his cardiovascular mortality and associated complications.    I will see patient back in clinic with lab tests and studies results in 3 months.    I thank you for referring patient to our Cardiology Clinic today.      Hali Johnson MD.   [facility].  [phone]  [place], [place].

## 2021-12-06 NOTE — PATIENT INSTRUCTIONS
Will stop Atorvastatin.    Will start Rosuvastatin 40 mg daily.    Will add Metoprolol 25 mg twice a day.

## 2021-12-28 ENCOUNTER — NON-PROVIDER VISIT (OUTPATIENT)
Dept: ENDOCRINOLOGY | Facility: MEDICAL CENTER | Age: 76
End: 2021-12-28
Attending: INTERNAL MEDICINE
Payer: MEDICARE

## 2021-12-28 VITALS — BODY MASS INDEX: 33.18 KG/M2 | OXYGEN SATURATION: 97 % | HEIGHT: 69 IN | WEIGHT: 224 LBS

## 2021-12-28 DIAGNOSIS — E11.59 CONTROLLED TYPE 2 DIABETES MELLITUS WITH OTHER CIRCULATORY COMPLICATION, WITH LONG-TERM CURRENT USE OF INSULIN (HCC): ICD-10-CM

## 2021-12-28 DIAGNOSIS — Z79.4 CONTROLLED TYPE 2 DIABETES MELLITUS WITH OTHER CIRCULATORY COMPLICATION, WITH LONG-TERM CURRENT USE OF INSULIN (HCC): ICD-10-CM

## 2021-12-28 LAB
HBA1C MFR BLD: 6 % (ref 0–5.6)
INT CON NEG: ABNORMAL
INT CON POS: ABNORMAL

## 2021-12-28 PROCEDURE — 99212 OFFICE O/P EST SF 10 MIN: CPT | Performed by: INTERNAL MEDICINE

## 2021-12-28 PROCEDURE — 83036 HEMOGLOBIN GLYCOSYLATED A1C: CPT

## 2021-12-28 ASSESSMENT — FIBROSIS 4 INDEX: FIB4 SCORE: 1.93

## 2021-12-28 NOTE — PROGRESS NOTES
"RN-CDE Note    Subjective:   Endocrinology Clinic Progress Note  PCP: Rylee García D.O.    HPI:  Dakota Boyer is a 76 y.o. old patient who is seen today for review of Type 2 Diabetes and Hypothyroidism.  Recent changes in health: He had 2 cardiac stents put in on 12/6/21 and is feeling much better.  DM:   Last A1c:   Lab Results   Component Value Date/Time    HBA1C 6.0 (A) 12/28/2021 11:35 AM      Previous A1c was 6.9 on 9/21/21  A1C GOAL: < 7    Diabetes Medications:   Lantus 34 units daily  Trulicity 3 mg weekly  Synjardy XR 12.5-1000 mg 2 daily      Exercise: shoveling snow and walking around the house  Diet: \"healthy\" diet  in general    Patient's body mass index is 33.08 kg/m². Exercise and nutrition counseling were performed at this visit.    Glucose monitoring frequency: Testing fasting  averaging in the 120's  Hypoglycemic episodes: yes -   Last Retinal Exam: on file and up-to-date  Daily Foot Exam: Yes   Foot Exam:  Monofilament: done  Monofilament testing with a 10 gram force: sensation intact: intact bilaterally  Visual Inspection: Feet without maceration, ulcers, fissures.  Pedal pulses: intact bilaterally   Lab Results   Component Value Date/Time    MALBCRT see below 07/30/2021 08:02 AM    MICROALBUR <1.2 07/30/2021 08:02 AM     He  reports that he quit smoking about 32 years ago. His smoking use included cigarettes. He has a 40.00 pack-year smoking history. He has never used smokeless tobacco.      Plan:     Discussed and educated on:   - All medications, side effects and compliance (discussed carefully)  - Annual eye examinations at Ophthalmology  - Home glucose monitoring emphasized  - Weight control and daily exercise    Recommended medication changes: No changes at this time.  He will follow up with Dr. Ring in March.   "

## 2022-01-05 DIAGNOSIS — Z79.4 CONTROLLED TYPE 2 DIABETES MELLITUS WITH OTHER CIRCULATORY COMPLICATION, WITH LONG-TERM CURRENT USE OF INSULIN (HCC): ICD-10-CM

## 2022-01-05 DIAGNOSIS — E11.59 CONTROLLED TYPE 2 DIABETES MELLITUS WITH OTHER CIRCULATORY COMPLICATION, WITH LONG-TERM CURRENT USE OF INSULIN (HCC): ICD-10-CM

## 2022-01-19 ENCOUNTER — NON-PROVIDER VISIT (OUTPATIENT)
Dept: CARDIOLOGY | Facility: MEDICAL CENTER | Age: 77
End: 2022-01-19
Payer: MEDICARE

## 2022-01-19 DIAGNOSIS — Z95.0 PACEMAKER: ICD-10-CM

## 2022-01-19 DIAGNOSIS — I49.5 SICK SINUS SYNDROME (HCC): ICD-10-CM

## 2022-02-02 ENCOUNTER — HOSPITAL ENCOUNTER (OUTPATIENT)
Dept: LAB | Facility: MEDICAL CENTER | Age: 77
End: 2022-02-02
Attending: INTERNAL MEDICINE
Payer: MEDICARE

## 2022-02-02 DIAGNOSIS — Z79.4 CONTROLLED TYPE 2 DIABETES MELLITUS WITH OTHER CIRCULATORY COMPLICATION, WITH LONG-TERM CURRENT USE OF INSULIN (HCC): ICD-10-CM

## 2022-02-02 DIAGNOSIS — E03.8 SUBCLINICAL HYPOTHYROIDISM: ICD-10-CM

## 2022-02-02 DIAGNOSIS — Z79.4 LONG-TERM INSULIN USE (HCC): ICD-10-CM

## 2022-02-02 DIAGNOSIS — E83.52 HYPERCALCEMIA: ICD-10-CM

## 2022-02-02 DIAGNOSIS — E55.9 VITAMIN D DEFICIENCY: ICD-10-CM

## 2022-02-02 DIAGNOSIS — E78.5 DYSLIPIDEMIA: ICD-10-CM

## 2022-02-02 DIAGNOSIS — E11.59 CONTROLLED TYPE 2 DIABETES MELLITUS WITH OTHER CIRCULATORY COMPLICATION, WITH LONG-TERM CURRENT USE OF INSULIN (HCC): ICD-10-CM

## 2022-02-02 LAB
25(OH)D3 SERPL-MCNC: 39 NG/ML (ref 30–100)
ALBUMIN SERPL BCP-MCNC: 4.3 G/DL (ref 3.2–4.9)
ALBUMIN/GLOB SERPL: 1.8 G/DL
ALP SERPL-CCNC: 84 U/L (ref 30–99)
ALT SERPL-CCNC: 20 U/L (ref 2–50)
ANION GAP SERPL CALC-SCNC: 12 MMOL/L (ref 7–16)
AST SERPL-CCNC: 19 U/L (ref 12–45)
BILIRUB SERPL-MCNC: 1.3 MG/DL (ref 0.1–1.5)
BUN SERPL-MCNC: 16 MG/DL (ref 8–22)
CALCIUM SERPL-MCNC: 10.1 MG/DL (ref 8.5–10.5)
CHLORIDE SERPL-SCNC: 103 MMOL/L (ref 96–112)
CHOLEST SERPL-MCNC: 76 MG/DL (ref 100–199)
CO2 SERPL-SCNC: 24 MMOL/L (ref 20–33)
CREAT SERPL-MCNC: 0.98 MG/DL (ref 0.5–1.4)
FASTING STATUS PATIENT QL REPORTED: NORMAL
GLOBULIN SER CALC-MCNC: 2.4 G/DL (ref 1.9–3.5)
GLUCOSE SERPL-MCNC: 140 MG/DL (ref 65–99)
HDLC SERPL-MCNC: 35 MG/DL
LDLC SERPL CALC-MCNC: 26 MG/DL
POTASSIUM SERPL-SCNC: 4 MMOL/L (ref 3.6–5.5)
PROT SERPL-MCNC: 6.7 G/DL (ref 6–8.2)
SODIUM SERPL-SCNC: 139 MMOL/L (ref 135–145)
T4 FREE SERPL-MCNC: 1.27 NG/DL (ref 0.93–1.7)
TRIGL SERPL-MCNC: 77 MG/DL (ref 0–149)
TSH SERPL DL<=0.005 MIU/L-ACNC: 3.46 UIU/ML (ref 0.38–5.33)

## 2022-02-02 PROCEDURE — 80061 LIPID PANEL: CPT

## 2022-02-02 PROCEDURE — 80053 COMPREHEN METABOLIC PANEL: CPT

## 2022-02-02 PROCEDURE — 84439 ASSAY OF FREE THYROXINE: CPT

## 2022-02-02 PROCEDURE — 84443 ASSAY THYROID STIM HORMONE: CPT

## 2022-02-02 PROCEDURE — 36415 COLL VENOUS BLD VENIPUNCTURE: CPT

## 2022-02-02 PROCEDURE — 82306 VITAMIN D 25 HYDROXY: CPT

## 2022-02-03 PROCEDURE — 93280 PM DEVICE PROGR EVAL DUAL: CPT | Performed by: INTERNAL MEDICINE

## 2022-02-24 ENCOUNTER — OFFICE VISIT (OUTPATIENT)
Dept: MEDICAL GROUP | Facility: PHYSICIAN GROUP | Age: 77
End: 2022-02-24
Payer: MEDICARE

## 2022-02-24 VITALS
HEIGHT: 67 IN | BODY MASS INDEX: 33.27 KG/M2 | OXYGEN SATURATION: 93 % | HEART RATE: 82 BPM | TEMPERATURE: 97.9 F | WEIGHT: 212 LBS | DIASTOLIC BLOOD PRESSURE: 44 MMHG | SYSTOLIC BLOOD PRESSURE: 108 MMHG

## 2022-02-24 DIAGNOSIS — K62.89 PERIRECTAL SKIN IRRITATION: ICD-10-CM

## 2022-02-24 PROCEDURE — 99214 OFFICE O/P EST MOD 30 MIN: CPT | Performed by: INTERNAL MEDICINE

## 2022-02-24 RX ORDER — AMOXICILLIN 500 MG/1
CAPSULE ORAL
Status: ON HOLD | COMMUNITY
Start: 2022-02-04 | End: 2022-03-05

## 2022-02-24 ASSESSMENT — FIBROSIS 4 INDEX: FIB4 SCORE: 1.96

## 2022-02-24 ASSESSMENT — PATIENT HEALTH QUESTIONNAIRE - PHQ9: CLINICAL INTERPRETATION OF PHQ2 SCORE: 0

## 2022-02-25 NOTE — PROGRESS NOTES
Established Patient    Chief Complaint   Patient presents with   • Pain     Lower back, tail bone area.       Subjective:     HPI:   Dakota presents today with the following.    Patient Active Problem List    Diagnosis Date Noted   • Former smoker 11/02/2021   • History of bilateral knee replacement 11/02/2021   • Long-term insulin use ([facility]) 07/01/2020   • Mild aortic stenosis 06/03/2020   • Benign prostatic hyperplasia with urinary obstruction 12/22/2019   • Bilateral lower extremity edema 12/04/2018   • Prostate cancer ([facility]) 08/09/2018   • Elevated PSA 01/23/2018   • Cervical spondylosis 10/19/2016   • Diabetes mellitus type II, controlled ([facility]) 06/02/2016   • Hypertensive retinopathy of both eyes 06/02/2016   • Depression with anxiety 03/28/2016   • Obstructive sleep apnea on CPAP 03/28/2016   • Subclinical hypothyroidism 08/21/2014   • Class 2 severe obesity with serious comorbidity and body mass index (BMI) of 35.0 to 35.9 in adult ([facility]) 08/21/2014   • Coronary artery disease due to calcified coronary lesion: 40-50% disease in the circumflex at cardiac catheterization in 2013 08/16/2013   • DJD (degenerative joint disease) of cervical spine 02/27/2013   • Degeneration of lumbar or lumbosacral intervertebral disc 11/20/2012   • Pacemaker 10/04/2012   • Hypersensitive carotid sinus syndrome 10/02/2012   • Dyslipidemia 09/28/2012   • RBBB 09/28/2012   • Back pain, chronic 04/10/2012   • Hypertension associated with type 2 diabetes mellitus ([facility]) 04/10/2012   • Neck pain 04/10/2012       Current Outpatient Medications on File Prior to Visit   Medication Sig Dispense Refill   • amoxicillin (AMOXIL) 500 MG Cap      • rosuvastatin (CRESTOR) 40 MG tablet Take 1 Tablet by mouth every day. 90 Tablet 3   • metoprolol tartrate (LOPRESSOR) 25 MG Tab Take 1 Tablet by mouth 2 times a day. 180 Tablet 3   • clopidogrel (PLAVIX) 75 MG Tab Take 1 Tablet by mouth every day. 90 Tablet 3   • insulin glargine (LANTUS SOLOSTAR) 100  "UNIT/ML Solution Pen-injector injection Inject 36 Units under the skin every evening. 5 pens per month (Patient taking differently: Inject 34 Units under the skin every evening. 5 pens per month) 45 mL 3   • Dulaglutide (TRULICITY) 3 MG/0.5ML Solution Pen-injector Inject 3 mg under the skin every 7 days. 2.0 ml equals 4 pens per month 6 mL 3   • levothyroxine (SYNTHROID) 25 MCG Tab Take 1 Tablet by mouth every morning on an empty stomach. 90 Tablet 3   • SYNJARDY XR 12.5-1000 MG TABLET SR 24 HR TAKE 2 TABLETS DAILY (Patient taking differently: Take 2 Tablets by mouth every evening.) 180 Tablet 3   • glucose blood (FREESTYLE LITE) strip 1 Strip by Other route 2 times a day. 150 Strip 3   • valsartan (DIOVAN) 160 MG Tab TAKE 1 TABLET DAILY (Patient taking differently: Take 160 mg by mouth every day.) 90 tablet 3   • Lancets MISC Lancets order: Lancets for Abbott Freestyle Lite meter. Sig: use 4 times daily  and prn ssx high or low sugar. #100 RF x 0 360 Each 4   • Blood Glucose Monitoring Suppl [place] Meter: Dispense Abbott Freestyle Lite meter. Sig. Use as directed for blood sugar monitoring. #1. NR. 1 Device 0   • B-D ULTRAFINE III SHORT PEN 31G X 8 MM MISC USE 1 NEEDLE EVERY DAY WITH LANTUS 999 Each 2   • aspirin EC (ECOTRIN) 81 MG TBEC Take 81 mg by mouth every evening.     • hepatitis B vaccine 20 mcg/mL (ENGERIX-B) 20 MCG/ML Suspension Inject 1 mL into the shoulder, thigh, or buttocks Once PRN for up to 1 dose. Obtain last dose 6-12 months after 2nd dose. 1 mL 1     No current facility-administered medications on file prior to visit.       Allergies, past medical history, past surgical history, family history, social history reviewed and updated    ROS:  All other systems reviewed and are negative except as stated in the HPI       Physical Exam:     /44 (BP Location: Left arm, Patient Position: Sitting, BP Cuff Size: Adult)   Pulse 82   Temp 36.6 °C (97.9 °F) (Temporal)   Ht 1.702 m (5' 7\")   Wt 96.2 kg " (212 lb)   SpO2 93%   BMI 33.20 kg/m²   General: Normal appearing. No distress.  Pulmonary: Clear to ausculation.  Normal effort.   Cardiovascular: Regular rate and rhythm    Assessment and Plan:     77 y.o. male with the following issues.    1. Perirectal skin irritation  Reported 2-week ago he had some intertriginous skin irritation of the buttock above the anal canal, reported also of having episodes of diarrhea for the whole day 1 week ago, reported usually has a bowel movement every 3 days, reported to use Neosporin with improvement of rotation,  also mentioned of skin rubbing against the couch that caused irritation.  he had a colonoscopy before March 2019 with 1 polyp  -On physical exam there is no evidence of abscess or cellulitis, there is some skin redness possibly due to irritation from sitting position  -Educate regarding conservative management and cushion support when he sit down  - ZINC OXIDE, TOPICAL, 10 % Cream; Apply 3 times daily to the perirectal skin irritation area.  Dispense: 78 g; Refill: 1  -ER precaution explained to the patient in detail.  Patient understands      Follow Up:      No follow-ups on file.    Please note that this dictation was created using voice recognition software. I have made every reasonable attempt to correct obvious errors, but I expect that there are errors of grammar and possibly content that I did not discover before finalizing the note.    Signed by: Kanika Boudreaux M.D.

## 2022-03-04 ENCOUNTER — APPOINTMENT (OUTPATIENT)
Dept: RADIOLOGY | Facility: MEDICAL CENTER | Age: 77
End: 2022-03-04
Attending: EMERGENCY MEDICINE
Payer: MEDICARE

## 2022-03-04 ENCOUNTER — HOSPITAL ENCOUNTER (OUTPATIENT)
Facility: MEDICAL CENTER | Age: 77
End: 2022-03-05
Attending: EMERGENCY MEDICINE | Admitting: STUDENT IN AN ORGANIZED HEALTH CARE EDUCATION/TRAINING PROGRAM
Payer: MEDICARE

## 2022-03-04 DIAGNOSIS — R79.89 ELEVATED TROPONIN: ICD-10-CM

## 2022-03-04 DIAGNOSIS — R55 SYNCOPE, UNSPECIFIED SYNCOPE TYPE: ICD-10-CM

## 2022-03-04 PROBLEM — D70.9 NEUTROPENIA (HCC): Status: ACTIVE | Noted: 2022-03-04

## 2022-03-04 PROBLEM — D69.6 THROMBOCYTOPENIA (HCC): Status: ACTIVE | Noted: 2022-03-04

## 2022-03-04 LAB
ALBUMIN SERPL BCP-MCNC: 3.6 G/DL (ref 3.2–4.9)
ALBUMIN/GLOB SERPL: 1.4 G/DL
ALP SERPL-CCNC: 71 U/L (ref 30–99)
ALT SERPL-CCNC: 41 U/L (ref 2–50)
ANION GAP SERPL CALC-SCNC: 14 MMOL/L (ref 7–16)
AST SERPL-CCNC: 38 U/L (ref 12–45)
BASOPHILS # BLD AUTO: 0.6 % (ref 0–1.8)
BASOPHILS # BLD: 0.02 K/UL (ref 0–0.12)
BILIRUB SERPL-MCNC: 1.1 MG/DL (ref 0.1–1.5)
BUN SERPL-MCNC: 19 MG/DL (ref 8–22)
CALCIUM SERPL-MCNC: 10.1 MG/DL (ref 8.5–10.5)
CHLORIDE SERPL-SCNC: 102 MMOL/L (ref 96–112)
CO2 SERPL-SCNC: 21 MMOL/L (ref 20–33)
CREAT SERPL-MCNC: 0.91 MG/DL (ref 0.5–1.4)
CRP SERPL HS-MCNC: 0.49 MG/DL (ref 0–0.75)
D DIMER PPP IA.FEU-MCNC: 1.21 UG/ML (FEU) (ref 0–0.5)
EKG IMPRESSION: NORMAL
EOSINOPHIL # BLD AUTO: 0.03 K/UL (ref 0–0.51)
EOSINOPHIL NFR BLD: 0.9 % (ref 0–6.9)
ERYTHROCYTE [DISTWIDTH] IN BLOOD BY AUTOMATED COUNT: 46.1 FL (ref 35.9–50)
GLOBULIN SER CALC-MCNC: 2.5 G/DL (ref 1.9–3.5)
GLUCOSE SERPL-MCNC: 135 MG/DL (ref 65–99)
HCT VFR BLD AUTO: 36.2 % (ref 42–52)
HGB BLD-MCNC: 13.1 G/DL (ref 14–18)
IMM GRANULOCYTES # BLD AUTO: 0.02 K/UL (ref 0–0.11)
IMM GRANULOCYTES NFR BLD AUTO: 0.6 % (ref 0–0.9)
LYMPHOCYTES # BLD AUTO: 1.12 K/UL (ref 1–4.8)
LYMPHOCYTES NFR BLD: 32.7 % (ref 22–41)
MAGNESIUM SERPL-MCNC: 1.6 MG/DL (ref 1.5–2.5)
MCH RBC QN AUTO: 32.3 PG (ref 27–33)
MCHC RBC AUTO-ENTMCNC: 36.2 G/DL (ref 33.7–35.3)
MCV RBC AUTO: 89.4 FL (ref 81.4–97.8)
MONOCYTES # BLD AUTO: 0.39 K/UL (ref 0–0.85)
MONOCYTES NFR BLD AUTO: 11.4 % (ref 0–13.4)
NEUTROPHILS # BLD AUTO: 1.84 K/UL (ref 1.82–7.42)
NEUTROPHILS NFR BLD: 53.8 % (ref 44–72)
NRBC # BLD AUTO: 0 K/UL
NRBC BLD-RTO: 0 /100 WBC
NT-PROBNP SERPL IA-MCNC: 37 PG/ML (ref 0–125)
PLATELET # BLD AUTO: 115 K/UL (ref 164–446)
PMV BLD AUTO: 11.1 FL (ref 9–12.9)
POTASSIUM SERPL-SCNC: 3.6 MMOL/L (ref 3.6–5.5)
PROT SERPL-MCNC: 6.1 G/DL (ref 6–8.2)
RBC # BLD AUTO: 4.05 M/UL (ref 4.7–6.1)
SODIUM SERPL-SCNC: 137 MMOL/L (ref 135–145)
TROPONIN T SERPL-MCNC: 23 NG/L (ref 6–19)
WBC # BLD AUTO: 3.4 K/UL (ref 4.8–10.8)

## 2022-03-04 PROCEDURE — 84484 ASSAY OF TROPONIN QUANT: CPT

## 2022-03-04 PROCEDURE — 85379 FIBRIN DEGRADATION QUANT: CPT

## 2022-03-04 PROCEDURE — 83735 ASSAY OF MAGNESIUM: CPT

## 2022-03-04 PROCEDURE — 700105 HCHG RX REV CODE 258: Performed by: EMERGENCY MEDICINE

## 2022-03-04 PROCEDURE — 94760 N-INVAS EAR/PLS OXIMETRY 1: CPT

## 2022-03-04 PROCEDURE — 84439 ASSAY OF FREE THYROXINE: CPT

## 2022-03-04 PROCEDURE — 83880 ASSAY OF NATRIURETIC PEPTIDE: CPT

## 2022-03-04 PROCEDURE — 83036 HEMOGLOBIN GLYCOSYLATED A1C: CPT

## 2022-03-04 PROCEDURE — 86140 C-REACTIVE PROTEIN: CPT

## 2022-03-04 PROCEDURE — 93005 ELECTROCARDIOGRAM TRACING: CPT | Performed by: EMERGENCY MEDICINE

## 2022-03-04 PROCEDURE — 36415 COLL VENOUS BLD VENIPUNCTURE: CPT

## 2022-03-04 PROCEDURE — G0378 HOSPITAL OBSERVATION PER HR: HCPCS

## 2022-03-04 PROCEDURE — 99285 EMERGENCY DEPT VISIT HI MDM: CPT

## 2022-03-04 PROCEDURE — 84443 ASSAY THYROID STIM HORMONE: CPT

## 2022-03-04 PROCEDURE — 71045 X-RAY EXAM CHEST 1 VIEW: CPT

## 2022-03-04 PROCEDURE — 80053 COMPREHEN METABOLIC PANEL: CPT

## 2022-03-04 PROCEDURE — 85025 COMPLETE CBC W/AUTO DIFF WBC: CPT

## 2022-03-04 PROCEDURE — 84145 PROCALCITONIN (PCT): CPT

## 2022-03-04 PROCEDURE — C9803 HOPD COVID-19 SPEC COLLECT: HCPCS | Performed by: STUDENT IN AN ORGANIZED HEALTH CARE EDUCATION/TRAINING PROGRAM

## 2022-03-04 PROCEDURE — 99220 PR INITIAL OBSERVATION CARE,LEVL III: CPT | Performed by: STUDENT IN AN ORGANIZED HEALTH CARE EDUCATION/TRAINING PROGRAM

## 2022-03-04 PROCEDURE — 70450 CT HEAD/BRAIN W/O DYE: CPT | Mod: MG

## 2022-03-04 RX ORDER — ONDANSETRON 4 MG/1
4 TABLET, ORALLY DISINTEGRATING ORAL EVERY 4 HOURS PRN
Status: DISCONTINUED | OUTPATIENT
Start: 2022-03-04 | End: 2022-03-05 | Stop reason: HOSPADM

## 2022-03-04 RX ORDER — ONDANSETRON 2 MG/ML
4 INJECTION INTRAMUSCULAR; INTRAVENOUS EVERY 4 HOURS PRN
Status: DISCONTINUED | OUTPATIENT
Start: 2022-03-04 | End: 2022-03-05 | Stop reason: HOSPADM

## 2022-03-04 RX ORDER — LABETALOL HYDROCHLORIDE 5 MG/ML
10 INJECTION, SOLUTION INTRAVENOUS EVERY 4 HOURS PRN
Status: DISCONTINUED | OUTPATIENT
Start: 2022-03-04 | End: 2022-03-05 | Stop reason: HOSPADM

## 2022-03-04 RX ORDER — POLYETHYLENE GLYCOL 3350 17 G/17G
1 POWDER, FOR SOLUTION ORAL
Status: DISCONTINUED | OUTPATIENT
Start: 2022-03-04 | End: 2022-03-05 | Stop reason: HOSPADM

## 2022-03-04 RX ORDER — GUAIFENESIN/DEXTROMETHORPHAN 100-10MG/5
10 SYRUP ORAL EVERY 6 HOURS PRN
Status: DISCONTINUED | OUTPATIENT
Start: 2022-03-04 | End: 2022-03-05 | Stop reason: HOSPADM

## 2022-03-04 RX ORDER — SODIUM CHLORIDE 9 MG/ML
500 INJECTION, SOLUTION INTRAVENOUS ONCE
Status: COMPLETED | OUTPATIENT
Start: 2022-03-04 | End: 2022-03-05

## 2022-03-04 RX ORDER — ACETAMINOPHEN 325 MG/1
650 TABLET ORAL EVERY 6 HOURS PRN
Status: DISCONTINUED | OUTPATIENT
Start: 2022-03-04 | End: 2022-03-05 | Stop reason: HOSPADM

## 2022-03-04 RX ORDER — LEVOTHYROXINE SODIUM 0.05 MG/1
25 TABLET ORAL
Status: DISCONTINUED | OUTPATIENT
Start: 2022-03-05 | End: 2022-03-05 | Stop reason: HOSPADM

## 2022-03-04 RX ORDER — ROSUVASTATIN CALCIUM 20 MG/1
40 TABLET, COATED ORAL DAILY
Status: DISCONTINUED | OUTPATIENT
Start: 2022-03-05 | End: 2022-03-05 | Stop reason: HOSPADM

## 2022-03-04 RX ORDER — VALSARTAN 80 MG/1
160 TABLET ORAL DAILY
Status: DISCONTINUED | OUTPATIENT
Start: 2022-03-05 | End: 2022-03-05 | Stop reason: HOSPADM

## 2022-03-04 RX ORDER — BISACODYL 10 MG
10 SUPPOSITORY, RECTAL RECTAL
Status: DISCONTINUED | OUTPATIENT
Start: 2022-03-04 | End: 2022-03-05 | Stop reason: HOSPADM

## 2022-03-04 RX ORDER — AMOXICILLIN 250 MG
2 CAPSULE ORAL 2 TIMES DAILY
Status: DISCONTINUED | OUTPATIENT
Start: 2022-03-04 | End: 2022-03-05 | Stop reason: HOSPADM

## 2022-03-04 RX ORDER — CLOPIDOGREL BISULFATE 75 MG/1
75 TABLET ORAL DAILY
Status: DISCONTINUED | OUTPATIENT
Start: 2022-03-05 | End: 2022-03-05 | Stop reason: HOSPADM

## 2022-03-04 RX ADMIN — SODIUM CHLORIDE 500 ML: 9 INJECTION, SOLUTION INTRAVENOUS at 21:22

## 2022-03-04 ASSESSMENT — ENCOUNTER SYMPTOMS
FALLS: 1
LOSS OF CONSCIOUSNESS: 1

## 2022-03-04 ASSESSMENT — FIBROSIS 4 INDEX: FIB4 SCORE: 1.96

## 2022-03-05 ENCOUNTER — APPOINTMENT (OUTPATIENT)
Dept: CARDIOLOGY | Facility: MEDICAL CENTER | Age: 77
End: 2022-03-05
Attending: STUDENT IN AN ORGANIZED HEALTH CARE EDUCATION/TRAINING PROGRAM
Payer: MEDICARE

## 2022-03-05 ENCOUNTER — APPOINTMENT (OUTPATIENT)
Dept: RADIOLOGY | Facility: MEDICAL CENTER | Age: 77
End: 2022-03-05
Attending: STUDENT IN AN ORGANIZED HEALTH CARE EDUCATION/TRAINING PROGRAM
Payer: MEDICARE

## 2022-03-05 VITALS
TEMPERATURE: 97.6 F | DIASTOLIC BLOOD PRESSURE: 56 MMHG | HEIGHT: 67 IN | RESPIRATION RATE: 18 BRPM | SYSTOLIC BLOOD PRESSURE: 119 MMHG | BODY MASS INDEX: 33.29 KG/M2 | WEIGHT: 212.08 LBS | OXYGEN SATURATION: 92 % | HEART RATE: 80 BPM

## 2022-03-05 PROBLEM — R55 SYNCOPE: Status: RESOLVED | Noted: 2022-03-04 | Resolved: 2022-03-05

## 2022-03-05 PROBLEM — R55 SYNCOPE AND COLLAPSE: Status: RESOLVED | Noted: 2022-03-04 | Resolved: 2022-03-05

## 2022-03-05 PROBLEM — R63.4 UNINTENTIONAL WEIGHT LOSS: Status: ACTIVE | Noted: 2022-03-05

## 2022-03-05 LAB
ALBUMIN SERPL BCP-MCNC: 3.3 G/DL (ref 3.2–4.9)
ALBUMIN/GLOB SERPL: 1.3 G/DL
ALP SERPL-CCNC: 63 U/L (ref 30–99)
ALT SERPL-CCNC: 34 U/L (ref 2–50)
ANION GAP SERPL CALC-SCNC: 15 MMOL/L (ref 7–16)
AST SERPL-CCNC: 27 U/L (ref 12–45)
BASOPHILS # BLD AUTO: 0.3 % (ref 0–1.8)
BASOPHILS # BLD: 0.01 K/UL (ref 0–0.12)
BILIRUB SERPL-MCNC: 1.2 MG/DL (ref 0.1–1.5)
BUN SERPL-MCNC: 16 MG/DL (ref 8–22)
CALCIUM SERPL-MCNC: 9.3 MG/DL (ref 8.5–10.5)
CHLORIDE SERPL-SCNC: 102 MMOL/L (ref 96–112)
CHOLEST SERPL-MCNC: 65 MG/DL (ref 100–199)
CO2 SERPL-SCNC: 18 MMOL/L (ref 20–33)
CREAT SERPL-MCNC: 0.66 MG/DL (ref 0.5–1.4)
EOSINOPHIL # BLD AUTO: 0.01 K/UL (ref 0–0.51)
EOSINOPHIL NFR BLD: 0.3 % (ref 0–6.9)
ERYTHROCYTE [DISTWIDTH] IN BLOOD BY AUTOMATED COUNT: 46 FL (ref 35.9–50)
EST. AVERAGE GLUCOSE BLD GHB EST-MCNC: 117 MG/DL
FLUAV RNA SPEC QL NAA+PROBE: NEGATIVE
FLUBV RNA SPEC QL NAA+PROBE: NEGATIVE
GLOBULIN SER CALC-MCNC: 2.6 G/DL (ref 1.9–3.5)
GLUCOSE BLD STRIP.AUTO-MCNC: 121 MG/DL (ref 65–99)
GLUCOSE BLD STRIP.AUTO-MCNC: 94 MG/DL (ref 65–99)
GLUCOSE SERPL-MCNC: 93 MG/DL (ref 65–99)
HBA1C MFR BLD: 5.7 % (ref 4–5.6)
HCT VFR BLD AUTO: 33.8 % (ref 42–52)
HDLC SERPL-MCNC: 24 MG/DL
HGB BLD-MCNC: 12.4 G/DL (ref 14–18)
IMM GRANULOCYTES # BLD AUTO: 0.02 K/UL (ref 0–0.11)
IMM GRANULOCYTES NFR BLD AUTO: 0.6 % (ref 0–0.9)
LACTATE BLD-SCNC: 1.1 MMOL/L (ref 0.5–2)
LDLC SERPL CALC-MCNC: 21 MG/DL
LV EJECT FRACT  99904: 75
LV EJECT FRACT MOD 2C 99903: 81.34
LV EJECT FRACT MOD 4C 99902: 74.49
LV EJECT FRACT MOD BP 99901: 78.59
LYMPHOCYTES # BLD AUTO: 0.96 K/UL (ref 1–4.8)
LYMPHOCYTES NFR BLD: 29.8 % (ref 22–41)
MCH RBC QN AUTO: 32.7 PG (ref 27–33)
MCHC RBC AUTO-ENTMCNC: 36.7 G/DL (ref 33.7–35.3)
MCV RBC AUTO: 89.2 FL (ref 81.4–97.8)
MONOCYTES # BLD AUTO: 0.45 K/UL (ref 0–0.85)
MONOCYTES NFR BLD AUTO: 14 % (ref 0–13.4)
NEUTROPHILS # BLD AUTO: 1.77 K/UL (ref 1.82–7.42)
NEUTROPHILS NFR BLD: 55 % (ref 44–72)
NRBC # BLD AUTO: 0 K/UL
NRBC BLD-RTO: 0 /100 WBC
PLATELET # BLD AUTO: 111 K/UL (ref 164–446)
PMV BLD AUTO: 10.7 FL (ref 9–12.9)
POTASSIUM SERPL-SCNC: 3.5 MMOL/L (ref 3.6–5.5)
PROCALCITONIN SERPL-MCNC: <0.05 NG/ML
PROT SERPL-MCNC: 5.9 G/DL (ref 6–8.2)
RBC # BLD AUTO: 3.79 M/UL (ref 4.7–6.1)
RSV RNA SPEC QL NAA+PROBE: NEGATIVE
SARS-COV-2 RNA RESP QL NAA+PROBE: DETECTED
SODIUM SERPL-SCNC: 135 MMOL/L (ref 135–145)
SPECIMEN SOURCE: ABNORMAL
T4 FREE SERPL-MCNC: 1.26 NG/DL (ref 0.93–1.7)
TRIGL SERPL-MCNC: 98 MG/DL (ref 0–149)
TROPONIN T SERPL-MCNC: 20 NG/L (ref 6–19)
TROPONIN T SERPL-MCNC: 23 NG/L (ref 6–19)
TSH SERPL DL<=0.005 MIU/L-ACNC: 5.59 UIU/ML (ref 0.38–5.33)
URATE SERPL-MCNC: 2.8 MG/DL (ref 2.5–8.3)
WBC # BLD AUTO: 3.2 K/UL (ref 4.8–10.8)

## 2022-03-05 PROCEDURE — 80061 LIPID PANEL: CPT

## 2022-03-05 PROCEDURE — 85025 COMPLETE CBC W/AUTO DIFF WBC: CPT

## 2022-03-05 PROCEDURE — 700102 HCHG RX REV CODE 250 W/ 637 OVERRIDE(OP): Performed by: STUDENT IN AN ORGANIZED HEALTH CARE EDUCATION/TRAINING PROGRAM

## 2022-03-05 PROCEDURE — 93306 TTE W/DOPPLER COMPLETE: CPT | Mod: 26 | Performed by: INTERNAL MEDICINE

## 2022-03-05 PROCEDURE — 84550 ASSAY OF BLOOD/URIC ACID: CPT

## 2022-03-05 PROCEDURE — G0378 HOSPITAL OBSERVATION PER HR: HCPCS

## 2022-03-05 PROCEDURE — 93306 TTE W/DOPPLER COMPLETE: CPT

## 2022-03-05 PROCEDURE — 80053 COMPREHEN METABOLIC PANEL: CPT

## 2022-03-05 PROCEDURE — A9270 NON-COVERED ITEM OR SERVICE: HCPCS | Performed by: STUDENT IN AN ORGANIZED HEALTH CARE EDUCATION/TRAINING PROGRAM

## 2022-03-05 PROCEDURE — 71275 CT ANGIOGRAPHY CHEST: CPT | Mod: ME

## 2022-03-05 PROCEDURE — 99217 PR OBSERVATION CARE DISCHARGE: CPT | Performed by: HOSPITALIST

## 2022-03-05 PROCEDURE — 83605 ASSAY OF LACTIC ACID: CPT

## 2022-03-05 PROCEDURE — 84484 ASSAY OF TROPONIN QUANT: CPT | Mod: 91

## 2022-03-05 PROCEDURE — 82962 GLUCOSE BLOOD TEST: CPT

## 2022-03-05 PROCEDURE — 0241U HCHG SARS-COV-2 COVID-19 NFCT DS RESP RNA 4 TRGT MIC: CPT

## 2022-03-05 PROCEDURE — 700117 HCHG RX CONTRAST REV CODE 255: Performed by: STUDENT IN AN ORGANIZED HEALTH CARE EDUCATION/TRAINING PROGRAM

## 2022-03-05 RX ADMIN — VALSARTAN 160 MG: 80 TABLET, FILM COATED ORAL at 06:35

## 2022-03-05 RX ADMIN — IOHEXOL 86 ML: 350 INJECTION, SOLUTION INTRAVENOUS at 11:12

## 2022-03-05 RX ADMIN — CLOPIDOGREL BISULFATE 75 MG: 75 TABLET ORAL at 06:36

## 2022-03-05 RX ADMIN — METOPROLOL TARTRATE 25 MG: 25 TABLET, FILM COATED ORAL at 12:28

## 2022-03-05 RX ADMIN — LEVOTHYROXINE SODIUM 25 MCG: 0.05 TABLET ORAL at 06:36

## 2022-03-05 ASSESSMENT — LIFESTYLE VARIABLES
TOTAL SCORE: 0
TOTAL SCORE: 0
CONSUMPTION TOTAL: NEGATIVE
ON A TYPICAL DAY WHEN YOU DRINK ALCOHOL HOW MANY DRINKS DO YOU HAVE: 0
AVERAGE NUMBER OF DAYS PER WEEK YOU HAVE A DRINK CONTAINING ALCOHOL: 0
EVER HAD A DRINK FIRST THING IN THE MORNING TO STEADY YOUR NERVES TO GET RID OF A HANGOVER: NO
TOTAL SCORE: 0
HAVE YOU EVER FELT YOU SHOULD CUT DOWN ON YOUR DRINKING: NO
HAVE PEOPLE ANNOYED YOU BY CRITICIZING YOUR DRINKING: NO
ALCOHOL_USE: NO
EVER FELT BAD OR GUILTY ABOUT YOUR DRINKING: NO
HOW MANY TIMES IN THE PAST YEAR HAVE YOU HAD 5 OR MORE DRINKS IN A DAY: 0

## 2022-03-05 ASSESSMENT — PATIENT HEALTH QUESTIONNAIRE - PHQ9
2. FEELING DOWN, DEPRESSED, IRRITABLE, OR HOPELESS: NOT AT ALL
1. LITTLE INTEREST OR PLEASURE IN DOING THINGS: NOT AT ALL
SUM OF ALL RESPONSES TO PHQ9 QUESTIONS 1 AND 2: 0

## 2022-03-05 ASSESSMENT — PAIN DESCRIPTION - PAIN TYPE: TYPE: ACUTE PAIN

## 2022-03-05 NOTE — ED TRIAGE NOTES
"Chief Complaint   Patient presents with   • Syncope   • T-5000 GLF     PT with witnessed syncopal episode and hit head on way down no obvious trauma and EKG is normal with a 0 on the stroke scale.  PT is A/O x 4 GCS of 15 with a BS of 146     Blood Pressure (Abnormal) 93/59   Pulse 78   Temperature 36.8 °C (98.2 °F) (Temporal)   Respiration 16   Height 1.702 m (5' 7.01\")   Weight 96.2 kg (212 lb 1.3 oz)   Oxygen Saturation 96%   Body Mass Index 33.21 kg/m²     "

## 2022-03-05 NOTE — ED NOTES
Grace from interrogation contacted with report, last episode on 2/28 with PAC's. Pt is using pacemaker <1% of time, no recent episodes. MD Sterling notified

## 2022-03-05 NOTE — HOSPITAL COURSE
Mr. Gerry Boyer is a 77-year-old male with a PMHx of HTN, HLD, pulse cardiology due to to X stents placement on 11/2021, CAD, presented on 3/4/2022 due to episodes of syncope, associated with bowel incontinence while at a restaurant.    Patient reports altered LOC and hit his head.  Patient also complains of 2-month history of severe fatigue, 30 pound unintentional weight loss.  Patient also states that his thyroid medication were adjusted by his cardiologist on 11/2021.    In ER, notable findings noted BP 93/59, troponin XX 3, glucose 135, WBC 3.4, hemoglobin 13.1, platelets 115.  His pacemaker was interrogated in ER due to history of SVT and syncope.  No acute abnormalities were noted on his CT scan of the head.  CXR noted no acute cardiac or pulmonary abnormality.  EKG noted normal sinus rhythm with RBBB.  COVID-19 detected and is positive.  CTA pulmonary reports no evidence of pulmonary embolism with peripheral groundglass opacities throughout both lungs in keeping with COVID-19 pneumonia.  An echocardiogram was also obtained which noted LVEF approximately 75%.    Patient seen and examined prior to being discharged.  Discussed with patient consistent findings of symptoms due to COVID-19 infection.  Discussed with patient quarantining for a total of 4-5 days since onset of symptoms.  Patient currently not in respiratory distress, no shortness of breath noted and O2 saturation in the low 90s on room air.  Patient to follow-up with PCP s/p hospitalization.  All questions and concerns answered prior to being discharged.  Patient discharged home.

## 2022-03-05 NOTE — PROGRESS NOTES
"When I reviewed with patient that he is on isolation for +covid, pt became quiet and sad. States he did not get vaccinated with covid or did his family as they have been careful. He was worried that \"im going to a respirator\". Reassured that he is on RA and we are constantly monitoring heart and oxygen monitor. I asked if he is interest in getting a covid vaccine in the hospital. He is unsure right now.   "

## 2022-03-05 NOTE — ASSESSMENT & PLAN NOTE
- Telemetry monitoring  - Fall precautions  - Check orthostatics  - TTE  NO ACUTE ABNORMALITIES ARE NOTED ON CT SCAN OF THE HEAD.     Findings are consistent with atrophy.  Decreased attenuation in the periventricular white matter likely indicates microvascular ischemic disease.

## 2022-03-05 NOTE — PROGRESS NOTES
Pt admitted to room T218 from B22 at 0245.  Pt  a&o x4. Complaints of constant generalized pain. Denies pain med needs. States has back stimulator. Ambulating with one per SBA.  BARDALES.  On RA. Respirations equal and unlabored. Oriented to room call light and vitals frequency.   Partial Pacing 70's on monitor. Reviewed plan of care: isolation precautions, echo, CT PE, diet, fall precautions, skin care, labs,and pain management with patient. Visiting hours from 8am-till 8pm for 2 visitors at a time only. Passed RN bedside swallow evaluation without s/sx of aspiration. All questions answered. White board updated. Verbalized understanding and agrees. Able to make needs known.

## 2022-03-05 NOTE — ED PROVIDER NOTES
ED Provider Note    CHIEF COMPLAINT  Chief Complaint   Patient presents with   • Syncope   • T-5000 GLF     PT with witnessed syncopal episode and hit head on way down no obvious trauma and EKG is normal with a 0 on the stroke scale.  PT is A/O x 4 GCS of 15 with a BS of 146       HPI  Dakota Boyer is a 77 y.o. male who presents to the emergency room after syncopal event. Past medical history as documented below.  Tonight was awaiting dinner at [facility] as he lives in [place]. He is having some acute on chronic back pain while awaiting to get into the restaurant and he then went to sit in a nearby chair near a slot machine at which point his wife noticed him to have a shaking like episode and ultimately passed out and being brought to the ground. At this point states that he feels generally fatigued. He does note that he has been feeling generally unwell for at least the last three weeks. He notes that he has had up 20 pounds weight loss in the last two months unexpectedly. No other acute symptoms tonight before after the syncopal event.    Chart review does reveal that the patient does have a pacemaker. Prior captured SVT.    REVIEW OF SYSTEMS  See HPI for further details. All other systems are negative.     PAST MEDICAL HISTORY   has a past medical history of Anxiety, Back pain, chronic (10/18/2016), Bilateral primary osteoarthritis of knee (1/23/2018), Blood clotting disorder ([facility]) (1978), Breath shortness, CAD (coronary artery disease) (October 2012), Cancer ([facility]) (? early 90's), Degeneration of lumbar or lumbosacral intervertebral disc ( ), Dental disorder, Depression, Diabetes, DJD (degenerative joint disease) of cervical spine ( ), Episodic lightheadedness ( ), High cholesterol, Hyperlipidemia, Hypersensitive carotid sinus syndrome ( ), Hypertension, Incisional infection (April 2013), Insomnia ( ), Myocardial infarct ([facility]) (?), Neck pain (10/18/2016), Pacemaker (October 2012),  "RBBB ( ), S/P lumbar discectomy ( ), Sick sinus syndrome (HCC) ( ), Sleep apnea, Syncope (2012), Thyroid disease, and Unspecified hemorrhagic conditions.    SOCIAL HISTORY  Social History     Tobacco Use   • Smoking status: Former Smoker     Packs/day: 1.00     Years: 40.00     Pack years: 40.00     Types: Cigarettes     Quit date: 1/3/1990     Years since quittin.1   • Smokeless tobacco: Never Used   Vaping Use   • Vaping Use: Never used   Substance and Sexual Activity   • Alcohol use: No     Alcohol/week: 0.0 oz   • Drug use: No   • Sexual activity: Yes     Partners: Female       SURGICAL HISTORY   has a past surgical history that includes arthroplasty (Left, ); arthroscopy, knee (Bilateral, ); tonsillectomy; vasectomy; shoulder decompression (Left, ); orthopedic osteotomy; recovery (10/4/2012); lumbar laminectomy diskectomy (2012); pacemaker insertion (2012); cervical fusion posterior (2013); cervical laminectomy posterior (2013); wound dehiscence (4/10/2013); recovery (10/17/2013); shoulder arthroscopy (Right, ); other; spinal cord stimulator (1 month ago); dstr nrolytc agnt parverteb fct sngl crvcl/thora (Right, 10/19/2016); dstr nrolytc agnt parverteb fct addl crvcl/thora (10/19/2016); and fluoroscopic guidance needle placement (10/19/2016).    CURRENT MEDICATIONS  Home Medications    **Home medications have not yet been reviewed for this encounter**         ALLERGIES  Allergies   Allergen Reactions   • Aleve Cold & [Pseudoephedrine-Naproxen Na] Anaphylaxis   • Ceftriaxone Sodium Anaphylaxis     Reaction; 's.   • Naproxen Anaphylaxis     Reaction; .   • Latex Rash     Contact site   • Tape Rash and Itching     Plastic tape (paper tape ok)       PHYSICAL EXAM  VITAL SIGNS: /60   Pulse 73   Temp 36.8 °C (98.2 °F) (Temporal)   Resp 18   Ht 1.702 m (5' 7.01\")   Wt 96.2 kg (212 lb 1.3 oz)   SpO2 90%   BMI 33.21 kg/m²  @JOCELYN[2001::@   Pulse " ox interpretation: I interpret this pulse ox as normal.  Constitutional: Alert in no apparent distress.  HENT: No signs of trauma, Bilateral external ears normal, Nose normal.   Eyes: Pupils are equal and reactive  Neck: Normal range of motion, No tenderness, Supple  Cardiovascular: Regular rate and rhythm, no murmurs.   Thorax & Lungs: Normal breath sounds, No respiratory distress, No wheezing, No chest tenderness.   Abdomen: Bowel sounds normal, Soft, No tenderness  Skin: Warm, Dry, No erythema, No rash.   Extremities: Intact distal pulses, No edema  Musculoskeletal: Good range of motion in all major joints. No tenderness to palpation or major deformities noted.   Neurologic: Alert , Normal motor function, Normal sensory function, No focal deficits noted.   Psychiatric: Affect normal, Judgment normal, Mood normal.       DIAGNOSTIC STUDIES / PROCEDURES    LABS  Results for orders placed or performed during the hospital encounter of 03/04/22   CBC w/ Differential   Result Value Ref Range    WBC 3.4 (L) 4.8 - 10.8 K/uL    RBC 4.05 (L) 4.70 - 6.10 M/uL    Hemoglobin 13.1 (L) 14.0 - 18.0 g/dL    Hematocrit 36.2 (L) 42.0 - 52.0 %    MCV 89.4 81.4 - 97.8 fL    MCH 32.3 27.0 - 33.0 pg    MCHC 36.2 (H) 33.7 - 35.3 g/dL    RDW 46.1 35.9 - 50.0 fL    Platelet Count 115 (L) 164 - 446 K/uL    MPV 11.1 9.0 - 12.9 fL    Neutrophils-Polys 53.80 44.00 - 72.00 %    Lymphocytes 32.70 22.00 - 41.00 %    Monocytes 11.40 0.00 - 13.40 %    Eosinophils 0.90 0.00 - 6.90 %    Basophils 0.60 0.00 - 1.80 %    Immature Granulocytes 0.60 0.00 - 0.90 %    Nucleated RBC 0.00 /100 WBC    Neutrophils (Absolute) 1.84 1.82 - 7.42 K/uL    Lymphs (Absolute) 1.12 1.00 - 4.80 K/uL    Monos (Absolute) 0.39 0.00 - 0.85 K/uL    Eos (Absolute) 0.03 0.00 - 0.51 K/uL    Baso (Absolute) 0.02 0.00 - 0.12 K/uL    Immature Granulocytes (abs) 0.02 0.00 - 0.11 K/uL    NRBC (Absolute) 0.00 K/uL   Complete Metabolic Panel (CMP)   Result Value Ref Range    Sodium 137  135 - 145 mmol/L    Potassium 3.6 3.6 - 5.5 mmol/L    Chloride 102 96 - 112 mmol/L    Co2 21 20 - 33 mmol/L    Anion Gap 14.0 7.0 - 16.0    Glucose 135 (H) 65 - 99 mg/dL    Bun 19 8 - 22 mg/dL    Creatinine 0.91 0.50 - 1.40 mg/dL    Calcium 10.1 8.5 - 10.5 mg/dL    AST(SGOT) 38 12 - 45 U/L    ALT(SGPT) 41 2 - 50 U/L    Alkaline Phosphatase 71 30 - 99 U/L    Total Bilirubin 1.1 0.1 - 1.5 mg/dL    Albumin 3.6 3.2 - 4.9 g/dL    Total Protein 6.1 6.0 - 8.2 g/dL    Globulin 2.5 1.9 - 3.5 g/dL    A-G Ratio 1.4 g/dL   Troponin STAT   Result Value Ref Range    Troponin T 23 (H) 6 - 19 ng/L   ESTIMATED GFR   Result Value Ref Range    GFR If African American >60 >60 mL/min/1.73 m 2    GFR If Non African American >60 >60 mL/min/1.73 m 2   Troponin   Result Value Ref Range    Troponin T 23 (H) 6 - 19 ng/L   D-Dimer   Result Value Ref Range    D-Dimer Screen 1.21 (H) 0.00 - 0.50 ug/mL (FEU)   COV-2, FLU A/B, AND RSV BY PCR (2-4 HOURS CEPHEID): Collect NP swab in VTM    Specimen: Respirate   Result Value Ref Range    Influenza virus A RNA Negative Negative    Influenza virus B, PCR Negative Negative    RSV, PCR Negative Negative    SARS-CoV-2 by PCR DETECTED (AA)     SARS-CoV-2 Source NP Swab    TSH WITH REFLEX TO FT4   Result Value Ref Range    TSH 5.590 (H) 0.380 - 5.330 uIU/mL   HEMOGLOBIN A1C   Result Value Ref Range    Glycohemoglobin 5.7 (H) 4.0 - 5.6 %    Est Avg Glucose 117 mg/dL   LACTIC ACID   Result Value Ref Range    Lactic Acid 1.1 0.5 - 2.0 mmol/L   MAGNESIUM   Result Value Ref Range    Magnesium 1.6 1.5 - 2.5 mg/dL   proBrain Natriuretic Peptide, NT   Result Value Ref Range    NT-proBNP 37 0 - 125 pg/mL   PROCALCITONIN   Result Value Ref Range    Procalcitonin <0.05 <0.25 ng/mL   CRP QUANTITIVE (NON-CARDIAC)   Result Value Ref Range    Stat C-Reactive Protein 0.49 0.00 - 0.75 mg/dL   FREE THYROXINE   Result Value Ref Range    Free T-4 1.26 0.93 - 1.70 ng/dL   EKG   Result Value Ref Range    Report       [facility] Emergency Dept.    Test Date:  2022  Pt Name:    TON POWER               Department: ER  MRN:        [id number]                      Room:       BL 22  Gender:     Male                         Technician: [id number]  :        [id number]                   Requested By:UMAIR SPICER  Order #:    [id number]                    Reading MD:    Measurements  Intervals                                Axis  Rate:       75                           P:          57  NC:         187                          QRS:        42  QRSD:       157                          T:          14  QT:         422  QTc:        472    Interpretive Statements  Sinus rhythm  Right bundle branch block  Probable inferior infarct, age indeterminate  Compared to ECG 2021 12:44:47  Myocardial infarct finding now present  Atrial premature complex(es) no longer present         RADIOLOGY  DX-CHEST-PORTABLE (1 VIEW)   Final Result         No acute cardiac or pulmonary abnormality is identified.      CT-HEAD W/O   Final Result         NO ACUTE ABNORMALITIES ARE NOTED ON CT SCAN OF THE HEAD.      Findings are consistent with atrophy.  Decreased attenuation in the periventricular white matter likely indicates microvascular ischemic disease.         EC-ECHOCARDIOGRAM COMPLETE W/O CONT    (Results Pending)   CT-CTA CHEST PULMONARY ARTERY W/ RECONS    (Results Pending)           COURSE & MEDICAL DECISION MAKING  Pertinent Labs & Imaging studies reviewed. (See chart for details)  77-year-old male presented emergency room after syncopal episode. History as above. Given his complex cardiac history and concern that he may have had a cardiac event tonight. Minimally elevated troponin. Pacemaker interrogation does show a atrial dysrhythmia. No recurrence of SVT or other malignant dysrhythmia today. Patient is largely been asymptomatic other than generalized fatigue. Again he does note significant weight loss of response which was  unexpected. At this point I will have them brought into the hospital service for ongoing inpatient care and workup.    FINAL IMPRESSION  1. Syncope, unspecified syncope type    2. Elevated troponin            Electronically signed by: Sp Sterling M.D., 3/4/2022 8:57 PM

## 2022-03-05 NOTE — ASSESSMENT & PLAN NOTE
20-30 pounds in past 2 months  Uric acid/CRP/ESR ordered   CTA chest ordered due to elevated d-dimer

## 2022-03-05 NOTE — PROGRESS NOTES
Monitor Summary:    Rhythm: SR (76-81), partially paced  Interval: .20/.12/.43  Ectopy: pvc, pac

## 2022-03-05 NOTE — PROGRESS NOTES
ISOLATION PRECAUTIONS EDUCATION    Educated PATIENT, FAMILY, S.O: patient on isolation for COVID-19.    Educated on reason for isolation, how the infection may be transmitted, and how to help prevent transmission to others. Educated precautions involves staff and visitors wearing PPE, following Standard Precautions and performing meticulous hand hygiene in order to prevent transmission of infection.     Special Contact Precautions: Educated that Special Contact Precautions involves staff and visitors wearing gowns and gloves when in the patient room, and using soap and water for hand hygiene when exiting patient’s room.     Educated that patient may leave the room if they or continent of stool, but prior to exiting the patient room each time, the patient needs to have on a fresh patient gown, ensure the potentially infectious area is covered, and perform hand hygiene with soap and water immediately prior to exiting the room.    In addition, educated that alcohol based hand rub is NOT sufficient for hand hygiene for Special Contact Precautions. A bonnet is placed over the hand  dispenser inside the patients’ room as a reminder to wash hands with soap and water.    Enhanced Droplet Precautions: Educated that Enhanced Droplet Precautions involves staff and visitors wearing a surgical mask when in the patient room.     In addition,  educated that they may leave their room, but prior to exiting the patient room each time, the patient needs to have on a fresh patient gown, a surgical mask must be worn by the patient while out of the patient room, and perform hand hygiene immediately prior to exiting the room.       Patient transport and mobilization on unit  Educated that they may leave their room, but prior to exiting, the patient needs to have on a fresh patient gown, ensure the potentially infectious area is covered, performing appropriate hand hygiene immediately prior to exiting the room.

## 2022-03-05 NOTE — CARE PLAN
The patient is Watcher - Medium risk of patient condition declining or worsening      Progress made toward(s) clinical / shift goals:    Complaints of generalized chronic pain. Denies pain med needs.   Resting and calm.    Patient is not progressing towards the following goals:  +covid  Echo, CT PE.   At risk fall.      Problem: Nutrition  Goal: Patient's nutritional and fluid intake will be adequate or improve  Outcome: Not Progressing  Note: Reports unintentional weight loss. Denies loss in appetite, denies n/v. Dietary consult ordered per protocol.     Problem: Mobility  Goal: Patient's capacity to carry out activities will improve  Outcome: Progressing  Flowsheets (Taken 3/5/2022 0443)  Level of Mobility: Level IV  Activity Performed: Ambulate  Assistance: Assistance of One  Note: Generalized weakness. One person contact guard assist when OOB.

## 2022-03-05 NOTE — DISCHARGE SUMMARY
Discharge Summary    CHIEF COMPLAINT ON ADMISSION  Chief Complaint   Patient presents with   • Syncope   • T-5000 GLF     PT with witnessed syncopal episode and hit head on way down no obvious trauma and EKG is normal with a 0 on the stroke scale.  PT is A/O x 4 GCS of 15 with a BS of 146       Reason for Admission  EMS     Admission Date  3/4/2022    CODE STATUS  Full Code    HPI & HOSPITAL COURSE    Mr. Gerry Boyer is a 77-year-old male with a PMHx of HTN, HLD, pulse cardiology due to to X stents placement on 11/2021, CAD, presented on 3/4/2022 due to episodes of syncope, associated with bowel incontinence while at a restaurant.    Patient reports altered LOC and hit his head.  Patient also complains of 2-month history of severe fatigue, 30 pound unintentional weight loss.  Patient also states that his thyroid medication were adjusted by his cardiologist on 11/2021.    In ER, notable findings noted BP 93/59, troponin XX 3, glucose 135, WBC 3.4, hemoglobin 13.1, platelets 115.  His pacemaker was interrogated in ER due to history of SVT and syncope.  No acute abnormalities were noted on his CT scan of the head.  CXR noted no acute cardiac or pulmonary abnormality.  EKG noted normal sinus rhythm with RBBB.  COVID-19 detected and is positive.  CTA pulmonary reports no evidence of pulmonary embolism with peripheral groundglass opacities throughout both lungs in keeping with COVID-19 pneumonia.  An echocardiogram was also obtained which noted LVEF approximately 75%.    Patient seen and examined prior to being discharged.  Discussed with patient consistent findings of symptoms due to COVID-19 infection.  Discussed with patient quarantining for a total of 4-5 days since onset of symptoms.  Patient currently not in respiratory distress, no shortness of breath noted and O2 saturation in the low 90s on room air.  Patient to follow-up with PCP s/p hospitalization.  All questions and concerns answered prior to being  discharged.  Patient discharged home.      Therefore, he is discharged in good and stable condition to home with close outpatient follow-up.    The patient recovered much more quickly than anticipated on admission.    Discharge Date  03/05/22      FOLLOW UP ITEMS POST DISCHARGE  Please call [phone] to schedule PCP appointment for patient.    Required specialty appointments include:       Discharge Instructions per KAYE Montes    -Follow-up with PCP s/p hospitalization  -Please quarantine for 4-5 days and avoid crowds wearing mask while having symptoms  -Recommended Covid vaccine if not vaccinated yet    DIET: As tolerated    ACTIVITY: As tolerated    DIAGNOSIS: Syncope    Return to ER if symptoms persist, chest pain, palpitations, shortness of breath, numbness, tingling, weakness, and high fevers.      DISCHARGE DIAGNOSES  Principal Problem (Resolved):    Syncope and collapse POA: Yes  Active Problems:    HTN (hypertension) POA: Unknown    Dyslipidemia POA: Yes    Subclinical hypothyroidism POA: Yes    Diabetes mellitus type II, controlled (HCC) POA: Yes      Overview: Dr. Long    Thrombocytopenia (HCC) POA: Yes    Neutropenia (HCC) POA: Yes    Unintentional weight loss POA: Yes  Resolved Problems:    Syncope POA: Yes      FOLLOW UP  Future Appointments   Date Time Provider Department Center   3/8/2022 10:45 AM ERIN Rey [facility] None   3/23/2022 11:30 AM LAYO Chase   5/3/2022 11:00 AM Rylee García D.O. [facility]   7/20/2022  1:15 PM PACER CHECK-[facility] B [facility] None     Rylee García D.O.  [facility] [facility] [place] [address]  [facility] [id number]  [phone]    Schedule an appointment as soon as possible for a visit in 1 week        MEDICATIONS ON DISCHARGE     Medication List      CHANGE how you take these medications      Instructions   Lantus SoloStar 100 UNIT/ML Sopn injection  What changed: how much to take  Generic drug: insulin glargine    Inject 36 Units under the skin every evening. 5 pens per month  Dose: 36 Units     Synjardy XR 12.5-1000 MG Tb24  What changed: when to take this  Generic drug: Empagliflozin-metFORMIN HCl ER   TAKE 2 TABLETS DAILY        CONTINUE taking these medications      Instructions   aspirin EC 81 MG Tbec  Commonly known as: ECOTRIN   Take 81 mg by mouth every evening.  Dose: 81 mg     B-D ULTRAFINE III SHORT PEN  Generic drug: Insulin Pen Needle   USE 1 NEEDLE EVERY DAY WITH LANTUS     Blood Glucose Monitoring Suppl Christine   Meter: Dispense Abbott Freestyle Lite meter. Sig. Use as directed for blood sugar monitoring. #1. NR.     clopidogrel 75 MG Tabs  Commonly known as: PLAVIX   Take 1 Tablet by mouth every day.  Dose: 75 mg     FREESTYLE LITE strip  Generic drug: glucose blood   1 Strip by Other route 2 times a day.  Dose: 1 Each     Lancets   Lancets order: Lancets for Abbott Freestyle Lite meter. Sig: use 4 times daily  and prn ssx high or low sugar. #100 RF x 0     levothyroxine 25 MCG Tabs  Commonly known as: SYNTHROID   Take 1 Tablet by mouth every morning on an empty stomach.  Dose: 25 mcg     metoprolol tartrate 25 MG Tabs  Commonly known as: LOPRESSOR   Take 1 Tablet by mouth 2 times a day.  Dose: 25 mg     rosuvastatin 40 MG tablet  Commonly known as: CRESTOR   Take 1 Tablet by mouth every day.  Dose: 40 mg     Trulicity 3 MG/0.5ML Sopn  Generic drug: Dulaglutide   Doctor's comments: 90 day - replaces the 1.5mg dose  Inject 3 mg under the skin every 7 days. 2.0 ml equals 4 pens per month  Dose: 3 mg     valsartan 160 MG Tabs  Commonly known as: DIOVAN   TAKE 1 TABLET DAILY     ZINC OXIDE (TOPICAL) 10 % Crea   Apply 3 times daily to the perirectal skin irritation area.        STOP taking these medications    amoxicillin 500 MG Caps  Commonly known as: AMOXIL            Allergies  Allergies   Allergen Reactions   • Aleve Cold & [Pseudoephedrine-Naproxen Na] Anaphylaxis   • Ceftriaxone Sodium Anaphylaxis     Reaction;  1970's.   • Naproxen Anaphylaxis     Reaction; 2004.   • Latex Rash     Contact site   • Tape Rash and Itching     Plastic tape (paper tape ok)       DIET  Orders Placed This Encounter   Procedures   • Diet Order Diet: Regular     Standing Status:   Standing     Number of Occurrences:   1     Order Specific Question:   Diet:     Answer:   Regular [1]       ACTIVITY  As tolerated.  Weight bearing as tolerated    CONSULTATIONS  NONE    PROCEDURES  NONE    IMAGING  CT-CTA CHEST PULMONARY ARTERY W/ RECONS   Final Result         1. No CT evidence of pulmonary embolism.      2. Peripheral groundglass opacities throughout both lungs, in keeping with Covid 19 pneumonia.         EC-ECHOCARDIOGRAM COMPLETE W/O CONT   Final Result      DX-CHEST-PORTABLE (1 VIEW)   Final Result         No acute cardiac or pulmonary abnormality is identified.      CT-HEAD W/O   Final Result         NO ACUTE ABNORMALITIES ARE NOTED ON CT SCAN OF THE HEAD.      Findings are consistent with atrophy.  Decreased attenuation in the periventricular white matter likely indicates microvascular ischemic disease.               LABORATORY  Lab Results   Component Value Date    SODIUM 135 03/05/2022    POTASSIUM 3.5 (L) 03/05/2022    CHLORIDE 102 03/05/2022    CO2 18 (L) 03/05/2022    GLUCOSE 93 03/05/2022    BUN 16 03/05/2022    CREATININE 0.66 03/05/2022        Lab Results   Component Value Date    WBC 3.2 (L) 03/05/2022    HEMOGLOBIN 12.4 (L) 03/05/2022    HEMATOCRIT 33.8 (L) 03/05/2022    PLATELETCT 111 (L) 03/05/2022        Total time of the discharge process exceeds 36 minutes.    ============================================================================================================  Please note that this dictation was created using voice recognition software. I have made every reasonable attempt to correct obvious errors, but there may be errors of grammar and possibly content that I did not discover before finalizing the note.    Electronically  signed by:  JUAN Ferrer, MSN, APRN, FNP-C  Hospitalist Services  [facility]  [phone]  [email or web address]  03/05/22                   [phone]

## 2022-03-05 NOTE — PROGRESS NOTES
IV dc'd.  Discharge instructions given to patient; patient verbalizes understanding, all questions answered.  Copy of DC summary provided, signed copy in chart.  0 prescriptions electronically sent to pt's pharmacy/provided to patient, copies in chart.  Pt states personal belongings are in possession.  Pt escorted off unit by tech without incident.

## 2022-03-05 NOTE — ED NOTES
Sig other left for the night, pt resting comfortably with call light within reach. No needs at this time.

## 2022-03-05 NOTE — PROGRESS NOTES
4 Eyes Skin Assessment Completed by PJ Eastman and PJ Salter.    Head WDL  Ears WDL  Nose WDL  Mouth WDL  Neck WDL  Breast/Chest WDL  Shoulder Blades WDL  Spine WDL, back stimulator  (R) Arm/Elbow/Hand WDL  (L) Arm/Elbow/Hand WDL  Abdomen WDL  Groin WDL  Scrotum/Coccyx/Buttocks Redness and Excoriation, open sore  (R) Leg Swelling, healed knee surgery incision  (L) Leg Swelling, mottle, healed knee surgery incision  (R) Heel/Foot/Toe redness, blanching  (L) Heel/Foot/Toe redness, blanching          Devices In Places Pulse Ox      Interventions In Place Pressure Redistribution Mattress, preston cream,     Possible Skin Injury Yes    Pictures Uploaded Into Epic Yes  Wound Consult Placed Yes  RN Wound Prevention Protocol Ordered Yes

## 2022-03-05 NOTE — PROGRESS NOTES
Assessment completed. Pt A&Ox 4. Respirations are even and unlabored on room air. Pt denies pain at this time. Monitors applied, VS stable, call light and belongings within reach. POC updated (possible D/C). Pt educated on room and call light, pt verbalized understanding. Communication board updated. Needs met.

## 2022-03-05 NOTE — DISCHARGE INSTRUCTIONS
FOLLOW UP ITEMS POST DISCHARGE  Please call [phone] to schedule PCP appointment for patient.    Required specialty appointments include:       Discharge Instructions per ROSSY MontesRLauritaNLaurita    -Follow-up with PCP s/p hospitalization  -Please quarantine for 4-5 days and avoid crowds wearing mask while having symptoms  -Recommended Covid vaccine if not vaccinated yet    DIET: As tolerated    ACTIVITY: As tolerated    DIAGNOSIS: Syncope    Return to ER if symptoms persist, chest pain, palpitations, shortness of breath, numbness, tingling, weakness, and high fevers.    Discharge Instructions    Discharged to home by car with relative. Discharged via wheelchair, hospital escort: Yes.  Special equipment needed: Not Applicable    Be sure to schedule a follow-up appointment with your primary care doctor or any specialists as instructed.     Discharge Plan:   Diet Plan: Discussed  Activity Level: Discussed  Confirmed Follow up Appointment: Appointment Scheduled  Confirmed Symptoms Management: Discussed  Medication Reconciliation Updated: Yes  Influenza Vaccine Indication: Not indicated: Previously immunized this influenza season and > 8 years of age    I understand that a diet low in cholesterol, fat, and sodium is recommended for good health. Unless I have been given specific instructions below for another diet, I accept this instruction as my diet prescription.   Other diet: Diabetic Diet    Special Instructions: None    · Is patient discharged on Warfarin / Coumadin?   No     Depression / Suicide Risk    As you are discharged from this [facility] facility, it is important to learn how to keep safe from harming yourself.    Recognize the warning signs:  · Abrupt changes in personality, positive or negative- including increase in energy   · Giving away possessions  · Change in eating patterns- significant weight changes-  positive or negative  · Change in sleeping patterns- unable to sleep or  sleeping all the time   · Unwillingness or inability to communicate  · Depression  · Unusual sadness, discouragement and loneliness  · Talk of wanting to die  · Neglect of personal appearance   · Rebelliousness- reckless behavior  · Withdrawal from people/activities they love  · Confusion- inability to concentrate     If you or a loved one observes any of these behaviors or has concerns about self-harm, here's what you can do:  · Talk about it- your feelings and reasons for harming yourself  · Remove any means that you might use to hurt yourself (examples: pills, rope, extension cords, firearm)  · Get professional help from the community (Mental Health, Substance Abuse, psychological counseling)  · Do not be alone:Call your Safe Contact- someone whom you trust who will be there for you.  · Call your local CRISIS HOTLINE 956-7672 or 803-986-1607  · Call your local Children's Mobile Crisis Response Team [place] (135) 832-6900 or www.[employer]  · Call the toll free National Suicide Prevention Hotlines   · National Suicide Prevention Lifeline 823-978-MSYA (7827)  · National Hope Line Network 800-SUICIDE (182-4085)    Discharge Instructions    Discharged to home by car with relative. Discharged via wheelchair, hospital escort: Yes.  Special equipment needed: Not Applicable    Be sure to schedule a follow-up appointment with your primary care doctor or any specialists as instructed.     Discharge Plan:   Diet Plan: Discussed  Activity Level: Discussed  Confirmed Follow up Appointment: Appointment Scheduled  Confirmed Symptoms Management: Discussed  Medication Reconciliation Updated: Yes  Influenza Vaccine Indication: Not indicated: Previously immunized this influenza season and > 8 years of age    I understand that a diet low in cholesterol, fat, and sodium is recommended for good health. Unless I have been given specific instructions below for another diet, I accept this instruction as my diet prescription.    Other diet: regular diet    Special Instructions: None    · Is patient discharged on Warfarin / Coumadin?   No     Depression / Suicide Risk    As you are discharged from this [facility] facility, it is important to learn how to keep safe from harming yourself.    Recognize the warning signs:  · Abrupt changes in personality, positive or negative- including increase in energy   · Giving away possessions  · Change in eating patterns- significant weight changes-  positive or negative  · Change in sleeping patterns- unable to sleep or sleeping all the time   · Unwillingness or inability to communicate  · Depression  · Unusual sadness, discouragement and loneliness  · Talk of wanting to die  · Neglect of personal appearance   · Rebelliousness- reckless behavior  · Withdrawal from people/activities they love  · Confusion- inability to concentrate     If you or a loved one observes any of these behaviors or has concerns about self-harm, here's what you can do:  · Talk about it- your feelings and reasons for harming yourself  · Remove any means that you might use to hurt yourself (examples: pills, rope, extension cords, firearm)  · Get professional help from the community (Mental Health, Substance Abuse, psychological counseling)  · Do not be alone:Call your Safe Contact- someone whom you trust who will be there for you.  · Call your local CRISIS HOTLINE 307-3621 or 020-443-8906  · Call your local Children's Mobile Crisis Response Team [place] (173) 714-7009 or www.[employer]  · Call the toll free National Suicide Prevention Hotlines   · National Suicide Prevention Lifeline 071-901-AUXA (6826)  · National Hope Line Network 800-SUICIDE (149-4944)

## 2022-03-05 NOTE — ASSESSMENT & PLAN NOTE
----- Message from Aurelio Phelps Continue home meds  TSH ordered to assess

## 2022-03-05 NOTE — H&P
Hospital Medicine History & Physical Note    Date of Service  3/5/2022    Primary Care Physician  Rylee García D.O.    Consultants    Code Status  Full Code    Chief Complaint  Chief Complaint   Patient presents with   • Syncope   • T-5000 GLF     PT with witnessed syncopal episode and hit head on way down no obvious trauma and EKG is normal with a 0 on the stroke scale.  PT is A/O x 4 GCS of 15 with a BS of 146       History of Presenting Illness  Dakota Boyer is a 77 y.o. male with past medical hx of HTN, HLD, follows with cardiology and had 2 stents placed during cath in 11/2021, CAD, who presented 3/4/2022 following an episode of syncope with bowel incontinence that occurred today while waiting for a table at a restaurant with his wife.  He had LOC and hit his head per report. Patient also complains of 2 month hx of severe fatigue, 30 pound unintentional weight loss. Patient states that his thyroid medications were adjusted by his cardiologist in 11/21.      Notable findings include: BP 93/59, Troponin 23, glucose 135, WBC 3.4, Hgb 13.1, platelets 115    Pacemaker interrogated in the ED, hx of SVT  Syncope    NO ACUTE ABNORMALITIES ARE NOTED ON CT SCAN OF THE HEAD.     Findings are consistent with atrophy.  Decreased attenuation in the periventricular white matter likely indicates microvascular ischemic disease.    Chest xray showing: :  No acute cardiac or pulmonary abnormality is identified.     EKG showing NSR with RBBB    I discussed the plan of care with patient.    Review of Systems  Review of Systems   Musculoskeletal: Positive for falls.   Neurological: Positive for loss of consciousness.   All other systems reviewed and are negative.      Past Medical History   has a past medical history of Anxiety, Back pain, chronic (10/18/2016), Bilateral primary osteoarthritis of knee (1/23/2018), Blood clotting disorder (HCC) (1978), Breath shortness, CAD (coronary artery disease) (October 2012),  Cancer (HCC) (? early 90's), Degeneration of lumbar or lumbosacral intervertebral disc ( ), Dental disorder, Depression, Diabetes, DJD (degenerative joint disease) of cervical spine ( ), Episodic lightheadedness ( ), High cholesterol, Hyperlipidemia, Hypersensitive carotid sinus syndrome ( ), Hypertension, Incisional infection (April 2013), Insomnia ( ), Myocardial infarct (HCC) (?), Neck pain (10/18/2016), Pacemaker (October 2012), RBBB ( ), S/P lumbar discectomy ( ), Sick sinus syndrome (HCC) ( ), Sleep apnea, Syncope (October 2012), Thyroid disease, and Unspecified hemorrhagic conditions.    Surgical History   has a past surgical history that includes arthroplasty (Left, 2004); arthroscopy, knee (Bilateral, 1978); tonsillectomy; vasectomy; shoulder decompression (Left, 2008); orthopedic osteotomy; recovery (10/4/2012); lumbar laminectomy diskectomy (11/20/2012); pacemaker insertion (October 2012); cervical fusion posterior (2/27/2013); cervical laminectomy posterior (2/27/2013); wound dehiscence (4/10/2013); recovery (10/17/2013); shoulder arthroscopy (Right, 2015); other; spinal cord stimulator (1 month ago); pr dstr nrolytc agnt parverteb fct sngl crvcl/thora (Right, 10/19/2016); pr dstr nrolytc agnt parverteb fct addl crvcl/thora (10/19/2016); and pr fluoroscopic guidance needle placement (10/19/2016).     Family History  family history includes Alcohol/Drug in his brother and mother; Cancer in his brother and brother; Diabetes in his brother; Heart Disease in his brother, maternal grandmother, and sister; Heart Disease (age of onset: 36) in his father; Heart Disease (age of onset: 60) in his brother; Hyperlipidemia in his brother and maternal grandmother; Hypertension in his brother and maternal grandmother; Lung Disease in his brother and mother; No Known Problems in his maternal grandfather and paternal grandmother; Sleep Apnea in his brother.   Family history reviewed with patient. There is no family  history that is pertinent to the chief complaint.     Social History   reports that he quit smoking about 32 years ago. His smoking use included cigarettes. He has a 40.00 pack-year smoking history. He has never used smokeless tobacco. He reports that he does not drink alcohol and does not use drugs.    Allergies  Allergies   Allergen Reactions   • Aleve Cold & [Pseudoephedrine-Naproxen Na] Anaphylaxis   • Ceftriaxone Sodium Anaphylaxis     Reaction; 's.   • Naproxen Anaphylaxis     Reaction; .   • Latex Rash     Contact site   • Tape Rash and Itching     Plastic tape (paper tape ok)       Medications  Prior to Admission Medications   Prescriptions Last Dose Informant Patient Reported? Taking?   B-D ULTRAFINE III SHORT PEN 31G X 8 MM MISC   No No   Sig: USE 1 NEEDLE EVERY DAY WITH LANTUS   Blood Glucose Monitoring Suppl [place]   No No   Sig: Meter: Dispense Abbott Freestyle Lite meter. Sig. Use as directed for blood sugar monitoring. #1. NR.   Dulaglutide (TRULICITY) 3 MG/0.5ML Solution Pen-injector  Patient No No   Sig: Inject 3 mg under the skin every 7 days. 2.0 ml equals 4 pens per month   Lancets MISC   No No   Sig: Lancets order: Lancets for Abbott Freestyle Lite meter. Sig: use 4 times daily  and prn ssx high or low sugar. #100 RF x 0   SYNJARDY XR 12.5-1000 MG TABLET SR 24 HR  Patient No No   Sig: TAKE 2 TABLETS DAILY   Patient taking differently: Take 2 Tablets by mouth every evening.   ZINC OXIDE, TOPICAL, 10 % Cream   No No   Sig: Apply 3 times daily to the perirectal skin irritation area.   amoxicillin (AMOXIL) 500 MG Cap   Yes No   aspirin EC (ECOTRIN) 81 MG TBEC  Patient Yes No   Sig: Take 81 mg by mouth every evening.   clopidogrel (PLAVIX) 75 MG Tab   No No   Sig: Take 1 Tablet by mouth every day.   glucose blood (FREESTYLE LITE) strip   No No   Si Strip by Other route 2 times a day.   insulin glargine (LANTUS SOLOSTAR) 100 UNIT/ML Solution Pen-injector injection  Patient No No   Sig:  Inject 36 Units under the skin every evening. 5 pens per month   Patient taking differently: Inject 34 Units under the skin every evening. 5 pens per month   levothyroxine (SYNTHROID) 25 MCG Tab  Patient No No   Sig: Take 1 Tablet by mouth every morning on an empty stomach.   metoprolol tartrate (LOPRESSOR) 25 MG Tab   No No   Sig: Take 1 Tablet by mouth 2 times a day.   rosuvastatin (CRESTOR) 40 MG tablet   No No   Sig: Take 1 Tablet by mouth every day.   valsartan (DIOVAN) 160 MG Tab  Patient No No   Sig: TAKE 1 TABLET DAILY   Patient taking differently: Take 160 mg by mouth every day.      Facility-Administered Medications: None       Physical Exam  Temp:  [36.8 °C (98.2 °F)] 36.8 °C (98.2 °F)  Pulse:  [73-79] 73  Resp:  [10-18] 18  BP: ()/(59-73) 133/60  SpO2:  [90 %-96 %] 90 %  Blood Pressure : 112/73   Temperature: 36.8 °C (98.2 °F)   Pulse: 79   Respiration: (!) 10   Pulse Oximetry: 93 %       Physical Exam     Constitutional: Resting comfortably in NAD   HENT: Normocephalic, no obvious evidence of acute trauma.  Eyes: No scleral icterus. Normal conjunctiva   Neck: Comfortable movement without any obvious restriction in the range of motion.  Cardiovascular: Upon ascultation I appreciate a regular heart rhythm and a normal rate with 2/6 holosystolic murmur.   Thorax & Lungs: No respiratory distress. No wheezing, rales or rhonchi heard on ausculation.  there is no obvious chest wall tenderness. I appreciate normal air movement throughout.   Abdomen: The abdomen is not visibly distended. Upon palpation, I find it to be without tenderness.  No mass appreciated.  Skin: The exposed portions of skin reveal no obvious rash or other abnormalities.  Extremities/Musculoskeletal: no lower extremity edema with no asymmetry.  Neurologic: Alert & oriented. No focal deficits observed.   Psychiatric: Normal affect appropriate for the clinical situation.    Laboratory:  Recent Labs     03/04/22  2100   WBC 3.4*   RBC  4.05*   HEMOGLOBIN 13.1*   HEMATOCRIT 36.2*   MCV 89.4   MCH 32.3   MCHC 36.2*   RDW 46.1   PLATELETCT 115*   MPV 11.1     Recent Labs     03/04/22 2100   SODIUM 137   POTASSIUM 3.6   CHLORIDE 102   CO2 21   GLUCOSE 135*   BUN 19   CREATININE 0.91   CALCIUM 10.1     Recent Labs     03/04/22 2100   ALTSGPT 41   ASTSGOT 38   ALKPHOSPHAT 71   TBILIRUBIN 1.1   GLUCOSE 135*         Recent Labs     03/04/22 2100   NTPROBNP 37         Recent Labs     03/04/22  2100 03/05/22  0028   TROPONINT 23* 23*       Imaging:  DX-CHEST-PORTABLE (1 VIEW)   Final Result         No acute cardiac or pulmonary abnormality is identified.      CT-HEAD W/O   Final Result         NO ACUTE ABNORMALITIES ARE NOTED ON CT SCAN OF THE HEAD.      Findings are consistent with atrophy.  Decreased attenuation in the periventricular white matter likely indicates microvascular ischemic disease.         EC-ECHOCARDIOGRAM COMPLETE W/O CONT    (Results Pending)         Assessment/Plan:  I anticipate this patient is appropriate for observation status at this time.    Syncope- (present on admission)  Assessment & Plan  - Telemetry monitoring  - Fall precautions  - Check orthostatics  - TTE  NO ACUTE ABNORMALITIES ARE NOTED ON CT SCAN OF THE HEAD.     Findings are consistent with atrophy.  Decreased attenuation in the periventricular white matter likely indicates microvascular ischemic disease.    Unintentional weight loss- (present on admission)  Assessment & Plan  20-30 pounds in past 2 months  Uric acid/CRP/ESR ordered   CTA chest ordered due to elevated d-dimer    Neutropenia (HCC)- (present on admission)  Assessment & Plan  WBC 3.4  Continue to monitor  COVID pending  Procal, CRP, ESR, LA ordered    Thrombocytopenia (HCC)- (present on admission)  Assessment & Plan  COVID test pending  Platelets 115 on admission  Continue to monitor H & H    Diabetes mellitus type II, controlled (HCC)- (present on admission)  Assessment &  Plan  ISS  Accuchecks  A1C    Subclinical hypothyroidism- (present on admission)  Assessment & Plan  Continue home meds  TSH ordered to assess    Dyslipidemia- (present on admission)  Assessment & Plan  Continue home meds  Lipid panel ordered to assess    HTN (hypertension)  Assessment & Plan  Continue home meds  Antihypertensives prn  Continue to monitor      VTE prophylaxis: SCDs/TEDs

## 2022-03-09 ENCOUNTER — OFFICE VISIT (OUTPATIENT)
Dept: MEDICAL GROUP | Facility: PHYSICIAN GROUP | Age: 77
End: 2022-03-09

## 2022-03-09 ENCOUNTER — HOSPITAL ENCOUNTER (OUTPATIENT)
Dept: LAB | Facility: MEDICAL CENTER | Age: 77
End: 2022-03-09
Attending: STUDENT IN AN ORGANIZED HEALTH CARE EDUCATION/TRAINING PROGRAM
Payer: MEDICARE

## 2022-03-09 VITALS
SYSTOLIC BLOOD PRESSURE: 98 MMHG | TEMPERATURE: 97.6 F | OXYGEN SATURATION: 97 % | HEART RATE: 91 BPM | BODY MASS INDEX: 31.39 KG/M2 | DIASTOLIC BLOOD PRESSURE: 56 MMHG | HEIGHT: 67 IN | WEIGHT: 200 LBS

## 2022-03-09 DIAGNOSIS — E11.59 HYPERTENSION ASSOCIATED WITH TYPE 2 DIABETES MELLITUS (HCC): ICD-10-CM

## 2022-03-09 DIAGNOSIS — D64.9 ANEMIA, UNSPECIFIED TYPE: ICD-10-CM

## 2022-03-09 DIAGNOSIS — Z95.0 PACEMAKER: ICD-10-CM

## 2022-03-09 DIAGNOSIS — K92.1 BLACK STOOLS: ICD-10-CM

## 2022-03-09 DIAGNOSIS — E87.6 HYPOKALEMIA: ICD-10-CM

## 2022-03-09 DIAGNOSIS — I35.0 MODERATE AORTIC STENOSIS: ICD-10-CM

## 2022-03-09 DIAGNOSIS — I15.2 HYPERTENSION ASSOCIATED WITH TYPE 2 DIABETES MELLITUS (HCC): ICD-10-CM

## 2022-03-09 DIAGNOSIS — R63.4 UNINTENTIONAL WEIGHT LOSS: ICD-10-CM

## 2022-03-09 DIAGNOSIS — U07.1 COVID-19: ICD-10-CM

## 2022-03-09 DIAGNOSIS — I49.5 SICK SINUS SYNDROME (HCC): ICD-10-CM

## 2022-03-09 DIAGNOSIS — Z87.898 HISTORY OF SYNCOPE: ICD-10-CM

## 2022-03-09 DIAGNOSIS — D61.818 PANCYTOPENIA (HCC): ICD-10-CM

## 2022-03-09 PROBLEM — K59.00 CONSTIPATION: Status: ACTIVE | Noted: 2022-03-09

## 2022-03-09 LAB
ANION GAP SERPL CALC-SCNC: 12 MMOL/L (ref 7–16)
BASOPHILS # BLD AUTO: 0.3 % (ref 0–1.8)
BASOPHILS # BLD: 0.02 K/UL (ref 0–0.12)
BUN SERPL-MCNC: 15 MG/DL (ref 8–22)
CALCIUM SERPL-MCNC: 9.8 MG/DL (ref 8.5–10.5)
CHLORIDE SERPL-SCNC: 103 MMOL/L (ref 96–112)
CO2 SERPL-SCNC: 22 MMOL/L (ref 20–33)
CREAT SERPL-MCNC: 0.93 MG/DL (ref 0.5–1.4)
EOSINOPHIL # BLD AUTO: 0.06 K/UL (ref 0–0.51)
EOSINOPHIL NFR BLD: 0.9 % (ref 0–6.9)
ERYTHROCYTE [DISTWIDTH] IN BLOOD BY AUTOMATED COUNT: 43.3 FL (ref 35.9–50)
ERYTHROCYTE [SEDIMENTATION RATE] IN BLOOD BY WESTERGREN METHOD: 57 MM/HOUR (ref 0–20)
FERRITIN SERPL-MCNC: 777 NG/ML (ref 22–322)
FOLATE SERPL-MCNC: 10.1 NG/ML
GLUCOSE SERPL-MCNC: 137 MG/DL (ref 65–99)
HCT VFR BLD AUTO: 35.6 % (ref 42–52)
HCV AB SER QL: NORMAL
HGB BLD-MCNC: 13.2 G/DL (ref 14–18)
IMM GRANULOCYTES # BLD AUTO: 0.03 K/UL (ref 0–0.11)
IMM GRANULOCYTES NFR BLD AUTO: 0.4 % (ref 0–0.9)
IRON SATN MFR SERPL: 32 % (ref 15–55)
IRON SERPL-MCNC: 66 UG/DL (ref 50–180)
LYMPHOCYTES # BLD AUTO: 1.05 K/UL (ref 1–4.8)
LYMPHOCYTES NFR BLD: 15.6 % (ref 22–41)
MAGNESIUM SERPL-MCNC: 1.7 MG/DL (ref 1.5–2.5)
MCH RBC QN AUTO: 32.4 PG (ref 27–33)
MCHC RBC AUTO-ENTMCNC: 37.1 G/DL (ref 33.7–35.3)
MCV RBC AUTO: 87.3 FL (ref 81.4–97.8)
MONOCYTES # BLD AUTO: 0.49 K/UL (ref 0–0.85)
MONOCYTES NFR BLD AUTO: 7.3 % (ref 0–13.4)
NEUTROPHILS # BLD AUTO: 5.08 K/UL (ref 1.82–7.42)
NEUTROPHILS NFR BLD: 75.5 % (ref 44–72)
NRBC # BLD AUTO: 0 K/UL
NRBC BLD-RTO: 0 /100 WBC
PLATELET # BLD AUTO: 189 K/UL (ref 164–446)
PMV BLD AUTO: 10.8 FL (ref 9–12.9)
POTASSIUM SERPL-SCNC: 3.8 MMOL/L (ref 3.6–5.5)
RBC # BLD AUTO: 4.08 M/UL (ref 4.7–6.1)
SODIUM SERPL-SCNC: 137 MMOL/L (ref 135–145)
TIBC SERPL-MCNC: 209 UG/DL (ref 250–450)
UIBC SERPL-MCNC: 143 UG/DL (ref 110–370)
VIT B12 SERPL-MCNC: 423 PG/ML (ref 211–911)
WBC # BLD AUTO: 6.7 K/UL (ref 4.8–10.8)

## 2022-03-09 PROCEDURE — 83540 ASSAY OF IRON: CPT

## 2022-03-09 PROCEDURE — 82607 VITAMIN B-12: CPT

## 2022-03-09 PROCEDURE — 85025 COMPLETE CBC W/AUTO DIFF WBC: CPT

## 2022-03-09 PROCEDURE — 83735 ASSAY OF MAGNESIUM: CPT

## 2022-03-09 PROCEDURE — 85652 RBC SED RATE AUTOMATED: CPT

## 2022-03-09 PROCEDURE — 82746 ASSAY OF FOLIC ACID SERUM: CPT

## 2022-03-09 PROCEDURE — 99215 OFFICE O/P EST HI 40 MIN: CPT | Mod: CS | Performed by: STUDENT IN AN ORGANIZED HEALTH CARE EDUCATION/TRAINING PROGRAM

## 2022-03-09 PROCEDURE — 86803 HEPATITIS C AB TEST: CPT

## 2022-03-09 PROCEDURE — 83550 IRON BINDING TEST: CPT

## 2022-03-09 PROCEDURE — 82728 ASSAY OF FERRITIN: CPT

## 2022-03-09 PROCEDURE — 36415 COLL VENOUS BLD VENIPUNCTURE: CPT

## 2022-03-09 PROCEDURE — 80048 BASIC METABOLIC PNL TOTAL CA: CPT

## 2022-03-09 ASSESSMENT — FIBROSIS 4 INDEX: FIB4 SCORE: 3.25

## 2022-03-09 NOTE — PROGRESS NOTES
Subjective:     Chief Complaint   Patient presents with   • Hospital Follow-up   • Weight Loss     HPI:   Dakota presents today for hospital follow up.    I reviewed his recent admission notes from 3/4/2022.  Patient states that prior to being diagnosed with Covid he did not feel well for about 4 weeks prior with excess fatigue and a lot of sleeping.    Leading up to his admission he was standing waiting to eat at a restaurant and felt very weak.  He then sat down and had a witnessed syncopal episode with loss of bowel function and also vomited.  His wife who was there denies any seizure-like activity.  She states that he lost consciousness for minutes until EMS got there and he was transported to the hospital.  He reports that he had some severe back pain prior which has also occurred in the past when he had a syncopal episode in the past.  He states that this is no different from usual denies any changes.  No significant back pain today.    He denies any fever, shortness of breath, cough, headache, chills, diarrhea, abdominal pain or myalgias.  He feels quite fatigued and reports intermittent lightheadedness at home as well as in clinic today that improves with lying down.  Doing his best to hydrate.    He does report intermittent history of black stools but had been taking iron prior.  States last bowel movement was brown and normal yesterday.  Struggles with constipation chronically but seems to be slightly worse recently with a bowel movement every 5 days although not having any issues with having a bowel movement.    Hypertension associated with type 2 diabetes mellitus (HCC)  Valsartan last taken last night. Didn't take lopressor this AM.     Current Outpatient Medications Ordered in Epic   Medication Sig Dispense Refill   • ZINC OXIDE, TOPICAL, 10 % Cream Apply 3 times daily to the perirectal skin irritation area. 78 g 1   • rosuvastatin (CRESTOR) 40 MG tablet Take 1 Tablet by mouth every day. 90 Tablet 3  "  • metoprolol tartrate (LOPRESSOR) 25 MG Tab Take 1 Tablet by mouth 2 times a day. 180 Tablet 3   • clopidogrel (PLAVIX) 75 MG Tab Take 1 Tablet by mouth every day. 90 Tablet 3   • insulin glargine (LANTUS SOLOSTAR) 100 UNIT/ML Solution Pen-injector injection Inject 36 Units under the skin every evening. 5 pens per month (Patient taking differently: Inject 34 Units under the skin every evening. 5 pens per month) 45 mL 3   • Dulaglutide (TRULICITY) 3 MG/0.5ML Solution Pen-injector Inject 3 mg under the skin every 7 days. 2.0 ml equals 4 pens per month 6 mL 3   • levothyroxine (SYNTHROID) 25 MCG Tab Take 1 Tablet by mouth every morning on an empty stomach. 90 Tablet 3   • SYNJARDY XR 12.5-1000 MG TABLET SR 24 HR TAKE 2 TABLETS DAILY (Patient taking differently: Take 2 Tablets by mouth every evening.) 180 Tablet 3   • glucose blood (FREESTYLE LITE) strip 1 Strip by Other route 2 times a day. 150 Strip 3   • valsartan (DIOVAN) 160 MG Tab TAKE 1 TABLET DAILY (Patient taking differently: Take 160 mg by mouth every day.) 90 tablet 3   • Lancets MISC Lancets order: Lancets for Abbott Freestyle Lite meter. Sig: use 4 times daily  and prn ssx high or low sugar. #100 RF x 0 360 Each 4   • Blood Glucose Monitoring Suppl [place] Meter: Dispense Abbott Freestyle Lite meter. Sig. Use as directed for blood sugar monitoring. #1. NR. 1 Device 0   • B-D ULTRAFINE III SHORT PEN 31G X 8 MM MISC USE 1 NEEDLE EVERY DAY WITH LANTUS 999 Each 2   • aspirin EC (ECOTRIN) 81 MG TBEC Take 81 mg by mouth every evening.       No current Epic-ordered facility-administered medications on file.     ROS:  See HPI    Objective:     Exam:  BP (!) 98/56 (BP Location: Left arm, Patient Position: Sitting, BP Cuff Size: Adult)   Pulse 91   Temp 36.4 °C (97.6 °F) (Temporal)   Ht 1.702 m (5' 7\")   Wt 90.7 kg (200 lb)   SpO2 97%   BMI 31.32 kg/m²  Body mass index is 31.32 kg/m².    Physical Exam:  Constitutional: Well-developed and well-nourished.  Appears " fatigued and ill but nontoxic.  Skin: Skin is warm and dry. No rash noted.  Head: Atraumatic without lesions.  Eyes: Conjunctivae and extraocular motions are normal. Pupils are equal, round. No scleral icterus.   Ears:  External ears unremarkable. Tympanic membranes clear and intact.  Nose: Nares patent No discharge.  Mouth/Throat: Oropharynx is clear and moist. Posterior pharynx without erythema or exudates.  Neck: Supple, trachea midline. Normal range of motion. No thyromegaly present. No lymphadenopathy--cervical or supraclavicular.  Cardiovascular: Regular rate and rhythm, S1 and S2 without murmur, rubs, or gallops.  Lungs: Normal inspiratory effort, CTA bilaterally, no wheezes/rhonchi/rales  Abdomen: Soft, non tender, and without distention. Active bowel sounds. No rebound, guarding, masses or HSM.  Extremities: No cyanosis, clubbing, erythema, nor edema.   Musculoskeletal: No CVA tenderness or paraspinal/spinous process tenderness.  Neurological: Alert and oriented x 3. No gross/focal deficits.  Psychiatric:  Behavior, mood, and affect are appropriate.    Labs: Reviewed from 3/4-5/2022  Imaging:  CONCLUSIONS  Normal left ventricular systolic function.  The left ventricular ejection fraction is visually estimated to be 75%.  Mild concentric left ventricular hypertrophy.  Moderate aortic valve stenosis.  The right ventricle is normal in size and systolic function.  Pacer/ICD wire seen in the right ventricle.  The ascending aorta diameter is 3.6 cm.    Assessment & Plan:     77 y.o. male with the following -      Resulted labs discussed at 1545 3/9/2022 over the phone.    1. COVID-19  This is an acute condition in this unvaccinated elderly patient.  Discussed that many of his findings and his blood work could be related to COVID-19.  No evidence currently of bacterial infection.  No current symptoms aside from lightheadedness with excess fatigue.  Stressed hydration.  Gave return precautions.  Continue isolation  per CDC recommendations.    2. Pancytopenia (HCC)  In the setting of COVID-19, resolved on repeat.  This is suspected due to COVID-19.  - CBC WITH DIFFERENTIAL; Future  - FERRITIN; Future  - FOLATE; Future  - HEP C VIRUS ANTIBODY; Future  - IRON/TOTAL IRON BIND; Future  - VITAMIN B12; Future    3. Hypokalemia  Acute, resolved on repeat.  - Basic Metabolic Panel; Future  - MAGNESIUM; Future    4. Unintentional weight loss  Reported to be ongoing.  Labs notable for elevated ESR which could likely be elevated due to current COVID-19 infection.  Pancytopenia resolved.  Given complaint of black stools which may be related to iron, referred to gastroenterology, last occult blood test was negative in 2020.  Patient does have a history of prostate cancer, recommend he follow-up specialist regarding his weight loss.  Labs are difficult to discern if abnormal or not given that he does have COVID-19, will plan to trend weight at follow-up and follow clinically.  - Referral to Gastroenterology  - CBC WITH DIFFERENTIAL; Future  - HEP C VIRUS ANTIBODY; Future  - Sed Rate; Future    5. History of syncope  6. Pacemaker  7. Sick sinus syndrome (HCC)  8. Moderate aortic stenosis  Recent syncope in the setting of COVID-19.  Suspect that to be causal however patient does have a history of pacemaker with sick sinus syndrome and on repeat echocardiogram was found to now have moderate aortic stenosis.  Not having any exertional dyspnea prior so may not be related but recommend patient follow-up with his cardiologist.    9. Hypertension associated with type 2 diabetes mellitus (HCC)  This is a chronic condition, blood pressure quite low today so recommend he hold his valsartan and hold his metoprolol for blood pressure less than 100.    11. Black stools  New issue in the setting of weight loss as above.  May be related to iron supplementation but has not had recently so referred to gastroenterology.  Discussed ER precautions.  - Referral  to Gastroenterology    13. Anemia, unspecified type  Resolved on repeat, iron saturation normal and ferritin elevated but suspect that as acute phase reactant related to COVID-19.  - FERRITIN; Future  - IRON/TOTAL IRON BIND; Future    I spent a total of 43 minutes with record review, exam, communication with the patient, and documentation of this encounter.    Return in about 4 weeks (around 4/6/2022), or if symptoms worsen or fail to improve.    Please note that this dictation was created using voice recognition software. I have made every reasonable attempt to correct obvious errors, but I expect that there are errors of grammar and possibly content that I did not discover before finalizing the note.

## 2022-03-09 NOTE — PATIENT INSTRUCTIONS
Hold valsartan for now.    Hold metoprolol if top blood pressure <100.    Make an appointment with cardiology

## 2022-03-09 NOTE — ASSESSMENT & PLAN NOTE
Chronic, worse recently (BM Q5 days) but no issues going. Reports black stools at times (was the other night when he passed out). This morning was brown.

## 2022-03-16 DIAGNOSIS — I10 ESSENTIAL HYPERTENSION, BENIGN: ICD-10-CM

## 2022-03-17 RX ORDER — VALSARTAN 160 MG/1
160 TABLET ORAL DAILY
Qty: 90 TABLET | Refills: 2 | Status: SHIPPED | OUTPATIENT
Start: 2022-03-17 | End: 2022-04-18

## 2022-03-23 ENCOUNTER — TELEPHONE (OUTPATIENT)
Dept: CARDIOLOGY | Facility: MEDICAL CENTER | Age: 77
End: 2022-03-23
Payer: MEDICARE

## 2022-03-23 ENCOUNTER — OFFICE VISIT (OUTPATIENT)
Dept: ENDOCRINOLOGY | Facility: MEDICAL CENTER | Age: 77
End: 2022-03-23
Attending: INTERNAL MEDICINE
Payer: MEDICARE

## 2022-03-23 VITALS
BODY MASS INDEX: 32.43 KG/M2 | OXYGEN SATURATION: 98 % | HEIGHT: 67 IN | DIASTOLIC BLOOD PRESSURE: 58 MMHG | SYSTOLIC BLOOD PRESSURE: 118 MMHG | WEIGHT: 206.6 LBS | HEART RATE: 108 BPM

## 2022-03-23 DIAGNOSIS — Z79.4 CONTROLLED TYPE 2 DIABETES MELLITUS WITH OTHER CIRCULATORY COMPLICATION, WITH LONG-TERM CURRENT USE OF INSULIN (HCC): ICD-10-CM

## 2022-03-23 DIAGNOSIS — Z79.4 LONG-TERM INSULIN USE (HCC): ICD-10-CM

## 2022-03-23 DIAGNOSIS — E03.8 SUBCLINICAL HYPOTHYROIDISM: ICD-10-CM

## 2022-03-23 DIAGNOSIS — E11.59 CONTROLLED TYPE 2 DIABETES MELLITUS WITH OTHER CIRCULATORY COMPLICATION, WITH LONG-TERM CURRENT USE OF INSULIN (HCC): ICD-10-CM

## 2022-03-23 DIAGNOSIS — E78.5 DYSLIPIDEMIA: ICD-10-CM

## 2022-03-23 PROCEDURE — 99214 OFFICE O/P EST MOD 30 MIN: CPT | Performed by: INTERNAL MEDICINE

## 2022-03-23 PROCEDURE — 99211 OFF/OP EST MAY X REQ PHY/QHP: CPT | Performed by: INTERNAL MEDICINE

## 2022-03-23 RX ORDER — LEVOTHYROXINE SODIUM 0.05 MG/1
50 TABLET ORAL
Qty: 90 TABLET | Refills: 2 | Status: SHIPPED | OUTPATIENT
Start: 2022-03-23 | End: 2022-12-02

## 2022-03-23 RX ORDER — ATORVASTATIN CALCIUM 20 MG/1
20 TABLET, FILM COATED ORAL NIGHTLY
COMMUNITY
End: 2022-03-23

## 2022-03-23 ASSESSMENT — FIBROSIS 4 INDEX: FIB4 SCORE: 1.91

## 2022-03-23 NOTE — TELEPHONE ENCOUNTER
Clearance request received from Replaced by [facility] for pt's colonoscopy/EGD. They are requesting to hold Plavix. Fax clearance response to [phone].     Per chart review, pt had a stent placed 11/30/21.     To TT, please advise. Would you like pt to remain on DAPT for 6 months to 1 year before interruption, unless it is urgent?

## 2022-03-23 NOTE — PROGRESS NOTES
CHIEF COMPLAINT: Patient is here for follow up of Type 2 Diabetes Mellitus    HPI:     Dakota Boyer is a 75 y.o. male with Type 2 Diabetes Mellitus here for follow up.    Labs from 3/4/2022 show a1c is 5.7%  Labs from 12/28/2021 show a1c was 6.0%  Labs from Sep 21, 2021 show A1c was 6.9%  Labs from 5/18/2021 show A1c was  6.7%  Labs from 7/1/2020 show A1c was 6.4%      He has a history of coronary artery disease with prior PCI in 2013 and has a pacemaker.  He was previously followed by Dr. Charles in cardiology and is now followed by Dr. Hsu.  Other comorbid conditions include obesity, sleep apnea, hyperlipidemia hypertension and nonproliferative diabetic retinopathy OU       On follow-up he is on Synjardy 12.5/1000 mg twice a day, Trulicity 3.0 mg once a week and Lantus 34 units daily    He was hospitalized for syncope related to covid pneumonia on 3/4/2022  He has recovered. He has declined covid vaccination due to personal reasons      He has hyperlipidemia and is taking rosuvastatin   He denies muscle aches or muscle weakness  LDL cholesterol was 21 on 3/2022      He has essential hypertension and is taking valsartan.    Blood pressure is at goal  He does not have albuminuria  U ACR was less than 30 on July 30, 2021      He is up-to-date with his eye exams [facility] to  [facility] and last eye exam was on July 2021        He does have subclinical hypothyroidism  TPO antibodies are negative  He is on Levothyroxine 25mcg daily  TSH was 5.59 with a free T4 on 3/4/2022  TSH was 3.4 with a free T4 of 2/2/2022  TSH was 6.1 with a free T4 of 1.08 on July 30, 2021  He is currently not on thyroid hormone replacement therapy              BG Diary:  Patient forgot blood sugar logs      Weight has been stable    Diabetes Complications   Retinopathy: Known retinopathy.  Last eye exam: He had an eye exam on July 29, 2021 with Dr[facility]ueredo at  [facility]  Neuropathy: Denies paresthesias or numbness in hands or feet. Denies  any foot wounds.  Exercise: Minimal.  Diet: Fair.  Patient's medications, allergies, and social histories were reviewed and updated as appropriate.    ROS:     CONS:     No fever, no chills   EYES:     No diplopia, no blurry vision   CV:           No chest pain, no palpitations   PULM:     No SOB, no cough, no hemoptysis.   GI:            No nausea, no vomiting, no diarrhea, no constipation   ENDO:     No polyuria, no polydipsia, no heat intolerance, no cold intolerance       Past Medical History:  Problem List:  2022-03: Pancytopenia ([facility])  2022-03: Hypokalemia  2022-03: History of syncope  2022-03: Constipation  2022-03: Black stools  2022-03: Unintentional weight loss  2022-03: Syncope  2022-03: Neutropenia ([facility])  2022-03: Syncope and collapse  2021-11: Former smoker  2021-11: History of bilateral knee replacement  2020-07: Long-term insulin use ([facility])  2020-06: Moderate aortic stenosis  2019-12: Benign prostatic hyperplasia with urinary obstruction  2018-12: Bilateral lower extremity edema  2018-08: Prostate cancer ([facility])  2018-01: Elevated PSA  2018-01: Bilateral primary osteoarthritis of knee  2017-06: Altered level of consciousness: March 2017  2016-10: Cervical spondylosis  2016-06: Diabetes mellitus type II, controlled ([facility])  2016-06: Hypertensive retinopathy of both eyes  2016-03: Depression with anxiety  2016-03: Obstructive sleep apnea on CPAP  2014-08: Type 2 diabetes mellitus, uncontrolled ([facility])  2014-08: Subclinical hypothyroidism  2014-08: Class 2 severe obesity with serious comorbidity and body   mass index (BMI) of 35.0 to 35.9 in adult ([facility])  2013-11: History of diverticulitis  2013-10: Other dyspnea and respiratory abnormality  2013-09: Fatigue  2013-09: Shortness of breath  2013-08: Coronary artery disease due to calcified coronary lesion: 40-  50% disease in the circumflex at cardiac catheterization in 2013 2013-04: Incisional infection  2013-02: DJD (degenerative joint disease) of cervical  spine  2012-11: Degeneration of lumbar or lumbosacral intervertebral disc  2012-10: Sick sinus syndrome (HCC)  2012-10: Pacemaker  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  2012-09: Dyslipidemia  2012-09: Episodic lightheadedness  2012-09: RBBB  2012-04: Back pain, chronic  2012-04: Hypertension associated with type 2 diabetes mellitus (HCC)  2012-04: Neck pain  2012-04: Insomnia      Past Surgical History:  Past Surgical History:   Procedure Laterality Date   • CT DSTR NROLYTC AGNT PARVERTEB FCT SNGL CRVCL/THORA Right 10/19/2016    Procedure: NEURO DEST FACET C/T W/IG SNGL - 3ON-C3, C8-T1    SINERGY;  Surgeon: Gaurang Pruett M.D.;  Location: SURGERY [facility];  Service: Pain Management   • CT DSTR NROLYTC AGNT PARVERTEB FCT ADDL CRVCL/THORA  10/19/2016    Procedure: NEURO DEST FACET C/T W/IG ADDL;  Surgeon: Gaurang Pruett M.D.;  Location: SURGERY [facility];  Service: Pain Management   • CT FLUOROSCOPIC GUIDANCE NEEDLE PLACEMENT  10/19/2016    Procedure: FLOURO GUIDE NEEDLE PLACEMENT;  Surgeon: Gaurang Pruett M.D.;  Location: SURGERY [facility];  Service: Pain Management   • SHOULDER ARTHROSCOPY Right 2015    torn bicep tendon and spurs   • RECOVERY  10/17/2013    Performed by Cath-Recovery Surgery at SURGERY SAME DAY [facility] ORS   • WOUND DEHISCENCE  4/10/2013    Performed by Tu Jain M.D. at SURGERY [facility] ORS   • CERVICAL FUSION POSTERIOR  2/27/2013    Performed by Tu Jain M.D. at SURGERY [facility]   • CERVICAL LAMINECTOMY POSTERIOR  2/27/2013    Performed by Tu Jain M.D. at SURGERY [facility]   • LUMBAR LAMINECTOMY DISKECTOMY  11/20/2012    Performed by Tu Jain M.D. at SURGERY [facility]   • RECOVERY  10/4/2012    Performed by Cath-Recovery Surgery at SURGERY SAME DAY [facility]   • PACEMAKER INSERTION  October 2012    [facility]  2110 implanted by Dr. Waite.   • SHOULDER DECOMPRESSION Left 2008    Left rotator cuff   •  "ARTHROPLASTY Left    • ARTHROSCOPY, KNEE Bilateral    • ORTHOPEDIC OSTEOTOMY      Both knees several times, 8 total   • OTHER     • SPINAL CORD STIMULATOR  1 month ago   • TONSILLECTOMY     • VASECTOMY          Allergies:  Aleve cold & [pseudoephedrine-naproxen na]; Ceftriaxone sodium; Naproxen; Latex; and Tape     Social History:  Social History     Tobacco Use   • Smoking status: Former Smoker     Packs/day: 1.00     Years: 40.00     Pack years: 40.00     Types: Cigarettes     Quit date: 1/3/1990     Years since quittin.2   • Smokeless tobacco: Never Used   Vaping Use   • Vaping Use: Never used   Substance Use Topics   • Alcohol use: No     Alcohol/week: 0.0 oz   • Drug use: No        Family History:   family history includes Alcohol/Drug in his brother and mother; Cancer in his brother and brother; Diabetes in his brother; Heart Disease in his brother, maternal grandmother, and sister; Heart Disease (age of onset: 36) in his father; Heart Disease (age of onset: 60) in his brother; Hyperlipidemia in his brother and maternal grandmother; Hypertension in his brother and maternal grandmother; Lung Disease in his brother and mother; No Known Problems in his maternal grandfather and paternal grandmother; Sleep Apnea in his brother.      PHYSICAL EXAM:   OBJECTIVE:  Vital signs: /58 (BP Location: Left arm, Patient Position: Sitting, BP Cuff Size: Large adult)   Pulse (!) 108   Ht 1.702 m (5' 7\")   Wt 93.7 kg (206 lb 9.6 oz)   SpO2 98%   BMI 32.36 kg/m²   GENERAL: Well-developed, well-nourished in no apparent distress.   EYE:  No ocular asymmetry, PERRLA  HENT: Pink, moist mucous membranes.    NECK: No thyromegaly.   CARDIOVASCULAR:  No murmurs  LUNGS: Clear breath sounds  ABDOMEN: Soft, nontender   EXTREMITIES: No clubbing, cyanosis, or edema.   NEUROLOGICAL: No gross focal motor abnormalities   LYMPH: No cervical adenopathy palpated.   SKIN: No rashes, lesions.       Labs:  Lab Results "   Component Value Date/Time    HBA1C 5.7 (H) 03/04/2022 09:00 PM        Lab Results   Component Value Date/Time    WBC 6.7 03/09/2022 09:28 AM    RBC 4.08 (L) 03/09/2022 09:28 AM    HEMOGLOBIN 13.2 (L) 03/09/2022 09:28 AM    MCV 87.3 03/09/2022 09:28 AM    MCH 32.4 03/09/2022 09:28 AM    MCHC 37.1 (H) 03/09/2022 09:28 AM    RDW 43.3 03/09/2022 09:28 AM    MPV 10.8 03/09/2022 09:28 AM       Lab Results   Component Value Date/Time    SODIUM 137 03/09/2022 09:28 AM    POTASSIUM 3.8 03/09/2022 09:28 AM    CHLORIDE 103 03/09/2022 09:28 AM    CO2 22 03/09/2022 09:28 AM    ANION 12.0 03/09/2022 09:28 AM    GLUCOSE 137 (H) 03/09/2022 09:28 AM    BUN 15 03/09/2022 09:28 AM    CREATININE 0.93 03/09/2022 09:28 AM    CALCIUM 9.8 03/09/2022 09:28 AM    ASTSGOT 27 03/05/2022 07:57 AM    ALTSGPT 34 03/05/2022 07:57 AM    TBILIRUBIN 1.2 03/05/2022 07:57 AM    ALBUMIN 3.3 03/05/2022 07:57 AM    TOTPROTEIN 5.9 (L) 03/05/2022 07:57 AM    GLOBULIN 2.6 03/05/2022 07:57 AM    AGRATIO 1.3 03/05/2022 07:57 AM       Lab Results   Component Value Date/Time    CHOLSTRLTOT 94 (L) 05/13/2019 0711    TRIGLYCERIDE 120 05/13/2019 0711    HDL 30 (A) 05/13/2019 0711    LDL 40 05/13/2019 0711       Lab Results   Component Value Date/Time    MALBCRT see below 07/30/2021 08:02 AM    MICROALBUR <1.2 07/30/2021 08:02 AM        Lab Results   Component Value Date/Time    TSHULTRASEN 3.650 10/08/2019 1546     No results found for: FREEDIR  Lab Results   Component Value Date/Time    FREET3 3.6 07/22/2014 0743     No results found for: THYSTIMIG        ASSESSMENT/PLAN:     1. Controlled type 2 diabetes mellitus with other circulatory complication, with long-term current use of insulin (HCC)  Fair control  A1c is stable at 5.7%  Continue Lantus 34 units daily  Continue Trulicity 3 mg weekly  Continue Synjardy twice a day  I recommend that he get an updated urine microalbumin  He is up-to-date with his serum creatinine and fasting lipids  Follow-up with Kayla  in 3-6 months    2. Subclinical hypothyroidism  Unstable  TSH is still elevated  Will increase levothyroxine to 50 MCG daily  Reviewed how to properly take levothyroxine  Repeat TSH levels in 3 months    3. Dyslipidemia  Controlled  Continue atorvastatin  Repeat fasting lipids in 12 months    4. Long-term insulin use (HCC)  Patient is on long-term basal insulin therapy with a GLP-1 and SGLT2 inhibitor for type 2 diabetes management      Return in about 6 months (around 9/23/2022).      Thank you kindly for allowing me to participate in the diabetes care plan for this patient.    Luis Daniel Ring MD, JORGE A, [facility]  07/01/20    CC:   Tanya Rayo P.A.-C.

## 2022-03-30 ENCOUNTER — PATIENT MESSAGE (OUTPATIENT)
Dept: CARDIOLOGY | Facility: MEDICAL CENTER | Age: 77
End: 2022-03-30
Payer: MEDICARE

## 2022-03-31 ENCOUNTER — HOSPITAL ENCOUNTER (OUTPATIENT)
Dept: LAB | Facility: MEDICAL CENTER | Age: 77
End: 2022-03-31
Attending: UROLOGY
Payer: MEDICARE

## 2022-03-31 LAB — PSA SERPL-MCNC: 9.11 NG/ML (ref 0–4)

## 2022-03-31 PROCEDURE — 36415 COLL VENOUS BLD VENIPUNCTURE: CPT

## 2022-03-31 PROCEDURE — 84153 ASSAY OF PSA TOTAL: CPT

## 2022-04-18 ENCOUNTER — NON-PROVIDER VISIT (OUTPATIENT)
Dept: CARDIOLOGY | Facility: MEDICAL CENTER | Age: 77
End: 2022-04-18
Payer: MEDICARE

## 2022-04-18 ENCOUNTER — OFFICE VISIT (OUTPATIENT)
Dept: CARDIOLOGY | Facility: MEDICAL CENTER | Age: 77
End: 2022-04-18
Payer: MEDICARE

## 2022-04-18 VITALS
SYSTOLIC BLOOD PRESSURE: 122 MMHG | BODY MASS INDEX: 32.33 KG/M2 | HEART RATE: 95 BPM | DIASTOLIC BLOOD PRESSURE: 60 MMHG | OXYGEN SATURATION: 97 % | WEIGHT: 206 LBS | HEIGHT: 67 IN | RESPIRATION RATE: 18 BRPM

## 2022-04-18 DIAGNOSIS — Z95.0 PACEMAKER: ICD-10-CM

## 2022-04-18 DIAGNOSIS — Z87.898 HISTORY OF SYNCOPE: ICD-10-CM

## 2022-04-18 DIAGNOSIS — E11.3559 CONTROLLED TYPE 2 DIABETES MELLITUS WITH STABLE PROLIFERATIVE RETINOPATHY, WITH LONG-TERM CURRENT USE OF INSULIN, UNSPECIFIED LATERALITY (HCC): ICD-10-CM

## 2022-04-18 DIAGNOSIS — Z79.4 CONTROLLED TYPE 2 DIABETES MELLITUS WITH STABLE PROLIFERATIVE RETINOPATHY, WITH LONG-TERM CURRENT USE OF INSULIN, UNSPECIFIED LATERALITY (HCC): ICD-10-CM

## 2022-04-18 DIAGNOSIS — I25.84 CORONARY ARTERY DISEASE DUE TO CALCIFIED CORONARY LESION: ICD-10-CM

## 2022-04-18 DIAGNOSIS — I49.5 SICK SINUS SYNDROME (HCC): ICD-10-CM

## 2022-04-18 DIAGNOSIS — E78.5 DYSLIPIDEMIA: ICD-10-CM

## 2022-04-18 DIAGNOSIS — E11.59 HYPERTENSION ASSOCIATED WITH TYPE 2 DIABETES MELLITUS (HCC): ICD-10-CM

## 2022-04-18 DIAGNOSIS — I25.10 CORONARY ARTERY DISEASE DUE TO CALCIFIED CORONARY LESION: ICD-10-CM

## 2022-04-18 DIAGNOSIS — G90.01 HYPERSENSITIVE CAROTID SINUS SYNDROME: ICD-10-CM

## 2022-04-18 DIAGNOSIS — I35.0 MODERATE AORTIC STENOSIS: ICD-10-CM

## 2022-04-18 DIAGNOSIS — I15.2 HYPERTENSION ASSOCIATED WITH TYPE 2 DIABETES MELLITUS (HCC): ICD-10-CM

## 2022-04-18 PROBLEM — E87.6 HYPOKALEMIA: Status: RESOLVED | Noted: 2022-03-09 | Resolved: 2022-04-18

## 2022-04-18 PROCEDURE — 99214 OFFICE O/P EST MOD 30 MIN: CPT | Performed by: NURSE PRACTITIONER

## 2022-04-18 PROCEDURE — 93280 PM DEVICE PROGR EVAL DUAL: CPT | Performed by: NURSE PRACTITIONER

## 2022-04-18 RX ORDER — ATORVASTATIN CALCIUM 20 MG/1
20 TABLET, FILM COATED ORAL NIGHTLY
COMMUNITY
End: 2022-11-04

## 2022-04-18 ASSESSMENT — ENCOUNTER SYMPTOMS
COUGH: 0
PALPITATIONS: 0
BRUISES/BLEEDS EASILY: 0
HEADACHES: 0
BACK PAIN: 1
ORTHOPNEA: 0
NAUSEA: 0
LOSS OF CONSCIOUSNESS: 0
PND: 0
ABDOMINAL PAIN: 0
INSOMNIA: 0
MYALGIAS: 0
FEVER: 0
SHORTNESS OF BREATH: 0
DIZZINESS: 0
CHILLS: 0

## 2022-04-18 ASSESSMENT — FIBROSIS 4 INDEX: FIB4 SCORE: 1.91

## 2022-04-18 NOTE — PROGRESS NOTES
Chief Complaint   Patient presents with   • Hospital Follow-up   • Syncope   • Hypotension       Subjective     Gerry Boyer is a 77 y.o. male who presents today for hospital follow-up of syncope.    Gerry is a 77 year old male with history of CAD, status post PCI/SWETHA x 2 to the RCA in November 2021, moderate AS, HTN, hyperlipidemia, DM and hypothyroidism, normally followed by Dr. Hsu.    On 3/4/2022, he was admitted to [facility] for syncopal episodes. While there, he tested positive for Covid. He did NOT require any oxygen support; echocardiogram showed normal LV size and function, and moderate AS. He was discharged home in stable condition.    Five days later, Valsartan and Metoprolol were stopped by his PCP, as BP was quite low; he has been losing weight as well. He had not had any further sycopal episodes since then.    He is here today for follow-up. No chest pain, pressure or discomfort; no palpitations; no shortness of breath, orthopnea or PND; no dizziness or syncope; no LE edema. He does have debilitating back pain. His thyroid medication was recently adjusted by his endocrinologist.    Past Medical History:   Diagnosis Date   • Anxiety    • Aortic stenosis 03/2022    Echocardiogram with normal LV size, mild concentric LVH, LVEF 75%. Normal RA, LA and RV. Trace MR. Moderate AS (peak 37mmHg, mean 24mmHg, Vmax 3.0m/s, GUY 1.1cm2). Trace TR.   • Back pain, chronic 10/18/2016   • Bilateral primary osteoarthritis of knee 1/23/2018   • Blood clotting disorder (HCC) 1978    s/p Left knee surgery- DVT in left leg   • Breath shortness    • CAD (coronary artery disease) 11/2021    PCI/SWETHA x 2 (Wayne 3.0 x 38mm, Giovanni 3.5 x 38mm) to the RCA.   • Cancer (HCC) ? early 90's    Melanoma Left arm- surgically removed   • Degeneration of lumbar or lumbosacral intervertebral disc     • Dental disorder    • Depression    • Diabetes     Oral agents and insulin   • DJD (degenerative joint disease) of cervical spine     •  Hyperlipidemia    • Hypersensitive carotid sinus syndrome     • Hypertension    • Insomnia     • Neck pain    • Pacemaker October 2012    [employer] [address] [address] #[id number] [facility]    • RBBB     • S/P lumbar discectomy     • Sick sinus syndrome (HCC)     • Sleep apnea     Uses CPAP   • Syncope October 2012    Treated with PPM   • Thyroid disease     Hypothyroid   • Unspecified hemorrhagic conditions     Reports bleeds easily     Past Surgical History:   Procedure Laterality Date   • NE DSTR NROLYTC AGNT PARVERTEB FCT SNGL CRVCL/THORA Right 10/19/2016    Procedure: NEURO DEST FACET C/T W/IG SNGL - 3ON-C3, C8-T1    SINERGY;  Surgeon: Gaurang Pruett M.D.;  Location: SURGERY SURGICAL [facility];  Service: Pain Management   • NE DSTR NROLYTC AGNT PARVERTEB FCT ADDL CRVCL/THORA  10/19/2016    Procedure: NEURO DEST FACET C/T W/IG ADDL;  Surgeon: Gaurang Pruett M.D.;  Location: SURGERY SURGICAL [facility] ORS;  Service: Pain Management   • NE FLUOROSCOPIC GUIDANCE NEEDLE PLACEMENT  10/19/2016    Procedure: FLOURO GUIDE NEEDLE PLACEMENT;  Surgeon: Gaurang Pruett M.D.;  Location: SURGERY SURGICAL [facility];  Service: Pain Management   • SHOULDER ARTHROSCOPY Right 2015    torn bicep tendon and spurs   • RECOVERY  10/17/2013    Performed by Cath-Recovery Surgery at SURGERY SAME DAY [facility] ORS   • WOUND DEHISCENCE  4/10/2013    Performed by Tu Jain M.D. at SURGERY [facility] ORS   • CERVICAL FUSION POSTERIOR  2/27/2013    Performed by Tu Jain M.D. at SURGERY [facility] ORS   • CERVICAL LAMINECTOMY POSTERIOR  2/27/2013    Performed by Tu Jain M.D. at SURGERY [facility] ORS   • LUMBAR LAMINECTOMY DISKECTOMY  11/20/2012    Performed by Tu Jain M.D. at SURGERY [facility] ORS   • RECOVERY  10/4/2012    Performed by Cath-Recovery Surgery at SURGERY SAME DAY [facility]   • PACEMAKER INSERTION  October 2012    [employer] [facility] [employer] [id number] implanted by Dr. Waite.   • SHOULDER DECOMPRESSION Left 2008     "Left rotator cuff   • ARTHROPLASTY Left    • ARTHROSCOPY, KNEE Bilateral    • ORTHOPEDIC OSTEOTOMY      Both knees several times, 8 total   • OTHER     • SPINAL CORD STIMULATOR  1 month ago   • TONSILLECTOMY     • VASECTOMY       Family History   Problem Relation Age of Onset   • Lung Disease Mother         Smoker ( of lung dz)   • Alcohol/Drug Mother    • Heart Disease Father 36        CAD   • Heart Disease Sister         \"hole in heart\"   • Lung Disease Brother         COPD, CANCER   • Cancer Brother         Prostate, Lung ( of lung CA)   • Alcohol/Drug Brother    • Heart Disease Brother    • Diabetes Brother    • Hypertension Brother    • Hyperlipidemia Brother    • Heart Disease Maternal Grandmother    • Hypertension Maternal Grandmother    • Hyperlipidemia Maternal Grandmother    • Cancer Brother          of esophagus/stomach, thyroid   • Heart Disease Brother 60        MI, stent, PM/defib   • Sleep Apnea Brother    • No Known Problems Maternal Grandfather    • No Known Problems Paternal Grandmother      Social History     Socioeconomic History   • Marital status:      Spouse name: Not on file   • Number of children: Not on file   • Years of education: Not on file   • Highest education level: Not on file   Occupational History   • Not on file   Tobacco Use   • Smoking status: Former Smoker     Packs/day: 1.00     Years: 40.00     Pack years: 40.00     Types: Cigarettes     Quit date: 1/3/1990     Years since quittin.3   • Smokeless tobacco: Never Used   Vaping Use   • Vaping Use: Never used   Substance and Sexual Activity   • Alcohol use: No     Alcohol/week: 0.0 oz   • Drug use: No   • Sexual activity: Yes     Partners: Female   Other Topics Concern   • Not on file   Social History Narrative    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      Social Determinants of Health     Financial Resource Strain: Not on file   Food Insecurity: Not on file   Transportation Needs: Not on file "   Physical Activity: Not on file   Stress: Not on file   Social Connections: Not on file   Intimate Partner Violence: Not on file   Housing Stability: Not on file     Allergies   Allergen Reactions   • Aleve Cold & [Pseudoephedrine-Naproxen Na] Anaphylaxis   • Ceftriaxone Sodium Anaphylaxis     Reaction; 1970's.   • Naproxen Anaphylaxis     Reaction; 2004.   • Latex Rash     Contact site   • Tape Rash and Itching     Plastic tape (paper tape ok)     Outpatient Encounter Medications as of 4/18/2022   Medication Sig Dispense Refill   • atorvastatin (LIPITOR) 20 MG Tab Take 20 mg by mouth every evening.     • levothyroxine (SYNTHROID) 50 MCG Tab Take 1 Tablet by mouth every morning on an empty stomach. 90 Tablet 2   • rosuvastatin (CRESTOR) 40 MG tablet Take 1 Tablet by mouth every day. 90 Tablet 3   • clopidogrel (PLAVIX) 75 MG Tab Take 1 Tablet by mouth every day. 90 Tablet 3   • insulin glargine (LANTUS SOLOSTAR) 100 UNIT/ML Solution Pen-injector injection Inject 36 Units under the skin every evening. 5 pens per month (Patient taking differently: Inject 34 Units under the skin every evening. 5 pens per month) 45 mL 3   • Dulaglutide (TRULICITY) 3 MG/0.5ML Solution Pen-injector Inject 3 mg under the skin every 7 days. 2.0 ml equals 4 pens per month 6 mL 3   • SYNJARDY XR 12.5-1000 MG TABLET SR 24 HR TAKE 2 TABLETS DAILY (Patient taking differently: Take 2 Tablets by mouth every evening.) 180 Tablet 3   • glucose blood (FREESTYLE LITE) strip 1 Strip by Other route 2 times a day. 150 Strip 3   • Lancets MISC Lancets order: Lancets for Abbott Freestyle Lite meter. Sig: use 4 times daily  and prn ssx high or low sugar. #100 RF x 0 360 Each 4   • Blood Glucose Monitoring Suppl [place] Meter: Dispense Abbott Freestyle Lite meter. Sig. Use as directed for blood sugar monitoring. #1. NR. 1 Device 0   • B-D ULTRAFINE III SHORT PEN 31G X 8 MM MISC USE 1 NEEDLE EVERY DAY WITH LANTUS 999 Each 2   • aspirin EC (ECOTRIN) 81 MG TBEC  "Take 81 mg by mouth every evening.     • [DISCONTINUED] valsartan (DIOVAN) 160 MG Tab Take 1 Tablet by mouth every day. (Patient not taking: Reported on 4/18/2022) 90 Tablet 2   • [DISCONTINUED] ZINC OXIDE, TOPICAL, 10 % Cream Apply 3 times daily to the perirectal skin irritation area. (Patient not taking: Reported on 4/18/2022) 78 g 1   • [DISCONTINUED] metoprolol tartrate (LOPRESSOR) 25 MG Tab Take 1 Tablet by mouth 2 times a day. (Patient not taking: No sig reported) 180 Tablet 3     No facility-administered encounter medications on file as of 4/18/2022.     Review of Systems   Constitutional: Negative for chills and fever.   HENT: Negative for congestion.    Respiratory: Negative for cough and shortness of breath.    Cardiovascular: Negative for chest pain, palpitations, orthopnea, leg swelling and PND.   Gastrointestinal: Negative for abdominal pain and nausea.   Musculoskeletal: Positive for back pain and joint pain. Negative for myalgias.   Skin: Negative for rash.   Neurological: Negative for dizziness, loss of consciousness and headaches.   Endo/Heme/Allergies: Does not bruise/bleed easily.   Psychiatric/Behavioral: The patient does not have insomnia.               Objective     /60 (BP Location: Left arm, Patient Position: Sitting, BP Cuff Size: Adult)   Pulse 95   Resp 18   Ht 1.702 m (5' 7\")   Wt 93.4 kg (206 lb)   SpO2 97%   BMI 32.26 kg/m²     Physical Exam  Constitutional:       Appearance: He is well-developed.   HENT:      Head: Normocephalic.   Neck:      Vascular: No JVD.   Cardiovascular:      Rate and Rhythm: Normal rate and regular rhythm.      Heart sounds: Normal heart sounds.      Comments: PM in left chest wall.  Pulmonary:      Effort: Pulmonary effort is normal. No respiratory distress.      Breath sounds: Normal breath sounds. No wheezing or rales.   Abdominal:      General: Bowel sounds are normal. There is no distension.      Palpations: Abdomen is soft.      Tenderness: " There is no abdominal tenderness.   Musculoskeletal:         General: Normal range of motion.      Cervical back: Normal range of motion and neck supple.   Skin:     General: Skin is warm and dry.      Findings: No rash.   Neurological:      Mental Status: He is alert and oriented to person, place, and time.     PM interrogation today reveals normal function. He is paced <1% of total time. 38 mode switching episodes (all <10 seconds, <1% of total time)    CONCLUSIONS OF ECHOCARDIOGRAM OF 3/5/2022:  Normal left ventricular systolic function.  The left ventricular ejection fraction is visually estimated to be 75%.  Mild concentric left ventricular hypertrophy.  Moderate aortic valve stenosis.  The right ventricle is normal in size and systolic function.  Pacer/ICD wire seen in the right ventricle.  The ascending aorta diameter is 3.6 cm.      IMPRESSIONS OF [facility] OF 11/30/2021:  1. Class III angina due to obstructive one-vessel coronary artery disease  2.  Successful PCI of the RCA using 2 overlapping drug-eluting stents, IVUS guidance  3.  Normal right and left heart hemodynamics and preserved cardiac output  4.  Moderate aortic stenosis    Lab Results   Component Value Date/Time    CHOLSTRLTOT 65 (L) 03/05/2022 07:57 AM    LDL 21 03/05/2022 07:57 AM    HDL 24 (A) 03/05/2022 07:57 AM    TRIGLYCERIDE 98 03/05/2022 07:57 AM       Lab Results   Component Value Date/Time    SODIUM 137 03/09/2022 09:28 AM    POTASSIUM 3.8 03/09/2022 09:28 AM    CHLORIDE 103 03/09/2022 09:28 AM    CO2 22 03/09/2022 09:28 AM    GLUCOSE 137 (H) 03/09/2022 09:28 AM    BUN 15 03/09/2022 09:28 AM    CREATININE 0.93 03/09/2022 09:28 AM     Lab Results   Component Value Date/Time    ALKPHOSPHAT 63 03/05/2022 07:57 AM    ASTSGOT 27 03/05/2022 07:57 AM    ALTSGPT 34 03/05/2022 07:57 AM    TBILIRUBIN 1.2 03/05/2022 07:57 AM        Assessment & Plan     1. History of syncope     2. Pacemaker     3. Sick sinus syndrome (HCC)     4. Coronary artery  disease due to calcified coronary lesion: 40-50% disease in the circumflex at cardiac catheterization in 2013     5. Moderate aortic stenosis     6. Hypersensitive carotid sinus syndrome     7. Hypertension associated with type 2 diabetes mellitus (HCC)     8. Dyslipidemia     9. Controlled type 2 diabetes mellitus with stable proliferative retinopathy, with long-term current use of insulin, unspecified laterality ([facility])         Medical Decision Making: Today's Assessment/Status/Plan:      1. Syncopal episode, possibly low BP with weight loss and recent Covid infection. Valsartan and Metoprolol have been held for now. No further syncope.    2. Sick sinus syndrome with PPM, which is working normally. He is paced <1% of total time. Device is working normally.    3. CAD, status post PCI/SWETHA x 2 to the RCA in November 2021. He is on ASA, Plavix and Crestor.    4. Moderate AS, monitored regularly.    5. Hypertension, not currently on any therapy. BP is stable. No further syncope.    6. Hyperlipidemia, treated with Crestor 40mg. Recent LDL was 21.    7. Diabetes mellitus, treated with insulin and Trulicity and Synjardy, followed by endocrinology.    8. Chronic back pain, debilitating.    Reassured regarding normal function of PM. Could have had syncopal episode due to low BP and weight loss. Continue to monitor BP at home. Keep follow-up with other providers. Follow-up with Dr. Hsu in 4 months.

## 2022-04-19 ENCOUNTER — APPOINTMENT (OUTPATIENT)
Dept: MEDICAL GROUP | Facility: PHYSICIAN GROUP | Age: 77
End: 2022-04-19
Payer: MEDICARE

## 2022-04-19 ENCOUNTER — OFFICE VISIT (OUTPATIENT)
Dept: MEDICAL GROUP | Facility: PHYSICIAN GROUP | Age: 77
End: 2022-04-19
Payer: MEDICARE

## 2022-04-19 VITALS
OXYGEN SATURATION: 96 % | WEIGHT: 206 LBS | DIASTOLIC BLOOD PRESSURE: 70 MMHG | SYSTOLIC BLOOD PRESSURE: 136 MMHG | HEART RATE: 88 BPM | TEMPERATURE: 97.8 F | HEIGHT: 67 IN | BODY MASS INDEX: 32.33 KG/M2

## 2022-04-19 DIAGNOSIS — I15.2 HYPERTENSION ASSOCIATED WITH TYPE 2 DIABETES MELLITUS (HCC): ICD-10-CM

## 2022-04-19 DIAGNOSIS — C61 MALIGNANT NEOPLASM OF PROSTATE (HCC): ICD-10-CM

## 2022-04-19 DIAGNOSIS — R97.20 ELEVATED PSA: ICD-10-CM

## 2022-04-19 DIAGNOSIS — E11.59 HYPERTENSION ASSOCIATED WITH TYPE 2 DIABETES MELLITUS (HCC): ICD-10-CM

## 2022-04-19 DIAGNOSIS — K59.00 CONSTIPATION, UNSPECIFIED CONSTIPATION TYPE: ICD-10-CM

## 2022-04-19 DIAGNOSIS — Z87.898 HISTORY OF SYNCOPE: ICD-10-CM

## 2022-04-19 PROBLEM — K92.1 BLACK STOOLS: Status: RESOLVED | Noted: 2022-03-09 | Resolved: 2022-04-19

## 2022-04-19 PROBLEM — R63.4 UNINTENTIONAL WEIGHT LOSS: Status: RESOLVED | Noted: 2022-03-05 | Resolved: 2022-04-19

## 2022-04-19 PROBLEM — D70.9 NEUTROPENIA (HCC): Status: RESOLVED | Noted: 2022-03-04 | Resolved: 2022-04-19

## 2022-04-19 PROBLEM — D61.818 PANCYTOPENIA (HCC): Status: RESOLVED | Noted: 2022-03-09 | Resolved: 2022-04-19

## 2022-04-19 PROCEDURE — 99214 OFFICE O/P EST MOD 30 MIN: CPT | Performed by: STUDENT IN AN ORGANIZED HEALTH CARE EDUCATION/TRAINING PROGRAM

## 2022-04-19 ASSESSMENT — FIBROSIS 4 INDEX: FIB4 SCORE: 1.91

## 2022-04-19 NOTE — PROGRESS NOTES
Subjective:     No chief complaint on file.      HPI:   Dakota presents today with ***    No problem-specific Assessment & Plan notes found for this encounter.      Current Outpatient Medications Ordered in Epic   Medication Sig Dispense Refill   • atorvastatin (LIPITOR) 20 MG Tab Take 20 mg by mouth every evening.     • levothyroxine (SYNTHROID) 50 MCG Tab Take 1 Tablet by mouth every morning on an empty stomach. 90 Tablet 2   • rosuvastatin (CRESTOR) 40 MG tablet Take 1 Tablet by mouth every day. 90 Tablet 3   • clopidogrel (PLAVIX) 75 MG Tab Take 1 Tablet by mouth every day. 90 Tablet 3   • insulin glargine (LANTUS SOLOSTAR) 100 UNIT/ML Solution Pen-injector injection Inject 36 Units under the skin every evening. 5 pens per month (Patient taking differently: Inject 34 Units under the skin every evening. 5 pens per month) 45 mL 3   • Dulaglutide (TRULICITY) 3 MG/0.5ML Solution Pen-injector Inject 3 mg under the skin every 7 days. 2.0 ml equals 4 pens per month 6 mL 3   • SYNJARDY XR 12.5-1000 MG TABLET SR 24 HR TAKE 2 TABLETS DAILY (Patient taking differently: Take 2 Tablets by mouth every evening.) 180 Tablet 3   • glucose blood (FREESTYLE LITE) strip 1 Strip by Other route 2 times a day. 150 Strip 3   • Lancets MISC Lancets order: Lancets for Abbott Freestyle Lite meter. Sig: use 4 times daily  and prn ssx high or low sugar. #100 RF x 0 360 Each 4   • Blood Glucose Monitoring Suppl [place] Meter: Dispense Abbott Freestyle Lite meter. Sig. Use as directed for blood sugar monitoring. #1. NR. 1 Device 0   • B-D ULTRAFINE III SHORT PEN 31G X 8 MM MISC USE 1 NEEDLE EVERY DAY WITH LANTUS 999 Each 2   • aspirin EC (ECOTRIN) 81 MG TBEC Take 81 mg by mouth every evening.       No current Epic-ordered facility-administered medications on file.       Health Maintenance: {Reviewed with patient.}    ROS:  Gen: no fevers/chills, no changes in weight  Eyes: no changes in vision  ENT: no sore throat, no hearing loss, no bloody  nose  Pulm: no sob, no cough  CV: no chest pain, no palpitations  GI: no nausea/vomiting, no diarrhea  : no dysuria  MSk: no myalgias  Skin: no rash  Neuro: no headaches, no numbness/tingling  Heme/Lymph: no easy bruising      Objective:     Exam:  There were no vitals taken for this visit. There is no height or weight on file to calculate BMI.    Physical Exam:  Constitutional: Well-developed and well-nourished. No acute distress.   Skin: Skin is warm and dry. No rash noted.  Head: Atraumatic without lesions.  Eyes: Conjunctivae and extraocular motions are normal. Pupils are equal, round, {and reactive to light}. No scleral icterus.   Ears:  External ears unremarkable. Tympanic membranes clear and intact.  Nose: Nares patent. {Septum midline. Turbinates without erythema nor edema.} No discharge.  Mouth/Throat: Oropharynx is clear and moist. Posterior pharynx without erythema or exudates.  Neck: Supple, trachea midline. Normal range of motion. No thyromegaly present. No lymphadenopathy--cervical or supraclavicular.  Cardiovascular: Regular rate and rhythm, S1 and S2 without murmur, rubs, or gallops.  Lungs: Normal inspiratory effort, CTA bilaterally, no wheezes/rhonchi/rales  Abdomen: Soft, non tender, and without distention. Active bowel sounds. No rebound, guarding, masses or HSM.  Extremities: No cyanosis, clubbing, erythema, nor edema. {Distal pulses intact and symmetric.}  Musculoskeletal: ***  Neurological: Alert and oriented x 3. No gross/focal deficits.  Psychiatric:  Behavior, mood, and affect are appropriate.    Labs: ***    Assessment & Plan:     77 y.o. male with the following -     There are no diagnoses linked to this encounter.     I spent a total of *** minutes with record review, exam, communication with the patient, communication with other providers, and documentation of this encounter.      No follow-ups on file.    Please note that this dictation was created using voice recognition software. I  have made every reasonable attempt to correct obvious errors, but I expect that there are errors of grammar and possibly content that I did not discover before finalizing the note.

## 2022-04-19 NOTE — PROGRESS NOTES
Subjective:     Chief Complaint   Patient presents with   • Follow-Up     HPI:   Dakota presents today for follow-up.    Since his last visit he has seen his urologist, cardiologist, endocrinologist and also gastroenterology.  He is unable to complete colonoscopy due to need to continue on DAPT so he is having to do 3 stool samples and CT abdomen pelvis with contrast.  He states that he typically will have soft fibrinogens every 3 to 5 days but more recently since being in the hospital has been a bit constipated which he took requiring stool softener (sounds like Colace) which was effective and plans to take another tonight.  Denies any abdominal pain or further black stools.  States that he was previously taking iron and now is not.  Denies any further weight loss.  Patient states that his cardiologist agreed with discontinuing his blood pressure medication due to hypotension which likely contributed or caused his syncope.  He has a follow-up to evaluate his aortic stenosis.    He states that he is going to see his urologist again in September, they plan to trend his PSA at that time.    Current Outpatient Medications Ordered in Epic   Medication Sig Dispense Refill   • atorvastatin (LIPITOR) 20 MG Tab Take 20 mg by mouth every evening.     • levothyroxine (SYNTHROID) 50 MCG Tab Take 1 Tablet by mouth every morning on an empty stomach. 90 Tablet 2   • rosuvastatin (CRESTOR) 40 MG tablet Take 1 Tablet by mouth every day. 90 Tablet 3   • clopidogrel (PLAVIX) 75 MG Tab Take 1 Tablet by mouth every day. 90 Tablet 3   • insulin glargine (LANTUS SOLOSTAR) 100 UNIT/ML Solution Pen-injector injection Inject 36 Units under the skin every evening. 5 pens per month (Patient taking differently: Inject 34 Units under the skin every evening. 5 pens per month) 45 mL 3   • Dulaglutide (TRULICITY) 3 MG/0.5ML Solution Pen-injector Inject 3 mg under the skin every 7 days. 2.0 ml equals 4 pens per month 6 mL 3   • SYNJARDY XR  "12.5-1000 MG TABLET SR 24 HR TAKE 2 TABLETS DAILY (Patient taking differently: Take 2 Tablets by mouth every evening.) 180 Tablet 3   • glucose blood (FREESTYLE LITE) strip 1 Strip by Other route 2 times a day. 150 Strip 3   • Lancets MISC Lancets order: Lancets for Abbott Freestyle Lite meter. Sig: use 4 times daily  and prn ssx high or low sugar. #100 RF x 0 360 Each 4   • Blood Glucose Monitoring Suppl [place] Meter: Dispense Abbott Freestyle Lite meter. Sig. Use as directed for blood sugar monitoring. #1. NR. 1 Device 0   • B-D ULTRAFINE III SHORT PEN 31G X 8 MM MISC USE 1 NEEDLE EVERY DAY WITH LANTUS 999 Each 2   • aspirin EC (ECOTRIN) 81 MG TBEC Take 81 mg by mouth every evening.       No current Epic-ordered facility-administered medications on file.     ROS:  Gen: no fevers/chills, no changes in weight  Eyes: no changes in vision  ENT: no sore throat  Pulm: no sob, no cough  CV: no chest pain, no palpitations  GI: no nausea/vomiting, no diarrhea  : no dysuria  MSk: no myalgias  Skin: no rash  Neuro: no headaches, no numbness/tingling  Heme/Lymph: no easy bruising    Objective:     Exam:  /70 (BP Location: Left arm, Patient Position: Sitting, BP Cuff Size: Adult)   Pulse 88   Temp 36.6 °C (97.8 °F) (Temporal)   Ht 1.702 m (5' 7\")   Wt 93.4 kg (206 lb)   SpO2 96%   BMI 32.26 kg/m²  Body mass index is 32.26 kg/m².    Physical Exam:  Constitutional: Well-developed and well-nourished. No acute distress.   Skin: Skin is warm and dry. No rash noted.  Head: Atraumatic without lesions.  Eyes: Conjunctivae and extraocular motions are normal. Pupils are equal, round. No scleral icterus.   Mouth/Throat: Wearing mask.  Neck: Supple, trachea midline.  Cardiovascular: Regular rate and rhythm, S1 and S2 without rubs, or gallops. Systolic murmur RUSB.  Lungs: Normal inspiratory effort, CTA bilaterally, no wheezes/rhonchi/rales  Abdomen: Soft, non tender, and without distention. Active bowel sounds. No rebound, " guarding, masses or HSM.  Extremities: No cyanosis, clubbing, erythema, nor edema.  Neurological: Alert and oriented x 3. No gross/focal deficits.  Psychiatric:  Behavior, mood, and affect are appropriate.    Labs: Reviewed from 3/9/2022    Assessment & Plan:     77 y.o. male with the following -     1. 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  Blood pressure excellent at cardiology appointment yesterday and near goal today, patient to monitor at home.  Discussed goal blood pressures.    2. History of syncope  Suspect related to relative hypotension, has follow-up with cardiology for moderate AS.    3. Constipation, unspecified constipation type  New concern, Colace is working well.  Follow-up with gastroenterology for testing as recommended.    4. Elevated PSA  5. Prostate cancer (HCC)  This is a chronic condition, patient has a follow-up to see urology again and trend PSA in September.  Continue care with specialist.  Of note his weight is stable from prior, previously had complained of unintentional weight loss.  Will trend at follow-up.    I spent a total of 30 minutes with record review, exam, communication with the patient, and documentation of this encounter.    Return in about 6 months (around 10/19/2022), or if symptoms worsen or fail to improve.    Please note that this dictation was created using voice recognition software. I have made every reasonable attempt to correct obvious errors, but I expect that there are errors of grammar and possibly content that I did not discover before finalizing the note.

## 2022-04-22 ENCOUNTER — HOSPITAL ENCOUNTER (OUTPATIENT)
Dept: RADIOLOGY | Facility: MEDICAL CENTER | Age: 77
End: 2022-04-22
Attending: INTERNAL MEDICINE
Payer: MEDICARE

## 2022-04-22 DIAGNOSIS — R63.4 UNINTENTIONAL WEIGHT LOSS: ICD-10-CM

## 2022-04-22 DIAGNOSIS — R14.0 BLOATING: ICD-10-CM

## 2022-04-22 DIAGNOSIS — R10.30 LOWER ABDOMINAL PAIN: ICD-10-CM

## 2022-04-22 PROCEDURE — 74177 CT ABD & PELVIS W/CONTRAST: CPT | Mod: MH

## 2022-04-22 PROCEDURE — 700117 HCHG RX CONTRAST REV CODE 255: Performed by: INTERNAL MEDICINE

## 2022-04-22 RX ADMIN — IOHEXOL 25 ML: 240 INJECTION, SOLUTION INTRATHECAL; INTRAVASCULAR; INTRAVENOUS; ORAL at 13:15

## 2022-04-22 RX ADMIN — IOHEXOL 100 ML: 350 INJECTION, SOLUTION INTRAVENOUS at 12:49

## 2022-05-03 ENCOUNTER — HOSPITAL ENCOUNTER (OUTPATIENT)
Facility: MEDICAL CENTER | Age: 77
End: 2022-05-03
Attending: NURSE PRACTITIONER
Payer: MEDICARE

## 2022-05-03 PROCEDURE — 82274 ASSAY TEST FOR BLOOD FECAL: CPT

## 2022-05-09 LAB — IMM ASSAY OCC BLD FITOB: NEGATIVE

## 2022-05-16 NOTE — TELEPHONE ENCOUNTER
Pt last seen regarding this issue 1/18. Will send 6 months of fills to pharmacy.    
I personally performed the service described in the documentation recorded by the scribe in my presence, and it accurately and completely records my words and actions.

## 2022-05-17 ENCOUNTER — TELEPHONE (OUTPATIENT)
Dept: CARDIOLOGY | Facility: MEDICAL CENTER | Age: 77
End: 2022-05-17
Payer: MEDICARE

## 2022-05-17 NOTE — TELEPHONE ENCOUNTER
AB    Caller: [facility]    Name and Department: [facility]    Topic/Issue: Pt was denied to have a  surgical clearance due to him not being able to stop Plavix (until Nov). He is needing to have a procedure done. They are wanting to verify if he can proceed with the surgery and just not stop plavix?.    Callback Number or Extension:  [phone]  Fax: [phone]    Thank You  Janett SANTANA

## 2022-05-18 ENCOUNTER — TELEPHONE (OUTPATIENT)
Dept: CARDIOLOGY | Facility: MEDICAL CENTER | Age: 77
End: 2022-05-18
Payer: MEDICARE

## 2022-05-18 NOTE — TELEPHONE ENCOUNTER
Letter drafted and faxed to [phone]  Confirmation status received  Scan to [facility]

## 2022-05-18 NOTE — TELEPHONE ENCOUNTER
Received fax from   [facility]      FAX: [phone]  TELE:  [phone]      Requesting cardiac clearance for upcoming  COLONOSCOPY/EDOSCOPY      Scheduled on:  PENDING CLEARANCE      Medication hold  ASA_______PLAVIX_______   days prior to procedure.      To AB

## 2022-05-18 NOTE — TELEPHONE ENCOUNTER
He can stop Plavix 7 days before - make SURE to resume it after procedure.  Ideally, he should stay on ASA.  If GI wants him off of it, he can stop this 7 days before also.  Just RESUME both after procedure, and keep follow-up with [facility] in September 2022.  Thanks, AB

## 2022-07-01 ENCOUNTER — HOSPITAL ENCOUNTER (OUTPATIENT)
Dept: RADIOLOGY | Facility: MEDICAL CENTER | Age: 77
End: 2022-07-01
Attending: CHIROPRACTOR
Payer: MEDICARE

## 2022-07-01 DIAGNOSIS — M62.830 BACK MUSCLE SPASM: ICD-10-CM

## 2022-07-01 DIAGNOSIS — M54.59 MECHANICAL LOW BACK PAIN: ICD-10-CM

## 2022-07-01 DIAGNOSIS — M54.51 VERTEBROGENIC LOW BACK PAIN: ICD-10-CM

## 2022-07-01 PROCEDURE — 72110 X-RAY EXAM L-2 SPINE 4/>VWS: CPT

## 2022-07-01 PROCEDURE — 72072 X-RAY EXAM THORAC SPINE 3VWS: CPT

## 2022-07-25 ENCOUNTER — HOSPITAL ENCOUNTER (OUTPATIENT)
Dept: LAB | Facility: MEDICAL CENTER | Age: 77
End: 2022-07-25
Attending: UROLOGY
Payer: MEDICARE

## 2022-07-25 ENCOUNTER — HOSPITAL ENCOUNTER (OUTPATIENT)
Dept: LAB | Facility: MEDICAL CENTER | Age: 77
End: 2022-07-25
Attending: INTERNAL MEDICINE
Payer: MEDICARE

## 2022-07-25 LAB
BASOPHILS # BLD AUTO: 0.8 % (ref 0–1.8)
BASOPHILS # BLD: 0.05 K/UL (ref 0–0.12)
EOSINOPHIL # BLD AUTO: 0.24 K/UL (ref 0–0.51)
EOSINOPHIL NFR BLD: 4 % (ref 0–6.9)
ERYTHROCYTE [DISTWIDTH] IN BLOOD BY AUTOMATED COUNT: 50.8 FL (ref 35.9–50)
FERRITIN SERPL-MCNC: 372 NG/ML (ref 22–322)
HCT VFR BLD AUTO: 40.9 % (ref 42–52)
HGB BLD-MCNC: 14.5 G/DL (ref 14–18)
IMM GRANULOCYTES # BLD AUTO: 0.01 K/UL (ref 0–0.11)
IMM GRANULOCYTES NFR BLD AUTO: 0.2 % (ref 0–0.9)
IRON SATN MFR SERPL: 30 % (ref 15–55)
IRON SERPL-MCNC: 80 UG/DL (ref 50–180)
LYMPHOCYTES # BLD AUTO: 1.62 K/UL (ref 1–4.8)
LYMPHOCYTES NFR BLD: 27.1 % (ref 22–41)
MCH RBC QN AUTO: 31.6 PG (ref 27–33)
MCHC RBC AUTO-ENTMCNC: 35.5 G/DL (ref 33.7–35.3)
MCV RBC AUTO: 89.1 FL (ref 81.4–97.8)
MONOCYTES # BLD AUTO: 0.6 K/UL (ref 0–0.85)
MONOCYTES NFR BLD AUTO: 10 % (ref 0–13.4)
NEUTROPHILS # BLD AUTO: 3.46 K/UL (ref 1.82–7.42)
NEUTROPHILS NFR BLD: 57.9 % (ref 44–72)
NRBC # BLD AUTO: 0 K/UL
NRBC BLD-RTO: 0 /100 WBC
PLATELET # BLD AUTO: 165 K/UL (ref 164–446)
PMV BLD AUTO: 10.6 FL (ref 9–12.9)
PSA SERPL-MCNC: 9.83 NG/ML (ref 0–4)
RBC # BLD AUTO: 4.59 M/UL (ref 4.7–6.1)
TIBC SERPL-MCNC: 271 UG/DL (ref 250–450)
UIBC SERPL-MCNC: 191 UG/DL (ref 110–370)
WBC # BLD AUTO: 6 K/UL (ref 4.8–10.8)

## 2022-07-25 PROCEDURE — 83540 ASSAY OF IRON: CPT

## 2022-07-25 PROCEDURE — 85025 COMPLETE CBC W/AUTO DIFF WBC: CPT

## 2022-07-25 PROCEDURE — 82728 ASSAY OF FERRITIN: CPT

## 2022-07-25 PROCEDURE — 83550 IRON BINDING TEST: CPT

## 2022-07-25 PROCEDURE — 36415 COLL VENOUS BLD VENIPUNCTURE: CPT

## 2022-07-25 PROCEDURE — 84153 ASSAY OF PSA TOTAL: CPT

## 2022-08-19 ENCOUNTER — OFFICE VISIT (OUTPATIENT)
Dept: MEDICAL GROUP | Facility: PHYSICIAN GROUP | Age: 77
End: 2022-08-19
Payer: MEDICARE

## 2022-08-19 VITALS
BODY MASS INDEX: 31.27 KG/M2 | HEART RATE: 87 BPM | RESPIRATION RATE: 20 BRPM | WEIGHT: 199.2 LBS | SYSTOLIC BLOOD PRESSURE: 112 MMHG | HEIGHT: 67 IN | TEMPERATURE: 98.2 F | DIASTOLIC BLOOD PRESSURE: 68 MMHG | OXYGEN SATURATION: 94 %

## 2022-08-19 DIAGNOSIS — M54.9 CHRONIC BACK PAIN, UNSPECIFIED BACK LOCATION, UNSPECIFIED BACK PAIN LATERALITY: ICD-10-CM

## 2022-08-19 DIAGNOSIS — R20.0 NUMBNESS AND TINGLING IN BOTH HANDS: ICD-10-CM

## 2022-08-19 DIAGNOSIS — R20.2 NUMBNESS AND TINGLING IN BOTH HANDS: ICD-10-CM

## 2022-08-19 DIAGNOSIS — G89.29 CHRONIC BACK PAIN, UNSPECIFIED BACK LOCATION, UNSPECIFIED BACK PAIN LATERALITY: ICD-10-CM

## 2022-08-19 PROCEDURE — 99214 OFFICE O/P EST MOD 30 MIN: CPT | Performed by: STUDENT IN AN ORGANIZED HEALTH CARE EDUCATION/TRAINING PROGRAM

## 2022-08-19 RX ORDER — OMEPRAZOLE 20 MG/1
CAPSULE, DELAYED RELEASE ORAL
COMMUNITY
End: 2022-09-13

## 2022-08-19 RX ORDER — OXYBUTYNIN CHLORIDE 10 MG/1
TABLET, EXTENDED RELEASE ORAL
COMMUNITY
End: 2022-09-27

## 2022-08-19 ASSESSMENT — FIBROSIS 4 INDEX: FIB4 SCORE: 2.19

## 2022-08-19 NOTE — PROGRESS NOTES
Subjective:     Chief Complaint   Patient presents with    Results     X ray results  Wanting CT for neck, back and lumbar      HPI:   Dakota presents today requesting a CT of his entire back.    He reports acute on chronic back pain (entire), mostly bilateral lumbar without LE radiculopathy but reports numbness in his hands which is worse. Reports both hands become numb and tingle, left 5th digit chronically numb (so numb he uses to prick his finger to check his blood sugar).    Fell in March (syncope-had COVID) has gotten worse. He has a history of a spine stimulator (by Dr. Campo), had to take it out and had it replaced. Also had ablation (painful-not helpful) and blocks. Wants to find anything that can be done about the pain, cannot have an MRI due to pacemaker. [facility], but he would like me to order so the cost will be covered. Denies saddle anesthesia and is followed by urology from chronic urinary symptoms in the setting of prostate CA including urinary incontinent which is not worsened.     Current Outpatient Medications Ordered in Epic   Medication Sig Dispense Refill    omeprazole (PRILOSEC) 20 MG delayed-release capsule omeprazole 20 mg capsule,delayed release      oxybutynin SR (DITROPAN-XL) 10 MG CR tablet oxybutynin chloride ER 10 mg tablet,extended release 24 hr      atorvastatin (LIPITOR) 20 MG Tab Take 20 mg by mouth every evening.      levothyroxine (SYNTHROID) 50 MCG Tab Take 1 Tablet by mouth every morning on an empty stomach. 90 Tablet 2    rosuvastatin (CRESTOR) 40 MG tablet Take 1 Tablet by mouth every day. 90 Tablet 3    clopidogrel (PLAVIX) 75 MG Tab Take 1 Tablet by mouth every day. 90 Tablet 3    insulin glargine (LANTUS SOLOSTAR) 100 UNIT/ML Solution Pen-injector injection Inject 36 Units under the skin every evening. 5 pens per month (Patient taking differently: Inject 34 Units under the skin every evening. 5 pens per month) 45 mL 3  "   Dulaglutide (TRULICITY) 3 MG/0.5ML Solution Pen-injector Inject 3 mg under the skin every 7 days. 2.0 ml equals 4 pens per month 6 mL 3    SYNJARDY XR 12.5-1000 MG TABLET SR 24 HR TAKE 2 TABLETS DAILY (Patient taking differently: Take 2 Tablets by mouth every evening.) 180 Tablet 3    glucose blood (FREESTYLE LITE) strip 1 Strip by Other route 2 times a day. 150 Strip 3    Lancets MISC Lancets order: Lancets for Abbott Freestyle Lite meter. Sig: use 4 times daily  and prn ssx high or low sugar. #100 RF x 0 360 Each 4    Blood Glucose Monitoring Suppl [place] Meter: Dispense Abbott Freestyle Lite meter. Sig. Use as directed for blood sugar monitoring. #1. NR. 1 Device 0    B-D ULTRAFINE III SHORT PEN 31G X 8 MM MISC USE 1 NEEDLE EVERY DAY WITH LANTUS 999 Each 2    aspirin EC (ECOTRIN) 81 MG TBEC Take 81 mg by mouth every evening.       No current Epic-ordered facility-administered medications on file.     ROS:  Gen: no fevers/chills, no changes in weight  Eyes: no changes in vision  ENT: no sore throat  Pulm: no sob, no cough  CV: no chest pain, no palpitations  GI: no nausea/vomiting, no diarrhea  MSk: back pain  Skin: no rash  Neuro: no headaches, numbness/tingling  Heme/Lymph: no easy bruising    Objective:     Exam:  /68 (BP Location: Left arm, Patient Position: Sitting, BP Cuff Size: Adult)   Pulse 87   Temp 36.8 °C (98.2 °F) (Temporal)   Resp 20   Ht 1.702 m (5' 7\")   Wt 90.4 kg (199 lb 3.2 oz)   SpO2 94%   BMI 31.20 kg/m²  Body mass index is 31.2 kg/m².    Physical Exam:  Constitutional: Well-developed and well-nourished. No acute distress.   Skin: Skin is warm and dry. No rash noted.  Head: Atraumatic without lesions.  Eyes: Conjunctivae and extraocular motions are normal. Pupils are equal, round. No scleral icterus.   Mouth/Throat: Wearing mask.  Neck: Supple, trachea midline.   Lungs: Normal inspiratory effort  Extremities: No cyanosis, clubbing, erythema, nor edema.  Musculoskeletal: Negative " bhumika. +Ulnar tinnels on left and +phalens (entire hand numb). Negative median nerve tinnels. Bilat. Negative SLR.Thoracic and lumbar  paraspinal hypertonicity, no supraclavicular ttp.an  Neurological: Alert and oriented x 3. +2 DTRs of UE/LE. Decreased sensation in left 5th digit, intact in LE. Strength 5/5 UE. Gait not ataxic. Strength 5/5 in LE aside from plantar/dorsiflexion (unable to walk on heels/toes well), EHL 5/5,.   Psychiatric:  Behavior, mood, and affect are appropriate.    Assessment & Plan:     77 y.o. male with the following -     1. Chronic back pain, unspecified back location, unspecified back pain laterality  Acute on chronic. [facility] neck/lumbar spine to evaluate radiculopathy. Cannot have an MRI. Patient is interested in treatment if there are things that are found, imaging ordered. Declines pain medications at this time.  - CT-CSPINE WITHOUT PLUS RECONS; Future  - CT-LSPINE W/O PLUS RECONS; Future    2. Numbness and tingling in both hands  Chronic. Evaluate further with studies below. Consider CT myelogram if still unclear.  - Referral to Neurodiagnostics (EEG,EP,EMG/NCS/DBS)  - CT-CSPINE WITHOUT PLUS RECONS; Future    I spent a total of 34 minutes with record review, exam, communication with the patient, and documentation of this encounter.    Return in about 25 days (around 9/13/2022).    Please note that this dictation was created using voice recognition software. I have made every reasonable attempt to correct obvious errors, but I expect that there are errors of grammar and possibly content that I did not discover before finalizing the note.

## 2022-09-02 ENCOUNTER — APPOINTMENT (OUTPATIENT)
Dept: RADIOLOGY | Facility: MEDICAL CENTER | Age: 77
End: 2022-09-02
Attending: STUDENT IN AN ORGANIZED HEALTH CARE EDUCATION/TRAINING PROGRAM
Payer: MEDICARE

## 2022-09-03 ENCOUNTER — HOSPITAL ENCOUNTER (OUTPATIENT)
Dept: RADIOLOGY | Facility: MEDICAL CENTER | Age: 77
End: 2022-09-03
Attending: STUDENT IN AN ORGANIZED HEALTH CARE EDUCATION/TRAINING PROGRAM
Payer: MEDICARE

## 2022-09-03 DIAGNOSIS — G89.29 CHRONIC BACK PAIN, UNSPECIFIED BACK LOCATION, UNSPECIFIED BACK PAIN LATERALITY: ICD-10-CM

## 2022-09-03 DIAGNOSIS — R20.0 NUMBNESS AND TINGLING IN BOTH HANDS: ICD-10-CM

## 2022-09-03 DIAGNOSIS — M54.9 CHRONIC BACK PAIN, UNSPECIFIED BACK LOCATION, UNSPECIFIED BACK PAIN LATERALITY: ICD-10-CM

## 2022-09-03 DIAGNOSIS — R20.2 NUMBNESS AND TINGLING IN BOTH HANDS: ICD-10-CM

## 2022-09-03 PROCEDURE — 72131 CT LUMBAR SPINE W/O DYE: CPT

## 2022-09-03 PROCEDURE — 72125 CT NECK SPINE W/O DYE: CPT

## 2022-09-13 ENCOUNTER — OFFICE VISIT (OUTPATIENT)
Dept: MEDICAL GROUP | Facility: PHYSICIAN GROUP | Age: 77
End: 2022-09-13
Payer: MEDICARE

## 2022-09-13 VITALS
OXYGEN SATURATION: 96 % | WEIGHT: 200 LBS | TEMPERATURE: 97.6 F | SYSTOLIC BLOOD PRESSURE: 110 MMHG | HEIGHT: 67 IN | BODY MASS INDEX: 31.39 KG/M2 | DIASTOLIC BLOOD PRESSURE: 60 MMHG | HEART RATE: 74 BPM

## 2022-09-13 DIAGNOSIS — M54.9 CHRONIC BACK PAIN, UNSPECIFIED BACK LOCATION, UNSPECIFIED BACK PAIN LATERALITY: ICD-10-CM

## 2022-09-13 DIAGNOSIS — N28.1 CYST OF LEFT KIDNEY: ICD-10-CM

## 2022-09-13 DIAGNOSIS — G89.29 CHRONIC BACK PAIN, UNSPECIFIED BACK LOCATION, UNSPECIFIED BACK PAIN LATERALITY: ICD-10-CM

## 2022-09-13 PROCEDURE — 99213 OFFICE O/P EST LOW 20 MIN: CPT | Performed by: STUDENT IN AN ORGANIZED HEALTH CARE EDUCATION/TRAINING PROGRAM

## 2022-09-13 ASSESSMENT — FIBROSIS 4 INDEX: FIB4 SCORE: 2.19

## 2022-09-13 NOTE — PATIENT INSTRUCTIONS
Imaging [phone] to ask about getting the images    Nerve testing  [phone] option 2    Renal ultrasound due end of October

## 2022-09-13 NOTE — PROGRESS NOTES
Subjective:     Chief Complaint   Patient presents with    Follow-Up     HPI:   Gerry presents today for follow-up.    See last note for details.  We reviewed his CT cervical and lumbar spine.  He [facility] coming up September 20.  Wonders about how to get a copy of the images and report.  Pain is stable from prior.  States that he did not get any calls to set up the nerve conduction testing, numbness and tingling stable from prior as well.    Has a follow-up coming up with endocrinology at the end of the month.  No concerns for controlled diabetes.    No problem-specific Assessment & Plan notes found for this encounter.    Current Outpatient Medications Ordered in Epic   Medication Sig Dispense Refill    oxybutynin SR (DITROPAN-XL) 10 MG CR tablet oxybutynin chloride ER 10 mg tablet,extended release 24 hr      atorvastatin (LIPITOR) 20 MG Tab Take 20 mg by mouth every evening.      levothyroxine (SYNTHROID) 50 MCG Tab Take 1 Tablet by mouth every morning on an empty stomach. 90 Tablet 2    rosuvastatin (CRESTOR) 40 MG tablet Take 1 Tablet by mouth every day. 90 Tablet 3    clopidogrel (PLAVIX) 75 MG Tab Take 1 Tablet by mouth every day. 90 Tablet 3    insulin glargine (LANTUS SOLOSTAR) 100 UNIT/ML Solution Pen-injector injection Inject 36 Units under the skin every evening. 5 pens per month (Patient taking differently: Inject 34 Units under the skin every evening. 5 pens per month) 45 mL 3    Dulaglutide (TRULICITY) 3 MG/0.5ML Solution Pen-injector Inject 3 mg under the skin every 7 days. 2.0 ml equals 4 pens per month 6 mL 3    SYNJARDY XR 12.5-1000 MG TABLET SR 24 HR TAKE 2 TABLETS DAILY (Patient taking differently: Take 2 Tablets by mouth every evening.) 180 Tablet 3    glucose blood (FREESTYLE LITE) strip 1 Strip by Other route 2 times a day. 150 Strip 3    Lancets MISC Lancets order: Lancets for Abbott Freestyle Lite meter. Sig: use 4 times daily  and  "prn ssx high or low sugar. #100 RF x 0 360 Each 4    Blood Glucose Monitoring Suppl [place] Meter: Dispense Abbott Freestyle Lite meter. Sig. Use as directed for blood sugar monitoring. #1. NR. 1 Device 0    B-D ULTRAFINE III SHORT PEN 31G X 8 MM MISC USE 1 NEEDLE EVERY DAY WITH LANTUS 999 Each 2    aspirin EC (ECOTRIN) 81 MG TBEC Take 81 mg by mouth every evening.       No current Epic-ordered facility-administered medications on file.     ROS:  Gen: no fevers/chills, no changes in weight  Eyes: no changes in vision  ENT: no sore throat  Pulm: no sob, no cough  CV: no chest pain, no palpitations  GI: no nausea/vomiting, no diarrhea  Skin: no rash  Neuro: no headaches, numbness/tingling  Heme/Lymph: no easy bruising    Objective:     Exam:  /60 (BP Location: Left arm, Patient Position: Sitting, BP Cuff Size: Adult)   Pulse 74   Temp 36.4 °C (97.6 °F) (Temporal)   Ht 1.702 m (5' 7\")   Wt 90.7 kg (200 lb)   SpO2 96%   BMI 31.32 kg/m²  Body mass index is 31.32 kg/m².    Physical Exam:  Constitutional: Well-developed and well-nourished. No acute distress.   Skin: Skin is warm and dry. No rash noted.  Head: Atraumatic without lesions.  Eyes: Conjunctivae and extraocular motions are normal. Pupils are equal, round. No scleral icterus.   Mouth/Throat: Wearing mask.  Neck: Supple, trachea midline.   Cardiovascular: Regular rate and rhythm, S1 and S2 without rubs, or gallops. Systolic murmur RUSB.  Lungs: Normal inspiratory effort, CTA bilaterally, no wheezes/rhonchi/rales  Extremities: No cyanosis, clubbing, erythema, nor edema.  Neurological: Alert and oriented x 3. No gross/focal deficits.  Psychiatric:  Behavior, mood, and affect are appropriate.    Assessment & Plan:     77 y.o. male with the following -     1. Cyst of left kidney  This is an acute finding noted on CT abdomen pelvis incidentally in April.  Plan on interval follow-up 6 months after with renal ultrasound, if not able to appreciate well enough " then plan for CT to trend.  - US-RENAL; Future    2. Chronic back pain, unspecified back location, unspecified back pain laterality  This is a chronic condition, stable.  Reviewed CT scans which were obtained as patient cannot have an MRI due to pacemaker.  [facility].  Declines medication for pain at this time.  Provided him with the phone number to set the nerve conduction studies up to further evaluate his upper extremity numbness and tingling.    I spent a total of 21 minutes with record review, exam, communication with the patient, communication with other providers, and documentation of this encounter.    Return in about 3 months (around 12/13/2022), or if symptoms worsen or fail to improve, for Annual Medicare Visit.    Please note that this dictation was created using voice recognition software. I have made every reasonable attempt to correct obvious errors, but I expect that there are errors of grammar and possibly content that I did not discover before finalizing the note.

## 2022-09-27 ENCOUNTER — NON-PROVIDER VISIT (OUTPATIENT)
Dept: ENDOCRINOLOGY | Facility: MEDICAL CENTER | Age: 77
End: 2022-09-27
Attending: INTERNAL MEDICINE
Payer: MEDICARE

## 2022-09-27 VITALS
DIASTOLIC BLOOD PRESSURE: 70 MMHG | SYSTOLIC BLOOD PRESSURE: 114 MMHG | HEIGHT: 67 IN | OXYGEN SATURATION: 95 % | HEART RATE: 80 BPM | WEIGHT: 198 LBS | BODY MASS INDEX: 31.08 KG/M2

## 2022-09-27 DIAGNOSIS — Z79.4 CONTROLLED TYPE 2 DIABETES MELLITUS WITH OTHER CIRCULATORY COMPLICATION, WITH LONG-TERM CURRENT USE OF INSULIN (HCC): ICD-10-CM

## 2022-09-27 DIAGNOSIS — E11.59 CONTROLLED TYPE 2 DIABETES MELLITUS WITH OTHER CIRCULATORY COMPLICATION, WITH LONG-TERM CURRENT USE OF INSULIN (HCC): ICD-10-CM

## 2022-09-27 DIAGNOSIS — E03.8 SUBCLINICAL HYPOTHYROIDISM: ICD-10-CM

## 2022-09-27 DIAGNOSIS — E78.5 DYSLIPIDEMIA: ICD-10-CM

## 2022-09-27 DIAGNOSIS — E55.9 VITAMIN D DEFICIENCY: ICD-10-CM

## 2022-09-27 LAB
HBA1C MFR BLD: 5.7 % (ref 0–5.6)
INT CON NEG: ABNORMAL
INT CON POS: ABNORMAL

## 2022-09-27 PROCEDURE — 99212 OFFICE O/P EST SF 10 MIN: CPT | Performed by: INTERNAL MEDICINE

## 2022-09-27 PROCEDURE — 83036 HEMOGLOBIN GLYCOSYLATED A1C: CPT

## 2022-09-27 RX ORDER — DULAGLUTIDE 3 MG/.5ML
3 INJECTION, SOLUTION SUBCUTANEOUS
Qty: 6 ML | Refills: 3 | Status: SHIPPED | OUTPATIENT
Start: 2022-09-27 | End: 2023-04-05 | Stop reason: RX

## 2022-09-27 RX ORDER — EMPAGLIFLOZIN, METFORMIN HYDROCHLORIDE 12.5; 1 MG/1; MG/1
TABLET, EXTENDED RELEASE ORAL
Qty: 180 TABLET | Refills: 3 | Status: SHIPPED | OUTPATIENT
Start: 2022-09-27 | End: 2023-04-06 | Stop reason: SDUPTHER

## 2022-09-27 ASSESSMENT — FIBROSIS 4 INDEX: FIB4 SCORE: 2.19

## 2022-09-27 NOTE — PROGRESS NOTES
RN-CDE Note    Subjective:   Endocrinology Clinic Progress Note  PCP: Rylee García D.O.    HPI:  Dakota Boyer is a 77 y.o. old patient who is seen today by the Diabetes Nurse Specialist for review of type 2 Diabetes and Hypothyroidism.  Recent changes in health: Health unchanged.  Still having neck and back pain.  DM:   Last A1c:   Lab Results   Component Value Date/Time    HBA1C 5.7 (H) 03/04/2022 09:00 PM      Previous A1c was 5.7 on 3/4/22  A1C GOAL: < 7    Diabetes Medications:   Lantus 36 units daily  Trulicity 3 mg weekly  Synjardy 12.5-1000 mg BID      Exercise: Walking as tolerated  Diet: Eggs or cereal for breakfast.  Lunch is a sandwich.  Dinner protein, vegetable, and carbohydrates.  Patient's body mass index is unknown because there is no height or weight on file. Exercise and nutrition counseling were performed at this visit.    Glucose monitoring frequency: Testing daily  101-150 - see blood sugar logs in media  Hypoglycemic episodes: no  Last Retinal Exam: on file and up-to-date  Daily Foot Exam: Yes   Foot Exam:  Monofilament: done  Monofilament testing with a 10 gram force: sensation: intact bilaterally  Visual Inspection: Feet with maceration, ulcers, or fissures.  Left big toe nail removed but sides growing back.  Pedal pulses: intact bilaterally   Lab Results   Component Value Date/Time    MALBCRT see below 07/30/2021 08:02 AM    MICROALBUR <1.2 07/30/2021 08:02 AM        ACR Albumin/Creatinine Ratio goal <30     HTN:   Blood pressure goal <140/<80 .   Currently Rx ACE/ARB: No     Dyslipidemia:    Lab Results   Component Value Date/Time    CHOLSTRLTOT 65 (L) 03/05/2022 07:57 AM    LDL 21 03/05/2022 07:57 AM    HDL 24 (A) 03/05/2022 07:57 AM    TRIGLYCERIDE 98 03/05/2022 07:57 AM         Currently Rx Statin: Yes     He  reports that he quit smoking about 32 years ago. His smoking use included cigarettes. He has a 40.00 pack-year smoking history. He has never used smokeless  tobacco.      Plan:     Discussed and educated on:   - All medications, side effects and compliance (discussed carefully)  - Annual eye examinations at Ophthalmology  - Home glucose monitoring emphasized  - Weight control and daily exercise    Recommended medication changes: No changes at this time.  He will have his lab work done before his next appointment.  Follow up with Dr. Ring in 6 months.

## 2022-10-24 ENCOUNTER — HOSPITAL ENCOUNTER (OUTPATIENT)
Dept: LAB | Facility: MEDICAL CENTER | Age: 77
End: 2022-10-24
Attending: INTERNAL MEDICINE
Payer: MEDICARE

## 2022-10-24 DIAGNOSIS — Z79.4 LONG-TERM INSULIN USE (HCC): ICD-10-CM

## 2022-10-24 DIAGNOSIS — E55.9 VITAMIN D DEFICIENCY: ICD-10-CM

## 2022-10-24 DIAGNOSIS — E03.8 SUBCLINICAL HYPOTHYROIDISM: ICD-10-CM

## 2022-10-24 DIAGNOSIS — Z79.4 CONTROLLED TYPE 2 DIABETES MELLITUS WITH OTHER CIRCULATORY COMPLICATION, WITH LONG-TERM CURRENT USE OF INSULIN (HCC): ICD-10-CM

## 2022-10-24 DIAGNOSIS — E78.5 DYSLIPIDEMIA: ICD-10-CM

## 2022-10-24 DIAGNOSIS — E11.59 CONTROLLED TYPE 2 DIABETES MELLITUS WITH OTHER CIRCULATORY COMPLICATION, WITH LONG-TERM CURRENT USE OF INSULIN (HCC): ICD-10-CM

## 2022-10-24 LAB
25(OH)D3 SERPL-MCNC: 36 NG/ML (ref 30–100)
ALBUMIN SERPL BCP-MCNC: 4.1 G/DL (ref 3.2–4.9)
ALBUMIN/GLOB SERPL: 1.5 G/DL
ALP SERPL-CCNC: 98 U/L (ref 30–99)
ALT SERPL-CCNC: 21 U/L (ref 2–50)
ANION GAP SERPL CALC-SCNC: 13 MMOL/L (ref 7–16)
AST SERPL-CCNC: 28 U/L (ref 12–45)
BILIRUB SERPL-MCNC: 1.1 MG/DL (ref 0.1–1.5)
BUN SERPL-MCNC: 24 MG/DL (ref 8–22)
CALCIUM SERPL-MCNC: 10.3 MG/DL (ref 8.5–10.5)
CHLORIDE SERPL-SCNC: 103 MMOL/L (ref 96–112)
CHOLEST SERPL-MCNC: 81 MG/DL (ref 100–199)
CO2 SERPL-SCNC: 22 MMOL/L (ref 20–33)
CREAT SERPL-MCNC: 1.03 MG/DL (ref 0.5–1.4)
CREAT UR-MCNC: 49.67 MG/DL
GFR SERPLBLD CREATININE-BSD FMLA CKD-EPI: 74 ML/MIN/1.73 M 2
GLOBULIN SER CALC-MCNC: 2.7 G/DL (ref 1.9–3.5)
GLUCOSE SERPL-MCNC: 112 MG/DL (ref 65–99)
HDLC SERPL-MCNC: 40 MG/DL
LDLC SERPL CALC-MCNC: 27 MG/DL
MICROALBUMIN UR-MCNC: 11.4 MG/DL
MICROALBUMIN/CREAT UR: 230 MG/G (ref 0–30)
POTASSIUM SERPL-SCNC: 4.3 MMOL/L (ref 3.6–5.5)
PROT SERPL-MCNC: 6.8 G/DL (ref 6–8.2)
SODIUM SERPL-SCNC: 138 MMOL/L (ref 135–145)
T3FREE SERPL-MCNC: 2.71 PG/ML (ref 2–4.4)
T4 FREE SERPL-MCNC: 1.26 NG/DL (ref 0.93–1.7)
TRIGL SERPL-MCNC: 71 MG/DL (ref 0–149)
TSH SERPL DL<=0.005 MIU/L-ACNC: 1.73 UIU/ML (ref 0.38–5.33)

## 2022-10-24 PROCEDURE — 82043 UR ALBUMIN QUANTITATIVE: CPT

## 2022-10-24 PROCEDURE — 82306 VITAMIN D 25 HYDROXY: CPT

## 2022-10-24 PROCEDURE — 36415 COLL VENOUS BLD VENIPUNCTURE: CPT

## 2022-10-24 PROCEDURE — 84439 ASSAY OF FREE THYROXINE: CPT

## 2022-10-24 PROCEDURE — 80061 LIPID PANEL: CPT

## 2022-10-24 PROCEDURE — 80053 COMPREHEN METABOLIC PANEL: CPT

## 2022-10-24 PROCEDURE — 84443 ASSAY THYROID STIM HORMONE: CPT

## 2022-10-24 PROCEDURE — 84481 FREE ASSAY (FT-3): CPT

## 2022-10-24 PROCEDURE — 82570 ASSAY OF URINE CREATININE: CPT

## 2022-10-25 ENCOUNTER — TELEPHONE (OUTPATIENT)
Dept: ENDOCRINOLOGY | Facility: MEDICAL CENTER | Age: 77
End: 2022-10-25
Payer: MEDICARE

## 2022-11-04 ENCOUNTER — OFFICE VISIT (OUTPATIENT)
Dept: CARDIOLOGY | Facility: MEDICAL CENTER | Age: 77
End: 2022-11-04
Payer: MEDICARE

## 2022-11-04 ENCOUNTER — NON-PROVIDER VISIT (OUTPATIENT)
Dept: CARDIOLOGY | Facility: MEDICAL CENTER | Age: 77
End: 2022-11-04
Payer: MEDICARE

## 2022-11-04 VITALS
BODY MASS INDEX: 30.29 KG/M2 | OXYGEN SATURATION: 97 % | RESPIRATION RATE: 18 BRPM | WEIGHT: 193 LBS | HEART RATE: 79 BPM | SYSTOLIC BLOOD PRESSURE: 128 MMHG | DIASTOLIC BLOOD PRESSURE: 62 MMHG | HEIGHT: 67 IN

## 2022-11-04 DIAGNOSIS — I45.10 RIGHT BUNDLE BRANCH BLOCK: ICD-10-CM

## 2022-11-04 DIAGNOSIS — I35.0 MODERATE AORTIC STENOSIS: ICD-10-CM

## 2022-11-04 DIAGNOSIS — Z87.898 HISTORY OF SYNCOPE: ICD-10-CM

## 2022-11-04 DIAGNOSIS — I25.10 CORONARY ARTERY DISEASE DUE TO CALCIFIED CORONARY LESION: ICD-10-CM

## 2022-11-04 DIAGNOSIS — Z87.891 FORMER SMOKER: ICD-10-CM

## 2022-11-04 DIAGNOSIS — E11.59 HYPERTENSION ASSOCIATED WITH TYPE 2 DIABETES MELLITUS (HCC): ICD-10-CM

## 2022-11-04 DIAGNOSIS — E78.5 DYSLIPIDEMIA: ICD-10-CM

## 2022-11-04 DIAGNOSIS — G47.33 OBSTRUCTIVE SLEEP APNEA ON CPAP: ICD-10-CM

## 2022-11-04 DIAGNOSIS — Z95.5 STENTED CORONARY ARTERY: ICD-10-CM

## 2022-11-04 DIAGNOSIS — I15.2 HYPERTENSION ASSOCIATED WITH TYPE 2 DIABETES MELLITUS (HCC): ICD-10-CM

## 2022-11-04 DIAGNOSIS — I49.5 SICK SINUS SYNDROME (HCC): ICD-10-CM

## 2022-11-04 DIAGNOSIS — E11.3559 CONTROLLED TYPE 2 DIABETES MELLITUS WITH STABLE PROLIFERATIVE RETINOPATHY, WITH LONG-TERM CURRENT USE OF INSULIN, UNSPECIFIED LATERALITY (HCC): ICD-10-CM

## 2022-11-04 DIAGNOSIS — Z95.0 PACEMAKER: ICD-10-CM

## 2022-11-04 DIAGNOSIS — Z79.4 CONTROLLED TYPE 2 DIABETES MELLITUS WITH STABLE PROLIFERATIVE RETINOPATHY, WITH LONG-TERM CURRENT USE OF INSULIN, UNSPECIFIED LATERALITY (HCC): ICD-10-CM

## 2022-11-04 DIAGNOSIS — I25.84 CORONARY ARTERY DISEASE DUE TO CALCIFIED CORONARY LESION: ICD-10-CM

## 2022-11-04 DIAGNOSIS — E66.09 CLASS 1 OBESITY DUE TO EXCESS CALORIES WITH SERIOUS COMORBIDITY AND BODY MASS INDEX (BMI) OF 30.0 TO 30.9 IN ADULT: ICD-10-CM

## 2022-11-04 PROBLEM — R60.0 BILATERAL LOWER EXTREMITY EDEMA: Status: RESOLVED | Noted: 2018-12-04 | Resolved: 2022-11-04

## 2022-11-04 PROCEDURE — 99214 OFFICE O/P EST MOD 30 MIN: CPT | Performed by: NURSE PRACTITIONER

## 2022-11-04 PROCEDURE — 93280 PM DEVICE PROGR EVAL DUAL: CPT | Performed by: INTERNAL MEDICINE

## 2022-11-04 RX ORDER — ROSUVASTATIN CALCIUM 40 MG/1
40 TABLET, COATED ORAL DAILY
Qty: 90 TABLET | Refills: 3 | Status: SHIPPED | OUTPATIENT
Start: 2022-11-04 | End: 2022-12-14 | Stop reason: SDUPTHER

## 2022-11-04 ASSESSMENT — ENCOUNTER SYMPTOMS
FEVER: 0
CLAUDICATION: 0
COUGH: 0
SHORTNESS OF BREATH: 0
PND: 0
ABDOMINAL PAIN: 0
ORTHOPNEA: 0
MYALGIAS: 0
PALPITATIONS: 0

## 2022-11-04 ASSESSMENT — FIBROSIS 4 INDEX: FIB4 SCORE: 2.89

## 2022-11-04 NOTE — PROGRESS NOTES
Chief Complaint   Patient presents with    Syncope     F/V Dx: History of syncope    AV Block Complete     F/V Dx: RBBB    Aortic Stenosis     F/V Dx: Nonrheumatic aortic valve stenosis     Subjective     Gerry Luis Daniel Boyer is a 77 y.o. male who presents today for 6 month follow up.    He is a patient of Dr. Hsu in our office. Hx of CAD S/P PCI/SWETHA x 2 to the RCA in November 2021, moderate AS (March 2022), HTN, hyperlipidemia, DM and hypothyroidism.    He presents today alone. He has no cardiac complaints and his pacer was checked today with <1 year battery life.    No further syncope since last appointment. He drinks lots of water daily and just has some mild edema this is chronic for him with knee surgeries in the past.    He has no chest pain, shortness of breath, dizziness/lightheadedness, or palpitations.    Past Medical History:   Diagnosis Date    Anxiety     Aortic stenosis 03/2022    Echocardiogram with normal LV size, mild concentric LVH, LVEF 75%. Normal RA, LA and RV. Trace MR. Moderate AS (peak 37mmHg, mean 24mmHg, Vmax 3.0m/s, GUY 1.1cm2). Trace TR.    Back pain, chronic 10/18/2016    Bilateral primary osteoarthritis of knee 1/23/2018    Black stools 3/9/2022    Blood clotting disorder (HCC) 1978    s/p Left knee surgery- DVT in left leg    Breath shortness     CAD (coronary artery disease) 11/2021    PCI/SWETHA x 2 (Alcalde 3.0 x 38mm, Giovanni 3.5 x 38mm) to the RCA.    Cancer ([facility]) ? early 90's    Melanoma Left arm- surgically removed    Degeneration of lumbar or lumbosacral intervertebral disc      Dental disorder     Depression     Diabetes     Oral agents and insulin    DJD (degenerative joint disease) of cervical spine      Hyperlipidemia     Hypersensitive carotid sinus syndrome      Hypertension     Insomnia      Neck pain     Neutropenia (HCC) 3/4/2022    Pacemaker October 2012    [employer]  #[id number] implanted by [facility] [place].     Pancytopenia (HCC) 3/9/2022    RBBB      S/P lumbar discectomy       Sick sinus syndrome (HCC)      Sleep apnea     Uses CPAP    Syncope October 2012    Treated with PPM    Thyroid disease     Hypothyroid    Unintentional weight loss 3/5/2022    Unspecified hemorrhagic conditions     Reports bleeds easily     Past Surgical History:   Procedure Laterality Date    FL DSTR NROLYTC AGNT PARVERTEB FCT SNGL CRVCL/THORA Right 10/19/2016    Procedure: NEURO DEST FACET C/T W/IG SNGL - 3ON-C3, C8-T1    SINERGY;  Surgeon: Gaurang Pruett M.D.;  Location: [facility];  Service: Pain Management    FL DSTR NROLYTC AGNT PARVERTEB FCT ADDL CRVCL/THORA  10/19/2016    Procedure: NEURO DEST FACET C/T W/IG ADDL;  Surgeon: Gaurang Pruett M.D.;  Location: [facility];  Service: Pain Management    FL FLUOROSCOPIC GUIDANCE NEEDLE PLACEMENT  10/19/2016    Procedure: FLOURO GUIDE NEEDLE PLACEMENT;  Surgeon: Gaurang Pruett M.D.;  Location: [facility];  Service: Pain Management    SHOULDER ARTHROSCOPY Right 2015    torn bicep tendon and spurs    RECOVERY  10/17/2013    Performed by Cath-Recovery Surgery at SURGERY SAME DAY [facility] ORS    WOUND DEHISCENCE  4/10/2013    Performed by Tu Jain M.D. at SURGERY [facility] ORS    CERVICAL FUSION POSTERIOR  2/27/2013    Performed by Tu Jain M.D. at SURGERY [facility]    CERVICAL LAMINECTOMY POSTERIOR  2/27/2013    Performed by Tu Jain M.D. at SURGERY [facility]    LUMBAR LAMINECTOMY DISKECTOMY  11/20/2012    Performed by Tu Jain M.D. at SURGERY [facility]    RECOVERY  10/4/2012    Performed by Cath-Recovery Surgery at SURGERY SAME DAY [facility] ORS    PACEMAKER INSERTION  October 2012    [employer]  2110 implanted by Dr. Waite.    SHOULDER DECOMPRESSION Left 2008    Left rotator cuff    ARTHROPLASTY Left 2004    ARTHROSCOPY, KNEE Bilateral 1978    ORTHOPEDIC OSTEOTOMY      Both knees several times, 8 total    OTHER      SPINAL CORD STIMULATOR  1 month ago     "TONSILLECTOMY      VASECTOMY       Family History   Problem Relation Age of Onset    Lung Disease Mother         Smoker ( of lung dz)    Alcohol/Drug Mother     Heart Disease Father 36        CAD    Heart Disease Sister         \"hole in heart\"    Lung Disease Brother         COPD, CANCER    Cancer Brother         Prostate, Lung ( of lung CA)    Alcohol/Drug Brother     Heart Disease Brother     Diabetes Brother     Hypertension Brother     Hyperlipidemia Brother     Heart Disease Maternal Grandmother     Hypertension Maternal Grandmother     Hyperlipidemia Maternal Grandmother     Cancer Brother          of esophagus/stomach, thyroid    Heart Disease Brother 60        MI, stent, PM/defib    Sleep Apnea Brother     No Known Problems Maternal Grandfather     No Known Problems Paternal Grandmother      Social History     Socioeconomic History    Marital status:      Spouse name: Not on file    Number of children: Not on file    Years of education: Not on file    Highest education level: Not on file   Occupational History    Not on file   Tobacco Use    Smoking status: Former     Packs/day: 1.00     Years: 40.00     Pack years: 40.00     Types: Cigarettes     Quit date: 1/3/1990     Years since quittin.8    Smokeless tobacco: Never   Vaping Use    Vaping Use: Never used   Substance and Sexual Activity    Alcohol use: No     Alcohol/week: 0.0 oz    Drug use: No    Sexual activity: Yes     Partners: Female   Other Topics Concern    Not on file   Social History Narrative    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      Social Determinants of Health     Financial Resource Strain: Not on file   Food Insecurity: Not on file   Transportation Needs: Not on file   Physical Activity: Not on file   Stress: Not on file   Social Connections: Not on file   Intimate Partner Violence: Not on file   Housing Stability: Not on file     Allergies   Allergen Reactions    Aleve Cold & [Pseudoephedrine-Naproxen Na] " Anaphylaxis    Ceftriaxone Sodium Anaphylaxis     Reaction; 1970's.    Naproxen Anaphylaxis     Reaction; 2004.    Latex Rash     Contact site    Tape Rash and Itching     Plastic tape (paper tape ok)     Outpatient Encounter Medications as of 11/4/2022   Medication Sig Dispense Refill    rosuvastatin (CRESTOR) 40 MG tablet Take 1 Tablet by mouth every day. 90 Tablet 3    Dulaglutide (TRULICITY) 3 MG/0.5ML Solution Pen-injector Inject 3 mg under the skin every 7 days. 6 mL 3    FREESTYLE LITE strip USE 1 STRIP TWICE A  Strip 3    levothyroxine (SYNTHROID) 50 MCG Tab Take 1 Tablet by mouth every morning on an empty stomach. 90 Tablet 2    insulin glargine (LANTUS SOLOSTAR) 100 UNIT/ML Solution Pen-injector injection Inject 36 Units under the skin every evening. 5 pens per month 45 mL 3    Lancets MISC Lancets order: Lancets for Abbott Freestyle Lite meter. Sig: use 4 times daily  and prn ssx high or low sugar. #100 RF x 0 360 Each 4    Blood Glucose Monitoring Suppl [place] Meter: Dispense Abbott Freestyle Lite meter. Sig. Use as directed for blood sugar monitoring. #1. NR. 1 Device 0    B-D ULTRAFINE III SHORT PEN 31G X 8 MM MISC USE 1 NEEDLE EVERY DAY WITH LANTUS 999 Each 2    aspirin EC (ECOTRIN) 81 MG TBEC Take 81 mg by mouth every evening.      SYNJARDY XR 12.5-1000 MG TABLET SR 24 HR TAKE 2 TABLETS DAILY 180 Tablet 3    [DISCONTINUED] atorvastatin (LIPITOR) 20 MG Tab Take 20 mg by mouth every evening.      [DISCONTINUED] rosuvastatin (CRESTOR) 40 MG tablet Take 1 Tablet by mouth every day. 90 Tablet 3    [DISCONTINUED] clopidogrel (PLAVIX) 75 MG Tab Take 1 Tablet by mouth every day. 90 Tablet 3     No facility-administered encounter medications on file as of 11/4/2022.     Review of Systems   Constitutional:  Negative for fever and malaise/fatigue.   Respiratory:  Negative for cough and shortness of breath.    Cardiovascular:  Negative for chest pain, palpitations, orthopnea, claudication, leg swelling and  "PND.   Gastrointestinal:  Negative for abdominal pain.   Musculoskeletal:  Negative for myalgias.            Objective     /62 (BP Location: Left arm, Patient Position: Sitting, BP Cuff Size: Adult)   Pulse 79   Resp 18   Ht 1.702 m (5' 7\")   Wt 87.5 kg (193 lb)   SpO2 97%   BMI 30.23 kg/m²     Physical Exam  Vitals and nursing note reviewed.   Constitutional:       Appearance: Normal appearance. He is well-developed and normal weight.   HENT:      Head: Normocephalic and atraumatic.   Neck:      Vascular: No JVD.   Cardiovascular:      Rate and Rhythm: Normal rate and regular rhythm.      Pulses: Normal pulses.      Heart sounds: Normal heart sounds.      Comments: Pacemaker present in left upper chest without evidence   of erosion, drainage,or erythema.    Pulmonary:      Effort: Pulmonary effort is normal.      Breath sounds: Normal breath sounds.   Musculoskeletal:         General: Normal range of motion.      Comments: Trace LE edema   Skin:     General: Skin is warm and dry.      Capillary Refill: Capillary refill takes less than 2 seconds.   Neurological:      General: No focal deficit present.      Mental Status: He is alert and oriented to person, place, and time. Mental status is at baseline.   Psychiatric:         Mood and Affect: Mood normal.         Behavior: Behavior normal.         Thought Content: Thought content normal.         Judgment: Judgment normal.              Assessment & Plan     1. Hypertension associated with type 2 diabetes mellitus (HCC)        2. Dyslipidemia        3. RBBB        4. Sick sinus syndrome (HCC)        5. Pacemaker        6. Coronary artery disease due to calcified coronary lesion        7. Moderate aortic stenosis  EC-ECHOCARDIOGRAM COMPLETE W/O CONT      8. Class 2 severe obesity due to excess calories with serious comorbidity and body mass index (BMI) of 35.0 to 35.9 in adult ([facility])        9. Obstructive sleep apnea on CPAP        10. Controlled type 2 " diabetes mellitus with stable proliferative retinopathy, with long-term current use of insulin, unspecified laterality (HCC)        11. Former smoker        12. History of syncope        13. Coronary artery disease due to calcified coronary lesion: 40-50% disease in the circumflex at cardiac catheterization in 2013  rosuvastatin (CRESTOR) 40 MG tablet      14. Stented coronary artery  rosuvastatin (CRESTOR) 40 MG tablet        Medical Decision Making: Today's Assessment/Status/Plan:      1. Moderate AS  -check echo in March 2023  -follow  -no symptoms    2. HLD with CAD with prior PCI  -no angina or CALIX  -cont asa, statin lifelong  -OK to stop plavix at this time    3. PPM for SSS  -stable on device check  ->1 year battery life, pacer checks per device clinic    4. Obesity with DM type and LYNDA  -DM well managed with PCP  -not treating LYNDA anymore due to machine not working, not interested in re-starting or referral to sleep medicine team    Patient is to follow up with Dr. Hsu in 6 months with echo, PPM check.

## 2022-11-08 ENCOUNTER — PATIENT MESSAGE (OUTPATIENT)
Dept: HEALTH INFORMATION MANAGEMENT | Facility: OTHER | Age: 77
End: 2022-11-08

## 2022-11-15 ENCOUNTER — HOSPITAL ENCOUNTER (OUTPATIENT)
Dept: LAB | Facility: MEDICAL CENTER | Age: 77
End: 2022-11-15
Attending: UROLOGY
Payer: MEDICARE

## 2022-11-15 LAB — PSA SERPL-MCNC: 10.4 NG/ML (ref 0–4)

## 2022-11-15 PROCEDURE — 84153 ASSAY OF PSA TOTAL: CPT

## 2022-11-15 PROCEDURE — 36415 COLL VENOUS BLD VENIPUNCTURE: CPT

## 2022-11-23 DIAGNOSIS — E11.59 CONTROLLED TYPE 2 DIABETES MELLITUS WITH OTHER CIRCULATORY COMPLICATION, WITH LONG-TERM CURRENT USE OF INSULIN (HCC): ICD-10-CM

## 2022-11-23 DIAGNOSIS — Z79.4 CONTROLLED TYPE 2 DIABETES MELLITUS WITH OTHER CIRCULATORY COMPLICATION, WITH LONG-TERM CURRENT USE OF INSULIN (HCC): ICD-10-CM

## 2022-11-28 RX ORDER — INSULIN GLARGINE 100 [IU]/ML
INJECTION, SOLUTION SUBCUTANEOUS
Qty: 45 ML | Refills: 3 | Status: SHIPPED | OUTPATIENT
Start: 2022-11-28 | End: 2023-08-08

## 2022-12-01 DIAGNOSIS — E03.8 SUBCLINICAL HYPOTHYROIDISM: ICD-10-CM

## 2022-12-02 RX ORDER — LEVOTHYROXINE SODIUM 0.05 MG/1
TABLET ORAL
Qty: 90 TABLET | Refills: 2 | Status: SHIPPED | OUTPATIENT
Start: 2022-12-02 | End: 2023-01-09 | Stop reason: SDUPTHER

## 2022-12-11 SDOH — HEALTH STABILITY: PHYSICAL HEALTH: ON AVERAGE, HOW MANY MINUTES DO YOU ENGAGE IN EXERCISE AT THIS LEVEL?: 0 MIN

## 2022-12-11 SDOH — HEALTH STABILITY: MENTAL HEALTH
STRESS IS WHEN SOMEONE FEELS TENSE, NERVOUS, ANXIOUS, OR CAN'T SLEEP AT NIGHT BECAUSE THEIR MIND IS TROUBLED. HOW STRESSED ARE YOU?: NOT AT ALL

## 2022-12-11 SDOH — ECONOMIC STABILITY: HOUSING INSECURITY
IN THE LAST 12 MONTHS, WAS THERE A TIME WHEN YOU DID NOT HAVE A STEADY PLACE TO SLEEP OR SLEPT IN A SHELTER (INCLUDING NOW)?: NO

## 2022-12-11 SDOH — ECONOMIC STABILITY: INCOME INSECURITY: IN THE LAST 12 MONTHS, WAS THERE A TIME WHEN YOU WERE NOT ABLE TO PAY THE MORTGAGE OR RENT ON TIME?: NO

## 2022-12-11 SDOH — ECONOMIC STABILITY: TRANSPORTATION INSECURITY
IN THE PAST 12 MONTHS, HAS LACK OF TRANSPORTATION KEPT YOU FROM MEETINGS, WORK, OR FROM GETTING THINGS NEEDED FOR DAILY LIVING?: NO

## 2022-12-11 SDOH — ECONOMIC STABILITY: FOOD INSECURITY: WITHIN THE PAST 12 MONTHS, YOU WORRIED THAT YOUR FOOD WOULD RUN OUT BEFORE YOU GOT MONEY TO BUY MORE.: NEVER TRUE

## 2022-12-11 SDOH — ECONOMIC STABILITY: TRANSPORTATION INSECURITY
IN THE PAST 12 MONTHS, HAS THE LACK OF TRANSPORTATION KEPT YOU FROM MEDICAL APPOINTMENTS OR FROM GETTING MEDICATIONS?: NO

## 2022-12-11 SDOH — HEALTH STABILITY: PHYSICAL HEALTH: ON AVERAGE, HOW MANY DAYS PER WEEK DO YOU ENGAGE IN MODERATE TO STRENUOUS EXERCISE (LIKE A BRISK WALK)?: 0 DAYS

## 2022-12-11 SDOH — ECONOMIC STABILITY: INCOME INSECURITY: HOW HARD IS IT FOR YOU TO PAY FOR THE VERY BASICS LIKE FOOD, HOUSING, MEDICAL CARE, AND HEATING?: NOT HARD AT ALL

## 2022-12-11 SDOH — ECONOMIC STABILITY: HOUSING INSECURITY: IN THE LAST 12 MONTHS, HOW MANY PLACES HAVE YOU LIVED?: 1

## 2022-12-11 SDOH — ECONOMIC STABILITY: FOOD INSECURITY: WITHIN THE PAST 12 MONTHS, THE FOOD YOU BOUGHT JUST DIDN'T LAST AND YOU DIDN'T HAVE MONEY TO GET MORE.: NEVER TRUE

## 2022-12-11 SDOH — ECONOMIC STABILITY: TRANSPORTATION INSECURITY
IN THE PAST 12 MONTHS, HAS LACK OF RELIABLE TRANSPORTATION KEPT YOU FROM MEDICAL APPOINTMENTS, MEETINGS, WORK OR FROM GETTING THINGS NEEDED FOR DAILY LIVING?: NO

## 2022-12-11 ASSESSMENT — SOCIAL DETERMINANTS OF HEALTH (SDOH)
DO YOU BELONG TO ANY CLUBS OR ORGANIZATIONS SUCH AS CHURCH GROUPS UNIONS, FRATERNAL OR ATHLETIC GROUPS, OR SCHOOL GROUPS?: PATIENT DECLINED
HOW OFTEN DO YOU ATTEND CHURCH OR RELIGIOUS SERVICES?: MORE THAN 4 TIMES PER YEAR
IN A TYPICAL WEEK, HOW MANY TIMES DO YOU TALK ON THE PHONE WITH FAMILY, FRIENDS, OR NEIGHBORS?: MORE THAN THREE TIMES A WEEK
HOW OFTEN DO YOU ATTENT MEETINGS OF THE CLUB OR ORGANIZATION YOU BELONG TO?: PATIENT DECLINED
HOW OFTEN DO YOU GET TOGETHER WITH FRIENDS OR RELATIVES?: TWICE A WEEK
HOW OFTEN DO YOU GET TOGETHER WITH FRIENDS OR RELATIVES?: TWICE A WEEK
HOW OFTEN DO YOU HAVE A DRINK CONTAINING ALCOHOL: NEVER
HOW OFTEN DO YOU ATTEND CHURCH OR RELIGIOUS SERVICES?: MORE THAN 4 TIMES PER YEAR
HOW OFTEN DO YOU ATTENT MEETINGS OF THE CLUB OR ORGANIZATION YOU BELONG TO?: PATIENT DECLINED
DO YOU BELONG TO ANY CLUBS OR ORGANIZATIONS SUCH AS CHURCH GROUPS UNIONS, FRATERNAL OR ATHLETIC GROUPS, OR SCHOOL GROUPS?: PATIENT DECLINED
HOW OFTEN DO YOU HAVE SIX OR MORE DRINKS ON ONE OCCASION: NEVER
IN A TYPICAL WEEK, HOW MANY TIMES DO YOU TALK ON THE PHONE WITH FAMILY, FRIENDS, OR NEIGHBORS?: MORE THAN THREE TIMES A WEEK
HOW HARD IS IT FOR YOU TO PAY FOR THE VERY BASICS LIKE FOOD, HOUSING, MEDICAL CARE, AND HEATING?: NOT HARD AT ALL
HOW MANY DRINKS CONTAINING ALCOHOL DO YOU HAVE ON A TYPICAL DAY WHEN YOU ARE DRINKING: PATIENT DOES NOT DRINK
WITHIN THE PAST 12 MONTHS, YOU WORRIED THAT YOUR FOOD WOULD RUN OUT BEFORE YOU GOT THE MONEY TO BUY MORE: NEVER TRUE

## 2022-12-11 ASSESSMENT — LIFESTYLE VARIABLES
HOW OFTEN DO YOU HAVE A DRINK CONTAINING ALCOHOL: NEVER
SKIP TO QUESTIONS 9-10: 1
HOW OFTEN DO YOU HAVE SIX OR MORE DRINKS ON ONE OCCASION: NEVER
HOW MANY STANDARD DRINKS CONTAINING ALCOHOL DO YOU HAVE ON A TYPICAL DAY: PATIENT DOES NOT DRINK
AUDIT-C TOTAL SCORE: 0

## 2022-12-14 ENCOUNTER — OFFICE VISIT (OUTPATIENT)
Dept: MEDICAL GROUP | Facility: PHYSICIAN GROUP | Age: 77
End: 2022-12-14
Payer: MEDICARE

## 2022-12-14 VITALS
DIASTOLIC BLOOD PRESSURE: 64 MMHG | HEART RATE: 76 BPM | TEMPERATURE: 98.6 F | HEIGHT: 69 IN | OXYGEN SATURATION: 96 % | RESPIRATION RATE: 20 BRPM | SYSTOLIC BLOOD PRESSURE: 122 MMHG | WEIGHT: 200 LBS | BODY MASS INDEX: 29.62 KG/M2

## 2022-12-14 DIAGNOSIS — E78.5 HYPERLIPIDEMIA ASSOCIATED WITH TYPE 2 DIABETES MELLITUS (HCC): ICD-10-CM

## 2022-12-14 DIAGNOSIS — R20.2 NUMBNESS AND TINGLING IN BOTH HANDS: ICD-10-CM

## 2022-12-14 DIAGNOSIS — E11.69 HYPERLIPIDEMIA ASSOCIATED WITH TYPE 2 DIABETES MELLITUS (HCC): ICD-10-CM

## 2022-12-14 DIAGNOSIS — Z00.00 MEDICARE ANNUAL WELLNESS VISIT, SUBSEQUENT: Primary | ICD-10-CM

## 2022-12-14 DIAGNOSIS — I25.84 CORONARY ARTERY DISEASE DUE TO CALCIFIED CORONARY LESION: ICD-10-CM

## 2022-12-14 DIAGNOSIS — M54.9 CHRONIC BACK PAIN, UNSPECIFIED BACK LOCATION, UNSPECIFIED BACK PAIN LATERALITY: ICD-10-CM

## 2022-12-14 DIAGNOSIS — Z23 NEED FOR VACCINATION: ICD-10-CM

## 2022-12-14 DIAGNOSIS — I25.10 CORONARY ARTERY DISEASE DUE TO CALCIFIED CORONARY LESION: ICD-10-CM

## 2022-12-14 DIAGNOSIS — Z98.890 HISTORY OF LAMINECTOMY: ICD-10-CM

## 2022-12-14 DIAGNOSIS — Z96.89 S/P INSERTION OF SPINAL CORD STIMULATOR: ICD-10-CM

## 2022-12-14 DIAGNOSIS — Z95.5 STENTED CORONARY ARTERY: ICD-10-CM

## 2022-12-14 DIAGNOSIS — M54.2 NECK PAIN: ICD-10-CM

## 2022-12-14 DIAGNOSIS — G89.29 CHRONIC BACK PAIN, UNSPECIFIED BACK LOCATION, UNSPECIFIED BACK PAIN LATERALITY: ICD-10-CM

## 2022-12-14 DIAGNOSIS — R20.0 NUMBNESS AND TINGLING IN BOTH HANDS: ICD-10-CM

## 2022-12-14 PROCEDURE — G0008 ADMIN INFLUENZA VIRUS VAC: HCPCS | Performed by: STUDENT IN AN ORGANIZED HEALTH CARE EDUCATION/TRAINING PROGRAM

## 2022-12-14 PROCEDURE — 99213 OFFICE O/P EST LOW 20 MIN: CPT | Mod: 25 | Performed by: STUDENT IN AN ORGANIZED HEALTH CARE EDUCATION/TRAINING PROGRAM

## 2022-12-14 PROCEDURE — 90662 IIV NO PRSV INCREASED AG IM: CPT | Performed by: STUDENT IN AN ORGANIZED HEALTH CARE EDUCATION/TRAINING PROGRAM

## 2022-12-14 PROCEDURE — G0439 PPPS, SUBSEQ VISIT: HCPCS | Mod: 25 | Performed by: STUDENT IN AN ORGANIZED HEALTH CARE EDUCATION/TRAINING PROGRAM

## 2022-12-14 RX ORDER — ROSUVASTATIN CALCIUM 40 MG/1
40 TABLET, COATED ORAL DAILY
Qty: 90 TABLET | Refills: 3 | Status: SHIPPED | OUTPATIENT
Start: 2022-12-14 | End: 2023-10-26 | Stop reason: SDUPTHER

## 2022-12-14 ASSESSMENT — ENCOUNTER SYMPTOMS: GENERAL WELL-BEING: POOR

## 2022-12-14 ASSESSMENT — ACTIVITIES OF DAILY LIVING (ADL): BATHING_REQUIRES_ASSISTANCE: 0

## 2022-12-14 ASSESSMENT — FIBROSIS 4 INDEX: FIB4 SCORE: 2.89

## 2022-12-14 ASSESSMENT — PATIENT HEALTH QUESTIONNAIRE - PHQ9: CLINICAL INTERPRETATION OF PHQ2 SCORE: 0

## 2022-12-14 NOTE — PROGRESS NOTES
Chief Complaint   Patient presents with    Medicare Annual Wellness     HPI:  Dakota Boyer is a 77 y.o. here for Medicare Annual Wellness Visit.    Patient Active Problem List    Diagnosis Date Noted    S/P insertion of spinal cord stimulator 12/14/2022    History of laminectomy 12/14/2022    Cyst of left kidney 09/13/2022    Numbness and tingling in both hands 08/19/2022    History of syncope 03/09/2022    Constipation 03/09/2022    Former smoker 11/02/2021    History of bilateral knee replacement 11/02/2021    Long-term insulin use ([facility]) 07/01/2020    Moderate aortic stenosis 06/03/2020    Benign prostatic hyperplasia with urinary obstruction 12/22/2019    Prostate cancer ([facility]) 08/09/2018    Elevated PSA 01/23/2018    Cervical spondylosis 10/19/2016    Diabetes mellitus type II, controlled ([facility]) 06/02/2016    Hypertensive retinopathy of both eyes 06/02/2016    Depression with anxiety 03/28/2016    Obstructive sleep apnea on CPAP 03/28/2016    Subclinical hypothyroidism 08/21/2014    Class 2 severe obesity with serious comorbidity and body mass index (BMI) of 35.0 to 35.9 in adult ([facility]) 08/21/2014    Coronary artery disease due to calcified coronary lesion 08/16/2013    DJD (degenerative joint disease) of cervical spine 02/27/2013    Degeneration of lumbar or lumbosacral intervertebral disc 11/20/2012    Pacemaker 10/04/2012    Hyperlipidemia associated with type 2 diabetes mellitus ([facility]) 09/28/2012    RBBB 09/28/2012    Back pain, chronic 04/10/2012    Hypertension associated with type 2 diabetes mellitus ([facility]) 04/10/2012    Neck pain 04/10/2012     Current Outpatient Medications   Medication Sig Dispense Refill    rosuvastatin (CRESTOR) 40 MG tablet Take 1 Tablet by mouth every day. 90 Tablet 3    levothyroxine (SYNTHROID) 50 MCG Tab TAKE ONE TABLET BY MOUTH EVERY MORNING ON AN EMPTY STOMACH 90 Tablet 2    LANTUS SOLOSTAR 100 UNIT/ML Solution Pen-injector injection INJECT 36 UNITS UNDER THE SKIN EVERY  EVENING 45 mL 3    SYNJARDY XR 12.5-1000 MG TABLET SR 24 HR TAKE 2 TABLETS DAILY 180 Tablet 3    Dulaglutide (TRULICITY) 3 MG/0.5ML Solution Pen-injector Inject 3 mg under the skin every 7 days. 6 mL 3    FREESTYLE LITE strip USE 1 STRIP TWICE A  Strip 3    Lancets MISC Lancets order: Lancets for Abbott Freestyle Lite meter. Sig: use 4 times daily  and prn ssx high or low sugar. #100 RF x 0 360 Each 4    Blood Glucose Monitoring Suppl [place] Meter: Dispense Abbott Freestyle Lite meter. Sig. Use as directed for blood sugar monitoring. #1. NR. 1 Device 0    B-D ULTRAFINE III SHORT PEN 31G X 8 MM MISC USE 1 NEEDLE EVERY DAY WITH LANTUS 999 Each 2    aspirin EC (ECOTRIN) 81 MG TBEC Take 81 mg by mouth every evening.       No current facility-administered medications for this visit.          Current supplements as per medication list.     Allergies: Aleve cold & [pseudoephedrine-naproxen na], Ceftriaxone sodium, Naproxen, Latex, and Tape    Current social contact/activities: Spends time with friends at [employer]. Goes to [employer]. 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     He  reports that he quit smoking about 32 years ago. His smoking use included cigarettes. He has a 40.00 pack-year smoking history. He has never used smokeless tobacco. He reports that he does not drink alcohol and does not use drugs.  Counseling given: Not Answered    ROS:    Gait: Uses no assistive device  Ostomy: No  Other tubes: No  Amputations: No  Chronic oxygen use: No  Last eye exam: August 2022  Wears hearing aids: No   : Denies any urinary leakage during the last 6 months. Followed by urology.    Screening:    Depression Screening  Little interest or pleasure in doing things?  0 - not at all  Feeling down, depressed , or hopeless? 0 - not at all  Trouble falling or staying asleep, or sleeping too much?     Feeling tired or having little energy?     Poor appetite or overeating?      Feeling bad about yourself - or that you are a failure or have let yourself or your family down?    Trouble concentrating on things, such as reading the newspaper or watching television?    Moving or speaking so slowly that other people could have noticed.  Or the opposite - being so fidgety or restless that you have been moving around a lot more than usual?     Thoughts that you would be better off dead, or of hurting yourself?     Patient Health Questionnaire Score:      If depressive symptoms identified deferred to follow up visit unless specifically addressed in assessment and plan.    Interpretation of PHQ-9 Total Score   Score Severity   1-4 No Depression   5-9 Mild Depression   10-14 Moderate Depression   15-19 Moderately Severe Depression   20-27 Severe Depression    Screening for Cognitive Impairment  Three Minute Recall (daughter, heaven, mountain) 3/3    Erik clock face with all 12 numbers and set the hands to show 10 past 11.  Yes    Cognitive concerns identified deferred for follow up unless specifically addressed in assessment and plan.    Fall Risk Assessment  Has the patient had two or more falls in the last year or any fall with injury in the last year?  No. Did fall (syncope) in March when he had COVID without signficiant injury. No other falls. No hypoglycemia in >12m.    Safety Assessment  Throw rugs on floor.  Yes, but secure  Handrails on all stairs.  Yes  Good lighting in all hallways.  Yes  Difficulty hearing.  No  Patient counseled about all safety risks that were identified.    Functional Assessment ADLs  Are there any barriers preventing you from cooking for yourself or meeting nutritional needs?  No.    Are there any barriers preventing you from driving safely or obtaining transportation?  No.    Are there any barriers preventing you from using a telephone or calling for help?  No    Are there any barriers preventing you from shopping?  No.    Are there any barriers preventing you from  taking care of your own finances?  No    Are there any barriers preventing you from managing your medications?  No    Are there any barriers preventing you from showering, bathing or dressing yourself? No    Are you currently engaging in any exercise or physical activity?  No.    What is your perception of your health? Poor    Advance Care Planning  Do you have an Advance Directive, Living Will, Durable Power of , or POLST? Yes  Advance Directive Living Will Durable Power of  POLST is on file    Health Maintenance Summary            Ordered - IMM INFLUENZA (1) Ordered on 12/14/2022 11/02/2021  Imm Admin: Influenza Vaccine Adult HD    10/26/2020  Imm Admin: Influenza Vaccine Adult HD    10/04/2019  Imm Admin: Influenza Vaccine Adult HD    02/22/2019  Imm Admin: Influenza Vaccine Adult HD    11/13/2017  Imm Admin: Influenza Vaccine Adult HD    Only the first 5 history entries have been loaded, but more history exists.              Postponed - COVID-19 Vaccine (1) Postponed until 12/14/2023      No completion history exists for this topic.              A1C SCREENING (Every 6 Months) Next due on 3/27/2023      09/27/2022  POCT Hemoglobin A1C    03/04/2022  HEMOGLOBIN A1C    12/28/2021  POCT Hemoglobin A1C    09/21/2021  POCT Hemoglobin A1C    05/18/2021  POCT Hemoglobin A1C    Only the first 5 history entries have been loaded, but more history exists.              RETINAL SCREENING (Yearly) Next due on 7/27/2023 07/27/2022  REFERRAL FOR RETINAL SCREENING EXAM    07/27/2022  REFERRAL FOR RETINAL SCREENING EXAM    07/27/2021  REFERRAL FOR RETINAL SCREENING EXAM    07/16/2020  REFERRAL FOR RETINAL SCREENING EXAM    03/07/2019  REFERRAL FOR RETINAL SCREENING EXAM    Only the first 5 history entries have been loaded, but more history exists.              DIABETES MONOFILAMENT / LE EXAM (Yearly) Next due on 9/27/2023 09/27/2022  Diabetic Monofilament LE Exam    09/27/2022  SmartData: WORKFLOW -  DIABETES - DIABETIC FOOT EXAM PERFORMED    03/23/2022  Diabetic Monofilament LE Exam    03/23/2022  SmartData: WORKFLOW - DIABETES - DIABETIC FOOT EXAM PERFORMED    12/28/2021  Diabetic Monofilament LE Exam    Only the first 5 history entries have been loaded, but more history exists.              FASTING LIPID PROFILE (Yearly) Next due on 10/24/2023      10/24/2022  Lipid Profile    03/05/2022  Lipid Profile (Lipid Panel) Fasting    02/02/2022  Lipid Profile    11/18/2020  Lipid Profile    05/13/2019  Lipid Profile    Only the first 5 history entries have been loaded, but more history exists.              URINE ACR / MICROALBUMIN (Yearly) Next due on 10/24/2023      10/24/2022  MICROALBUMIN CREAT RATIO URINE    07/30/2021  MICROALBUMIN CREAT RATIO URINE    11/18/2020  MICROALBUMIN CREAT RATIO URINE    08/20/2018  MICROALBUMIN CREAT RATIO URINE    06/12/2017  MICROALBUMIN CREAT RATIO URINE    Only the first 5 history entries have been loaded, but more history exists.              SERUM CREATININE (Yearly) Next due on 10/24/2023      10/24/2022  Comp Metabolic Panel    03/09/2022  Basic Metabolic Panel    03/05/2022  Comp Metabolic Panel (CMP)    03/04/2022  Complete Metabolic Panel (CMP)    02/02/2022  Comp Metabolic Panel    Only the first 5 history entries have been loaded, but more history exists.              Annual Wellness Visit (Every 366 Days) Next due on 12/15/2023      12/14/2022  Level of Service: ANNUAL WELLNESS VISIT-INCLUDES PPPS SUBSEQUE*    12/14/2022  Visit Dx: Medicare annual wellness visit, subsequent    08/14/2020  Subsequent Annual Wellness Visit - Includes PPPS ()    08/14/2020  Visit Dx: Medicare annual wellness visit, subsequent    08/09/2018  Visit Dx: Medicare annual wellness visit, subsequent    Only the first 5 history entries have been loaded, but more history exists.              IMM DTaP/Tdap/Td Vaccine (2 - Td or Tdap) Next due on 8/9/2028 08/09/2018  Imm Admin: Tdap Vaccine     2008  Imm Admin: TD Vaccine              IMM PNEUMOCOCCAL VACCINE: 65+ Years (Series Information) Completed      2016  Imm Admin: Pneumococcal Conjugate Vaccine (Prevnar/PCV-13)    10/04/2012  Imm Admin: Pneumococcal polysaccharide vaccine (PPSV-23)              IMM ZOSTER VACCINES (Series Information) Completed      10/08/2019  Imm Admin: Zoster Vaccine Recombinant (RZV) (SHINGRIX)    2019  Imm Admin: Zoster Vaccine Recombinant (RZV) (SHINGRIX)    2014  Imm Admin: Zoster Vaccine Live (ZVL) (Zostavax) - HISTORICAL DATA    2014  Imm Admin: Zoster Vaccine Live (ZVL) (Zostavax) - HISTORICAL DATA              IMM HEP B VACCINE (Series Information) Completed      2021  Imm Admin: Hepatitis B Vaccine (Adol/Adult)    2020  Imm Admin: Hepatitis B Vaccine (Adol/Adult)    2020  Imm Admin: Hepatitis B Vaccine (Adol/Adult)              HEPATITIS C SCREENING  Completed      2022  HEP C VIRUS ANTIBODY              IMM MENINGOCOCCAL ACWY VACCINE (Series Information) Aged Out      No completion history exists for this topic.              Discontinued - COLORECTAL CANCER SCREENING  Discontinued        Frequency changed to Never automatically (Topic No Longer Applies)    2022  OCCULT BLOOD FECES IMMUNOASSAY    2020  OCCULT BLOOD FECES IMMUNOASSAY    2019  REFERRAL TO GI FOR COLONOSCOPY    2013  AMB REFERRAL TO GI FOR COLONOSCOPY                  Patient Care Team:  Rylee García D.O. as PCP - General (Family Medicine)  Jac Christiansen M.D. (Cardiovascular Disease (Cardiology))  Isabela Singletary M.D. as Consulting Physician (Endocrinology)  [employer] (DME Supplier)    Social History     Tobacco Use    Smoking status: Former     Packs/day: 1.00     Years: 40.00     Pack years: 40.00     Types: Cigarettes     Quit date: 1/3/1990     Years since quittin.9    Smokeless tobacco: Never   Vaping Use    Vaping Use: Never used   Substance Use Topics  "   Alcohol use: No     Alcohol/week: 0.0 oz    Drug use: No     Family History   Problem Relation Age of Onset    Lung Disease Mother         Smoker ( of lung dz)    Alcohol/Drug Mother     Heart Disease Father 36        CAD    Heart Disease Sister         \"hole in heart\"    Lung Disease Brother         COPD, CANCER    Cancer Brother         Prostate, Lung ( of lung CA)    Alcohol/Drug Brother     Heart Disease Brother     Diabetes Brother     Hypertension Brother     Hyperlipidemia Brother     Heart Disease Maternal Grandmother     Hypertension Maternal Grandmother     Hyperlipidemia Maternal Grandmother     Cancer Brother          of esophagus/stomach, thyroid    Heart Disease Brother 60        MI, stent, PM/defib    Sleep Apnea Brother     No Known Problems Maternal Grandfather     No Known Problems Paternal Grandmother      He  has a past medical history of Anxiety, Aortic stenosis (2022), Back pain, chronic (10/18/2016), Bilateral primary osteoarthritis of knee (2018), Black stools (3/9/2022), Blood clotting disorder ([facility]) (), Breath shortness, CAD (coronary artery disease) (2021), Cancer ([facility]) (? early ), Degeneration of lumbar or lumbosacral intervertebral disc ( ), Dental disorder, Depression, Diabetes, DJD (degenerative joint disease) of cervical spine ( ), Hyperlipidemia, Hypersensitive carotid sinus syndrome ( ), Hypertension, Insomnia ( ), Neck pain, Neutropenia ([facility]) (3/4/2022), Pacemaker (2012), Pancytopenia ([facility]) (3/9/2022), RBBB ( ), S/P lumbar discectomy ( ), Sick sinus syndrome ([facility]) ( ), Sleep apnea, Syncope (2012), Thyroid disease, Unintentional weight loss (3/5/2022), and Unspecified hemorrhagic conditions.   Past Surgical History:   Procedure Laterality Date    OK DSTR NROLYTC AGNT PARVERTEB FCT SNGL CRVCL/THORA Right 10/19/2016    Procedure: NEURO DEST FACET C/T W/IG SNGL - 3ON-C3, C8-T1    SINERGY;  Surgeon: Gaurang Pruett M.D.;  Location: SURGERY " "[facility];  Service: Pain Management    HI DSTR NROLYTC AGNT PARVERTEB FCT ADDL CRVCL/THORA  10/19/2016    Procedure: NEURO DEST FACET C/T W/IG ADDL;  Surgeon: Gaurang Pruett M.D.;  Location: SURGERY [facility];  Service: Pain Management    HI FLUOROSCOPIC GUIDANCE NEEDLE PLACEMENT  10/19/2016    Procedure: FLOURO GUIDE NEEDLE PLACEMENT;  Surgeon: Gaurang Pruett M.D.;  Location: [facility];  Service: Pain Management    SHOULDER ARTHROSCOPY Right 2015    torn bicep tendon and spurs    RECOVERY  10/17/2013    Performed by Cath-Recovery Surgery at SURGERY SAME DAY [facility] ORS    WOUND DEHISCENCE  4/10/2013    Performed by Tu Jain M.D. at SURGERY [facility]    CERVICAL FUSION POSTERIOR  2/27/2013    Performed by Tu Jain M.D. at SURGERY [facility]    CERVICAL LAMINECTOMY POSTERIOR  2/27/2013    Performed by Tu Jain M.D. at SURGERY [facility]    LUMBAR LAMINECTOMY DISKECTOMY  11/20/2012    Performed by Tu Jain M.D. at SURGERY [facility]    RECOVERY  10/4/2012    Performed by Cath-Recovery Surgery at SURGERY SAME DAY [facility]    PACEMAKER INSERTION  October 2012    [facility] [address] 2110 implanted by Dr. Waite.    SHOULDER DECOMPRESSION Left 2008    Left rotator cuff    ARTHROPLASTY Left 2004    ARTHROSCOPY, KNEE Bilateral 1978    ORTHOPEDIC OSTEOTOMY      Both knees several times, 8 total    OTHER      SPINAL CORD STIMULATOR  1 month ago    TONSILLECTOMY      VASECTOMY       Exam:   /64 (BP Location: Left arm, Patient Position: Sitting, BP Cuff Size: Adult)   Pulse 76   Temp 37 °C (98.6 °F) (Temporal)   Resp 20   Ht 1.753 m (5' 9\")   Wt 90.7 kg (200 lb)   SpO2 96%  Body mass index is 29.53 kg/m².    Constitutional: Well-developed and well-nourished. No acute distress.   Skin: Skin is warm and dry. No rash noted.  Head: Atraumatic without lesions.  Eyes: Conjunctivae and extraocular motions are normal. Pupils are equal, " round. No scleral icterus.   Mouth/Throat: Wearing mask.  Neck: Supple, trachea midline. No thyromegaly present. No lymphadenopathy--cervical or supraclavicular.  Cardiovascular: Regular rate and rhythm, S1 and S2 without murmur, rubs, or gallops.  Lungs: Normal inspiratory effort, CTA bilaterally, no wheezes/rhonchi/rales  Abdomen: Soft, non tender, and without distention. Active bowel sounds. No rebound, guarding, masses or HSM.  Extremities: No cyanosis, clubbing, erythema, nor edema.   Neurological: Alert and oriented x 3. Normal gait.  Psychiatric:  Behavior, mood, and affect are appropriate.    Assessment and Plan. The following treatment and monitoring plan is recommended:      1. Medicare annual wellness visit, subsequent  Services suggested: PT-declines.  Health Care Screening: Age-appropriate preventive services recommended by USPTF and ACIP covered by Medicare were discussed today. Services ordered if indicated and agreed upon by the patient.  Referrals offered: Community-based lifestyle interventions to reduce health risks and promote self-management and wellness, fall prevention, nutrition, physical activity, tobacco-use cessation, weight loss, and mental health services as per orders if indicated.    Discussion today about general wellness and lifestyle habits:    Prevent falls and reduce trip hazards; Cautioned about securing or removing rugs.  Engage in regular physical activity and social activities   Continue care with cardiology, endocrinology and urology.    2. Hyperlipidemia associated with type 2 diabetes mellitus (HCC)  3. Coronary artery disease due to calcified coronary lesion: 40-50% disease in the circumflex at cardiac catheterization in 2013  4. Stented coronary artery  Chronic, well controlled. Followed by cardiology and waiting for refill of statin-refilled below. Continue crestor as below and 81mg ASA daily.   - rosuvastatin (CRESTOR) 40 MG tablet; Take 1 Tablet by mouth every day.   Dispense: 90 Tablet; Refill: 3    5. Neck pain  6. Numbness and tingling in both hands  7. Chronic back pain, unspecified back location, unspecified back pain laterality  8. S/P insertion of spinal cord stimulator  9. History of laminectomy  Chronic, [facility]. Prior followed by Dr. Campo. Referred back to PM.  - Referral to Pain Management    10. Need for vaccination  - INFLUENZA VACCINE, HIGH DOSE (65+ ONLY)    Follow-up: Return in about 4 months (around 4/4/2023), or if symptoms worsen or fail to improve.

## 2022-12-14 NOTE — PATIENT INSTRUCTIONS
Renal -[phone]  Reschedule the nerve testing (same number)    Long-acting insulin:   Adjust dose according to FASTING BLOOD GLUCOSE target  mg/dL  (1) Increase the insulin dose every 3-4 days as needed.   (2) Increase by 2 units if FBG average concentration is 131-170 mg/dL.   (3) Increase by 4 units if FBG average concentration is 171-210 mg/dL.   (4) Increase by 6 units if FBG average concentration is 221-260 mg/dL.   (5) Increase by 8 units if FBG average concentration is greater than 261mg/dL and call us.      Consider cutting back by 1-2 units to previous dose if glucose concentration is below 80 mg/dL or symptoms of hypoglycemia.

## 2023-01-09 DIAGNOSIS — E11.59 CONTROLLED TYPE 2 DIABETES MELLITUS WITH OTHER CIRCULATORY COMPLICATION, WITH LONG-TERM CURRENT USE OF INSULIN (HCC): ICD-10-CM

## 2023-01-09 DIAGNOSIS — Z79.4 CONTROLLED TYPE 2 DIABETES MELLITUS WITH OTHER CIRCULATORY COMPLICATION, WITH LONG-TERM CURRENT USE OF INSULIN (HCC): ICD-10-CM

## 2023-01-09 DIAGNOSIS — E03.8 SUBCLINICAL HYPOTHYROIDISM: ICD-10-CM

## 2023-01-09 RX ORDER — LEVOTHYROXINE SODIUM 0.05 MG/1
50 TABLET ORAL
Qty: 90 TABLET | Refills: 2 | Status: SHIPPED | OUTPATIENT
Start: 2023-01-09 | End: 2023-10-24

## 2023-01-23 ENCOUNTER — HOSPITAL ENCOUNTER (OUTPATIENT)
Dept: CARDIOLOGY | Facility: MEDICAL CENTER | Age: 78
End: 2023-01-23
Attending: NURSE PRACTITIONER
Payer: MEDICARE

## 2023-01-23 DIAGNOSIS — I35.0 MODERATE AORTIC STENOSIS: ICD-10-CM

## 2023-01-23 LAB
LV EJECT FRACT  99904: 75
LV EJECT FRACT MOD 2C 99903: 78.43
LV EJECT FRACT MOD 4C 99902: 74.02
LV EJECT FRACT MOD BP 99901: 76.77

## 2023-01-23 PROCEDURE — 93306 TTE W/DOPPLER COMPLETE: CPT

## 2023-01-23 PROCEDURE — 93306 TTE W/DOPPLER COMPLETE: CPT | Mod: 26 | Performed by: INTERNAL MEDICINE

## 2023-03-15 ENCOUNTER — NON-PROVIDER VISIT (OUTPATIENT)
Dept: CARDIOLOGY | Facility: MEDICAL CENTER | Age: 78
End: 2023-03-15
Payer: MEDICARE

## 2023-03-15 ENCOUNTER — NON-PROVIDER VISIT (OUTPATIENT)
Dept: CARDIOLOGY | Facility: MEDICAL CENTER | Age: 78
End: 2023-03-15

## 2023-03-15 ENCOUNTER — OFFICE VISIT (OUTPATIENT)
Dept: CARDIOLOGY | Facility: MEDICAL CENTER | Age: 78
End: 2023-03-15
Payer: MEDICARE

## 2023-03-15 VITALS
HEART RATE: 85 BPM | HEIGHT: 69 IN | WEIGHT: 213 LBS | RESPIRATION RATE: 16 BRPM | DIASTOLIC BLOOD PRESSURE: 52 MMHG | OXYGEN SATURATION: 98 % | SYSTOLIC BLOOD PRESSURE: 124 MMHG | BODY MASS INDEX: 31.55 KG/M2

## 2023-03-15 DIAGNOSIS — I25.84 CORONARY ARTERY DISEASE DUE TO CALCIFIED CORONARY LESION: ICD-10-CM

## 2023-03-15 DIAGNOSIS — Z45.010 PACEMAKER BATTERY DEPLETION: ICD-10-CM

## 2023-03-15 DIAGNOSIS — Z95.0 CARDIAC PACEMAKER IN SITU: ICD-10-CM

## 2023-03-15 DIAGNOSIS — R80.9 MICROALBUMINURIA: ICD-10-CM

## 2023-03-15 DIAGNOSIS — I25.10 CORONARY ARTERY DISEASE DUE TO CALCIFIED CORONARY LESION: ICD-10-CM

## 2023-03-15 DIAGNOSIS — I35.0 MODERATE AORTIC STENOSIS: ICD-10-CM

## 2023-03-15 DIAGNOSIS — I49.5 SICK SINUS SYNDROME (HCC): ICD-10-CM

## 2023-03-15 PROCEDURE — 93280 PM DEVICE PROGR EVAL DUAL: CPT | Performed by: INTERNAL MEDICINE

## 2023-03-15 PROCEDURE — 99214 OFFICE O/P EST MOD 30 MIN: CPT | Performed by: INTERNAL MEDICINE

## 2023-03-15 ASSESSMENT — FIBROSIS 4 INDEX: FIB4 SCORE: 2.93

## 2023-03-15 NOTE — PROGRESS NOTES
"CARDIOLOGY OUTPATIENT FOLLOWUP    PCP: Rylee García D.O.    1. Moderate aortic stenosis    2. Coronary artery disease due to calcified coronary lesion    3. Pacemaker battery depletion    4. Microalbuminuria        Dakota Boyer is having ambiguous symptoms of fatigue which may be related to poor sleep but also progressive valvular disease.  I advised expectant management for the time being, particularly in light of echocardiogram showing only moderate stenosis.  I however am concerned as his murmur is more consistent with more severe aortic valve narrowing.  I will measure a BNP and see him back in 3 months.  Additionally I will have him measure a urine microalbumin and if persistent may prescribe ACE inhibitor.    Follow up: 3 months    Chief Complaint   Patient presents with    Hypertension    Hyperlipidemia    Coronary Artery Disease       History: Dakota Boyer is a 78 y.o. male with history of carotid hypersensitivity, dual-chamber pacemaker nearing end-of-life, PCI in 2021, normal ejection fraction and aortic stenosis presenting for follow-up.  An echocardiogram in January showed preserved LVEF, aortic valve mean gradient of 24 and V-max of 3.2 m/s.    He reports 3 months of gradual exertional slowing though symptoms are mild.  He also feels that he is sleepy all the time.  He has quit his CPAP over the past year and a half and is not interested in using this again.  He had noticed no trouble with his daytime sleepiness until the past few months.    He has chronic lower extremity edema.      ROS:   10 point review systems is otherwise negative except as per the HPI    PE:  /52 (BP Location: Left arm, Patient Position: Sitting, BP Cuff Size: Adult)   Pulse 85   Resp 16   Ht 1.753 m (5' 9\")   Wt 96.6 kg (213 lb)   SpO2 98%   BMI 31.45 kg/m²   Gen: no acute distress  HEENT: Symmetric face. Anicteric sclerae. Moist mucus membranes  NECK: No JVD. No lymphadenopathy  CARDIAC: " Regular, Normal S1, S2, +systolic murmur -late peaking  VASCULATURE: No bruit, weak pulsation  RESP: Clear to auscultation bilaterally  ABD: Soft, non-tender, non-distended  EXT: 1+ lower extremity edema, no clubbing or cyanosis  SKIN: Warm and dry  NEURO: No gross deficits  PSYCH: Appropriate affect, participates in conversation    The ASCVD Risk score ([place] DK, et al., 2019) failed to calculate.    Past Medical History:   Diagnosis Date    Anxiety     Aortic stenosis 03/2022    Echocardiogram with normal LV size, mild concentric LVH, LVEF 75%. Normal RA, LA and RV. Trace MR. Moderate AS (peak 37mmHg, mean 24mmHg, Vmax 3.0m/s, GUY 1.1cm2). Trace TR.    Back pain, chronic 10/18/2016    Bilateral primary osteoarthritis of knee 1/23/2018    Black stools 3/9/2022    Blood clotting disorder (HCC) 1978    s/p Left knee surgery- DVT in left leg    Breath shortness     CAD (coronary artery disease) 11/2021    PCI/SWETHA x 2 ([place] 3.0 x 38mm, [place] 3.5 x 38mm) to the RCA.    Cancer (HCC) ? early 90's    Melanoma Left arm- surgically removed    Degeneration of lumbar or lumbosacral intervertebral disc      Dental disorder     Depression     Diabetes     Oral agents and insulin    DJD (degenerative joint disease) of cervical spine      Hyperlipidemia     Hypersensitive carotid sinus syndrome      Hypertension     Insomnia      Neck pain     Neutropenia (HCC) 3/4/2022    Pacemaker October 2012    [facility] [employer] DR #[id number] implanted by [facility].     Pancytopenia (HCC) 3/9/2022    RBBB      S/P lumbar discectomy      Sick sinus syndrome (HCC)      Sleep apnea     Uses CPAP    Syncope October 2012    Treated with PPM    Thyroid disease     Hypothyroid    Unintentional weight loss 3/5/2022    Unspecified hemorrhagic conditions     Reports bleeds easily     Allergies   Allergen Reactions    Aleve Cold & [Pseudoephedrine-Naproxen Na] Anaphylaxis    Ceftriaxone Sodium Anaphylaxis     Reaction; 1970's.    Naproxen Anaphylaxis      Reaction; 2004.    Latex Rash     Contact site    Tape Rash and Itching     Plastic tape (paper tape ok)     Outpatient Encounter Medications as of 3/15/2023   Medication Sig Dispense Refill    levothyroxine (SYNTHROID) 50 MCG Tab Take 1 Tablet by mouth every morning on an empty stomach. 90 Tablet 2    rosuvastatin (CRESTOR) 40 MG tablet Take 1 Tablet by mouth every day. 90 Tablet 3    LANTUS SOLOSTAR 100 UNIT/ML Solution Pen-injector injection INJECT 36 UNITS UNDER THE SKIN EVERY EVENING 45 mL 3    SYNJARDY XR 12.5-1000 MG TABLET SR 24 HR TAKE 2 TABLETS DAILY 180 Tablet 3    Dulaglutide (TRULICITY) 3 MG/0.5ML Solution Pen-injector Inject 3 mg under the skin every 7 days. 6 mL 3    aspirin EC (ECOTRIN) 81 MG TBEC Take 81 mg by mouth every evening.      FREESTYLE LITE strip USE 1 STRIP TWICE A  Strip 3    Lancets MISC Lancets order: Lancets for Abbott Freestyle Lite meter. Sig: use 4 times daily  and prn ssx high or low sugar. #100 RF x 0 360 Each 4    Blood Glucose Monitoring Suppl [place] Meter: Dispense Abbott Freestyle Lite meter. Sig. Use as directed for blood sugar monitoring. #1. NR. 1 Device 0    B-D ULTRAFINE III SHORT PEN 31G X 8 MM MISC USE 1 NEEDLE EVERY DAY WITH LANTUS 999 Each 2     No facility-administered encounter medications on file as of 3/15/2023.     Social History     Socioeconomic History    Marital status:      Spouse name: Not on file    Number of children: Not on file    Years of education: Not on file    Highest education level: 12th grade   Occupational History    Not on file   Tobacco Use    Smoking status: Former     Packs/day: 1.00     Years: 40.00     Pack years: 40.00     Types: Cigarettes     Quit date: 1/3/1990     Years since quittin.2    Smokeless tobacco: Never   Vaping Use    Vaping Use: Never used   Substance and Sexual Activity    Alcohol use: No     Alcohol/week: 0.0 oz    Drug use: No    Sexual activity: Yes     Partners: Female   Other Topics Concern    Not  on file   Social History Narrative    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      Social Determinants of Health     Financial Resource Strain: Low Risk     Difficulty of Paying Living Expenses: Not hard at all   Food Insecurity: No Food Insecurity    Worried About Running Out of Food in the Last Year: Never true    Ran Out of Food in the Last Year: Never true   Transportation Needs: No Transportation Needs    Lack of Transportation (Medical): No    Lack of Transportation (Non-Medical): No   Physical Activity: Inactive    Days of Exercise per Week: 0 days    Minutes of Exercise per Session: 0 min   Stress: No Stress Concern Present    Feeling of Stress : Not at all   Social Connections: Unknown    Frequency of Communication with Friends and Family: More than three times a week    Frequency of Social Gatherings with Friends and Family: Twice a week    Attends Cheondoism Services: More than 4 times per year    Active Member of Clubs or Organizations: Patient refused    Attends Club or Organization Meetings: Patient refused    Marital Status:    Intimate Partner Violence: Not on file   Housing Stability: Low Risk     Unable to Pay for Housing in the Last Year: No    Number of Places Lived in the Last Year: 1    Unstable Housing in the Last Year: No       Studies  Lab Results   Component Value Date/Time    CHOLSTRLTOT 81 (L) 10/24/2022 12:22 PM    LDL 27 10/24/2022 12:22 PM    HDL 40 10/24/2022 12:22 PM    TRIGLYCERIDE 71 10/24/2022 12:22 PM       Lab Results   Component Value Date/Time    SODIUM 138 10/24/2022 12:22 PM    POTASSIUM 4.3 10/24/2022 12:22 PM    CHLORIDE 103 10/24/2022 12:22 PM    CO2 22 10/24/2022 12:22 PM    GLUCOSE 112 (H) 10/24/2022 12:22 PM    BUN 24 (H) 10/24/2022 12:22 PM    CREATININE 1.03 10/24/2022 12:22 PM     Lab Results   Component Value Date/Time    ALKPHOSPHAT 98 10/24/2022 12:22 PM    ASTSGOT 28 10/24/2022 12:22 PM    ALTSGPT 21 10/24/2022 12:22 PM    TBILIRUBIN 1.1 10/24/2022 12:22 PM             For this encounter I directly reviewed Echo images.  Severely restricted aortic valve opening.. I otherwise agree with the interpretation in the EHR.

## 2023-03-22 NOTE — CARDIAC REMOTE MONITOR - SCAN
Device transmission reviewed. Device demonstrated appropriate function.       Electronically Signed by: Gideon Perla M.D.    3/28/2023  10:24 AM

## 2023-04-05 ENCOUNTER — OFFICE VISIT (OUTPATIENT)
Dept: MEDICAL GROUP | Facility: PHYSICIAN GROUP | Age: 78
End: 2023-04-05
Payer: MEDICARE

## 2023-04-05 VITALS
RESPIRATION RATE: 20 BRPM | BODY MASS INDEX: 33.04 KG/M2 | SYSTOLIC BLOOD PRESSURE: 110 MMHG | OXYGEN SATURATION: 96 % | HEART RATE: 79 BPM | TEMPERATURE: 98.1 F | HEIGHT: 68 IN | WEIGHT: 218 LBS | DIASTOLIC BLOOD PRESSURE: 62 MMHG

## 2023-04-05 DIAGNOSIS — Z79.4 TYPE 2 DIABETES MELLITUS WITH OTHER SPECIFIED COMPLICATION, WITH LONG-TERM CURRENT USE OF INSULIN (HCC): ICD-10-CM

## 2023-04-05 DIAGNOSIS — N28.1 CYST OF LEFT KIDNEY: ICD-10-CM

## 2023-04-05 DIAGNOSIS — E78.5 HYPERLIPIDEMIA ASSOCIATED WITH TYPE 2 DIABETES MELLITUS (HCC): ICD-10-CM

## 2023-04-05 DIAGNOSIS — E11.69 HYPERLIPIDEMIA ASSOCIATED WITH TYPE 2 DIABETES MELLITUS (HCC): ICD-10-CM

## 2023-04-05 DIAGNOSIS — R80.9 MICROALBUMINURIA: ICD-10-CM

## 2023-04-05 DIAGNOSIS — Z79.4 LONG-TERM INSULIN USE (HCC): ICD-10-CM

## 2023-04-05 DIAGNOSIS — K59.00 CONSTIPATION, UNSPECIFIED CONSTIPATION TYPE: ICD-10-CM

## 2023-04-05 DIAGNOSIS — R91.1 PULMONARY NODULE: ICD-10-CM

## 2023-04-05 DIAGNOSIS — Z87.891 FORMER SMOKER: ICD-10-CM

## 2023-04-05 DIAGNOSIS — E11.69 TYPE 2 DIABETES MELLITUS WITH OTHER SPECIFIED COMPLICATION, WITH LONG-TERM CURRENT USE OF INSULIN (HCC): ICD-10-CM

## 2023-04-05 PROBLEM — R91.8 PULMONARY NODULES: Status: ACTIVE | Noted: 2023-04-05

## 2023-04-05 LAB
HBA1C MFR BLD: 7.9 % (ref ?–5.8)
POCT INT CON NEG: NEGATIVE
POCT INT CON POS: POSITIVE

## 2023-04-05 PROCEDURE — 83036 HEMOGLOBIN GLYCOSYLATED A1C: CPT | Performed by: STUDENT IN AN ORGANIZED HEALTH CARE EDUCATION/TRAINING PROGRAM

## 2023-04-05 PROCEDURE — 99214 OFFICE O/P EST MOD 30 MIN: CPT | Performed by: STUDENT IN AN ORGANIZED HEALTH CARE EDUCATION/TRAINING PROGRAM

## 2023-04-05 ASSESSMENT — FIBROSIS 4 INDEX: FIB4 SCORE: 2.93

## 2023-04-05 ASSESSMENT — PATIENT HEALTH QUESTIONNAIRE - PHQ9: CLINICAL INTERPRETATION OF PHQ2 SCORE: 0

## 2023-04-05 NOTE — ASSESSMENT & PLAN NOTE
Chronic. Large, soft sticky BM typically Q3 days and now Q7. Denies pain, nausea, vomiting or hematochezia.

## 2023-04-05 NOTE — PROGRESS NOTES
Subjective:     Chief Complaint   Patient presents with    Diabetes Follow-up     HPI:   Dakota presents today for follow-up on his diabetes.    [facility]  States that he has pending nerve conduction studies for chronic numbness and tingling in his extremities.    Patient reports he has a visit with his endocrinologist tomorrow.  States that he ran out of his Trulicity which contributed to this.  Has continued with Lantus 40 units nightly and Synjardy.    Current Outpatient Medications Ordered in Epic   Medication Sig Dispense Refill    levothyroxine (SYNTHROID) 50 MCG Tab Take 1 Tablet by mouth every morning on an empty stomach. 90 Tablet 2    rosuvastatin (CRESTOR) 40 MG tablet Take 1 Tablet by mouth every day. 90 Tablet 3    LANTUS SOLOSTAR 100 UNIT/ML Solution Pen-injector injection INJECT 36 UNITS UNDER THE SKIN EVERY EVENING (Patient taking differently: Inject 40 Units under the skin every evening.) 45 mL 3    FREESTYLE LITE strip USE 1 STRIP TWICE A  Strip 3    Lancets MISC Lancets order: Lancets for Abbott Freestyle Lite meter. Sig: use 4 times daily  and prn ssx high or low sugar. #100 RF x 0 360 Each 4    Blood Glucose Monitoring Suppl [place] Meter: Dispense Abbott Freestyle Lite meter. Sig. Use as directed for blood sugar monitoring. #1. NR. 1 Device 0    B-D ULTRAFINE III SHORT PEN 31G X 8 MM MISC USE 1 NEEDLE EVERY DAY WITH LANTUS 999 Each 2    aspirin EC (ECOTRIN) 81 MG TBEC Take 81 mg by mouth every evening.      Dulaglutide (TRULICITY) 1.5 MG/0.5ML Solution Pen-injector Inject 0.5 mL under the skin every 7 days. 2 mL 3    Empagliflozin-metFORMIN HCl ER (SYNJARDY XR) 12.5-1000 MG TABLET SR 24 HR Take 2 Tablets by mouth every day. 180 Tablet 3     No current Epic-ordered facility-administered medications on file.     ROS:  Gen: no fevers/chills, no changes in weight  Eyes: no changes in vision  ENT: no sore throat  Pulm: no sob, no cough  CV: no  "chest pain, no palpitations  GI: no nausea/vomiting, no diarrhea  : no dysuria  MSk: Chronic stable back  Skin: no rash  Neuro: no headaches, numbness/tingling  Heme/Lymph: no easy bruising    Objective:     Exam:  /62 (BP Location: Left arm, Patient Position: Sitting, BP Cuff Size: Adult)   Pulse 79   Temp 36.7 °C (98.1 °F) (Temporal)   Resp 20   Ht 1.727 m (5' 8\")   Wt 98.9 kg (218 lb)   SpO2 96%   BMI 33.15 kg/m²  Body mass index is 33.15 kg/m².    Physical Exam:  Constitutional: Well-developed and well-nourished. No acute distress.   Skin: Skin is warm and dry. No rash noted.  Head: Atraumatic without lesions.  Eyes: Conjunctivae and extraocular motions are normal. Pupils are equal, round. No scleral icterus.   Mouth/Throat: Wearing mask.  Neck: Supple, trachea midline.   Cardiovascular: Regular rate and rhythm, S1 and S2, systolic murmur, no rubs, or gallops.  Lungs: Normal inspiratory effort, CTA bilaterally, no wheezes/rhonchi/rales  Abdomen: Obese. Soft, non tender, and without distention. Active bowel sounds. No rebound, guarding, masses or HSM.  Musculoskeletal:   Neurological: Alert and oriented x 3. No gross/focal deficits.  Psychiatric:  Behavior, mood, and affect are appropriate.    Labs: Reviewed from 10/24/2022  Imaging: Reviewed from CT 4/2022    Assessment & Plan:     78 y.o. male with the following -     1. Type 2 diabetes mellitus with other specified complication, with long-term current use of insulin (HCC)  - POCT  A1C  2. Microalbuminuria  3. Long-term insulin use (HCC)  This is a chronic condition, poorly controlled as patient has had issues getting his Trulicity.  Continues with Synjardy XR 12.5 mg - 1000 mg 2 tablets daily and Lantus 40 units daily.  Has an appointment with endocrinologist tomorrow, continue current medication pending follow-up and evaluation tomorrow with specialist.  Patient to let me know if he runs into problems with his medication in the future.  A1c in " 3 months.  Continue diabetic diet.    4. Hyperlipidemia associated with type 2 diabetes mellitus (HCC)  Chronic, controlled. Continue Rosuvastatin 40 mg daily and 81 mg aspirin daily.    5. Constipation, unspecified constipation type  This is a chronic condition without red flags, discussed hydration, exercise and fiber to improve.  Follow-up if not improving.    6. Pulmonary nodule  7. Former smoker  Discussed options for surveillance of pulmonary nodule which was seen on CT abdomen pelvis from last year, given prior history of smoking patient elects to trend with CT to document stability.  - CT-CHEST,ABDOMEN,PELVIS WITH; Future    8. Cyst of left kidney  Needs interval surveillance of left kidney cyst, renal ultrasound ordered but patient favors CT to evaluate pulmonary nodule further as well.  Plan pending results.  - CT-CHEST,ABDOMEN,PELVIS WITH; Future    Return in 3 months (on 7/5/2023), or if symptoms worsen or fail to improve.    Please note that this dictation was created using voice recognition software. I have made every reasonable attempt to correct obvious errors, but I expect that there are errors of grammar and possibly content that I did not discover before finalizing the note.

## 2023-04-06 ENCOUNTER — OFFICE VISIT (OUTPATIENT)
Dept: ENDOCRINOLOGY | Facility: MEDICAL CENTER | Age: 78
End: 2023-04-06
Attending: INTERNAL MEDICINE
Payer: MEDICARE

## 2023-04-06 VITALS
SYSTOLIC BLOOD PRESSURE: 116 MMHG | BODY MASS INDEX: 30.51 KG/M2 | OXYGEN SATURATION: 94 % | WEIGHT: 206 LBS | DIASTOLIC BLOOD PRESSURE: 64 MMHG | HEIGHT: 69 IN | HEART RATE: 86 BPM

## 2023-04-06 DIAGNOSIS — E55.9 VITAMIN D DEFICIENCY: ICD-10-CM

## 2023-04-06 DIAGNOSIS — E11.65 TYPE 2 DIABETES MELLITUS WITH HYPERGLYCEMIA, WITH LONG-TERM CURRENT USE OF INSULIN (HCC): ICD-10-CM

## 2023-04-06 DIAGNOSIS — E78.5 DYSLIPIDEMIA: ICD-10-CM

## 2023-04-06 DIAGNOSIS — Z79.4 LONG-TERM INSULIN USE (HCC): ICD-10-CM

## 2023-04-06 DIAGNOSIS — Z79.4 TYPE 2 DIABETES MELLITUS WITH HYPERGLYCEMIA, WITH LONG-TERM CURRENT USE OF INSULIN (HCC): ICD-10-CM

## 2023-04-06 DIAGNOSIS — E03.8 SUBCLINICAL HYPOTHYROIDISM: ICD-10-CM

## 2023-04-06 DIAGNOSIS — E11.59 CONTROLLED TYPE 2 DIABETES MELLITUS WITH OTHER CIRCULATORY COMPLICATION, WITH LONG-TERM CURRENT USE OF INSULIN (HCC): ICD-10-CM

## 2023-04-06 DIAGNOSIS — Z79.4 CONTROLLED TYPE 2 DIABETES MELLITUS WITH OTHER CIRCULATORY COMPLICATION, WITH LONG-TERM CURRENT USE OF INSULIN (HCC): ICD-10-CM

## 2023-04-06 PROCEDURE — 99214 OFFICE O/P EST MOD 30 MIN: CPT | Performed by: INTERNAL MEDICINE

## 2023-04-06 PROCEDURE — 99212 OFFICE O/P EST SF 10 MIN: CPT | Performed by: INTERNAL MEDICINE

## 2023-04-06 RX ORDER — DULAGLUTIDE 1.5 MG/.5ML
0.5 INJECTION, SOLUTION SUBCUTANEOUS
Qty: 2 ML | Refills: 3 | Status: SHIPPED | OUTPATIENT
Start: 2023-04-06 | End: 2023-08-08 | Stop reason: SDUPTHER

## 2023-04-06 RX ORDER — EMPAGLIFLOZIN, METFORMIN HYDROCHLORIDE 12.5; 1 MG/1; MG/1
2 TABLET, EXTENDED RELEASE ORAL DAILY
Qty: 180 TABLET | Refills: 3 | Status: ON HOLD | OUTPATIENT
Start: 2023-04-06 | End: 2023-09-25

## 2023-04-06 ASSESSMENT — FIBROSIS 4 INDEX: FIB4 SCORE: 2.93

## 2023-04-25 ENCOUNTER — PATIENT MESSAGE (OUTPATIENT)
Dept: MEDICAL GROUP | Facility: PHYSICIAN GROUP | Age: 78
End: 2023-04-25
Payer: MEDICARE

## 2023-04-25 DIAGNOSIS — Z79.4 TYPE 2 DIABETES MELLITUS WITH OTHER SPECIFIED COMPLICATION, WITH LONG-TERM CURRENT USE OF INSULIN (HCC): ICD-10-CM

## 2023-04-25 DIAGNOSIS — E11.69 TYPE 2 DIABETES MELLITUS WITH OTHER SPECIFIED COMPLICATION, WITH LONG-TERM CURRENT USE OF INSULIN (HCC): ICD-10-CM

## 2023-05-04 ENCOUNTER — HOSPITAL ENCOUNTER (OUTPATIENT)
Dept: LAB | Facility: MEDICAL CENTER | Age: 78
End: 2023-05-04
Attending: STUDENT IN AN ORGANIZED HEALTH CARE EDUCATION/TRAINING PROGRAM
Payer: MEDICARE

## 2023-05-04 ENCOUNTER — HOSPITAL ENCOUNTER (OUTPATIENT)
Dept: LAB | Facility: MEDICAL CENTER | Age: 78
End: 2023-05-04
Attending: INTERNAL MEDICINE
Payer: MEDICARE

## 2023-05-04 DIAGNOSIS — E11.69 TYPE 2 DIABETES MELLITUS WITH OTHER SPECIFIED COMPLICATION, WITH LONG-TERM CURRENT USE OF INSULIN (HCC): ICD-10-CM

## 2023-05-04 DIAGNOSIS — Z79.4 LONG-TERM INSULIN USE (HCC): ICD-10-CM

## 2023-05-04 DIAGNOSIS — Z79.4 TYPE 2 DIABETES MELLITUS WITH HYPERGLYCEMIA, WITH LONG-TERM CURRENT USE OF INSULIN (HCC): ICD-10-CM

## 2023-05-04 DIAGNOSIS — E11.65 TYPE 2 DIABETES MELLITUS WITH HYPERGLYCEMIA, WITH LONG-TERM CURRENT USE OF INSULIN (HCC): ICD-10-CM

## 2023-05-04 DIAGNOSIS — E03.8 SUBCLINICAL HYPOTHYROIDISM: ICD-10-CM

## 2023-05-04 DIAGNOSIS — Z79.4 TYPE 2 DIABETES MELLITUS WITH OTHER SPECIFIED COMPLICATION, WITH LONG-TERM CURRENT USE OF INSULIN (HCC): ICD-10-CM

## 2023-05-04 DIAGNOSIS — R80.9 MICROALBUMINURIA: ICD-10-CM

## 2023-05-04 DIAGNOSIS — E55.9 VITAMIN D DEFICIENCY: ICD-10-CM

## 2023-05-04 DIAGNOSIS — E78.5 DYSLIPIDEMIA: ICD-10-CM

## 2023-05-04 LAB
CREAT UR-MCNC: 50.63 MG/DL
CREAT UR-MCNC: 51.04 MG/DL
MICROALBUMIN UR-MCNC: 13.4 MG/DL
MICROALBUMIN UR-MCNC: 13.5 MG/DL
MICROALBUMIN/CREAT UR: 264 MG/G (ref 0–30)
MICROALBUMIN/CREAT UR: 265 MG/G (ref 0–30)

## 2023-05-04 PROCEDURE — 82043 UR ALBUMIN QUANTITATIVE: CPT | Mod: 91

## 2023-05-04 PROCEDURE — 82570 ASSAY OF URINE CREATININE: CPT

## 2023-05-04 PROCEDURE — 80048 BASIC METABOLIC PNL TOTAL CA: CPT

## 2023-05-04 PROCEDURE — 36415 COLL VENOUS BLD VENIPUNCTURE: CPT

## 2023-05-04 PROCEDURE — 82043 UR ALBUMIN QUANTITATIVE: CPT

## 2023-05-04 PROCEDURE — 82306 VITAMIN D 25 HYDROXY: CPT

## 2023-05-04 PROCEDURE — 80053 COMPREHEN METABOLIC PANEL: CPT

## 2023-05-04 PROCEDURE — 84439 ASSAY OF FREE THYROXINE: CPT

## 2023-05-04 PROCEDURE — 83880 ASSAY OF NATRIURETIC PEPTIDE: CPT | Mod: GA

## 2023-05-04 PROCEDURE — 82570 ASSAY OF URINE CREATININE: CPT | Mod: 91

## 2023-05-04 PROCEDURE — 84443 ASSAY THYROID STIM HORMONE: CPT

## 2023-05-05 LAB
25(OH)D3 SERPL-MCNC: 20 NG/ML (ref 30–100)
ALBUMIN SERPL BCP-MCNC: 4.2 G/DL (ref 3.2–4.9)
ALBUMIN/GLOB SERPL: 1.7 G/DL
ALP SERPL-CCNC: 83 U/L (ref 30–99)
ALT SERPL-CCNC: 31 U/L (ref 2–50)
ANION GAP SERPL CALC-SCNC: 13 MMOL/L (ref 7–16)
ANION GAP SERPL CALC-SCNC: 14 MMOL/L (ref 7–16)
AST SERPL-CCNC: 21 U/L (ref 12–45)
BILIRUB SERPL-MCNC: 0.9 MG/DL (ref 0.1–1.5)
BUN SERPL-MCNC: 18 MG/DL (ref 8–22)
BUN SERPL-MCNC: 18 MG/DL (ref 8–22)
CALCIUM ALBUM COR SERPL-MCNC: 9.7 MG/DL (ref 8.5–10.5)
CALCIUM SERPL-MCNC: 9.7 MG/DL (ref 8.5–10.5)
CALCIUM SERPL-MCNC: 9.9 MG/DL (ref 8.5–10.5)
CHLORIDE SERPL-SCNC: 106 MMOL/L (ref 96–112)
CHLORIDE SERPL-SCNC: 107 MMOL/L (ref 96–112)
CO2 SERPL-SCNC: 22 MMOL/L (ref 20–33)
CO2 SERPL-SCNC: 23 MMOL/L (ref 20–33)
CREAT SERPL-MCNC: 0.97 MG/DL (ref 0.5–1.4)
CREAT SERPL-MCNC: 0.98 MG/DL (ref 0.5–1.4)
GFR SERPLBLD CREATININE-BSD FMLA CKD-EPI: 78 ML/MIN/1.73 M 2
GFR SERPLBLD CREATININE-BSD FMLA CKD-EPI: 79 ML/MIN/1.73 M 2
GLOBULIN SER CALC-MCNC: 2.5 G/DL (ref 1.9–3.5)
GLUCOSE SERPL-MCNC: 211 MG/DL (ref 65–99)
GLUCOSE SERPL-MCNC: 211 MG/DL (ref 65–99)
NT-PROBNP SERPL IA-MCNC: 339 PG/ML (ref 0–125)
POTASSIUM SERPL-SCNC: 4.2 MMOL/L (ref 3.6–5.5)
POTASSIUM SERPL-SCNC: 4.2 MMOL/L (ref 3.6–5.5)
PROT SERPL-MCNC: 6.7 G/DL (ref 6–8.2)
SODIUM SERPL-SCNC: 142 MMOL/L (ref 135–145)
SODIUM SERPL-SCNC: 143 MMOL/L (ref 135–145)
T4 FREE SERPL-MCNC: 1.31 NG/DL (ref 0.93–1.7)
TSH SERPL DL<=0.005 MIU/L-ACNC: 1.53 UIU/ML (ref 0.38–5.33)

## 2023-05-08 ENCOUNTER — PATIENT MESSAGE (OUTPATIENT)
Dept: CARDIOLOGY | Facility: MEDICAL CENTER | Age: 78
End: 2023-05-08

## 2023-05-08 ENCOUNTER — TELEPHONE (OUTPATIENT)
Dept: CARDIOLOGY | Facility: MEDICAL CENTER | Age: 78
End: 2023-05-08
Payer: MEDICARE

## 2023-05-08 DIAGNOSIS — R80.9 MICROALBUMINURIA: ICD-10-CM

## 2023-05-08 DIAGNOSIS — E11.59 HYPERTENSION ASSOCIATED WITH TYPE 2 DIABETES MELLITUS (HCC): ICD-10-CM

## 2023-05-08 DIAGNOSIS — I15.2 HYPERTENSION ASSOCIATED WITH TYPE 2 DIABETES MELLITUS (HCC): ICD-10-CM

## 2023-05-08 NOTE — TELEPHONE ENCOUNTER
See BN Note,   Labs show urine microalbuminuria which can lead to kidney damage. I suggest lisinopril 5 mg daily. BMP in one week. Follow up with PCP for further recommendations.   Written by Puma Hsu M.D. on 5/6/2023  3:10 PM Uptake message sent regarding BN recommendations.

## 2023-05-09 RX ORDER — LISINOPRIL 5 MG/1
5 TABLET ORAL DAILY
Qty: 90 TABLET | Refills: 3 | Status: SHIPPED | OUTPATIENT
Start: 2023-05-09 | End: 2023-06-12 | Stop reason: SDUPTHER

## 2023-05-12 ENCOUNTER — HOSPITAL ENCOUNTER (OUTPATIENT)
Dept: RADIOLOGY | Facility: MEDICAL CENTER | Age: 78
End: 2023-05-12
Attending: STUDENT IN AN ORGANIZED HEALTH CARE EDUCATION/TRAINING PROGRAM
Payer: MEDICARE

## 2023-05-12 DIAGNOSIS — Z87.891 FORMER SMOKER: ICD-10-CM

## 2023-05-12 DIAGNOSIS — N28.1 CYST OF LEFT KIDNEY: ICD-10-CM

## 2023-05-12 DIAGNOSIS — R91.1 PULMONARY NODULE: ICD-10-CM

## 2023-05-12 PROCEDURE — 700117 HCHG RX CONTRAST REV CODE 255: Performed by: STUDENT IN AN ORGANIZED HEALTH CARE EDUCATION/TRAINING PROGRAM

## 2023-05-12 PROCEDURE — 71260 CT THORAX DX C+: CPT

## 2023-05-12 RX ADMIN — IOHEXOL 100 ML: 350 INJECTION, SOLUTION INTRAVENOUS at 11:04

## 2023-06-08 ENCOUNTER — HOSPITAL ENCOUNTER (OUTPATIENT)
Dept: LAB | Facility: MEDICAL CENTER | Age: 78
End: 2023-06-08
Attending: INTERNAL MEDICINE
Payer: MEDICARE

## 2023-06-08 ENCOUNTER — HOSPITAL ENCOUNTER (OUTPATIENT)
Dept: LAB | Facility: MEDICAL CENTER | Age: 78
End: 2023-06-08
Attending: UROLOGY
Payer: MEDICARE

## 2023-06-08 DIAGNOSIS — R80.9 MICROALBUMINURIA: ICD-10-CM

## 2023-06-08 LAB
ANION GAP SERPL CALC-SCNC: 13 MMOL/L (ref 7–16)
BUN SERPL-MCNC: 20 MG/DL (ref 8–22)
CALCIUM SERPL-MCNC: 9.7 MG/DL (ref 8.5–10.5)
CHLORIDE SERPL-SCNC: 102 MMOL/L (ref 96–112)
CO2 SERPL-SCNC: 24 MMOL/L (ref 20–33)
CREAT SERPL-MCNC: 1.27 MG/DL (ref 0.5–1.4)
GFR SERPLBLD CREATININE-BSD FMLA CKD-EPI: 57 ML/MIN/1.73 M 2
GLUCOSE SERPL-MCNC: 284 MG/DL (ref 65–99)
POTASSIUM SERPL-SCNC: 3.9 MMOL/L (ref 3.6–5.5)
PSA SERPL-MCNC: 9.78 NG/ML (ref 0–4)
SODIUM SERPL-SCNC: 139 MMOL/L (ref 135–145)

## 2023-06-08 PROCEDURE — 80048 BASIC METABOLIC PNL TOTAL CA: CPT

## 2023-06-08 PROCEDURE — 84153 ASSAY OF PSA TOTAL: CPT

## 2023-06-08 PROCEDURE — 36415 COLL VENOUS BLD VENIPUNCTURE: CPT

## 2023-06-12 RX ORDER — LISINOPRIL 5 MG/1
5 TABLET ORAL DAILY
Qty: 90 TABLET | Refills: 3 | Status: ON HOLD | OUTPATIENT
Start: 2023-06-12 | End: 2023-09-30

## 2023-06-15 ENCOUNTER — OFFICE VISIT (OUTPATIENT)
Dept: CARDIOLOGY | Facility: MEDICAL CENTER | Age: 78
End: 2023-06-15
Attending: INTERNAL MEDICINE
Payer: MEDICARE

## 2023-06-15 ENCOUNTER — TELEPHONE (OUTPATIENT)
Dept: CARDIOLOGY | Facility: MEDICAL CENTER | Age: 78
End: 2023-06-15

## 2023-06-15 ENCOUNTER — NON-PROVIDER VISIT (OUTPATIENT)
Dept: CARDIOLOGY | Facility: MEDICAL CENTER | Age: 78
End: 2023-06-15

## 2023-06-15 VITALS
HEIGHT: 69 IN | OXYGEN SATURATION: 96 % | RESPIRATION RATE: 16 BRPM | HEART RATE: 80 BPM | BODY MASS INDEX: 31.1 KG/M2 | DIASTOLIC BLOOD PRESSURE: 76 MMHG | SYSTOLIC BLOOD PRESSURE: 122 MMHG | WEIGHT: 210 LBS

## 2023-06-15 DIAGNOSIS — I25.10 CORONARY ARTERY DISEASE DUE TO CALCIFIED CORONARY LESION: ICD-10-CM

## 2023-06-15 DIAGNOSIS — I45.10 RIGHT BUNDLE BRANCH BLOCK: ICD-10-CM

## 2023-06-15 DIAGNOSIS — I49.5 SICK SINUS SYNDROME (HCC): ICD-10-CM

## 2023-06-15 DIAGNOSIS — I25.84 CORONARY ARTERY DISEASE DUE TO CALCIFIED CORONARY LESION: ICD-10-CM

## 2023-06-15 DIAGNOSIS — I35.0 NONRHEUMATIC AORTIC VALVE STENOSIS: ICD-10-CM

## 2023-06-15 DIAGNOSIS — Z95.0 CARDIAC PACEMAKER IN SITU: ICD-10-CM

## 2023-06-15 DIAGNOSIS — Z95.0 PACEMAKER: ICD-10-CM

## 2023-06-15 PROCEDURE — 3078F DIAST BP <80 MM HG: CPT | Performed by: INTERNAL MEDICINE

## 2023-06-15 PROCEDURE — 99213 OFFICE O/P EST LOW 20 MIN: CPT | Performed by: INTERNAL MEDICINE

## 2023-06-15 PROCEDURE — 99214 OFFICE O/P EST MOD 30 MIN: CPT | Performed by: INTERNAL MEDICINE

## 2023-06-15 PROCEDURE — 93280 PM DEVICE PROGR EVAL DUAL: CPT | Mod: 26 | Performed by: INTERNAL MEDICINE

## 2023-06-15 PROCEDURE — 3074F SYST BP LT 130 MM HG: CPT | Performed by: INTERNAL MEDICINE

## 2023-06-15 PROCEDURE — 93280 PM DEVICE PROGR EVAL DUAL: CPT | Performed by: INTERNAL MEDICINE

## 2023-06-15 PROCEDURE — 99212 OFFICE O/P EST SF 10 MIN: CPT | Performed by: INTERNAL MEDICINE

## 2023-06-15 ASSESSMENT — FIBROSIS 4 INDEX: FIB4 SCORE: 1.81

## 2023-06-15 NOTE — TELEPHONE ENCOUNTER
Rai Arechiga,  Patient was seen today by BE and he has ordered a CL-RIGHT AND LEFT HEART CATHETERIZATION [[id number]]   to be scheduled for the patient.    Thank you.  [employer]

## 2023-06-15 NOTE — PROGRESS NOTES
Device is functioning appropriately.  No changes made today.  Patient to be scheduled for gen change.

## 2023-06-15 NOTE — TELEPHONE ENCOUNTER
Patients device is @ [facility].  Please contact patient to schedule for gen change.  Can be done in 2 months.  Patient has dual chamber pacemaker [facility].

## 2023-06-15 NOTE — TELEPHONE ENCOUNTER
Patient is scheduled on 7-11-23 for a R&L Hrt with . Patient was told to cut insulin in half night before and hold AM day of and to hold synjardy day of and 48hrs after. Patient to check in at 6:30 for an 8:30 procedure. H&P was done on 6-15-23 by Dr. Hsu. Pre admit to call patient.

## 2023-06-15 NOTE — PROGRESS NOTES
"CARDIOLOGY OUTPATIENT FOLLOWUP    PCP: Rylee García D.O.    1. Nonrheumatic aortic valve stenosis    2. RBBB    3. Pacemaker    4. Coronary artery disease due to calcified coronary lesion        Dakota Boyer has progressive class III exertional shortness of breath and leg weakness, highly suggestive of aortic valve disease.  His physical exam supports this but the echocardiogram 6 months ago does not.  I will arrange a right and left heart catheterization with aortic valve study.  This will also facilitate coronary artery assessment and possible PCI which previously did help with the symptoms.  Should the valve need replacing, TAVR would seem most appropriate.    Follow up: 3 months    History: Dakota Boyer is a 78 y.o. male with history of carotid hypersensitivity, dual-chamber pacer nearing end-of-life, PCI in 2021, normal ejection fraction and aortic stenosis presenting for follow-up.  An echocardiogram in January showed normal LVEF, mean gradient of 24 and V-max of 3.2.      He continues to experience progressive fatigue during the days and has trouble mowing the lawn or sweeping the floors, which she could previously do with these last year.  He did quit his CPAP but does not believe this is the source of the symptoms even though he feels very sleepy during the day.  He has stable lower extremity edema.      Physical Exam:  /76 (BP Location: Left arm, Patient Position: Sitting, BP Cuff Size: Adult)   Pulse 80   Resp 16   Ht 1.753 m (5' 9\")   Wt 95.3 kg (210 lb)   SpO2 96%   BMI 31.01 kg/m²   GEN: NAD  CARDIAC: +LISA late peaking regular no JVP Normal S1 Preserved S2  VASCULATURE: diminished and delayed carotid pulse  RESP: Clear to auscultation bilaterally  ABD: Soft, non-tender, non-distended  EXT: 1+ lower extremity edema Stasis dermatitis bilaterally  NEURO: No focal deficit    The ASCVD Risk score ([place] DK, et al., 2019) failed to calculate.    I directly reviewed the " echocardiogram images and find the study to show bulky aortic valve calcification, relatively preserved leaflet motion and moderate aortic valve stenosis gradients.    Studies  Lab Results   Component Value Date/Time    CHOLSTRLTOT 81 (L) 10/24/2022 12:22 PM    LDL 27 10/24/2022 12:22 PM    HDL 40 10/24/2022 12:22 PM    TRIGLYCERIDE 71 10/24/2022 12:22 PM       Lab Results   Component Value Date/Time    SODIUM 139 06/08/2023 01:05 PM    POTASSIUM 3.9 06/08/2023 01:05 PM    CHLORIDE 102 06/08/2023 01:05 PM    CO2 24 06/08/2023 01:05 PM    GLUCOSE 284 (H) 06/08/2023 01:05 PM    BUN 20 06/08/2023 01:05 PM    CREATININE 1.27 06/08/2023 01:05 PM     Lab Results   Component Value Date/Time    ALKPHOSPHAT 83 05/04/2023 10:08 AM    ASTSGOT 21 05/04/2023 10:08 AM    ALTSGPT 31 05/04/2023 10:08 AM    TBILIRUBIN 0.9 05/04/2023 10:08 AM        Past Medical History:   Diagnosis Date    Anxiety     Aortic stenosis 03/2022    Echocardiogram with normal LV size, mild concentric LVH, LVEF 75%. Normal RA, LA and RV. Trace MR. Moderate AS (peak 37mmHg, mean 24mmHg, Vmax 3.0m/s, GUY 1.1cm2). Trace TR.    Back pain, chronic 10/18/2016    Bilateral primary osteoarthritis of knee 1/23/2018    Black stools 3/9/2022    Blood clotting disorder ([facility]) 1978    s/p Left knee surgery- DVT in left leg    Breath shortness     CAD (coronary artery disease) 11/2021    PCI/SWETHA x 2 ([place] 3.0 x 38mm, [place] 3.5 x 38mm) to the RCA.    Cancer (HCC) ? early 90's    Melanoma Left arm- surgically removed    Degeneration of lumbar or lumbosacral intervertebral disc      Dental disorder     Depression     Diabetes     Oral agents and insulin    DJD (degenerative joint disease) of cervical spine      Hyperlipidemia     Hypersensitive carotid sinus syndrome      Hypertension     Hypertension associated with type 2 diabetes mellitus (HCC) 4/10/2012    Insomnia      Neck pain     Neutropenia (HCC) 3/4/2022    Pacemaker October 2012    [facility] [employer]   #[id number] implanted by [facility].     Pancytopenia (HCC) 3/9/2022    RBBB      S/P lumbar discectomy      Sick sinus syndrome (HCC)      Sleep apnea     Uses CPAP    Syncope October 2012    Treated with PPM    Thyroid disease     Hypothyroid    Unintentional weight loss 3/5/2022    Unspecified hemorrhagic conditions     Reports bleeds easily     Allergies   Allergen Reactions    Aleve Cold & [Pseudoephedrine-Naproxen Na] Anaphylaxis    Ceftriaxone Sodium Anaphylaxis     Reaction; 1970's.    Naproxen Anaphylaxis     Reaction; 2004.    Latex Rash     Contact site    Tape Rash and Itching     Plastic tape (paper tape ok)     Outpatient Encounter Medications as of 6/15/2023   Medication Sig Dispense Refill    lisinopril (PRINIVIL) 5 MG Tab Take 1 Tablet by mouth every day. 90 Tablet 3    Dulaglutide (TRULICITY) 1.5 MG/0.5ML Solution Pen-injector Inject 0.5 mL under the skin every 7 days. 2 mL 3    Empagliflozin-metFORMIN HCl ER (SYNJARDY XR) 12.5-1000 MG TABLET SR 24 HR Take 2 Tablets by mouth every day. 180 Tablet 3    levothyroxine (SYNTHROID) 50 MCG Tab Take 1 Tablet by mouth every morning on an empty stomach. 90 Tablet 2    rosuvastatin (CRESTOR) 40 MG tablet Take 1 Tablet by mouth every day. 90 Tablet 3    LANTUS SOLOSTAR 100 UNIT/ML Solution Pen-injector injection INJECT 36 UNITS UNDER THE SKIN EVERY EVENING (Patient taking differently: Inject 40 Units under the skin every evening.) 45 mL 3    FREESTYLE LITE strip USE 1 STRIP TWICE A  Strip 3    Lancets MISC Lancets order: Lancets for Abbott Freestyle Lite meter. Sig: use 4 times daily  and prn ssx high or low sugar. #100 RF x 0 360 Each 4    Blood Glucose Monitoring Suppl [place] Meter: Dispense Abbott Freestyle Lite meter. Sig. Use as directed for blood sugar monitoring. #1. NR. 1 Device 0    B-D ULTRAFINE III SHORT PEN 31G X 8 MM MISC USE 1 NEEDLE EVERY DAY WITH LANTUS 999 Each 2    aspirin EC (ECOTRIN) 81 MG TBEC Take 81 mg by mouth every evening.       No  facility-administered encounter medications on file as of 6/15/2023.     Social History     Socioeconomic History    Marital status:      Spouse name: Not on file    Number of children: Not on file    Years of education: Not on file    Highest education level: 12th grade   Occupational History    Not on file   Tobacco Use    Smoking status: Former     Packs/day: 1.00     Years: 40.00     Pack years: 40.00     Types: Cigarettes     Quit date: 1/3/1990     Years since quittin.4    Smokeless tobacco: Never   Vaping Use    Vaping Use: Never used   Substance and Sexual Activity    Alcohol use: No     Alcohol/week: 0.0 oz    Drug use: No    Sexual activity: Yes     Partners: Female   Other Topics Concern    Not on file   Social History Narrative    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      Social Determinants of Health     Financial Resource Strain: Low Risk  (2022)    Overall Financial Resource Strain (CARDIA)     Difficulty of Paying Living Expenses: Not hard at all   Food Insecurity: No Food Insecurity (2022)    Hunger Vital Sign     Worried About Running Out of Food in the Last Year: Never true     Ran Out of Food in the Last Year: Never true   Transportation Needs: No Transportation Needs (2022)    PRAPARE - Transportation     Lack of Transportation (Medical): No     Lack of Transportation (Non-Medical): No   Physical Activity: Inactive (2022)    Exercise Vital Sign     Days of Exercise per Week: 0 days     Minutes of Exercise per Session: 0 min   Stress: No Stress Concern Present (2022)    [place] [employer] - Occupational Stress Questionnaire     Feeling of Stress : Not at all   Social Connections: Unknown (2022)    Social Connection and Isolation Panel [NHANES]     Frequency of Communication with Friends and Family: More than three times a week     Frequency of Social Gatherings with Friends and Family: Twice a week     Attends Hinduism  Services: More than 4 times per year     Active Member of Clubs or Organizations: Patient refused     Attends Club or Organization Meetings: Patient refused     Marital Status:    Intimate Partner Violence: Not on file   Housing Stability: Low Risk  (12/11/2022)    Housing Stability Vital Sign     Unable to Pay for Housing in the Last Year: No     Number of Places Lived in the Last Year: 1     Unstable Housing in the Last Year: No         ROS:   10 point review systems is otherwise negative except as per the HPI    Chief Complaint   Patient presents with    Aortic Stenosis    Syncope

## 2023-06-16 NOTE — TELEPHONE ENCOUNTER
Patient scheduled for PM gen change on 8-9-23 with Dr. Rivas. Patient has been instructed to check in at 7:30 for 9:30 case time. 1/2 dose of Insulin pm before. Hold Synjardy and Insulin am of. Message sent to authorizations. JAMIL Maldonado with [facility]

## 2023-06-20 NOTE — CARDIAC REMOTE MONITOR - SCAN
In office interrogation reviewed. Device demonstrated appropriate function, occasional Atrial lead noise.      Electronically Signed by: Guille Rivas M.D.    6/21/2023  7:37 AM

## 2023-06-21 ENCOUNTER — APPOINTMENT (OUTPATIENT)
Dept: ADMISSIONS | Facility: MEDICAL CENTER | Age: 78
End: 2023-06-21
Attending: INTERNAL MEDICINE
Payer: MEDICARE

## 2023-06-21 ENCOUNTER — PATIENT MESSAGE (OUTPATIENT)
Dept: CARDIOLOGY | Facility: MEDICAL CENTER | Age: 78
End: 2023-06-21
Payer: MEDICARE

## 2023-06-22 ENCOUNTER — PATIENT MESSAGE (OUTPATIENT)
Dept: CARDIOLOGY | Facility: MEDICAL CENTER | Age: 78
End: 2023-06-22
Payer: MEDICARE

## 2023-06-22 NOTE — PATIENT COMMUNICATION
To BE: Patient is scheduled for a L/R heart cath with you on 7/11, and a pacemaker gen change on 8/9. He is concerned about having two different cath procedures and states he was told the cath procedure would be done right before the gen change. Please advise what to tell the patient, or if something needs to be rescheduled/changed. Thank you!

## 2023-06-23 NOTE — PATIENT COMMUNICATION
Patient called back in regards to concerns and questions with cath and PPM change.   Discussed with patient he will have cath procedure done before PPM change in August.   Explained he will only have one cath done and [facility] is wanting to have that done ASAP.   Patient verbalized understanding the plan of care.

## 2023-06-23 NOTE — TELEPHONE ENCOUNTER
Called patient at [phone], left VM stating BE recommendations of having cath done asap. Explained to patient we use heparin during a cath procedure and it could cause problems with pacemaker change if done to close.

## 2023-06-25 DIAGNOSIS — N28.1 CYST OF LEFT KIDNEY: ICD-10-CM

## 2023-07-10 ENCOUNTER — PRE-ADMISSION TESTING (OUTPATIENT)
Dept: ADMISSIONS | Facility: MEDICAL CENTER | Age: 78
End: 2023-07-10
Payer: MEDICARE

## 2023-07-11 ENCOUNTER — APPOINTMENT (OUTPATIENT)
Dept: CARDIOLOGY | Facility: MEDICAL CENTER | Age: 78
End: 2023-07-11
Attending: INTERNAL MEDICINE
Payer: MEDICARE

## 2023-07-11 ENCOUNTER — HOSPITAL ENCOUNTER (OUTPATIENT)
Facility: MEDICAL CENTER | Age: 78
End: 2023-07-11
Attending: INTERNAL MEDICINE | Admitting: INTERNAL MEDICINE
Payer: MEDICARE

## 2023-07-11 VITALS
SYSTOLIC BLOOD PRESSURE: 111 MMHG | BODY MASS INDEX: 31.67 KG/M2 | HEART RATE: 75 BPM | RESPIRATION RATE: 18 BRPM | OXYGEN SATURATION: 97 % | WEIGHT: 209 LBS | DIASTOLIC BLOOD PRESSURE: 57 MMHG | TEMPERATURE: 96.9 F | HEIGHT: 68 IN

## 2023-07-11 DIAGNOSIS — I25.10 CORONARY ARTERY DISEASE DUE TO CALCIFIED CORONARY LESION: ICD-10-CM

## 2023-07-11 DIAGNOSIS — I35.0 NONRHEUMATIC AORTIC VALVE STENOSIS: ICD-10-CM

## 2023-07-11 DIAGNOSIS — I25.84 CORONARY ARTERY DISEASE DUE TO CALCIFIED CORONARY LESION: ICD-10-CM

## 2023-07-11 LAB
ALBUMIN SERPL BCP-MCNC: 4 G/DL (ref 3.2–4.9)
ALBUMIN/GLOB SERPL: 1.6 G/DL
ALP SERPL-CCNC: 69 U/L (ref 30–99)
ALT SERPL-CCNC: 32 U/L (ref 2–50)
ANION GAP SERPL CALC-SCNC: 13 MMOL/L (ref 7–16)
APTT PPP: 28.8 SEC (ref 24.7–36)
AST SERPL-CCNC: 20 U/L (ref 12–45)
BASE EXCESS BLDA CALC-SCNC: -3 MMOL/L (ref -4–3)
BASE EXCESS BLDV CALC-SCNC: -2 MMOL/L (ref -4–3)
BILIRUB SERPL-MCNC: 1 MG/DL (ref 0.1–1.5)
BODY TEMPERATURE: ABNORMAL DEGREES
BODY TEMPERATURE: ABNORMAL DEGREES
BUN SERPL-MCNC: 20 MG/DL (ref 8–22)
CA-I BLD ISE-SCNC: 1.37 MMOL/L (ref 1.1–1.3)
CA-I BLD ISE-SCNC: 1.38 MMOL/L (ref 1.1–1.3)
CALCIUM ALBUM COR SERPL-MCNC: 10.3 MG/DL (ref 8.5–10.5)
CALCIUM SERPL-MCNC: 10.3 MG/DL (ref 8.5–10.5)
CHLORIDE SERPL-SCNC: 105 MMOL/L (ref 96–112)
CO2 BLDA-SCNC: 23 MMOL/L (ref 20–33)
CO2 BLDV-SCNC: 25 MMOL/L (ref 20–33)
CO2 SERPL-SCNC: 23 MMOL/L (ref 20–33)
CREAT SERPL-MCNC: 1.1 MG/DL (ref 0.5–1.4)
EKG IMPRESSION: NORMAL
ERYTHROCYTE [DISTWIDTH] IN BLOOD BY AUTOMATED COUNT: 50.3 FL (ref 35.9–50)
GFR SERPLBLD CREATININE-BSD FMLA CKD-EPI: 68 ML/MIN/1.73 M 2
GLOBULIN SER CALC-MCNC: 2.5 G/DL (ref 1.9–3.5)
GLUCOSE BLD STRIP.AUTO-MCNC: 217 MG/DL (ref 65–99)
GLUCOSE SERPL-MCNC: 220 MG/DL (ref 65–99)
HCO3 BLDA-SCNC: 22.1 MMOL/L (ref 17–25)
HCO3 BLDV-SCNC: 23.7 MMOL/L (ref 24–28)
HCT VFR BLD AUTO: 36.1 % (ref 42–52)
HGB BLD-MCNC: 13 G/DL (ref 14–18)
INR PPP: 1.07 (ref 0.87–1.13)
MCH RBC QN AUTO: 33 PG (ref 27–33)
MCHC RBC AUTO-ENTMCNC: 36 G/DL (ref 32.3–36.5)
MCV RBC AUTO: 91.6 FL (ref 81.4–97.8)
PCO2 BLDA: 38.4 MMHG (ref 26–37)
PCO2 BLDV: 42.9 MMHG (ref 41–51)
PH BLDA: 7.37 [PH] (ref 7.4–7.5)
PH BLDV: 7.35 [PH] (ref 7.31–7.45)
PLATELET # BLD AUTO: 138 K/UL (ref 164–446)
PMV BLD AUTO: 10.4 FL (ref 9–12.9)
PO2 BLDA: 87 MMHG (ref 64–87)
PO2 BLDV: 37 MMHG (ref 25–40)
POTASSIUM BLD-SCNC: 4 MMOL/L (ref 3.6–5.5)
POTASSIUM BLD-SCNC: 4.1 MMOL/L (ref 3.6–5.5)
POTASSIUM SERPL-SCNC: 4.1 MMOL/L (ref 3.6–5.5)
PROT SERPL-MCNC: 6.5 G/DL (ref 6–8.2)
PROTHROMBIN TIME: 13.8 SEC (ref 12–14.6)
RBC # BLD AUTO: 3.94 M/UL (ref 4.7–6.1)
SAO2 % BLDA: 96 % (ref 93–99)
SAO2 % BLDV: 67 %
SODIUM BLD-SCNC: 141 MMOL/L (ref 135–145)
SODIUM BLD-SCNC: 142 MMOL/L (ref 135–145)
SODIUM SERPL-SCNC: 141 MMOL/L (ref 135–145)
SPECIMEN DRAWN FROM PATIENT: ABNORMAL
SPECIMEN DRAWN FROM PATIENT: ABNORMAL
WBC # BLD AUTO: 5.9 K/UL (ref 4.8–10.8)

## 2023-07-11 PROCEDURE — 80053 COMPREHEN METABOLIC PANEL: CPT

## 2023-07-11 PROCEDURE — G0278 ILIAC ART ANGIO,CARDIAC CATH: HCPCS | Performed by: INTERNAL MEDICINE

## 2023-07-11 PROCEDURE — A9270 NON-COVERED ITEM OR SERVICE: HCPCS

## 2023-07-11 PROCEDURE — 93005 ELECTROCARDIOGRAM TRACING: CPT | Performed by: INTERNAL MEDICINE

## 2023-07-11 PROCEDURE — 700102 HCHG RX REV CODE 250 W/ 637 OVERRIDE(OP)

## 2023-07-11 PROCEDURE — 82962 GLUCOSE BLOOD TEST: CPT

## 2023-07-11 PROCEDURE — 99152 MOD SED SAME PHYS/QHP 5/>YRS: CPT | Performed by: INTERNAL MEDICINE

## 2023-07-11 PROCEDURE — 160002 HCHG RECOVERY MINUTES (STAT)

## 2023-07-11 PROCEDURE — 82803 BLOOD GASES ANY COMBINATION: CPT | Mod: 91

## 2023-07-11 PROCEDURE — 99153 MOD SED SAME PHYS/QHP EA: CPT

## 2023-07-11 PROCEDURE — 93460 R&L HRT ART/VENTRICLE ANGIO: CPT | Mod: 26 | Performed by: INTERNAL MEDICINE

## 2023-07-11 PROCEDURE — 85730 THROMBOPLASTIN TIME PARTIAL: CPT

## 2023-07-11 PROCEDURE — 85610 PROTHROMBIN TIME: CPT

## 2023-07-11 PROCEDURE — 93567 NJX CAR CTH SPRVLV AORTGRPHY: CPT | Performed by: INTERNAL MEDICINE

## 2023-07-11 PROCEDURE — 82330 ASSAY OF CALCIUM: CPT

## 2023-07-11 PROCEDURE — 36415 COLL VENOUS BLD VENIPUNCTURE: CPT

## 2023-07-11 PROCEDURE — 85027 COMPLETE CBC AUTOMATED: CPT

## 2023-07-11 PROCEDURE — 160046 HCHG PACU - 1ST 60 MINS PHASE II

## 2023-07-11 PROCEDURE — 160035 HCHG PACU - 1ST 60 MINS PHASE I

## 2023-07-11 PROCEDURE — 700111 HCHG RX REV CODE 636 W/ 250 OVERRIDE (IP)

## 2023-07-11 PROCEDURE — 700105 HCHG RX REV CODE 258: Performed by: INTERNAL MEDICINE

## 2023-07-11 PROCEDURE — 84295 ASSAY OF SERUM SODIUM: CPT | Mod: XU

## 2023-07-11 PROCEDURE — 700101 HCHG RX REV CODE 250

## 2023-07-11 PROCEDURE — 84132 ASSAY OF SERUM POTASSIUM: CPT | Mod: 91,XU

## 2023-07-11 PROCEDURE — 93010 ELECTROCARDIOGRAM REPORT: CPT | Mod: 59 | Performed by: INTERNAL MEDICINE

## 2023-07-11 PROCEDURE — 700117 HCHG RX CONTRAST REV CODE 255: Performed by: INTERNAL MEDICINE

## 2023-07-11 RX ORDER — PHENYLEPHRINE HCL IN 0.9% NACL 0.5 MG/5ML
SYRINGE (ML) INTRAVENOUS
Status: COMPLETED
Start: 2023-07-11 | End: 2023-07-11

## 2023-07-11 RX ORDER — LIDOCAINE HYDROCHLORIDE 20 MG/ML
INJECTION, SOLUTION INFILTRATION; PERINEURAL
Status: COMPLETED
Start: 2023-07-11 | End: 2023-07-11

## 2023-07-11 RX ORDER — ASPIRIN 81 MG/1
TABLET, CHEWABLE ORAL
Status: COMPLETED
Start: 2023-07-11 | End: 2023-07-11

## 2023-07-11 RX ORDER — HEPARIN SODIUM 1000 [USP'U]/ML
INJECTION, SOLUTION INTRAVENOUS; SUBCUTANEOUS
Status: COMPLETED
Start: 2023-07-11 | End: 2023-07-11

## 2023-07-11 RX ORDER — SODIUM CHLORIDE 9 MG/ML
INJECTION, SOLUTION INTRAVENOUS CONTINUOUS
Status: DISCONTINUED | OUTPATIENT
Start: 2023-07-11 | End: 2023-07-11 | Stop reason: HOSPADM

## 2023-07-11 RX ORDER — MIDAZOLAM HYDROCHLORIDE 1 MG/ML
INJECTION INTRAMUSCULAR; INTRAVENOUS
Status: COMPLETED
Start: 2023-07-11 | End: 2023-07-11

## 2023-07-11 RX ORDER — SODIUM CHLORIDE 9 MG/ML
1000 INJECTION, SOLUTION INTRAVENOUS CONTINUOUS
Status: DISCONTINUED | OUTPATIENT
Start: 2023-07-11 | End: 2023-07-11 | Stop reason: HOSPADM

## 2023-07-11 RX ORDER — VERAPAMIL HYDROCHLORIDE 2.5 MG/ML
INJECTION, SOLUTION INTRAVENOUS
Status: COMPLETED
Start: 2023-07-11 | End: 2023-07-11

## 2023-07-11 RX ORDER — HEPARIN SODIUM 200 [USP'U]/100ML
INJECTION, SOLUTION INTRAVENOUS
Status: COMPLETED
Start: 2023-07-11 | End: 2023-07-11

## 2023-07-11 RX ADMIN — NITROGLYCERIN 10 ML: 20 INJECTION INTRAVENOUS at 08:43

## 2023-07-11 RX ADMIN — IOHEXOL 106 ML: 350 INJECTION, SOLUTION INTRAVENOUS at 09:40

## 2023-07-11 RX ADMIN — SODIUM CHLORIDE 1000 ML: 9 INJECTION, SOLUTION INTRAVENOUS at 08:08

## 2023-07-11 RX ADMIN — VERAPAMIL HYDROCHLORIDE 5 MG: 2.5 INJECTION, SOLUTION INTRAVENOUS at 08:44

## 2023-07-11 RX ADMIN — HEPARIN SODIUM: 1000 INJECTION, SOLUTION INTRAVENOUS; SUBCUTANEOUS at 08:43

## 2023-07-11 RX ADMIN — MIDAZOLAM 1.5 MG: 1 INJECTION, SOLUTION INTRAMUSCULAR; INTRAVENOUS at 09:39

## 2023-07-11 RX ADMIN — HEPARIN SODIUM 2000 UNITS: 200 INJECTION, SOLUTION INTRAVENOUS at 08:44

## 2023-07-11 RX ADMIN — ASPIRIN 81 MG 324 MG: 81 TABLET ORAL at 08:53

## 2023-07-11 RX ADMIN — LIDOCAINE HYDROCHLORIDE: 20 INJECTION, SOLUTION INFILTRATION; PERINEURAL at 08:43

## 2023-07-11 RX ADMIN — FENTANYL CITRATE 75 MCG: 50 INJECTION, SOLUTION INTRAMUSCULAR; INTRAVENOUS at 09:39

## 2023-07-11 ASSESSMENT — PAIN DESCRIPTION - PAIN TYPE: TYPE: ACUTE PAIN

## 2023-07-11 ASSESSMENT — FIBROSIS 4 INDEX: FIB4 SCORE: 1.81

## 2023-07-11 NOTE — OR NURSING
1225 l. Dressing CDI to Right wrist Awake and alert VSS.   D/c orders received. IV dc'd. Pt changed into clothing with assistance. Discharge instructions reviewed, Pt and family verbalized understanding and questions answered. Patient states ready to d/c home. Pt dc'd in w/c with RN

## 2023-07-11 NOTE — DISCHARGE INSTRUCTIONS
HOME CARE INSTRUCTIONS    ACTIVITY: Rest and take it easy for the first 24 hours.  A responsible adult is recommended to remain with you during that time.  It is normal to feel sleepy.  We encourage you to not do anything that requires balance, judgment or coordination.    FOR 24 HOURS DO NOT:  Drive, operate machinery or run household appliances.  Drink beer or alcoholic beverages.  Make important decisions or sign legal documents.    SPECIAL INSTRUCTIONS: Limit wrist movement for 48 hours    POST ANGIOGRAM  General Care Instructions  Maintain a bandage over the incision site for 24 hours.  It's normal to find a small bruise or dime-sized lump at the insertion site. This should disappear within a few weeks.  Do not apply lotions or powders to the site.  Do not immerse the catheter insertion site in water (bathtub/swimming) for five days. It is ok to shower 24 hours after the procedure.  You may resume your normal diet immediately; on the day of your procedure, drink 6-10 glasses of water to help flush the contrast liquid out of your system.  If the doctor inserted the catheter in through your groin:  Walking short distances on a flat surface is OK. Limit going up/down stairs for the first 2 days.  DO NOT do yard work, drive, squat, lift heavy objects, or play sports for 2 days; or until your health care provider tells you it is OK.  If the doctor inserted the catheter in your arm:  For 24 hours, DO NOT lift anything heavier than 10 pounds (approximately a gallon of milk). DO NOT do any heavy pushing, pulling, or twisting.    Medications  If your current medications need to be changed, you will be provided with an updated list of your medications prior to discharge.  If you take warfarin (Coumadin), resume taking your usual dose the evening after the procedure.  DO NOT STOP taking prescribed blood thinning (anti-platelet) medications unless instructed by your cardiologist.  These medications include:  Aspirin,  Clopidogrel (Plavix), Ticagrelor (Brilinta), or Prasugrel (Effient)   If you take one of the following anticoagulants, RESUME 24 HOURS after your procedure:  Apixiban (Eliquis), Rivaroxaban (Xarelto), Dabigatran (Pradaxa), Edoxaban (Savaysa)  If you take metformin (Glucophage), RESUME 48 HOURS after your procedure.    When to call your healthcare provider  Call your cardiologist right away at [phone] if you have any of the following:   Problems/Concerns taking any of your prescribed heart medicines.   The insertion site has increasing pain, swelling, redness, bleeding, or drainage.   Your arm or leg below where the insertion site changes color, is cool, or is numb.   You have chest pain or shortness of breath that does not go away with rest.   Your pulse feels irregular -- very slow (less than 60 beats/minute) or very fast (over 100 beats/minute).   You have dizziness, fainting, or you are very tired.   You are coughing up blood or yellow or green mucus.   You have chills or a fever over 101°F (38.3°C).    If there is bleeding at the catheter insertion site, apply pressure for 10 minutes.  If bleeding persists, call 911, and continue to hold pressure until advanced medical support arrives.    DIET: To avoid nausea, slowly advance diet as tolerated, avoiding spicy or greasy foods for the first day.  Add more substantial food to your diet according to your physician's instructions.  INCREASE FLUIDS AND FIBER TO AVOID CONSTIPATION.    SURGICAL DRESSING/BATHING: Maintain dressing for 24 hours. Okay to remove and shower after that. Do not submerge in water for 1 week (hot tubs, baths, pools).    MEDICATIONS: Resume taking daily medication.  Take prescribed pain medication with food.  If no medication is prescribed, you may take non-aspirin pain medication if needed.  PAIN MEDICATION CAN BE VERY CONSTIPATING.  Take a stool softener or laxative such as senokot, pericolace, or milk of magnesia if needed.    A follow-up  appointment should be arranged with your doctor in 1-2 weeks; call to schedule.    You should CALL YOUR PHYSICIAN if you develop:  Fever greater than 101 degrees F.  Pain not relieved by medication, or persistent nausea or vomiting.  Excessive bleeding (blood soaking through dressing) or unexpected drainage from the wound.  Extreme redness or swelling around the incision site, drainage of pus or foul smelling drainage.  Inability to urinate or empty your bladder within 8 hours.  Problems with breathing or chest pain.    You should call 911 if you develop problems with breathing or chest pain.  If you are unable to contact your doctor or surgical center, you should go to the nearest emergency room or urgent care center.  Physician's telephone #: Dr. Hsu [phone].    MILD FLU-LIKE SYMPTOMS ARE NORMAL.  YOU MAY EXPERIENCE GENERALIZED MUSCLE ACHES, THROAT IRRITATION, HEADACHE AND/OR SOME NAUSEA.    If any questions arise, call your doctor.  If your doctor is not available, please feel free to call the Surgical Center at [phone].  The Center is open Monday through Friday from 7AM to 7PM.      A registered nurse may call you a few days after your surgery to see how you are doing after your procedure.    You may also receive a survey in the mail within the next two weeks and we ask that you take a few moments to complete the survey and return it to us.  Our goal is to provide you with very good care and we value your comments.     Depression / Suicide Risk    As you are discharged from this [facility] facility, it is important to learn how to keep safe from harming yourself.    Recognize the warning signs:  Abrupt changes in personality, positive or negative- including increase in energy   Giving away possessions  Change in eating patterns- significant weight changes-  positive or negative  Change in sleeping patterns- unable to sleep or sleeping all the time   Unwillingness or inability to  communicate  Depression  Unusual sadness, discouragement and loneliness  Talk of wanting to die  Neglect of personal appearance   Rebelliousness- reckless behavior  Withdrawal from people/activities they love  Confusion- inability to concentrate     If you or a loved one observes any of these behaviors or has concerns about self-harm, here's what you can do:  Talk about it- your feelings and reasons for harming yourself  Remove any means that you might use to hurt yourself (examples: pills, rope, extension cords, firearm)  Get professional help from the community (Mental Health, Substance Abuse, psychological counseling)  Do not be alone:Call your Safe Contact- someone whom you trust who will be there for you.  Call your local CRISIS HOTLINE 433-5001 or [phone]  Call your local Children's Mobile Crisis Response Team [place] (455) 223-6790 or www.MAYKOR  Call the toll free National Suicide Prevention Hotlines   National Suicide Prevention Lifeline 003-134-PIYM (8128)  National Hope Line Network 800-SUICIDE (310-2621)    I acknowledge receipt and understanding of these Home Care instructions.

## 2023-07-11 NOTE — PROCEDURES
"CARDIAC CATHETERIZATION REPORT    REFERRING: Puma Hsu M.D.    PROCEDURE PHYSICIAN: Puma Hsu MD, [facility], [facility]  ASSISTANT: None    IMPRESSIONS:  1.  Nonobstructive three-vessel coronary artery disease  2.  Severe aortic stenosis-D3  3.  Nonobstructive three-vessel coronary artery disease with patent RCA stents  4.  Widely patent bilateral iliofemoral system.  5.  Enlarged ascending aorta    Recommendations:  Aortic valve calcium scoring , TAVR CTA    Pre-procedure diagnosis:  Aortic stenosis  Post-procedure diagnosis: Same    Procedure performed  Selective coronary angiography  Left heart catheterization  Right heart catheterization  Supravalvular aortography  Aortobifemoral angiography    Conscious sedation was supervised by myself and administered by trained personnel using fentanyl and versed between 903 and 940. The patient tolerated sedation without complication.     Procedure Description  1. Access: 5/6 Citizen of [place] right radial artery Micropuncture technique was utilized following local anesthesia with lidocaine.  A radial cocktail containing verapamil and saline was administered in the radial artery sheath Dynamic ultrasound was utilized to gain access Brachial venous access was obtained using micropuncture technique and dynamic ultrasound guidance    2. Diagnostic description: The catheter was passed to the central circulation with the aide of J tipped 0.35\" wire. A 5F TIG 4.0 and 6 Citizen of [place] dual-lumen pigtail catheter  was used to inject the coronary circulation and inject the ascending aorta  and inject the abdominal aorta  and a balloon tipped catheter was passed intravenously through the right heart chambers into wedge position, obtaining hemodynamic information.  Aortic valve was crossed with a multipurpose catheter and a straight wire.    3. Closing: At completion of the procedure the relevant equipment was removed from the body and hemostasis achieved by Radial band    Findings   Hemodynamics: "   Aorta: 82/43 mmHg  LVEDP: 19 mmHg  RA: 11 mmHg  RV: 29/13 mmHg  PA: 29/17/21 mmHg  PCWP: 16 mmHg  Cardiac Output/Index (thermodilution): 4.9 L/min, 2.3 L/min/m2  Cardiac Output/Index (Luis): 4.6 L/min, 2.2 L/min/m2  PA Saturation: 67%  Arterial saturation: 96%  GUY: 0.80-1.0 cm2  Aortic valve mean gradient: 23-32 mmHg by multiple readings    Coronary Anatomy   Left Main:  Normal, short   LAD: Minimal luminal irregularities   LCx: Co-dominant, Minimal luminal irregularities   RCA: Co-dominant, high anterior takeoff.  Patent stents throughout the midportion of the vessel    Supravalvular aortogram:  Enlarged ascending aorta - estimated 4.0-4.5 cm. No significant AI     Abdominal aorta/iliac angiogram:  Widely patent bilateral iliofemoral system    Technical Factors  1. Complications: None  2. Estimated Blood Loss: <50 cc  3. Specimens: None  4. Contrast Volume: 106 ml  5. Medications: Heparin 5000 Units  6. Radiation (air kerma): 311 mGy

## 2023-07-11 NOTE — OR NURSING
Assume care for pt in pre-op. Patient allergies and NPO status verified. Belongings secured.Patient verbalizes understanding of pain scale, expected course of stay and plan of care. Surgical procedure verified with patient. IV access established. Blood samples sent to lab for cbc, cmp, pt/ptt, fbs= 217, ekg results in chart. Call light within reach. No further needs at this time. Hourly rounding in place.

## 2023-07-11 NOTE — PROGRESS NOTES
Med Rec complete per patient   Allergies reviewed  No oral antibiotics in the last 30 days    Patient states he injected 25 units of the Lantus lastnight instead of full dose of 50 units

## 2023-07-11 NOTE — OR NURSING
0955 Pt arrived to PACU with Cath Lab RN. AAOx4. Even, unlabored respirations. VSS. Denies pain. Denies nausea. Right radial TR Band is CDI and soft. Right brachial site with gauze and tegaderm CDI. CMS intact. Pt denies numbness/tingling. RUE elevated on pillow.    1020 POC update given to Jenny, wife, over the phone. All questions answered.     1100 3cc air removed from TR band. CDI with no bleeding.   1115 3cc air removed from TR band. CDI with no bleeding.   1130 3cc air removed from TR band. CDI with no bleeding.   1145 3cc air removed from TR band. CDI with no bleeding.   1200 3cc air removed from TR band. CDI with no bleeding. TR band removed and replaced with tegaderm, gauze and coban wrap. CMS intact. Pt denies numbness/tinging.     1205 Pt meets phase II criteria. Report called to PJ Sigala. Pt transported to pre op 23 with RN. Chart with patient.

## 2023-07-13 ENCOUNTER — TELEPHONE (OUTPATIENT)
Dept: CARDIOLOGY | Facility: MEDICAL CENTER | Age: 78
End: 2023-07-13
Payer: MEDICARE

## 2023-07-13 DIAGNOSIS — Z01.810 PRE-PROCEDURAL CARDIOVASCULAR EXAMINATION: ICD-10-CM

## 2023-07-13 DIAGNOSIS — I35.0 NONRHEUMATIC AORTIC (VALVE) STENOSIS: ICD-10-CM

## 2023-07-13 DIAGNOSIS — I35.0 SEVERE AORTIC STENOSIS: ICD-10-CM

## 2023-07-13 NOTE — TELEPHONE ENCOUNTER
RE: Inpatient Notes  Received: Today  Puma Hsu M.D.  MINDI Miranda.N.  Can you get this patient into the TAVR pathway.     Thx   BE   _____________________________________________________________    Called and spoke to patient.     Discussed with patient consultation appointments, testing needed, and plan of care.    Patient scheduled at his earliest convenience. Patient given dates and times of testing and consultations.    All questions answered.    Phone number given to patient for [facility] for any further questions or concerns.

## 2023-08-01 DIAGNOSIS — C61 MALIGNANT NEOPLASM OF PROSTATE (HCC): ICD-10-CM

## 2023-08-01 DIAGNOSIS — R97.20 ELEVATED PSA: ICD-10-CM

## 2023-08-07 ENCOUNTER — PRE-ADMISSION TESTING (OUTPATIENT)
Dept: ADMISSIONS | Facility: MEDICAL CENTER | Age: 78
End: 2023-08-07
Attending: INTERNAL MEDICINE
Payer: MEDICARE

## 2023-08-07 DIAGNOSIS — Z01.812 PRE-OPERATIVE LABORATORY EXAMINATION: ICD-10-CM

## 2023-08-07 DIAGNOSIS — Z01.810 PRE-OPERATIVE CARDIOVASCULAR EXAMINATION: ICD-10-CM

## 2023-08-07 LAB
ALBUMIN SERPL BCP-MCNC: 4.2 G/DL (ref 3.2–4.9)
ALBUMIN/GLOB SERPL: 1.7 G/DL
ALP SERPL-CCNC: 67 U/L (ref 30–99)
ALT SERPL-CCNC: 14 U/L (ref 2–50)
ANION GAP SERPL CALC-SCNC: 11 MMOL/L (ref 7–16)
AST SERPL-CCNC: 14 U/L (ref 12–45)
BILIRUB SERPL-MCNC: 0.8 MG/DL (ref 0.1–1.5)
BUN SERPL-MCNC: 23 MG/DL (ref 8–22)
CALCIUM ALBUM COR SERPL-MCNC: 10.4 MG/DL (ref 8.5–10.5)
CALCIUM SERPL-MCNC: 10.6 MG/DL (ref 8.5–10.5)
CHLORIDE SERPL-SCNC: 103 MMOL/L (ref 96–112)
CO2 SERPL-SCNC: 24 MMOL/L (ref 20–33)
CREAT SERPL-MCNC: 1.2 MG/DL (ref 0.5–1.4)
EKG IMPRESSION: NORMAL
ERYTHROCYTE [DISTWIDTH] IN BLOOD BY AUTOMATED COUNT: 48.7 FL (ref 35.9–50)
GFR SERPLBLD CREATININE-BSD FMLA CKD-EPI: 61 ML/MIN/1.73 M 2
GLOBULIN SER CALC-MCNC: 2.5 G/DL (ref 1.9–3.5)
GLUCOSE SERPL-MCNC: 199 MG/DL (ref 65–99)
HCT VFR BLD AUTO: 38.6 % (ref 42–52)
HGB BLD-MCNC: 13.7 G/DL (ref 14–18)
INR PPP: 1.01 (ref 0.87–1.13)
MCH RBC QN AUTO: 32.5 PG (ref 27–33)
MCHC RBC AUTO-ENTMCNC: 35.5 G/DL (ref 32.3–36.5)
MCV RBC AUTO: 91.5 FL (ref 81.4–97.8)
PLATELET # BLD AUTO: 174 K/UL (ref 164–446)
PMV BLD AUTO: 10.7 FL (ref 9–12.9)
POTASSIUM SERPL-SCNC: 4.5 MMOL/L (ref 3.6–5.5)
PROT SERPL-MCNC: 6.7 G/DL (ref 6–8.2)
PROTHROMBIN TIME: 13.2 SEC (ref 12–14.6)
RBC # BLD AUTO: 4.22 M/UL (ref 4.7–6.1)
SODIUM SERPL-SCNC: 138 MMOL/L (ref 135–145)
WBC # BLD AUTO: 7.3 K/UL (ref 4.8–10.8)

## 2023-08-07 PROCEDURE — 80053 COMPREHEN METABOLIC PANEL: CPT

## 2023-08-07 PROCEDURE — 36415 COLL VENOUS BLD VENIPUNCTURE: CPT

## 2023-08-07 PROCEDURE — 93010 ELECTROCARDIOGRAM REPORT: CPT | Performed by: INTERNAL MEDICINE

## 2023-08-07 PROCEDURE — 85610 PROTHROMBIN TIME: CPT

## 2023-08-07 PROCEDURE — 85027 COMPLETE CBC AUTOMATED: CPT

## 2023-08-07 PROCEDURE — 93005 ELECTROCARDIOGRAM TRACING: CPT

## 2023-08-08 ENCOUNTER — NON-PROVIDER VISIT (OUTPATIENT)
Dept: ENDOCRINOLOGY | Facility: MEDICAL CENTER | Age: 78
End: 2023-08-08
Attending: INTERNAL MEDICINE
Payer: MEDICARE

## 2023-08-08 VITALS
HEIGHT: 67 IN | SYSTOLIC BLOOD PRESSURE: 108 MMHG | HEART RATE: 68 BPM | BODY MASS INDEX: 33.74 KG/M2 | WEIGHT: 215 LBS | OXYGEN SATURATION: 96 % | DIASTOLIC BLOOD PRESSURE: 50 MMHG

## 2023-08-08 DIAGNOSIS — E11.65 TYPE 2 DIABETES MELLITUS WITH HYPERGLYCEMIA, WITH LONG-TERM CURRENT USE OF INSULIN (HCC): ICD-10-CM

## 2023-08-08 DIAGNOSIS — Z79.4 TYPE 2 DIABETES MELLITUS WITH HYPERGLYCEMIA, WITH LONG-TERM CURRENT USE OF INSULIN (HCC): ICD-10-CM

## 2023-08-08 LAB
HBA1C MFR BLD: 7.3 % (ref ?–5.8)
POCT INT CON NEG: NEGATIVE
POCT INT CON POS: POSITIVE

## 2023-08-08 PROCEDURE — 99212 OFFICE O/P EST SF 10 MIN: CPT | Performed by: INTERNAL MEDICINE

## 2023-08-08 PROCEDURE — 83036 HEMOGLOBIN GLYCOSYLATED A1C: CPT

## 2023-08-08 RX ORDER — DULAGLUTIDE 1.5 MG/.5ML
0.5 INJECTION, SOLUTION SUBCUTANEOUS
Qty: 6 ML | Refills: 3 | Status: SHIPPED | OUTPATIENT
Start: 2023-08-08

## 2023-08-08 RX ORDER — CHOLECALCIFEROL (VITAMIN D3) 50 MCG
2000 TABLET ORAL DAILY
Status: ON HOLD | COMMUNITY
End: 2023-09-25

## 2023-08-08 RX ORDER — PEN NEEDLE, DIABETIC 31 GX5/16"
1 NEEDLE, DISPOSABLE MISCELLANEOUS DAILY
Qty: 100 EACH | Refills: 3 | Status: SHIPPED | OUTPATIENT
Start: 2023-08-08

## 2023-08-08 RX ORDER — INSULIN GLARGINE 100 [IU]/ML
50 INJECTION, SOLUTION SUBCUTANEOUS EVERY EVENING
COMMUNITY
End: 2023-08-08 | Stop reason: SDUPTHER

## 2023-08-08 RX ORDER — ASCORBIC ACID 500 MG
500 TABLET ORAL DAILY
Status: ON HOLD | COMMUNITY
End: 2023-09-25

## 2023-08-08 RX ORDER — PNV NO.95/FERROUS FUM/FOLIC AC 28MG-0.8MG
TABLET ORAL
Status: ON HOLD | COMMUNITY
End: 2023-09-25

## 2023-08-08 RX ORDER — INSULIN GLARGINE 100 [IU]/ML
50 INJECTION, SOLUTION SUBCUTANEOUS EVERY EVENING
Qty: 45 ML | Refills: 3 | Status: SHIPPED | OUTPATIENT
Start: 2023-08-08

## 2023-08-08 ASSESSMENT — FIBROSIS 4 INDEX: FIB4 SCORE: 1.7

## 2023-08-08 NOTE — PROGRESS NOTES
RN-CDE Note    Subjective:   Endocrinology Clinic Progress Note  PCP: Rylee García D.O.    HPI:  Dakota Boyer is a 78 y.o. old patient who is seen today by the Diabetes Nurse Specialist for review of Type 2 Diabetes.  Recent changes in health: Having heart issues and will be getting a pace maker tomorrow and then will have a valve replacement.  He is feeling weak and tired.  Having problems with his bladder and [facility]  DM:   Last A1c:   Lab Results   Component Value Date/Time    HBA1C 7.3 (A) 08/08/2023 02:07 PM      Previous A1c was 7.9 on 4/5/23  A1C GOAL: < 7    Diabetes Medications:   Lantus 50 units daily  Trulicity 1.5 mg weekly  Synthroid 12.5-1000 mg BID      Exercise: Up with walker and having a hard time walking without falling  Diet: Breakfast eggs, vegetables or cereal for breakfast.  Lunch is eaten out like a burger or skips.  Dinner is protein, vegetables, and carbohydrates.  Patient's body mass index is 33.67 kg/m². Exercise and nutrition counseling were performed at this visit.    Glucose monitoring frequency: Testing daily  130-170's  Hypoglycemic episodes: no  Last Retinal Exam:  Has an appointment with Dr. Long on 8/21/23  Daily Foot Exam: Yes   Foot Exam:  Monofilament: done  Monofilament testing with a 10 gram force: sensation intact: intact bilaterally  Visual Inspection: Feet without maceration, ulcers, fissures.  Pedal pulses: intact bilaterally   Lab Results   Component Value Date/Time    MALBCRT 265 (H) 05/04/2023 10:12 AM    MICROALBUR 13.4 05/04/2023 10:12 AM        ACR Albumin/Creatinine Ratio goal <30     HTN:   Blood pressure goal <130/<80 .   Currently Rx ACE/ARB: Yes     Dyslipidemia:    Lab Results   Component Value Date/Time    CHOLSTRLTOT 81 (L) 10/24/2022 12:22 PM    LDL 27 10/24/2022 12:22 PM    HDL 40 10/24/2022 12:22 PM    TRIGLYCERIDE 71 10/24/2022 12:22 PM         Currently Rx Statin: Yes     He  reports that he quit  smoking about 33 years ago. His smoking use included cigarettes. He has a 40.00 pack-year smoking history. He has never used smokeless tobacco.      Plan:     Discussed and educated on:   - All medications, side effects and compliance (discussed carefully)  - Annual eye examinations at Ophthalmology  - Home glucose monitoring emphasized  - Weight control and daily exercise    Recommended medication changes: No changes at this time.  He would like to stay on the Trulicity 1.5 mg weekly, Synjardy 12.5-1000 mg BID, and Lantus 50 units daily.  He understands to adjust his Lantus as needed. He will follow up in 6 months with Dr. Ring.

## 2023-08-09 ENCOUNTER — APPOINTMENT (OUTPATIENT)
Dept: CARDIOLOGY | Facility: MEDICAL CENTER | Age: 78
End: 2023-08-09
Attending: INTERNAL MEDICINE
Payer: MEDICARE

## 2023-08-09 ENCOUNTER — HOSPITAL ENCOUNTER (OUTPATIENT)
Facility: MEDICAL CENTER | Age: 78
End: 2023-08-09
Attending: INTERNAL MEDICINE | Admitting: INTERNAL MEDICINE
Payer: MEDICARE

## 2023-08-09 VITALS
RESPIRATION RATE: 16 BRPM | SYSTOLIC BLOOD PRESSURE: 150 MMHG | BODY MASS INDEX: 33.36 KG/M2 | OXYGEN SATURATION: 95 % | HEART RATE: 87 BPM | TEMPERATURE: 97 F | DIASTOLIC BLOOD PRESSURE: 69 MMHG | HEIGHT: 67 IN | WEIGHT: 212.52 LBS

## 2023-08-09 DIAGNOSIS — I45.10 RIGHT BUNDLE BRANCH BLOCK: ICD-10-CM

## 2023-08-09 DIAGNOSIS — I49.5 SICK SINUS SYNDROME (HCC): ICD-10-CM

## 2023-08-09 DIAGNOSIS — Z95.0 CARDIAC PACEMAKER IN SITU: ICD-10-CM

## 2023-08-09 DIAGNOSIS — Z95.0 PACEMAKER: ICD-10-CM

## 2023-08-09 LAB
EKG IMPRESSION: NORMAL
GLUCOSE BLD STRIP.AUTO-MCNC: 159 MG/DL (ref 65–99)

## 2023-08-09 PROCEDURE — 82962 GLUCOSE BLOOD TEST: CPT

## 2023-08-09 PROCEDURE — 99153 MOD SED SAME PHYS/QHP EA: CPT

## 2023-08-09 PROCEDURE — 160035 HCHG PACU - 1ST 60 MINS PHASE I

## 2023-08-09 PROCEDURE — 33228 REMV&REPLC PM GEN DUAL LEAD: CPT | Performed by: INTERNAL MEDICINE

## 2023-08-09 PROCEDURE — 99152 MOD SED SAME PHYS/QHP 5/>YRS: CPT | Performed by: INTERNAL MEDICINE

## 2023-08-09 PROCEDURE — 700101 HCHG RX REV CODE 250

## 2023-08-09 PROCEDURE — 160002 HCHG RECOVERY MINUTES (STAT)

## 2023-08-09 PROCEDURE — 160046 HCHG PACU - 1ST 60 MINS PHASE II

## 2023-08-09 PROCEDURE — 93005 ELECTROCARDIOGRAM TRACING: CPT | Performed by: INTERNAL MEDICINE

## 2023-08-09 PROCEDURE — 93010 ELECTROCARDIOGRAM REPORT: CPT | Mod: 59 | Performed by: INTERNAL MEDICINE

## 2023-08-09 PROCEDURE — 700111 HCHG RX REV CODE 636 W/ 250 OVERRIDE (IP): Mod: JZ

## 2023-08-09 RX ORDER — LIDOCAINE HYDROCHLORIDE 20 MG/ML
INJECTION, SOLUTION INFILTRATION; PERINEURAL
Status: COMPLETED
Start: 2023-08-09 | End: 2023-08-09

## 2023-08-09 RX ORDER — DOXYCYCLINE 100 MG/1
100 CAPSULE ORAL 2 TIMES DAILY
Qty: 8 CAPSULE | Refills: 0 | Status: SHIPPED | OUTPATIENT
Start: 2023-08-09 | End: 2023-08-16

## 2023-08-09 RX ORDER — MIDAZOLAM HYDROCHLORIDE 1 MG/ML
INJECTION INTRAMUSCULAR; INTRAVENOUS
Status: COMPLETED
Start: 2023-08-09 | End: 2023-08-09

## 2023-08-09 RX ORDER — ACETAMINOPHEN 325 MG/1
650 TABLET ORAL EVERY 4 HOURS PRN
Status: DISCONTINUED | OUTPATIENT
Start: 2023-08-09 | End: 2023-08-09 | Stop reason: HOSPADM

## 2023-08-09 RX ORDER — CEFAZOLIN SODIUM 1 G/3ML
INJECTION, POWDER, FOR SOLUTION INTRAMUSCULAR; INTRAVENOUS
Status: COMPLETED
Start: 2023-08-09 | End: 2023-08-09

## 2023-08-09 RX ADMIN — FENTANYL CITRATE 100 MCG: 50 INJECTION, SOLUTION INTRAMUSCULAR; INTRAVENOUS at 10:10

## 2023-08-09 RX ADMIN — LIDOCAINE HYDROCHLORIDE: 20 INJECTION, SOLUTION INFILTRATION; PERINEURAL at 09:55

## 2023-08-09 RX ADMIN — CEFAZOLIN 3000 MG: 1 INJECTION, POWDER, FOR SOLUTION INTRAMUSCULAR; INTRAVENOUS at 09:51

## 2023-08-09 RX ADMIN — MIDAZOLAM 1 MG: 1 INJECTION, SOLUTION INTRAMUSCULAR; INTRAVENOUS at 10:20

## 2023-08-09 RX ADMIN — MIDAZOLAM 2 MG: 1 INJECTION, SOLUTION INTRAMUSCULAR; INTRAVENOUS at 09:58

## 2023-08-09 ASSESSMENT — PAIN DESCRIPTION - PAIN TYPE
TYPE: ACUTE PAIN;SURGICAL PAIN

## 2023-08-09 ASSESSMENT — FIBROSIS 4 INDEX: FIB4 SCORE: 1.7

## 2023-08-09 NOTE — OR NURSING
Pt on RA.  No c/o nausea, tolerating PO fluids.  Left shoulder surgical site CDI, soft.  Pt denies procedural pain/discomfort.  VSS, afebrile, BARDALES, A/O x4.     Post procedure EKG completed in Recovery.     Left hand yellow wedding band in place, no other belongings in Recovery.   Pacemaker transmitter box with pt chart.

## 2023-08-09 NOTE — OR NURSING
1057: pt's wife Jenny phoned and updated on pt status in Recovery and POC.  Jenny is in the surgical waiting area.   1158: Jenny updated on pt status in Recovery and transfer to discharge.

## 2023-08-09 NOTE — OP REPORT
Electrophysiology Procedure Note  [facility]    PROCEDURE: Dual-chamber pacemaker generator change,   moderate sedation administered by RN and supervised by physician    : Guille Rivas M.D.    ANESTHESIA: Moderate sedation,  start time 952, stop time 1016  the moderate sedation document has been reviewed, signed and scanned into media     ESTIMATED BLOOD LOSS: 20 cc.    SPECIMENS: None.    COMPLICATIONS: None    INDICATION: RANJITH    PRE-PROCEDURE ECG: Sinus rhythm    POST-PROCEDURE ECG: Sinus rhythm    DESCRIPTION OF PROCEDURE: After informed written consent, the patient was brought to the electrophysiology lab in the fasting, unsedated state. The patient was prepped and draped in the usual sterile fashion. The procedure was performed under moderate sedation with local anesthetic. An incision was made with a scalpel along the old scar. Access to the device pocket was made using a combination of blunt dissection and electrocautery. The old generator and leads were freed from adhesions and the generator disconnected from the leads. Leads tested fine.  The pocket was irrigated with antibiotic solution, and the leads were attached to new Generator and inserted back in the pocket. The wound was closed with three layers of absorbable sutures and covered with Steri-Strips.   I personally supervised the administration of moderate sedation by the RN and observed the level of consciousness and physiologic status throughout the procedure.  Following recovery from sedation, the patient was transferred to a monitored bed.    IMPLANTED DEVICE INFORMATION:    Pulse generator is a [employer] model [id number]  Serial number [id number]  LEAD INFORMATION:  1. Right atrial lead is a [employer] model 1688TC/52, serial number [id number], P wave 3.6 millivolts, threshold 1 volts, pacing impedance 540 ohms, implant date 10/4/2012  2. Right ventricular lead is a [employer] model 1688TC/58, serial number [id number], R wave 7  millivolts, threshold 1.5 volts, pacing impedance 480 ohms, implant date 10/4/2012      DEVICE PROGRAMMING:    As previous programmed     IMPRESSIONS:  1. Successful dual-chamber pacemaker generator change.    RECOMMENDATIONS:  1. Transfer to PPU.  2. Follow-up in device clinic for wound check and device interrogation.

## 2023-08-09 NOTE — OR NURSING
1020: Report received from Andressa OLIVA, Cath Lab.  1036: Pt arrived on monitor and RA with RN.  Left shoulder surgical site CDI, soft.  VSS, afebrile, BARDALES, A/O x4.  Pt denies procedural pain/discomfort.  No c/o nausea.  Left hand yellow wedding band in place, no other belongings in Recovery.

## 2023-08-09 NOTE — OR NURSING
"1053: No pacer spikes visible in Recovery, verified with Cath Lab via Xiomy OLIVA that pt \"does not always pace.\"   "

## 2023-08-09 NOTE — H&P
Physician H&P    Patient ID:  Dakota Boyer  [id number]  78 y.o. male  1945    History:  Primary Diagnosis: Permanent pacemaker at [facility] for generator change.  [facility] device.  Placed in 2012 Dr. Waite    HPI:  History of RCA stenting.  Severe aortic stenosis.  To be scheduled for TAVR.  For generator change.  Device placed for sick sinus syndrome.    Past Medical History:  has a past medical history of Anxiety, Aortic stenosis (03/2022), Back pain, chronic (10/18/2016), Bilateral primary osteoarthritis of knee (01/23/2018), Black stools (03/09/2022), Blood clotting disorder ([facility]) (1978), Breath shortness, CAD (coronary artery disease) (11/2021), Cancer ([facility]) (? early 90's), Degeneration of lumbar or lumbosacral intervertebral disc ( ), Dental disorder, Diabetes, DJD (degenerative joint disease) of cervical spine ( ), High cholesterol, Hyperlipidemia, Hypersensitive carotid sinus syndrome ( ), Hypertension, Hypertension associated with type 2 diabetes mellitus ([facility]) (04/10/2012), Insomnia ( ), Neck pain, Neutropenia ([facility]) (03/04/2022), Pacemaker (10/2012), Pancytopenia ([facility]) (03/09/2022), Psychiatric problem, RBBB ( ), S/P lumbar discectomy ( ), Sick sinus syndrome ([facility]) ( ), Sleep apnea, Syncope (10/2012), Thyroid disease, Unintentional weight loss (03/05/2022), and Unspecified hemorrhagic conditions.  Past Surgical History:  has a past surgical history that includes arthroplasty (Left, 01/01/2004); arthroscopy, knee (Bilateral, 01/01/1978); tonsillectomy; vasectomy; shoulder decompression (Left, 01/01/2008); orthopedic osteotomy; recovery (10/04/2012); lumbar laminectomy diskectomy (11/20/2012); pacemaker insertion (10/01/2012); cervical fusion posterior (02/27/2013); cervical laminectomy posterior (02/27/2013); wound dehiscence (04/10/2013); recovery (10/17/2013); shoulder arthroscopy (Right, 01/01/2015); other; spinal cord stimulator (1 month ago); pr dstr nrolytc agnt parverteb fct sngl crvcl/zenon  "(Right, 10/19/2016); pr dstr nrolytc agnt parverteb fct addl crvcl/thora (10/19/2016); pr fluoroscopic guidance needle placement (10/19/2016); and knee arthroplasty total (Right, 2018).  Past Social History:  reports that he quit smoking about 33 years ago. His smoking use included cigarettes. He has a 40.00 pack-year smoking history. He has never used smokeless tobacco. He reports that he does not drink alcohol and does not use drugs.  Past Family History:   Family History   Problem Relation Age of Onset    Lung Disease Mother         Smoker ( of lung dz)    Alcohol/Drug Mother     Heart Disease Father 36        CAD    Heart Disease Sister         \"hole in heart\"    Lung Disease Brother         COPD, CANCER    Cancer Brother         Prostate, Lung ( of lung CA)    Alcohol/Drug Brother     Heart Disease Brother     Diabetes Brother     Hypertension Brother     Hyperlipidemia Brother     Heart Disease Maternal Grandmother     Hypertension Maternal Grandmother     Hyperlipidemia Maternal Grandmother     Cancer Brother          of esophagus/stomach, thyroid    Heart Disease Brother 60        MI, stent, PM/defib    Sleep Apnea Brother     No Known Problems Maternal Grandfather     No Known Problems Paternal Grandmother      Allergies: Aleve cold & [pseudoephedrine-naproxen na], Ceftriaxone sodium, Naproxen, Latex, and Tape    Current Medications:  Prior to Admission medications    Medication Sig Start Date End Date Taking? Authorizing Provider   Cholecalciferol (VITAMIN D) 2000 UNIT Tab Take 2,000 Units by mouth every day.    Physician Outpatient   Ferrous Sulfate (IRON) 325 (65 Fe) MG Tab Take  by mouth.    Physician Outpatient   ascorbic acid (ASCORBIC ACID) 500 MG Tab Take 500 mg by mouth every day.    Physician Outpatient   insulin glargine (LANTUS SOLOSTAR) 100 UNIT/ML Solution Pen-injector injection Inject 50 Units under the skin every evening. 23   Luis Daniel Ring M.D.   Dulaglutide (TRULICITY) " "1.5 MG/0.5ML Solution Pen-injector Inject 0.5 mL under the skin every 7 days. 8/8/23   Luis Daniel Ring M.D.   Insulin Pen Needle (PEN NEEDLES 31GX5/16\") 31G X 8 MM Misc 1 Each every day. 8/8/23   Luis Daniel Ring M.D.   lisinopril (PRINIVIL) 5 MG Tab Take 1 Tablet by mouth every day.  Patient taking differently: Take 5 mg by mouth every evening. 6/12/23   Puma Hsu M.D.   Empagliflozin-metFORMIN HCl ER (SYNJARDY XR) 12.5-1000 MG TABLET SR 24 HR Take 2 Tablets by mouth every day.  Patient taking differently: Take 1 Tablet by mouth 2 times a day. 4/6/23   Luis Daniel Ring M.D.   levothyroxine (SYNTHROID) 50 MCG Tab Take 1 Tablet by mouth every morning on an empty stomach. 1/9/23   Luis Daniel Ring M.D.   rosuvastatin (CRESTOR) 40 MG tablet Take 1 Tablet by mouth every day.  Patient taking differently: Take 40 mg by mouth every evening. 12/14/22   Rylee García D.O.   FREESTYLE LITE strip USE 1 STRIP TWICE A DAY 9/20/22   Luis Daniel Ring M.D.   Lancets MISC Lancets order: Lancets for Abbott Freestyle Lite meter. Sig: use 4 times daily  and prn ssx high or low sugar. #100 RF x 0 7/15/15   Mally Moon M.D.   Blood Glucose Monitoring Suppl [place] Meter: Dispense Abbott Freestyle Lite meter. Sig. Use as directed for blood sugar monitoring. #1. NR. 7/7/15   Tu Allison M.D.   B-D ULTRAFINE III SHORT PEN 31G X 8 MM MISC USE 1 NEEDLE EVERY DAY WITH LANTUS 5/8/14   Kassie Garcia P.A.-C.   aspirin EC (ECOTRIN) 81 MG TBEC Take 81 mg by mouth every evening.    Physician Outpatient       Review of Systems:  ROS  /74   Pulse 94   Temp 36.5 °C (97.7 °F) (Temporal)   Resp 18   Ht 1.702 m (5' 7\")   Wt 96.4 kg (212 lb 8.4 oz)   SpO2 94%     Physical Examination:  Physical Exam  Constitutional:       Appearance: He is well-developed.   HENT:      Head: Normocephalic and atraumatic.   Cardiovascular:      Rate and Rhythm: Normal rate and regular rhythm.      Heart sounds: Murmur heard.      No " friction rub. No gallop.   Pulmonary:      Effort: Pulmonary effort is normal.      Breath sounds: Normal breath sounds.   Abdominal:      General: Bowel sounds are normal.      Palpations: Abdomen is soft.   Musculoskeletal:         General: Normal range of motion.      Cervical back: Normal range of motion and neck supple.   Skin:     General: Skin is warm and dry.   Neurological:      Mental Status: He is alert and oriented to person, place, and time.   Psychiatric:         Behavior: Behavior normal.         Thought Content: Thought content normal.         Judgment: Judgment normal.         Impression:  1.  Permanent pacemaker at [facility] for generator change.  The risks, benefits, and alternatives to permanent pacemaker replacement were discussed in great detail.  Specific risks mentioned to the patient including bleeding, cardiac perforation with possible tamponade possibly requiring pericardiocentesis or open heart surgery.  In addition the possibility of lead dislodgment (2-3%), pneumothorax (3%), hemothorax, infection were discussed.  Also, mentioned were the risk of death, stroke, and myocardial infarction.  The patient verbalized understanding of the potential complications and wishes to proceed with the procedure.

## 2023-08-09 NOTE — DISCHARGE INSTRUCTIONS
HOME CARE INSTRUCTIONS    ACTIVITY: Rest and take it easy for the first 24 hours.  A responsible adult is recommended to remain with you during that time.  It is normal to feel sleepy.  We encourage you to not do anything that requires balance, judgment or coordination.    FOR 24 HOURS DO NOT:  Drive, operate machinery or run household appliances.  Drink beer or alcoholic beverages.  Make important decisions or sign legal documents.    SPECIAL INSTRUCTIONS:     Pacemaker Battery Change, Care After  This sheet gives you information about how to care for yourself after your procedure. Your health care provider may also give you more specific instructions. If you have problems or questions, contact your health care provider.  What can I expect after the procedure?  After the procedure, it is common to have these symptoms at the site where the pacemaker was inserted:  Mild pain or soreness.  Slight bruising.  Some swelling over the incisions.  A slight bump over the skin where the device was placed (if it was implanted in the upper chest area). Sometimes, it is possible to feel the device under the skin. This is normal.  Follow these instructions at home:  Incision care  Keep the incision clean and dry for 2-3 days after the procedure or as told by your health care provider. It takes several weeks for the incision site to completely heal.  Do not remove the bandage (dressing) on your chest until told to do so by your health care provider.  Leave stitches (sutures), skin glue, or adhesive strips in place. These skin closures may need to stay in place for 2 weeks or longer. If adhesive strip edges start to loosen and curl up, you may trim the loose edges. Do not remove adhesive strips completely unless your health care provider tells you to do that.  Do not take baths, swim, or use a hot tub for 7-10 days or until your health care provider approves. Ask your health care provider if you may take showers. You may only be  allowed to take sponge baths.  Pat the incision area dry with a clean towel. Do not rub the area. This may cause bleeding.  Check your incision area every day for signs of infection. Check for:  More redness, swelling, or pain.  Fluid or blood.  Warmth.  Pus or a bad smell.  Avoid putting pressure on the area where the pacemaker was placed. Women may want to place a small pad over the incision site to protect it from their bra strap.  Medicines  Take over-the-counter and prescription medicines only as told by your health care provider.  If you were prescribed an antibiotic medicine, take it as told by your health care provider. Do not stop taking the antibiotic even if you start to feel better.  Activity  For the first 2 weeks, or as long as told by your health care provider:  Avoid lifting your left arm higher than your shoulder.  Be gentle when you move your arms over your head. It is okay to raise your arm to comb your hair.  Avoid exercise or activities that take a lot of effort.  Ask your health care provider when it is okay to:  Return to your normal activities.  Return to work or school.  Resume sexual activity.  If you were given a medicine to help you relax (sedative) during the procedure, it can affect you for several hours. Do not drive or operate machinery until your health care provider says that it is safe.  General instructions  Do not use any products that contain nicotine or tobacco, such as cigarettes, e-cigarettes, and chewing tobacco. These can delay incision healing after surgery. If you need help quitting, ask your health care provider.  Always let all health care providers, including dentists, know about your pacemaker before you have any medical procedures or tests.  You may be shown how to transfer data from your pacemaker through the phone to your health care provider.  Wear a medical ID bracelet or necklace stating that you have a pacemaker, and carry a pacemaker ID card with you at all  times.  Avoid close and prolonged exposure to electrical devices that have strong magnetic fields. These include:  Airport security checkpoints. When at the airport, let officials know that you have a pacemaker. Carry your pacemaker ID card.  Metal detectors. If you must pass through a metal detector, walk through it quickly. Do not stop under the detector or stand near it.  When using your mobile phone, hold it to the ear opposite the pacemaker. Do not leave your mobile phone in a pocket over the pacemaker.  Your pacemaker battery will last for 5-15 years. Your health care provider will do routine checks to know when the battery is starting to run down. When this happens, the pacemaker will need to be replaced.  Keep all follow-up visits as told by your health care provider. This is important.  Contact a health care provider if:  You have pain at the incision site that is not relieved by medicines.  You have any of these signs of infection:  More redness, swelling, or pain around your incision.  Fluid or blood coming from your incision.  Warmth coming from your incision.  Pus or a bad smell coming from your incision.  A fever.  You feel brief, occasional palpitations, light-headedness, or any symptoms that you think might be related to your heart.  Get help right away if:  You have chest pain that is different from the pain at the pacemaker site.  You develop a red streak that extends above or below the incision site.  You have shortness of breath.  You have palpitations or an irregular heartbeat.  You have light-headedness that does not go away quickly.  You faint or have dizzy spells.  Your pulse suddenly drops or increases rapidly and does not return to normal.  You gain weight and your legs and ankles swell.  Summary  After the procedure, it is common to have pain, soreness, and some swelling or bruising where the pacemaker was inserted.  Keep your incision clean and dry. Follow instructions from your health  care provider about how to take care of your incision.  Check your incision every day for signs of infection, such as more pain or swelling, pus or a bad smell, warmth, or leaking fluid or blood.  Carry a pacemaker ID card with you at all times.    DIET: To avoid nausea, slowly advance diet as tolerated, avoiding spicy or greasy foods for the first day.  Add more substantial food to your diet according to your physician's instructions.  Babies can be fed formula or breast milk as soon as they are hungry.  INCREASE FLUIDS AND FIBER TO AVOID CONSTIPATION.    SURGICAL DRESSING/BATHING: keep dressing clean,dry and intact for 1 week. At your follow up appointment, dressing will be changed. No showering until then.    MEDICATIONS: Resume taking daily medication.  Take prescribed pain medication with food.  If no medication is prescribed, you may take non-aspirin pain medication if needed.  PAIN MEDICATION CAN BE VERY CONSTIPATING.  Take a stool softener or laxative such as senokot, pericolace, or milk of magnesia if needed.    Prescription given for doxycycline.      A follow-up appointment should be arranged with your doctor in 1 week; call to schedule.    You should CALL YOUR PHYSICIAN if you develop:  Fever greater than 101 degrees F.  Pain not relieved by medication, or persistent nausea or vomiting.  Excessive bleeding (blood soaking through dressing) or unexpected drainage from the wound.  Extreme redness or swelling around the incision site, drainage of pus or foul smelling drainage.  Inability to urinate or empty your bladder within 8 hours.  Problems with breathing or chest pain.    You should call 911 if you develop problems with breathing or chest pain.  If you are unable to contact your doctor or surgical center, you should go to the nearest emergency room or urgent care center.  Physician's telephone #: [phone] Dr Rivas    MILD FLU-LIKE SYMPTOMS ARE NORMAL.  YOU MAY EXPERIENCE GENERALIZED MUSCLE ACHES,  THROAT IRRITATION, HEADACHE AND/OR SOME NAUSEA.    If any questions arise, call your doctor.  If your doctor is not available, please feel free to call the Surgical Center at [phone].  The Center is open Monday through Friday from 7AM to 7PM.      A registered nurse may call you a few days after your surgery to see how you are doing after your procedure.    You may also receive a survey in the mail within the next two weeks and we ask that you take a few moments to complete the survey and return it to us.  Our goal is to provide you with very good care and we value your comments.     Depression / Suicide Risk    As you are discharged from this [facility] facility, it is important to learn how to keep safe from harming yourself.    Recognize the warning signs:  Abrupt changes in personality, positive or negative- including increase in energy   Giving away possessions  Change in eating patterns- significant weight changes-  positive or negative  Change in sleeping patterns- unable to sleep or sleeping all the time   Unwillingness or inability to communicate  Depression  Unusual sadness, discouragement and loneliness  Talk of wanting to die  Neglect of personal appearance   Rebelliousness- reckless behavior  Withdrawal from people/activities they love  Confusion- inability to concentrate     If you or a loved one observes any of these behaviors or has concerns about self-harm, here's what you can do:  Talk about it- your feelings and reasons for harming yourself  Remove any means that you might use to hurt yourself (examples: pills, rope, extension cords, firearm)  Get professional help from the community (Mental Health, Substance Abuse, psychological counseling)  Do not be alone:Call your Safe Contact- someone whom you trust who will be there for you.  Call your local CRISIS HOTLINE 433-8546 or 111-033-6854  Call your local Children's Mobile Crisis Response Team [place] (353) 975-2383 or  [email or web address]  Call the toll free National Suicide Prevention Hotlines   National Suicide Prevention Lifeline 259-839-IVEP (1818)  National [place] Line Network 800-SUICIDE (709-6842)    I acknowledge receipt and understanding of these Home Care instructions.

## 2023-08-09 NOTE — OR NURSING
Assumed care in pre- op . Patient is alert , oriented x 4 with pacemaker insitu for generator exchange . Wife virginie at bedside.   Surical procedure , allergies , med rec med hx , surgical hx verified and consent form signed by the patient.   0730H FSBS - 154mg  Waiting for mD.

## 2023-08-16 ENCOUNTER — OFFICE VISIT (OUTPATIENT)
Dept: MEDICAL GROUP | Facility: PHYSICIAN GROUP | Age: 78
End: 2023-08-16
Payer: MEDICARE

## 2023-08-16 ENCOUNTER — NON-PROVIDER VISIT (OUTPATIENT)
Dept: CARDIOLOGY | Facility: MEDICAL CENTER | Age: 78
End: 2023-08-16
Attending: INTERNAL MEDICINE
Payer: MEDICARE

## 2023-08-16 ENCOUNTER — NON-PROVIDER VISIT (OUTPATIENT)
Dept: CARDIOLOGY | Facility: MEDICAL CENTER | Age: 78
End: 2023-08-16

## 2023-08-16 VITALS
SYSTOLIC BLOOD PRESSURE: 104 MMHG | BODY MASS INDEX: 33.27 KG/M2 | HEIGHT: 67 IN | RESPIRATION RATE: 20 BRPM | OXYGEN SATURATION: 95 % | DIASTOLIC BLOOD PRESSURE: 58 MMHG | TEMPERATURE: 97.9 F | HEART RATE: 92 BPM | WEIGHT: 212 LBS

## 2023-08-16 DIAGNOSIS — R53.1 GENERALIZED WEAKNESS: ICD-10-CM

## 2023-08-16 DIAGNOSIS — C61 MALIGNANT NEOPLASM OF PROSTATE (HCC): ICD-10-CM

## 2023-08-16 DIAGNOSIS — I49.5 SICK SINUS SYNDROME (HCC): ICD-10-CM

## 2023-08-16 DIAGNOSIS — Z91.81 HISTORY OF FALLING: ICD-10-CM

## 2023-08-16 DIAGNOSIS — Z79.4 TYPE 2 DIABETES MELLITUS WITH OTHER SPECIFIED COMPLICATION, WITH LONG-TERM CURRENT USE OF INSULIN (HCC): ICD-10-CM

## 2023-08-16 DIAGNOSIS — E11.69 TYPE 2 DIABETES MELLITUS WITH OTHER SPECIFIED COMPLICATION, WITH LONG-TERM CURRENT USE OF INSULIN (HCC): ICD-10-CM

## 2023-08-16 DIAGNOSIS — Z95.0 PACEMAKER: ICD-10-CM

## 2023-08-16 PROBLEM — N40.1 LOWER URINARY TRACT SYMPTOMS DUE TO BENIGN PROSTATIC HYPERPLASIA: Status: ACTIVE | Noted: 2022-04-10

## 2023-08-16 PROBLEM — R39.15 URINARY URGENCY: Status: ACTIVE | Noted: 2022-04-10

## 2023-08-16 PROCEDURE — 99214 OFFICE O/P EST MOD 30 MIN: CPT | Performed by: STUDENT IN AN ORGANIZED HEALTH CARE EDUCATION/TRAINING PROGRAM

## 2023-08-16 PROCEDURE — 3078F DIAST BP <80 MM HG: CPT | Performed by: STUDENT IN AN ORGANIZED HEALTH CARE EDUCATION/TRAINING PROGRAM

## 2023-08-16 PROCEDURE — 3074F SYST BP LT 130 MM HG: CPT | Performed by: STUDENT IN AN ORGANIZED HEALTH CARE EDUCATION/TRAINING PROGRAM

## 2023-08-16 PROCEDURE — 93280 PM DEVICE PROGR EVAL DUAL: CPT | Performed by: INTERNAL MEDICINE

## 2023-08-16 ASSESSMENT — FIBROSIS 4 INDEX: FIB4 SCORE: 1.7

## 2023-08-16 NOTE — PROGRESS NOTES
Wound site is healing well.  Patient advised to watch for increased redness, swelling, oozing or fever--verbalized understanding.     no

## 2023-08-16 NOTE — PATIENT INSTRUCTIONS
See if [facility] will get the 3mg dose of trulicity  Ask about the MRI  If you want physical therapy let me know    Long-acting insulin:   Adjust dose according to FASTING BLOOD GLUCOSE target  mg/dL  (1) Increase the insulin dose every 3-4 days as needed.   (2) Increase by 2 units if FBG average concentration is 131-170 mg/dL.   (3) Increase by 4 units if FBG average concentration is 171-210 mg/dL.   (4) Increase by 6 units if FBG average concentration is 221-260 mg/dL.   (5) Increase by 8 units if FBG average concentration is greater than 261mg/dL and call us.      Consider cutting back by 1-2 units to previous dose if glucose concentration is below 80 mg/dL or symptoms of hypoglycemia.     fasting labs due 11/8 or after    I will be transferring to the clinic below 10/10/2023.  [facility] [place] [facility]  [address] [place] [address]     I will be happy to continue to be your primary care provider if you'd like to follow me. If not please call [phone] to establish care with new provider

## 2023-08-16 NOTE — PROGRESS NOTES
"Subjective:     Chief Complaint   Patient presents with    Follow-Up     HPI:   Gerry presents today for follow-up.    Patient reports he has had follow-up with cardiology to evaluate the etiology of his exertional shortness of breath and leg weakness. He has a CT coming up to evaluate need for TAVR.    Patient states that he had a fall in June and since then has been using a walker.  He also fell last week when he was not using the walker so is trying to be better about using it at all times.  He does have chronic stable back pain and is followed by urology for urinary incontinence with studies pending for his bladder.    [facility] and [facility].  Had his pacemaker exchange last week and was told that it may be MRI safe but patient is not sure.    Patient also reports that he recently saw [facility] disability evaluator who did a neurologic exam of the lower extremities and that was normal aside from the weakness he has been experiencing.  He states that he [facility] provider about [facility] and he said there is nothing to be done until his heart was taking care of.    He saw a provider in the endocrinology department and recently increased his insulin to 50 units daily.  Continues with his other medication as prescribed prior, taking 1.5 mg weekly Trulicity.    Review of Systems   Constitutional:  Positive for malaise/fatigue. Negative for chills, fever and weight loss.   Respiratory:  Positive for shortness of breath. Negative for cough.    Cardiovascular:  Negative for chest pain, palpitations and leg swelling.   Gastrointestinal:  Negative for abdominal pain, diarrhea, nausea and vomiting.   Musculoskeletal:  Positive for back pain.   Neurological:  Positive for weakness.     Objective:     Exam:  /58 (BP Location: Right arm, Patient Position: Sitting, BP Cuff Size: Adult)   Pulse 92   Temp 36.6 °C (97.9 °F) (Temporal)   Resp 20   Ht 1.702 m (5' 7\")  "  Wt 96.2 kg (212 lb)   SpO2 95%   BMI 33.20 kg/m²  Body mass index is 33.2 kg/m².    Physical Exam  Vitals reviewed.   Constitutional:       General: He is not in acute distress.     Appearance: Normal appearance. He is obese. He is not ill-appearing.   HENT:      Head: Normocephalic and atraumatic.      Nose: Nose normal.      Mouth/Throat:      Mouth: Mucous membranes are moist.   Cardiovascular:      Rate and Rhythm: Normal rate and regular rhythm.      Heart sounds: Murmur heard.      Systolic murmur is present.   Pulmonary:      Effort: Pulmonary effort is normal. No respiratory distress.      Breath sounds: Normal breath sounds. No wheezing, rhonchi or rales.   Musculoskeletal:      Cervical back: Normal range of motion and neck supple.      Right lower leg: No edema.      Left lower leg: No edema.   Neurological:      Mental Status: He is alert and oriented to person, place, and time.      Motor: Weakness (Generalized nonfocal bilateral lower extremity weakness) present.      Gait: Gait abnormal (Requires significant effort to go from sitting to standing and use of a walker, requires assistance in getting on the exam table.).      Deep Tendon Reflexes: Reflexes normal.   Psychiatric:         Mood and Affect: Mood normal.         Behavior: Behavior normal.         Thought Content: Thought content normal.       Labs: Reviewed from 10/24/2022, 8/7/2023, 8/8/2023    Assessment & Plan:     78 y.o. male with the following -     1. Type 2 diabetes mellitus with other specified complication, with long-term current use of insulin (HCC)  This is a chronic condition, improved from prior but still not at goal.  Patient continues care with endocrinology.  Discussed that I am happy to put in a refill of the 3 mg Trulicity should [facility] now have it in stock so we could consider reducing his insulin requirement.  Patient declines for now but will look into it and get back to me.  Continue medications as below.   "Due for annual lab work in October. A1C 3m after last.  - Comp Metabolic Panel; Future  - Lipid Profile; Future  - CBC WITH DIFFERENTIAL; Future  - MICROALBUMIN CREAT RATIO URINE; Future  - HEMOGLOBIN A1C; Future    insulin glargine (LANTUS SOLOSTAR) 100 UNIT/ML Solution Pen-injector injection, Inject 50 Units under the skin every evening., Disp: 45 mL, Rfl: 3    Dulaglutide (TRULICITY) 1.5 MG/0.5ML Solution Pen-injector, Inject 0.5 mL under the skin every 7 days., Disp: 6 mL, Rfl: 3    Insulin Pen Needle (PEN NEEDLES 31GX5/16\") 31G X 8 MM Misc, 1 Each every day., Disp: 100 Each, Rfl: 3    Empagliflozin-metFORMIN HCl ER (SYNJARDY XR) 12.5-1000 MG TABLET SR 24 HR, Take 2 Tablets by mouth every day. (Takes 1 BID)    2. Generalized weakness  3. History of falling  Chronic, being evaluated by cardiology to determine need for TAVR.  Continue care with cardiology.  Offered physical therapy referral to work on strength and balance, patient declines at this time and will continue use of walker with strict fall precautions.    4. Prostate cancer (HCC)  This is a chronic condition, followed by urology.  Patient reports some upcoming studies due to changes in urinary habits.  We discussed that given his chronic back pain if there are no findings that would explain these new symptoms we should consider an MRI of his back-patient will ask his cardiologist regarding the pacemaker and whether it can be MRI safe.    I spent a total of 32 minutes with record review, exam, communication with the patient, and documentation of this encounter.    Return in about 14 weeks (around 11/22/2023), or if symptoms worsen or fail to improve.    Please note that this dictation was created using voice recognition software. I have made every reasonable attempt to correct obvious errors, but I expect that there are errors of grammar and possibly content that I did not discover before finalizing the note.        "

## 2023-08-17 ASSESSMENT — ENCOUNTER SYMPTOMS
VOMITING: 0
WEAKNESS: 1
CHILLS: 0
WEIGHT LOSS: 0
COUGH: 0
NAUSEA: 0
PALPITATIONS: 0
DIARRHEA: 0
SHORTNESS OF BREATH: 1
FEVER: 0
ABDOMINAL PAIN: 0
BACK PAIN: 1

## 2023-08-18 NOTE — CARDIAC REMOTE MONITOR - SCAN
Device transmission reviewed. Device demonstrated appropriate function.       Electronically Signed by: Guille Rivas M.D.    8/22/2023  1:59 PM

## 2023-08-22 PROCEDURE — 93280 PM DEVICE PROGR EVAL DUAL: CPT | Mod: 26 | Performed by: INTERNAL MEDICINE

## 2023-08-31 DIAGNOSIS — I25.84 CORONARY ARTERY DISEASE DUE TO CALCIFIED CORONARY LESION: ICD-10-CM

## 2023-08-31 DIAGNOSIS — I25.10 CORONARY ARTERY DISEASE DUE TO CALCIFIED CORONARY LESION: ICD-10-CM

## 2023-09-10 NOTE — PROGRESS NOTES
REFERRING PHYSICIAN: Puma Hsu MD.     CONSULTING PHYSICIAN: Serina García MD     CHIEF COMPLAINT: Shortness of breath    HISTORY OF PRESENT ILLNESS: The patient is a 78 y.o. male with a past medical history of prostate CA, dual chamber PPM for SSS with recent generator change, CAD s/p stent to RCA in 2021, BBB, aortic stenosis, LYNDA not using CPAP, DM insulin dependent, previous tobacco use, HLD, HTN who presents to the clinic today with worsening shortness of breath over the last six month.  He has associated fatigue, weakness in his legs, and episodes of dizziness.  He was seen by his cardiologist and underwent echocardiogram and left heart catheterization which shows progression to severe aortic stenosis.  He denies chest pain.  He is accompanied by his wife for the entire consultation.      PAST MEDICAL HISTORY:   Active Ambulatory Problems     Diagnosis Date Noted    Back pain, chronic 04/10/2012    Neck pain 04/10/2012    Hyperlipidemia associated with type 2 diabetes mellitus (HCC) 09/28/2012    RBBB 09/28/2012    Pacemaker 10/04/2012    Degeneration of lumbar or lumbosacral intervertebral disc 11/20/2012    DJD (degenerative joint disease) of cervical spine 02/27/2013    Coronary artery disease due to calcified coronary lesion 08/16/2013    Subclinical hypothyroidism 08/21/2014    Class 2 severe obesity with serious comorbidity and body mass index (BMI) of 35.0 to 35.9 in adult ([facility]) 08/21/2014    Depression with anxiety 03/28/2016    Obstructive sleep apnea on CPAP 03/28/2016    Hypertensive retinopathy of both eyes 06/02/2016    Cervical spondylosis 10/19/2016    Elevated PSA 01/23/2018    Prostate cancer ([facility]) 08/09/2018    Benign prostatic hyperplasia with urinary obstruction 12/22/2019    Severe aortic stenosis 06/03/2020    Long-term insulin use ([facility]) 07/01/2020    Former smoker 11/02/2021    History of bilateral knee replacement 11/02/2021    History of syncope 03/09/2022    Numbness and  tingling in both hands 08/19/2022    Cyst of left kidney 09/13/2022    S/P insertion of spinal cord stimulator 12/14/2022    History of laminectomy 12/14/2022    Microalbuminuria 03/15/2023    Pulmonary nodule 04/05/2023    Lower urinary tract symptoms due to benign prostatic hyperplasia 04/10/2022    Type 2 diabetes mellitus with other specified complication ([facility]) 08/16/2023    History of falling 08/16/2023    Generalized weakness 08/16/2023    Status post primary angioplasty with coronary stent 09/13/2023    Enlarged thoracic aorta (HCC) 09/13/2023    Obesity (BMI 30-39.9) 09/13/2023     Resolved Ambulatory Problems     Diagnosis Date Noted    Hypertension associated with type 2 diabetes mellitus ([facility]) 04/10/2012    Insomnia 04/10/2012    Episodic lightheadedness 09/28/2012    Sick sinus syndrome ([facility]) 10/04/2012    Incisional infection 04/10/2013    Fatigue 09/10/2013    Shortness of breath 09/10/2013    Other dyspnea and respiratory abnormality 10/17/2013    History of diverticulitis 11/13/2013    Type 2 diabetes mellitus, uncontrolled 08/21/2014    Altered level of consciousness: March 2017 06/20/2017    Bilateral primary osteoarthritis of knee 01/23/2018    Bilateral lower extremity edema 12/04/2018    Syncope 03/04/2022    Neutropenia ([facility]) 03/04/2022    Syncope and collapse 03/04/2022    Unintentional weight loss 03/05/2022    Pancytopenia ([facility]) 03/09/2022    Hypokalemia 03/09/2022    Constipation 03/09/2022    Black stools 03/09/2022    Dyslipidemia 04/06/2023    Urinary urgency 04/10/2022     Past Medical History:   Diagnosis Date    Anxiety     Aortic stenosis 03/2022    Blood clotting disorder ([facility]) 1978    Breath shortness     CAD (coronary artery disease) 11/2021    Cancer ([facility]) ? early 90's    Dental disorder     Diabetes     High cholesterol     Hyperlipidemia     Hypersensitive carotid sinus syndrome      Hypertension     Psychiatric problem     RBBB      S/P lumbar discectomy      Sleep apnea      Thyroid disease     Unspecified hemorrhagic conditions        PAST SURGICAL HISTORY:   Past Surgical History:   Procedure Laterality Date    KNEE ARTHROPLASTY TOTAL Right 2018    NV DSTR NROLYTC AGNT PARVERTEB FCT SNGL CRVCL/THORA Right 10/19/2016    Procedure: NEURO DEST FACET C/T W/IG SNGL - 3ON-C3, C8-T1    SINERGY;  Surgeon: Gaurang Pruett M.D.;  Location: [facility];  Service: Pain Management    NV DSTR NROLYTC AGNT PARVERTEB FCT ADDL CRVCL/THORA  10/19/2016    Procedure: NEURO DEST FACET C/T W/IG ADDL;  Surgeon: Gaurang Pruett M.D.;  Location: SURGERY [facility];  Service: Pain Management    NV FLUOROSCOPIC GUIDANCE NEEDLE PLACEMENT  10/19/2016    Procedure: FLOURO GUIDE NEEDLE PLACEMENT;  Surgeon: Gaurang Pruett M.D.;  Location: [facility];  Service: Pain Management    SHOULDER ARTHROSCOPY Right 01/01/2015    torn bicep tendon and spurs    RECOVERY  10/17/2013    Performed by Cath-Recovery Surgery at SURGERY SAME DAY [facility]    WOUND DEHISCENCE  04/10/2013    Performed by Tu Jain M.D. at SURGERY [facility] ORS    CERVICAL FUSION POSTERIOR  02/27/2013    Performed by Tu Jain M.D. at SURGERY [facility] ORS    CERVICAL LAMINECTOMY POSTERIOR  02/27/2013    Performed by Tu Jain M.D. at SURGERY [facility] ORS    LUMBAR LAMINECTOMY DISKECTOMY  11/20/2012    Performed by Tu Jain M.D. at SURGERY [facility] ORS    RECOVERY  10/04/2012    Performed by Cath-Recovery Surgery at SURGERY SAME DAY [facility]    PACEMAKER INSERTION  10/01/2012    [facility] [employer]  2110 implanted by Dr. Waite.    SHOULDER DECOMPRESSION Left 01/01/2008    Left rotator cuff    ARTHROPLASTY Left 01/01/2004    Knee    ARTHROSCOPY, KNEE Bilateral 01/01/1978    ORTHOPEDIC OSTEOTOMY      Both knees several times, 8 total    OTHER      SPINAL CORD STIMULATOR  1 month ago    TONSILLECTOMY      VASECTOMY          ALLERGIES:   Allergies   Allergen Reactions    Aleve Cold &  "[Pseudoephedrine-Naproxen Na] Anaphylaxis    Ceftriaxone Sodium Anaphylaxis     Reaction; 1970's. Patient has tolerated cefazolin.    Naproxen Anaphylaxis     Reaction; 2004.    Latex Rash     Contact site    Tape Rash and Itching     Plastic tape (paper tape ok)        CURRENT MEDICATIONS:   Current Outpatient Medications:     Cholecalciferol (VITAMIN D) 2000 UNIT Tab, Take 2,000 Units by mouth every day., Disp: , Rfl:     Ferrous Sulfate (IRON) 325 (65 Fe) MG Tab, Take  by mouth., Disp: , Rfl:     ascorbic acid (ASCORBIC ACID) 500 MG Tab, Take 500 mg by mouth every day., Disp: , Rfl:     insulin glargine (LANTUS SOLOSTAR) 100 UNIT/ML Solution Pen-injector injection, Inject 50 Units under the skin every evening., Disp: 45 mL, Rfl: 3    Dulaglutide (TRULICITY) 1.5 MG/0.5ML Solution Pen-injector, Inject 0.5 mL under the skin every 7 days., Disp: 6 mL, Rfl: 3    Insulin Pen Needle (PEN NEEDLES 31GX5/16\") 31G X 8 MM Misc, 1 Each every day., Disp: 100 Each, Rfl: 3    lisinopril (PRINIVIL) 5 MG Tab, Take 1 Tablet by mouth every day. (Patient taking differently: Take 5 mg by mouth every evening.), Disp: 90 Tablet, Rfl: 3    Empagliflozin-metFORMIN HCl ER (SYNJARDY XR) 12.5-1000 MG TABLET SR 24 HR, Take 2 Tablets by mouth every day. (Patient taking differently: Take 1 Tablet by mouth 2 times a day.), Disp: 180 Tablet, Rfl: 3    levothyroxine (SYNTHROID) 50 MCG Tab, Take 1 Tablet by mouth every morning on an empty stomach., Disp: 90 Tablet, Rfl: 2    rosuvastatin (CRESTOR) 40 MG tablet, Take 1 Tablet by mouth every day. (Patient taking differently: Take 40 mg by mouth every evening.), Disp: 90 Tablet, Rfl: 3    FREESTYLE LITE strip, USE 1 STRIP TWICE A DAY, Disp: 200 Strip, Rfl: 3    Lancets MISC, Lancets order: Lancets for Abbott Freestyle Lite meter. Sig: use 4 times daily  and prn ssx high or low sugar. #100 RF x 0, Disp: 360 Each, Rfl: 4    Blood Glucose Monitoring Suppl [place], Meter: Dispense Abbott Freestyle Lite " "meter. Sig. Use as directed for blood sugar monitoring. #1. NR., Disp: 1 Device, Rfl: 0    aspirin EC (ECOTRIN) 81 MG TBEC, Take 81 mg by mouth every evening., Disp: , Rfl:     FAMILY HISTORY:   Family History   Problem Relation Age of Onset    Lung Disease Mother         Smoker ( of lung dz)    Alcohol/Drug Mother     Heart Disease Father 36        CAD    Heart Disease Sister         \"hole in heart\"    Lung Disease Brother         COPD, CANCER    Cancer Brother         Prostate, Lung ( of lung CA)    Alcohol/Drug Brother     Heart Disease Brother     Diabetes Brother     Hypertension Brother     Hyperlipidemia Brother     Heart Disease Maternal Grandmother     Hypertension Maternal Grandmother     Hyperlipidemia Maternal Grandmother     Cancer Brother          of esophagus/stomach, thyroid    Heart Disease Brother 60        MI, stent, PM/defib    Sleep Apnea Brother     No Known Problems Maternal Grandfather     No Known Problems Paternal Grandmother         SOCIAL HISTORY:   Social History     Socioeconomic History    Marital status:      Spouse name: Not on file    Number of children: Not on file    Years of education: Not on file    Highest education level: 12th grade   Occupational History    Not on file   Tobacco Use    Smoking status: Former     Current packs/day: 0.00     Average packs/day: 1 pack/day for 40.0 years (40.0 ttl pk-yrs)     Types: Cigarettes     Start date: 1/3/1950     Quit date: 1/3/1990     Years since quittin.7    Smokeless tobacco: Never   Vaping Use    Vaping Use: Never used   Substance and Sexual Activity    Alcohol use: No     Alcohol/week: 0.0 oz    Drug use: No    Sexual activity: Yes     Partners: Female   Other Topics Concern    Not on file   Social History Narrative    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      Social Determinants of Health     Financial Resource Strain: Low Risk  (2022)    Overall Financial Resource Strain (CARDIA)     Difficulty of " Paying Living Expenses: Not hard at all   Food Insecurity: No Food Insecurity (12/11/2022)    Hunger Vital Sign     Worried About Running Out of Food in the Last Year: Never true     Ran Out of Food in the Last Year: Never true   Transportation Needs: No Transportation Needs (12/11/2022)    PRAPARE - Transportation     Lack of Transportation (Medical): No     Lack of Transportation (Non-Medical): No   Physical Activity: Inactive (12/11/2022)    Exercise Vital Sign     Days of Exercise per Week: 0 days     Minutes of Exercise per Session: 0 min   Stress: No Stress Concern Present (12/11/2022)    [place] [employer] - Occupational Stress Questionnaire     Feeling of Stress : Not at all   Social Connections: Unknown (12/11/2022)    Social Connection and Isolation Panel [NHANES]     Frequency of Communication with Friends and Family: More than three times a week     Frequency of Social Gatherings with Friends and Family: Twice a week     Attends Baptism Services: More than 4 times per year     Active Member of Clubs or Organizations: Patient refused     Attends Club or Organization Meetings: Patient refused     Marital Status:    Intimate Partner Violence: Not on file   Housing Stability: Low Risk  (12/11/2022)    Housing Stability Vital Sign     Unable to Pay for Housing in the Last Year: No     Number of Places Lived in the Last Year: 1     Unstable Housing in the Last Year: No       REVIEW OF SYSTEMS:  Review of Systems   Constitutional:  Positive for malaise/fatigue. Negative for chills, fever and weight loss.   HENT:  Negative for ear pain, nosebleeds and tinnitus.    Eyes:  Negative for double vision, photophobia and pain.   Respiratory:  Positive for shortness of breath. Negative for cough and hemoptysis.    Cardiovascular:  Negative for chest pain, palpitations, orthopnea, leg swelling and PND.   Gastrointestinal:  Negative for abdominal pain, blood in stool, nausea and vomiting.  "  Genitourinary:  Negative for frequency, hematuria and urgency.   Musculoskeletal: Negative.    Skin:  Negative for rash.   Neurological:  Positive for dizziness. Negative for tremors, speech change, focal weakness, seizures and headaches.   Endo/Heme/Allergies:  Negative for polydipsia. Does not bruise/bleed easily.   Psychiatric/Behavioral:  Negative for hallucinations and memory loss.          PHYSICAL EXAMINATION:    /58 (BP Location: Left arm, Patient Position: Sitting, BP Cuff Size: Adult)   Pulse 94   Temp 36.8 °C (98.2 °F) (Temporal)   Ht 1.702 m (5' 7\")   Wt 98 kg (216 lb)   SpO2 95%   BMI 33.83 kg/m²      Physical Exam  Vitals reviewed.   Constitutional:       General: He is not in acute distress.     Appearance: Normal appearance.   HENT:      Head: Normocephalic and atraumatic.      Right Ear: External ear normal.      Left Ear: External ear normal.      Nose: Nose normal. No congestion.   Eyes:      General: No scleral icterus.     Extraocular Movements: Extraocular movements intact.      Conjunctiva/sclera: Conjunctivae normal.   Cardiovascular:      Rate and Rhythm: Normal rate and regular rhythm.   Pulmonary:      Effort: Pulmonary effort is normal. No respiratory distress.   Abdominal:      General: There is no distension.      Palpations: Abdomen is soft.   Musculoskeletal:         General: Normal range of motion.      Cervical back: Normal range of motion.      Comments: Ambulating with walker   Skin:     General: Skin is warm and dry.      Coloration: Skin is not jaundiced.      Findings: No rash.   Neurological:      Mental Status: He is alert and oriented to person, place, and time.      Cranial Nerves: No cranial nerve deficit.   Psychiatric:         Mood and Affect: Mood normal.         Behavior: Behavior normal.             LABS REVIEWED:  Lab Results   Component Value Date/Time    SODIUM 138 08/07/2023 09:50 AM    POTASSIUM 4.5 08/07/2023 09:50 AM    CHLORIDE 103 08/07/2023 " 09:50 AM    CO2 24 08/07/2023 09:50 AM    GLUCOSE 199 (H) 08/07/2023 09:50 AM    BUN 23 (H) 08/07/2023 09:50 AM    CREATININE 1.20 08/07/2023 09:50 AM      Lab Results   Component Value Date/Time    PROTHROMBTM 13.2 08/07/2023 09:50 AM    INR 1.01 08/07/2023 09:50 AM      Lab Results   Component Value Date/Time    WBC 7.3 08/07/2023 09:50 AM    RBC 4.22 (L) 08/07/2023 09:50 AM    HEMOGLOBIN 13.7 (L) 08/07/2023 09:50 AM    HEMATOCRIT 38.6 (L) 08/07/2023 09:50 AM    MCV 91.5 08/07/2023 09:50 AM    MCH 32.5 08/07/2023 09:50 AM    MCHC 35.5 08/07/2023 09:50 AM    MPV 10.7 08/07/2023 09:50 AM    NEUTSPOLYS 57.90 07/25/2022 07:31 AM    LYMPHOCYTES 27.10 07/25/2022 07:31 AM    MONOCYTES 10.00 07/25/2022 07:31 AM    EOSINOPHILS 4.00 07/25/2022 07:31 AM    BASOPHILS 0.80 07/25/2022 07:31 AM        IMAGING REVIEWED AND INTERPRETED:    ECHOCARDIOGRAM 1/23/23 [facility]  Compared to the prior study 03/05/2022 - AS remains moderate.  Normal left ventricular chamber size.  The left ventricular ejection fraction is visually estimated to be 75%.  Normal inferior vena cava size and inspiratory collapse.  Moderate aortic valve stenosis. Vmax is 3.2 m/s.    CARDIAC CATHETERIZATION 7/11/23 [facility]  Hemodynamics:   Aorta: 82/43 mmHg  LVEDP: 19 mmHg  RA: 11 mmHg  RV: 29/13 mmHg  PA: 29/17/21 mmHg  PCWP: 16 mmHg  Cardiac Output/Index (thermodilution): 4.9 L/min, 2.3 L/min/m2  Cardiac Output/Index (Luis): 4.6 L/min, 2.2 L/min/m2  PA Saturation: 67%  Arterial saturation: 96%  GUY: 0.80-1.0 cm2  Aortic valve mean gradient: 23-32 mmHg by multiple readings     Coronary Anatomy              Left Main:  Normal, short              LAD: Minimal luminal irregularities              LCx: Co-dominant, Minimal luminal irregularities              RCA: Co-dominant, high anterior takeoff.  Patent stents throughout the midportion of the vessel     Supravalvular aortogram:  Enlarged ascending aorta - estimated 4.0-4.5 cm. No significant AI      Abdominal aorta/iliac  angiogram:  Widely patent bilateral iliofemoral system    IMPRESSIONS:  1.  Nonobstructive three-vessel coronary artery disease  2.  Severe aortic stenosis-D3  3.  Nonobstructive three-vessel coronary artery disease with patent RCA stents  4.  Widely patent bilateral iliofemoral system.  5.  Enlarged ascending aorta     Recommendations:  Aortic valve calcium scoring , TAVR CTA    CT SCAN CHEST Pending      IMPRESSION:  77 yo gentleman with known conditions dual chamber PPM for SSS with recent generator change, CAD s/p stent to RCA in 2021, BBB, aortic stenosis, LYNDA not using CPAP, DM insulin dependent, previous tobacco use, HLD, HTN now with severe symptomatic aortic stenosis.      PLAN:    I recommend that the patient is a reasonable TAVR candidate given age, preference and comorbidities. We will continue to follow workup and discuss in multidisciplinary conference.    The STS mortality risk score is 2.4% and the morbidity and mortality risk score is 11%. The scores were discussed with patient.    Thank you for this very challenging consultation and participation in the patient’s care.  I will keep you apprised of all future developments.  Sincerely,     Serina García MD

## 2023-09-13 ENCOUNTER — DOCUMENTATION (OUTPATIENT)
Dept: CARDIOLOGY | Facility: MEDICAL CENTER | Age: 78
End: 2023-09-13

## 2023-09-13 ENCOUNTER — NON-PROVIDER VISIT (OUTPATIENT)
Dept: CARDIOLOGY | Facility: MEDICAL CENTER | Age: 78
End: 2023-09-13

## 2023-09-13 ENCOUNTER — OFFICE VISIT (OUTPATIENT)
Dept: CARDIOTHORACIC SURGERY | Facility: MEDICAL CENTER | Age: 78
End: 2023-09-13
Payer: MEDICARE

## 2023-09-13 ENCOUNTER — HOSPITAL ENCOUNTER (OUTPATIENT)
Dept: RADIOLOGY | Facility: MEDICAL CENTER | Age: 78
End: 2023-09-13
Payer: MEDICARE

## 2023-09-13 ENCOUNTER — OFFICE VISIT (OUTPATIENT)
Dept: CARDIOLOGY | Facility: MEDICAL CENTER | Age: 78
End: 2023-09-13
Attending: INTERNAL MEDICINE
Payer: MEDICARE

## 2023-09-13 ENCOUNTER — NON-PROVIDER VISIT (OUTPATIENT)
Dept: CARDIOLOGY | Facility: MEDICAL CENTER | Age: 78
End: 2023-09-13
Payer: MEDICARE

## 2023-09-13 VITALS
RESPIRATION RATE: 16 BRPM | HEART RATE: 102 BPM | SYSTOLIC BLOOD PRESSURE: 92 MMHG | WEIGHT: 216 LBS | HEIGHT: 67 IN | OXYGEN SATURATION: 94 % | DIASTOLIC BLOOD PRESSURE: 50 MMHG | BODY MASS INDEX: 33.9 KG/M2

## 2023-09-13 VITALS
DIASTOLIC BLOOD PRESSURE: 58 MMHG | WEIGHT: 216 LBS | SYSTOLIC BLOOD PRESSURE: 110 MMHG | TEMPERATURE: 98.2 F | BODY MASS INDEX: 33.9 KG/M2 | OXYGEN SATURATION: 95 % | HEIGHT: 67 IN | HEART RATE: 94 BPM

## 2023-09-13 DIAGNOSIS — Z95.5 STATUS POST PRIMARY ANGIOPLASTY WITH CORONARY STENT: ICD-10-CM

## 2023-09-13 DIAGNOSIS — Z95.0 PACEMAKER: ICD-10-CM

## 2023-09-13 DIAGNOSIS — Z79.4 TYPE 2 DIABETES MELLITUS WITH OTHER SPECIFIED COMPLICATION, WITH LONG-TERM CURRENT USE OF INSULIN (HCC): ICD-10-CM

## 2023-09-13 DIAGNOSIS — I35.0 NONRHEUMATIC AORTIC (VALVE) STENOSIS: ICD-10-CM

## 2023-09-13 DIAGNOSIS — Z00.6 EXAMINATION OF PARTICIPANT IN CLINICAL TRIAL: ICD-10-CM

## 2023-09-13 DIAGNOSIS — I35.0 SEVERE AORTIC STENOSIS: ICD-10-CM

## 2023-09-13 DIAGNOSIS — I25.84 CORONARY ARTERY DISEASE DUE TO CALCIFIED CORONARY LESION: ICD-10-CM

## 2023-09-13 DIAGNOSIS — I45.10 RIGHT BUNDLE BRANCH BLOCK: ICD-10-CM

## 2023-09-13 DIAGNOSIS — E11.69 TYPE 2 DIABETES MELLITUS WITH OTHER SPECIFIED COMPLICATION, WITH LONG-TERM CURRENT USE OF INSULIN (HCC): ICD-10-CM

## 2023-09-13 DIAGNOSIS — E66.09 CLASS 1 OBESITY DUE TO EXCESS CALORIES WITHOUT SERIOUS COMORBIDITY WITH BODY MASS INDEX (BMI) OF 33.0 TO 33.9 IN ADULT: ICD-10-CM

## 2023-09-13 DIAGNOSIS — E66.01 CLASS 2 SEVERE OBESITY DUE TO EXCESS CALORIES WITH SERIOUS COMORBIDITY AND BODY MASS INDEX (BMI) OF 35.0 TO 35.9 IN ADULT (HCC): ICD-10-CM

## 2023-09-13 DIAGNOSIS — Z01.810 PRE-PROCEDURAL CARDIOVASCULAR EXAMINATION: ICD-10-CM

## 2023-09-13 DIAGNOSIS — G47.33 OBSTRUCTIVE SLEEP APNEA ON CPAP: ICD-10-CM

## 2023-09-13 DIAGNOSIS — I25.10 CORONARY ARTERY DISEASE DUE TO CALCIFIED CORONARY LESION: ICD-10-CM

## 2023-09-13 DIAGNOSIS — I49.5 SICK SINUS SYNDROME (HCC): ICD-10-CM

## 2023-09-13 DIAGNOSIS — I77.89 ENLARGED THORACIC AORTA (HCC): ICD-10-CM

## 2023-09-13 DIAGNOSIS — Z79.4 LONG-TERM INSULIN USE (HCC): ICD-10-CM

## 2023-09-13 PROBLEM — E78.5 DYSLIPIDEMIA: Status: RESOLVED | Noted: 2023-04-06 | Resolved: 2023-09-13

## 2023-09-13 PROBLEM — K59.00 CONSTIPATION: Status: RESOLVED | Noted: 2022-03-09 | Resolved: 2023-09-13

## 2023-09-13 PROBLEM — R39.15 URINARY URGENCY: Status: RESOLVED | Noted: 2022-04-10 | Resolved: 2023-09-13

## 2023-09-13 PROBLEM — E66.9 OBESITY (BMI 30-39.9): Status: ACTIVE | Noted: 2023-09-13

## 2023-09-13 PROCEDURE — 93280 PM DEVICE PROGR EVAL DUAL: CPT | Performed by: INTERNAL MEDICINE

## 2023-09-13 PROCEDURE — 3074F SYST BP LT 130 MM HG: CPT | Performed by: INTERNAL MEDICINE

## 2023-09-13 PROCEDURE — 3074F SYST BP LT 130 MM HG: CPT | Performed by: THORACIC SURGERY (CARDIOTHORACIC VASCULAR SURGERY)

## 2023-09-13 PROCEDURE — 3078F DIAST BP <80 MM HG: CPT | Performed by: INTERNAL MEDICINE

## 2023-09-13 PROCEDURE — 99213 OFFICE O/P EST LOW 20 MIN: CPT | Performed by: INTERNAL MEDICINE

## 2023-09-13 PROCEDURE — 99215 OFFICE O/P EST HI 40 MIN: CPT | Performed by: INTERNAL MEDICINE

## 2023-09-13 PROCEDURE — 99205 OFFICE O/P NEW HI 60 MIN: CPT | Performed by: THORACIC SURGERY (CARDIOTHORACIC VASCULAR SURGERY)

## 2023-09-13 PROCEDURE — 3078F DIAST BP <80 MM HG: CPT | Performed by: THORACIC SURGERY (CARDIOTHORACIC VASCULAR SURGERY)

## 2023-09-13 PROCEDURE — 700117 HCHG RX CONTRAST REV CODE 255

## 2023-09-13 PROCEDURE — 71275 CT ANGIOGRAPHY CHEST: CPT

## 2023-09-13 RX ADMIN — IOHEXOL 100 ML: 350 INJECTION, SOLUTION INTRAVENOUS at 12:15

## 2023-09-13 ASSESSMENT — ENCOUNTER SYMPTOMS
HALLUCINATIONS: 0
MUSCULOSKELETAL NEGATIVE: 1
DIZZINESS: 1
BLOOD IN STOOL: 0
PHOTOPHOBIA: 0
DOUBLE VISION: 0
VOMITING: 0
NAUSEA: 0
ABDOMINAL PAIN: 0
WEIGHT LOSS: 0
POLYDIPSIA: 0
SPEECH CHANGE: 0
HEMOPTYSIS: 0
PND: 0
ORTHOPNEA: 0
HEADACHES: 0
SEIZURES: 0
CHILLS: 0
FOCAL WEAKNESS: 0
TREMORS: 0
COUGH: 0
BRUISES/BLEEDS EASILY: 0
SHORTNESS OF BREATH: 1
PALPITATIONS: 0
FEVER: 0
EYE PAIN: 0
MEMORY LOSS: 0

## 2023-09-13 ASSESSMENT — FIBROSIS 4 INDEX
FIB4 SCORE: 1.7
FIB4 SCORE: 1.7

## 2023-09-13 ASSESSMENT — PATIENT HEALTH QUESTIONNAIRE - PHQ9: CLINICAL INTERPRETATION OF PHQ2 SCORE: 0

## 2023-09-13 NOTE — PROGRESS NOTES
"CARDIOLOGY STRUCTURAL HEART FOLLOWUP    PCP: Rylee García D.O.  Primary Cardiologist: Aracelis    1. Severe aortic stenosis    2. RBBB    3. Enlarged thoracic aorta (HCC)    4. Status post primary angioplasty with coronary stent    5. Coronary artery disease due to calcified coronary lesion    6. 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 Class 2 severe obesity due to excess calories with serious comorbidity and body mass index (BMI) of 35.0 to 35.9 in adult (HCC)    8. Long-term insulin use (HCC)    9. Obstructive sleep apnea on CPAP        Dakota Boyer has class III, D3 aortic stenosis.  I advise proceeding with aortic valve replacement -TAVR versus SAVR pending heart team review.     We discussed the risks, benefits and alternatives to TAVR including but not limited to a 10% risk of pacemaker dependence, 5% risk of bleeding/access site complication, 2% risk of stroke, risk of contrast nephropathy and 2% risk of mortality. The patient is in agreement with proceeding.  We did discuss the heightened risk of pacemaker dependence as well as the potential implications given the underlying right bundle branch block.      Follow up: 6 weeks    History: Dakota Boyer is a 78 y.o. male with history of dual-chamber pacer for carotid hypersensitivity, PCI in 2021, normal ejection fraction presenting for follow-up of D3 aortic stenosis.  He is accompanied by his wife.  He continues to feel progressive fatigue, shortness of breath and leg discomfort.  His legs were so fatigued the other day at [employer] he was unable to make it back to his car.  He also cannot mow the entire yard as he did previously.      PE:  BP 92/50 (BP Location: Left arm, Patient Position: Sitting, BP Cuff Size: Adult)   Pulse (!) 102   Resp 16   Ht 1.702 m (5' 7\")   Wt 98 kg (216 lb)   SpO2 94%   BMI 33.83 kg/m²   GEN: NAD  CARDIAC: +LISA late peaking harsh regular no JVP Normal S1 Normal " S2  VASCULATURE: diminished and delayed carotid pulse  RESP: Clear to auscultation bilaterally  ABD: Soft, non-tender, non-distended  EXT: Trace lower extremity edema  NEURO: No focal deficit    Past Medical History:   Diagnosis Date    Anxiety     Aortic stenosis 03/2022    Echocardiogram with normal LV size, mild concentric LVH, LVEF 75%. Normal RA, LA and RV. Trace MR. Moderate AS (peak 37mmHg, mean 24mmHg, Vmax 3.0m/s, GUY 1.1cm2). Trace TR.    Back pain, chronic 10/18/2016    Bilateral primary osteoarthritis of knee 01/23/2018    Black stools 03/09/2022    Blood clotting disorder (HCC) 1978    s/p Left knee surgery- DVT in left leg    Breath shortness     CAD (coronary artery disease) 11/2021    PCI/SWETHA x 2 (Phillips 3.0 x 38mm, Phillips 3.5 x 38mm) to the RCA.    Cancer (HCC) ? early 90's    Melanoma Left arm- surgically removed    Degeneration of lumbar or lumbosacral intervertebral disc      Dental disorder     Diabetes     Oral agents and insulin    DJD (degenerative joint disease) of cervical spine      High cholesterol     Hyperlipidemia     Hypersensitive carotid sinus syndrome      Hypertension     Hypertension associated with type 2 diabetes mellitus (HCC) 04/10/2012    Insomnia      Neck pain     Neutropenia ([facility]) 03/04/2022    Pacemaker 10/2012    [facility] [address] #[id number] implanted by [facility].     Pancytopenia (Formerly [facility]) 03/09/2022    Psychiatric problem     denies    RBBB      S/P lumbar discectomy      Sick sinus syndrome (Former[facility])      Sleep apnea     Uses CPAP    Syncope 10/2012    Treated with PPM    Thyroid disease     Hypothyroid    Unintentional weight loss 03/05/2022    Unspecified hemorrhagic conditions     Reports bleeds easily     Allergies   Allergen Reactions    Aleve Cold & [Pseudoephedrine-Naproxen Na] Anaphylaxis    Ceftriaxone Sodium Anaphylaxis     Reaction; 1970's. Patient has tolerated cefazolin.    Naproxen Anaphylaxis     Reaction; 2004.    Latex Rash     Contact site    Tape Rash and  "Itching     Plastic tape (paper tape ok)     Outpatient Encounter Medications as of 9/13/2023   Medication Sig Dispense Refill    Cholecalciferol (VITAMIN D) 2000 UNIT Tab Take 2,000 Units by mouth every day.      Ferrous Sulfate (IRON) 325 (65 Fe) MG Tab Take  by mouth.      ascorbic acid (ASCORBIC ACID) 500 MG Tab Take 500 mg by mouth every day.      insulin glargine (LANTUS SOLOSTAR) 100 UNIT/ML Solution Pen-injector injection Inject 50 Units under the skin every evening. 45 mL 3    Dulaglutide (TRULICITY) 1.5 MG/0.5ML Solution Pen-injector Inject 0.5 mL under the skin every 7 days. 6 mL 3    Insulin Pen Needle (PEN NEEDLES 31GX5/16\") 31G X 8 MM Misc 1 Each every day. 100 Each 3    lisinopril (PRINIVIL) 5 MG Tab Take 1 Tablet by mouth every day. (Patient taking differently: Take 5 mg by mouth every evening.) 90 Tablet 3    Empagliflozin-metFORMIN HCl ER (SYNJARDY XR) 12.5-1000 MG TABLET SR 24 HR Take 2 Tablets by mouth every day. (Patient taking differently: Take 1 Tablet by mouth 2 times a day.) 180 Tablet 3    levothyroxine (SYNTHROID) 50 MCG Tab Take 1 Tablet by mouth every morning on an empty stomach. 90 Tablet 2    rosuvastatin (CRESTOR) 40 MG tablet Take 1 Tablet by mouth every day. (Patient taking differently: Take 40 mg by mouth every evening.) 90 Tablet 3    FREESTYLE LITE strip USE 1 STRIP TWICE A  Strip 3    Lancets MISC Lancets order: Lancets for Abbott Freestyle Lite meter. Sig: use 4 times daily  and prn ssx high or low sugar. #100 RF x 0 360 Each 4    Blood Glucose Monitoring Suppl [place] Meter: Dispense Abbott Freestyle Lite meter. Sig. Use as directed for blood sugar monitoring. #1. NR. 1 Device 0    aspirin EC (ECOTRIN) 81 MG TBEC Take 81 mg by mouth every evening.       No facility-administered encounter medications on file as of 9/13/2023.     Social History     Socioeconomic History    Marital status:      Spouse name: Not on file    Number of children: Not on file    Years of " education: Not on file    Highest education level: 12th grade   Occupational History    Not on file   Tobacco Use    Smoking status: Former     Current packs/day: 0.00     Average packs/day: 1 pack/day for 40.0 years (40.0 ttl pk-yrs)     Types: Cigarettes     Start date: 1/3/1950     Quit date: 1/3/1990     Years since quittin.7    Smokeless tobacco: Never   Vaping Use    Vaping Use: Never used   Substance and Sexual Activity    Alcohol use: No     Alcohol/week: 0.0 oz    Drug use: No    Sexual activity: Yes     Partners: Female   Other Topics Concern    Not on file   Social History Narrative    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      Social Determinants of Health     Financial Resource Strain: Low Risk  (2022)    Overall Financial Resource Strain (CARDIA)     Difficulty of Paying Living Expenses: Not hard at all   Food Insecurity: No Food Insecurity (2022)    Hunger Vital Sign     Worried About Running Out of Food in the Last Year: Never true     Ran Out of Food in the Last Year: Never true   Transportation Needs: No Transportation Needs (2022)    PRAPARE - Transportation     Lack of Transportation (Medical): No     Lack of Transportation (Non-Medical): No   Physical Activity: Inactive (2022)    Exercise Vital Sign     Days of Exercise per Week: 0 days     Minutes of Exercise per Session: 0 min   Stress: No Stress Concern Present (2022)    [place] [employer] - Occupational Stress Questionnaire     Feeling of Stress : Not at all   Social Connections: Unknown (2022)    Social Connection and Isolation Panel [NHANES]     Frequency of Communication with Friends and Family: More than three times a week     Frequency of Social Gatherings with Friends and Family: Twice a week     Attends Hoahaoism Services: More than 4 times per year     Active Member of Clubs or Organizations: Patient refused     Attends Club or Organization Meetings: Patient refused      Marital Status:    Intimate Partner Violence: Not on file   Housing Stability: Low Risk  (12/11/2022)    Housing Stability Vital Sign     Unable to Pay for Housing in the Last Year: No     Number of Places Lived in the Last Year: 1     Unstable Housing in the Last Year: No       Studies  Lab Results   Component Value Date/Time    CHOLSTRLTOT 81 (L) 10/24/2022 12:22 PM    LDL 27 10/24/2022 12:22 PM    HDL 40 10/24/2022 12:22 PM    TRIGLYCERIDE 71 10/24/2022 12:22 PM       Lab Results   Component Value Date/Time    SODIUM 138 08/07/2023 09:50 AM    POTASSIUM 4.5 08/07/2023 09:50 AM    CHLORIDE 103 08/07/2023 09:50 AM    CO2 24 08/07/2023 09:50 AM    GLUCOSE 199 (H) 08/07/2023 09:50 AM    BUN 23 (H) 08/07/2023 09:50 AM    CREATININE 1.20 08/07/2023 09:50 AM     Lab Results   Component Value Date/Time    ALKPHOSPHAT 67 08/07/2023 09:50 AM    ASTSGOT 14 08/07/2023 09:50 AM    ALTSGPT 14 08/07/2023 09:50 AM    TBILIRUBIN 0.8 08/07/2023 09:50 AM        For this encounter I directly reviewed and interpreted ECG tracings, Echo images, and Device interrogation.  There is severe aortic stenosis, EKG shows right bundle branch block-sinus rhythm and pacer interrogation with minimal pacing    We discussed the natural history of untreated aortic stenosis as well as the life-threatening nature of the condition.  I explained to him that as he is experiencing symptoms attributable to this life-threatening condition that valve replacement is recommended.    No chief complaint on file.    ROS:   10 point review systems is otherwise negative except as per the HPI

## 2023-09-13 NOTE — PROGRESS NOTES
"Valve Program Consultation: 09/13/2023 for tentative TAVR    Mental Status Assessment:  Appearance: normal  Behavior: normal  Mood/Affect: normal  Thought process/content: normal  Cognition: normal  Functional ability: slightly limited, walker use for balance  Dental Concerns: natural teeth, patient denies s/s of active oral infection/irritation  Further mental assessment needed: no    Post-op Plan of Care:  Support systems: Linda (spouse), present at consult today  Patient understands discharge plan: yes  DME: walker, back stimulator, wears glasses  Home health warranted prior to procedure: no  Social concerns for discharge: none  PCP alerted of social concerns: n/a  POLST: none  Advance directive: DPOA-HC on file dated 02/19/2013  Flu/PNA/COVID vaccines completed: Advised patient not to have any new vaccinations 3 days prior to procedure or 1 week post procedure  Post-op goal: \"get back to normal and be more active, have a more normal sleep schedule, have less fatigue, do yard work\"  [place]?: [place] ([facility])  Medication instructions: Hold all vitamins/supplements and Trulicty 7 days prior, hold Synjardy 4 days prior, hold Lisinopril 24 hours prior, take 1/2 dose of insulin the night before the procedure    Concerns prior to procedure: none    Met with patient during New TAVR consult.     All patient's questions and concerns were addressed during this visit. They understood pre-operative and post-operative plan of care.    Reviewed patient TAVR education packet explaining that information is provided regarding preparation for TAVR the night prior, what to expect during the hospital stay, average LOS, and what to look out for post TAVR discharge. Explained that patient will require SBE prophylactic antibiotic prior to any dental treatment post TAVR.     Explained that patient should not eat or drink anything past midnight the day of the procedure. Encouraged patient to wear something clean and " comfortable, easy to get on/off to check in Monday morning, time TBD. Patient may have friends and family in the pre-operational area until patient is taken to the operating room, at which time family and friends will be asked to wait on floor 1 [facility] surgical waiting area. On completion of TAVR, heart team will update family. Once update is given, there is some time before family/friends may visit patient in their assigned room. Length of stay on average is one night, however patient may stay longer depending on specific needs at that time. Patient given printed instructions sheet with all above stated instructions. Patient states understanding of all material and education presented today and has no further questions at this time. Encouraged patient again, to contact me with any questions or concerns during this work-up process. Patient states understanding.

## 2023-09-14 ENCOUNTER — APPOINTMENT (OUTPATIENT)
Dept: ADMISSIONS | Facility: MEDICAL CENTER | Age: 78
DRG: 219 | End: 2023-09-14
Attending: INTERNAL MEDICINE
Payer: MEDICARE

## 2023-09-14 NOTE — CARDIAC REMOTE MONITOR - SCAN
Device transmission reviewed. Device demonstrated appropriate function.       Electronically Signed by: Delfino Salazar M.D.    9/25/2023  10:26 AM

## 2023-09-18 ENCOUNTER — PATIENT MESSAGE (OUTPATIENT)
Dept: CARDIOLOGY | Facility: MEDICAL CENTER | Age: 78
End: 2023-09-18
Payer: MEDICARE

## 2023-09-18 NOTE — PATIENT COMMUNICATION
Called patient to discuss. We figured out he was referring to a possible aneurysm, not an embolism. Advised there was no aneurysm noted on his TAVR CTA but we would review all his imaging at valve conference on Wednesday and update him on the discussion/plan. Patient is appreciative for the phone call and update as he was concerned.

## 2023-09-19 ENCOUNTER — DOCUMENTATION (OUTPATIENT)
Dept: CARDIOLOGY | Facility: MEDICAL CENTER | Age: 78
End: 2023-09-19
Payer: MEDICARE

## 2023-09-19 NOTE — PROGRESS NOTES
Otero TAVR Review:    Consider a 23 S3UR from a right femoral approach.  Note the 8.5mm LCA height (34mm LCC sinus)  R2, Cra6

## 2023-09-19 NOTE — PROGRESS NOTES
Valve Program Functional Assessment:     KCCQ12   1a) Showering/bathin  1b) Walking 1 block on ground: 2  1c) Hurrying or joggin  2) Swellin  3) Fatigue: 2  4) Shortness of breath: 3  5) Sleep sitting up: 5  6) Limited enjoyment of life: 4  7) Spend the rest of your life with HF: 2  8a) Hobbies, recreational activities:3  8b) Working or doing household chores:2  8c) Visiting family or friends: 5    5 meter walk test  1) __10.15____ s/5m  2) __9.79____ s/5m  3) __10.87____ s/5m  AVG:_10.27______     Strength   1) _24_____ kg  2) _24_____ kg  3) _22_____ kg  AVG:__23.3____    ARIAS ADLs  Patient independently preforms...   - Bathing: Yes   - Dressing: Yes   - Toileting: Yes   - Transferring: Yes   - Continence: Yes   - Feeding: Yes   Total Score: _6_/6    Living Situation  Patient lives: with spouse   Mobility Aids   Patient uses: walker    FRAILTY SCORE: _1_/ 4

## 2023-09-20 ENCOUNTER — TELEPHONE (OUTPATIENT)
Dept: CARDIOLOGY | Facility: MEDICAL CENTER | Age: 78
End: 2023-09-20
Payer: MEDICARE

## 2023-09-20 NOTE — TELEPHONE ENCOUNTER
Valve Conference Plan of Care: 9/20/2023 for TAVR 9/25/2023           TAVR Candidate: yes  Access: right femoral  Valve Size: 23mm  General Anesthesia or MAC: GA  Unit: telemetry   Incidental findings to be discussed with PCP: Degenerative change of thoracic spine.  Degenerative change of lumbar spine.   Clearance needed prior to procedure: no    Check in time of 1100 9/25/2023.     Pre-op appointment completed: scheduled 9/21/2023    NPO after midnight. Hold all OTC vitamins/supplements and Trulicity x7 days, hold Synjardy x4 days, hold lisinopril x24hrs, take half dose of insulin the night before the procedure.

## 2023-09-21 ENCOUNTER — PRE-ADMISSION TESTING (OUTPATIENT)
Dept: ADMISSIONS | Facility: MEDICAL CENTER | Age: 78
DRG: 219 | End: 2023-09-21
Attending: INTERNAL MEDICINE
Payer: MEDICARE

## 2023-09-21 ENCOUNTER — TELEPHONE (OUTPATIENT)
Dept: CARDIOLOGY | Facility: MEDICAL CENTER | Age: 78
End: 2023-09-21
Payer: MEDICARE

## 2023-09-21 DIAGNOSIS — I35.0 NONRHEUMATIC AORTIC (VALVE) STENOSIS: ICD-10-CM

## 2023-09-21 NOTE — TELEPHONE ENCOUNTER
Thank you for the opportunity to participate in your patient's care.     Your patient is scheduled for transcatheter aortic valve replacement (TAVR) on Monday, September 25, 2023.    Sincerely,    [facility] Structural [facility] Team    JON Moody, RN, Structural Heart Program Nurse Coordinator ([phone])  JON Hebert, RN, Structural Heart Program Nurse Coordinator ([phone])

## 2023-09-21 NOTE — OR NURSING
Pt no show for pread appt today.  LVM for pt to call us to reschedule ASAP.  Dr. Hsu's office notified.

## 2023-09-22 ENCOUNTER — TELEPHONE (OUTPATIENT)
Dept: CARDIOLOGY | Facility: MEDICAL CENTER | Age: 78
End: 2023-09-22
Payer: MEDICARE

## 2023-09-22 ENCOUNTER — HOSPITAL ENCOUNTER (OUTPATIENT)
Dept: LAB | Facility: MEDICAL CENTER | Age: 78
DRG: 219 | End: 2023-09-22
Attending: STUDENT IN AN ORGANIZED HEALTH CARE EDUCATION/TRAINING PROGRAM
Payer: MEDICARE

## 2023-09-22 ENCOUNTER — PRE-ADMISSION TESTING (OUTPATIENT)
Dept: ADMISSIONS | Facility: MEDICAL CENTER | Age: 78
DRG: 219 | End: 2023-09-22
Attending: INTERNAL MEDICINE
Payer: MEDICARE

## 2023-09-22 ENCOUNTER — HOSPITAL ENCOUNTER (OUTPATIENT)
Dept: LAB | Facility: MEDICAL CENTER | Age: 78
DRG: 219 | End: 2023-09-22
Attending: INTERNAL MEDICINE | Admitting: INTERNAL MEDICINE
Payer: MEDICARE

## 2023-09-22 DIAGNOSIS — Z01.812 PRE-OPERATIVE LABORATORY EXAMINATION: ICD-10-CM

## 2023-09-22 DIAGNOSIS — Z79.4 TYPE 2 DIABETES MELLITUS WITH OTHER SPECIFIED COMPLICATION, WITH LONG-TERM CURRENT USE OF INSULIN (HCC): ICD-10-CM

## 2023-09-22 DIAGNOSIS — E11.69 TYPE 2 DIABETES MELLITUS WITH OTHER SPECIFIED COMPLICATION, WITH LONG-TERM CURRENT USE OF INSULIN (HCC): ICD-10-CM

## 2023-09-22 LAB
ABO GROUP BLD: NORMAL
ALBUMIN SERPL BCP-MCNC: 4.1 G/DL (ref 3.2–4.9)
ALBUMIN SERPL BCP-MCNC: 4.1 G/DL (ref 3.2–4.9)
ALBUMIN/GLOB SERPL: 1.8 G/DL
ALBUMIN/GLOB SERPL: 1.8 G/DL
ALP SERPL-CCNC: 73 U/L (ref 30–99)
ALP SERPL-CCNC: 77 U/L (ref 30–99)
ALT SERPL-CCNC: 13 U/L (ref 2–50)
ALT SERPL-CCNC: 17 U/L (ref 2–50)
ANION GAP SERPL CALC-SCNC: 12 MMOL/L (ref 7–16)
ANION GAP SERPL CALC-SCNC: 13 MMOL/L (ref 7–16)
APTT PPP: 31.9 SEC (ref 24.7–36)
AST SERPL-CCNC: 15 U/L (ref 12–45)
AST SERPL-CCNC: 17 U/L (ref 12–45)
BASOPHILS # BLD AUTO: 0.7 % (ref 0–1.8)
BASOPHILS # BLD: 0.05 K/UL (ref 0–0.12)
BILIRUB SERPL-MCNC: 1 MG/DL (ref 0.1–1.5)
BILIRUB SERPL-MCNC: 1 MG/DL (ref 0.1–1.5)
BLD GP AB SCN SERPL QL: NORMAL
BUN SERPL-MCNC: 19 MG/DL (ref 8–22)
BUN SERPL-MCNC: 20 MG/DL (ref 8–22)
CALCIUM ALBUM COR SERPL-MCNC: 10 MG/DL (ref 8.5–10.5)
CALCIUM ALBUM COR SERPL-MCNC: 9.5 MG/DL (ref 8.5–10.5)
CALCIUM SERPL-MCNC: 10.1 MG/DL (ref 8.5–10.5)
CALCIUM SERPL-MCNC: 9.6 MG/DL (ref 8.5–10.5)
CHLORIDE SERPL-SCNC: 101 MMOL/L (ref 96–112)
CHLORIDE SERPL-SCNC: 102 MMOL/L (ref 96–112)
CHOLEST SERPL-MCNC: 96 MG/DL (ref 100–199)
CO2 SERPL-SCNC: 22 MMOL/L (ref 20–33)
CO2 SERPL-SCNC: 23 MMOL/L (ref 20–33)
CREAT SERPL-MCNC: 1.12 MG/DL (ref 0.5–1.4)
CREAT SERPL-MCNC: 1.15 MG/DL (ref 0.5–1.4)
EOSINOPHIL # BLD AUTO: 0.26 K/UL (ref 0–0.51)
EOSINOPHIL NFR BLD: 3.9 % (ref 0–6.9)
ERYTHROCYTE [DISTWIDTH] IN BLOOD BY AUTOMATED COUNT: 48.4 FL (ref 35.9–50)
FASTING STATUS PATIENT QL REPORTED: NORMAL
GFR SERPLBLD CREATININE-BSD FMLA CKD-EPI: 64 ML/MIN/1.73 M 2
GFR SERPLBLD CREATININE-BSD FMLA CKD-EPI: 67 ML/MIN/1.73 M 2
GLOBULIN SER CALC-MCNC: 2.3 G/DL (ref 1.9–3.5)
GLOBULIN SER CALC-MCNC: 2.3 G/DL (ref 1.9–3.5)
GLUCOSE SERPL-MCNC: 319 MG/DL (ref 65–99)
GLUCOSE SERPL-MCNC: 372 MG/DL (ref 65–99)
HCT VFR BLD AUTO: 38.3 % (ref 42–52)
HDLC SERPL-MCNC: 37 MG/DL
HGB BLD-MCNC: 13.3 G/DL (ref 14–18)
IMM GRANULOCYTES # BLD AUTO: 0.01 K/UL (ref 0–0.11)
IMM GRANULOCYTES NFR BLD AUTO: 0.1 % (ref 0–0.9)
INR PPP: 1.11 (ref 0.87–1.13)
LDLC SERPL CALC-MCNC: 18 MG/DL
LYMPHOCYTES # BLD AUTO: 1.58 K/UL (ref 1–4.8)
LYMPHOCYTES NFR BLD: 23.5 % (ref 22–41)
MCH RBC QN AUTO: 31.6 PG (ref 27–33)
MCHC RBC AUTO-ENTMCNC: 34.7 G/DL (ref 32.3–36.5)
MCV RBC AUTO: 91 FL (ref 81.4–97.8)
MONOCYTES # BLD AUTO: 0.5 K/UL (ref 0–0.85)
MONOCYTES NFR BLD AUTO: 7.4 % (ref 0–13.4)
NEUTROPHILS # BLD AUTO: 4.32 K/UL (ref 1.82–7.42)
NEUTROPHILS NFR BLD: 64.4 % (ref 44–72)
NRBC # BLD AUTO: 0 K/UL
NRBC BLD-RTO: 0 /100 WBC (ref 0–0.2)
NT-PROBNP SERPL IA-MCNC: 74 PG/ML (ref 0–125)
NT-PROBNP SERPL IA-MCNC: 76 PG/ML (ref 0–125)
PLATELET # BLD AUTO: 166 K/UL (ref 164–446)
PMV BLD AUTO: 10.6 FL (ref 9–12.9)
POTASSIUM SERPL-SCNC: 4.2 MMOL/L (ref 3.6–5.5)
POTASSIUM SERPL-SCNC: 4.3 MMOL/L (ref 3.6–5.5)
PROT SERPL-MCNC: 6.4 G/DL (ref 6–8.2)
PROT SERPL-MCNC: 6.4 G/DL (ref 6–8.2)
PROTHROMBIN TIME: 14.4 SEC (ref 12–14.6)
RBC # BLD AUTO: 4.21 M/UL (ref 4.7–6.1)
RH BLD: NORMAL
SODIUM SERPL-SCNC: 136 MMOL/L (ref 135–145)
SODIUM SERPL-SCNC: 137 MMOL/L (ref 135–145)
TRIGL SERPL-MCNC: 207 MG/DL (ref 0–149)
WBC # BLD AUTO: 6.7 K/UL (ref 4.8–10.8)

## 2023-09-22 PROCEDURE — 86850 RBC ANTIBODY SCREEN: CPT

## 2023-09-22 PROCEDURE — 80053 COMPREHEN METABOLIC PANEL: CPT

## 2023-09-22 PROCEDURE — 85730 THROMBOPLASTIN TIME PARTIAL: CPT

## 2023-09-22 PROCEDURE — 85610 PROTHROMBIN TIME: CPT

## 2023-09-22 PROCEDURE — 83880 ASSAY OF NATRIURETIC PEPTIDE: CPT | Mod: GA

## 2023-09-22 PROCEDURE — 36415 COLL VENOUS BLD VENIPUNCTURE: CPT

## 2023-09-22 PROCEDURE — 83880 ASSAY OF NATRIURETIC PEPTIDE: CPT | Mod: 91

## 2023-09-22 PROCEDURE — 86901 BLOOD TYPING SEROLOGIC RH(D): CPT

## 2023-09-22 PROCEDURE — 86900 BLOOD TYPING SEROLOGIC ABO: CPT

## 2023-09-22 PROCEDURE — 80061 LIPID PANEL: CPT

## 2023-09-22 PROCEDURE — 85025 COMPLETE CBC W/AUTO DIFF WBC: CPT

## 2023-09-22 PROCEDURE — 80053 COMPREHEN METABOLIC PANEL: CPT | Mod: 91

## 2023-09-22 NOTE — TELEPHONE ENCOUNTER
Received [employer] message from patient regarding coding for pre-admit labs.     Spoke with both pre-admit RN Janett and  regarding concerns.     Called and spoke with patient. He states his concerns are regarding lipid panel from PCP Dr. García. Advisement on lab concerns provided. All questions answered. Patient verbalizes understanding.

## 2023-09-25 ENCOUNTER — ANESTHESIA (OUTPATIENT)
Dept: SURGERY | Facility: MEDICAL CENTER | Age: 78
DRG: 219 | End: 2023-09-25
Payer: MEDICARE

## 2023-09-25 ENCOUNTER — APPOINTMENT (OUTPATIENT)
Dept: RADIOLOGY | Facility: MEDICAL CENTER | Age: 78
DRG: 219 | End: 2023-09-25
Attending: THORACIC SURGERY (CARDIOTHORACIC VASCULAR SURGERY)
Payer: MEDICARE

## 2023-09-25 ENCOUNTER — APPOINTMENT (OUTPATIENT)
Dept: RADIOLOGY | Facility: MEDICAL CENTER | Age: 78
DRG: 219 | End: 2023-09-25
Attending: INTERNAL MEDICINE
Payer: MEDICARE

## 2023-09-25 ENCOUNTER — APPOINTMENT (OUTPATIENT)
Dept: CARDIOLOGY | Facility: MEDICAL CENTER | Age: 78
DRG: 219 | End: 2023-09-25
Attending: THORACIC SURGERY (CARDIOTHORACIC VASCULAR SURGERY)
Payer: MEDICARE

## 2023-09-25 ENCOUNTER — ANESTHESIA EVENT (OUTPATIENT)
Dept: SURGERY | Facility: MEDICAL CENTER | Age: 78
DRG: 219 | End: 2023-09-25
Payer: MEDICARE

## 2023-09-25 ENCOUNTER — HOSPITAL ENCOUNTER (INPATIENT)
Facility: MEDICAL CENTER | Age: 78
LOS: 5 days | DRG: 219 | End: 2023-09-30
Attending: INTERNAL MEDICINE | Admitting: INTERNAL MEDICINE
Payer: MEDICARE

## 2023-09-25 ENCOUNTER — APPOINTMENT (OUTPATIENT)
Dept: CARDIOLOGY | Facility: MEDICAL CENTER | Age: 78
DRG: 219 | End: 2023-09-25
Attending: INTERNAL MEDICINE
Payer: MEDICARE

## 2023-09-25 DIAGNOSIS — I35.0 SEVERE AORTIC STENOSIS: ICD-10-CM

## 2023-09-25 DIAGNOSIS — G89.18 POST-OP PAIN: ICD-10-CM

## 2023-09-25 DIAGNOSIS — I25.10 CORONARY ARTERY DISEASE DUE TO CALCIFIED CORONARY LESION: ICD-10-CM

## 2023-09-25 DIAGNOSIS — Z95.2 S/P TAVR (TRANSCATHETER AORTIC VALVE REPLACEMENT): ICD-10-CM

## 2023-09-25 DIAGNOSIS — I25.84 CORONARY ARTERY DISEASE DUE TO CALCIFIED CORONARY LESION: ICD-10-CM

## 2023-09-25 DIAGNOSIS — Z95.2 S/P AVR: ICD-10-CM

## 2023-09-25 PROBLEM — R97.20 ELEVATED PSA: Status: RESOLVED | Noted: 2018-01-23 | Resolved: 2023-09-25

## 2023-09-25 LAB
ABO + RH BLD: NORMAL
ACT BLD: 125 SEC (ref 74–137)
ACT BLD: 245 SEC (ref 74–137)
ACT BLD: 342 SEC (ref 74–137)
ACT BLD: 540 SEC (ref 74–137)
ACT BLD: 648 SEC (ref 74–137)
ACT BLD: 727 SEC (ref 74–137)
ACT BLD: 727 SEC (ref 74–137)
ANION GAP SERPL CALC-SCNC: 13 MMOL/L (ref 7–16)
APTT PPP: 108.5 SEC (ref 24.7–36)
ARTERIAL PATENCY WRIST A: ABNORMAL
BARCODED ABORH UBTYP: 5100
BARCODED ABORH UBTYP: 5100
BARCODED PRD CODE UBPRD: NORMAL
BARCODED PRD CODE UBPRD: NORMAL
BARCODED UNIT NUM UBUNT: NORMAL
BARCODED UNIT NUM UBUNT: NORMAL
BASE EXCESS BLDA CALC-SCNC: -2 MMOL/L (ref -4–3)
BASE EXCESS BLDA CALC-SCNC: -2 MMOL/L (ref -4–3)
BASE EXCESS BLDA CALC-SCNC: -3 MMOL/L (ref -4–3)
BASE EXCESS BLDA CALC-SCNC: -5 MMOL/L (ref -4–3)
BASE EXCESS BLDA CALC-SCNC: -5 MMOL/L (ref -4–3)
BASE EXCESS BLDA CALC-SCNC: -6 MMOL/L (ref -4–3)
BASE EXCESS BLDA CALC-SCNC: 0 MMOL/L (ref -4–3)
BASE EXCESS BLDA CALC-SCNC: 2 MMOL/L (ref -4–3)
BASE EXCESS BLDA CALC-SCNC: 3 MMOL/L (ref -4–3)
BASE EXCESS BLDV CALC-SCNC: 0 MMOL/L (ref -4–3)
BODY TEMPERATURE: ABNORMAL DEGREES
BUN SERPL-MCNC: 15 MG/DL (ref 8–22)
CA-I BLD ISE-SCNC: 0.31 MMOL/L (ref 1.1–1.3)
CA-I BLD ISE-SCNC: 1.08 MMOL/L (ref 1.1–1.3)
CA-I BLD ISE-SCNC: 1.09 MMOL/L (ref 1.1–1.3)
CA-I BLD ISE-SCNC: 1.11 MMOL/L (ref 1.1–1.3)
CA-I BLD ISE-SCNC: 1.12 MMOL/L (ref 1.1–1.3)
CA-I BLD ISE-SCNC: 1.23 MMOL/L (ref 1.1–1.3)
CA-I BLD ISE-SCNC: 1.32 MMOL/L (ref 1.1–1.3)
CA-I BLD ISE-SCNC: 1.32 MMOL/L (ref 1.1–1.3)
CALCIUM SERPL-MCNC: 8.5 MG/DL (ref 8.5–10.5)
CHLORIDE SERPL-SCNC: 108 MMOL/L (ref 96–112)
CO2 BLDA-SCNC: 21 MMOL/L (ref 20–33)
CO2 BLDA-SCNC: 22 MMOL/L (ref 20–33)
CO2 BLDA-SCNC: 22 MMOL/L (ref 20–33)
CO2 BLDA-SCNC: 23 MMOL/L (ref 20–33)
CO2 BLDA-SCNC: 23 MMOL/L (ref 20–33)
CO2 BLDA-SCNC: 25 MMOL/L (ref 20–33)
CO2 BLDA-SCNC: 26 MMOL/L (ref 20–33)
CO2 BLDA-SCNC: 27 MMOL/L (ref 20–33)
CO2 BLDA-SCNC: 29 MMOL/L (ref 20–33)
CO2 BLDA-SCNC: 30 MMOL/L (ref 20–33)
CO2 BLDA-SCNC: 30 MMOL/L (ref 20–33)
CO2 BLDV-SCNC: 29 MMOL/L (ref 20–33)
CO2 SERPL-SCNC: 21 MMOL/L (ref 20–33)
COMPONENT R 8504R: NORMAL
COMPONENT R 8504R: NORMAL
CREAT SERPL-MCNC: 0.89 MG/DL (ref 0.5–1.4)
DELSYS IDSYS: ABNORMAL
EKG IMPRESSION: NORMAL
END TIDAL CARBON DIOXIDE IECO2: 32 MMHG
END TIDAL CARBON DIOXIDE IECO2: 35 MMHG
END TIDAL CARBON DIOXIDE IECO2: 36 MMHG
END TIDAL CARBON DIOXIDE IECO2: 36 MMHG
GFR SERPLBLD CREATININE-BSD FMLA CKD-EPI: 87 ML/MIN/1.73 M 2
GLUCOSE BLD STRIP.AUTO-MCNC: 216 MG/DL (ref 65–99)
GLUCOSE BLD STRIP.AUTO-MCNC: 218 MG/DL (ref 65–99)
GLUCOSE BLD STRIP.AUTO-MCNC: 219 MG/DL (ref 65–99)
GLUCOSE BLD STRIP.AUTO-MCNC: 232 MG/DL (ref 65–99)
GLUCOSE BLD STRIP.AUTO-MCNC: 237 MG/DL (ref 65–99)
GLUCOSE BLD STRIP.AUTO-MCNC: 259 MG/DL (ref 65–99)
GLUCOSE BLD STRIP.AUTO-MCNC: 336 MG/DL (ref 65–99)
GLUCOSE SERPL-MCNC: 242 MG/DL (ref 65–99)
HCO3 BLDA-SCNC: 19.4 MMOL/L (ref 17–25)
HCO3 BLDA-SCNC: 20.7 MMOL/L (ref 17–25)
HCO3 BLDA-SCNC: 21.3 MMOL/L (ref 17–25)
HCO3 BLDA-SCNC: 21.5 MMOL/L (ref 17–25)
HCO3 BLDA-SCNC: 22.1 MMOL/L (ref 17–25)
HCO3 BLDA-SCNC: 23.8 MMOL/L (ref 17–25)
HCO3 BLDA-SCNC: 24.9 MMOL/L (ref 17–25)
HCO3 BLDA-SCNC: 25 MMOL/L (ref 17–25)
HCO3 BLDA-SCNC: 27.1 MMOL/L (ref 17–25)
HCO3 BLDA-SCNC: 28.7 MMOL/L (ref 17–25)
HCO3 BLDA-SCNC: 28.7 MMOL/L (ref 17–25)
HCO3 BLDV-SCNC: 27.1 MMOL/L (ref 24–28)
HCT VFR BLD AUTO: 38.6 % (ref 42–52)
HCT VFR BLD CALC: 23 % (ref 42–52)
HCT VFR BLD CALC: 25 % (ref 42–52)
HCT VFR BLD CALC: 25 % (ref 42–52)
HCT VFR BLD CALC: 26 % (ref 42–52)
HCT VFR BLD CALC: 26 % (ref 42–52)
HCT VFR BLD CALC: 32 % (ref 42–52)
HCT VFR BLD CALC: 36 % (ref 42–52)
HGB BLD-MCNC: 10.9 G/DL (ref 14–18)
HGB BLD-MCNC: 12.2 G/DL (ref 14–18)
HGB BLD-MCNC: 13.6 G/DL (ref 14–18)
HGB BLD-MCNC: 7.8 G/DL (ref 14–18)
HGB BLD-MCNC: 8.5 G/DL (ref 14–18)
HGB BLD-MCNC: 8.5 G/DL (ref 14–18)
HGB BLD-MCNC: 8.8 G/DL (ref 14–18)
HGB BLD-MCNC: 8.8 G/DL (ref 14–18)
HOROWITZ INDEX BLDA+IHG-RTO: 176 MM[HG]
HOROWITZ INDEX BLDA+IHG-RTO: 182 MM[HG]
HOROWITZ INDEX BLDA+IHG-RTO: 213 MM[HG]
HOROWITZ INDEX BLDA+IHG-RTO: 268 MM[HG]
HOROWITZ INDEX BLDA+IHG-RTO: 293 MM[HG]
INR PPP: 1.43 (ref 0.87–1.13)
LACTATE BLD-SCNC: 2.5 MMOL/L (ref 0.5–2)
LACTATE BLD-SCNC: 2.5 MMOL/L (ref 0.5–2)
MAGNESIUM SERPL-MCNC: 2.9 MG/DL (ref 1.5–2.5)
MODE IMODE: ABNORMAL
O2/TOTAL GAS SETTING VFR VENT: 100 %
O2/TOTAL GAS SETTING VFR VENT: 100 %
O2/TOTAL GAS SETTING VFR VENT: 40 %
PCO2 BLDA: 35.6 MMHG (ref 26–37)
PCO2 BLDA: 38.2 MMHG (ref 26–37)
PCO2 BLDA: 38.5 MMHG (ref 26–37)
PCO2 BLDA: 40.1 MMHG (ref 26–37)
PCO2 BLDA: 42.7 MMHG (ref 26–37)
PCO2 BLDA: 49.5 MMHG (ref 26–37)
PCO2 BLDA: 51.4 MMHG (ref 26–37)
PCO2 BLDA: 51.6 MMHG (ref 26–37)
PCO2 BLDA: 52.3 MMHG (ref 26–37)
PCO2 BLDA: 56.5 MMHG (ref 26–37)
PCO2 BLDA: 62 MMHG (ref 26–37)
PCO2 BLDV: 60.9 MMHG (ref 41–51)
PCO2 TEMP ADJ BLDA: 34.9 MMHG (ref 26–37)
PCO2 TEMP ADJ BLDA: 37 MMHG (ref 26–37)
PCO2 TEMP ADJ BLDA: 37.3 MMHG (ref 26–37)
PCO2 TEMP ADJ BLDA: 40 MMHG (ref 26–37)
PCO2 TEMP ADJ BLDA: 40.1 MMHG (ref 26–37)
PCO2 TEMP ADJ BLDA: 41 MMHG (ref 26–37)
PCO2 TEMP ADJ BLDA: 45.1 MMHG (ref 26–37)
PCO2 TEMP ADJ BLDA: 48.8 MMHG (ref 26–37)
PCO2 TEMP ADJ BLDA: 51.2 MMHG (ref 26–37)
PCO2 TEMP ADJ BLDA: 51.6 MMHG (ref 26–37)
PCO2 TEMP ADJ BLDA: 51.7 MMHG (ref 26–37)
PCO2 TEMP ADJ BLDV: 49 MMHG (ref 41–51)
PEEP END EXPIRATORY PRESSURE IPEEP: 8 CMH20
PERCENT MINUTE VOLUME IPMV: 100
PERCENT MINUTE VOLUME IPMV: 130
PERCENT MINUTE VOLUME IPMV: 130
PERCENT MINUTE VOLUME IPMV: 160
PERCENT MINUTE VOLUME IPMV: 160
PH BLDA: 7.25 [PH] (ref 7.4–7.5)
PH BLDA: 7.29 [PH] (ref 7.4–7.5)
PH BLDA: 7.31 [PH] (ref 7.4–7.5)
PH BLDA: 7.34 [PH] (ref 7.4–7.5)
PH BLDA: 7.35 [PH] (ref 7.4–7.5)
PH BLDA: 7.35 [PH] (ref 7.4–7.5)
PH BLDA: 7.38 [PH] (ref 7.4–7.5)
PH BLDV: 7.25 [PH] (ref 7.31–7.45)
PH TEMP ADJ BLDA: 7.29 [PH] (ref 7.4–7.5)
PH TEMP ADJ BLDA: 7.31 [PH] (ref 7.4–7.5)
PH TEMP ADJ BLDA: 7.32 [PH] (ref 7.4–7.5)
PH TEMP ADJ BLDA: 7.34 [PH] (ref 7.4–7.5)
PH TEMP ADJ BLDA: 7.35 [PH] (ref 7.4–7.5)
PH TEMP ADJ BLDA: 7.35 [PH] (ref 7.4–7.5)
PH TEMP ADJ BLDA: 7.36 [PH] (ref 7.4–7.5)
PH TEMP ADJ BLDA: 7.39 [PH] (ref 7.4–7.5)
PH TEMP ADJ BLDA: 7.39 [PH] (ref 7.4–7.5)
PH TEMP ADJ BLDV: 7.32 [PH] (ref 7.31–7.45)
PLATELET # BLD AUTO: 117 K/UL (ref 164–446)
PO2 BLDA: 107 MMHG (ref 64–87)
PO2 BLDA: 117 MMHG (ref 64–87)
PO2 BLDA: 176 MMHG (ref 64–87)
PO2 BLDA: 182 MMHG (ref 64–87)
PO2 BLDA: 273 MMHG (ref 64–87)
PO2 BLDA: 307 MMHG (ref 64–87)
PO2 BLDA: 312 MMHG (ref 64–87)
PO2 BLDA: 318 MMHG (ref 64–87)
PO2 BLDA: 325 MMHG (ref 64–87)
PO2 BLDA: 348 MMHG (ref 64–87)
PO2 BLDA: 85 MMHG (ref 64–87)
PO2 BLDV: 71 MMHG (ref 25–40)
PO2 TEMP ADJ BLDA: 104 MMHG (ref 64–87)
PO2 TEMP ADJ BLDA: 117 MMHG (ref 64–87)
PO2 TEMP ADJ BLDA: 172 MMHG (ref 64–87)
PO2 TEMP ADJ BLDA: 177 MMHG (ref 64–87)
PO2 TEMP ADJ BLDA: 267 MMHG (ref 64–87)
PO2 TEMP ADJ BLDA: 279 MMHG (ref 64–87)
PO2 TEMP ADJ BLDA: 297 MMHG (ref 64–87)
PO2 TEMP ADJ BLDA: 301 MMHG (ref 64–87)
PO2 TEMP ADJ BLDA: 318 MMHG (ref 64–87)
PO2 TEMP ADJ BLDA: 345 MMHG (ref 64–87)
PO2 TEMP ADJ BLDA: 82 MMHG (ref 64–87)
PO2 TEMP ADJ BLDV: 51 MMHG (ref 25–40)
POTASSIUM BLD-SCNC: 3.8 MMOL/L (ref 3.6–5.5)
POTASSIUM BLD-SCNC: 4 MMOL/L (ref 3.6–5.5)
POTASSIUM BLD-SCNC: 4 MMOL/L (ref 3.6–5.5)
POTASSIUM BLD-SCNC: 4.2 MMOL/L (ref 3.6–5.5)
POTASSIUM BLD-SCNC: 4.2 MMOL/L (ref 3.6–5.5)
POTASSIUM BLD-SCNC: 4.5 MMOL/L (ref 3.6–5.5)
POTASSIUM BLD-SCNC: 4.6 MMOL/L (ref 3.6–5.5)
POTASSIUM BLD-SCNC: 8.4 MMOL/L (ref 3.6–5.5)
POTASSIUM SERPL-SCNC: 4.4 MMOL/L (ref 3.6–5.5)
POTASSIUM SERPL-SCNC: 4.5 MMOL/L (ref 3.6–5.5)
PRODUCT TYPE UPROD: NORMAL
PRODUCT TYPE UPROD: NORMAL
PROTHROMBIN TIME: 17.6 SEC (ref 12–14.6)
SAO2 % BLDA: 100 % (ref 93–99)
SAO2 % BLDA: 96 % (ref 93–99)
SAO2 % BLDA: 98 % (ref 93–99)
SAO2 % BLDA: 98 % (ref 93–99)
SAO2 % BLDV: 91 %
SODIUM BLD-SCNC: 136 MMOL/L (ref 135–145)
SODIUM BLD-SCNC: 137 MMOL/L (ref 135–145)
SODIUM BLD-SCNC: 138 MMOL/L (ref 135–145)
SODIUM BLD-SCNC: 139 MMOL/L (ref 135–145)
SODIUM BLD-SCNC: 139 MMOL/L (ref 135–145)
SODIUM SERPL-SCNC: 142 MMOL/L (ref 135–145)
SPECIMEN DRAWN FROM PATIENT: ABNORMAL
UNIT STATUS USTAT: NORMAL
UNIT STATUS USTAT: NORMAL

## 2023-09-25 PROCEDURE — C1751 CATH, INF, PER/CENT/MIDLINE: HCPCS | Performed by: INTERNAL MEDICINE

## 2023-09-25 PROCEDURE — 85014 HEMATOCRIT: CPT

## 2023-09-25 PROCEDURE — 700102 HCHG RX REV CODE 250 W/ 637 OVERRIDE(OP): Performed by: ANESTHESIOLOGY

## 2023-09-25 PROCEDURE — 94002 VENT MGMT INPAT INIT DAY: CPT

## 2023-09-25 PROCEDURE — 700101 HCHG RX REV CODE 250: Performed by: ANESTHESIOLOGY

## 2023-09-25 PROCEDURE — 02RF08Z REPLACEMENT OF AORTIC VALVE WITH ZOOPLASTIC TISSUE, OPEN APPROACH: ICD-10-PCS | Performed by: THORACIC SURGERY (CARDIOTHORACIC VASCULAR SURGERY)

## 2023-09-25 PROCEDURE — 85730 THROMBOPLASTIN TIME PARTIAL: CPT

## 2023-09-25 PROCEDURE — P9047 ALBUMIN (HUMAN), 25%, 50ML: HCPCS | Mod: JG

## 2023-09-25 PROCEDURE — C1769 GUIDE WIRE: HCPCS | Performed by: INTERNAL MEDICINE

## 2023-09-25 PROCEDURE — C9248 INJ, CLEVIDIPINE BUTYRATE: HCPCS | Mod: JG | Performed by: ANESTHESIOLOGY

## 2023-09-25 PROCEDURE — 93280 PM DEVICE PROGR EVAL DUAL: CPT | Mod: 26 | Performed by: INTERNAL MEDICINE

## 2023-09-25 PROCEDURE — 83735 ASSAY OF MAGNESIUM: CPT

## 2023-09-25 PROCEDURE — C1887 CATHETER, GUIDING: HCPCS | Performed by: INTERNAL MEDICINE

## 2023-09-25 PROCEDURE — 700101 HCHG RX REV CODE 250: Performed by: THORACIC SURGERY (CARDIOTHORACIC VASCULAR SURGERY)

## 2023-09-25 PROCEDURE — 700105 HCHG RX REV CODE 258: Performed by: INTERNAL MEDICINE

## 2023-09-25 PROCEDURE — 71045 X-RAY EXAM CHEST 1 VIEW: CPT

## 2023-09-25 PROCEDURE — 700111 HCHG RX REV CODE 636 W/ 250 OVERRIDE (IP): Performed by: NURSE PRACTITIONER

## 2023-09-25 PROCEDURE — 94003 VENT MGMT INPAT SUBQ DAY: CPT

## 2023-09-25 PROCEDURE — 5A2204Z RESTORATION OF CARDIAC RHYTHM, SINGLE: ICD-10-PCS | Performed by: THORACIC SURGERY (CARDIOTHORACIC VASCULAR SURGERY)

## 2023-09-25 PROCEDURE — 82962 GLUCOSE BLOOD TEST: CPT | Mod: 91

## 2023-09-25 PROCEDURE — 700105 HCHG RX REV CODE 258: Performed by: THORACIC SURGERY (CARDIOTHORACIC VASCULAR SURGERY)

## 2023-09-25 PROCEDURE — 33361 REPLACE AORTIC VALVE PERQ: CPT | Mod: 62,Q0,53 | Performed by: INTERNAL MEDICINE

## 2023-09-25 PROCEDURE — 770020 HCHG ROOM/CARE - TELE (206)

## 2023-09-25 PROCEDURE — 85347 COAGULATION TIME ACTIVATED: CPT

## 2023-09-25 PROCEDURE — 93005 ELECTROCARDIOGRAM TRACING: CPT | Performed by: NURSE PRACTITIONER

## 2023-09-25 PROCEDURE — 94799 UNLISTED PULMONARY SVC/PX: CPT

## 2023-09-25 PROCEDURE — C1781 MESH (IMPLANTABLE): HCPCS | Performed by: INTERNAL MEDICINE

## 2023-09-25 PROCEDURE — C1894 INTRO/SHEATH, NON-LASER: HCPCS | Performed by: INTERNAL MEDICINE

## 2023-09-25 PROCEDURE — 503001 HCHG PERFUSION: Performed by: INTERNAL MEDICINE

## 2023-09-25 PROCEDURE — 33315 EXPLORATORY HEART SURGERY: CPT | Mod: AS,59 | Performed by: NURSE PRACTITIONER

## 2023-09-25 PROCEDURE — P9016 RBC LEUKOCYTES REDUCED: HCPCS | Mod: 91

## 2023-09-25 PROCEDURE — 700111 HCHG RX REV CODE 636 W/ 250 OVERRIDE (IP): Performed by: ANESTHESIOLOGY

## 2023-09-25 PROCEDURE — 160048 HCHG OR STATISTICAL LEVEL 1-5: Performed by: INTERNAL MEDICINE

## 2023-09-25 PROCEDURE — 5A1221Z PERFORMANCE OF CARDIAC OUTPUT, CONTINUOUS: ICD-10-PCS | Performed by: THORACIC SURGERY (CARDIOTHORACIC VASCULAR SURGERY)

## 2023-09-25 PROCEDURE — 160031 HCHG SURGERY MINUTES - 1ST 30 MINS LEVEL 5: Performed by: INTERNAL MEDICINE

## 2023-09-25 PROCEDURE — 02RF38Z REPLACEMENT OF AORTIC VALVE WITH ZOOPLASTIC TISSUE, PERCUTANEOUS APPROACH: ICD-10-PCS | Performed by: INTERNAL MEDICINE

## 2023-09-25 PROCEDURE — 700101 HCHG RX REV CODE 250

## 2023-09-25 PROCEDURE — C1883 ADAPT/EXT, PACING/NEURO LEAD: HCPCS | Performed by: INTERNAL MEDICINE

## 2023-09-25 PROCEDURE — 700101 HCHG RX REV CODE 250: Performed by: INTERNAL MEDICINE

## 2023-09-25 PROCEDURE — 770022 HCHG ROOM/CARE - ICU (200)

## 2023-09-25 PROCEDURE — 93010 ELECTROCARDIOGRAM REPORT: CPT | Performed by: INTERNAL MEDICINE

## 2023-09-25 PROCEDURE — 93355 ECHO TRANSESOPHAGEAL (TEE): CPT

## 2023-09-25 PROCEDURE — 110372 HCHG SHELL REV 278: Performed by: INTERNAL MEDICINE

## 2023-09-25 PROCEDURE — 33411 REPLACEMENT OF AORTIC VALVE: CPT | Performed by: THORACIC SURGERY (CARDIOTHORACIC VASCULAR SURGERY)

## 2023-09-25 PROCEDURE — 82330 ASSAY OF CALCIUM: CPT | Mod: 91

## 2023-09-25 PROCEDURE — 88311 DECALCIFY TISSUE: CPT

## 2023-09-25 PROCEDURE — 33411 REPLACEMENT OF AORTIC VALVE: CPT | Mod: AS | Performed by: NURSE PRACTITIONER

## 2023-09-25 PROCEDURE — 84132 ASSAY OF SERUM POTASSIUM: CPT | Mod: 91

## 2023-09-25 PROCEDURE — 83605 ASSAY OF LACTIC ACID: CPT | Mod: 91

## 2023-09-25 PROCEDURE — 85610 PROTHROMBIN TIME: CPT

## 2023-09-25 PROCEDURE — 88305 TISSUE EXAM BY PATHOLOGIST: CPT

## 2023-09-25 PROCEDURE — C1760 CLOSURE DEV, VASC: HCPCS | Performed by: INTERNAL MEDICINE

## 2023-09-25 PROCEDURE — 700105 HCHG RX REV CODE 258

## 2023-09-25 PROCEDURE — 160042 HCHG SURGERY MINUTES - EA ADDL 1 MIN LEVEL 5: Performed by: INTERNAL MEDICINE

## 2023-09-25 PROCEDURE — 85049 AUTOMATED PLATELET COUNT: CPT

## 2023-09-25 PROCEDURE — 02UX08Z SUPPLEMENT THORACIC AORTA, ASCENDING/ARCH WITH ZOOPLASTIC TISSUE, OPEN APPROACH: ICD-10-PCS | Performed by: THORACIC SURGERY (CARDIOTHORACIC VASCULAR SURGERY)

## 2023-09-25 PROCEDURE — 86923 COMPATIBILITY TEST ELECTRIC: CPT | Mod: 91

## 2023-09-25 PROCEDURE — 85018 HEMOGLOBIN: CPT

## 2023-09-25 PROCEDURE — 700111 HCHG RX REV CODE 636 W/ 250 OVERRIDE (IP)

## 2023-09-25 PROCEDURE — 160009 HCHG ANES TIME/MIN: Performed by: INTERNAL MEDICINE

## 2023-09-25 PROCEDURE — 80048 BASIC METABOLIC PNL TOTAL CA: CPT

## 2023-09-25 PROCEDURE — 02PA08Z REMOVAL OF ZOOPLASTIC TISSUE FROM HEART, OPEN APPROACH: ICD-10-PCS | Performed by: THORACIC SURGERY (CARDIOTHORACIC VASCULAR SURGERY)

## 2023-09-25 PROCEDURE — 36430 TRANSFUSION BLD/BLD COMPNT: CPT

## 2023-09-25 PROCEDURE — 36415 COLL VENOUS BLD VENIPUNCTURE: CPT

## 2023-09-25 PROCEDURE — 33315 EXPLORATORY HEART SURGERY: CPT | Mod: 59 | Performed by: THORACIC SURGERY (CARDIOTHORACIC VASCULAR SURGERY)

## 2023-09-25 PROCEDURE — 93005 ELECTROCARDIOGRAM TRACING: CPT | Performed by: INTERNAL MEDICINE

## 2023-09-25 PROCEDURE — 700105 HCHG RX REV CODE 258: Performed by: NURSE PRACTITIONER

## 2023-09-25 PROCEDURE — 700105 HCHG RX REV CODE 258: Performed by: ANESTHESIOLOGY

## 2023-09-25 PROCEDURE — 82803 BLOOD GASES ANY COMBINATION: CPT | Mod: 91

## 2023-09-25 PROCEDURE — C1768 GRAFT, VASCULAR: HCPCS | Performed by: INTERNAL MEDICINE

## 2023-09-25 PROCEDURE — 700101 HCHG RX REV CODE 250: Performed by: NURSE PRACTITIONER

## 2023-09-25 PROCEDURE — 84295 ASSAY OF SERUM SODIUM: CPT | Mod: 91

## 2023-09-25 PROCEDURE — 93325 DOPPLER ECHO COLOR FLOW MAPG: CPT

## 2023-09-25 PROCEDURE — 700111 HCHG RX REV CODE 636 W/ 250 OVERRIDE (IP): Performed by: INTERNAL MEDICINE

## 2023-09-25 PROCEDURE — 99291 CRITICAL CARE FIRST HOUR: CPT | Performed by: INTERNAL MEDICINE

## 2023-09-25 DEVICE — PATCH 2X9CM XENOSURE BIOLOGIC VASCULAR---ORDER IN MULTIPLES OF 5---: Type: IMPLANTABLE DEVICE | Site: HEART | Status: FUNCTIONAL

## 2023-09-25 DEVICE — VALVE INSPIRIS AORTIC 25MM: Type: IMPLANTABLE DEVICE | Site: HEART | Status: FUNCTIONAL

## 2023-09-25 RX ORDER — ASPIRIN 81 MG/1
81 TABLET ORAL EVERY EVENING
Status: DISCONTINUED | OUTPATIENT
Start: 2023-09-25 | End: 2023-09-25

## 2023-09-25 RX ORDER — DEXTROSE MONOHYDRATE 25 G/50ML
25 INJECTION, SOLUTION INTRAVENOUS
Status: CANCELLED | OUTPATIENT
Start: 2023-09-25

## 2023-09-25 RX ORDER — METOCLOPRAMIDE HYDROCHLORIDE 5 MG/ML
INJECTION INTRAMUSCULAR; INTRAVENOUS PRN
Status: DISCONTINUED | OUTPATIENT
Start: 2023-09-25 | End: 2023-09-25 | Stop reason: SURG

## 2023-09-25 RX ORDER — ROSUVASTATIN CALCIUM 20 MG/1
40 TABLET, COATED ORAL NIGHTLY
Status: DISCONTINUED | OUTPATIENT
Start: 2023-09-26 | End: 2023-09-30 | Stop reason: HOSPADM

## 2023-09-25 RX ORDER — LEVOTHYROXINE SODIUM 0.05 MG/1
50 TABLET ORAL
Status: DISCONTINUED | OUTPATIENT
Start: 2023-09-26 | End: 2023-09-30 | Stop reason: HOSPADM

## 2023-09-25 RX ORDER — BUPIVACAINE HYDROCHLORIDE 2.5 MG/ML
INJECTION, SOLUTION EPIDURAL; INFILTRATION; INTRACAUDAL
Status: DISCONTINUED | OUTPATIENT
Start: 2023-09-25 | End: 2023-09-25 | Stop reason: HOSPADM

## 2023-09-25 RX ORDER — ONDANSETRON 2 MG/ML
INJECTION INTRAMUSCULAR; INTRAVENOUS PRN
Status: DISCONTINUED | OUTPATIENT
Start: 2023-09-25 | End: 2023-09-25 | Stop reason: SURG

## 2023-09-25 RX ORDER — MORPHINE SULFATE 4 MG/ML
4 INJECTION INTRAVENOUS
Status: DISCONTINUED | OUTPATIENT
Start: 2023-09-25 | End: 2023-09-30 | Stop reason: HOSPADM

## 2023-09-25 RX ORDER — DEXMEDETOMIDINE HYDROCHLORIDE 4 UG/ML
0-1.5 INJECTION, SOLUTION INTRAVENOUS CONTINUOUS
Status: DISCONTINUED | OUTPATIENT
Start: 2023-09-25 | End: 2023-09-25

## 2023-09-25 RX ORDER — HYDROMORPHONE HYDROCHLORIDE 1 MG/ML
0.1 INJECTION, SOLUTION INTRAMUSCULAR; INTRAVENOUS; SUBCUTANEOUS
Status: CANCELLED | OUTPATIENT
Start: 2023-09-25

## 2023-09-25 RX ORDER — METHADONE HYDROCHLORIDE 10 MG/ML
INJECTION, SOLUTION INTRAMUSCULAR; INTRAVENOUS; SUBCUTANEOUS PRN
Status: DISCONTINUED | OUTPATIENT
Start: 2023-09-25 | End: 2023-09-25 | Stop reason: SURG

## 2023-09-25 RX ORDER — SODIUM CHLORIDE, SODIUM LACTATE, POTASSIUM CHLORIDE, CALCIUM CHLORIDE 600; 310; 30; 20 MG/100ML; MG/100ML; MG/100ML; MG/100ML
INJECTION, SOLUTION INTRAVENOUS CONTINUOUS
Status: CANCELLED | OUTPATIENT
Start: 2023-09-25

## 2023-09-25 RX ORDER — PHENYLEPHRINE HYDROCHLORIDE 10 MG/ML
INJECTION, SOLUTION INTRAMUSCULAR; INTRAVENOUS; SUBCUTANEOUS PRN
Status: DISCONTINUED | OUTPATIENT
Start: 2023-09-25 | End: 2023-09-25 | Stop reason: SURG

## 2023-09-25 RX ORDER — SODIUM CHLORIDE, SODIUM GLUCONATE, SODIUM ACETATE, POTASSIUM CHLORIDE AND MAGNESIUM CHLORIDE 526; 502; 368; 37; 30 MG/100ML; MG/100ML; MG/100ML; MG/100ML; MG/100ML
INJECTION, SOLUTION INTRAVENOUS
Status: DISCONTINUED | OUTPATIENT
Start: 2023-09-25 | End: 2023-09-25 | Stop reason: SURG

## 2023-09-25 RX ORDER — OXYCODONE HYDROCHLORIDE 5 MG/1
5 TABLET ORAL
Status: DISCONTINUED | OUTPATIENT
Start: 2023-09-25 | End: 2023-09-30 | Stop reason: HOSPADM

## 2023-09-25 RX ORDER — POLYETHYLENE GLYCOL 3350 17 G/17G
1 POWDER, FOR SOLUTION ORAL
Status: CANCELLED | OUTPATIENT
Start: 2023-09-25

## 2023-09-25 RX ORDER — PROCHLORPERAZINE EDISYLATE 5 MG/ML
10 INJECTION INTRAMUSCULAR; INTRAVENOUS EVERY 6 HOURS PRN
Status: DISCONTINUED | OUTPATIENT
Start: 2023-09-25 | End: 2023-09-30 | Stop reason: HOSPADM

## 2023-09-25 RX ORDER — LIDOCAINE HYDROCHLORIDE 20 MG/ML
INJECTION, SOLUTION INFILTRATION; PERINEURAL
Status: DISCONTINUED | OUTPATIENT
Start: 2023-09-25 | End: 2023-09-25 | Stop reason: HOSPADM

## 2023-09-25 RX ORDER — HYDRALAZINE HYDROCHLORIDE 20 MG/ML
10 INJECTION INTRAMUSCULAR; INTRAVENOUS
Status: CANCELLED | OUTPATIENT
Start: 2023-09-25

## 2023-09-25 RX ORDER — ACETAMINOPHEN 500 MG
1000 TABLET ORAL EVERY 6 HOURS PRN
Status: DISCONTINUED | OUTPATIENT
Start: 2023-09-30 | End: 2023-09-30 | Stop reason: HOSPADM

## 2023-09-25 RX ORDER — OMEPRAZOLE 20 MG/1
20 CAPSULE, DELAYED RELEASE ORAL DAILY
Status: DISCONTINUED | OUTPATIENT
Start: 2023-09-26 | End: 2023-09-30 | Stop reason: HOSPADM

## 2023-09-25 RX ORDER — POLYETHYLENE GLYCOL 3350 17 G/17G
1 POWDER, FOR SOLUTION ORAL DAILY
Status: DISCONTINUED | OUTPATIENT
Start: 2023-09-26 | End: 2023-09-30 | Stop reason: HOSPADM

## 2023-09-25 RX ORDER — ONDANSETRON 2 MG/ML
4 INJECTION INTRAMUSCULAR; INTRAVENOUS EVERY 4 HOURS PRN
Status: CANCELLED | OUTPATIENT
Start: 2023-09-25

## 2023-09-25 RX ORDER — DIPHENHYDRAMINE HCL 25 MG
25 TABLET ORAL NIGHTLY PRN
Status: CANCELLED | OUTPATIENT
Start: 2023-09-25

## 2023-09-25 RX ORDER — ENOXAPARIN SODIUM 100 MG/ML
40 INJECTION SUBCUTANEOUS DAILY
Status: DISCONTINUED | OUTPATIENT
Start: 2023-09-26 | End: 2023-09-30 | Stop reason: HOSPADM

## 2023-09-25 RX ORDER — HYDROMORPHONE HYDROCHLORIDE 1 MG/ML
0.4 INJECTION, SOLUTION INTRAMUSCULAR; INTRAVENOUS; SUBCUTANEOUS
Status: CANCELLED | OUTPATIENT
Start: 2023-09-25

## 2023-09-25 RX ORDER — SODIUM CHLORIDE 9 MG/ML
INJECTION, SOLUTION INTRAVENOUS CONTINUOUS
Status: DISCONTINUED | OUTPATIENT
Start: 2023-09-25 | End: 2023-09-28

## 2023-09-25 RX ORDER — ONDANSETRON 2 MG/ML
8 INJECTION INTRAMUSCULAR; INTRAVENOUS EVERY 6 HOURS PRN
Status: DISCONTINUED | OUTPATIENT
Start: 2023-09-25 | End: 2023-09-30 | Stop reason: HOSPADM

## 2023-09-25 RX ORDER — DIPHENHYDRAMINE HCL 25 MG
25 TABLET ORAL
Status: DISCONTINUED | OUTPATIENT
Start: 2023-09-25 | End: 2023-09-30 | Stop reason: HOSPADM

## 2023-09-25 RX ORDER — ACETAMINOPHEN 325 MG/1
650 TABLET ORAL EVERY 6 HOURS PRN
Status: CANCELLED | OUTPATIENT
Start: 2023-09-25

## 2023-09-25 RX ORDER — EPINEPHRINE HCL IN 0.9 % NACL 4MG/250ML
0-.5 PLASTIC BAG, INJECTION (ML) INTRAVENOUS CONTINUOUS
Status: DISCONTINUED | OUTPATIENT
Start: 2023-09-25 | End: 2023-09-28

## 2023-09-25 RX ORDER — DIPHENHYDRAMINE HYDROCHLORIDE 50 MG/ML
25 INJECTION INTRAMUSCULAR; INTRAVENOUS EVERY 6 HOURS PRN
Status: CANCELLED | OUTPATIENT
Start: 2023-09-25

## 2023-09-25 RX ORDER — VERAPAMIL HYDROCHLORIDE 2.5 MG/ML
INJECTION, SOLUTION INTRAVENOUS
Status: DISCONTINUED | OUTPATIENT
Start: 2023-09-25 | End: 2023-09-25 | Stop reason: HOSPADM

## 2023-09-25 RX ORDER — DEXTROSE MONOHYDRATE 25 G/50ML
12.5-25 INJECTION, SOLUTION INTRAVENOUS PRN
Status: DISCONTINUED | OUTPATIENT
Start: 2023-09-25 | End: 2023-09-26

## 2023-09-25 RX ORDER — DIPHENHYDRAMINE HYDROCHLORIDE 50 MG/ML
12.5 INJECTION INTRAMUSCULAR; INTRAVENOUS
Status: CANCELLED | OUTPATIENT
Start: 2023-09-25

## 2023-09-25 RX ORDER — BISACODYL 10 MG
10 SUPPOSITORY, RECTAL RECTAL
Status: DISCONTINUED | OUTPATIENT
Start: 2023-09-25 | End: 2023-09-30 | Stop reason: HOSPADM

## 2023-09-25 RX ORDER — SODIUM CHLORIDE 9 MG/ML
INJECTION, SOLUTION INTRAVENOUS CONTINUOUS
Status: CANCELLED | OUTPATIENT
Start: 2023-09-25 | End: 2023-09-25

## 2023-09-25 RX ORDER — SODIUM CHLORIDE, SODIUM LACTATE, POTASSIUM CHLORIDE, CALCIUM CHLORIDE 600; 310; 30; 20 MG/100ML; MG/100ML; MG/100ML; MG/100ML
INJECTION, SOLUTION INTRAVENOUS
Status: DISCONTINUED | OUTPATIENT
Start: 2023-09-25 | End: 2023-09-25 | Stop reason: SURG

## 2023-09-25 RX ORDER — HYDRALAZINE HYDROCHLORIDE 20 MG/ML
5 INJECTION INTRAMUSCULAR; INTRAVENOUS
Status: CANCELLED | OUTPATIENT
Start: 2023-09-25

## 2023-09-25 RX ORDER — SODIUM CHLORIDE 9 MG/ML
INJECTION, SOLUTION INTRAVENOUS
Status: DISCONTINUED | OUTPATIENT
Start: 2023-09-25 | End: 2023-09-25 | Stop reason: SURG

## 2023-09-25 RX ORDER — EMPAGLIFLOZIN, METFORMIN HYDROCHLORIDE 12.5; 1 MG/1; MG/1
1 TABLET, EXTENDED RELEASE ORAL 2 TIMES DAILY
COMMUNITY
End: 2024-03-19

## 2023-09-25 RX ORDER — PROTAMINE SULFATE 10 MG/ML
INJECTION, SOLUTION INTRAVENOUS PRN
Status: DISCONTINUED | OUTPATIENT
Start: 2023-09-25 | End: 2023-09-25 | Stop reason: SURG

## 2023-09-25 RX ORDER — MEPERIDINE HYDROCHLORIDE 25 MG/ML
12.5 INJECTION INTRAMUSCULAR; INTRAVENOUS; SUBCUTANEOUS
Status: CANCELLED | OUTPATIENT
Start: 2023-09-25

## 2023-09-25 RX ORDER — INSULIN LISPRO 100 [IU]/ML
0-14 INJECTION, SOLUTION INTRAVENOUS; SUBCUTANEOUS
Status: DISCONTINUED | OUTPATIENT
Start: 2023-09-26 | End: 2023-09-26

## 2023-09-25 RX ORDER — TRAMADOL HYDROCHLORIDE 50 MG/1
50 TABLET ORAL EVERY 4 HOURS PRN
Status: DISCONTINUED | OUTPATIENT
Start: 2023-09-25 | End: 2023-09-30 | Stop reason: HOSPADM

## 2023-09-25 RX ORDER — OXYCODONE HCL 5 MG/5 ML
5 SOLUTION, ORAL ORAL
Status: CANCELLED | OUTPATIENT
Start: 2023-09-25

## 2023-09-25 RX ORDER — MIDAZOLAM HYDROCHLORIDE 1 MG/ML
2 INJECTION INTRAMUSCULAR; INTRAVENOUS
Status: DISCONTINUED | OUTPATIENT
Start: 2023-09-25 | End: 2023-09-30 | Stop reason: HOSPADM

## 2023-09-25 RX ORDER — ONDANSETRON 2 MG/ML
4 INJECTION INTRAMUSCULAR; INTRAVENOUS
Status: CANCELLED | OUTPATIENT
Start: 2023-09-25

## 2023-09-25 RX ORDER — HEPARIN SODIUM,PORCINE 1000/ML
VIAL (ML) INJECTION PRN
Status: DISCONTINUED | OUTPATIENT
Start: 2023-09-25 | End: 2023-09-25 | Stop reason: SURG

## 2023-09-25 RX ORDER — BISACODYL 10 MG
10 SUPPOSITORY, RECTAL RECTAL
Status: CANCELLED | OUTPATIENT
Start: 2023-09-25

## 2023-09-25 RX ORDER — HYDROMORPHONE HYDROCHLORIDE 1 MG/ML
0.2 INJECTION, SOLUTION INTRAMUSCULAR; INTRAVENOUS; SUBCUTANEOUS
Status: CANCELLED | OUTPATIENT
Start: 2023-09-25

## 2023-09-25 RX ORDER — AMOXICILLIN 250 MG
2 CAPSULE ORAL 2 TIMES DAILY
Status: CANCELLED | OUTPATIENT
Start: 2023-09-25

## 2023-09-25 RX ORDER — ACETAMINOPHEN 500 MG
1000 TABLET ORAL EVERY 6 HOURS
Status: DISCONTINUED | OUTPATIENT
Start: 2023-09-25 | End: 2023-09-30 | Stop reason: HOSPADM

## 2023-09-25 RX ORDER — LIDOCAINE HYDROCHLORIDE 20 MG/ML
INJECTION, SOLUTION EPIDURAL; INFILTRATION; INTRACAUDAL; PERINEURAL PRN
Status: DISCONTINUED | OUTPATIENT
Start: 2023-09-25 | End: 2023-09-25 | Stop reason: SURG

## 2023-09-25 RX ORDER — SODIUM CHLORIDE, SODIUM GLUCONATE, SODIUM ACETATE, POTASSIUM CHLORIDE AND MAGNESIUM CHLORIDE 526; 502; 368; 37; 30 MG/100ML; MG/100ML; MG/100ML; MG/100ML; MG/100ML
INJECTION, SOLUTION INTRAVENOUS PRN
Status: DISCONTINUED | OUTPATIENT
Start: 2023-09-25 | End: 2023-09-28

## 2023-09-25 RX ORDER — MAGNESIUM SULFATE 1 G/100ML
1 INJECTION INTRAVENOUS DAILY
Status: COMPLETED | OUTPATIENT
Start: 2023-09-25 | End: 2023-09-27

## 2023-09-25 RX ORDER — ASPIRIN 81 MG/1
81 TABLET ORAL DAILY
Status: DISCONTINUED | OUTPATIENT
Start: 2023-09-26 | End: 2023-09-30 | Stop reason: HOSPADM

## 2023-09-25 RX ORDER — ALUMINA, MAGNESIA, AND SIMETHICONE 2400; 2400; 240 MG/30ML; MG/30ML; MG/30ML
30 SUSPENSION ORAL EVERY 4 HOURS PRN
Status: DISCONTINUED | OUTPATIENT
Start: 2023-09-25 | End: 2023-09-30 | Stop reason: HOSPADM

## 2023-09-25 RX ORDER — OXYCODONE HCL 5 MG/5 ML
10 SOLUTION, ORAL ORAL
Status: CANCELLED | OUTPATIENT
Start: 2023-09-25

## 2023-09-25 RX ORDER — DEXMEDETOMIDINE HYDROCHLORIDE 4 UG/ML
0-1.5 INJECTION, SOLUTION INTRAVENOUS CONTINUOUS
Status: DISCONTINUED | OUTPATIENT
Start: 2023-09-25 | End: 2023-09-28

## 2023-09-25 RX ORDER — AMOXICILLIN 250 MG
2 CAPSULE ORAL 2 TIMES DAILY
Status: DISCONTINUED | OUTPATIENT
Start: 2023-09-25 | End: 2023-09-30 | Stop reason: HOSPADM

## 2023-09-25 RX ORDER — LISINOPRIL 5 MG/1
5 TABLET ORAL NIGHTLY
Status: DISCONTINUED | OUTPATIENT
Start: 2023-09-25 | End: 2023-09-25

## 2023-09-25 RX ORDER — AMIODARONE HYDROCHLORIDE 150 MG/3ML
INJECTION, SOLUTION INTRAVENOUS PRN
Status: DISCONTINUED | OUTPATIENT
Start: 2023-09-25 | End: 2023-09-25 | Stop reason: SURG

## 2023-09-25 RX ORDER — NOREPINEPHRINE BITARTRATE 0.03 MG/ML
0-1 INJECTION, SOLUTION INTRAVENOUS CONTINUOUS
Status: DISCONTINUED | OUTPATIENT
Start: 2023-09-25 | End: 2023-09-28

## 2023-09-25 RX ORDER — NOREPINEPHRINE BITARTRATE 0.03 MG/ML
0-1 INJECTION, SOLUTION INTRAVENOUS CONTINUOUS
Status: DISCONTINUED | OUTPATIENT
Start: 2023-09-25 | End: 2023-09-25

## 2023-09-25 RX ORDER — SODIUM CHLORIDE, SODIUM LACTATE, POTASSIUM CHLORIDE, CALCIUM CHLORIDE 600; 310; 30; 20 MG/100ML; MG/100ML; MG/100ML; MG/100ML
INJECTION, SOLUTION INTRAVENOUS CONTINUOUS
Status: ACTIVE | OUTPATIENT
Start: 2023-09-25 | End: 2023-09-25

## 2023-09-25 RX ORDER — OXYCODONE HYDROCHLORIDE 10 MG/1
10 TABLET ORAL
Status: DISCONTINUED | OUTPATIENT
Start: 2023-09-25 | End: 2023-09-30 | Stop reason: HOSPADM

## 2023-09-25 RX ORDER — NITROGLYCERIN 20 MG/100ML
0-100 INJECTION INTRAVENOUS CONTINUOUS
Status: DISCONTINUED | OUTPATIENT
Start: 2023-09-25 | End: 2023-09-28

## 2023-09-25 RX ORDER — HALOPERIDOL 5 MG/ML
1 INJECTION INTRAMUSCULAR
Status: CANCELLED | OUTPATIENT
Start: 2023-09-25

## 2023-09-25 RX ORDER — CEFAZOLIN SODIUM 1 G/3ML
INJECTION, POWDER, FOR SOLUTION INTRAMUSCULAR; INTRAVENOUS PRN
Status: DISCONTINUED | OUTPATIENT
Start: 2023-09-25 | End: 2023-09-25 | Stop reason: SURG

## 2023-09-25 RX ADMIN — FENTANYL CITRATE 50 MCG: 50 INJECTION, SOLUTION INTRAMUSCULAR; INTRAVENOUS at 12:21

## 2023-09-25 RX ADMIN — LIDOCAINE HYDROCHLORIDE 100 MG: 20 INJECTION, SOLUTION EPIDURAL; INFILTRATION; INTRACAUDAL at 11:59

## 2023-09-25 RX ADMIN — PROPOFOL 200 MG: 10 INJECTION, EMULSION INTRAVENOUS at 11:59

## 2023-09-25 RX ADMIN — VANCOMYCIN HYDROCHLORIDE 1.5 G: 1 INJECTION, POWDER, LYOPHILIZED, FOR SOLUTION INTRAVENOUS at 13:30

## 2023-09-25 RX ADMIN — FENTANYL CITRATE 50 MCG: 50 INJECTION, SOLUTION INTRAMUSCULAR; INTRAVENOUS at 14:01

## 2023-09-25 RX ADMIN — PHENYLEPHRINE HYDROCHLORIDE 200 MCG: 10 INJECTION INTRAVENOUS at 14:32

## 2023-09-25 RX ADMIN — SODIUM CHLORIDE, POTASSIUM CHLORIDE, SODIUM LACTATE AND CALCIUM CHLORIDE: 600; 310; 30; 20 INJECTION, SOLUTION INTRAVENOUS at 11:53

## 2023-09-25 RX ADMIN — AMIODARONE HYDROCHLORIDE 300 MG: 50 INJECTION, SOLUTION INTRAVENOUS at 16:34

## 2023-09-25 RX ADMIN — CEFAZOLIN 2 G: 1 INJECTION, POWDER, FOR SOLUTION INTRAMUSCULAR; INTRAVENOUS at 11:59

## 2023-09-25 RX ADMIN — ROCURONIUM BROMIDE 50 MG: 10 INJECTION, SOLUTION INTRAVENOUS at 12:58

## 2023-09-25 RX ADMIN — METHADONE HYDROCHLORIDE 10 MG: 10 INJECTION, SOLUTION INTRAMUSCULAR; INTRAVENOUS; SUBCUTANEOUS at 14:01

## 2023-09-25 RX ADMIN — PHENYLEPHRINE HYDROCHLORIDE 200 MCG: 10 INJECTION INTRAVENOUS at 14:40

## 2023-09-25 RX ADMIN — AMINOCAPROIC ACID 9.68 G: 250 INJECTION, SOLUTION INTRAVENOUS at 13:30

## 2023-09-25 RX ADMIN — AMINOCAPROIC ACID 5 G: 250 INJECTION, SOLUTION INTRAVENOUS at 14:00

## 2023-09-25 RX ADMIN — SODIUM CHLORIDE, SODIUM GLUCONATE, SODIUM ACETATE, POTASSIUM CHLORIDE AND MAGNESIUM CHLORIDE: 526; 502; 368; 37; 30 INJECTION, SOLUTION INTRAVENOUS at 17:07

## 2023-09-25 RX ADMIN — PROTAMINE SULFATE 400 MG: 10 INJECTION, SOLUTION INTRAVENOUS at 16:40

## 2023-09-25 RX ADMIN — SODIUM CHLORIDE, SODIUM GLUCONATE, SODIUM ACETATE, POTASSIUM CHLORIDE AND MAGNESIUM CHLORIDE: 526; 502; 368; 37; 30 INJECTION, SOLUTION INTRAVENOUS at 18:55

## 2023-09-25 RX ADMIN — CALCIUM CHLORIDE 1000 MG: 100 INJECTION, SOLUTION INTRAVENOUS at 19:06

## 2023-09-25 RX ADMIN — METOCLOPRAMIDE 10 MG: 5 INJECTION, SOLUTION INTRAMUSCULAR; INTRAVENOUS at 16:55

## 2023-09-25 RX ADMIN — MAGNESIUM SULFATE IN DEXTROSE 1 G: 10 INJECTION, SOLUTION INTRAVENOUS at 20:04

## 2023-09-25 RX ADMIN — PHENYLEPHRINE HYDROCHLORIDE 100 MCG: 10 INJECTION INTRAVENOUS at 13:45

## 2023-09-25 RX ADMIN — HEPARIN SODIUM 10000 UNITS: 1000 INJECTION, SOLUTION INTRAVENOUS; SUBCUTANEOUS at 12:21

## 2023-09-25 RX ADMIN — PHENYLEPHRINE HYDROCHLORIDE 200 MCG: 10 INJECTION INTRAVENOUS at 14:38

## 2023-09-25 RX ADMIN — SODIUM CHLORIDE: 9 INJECTION, SOLUTION INTRAVENOUS at 19:06

## 2023-09-25 RX ADMIN — NOREPINEPHRINE BITARTRATE 0.05 MCG/KG/MIN: 1 INJECTION, SOLUTION, CONCENTRATE INTRAVENOUS at 16:44

## 2023-09-25 RX ADMIN — PHENYLEPHRINE HYDROCHLORIDE 200 MCG: 10 INJECTION INTRAVENOUS at 14:24

## 2023-09-25 RX ADMIN — Medication 25000 UNITS: at 14:20

## 2023-09-25 RX ADMIN — DEXMEDETOMIDINE 0.2 MCG/KG/HR: 100 INJECTION, SOLUTION INTRAVENOUS at 17:17

## 2023-09-25 RX ADMIN — SODIUM CHLORIDE, POTASSIUM CHLORIDE, SODIUM LACTATE AND CALCIUM CHLORIDE: 600; 310; 30; 20 INJECTION, SOLUTION INTRAVENOUS at 14:12

## 2023-09-25 RX ADMIN — SODIUM CHLORIDE, SODIUM GLUCONATE, SODIUM ACETATE, POTASSIUM CHLORIDE AND MAGNESIUM CHLORIDE: 526; 502; 368; 37; 30 INJECTION, SOLUTION INTRAVENOUS at 13:30

## 2023-09-25 RX ADMIN — NOREPINEPHRINE BITARTRATE 0.07 MCG/KG/MIN: 1 INJECTION, SOLUTION, CONCENTRATE INTRAVENOUS at 18:29

## 2023-09-25 RX ADMIN — CLEVIPIDINE 500 MCG: 0.5 EMULSION INTRAVENOUS at 14:02

## 2023-09-25 RX ADMIN — ROCURONIUM BROMIDE 50 MG: 10 INJECTION, SOLUTION INTRAVENOUS at 11:59

## 2023-09-25 RX ADMIN — SODIUM BICARBONATE 50 MEQ: 84 INJECTION, SOLUTION INTRAVENOUS at 19:42

## 2023-09-25 RX ADMIN — ROCURONIUM BROMIDE 50 MG: 10 INJECTION, SOLUTION INTRAVENOUS at 15:02

## 2023-09-25 RX ADMIN — NOREPINEPHRINE BITARTRATE 0.07 MCG/KG/MIN: 1 INJECTION INTRAVENOUS at 18:32

## 2023-09-25 RX ADMIN — ONDANSETRON 4 MG: 2 INJECTION INTRAMUSCULAR; INTRAVENOUS at 16:55

## 2023-09-25 RX ADMIN — PHENYLEPHRINE HYDROCHLORIDE 200 MCG: 10 INJECTION INTRAVENOUS at 16:37

## 2023-09-25 RX ADMIN — VASOPRESSIN 0.03 UNITS/MIN: 20 INJECTION INTRAVENOUS at 16:51

## 2023-09-25 RX ADMIN — PHENYLEPHRINE HYDROCHLORIDE 200 MCG: 10 INJECTION INTRAVENOUS at 14:07

## 2023-09-25 RX ADMIN — PHENYLEPHRINE HYDROCHLORIDE 100 MCG: 10 INJECTION INTRAVENOUS at 16:46

## 2023-09-25 RX ADMIN — Medication 1 APPLICATOR: at 21:09

## 2023-09-25 RX ADMIN — SODIUM CHLORIDE 4 UNITS/HR: 9 INJECTION, SOLUTION INTRAVENOUS at 13:58

## 2023-09-25 RX ADMIN — SODIUM CHLORIDE: 9 INJECTION, SOLUTION INTRAVENOUS at 13:30

## 2023-09-25 RX ADMIN — MORPHINE SULFATE 4 MG: 4 INJECTION, SOLUTION INTRAMUSCULAR; INTRAVENOUS at 20:06

## 2023-09-25 ASSESSMENT — PAIN DESCRIPTION - PAIN TYPE
TYPE: SURGICAL PAIN

## 2023-09-25 ASSESSMENT — FIBROSIS 4 INDEX: FIB4 SCORE: 1.96

## 2023-09-25 NOTE — ANESTHESIA PROCEDURE NOTES
Central Venous Line    Performed by: Mynor Geronimo M.D.  Authorized by: Mynor Geronimo M.D.    Start Time:  9/25/2023 1:18 PM  End Time:  9/25/2023 1:25 PM  Patient Location:  OR  Indication: central venous access        provider hand hygiene performed prior to central venous catheter insertion, all 5 sterile barriers used (gloves, gown, cap, mask, large sterile drape) during central venous catheter insertion and skin prep agent completely dried prior to procedure    Patient Position:  Trendelenburg  Laterality:  Right  Site:  Internal jugular  Prep:  Chlorhexidine  Catheter Size:  8 Fr  Catheter Length (cm):  16  Catheter Type:  Introducer  Number of Lumens:  Double lumen  target vein identified, needle advanced into vein and blood aspirated and guidewire advanced into vein    Seldinger Technique?: Yes    Ultrasound-Guided: ultrasound-guided  Image captured, interpreted and electronically stored.  Sterile Gel and Probe Cover Used for Ultrasound?: Yes    Intravenous Verification: verified by ultrasound, venous blood return and chest x-ray pending    all ports aspirated, all ports flushed easily, guidewire was removed intact, biopatch was applied, line was sutured in place and dressing was applied    Events: patient tolerated procedure well with no complications

## 2023-09-25 NOTE — ANESTHESIA PROCEDURE NOTES
Central Venous Line    Performed by: Mynor Geronimo M.D.  Authorized by: Mynor Geronimo M.D.    Start Time:  9/25/2023 1:25 PM  End Time:  9/25/2023 1:30 PM  Patient Location:  OR  Indication: central venous access and hemodynamic monitoring        provider hand hygiene performed prior to central venous catheter insertion, all 5 sterile barriers used (gloves, gown, cap, mask, large sterile drape) during central venous catheter insertion and skin prep agent completely dried prior to procedure    Patient Position:  Trendelenburg  Site:  Internal jugular  Prep:  Chlorhexidine  Catheter Size:  8.5 Fr  Catheter Length (cm):  10  Number of Lumens:  Single lumen  target vein identified, needle advanced into vein and blood aspirated and guidewire advanced into vein    Seldinger Technique?: Yes    Ultrasound-Guided: ultrasound-guided  Image captured, interpreted and electronically stored.  Sterile Gel and Probe Cover Used for Ultrasound?: Yes    Intravenous Verification: verified by ultrasound, venous blood return and chest x-ray pending    all ports aspirated, all ports flushed easily, guidewire was removed intact, biopatch was applied, line was sutured in place and dressing was applied    Events: patient tolerated procedure well with no complications    PA Catheter Placed?: Yes    PA Catheter Type:  Oximetric  PA Catheter Size:  7  Laterality:  Right  Site:  Through introducer  Placement Confirmation: waveform    PA Catheter Depth (cm):  58

## 2023-09-25 NOTE — ANESTHESIA PROCEDURE NOTES
Airway    Date/Time: 9/25/2023 12:00 PM    Performed by: Mynor Geronimo M.D.  Authorized by: Mynor Geronimo M.D.    Location:  OR  Urgency:  Elective  Difficult Airway: No    Indications for Airway Management:  Anesthesia      Spontaneous Ventilation: absent    Sedation Level:  Deep  Preoxygenated: Yes    Patient Position:  Sniffing  Final Airway Type:  Endotracheal airway  Final Endotracheal Airway:  ETT  Cuffed: Yes    Technique Used for Successful ETT Placement:  Direct laryngoscopy  Devices/Methods Used in Placement:  Intubating stylet    Insertion Site:  Oral  Blade Type:  Quang  Laryngoscope Blade/Videolaryngoscope Blade Size:  4  ETT Size (mm):  8.0  Measured from:  Teeth  ETT to Teeth (cm):  23  Placement Verified by: auscultation and capnometry    Cormack-Lehane Classification:  Grade I - full view of glottis  Number of Attempts at Approach:  1

## 2023-09-25 NOTE — ANESTHESIA PREPROCEDURE EVALUATION
Case: [id number] Anesthesia Start Date/Time: 09/25/23 1153    Procedures:       TRANSCATHETER AORTIC VALVE REPLACEMENT (Bilateral: Groin)      ECHOCARDIOGRAM, TRANSESOPHAGEAL, INTRAOPERATIVE (Mouth)    Anesthesia type: general    Pre-op diagnosis: SEVERE AORTIC STENOSIS    Location: [facility] OR  / SURGERY [facility]    Surgeons: Puma Hsu M.D.          Relevant Problems   ANESTHESIA   (positive) Obstructive sleep apnea on CPAP      CARDIAC   (positive) Coronary artery disease due to calcified coronary lesion   (positive) Enlarged thoracic aorta (HCC)   (positive) Pacemaker   (positive) RBBB   (positive) Severe aortic stenosis         (positive) Cyst of left kidney      ENDO   (positive) Subclinical hypothyroidism   (positive) Type 2 diabetes mellitus with other specified complication (HCC)       Physical Exam    Airway   Mallampati: II  TM distance: >3 FB  Neck ROM: full       Cardiovascular - normal exam  Rhythm: regular  Rate: normal  (-) murmur     Dental - normal exam           Pulmonary - normal exam  Breath sounds clear to auscultation     Abdominal    Neurological - normal exam                 Anesthesia Plan    ASA 4 (Severe valvular disease)- EMERGENT   ASA physical status 4 criteria: other (comment)ASA physical status emergent criteria: cardiac compromise requiring immediate intervention    Plan - general       Airway plan will be ETT  ABDIRIZAK Planned        Induction: intravenous    Postoperative Plan: Postoperative administration of opioids is intended.    Pertinent diagnostic labs and testing reviewed    Informed Consent:    Anesthetic plan and risks discussed with patient and spouse.    Use of blood products discussed with: patient and spouse whom.

## 2023-09-25 NOTE — OP REPORT
"TRANSCATHETER AORTIC VALVE REPLACEMENT REPORT    Referring Provider: myself    INTERVENTIONAL CARDIOLOGIST: Puma Hsu MD  CARDIAC SURGEON: Turner Johnson DO  ANESTHESIOLOGIST: Mynor Geronimo MD    ASSISTANT: None    IMPRESSIONS:  1. Unsuccessful attempt at implant of a 23 Otero Humza S3 Ultra Resilia deployed at nominal volume via the transfemoral approach  2.  Procedure complicated by valve embolization, valve lodged in aortic arch and necessitating surgical removal    Recommendations:  Surgical removal of embolized TAVR prosthesis.     Pre-procedure diagnosis: TAVR recommended by heart valve team. Stage D3 (symptomatic severe AS, pLF/LG)  Post-procedure diagnosis: Same    Procedure performed  Pigtail placement/ascending aortography  Transvenous pacemaker  23 Otero S3 Ultra Resilia  Preclose suture deployment    Procedure Description  1. Access:   A) Valve Sheath: Right femoral, 14F Otero eSheath. Fluoroscopic guidance was utilized for femoral access Dynamic ultrasound was utilized to gain access.  B) Temporary pacemaker: 6 [place] Islander Left femoral vein. Fluoroscopic guidance was utilized for femoral access Dynamic ultrasound was utilized to gain access.  C) Pigtail catheter: 5/6 [place]er right radial artery Micropuncture technique was utilized following local anesthesia with lidocaine.  A radial cocktail containing verapamil and saline was administered in the radial artery sheath    2. Procedure Description:  ABDIRIZAK images measured an annulus area of ~360, compatible with but smaller than sizing by CT. A plan to use a 23 device was chosen.  A TVP and pigtail catheter were placed and appropriate pacer function and implantation angle confirmed. The preclose was deployed followed by dilation of the tract with an 8F sheath. A standard 0.035\" J wire was used to deliver an catheter to the ascending aorta and then exchange for a 0.035\" Lunderquist wire. Over this wire the Otero E sheath advanced into the " "descending aorta. An AL1 was placed in the ascending aorta and the valve crossed with a 0.035\" strait wire. The catheter was advanced into the LV apex and wire exchanged for a 0.035\" Amplatz Super Stiff wire.   Next a 23 mm Otero Humza S3 Ultra Resilia was deployed under rapid pacing with nominal volume  and 6 yanni. After the valve was fully expanded but before the balloon was deflated a PVC occurred and resulted in embolization of the valve into the ascending aorta- this occurred concurrently with balloon deflation. The patient remained hemodynamically stable and we decided to move the embolized valve into the descending aorta. Rapid pacing was resumed and balloon re inflated. I then attempted to drag the valve into the descending aorta however it would not pass from the arch to the descending portion. I made several attempts with different angles to see if the valve could be safely retrieved. Ultimately it was apparent the valve could not be moved to the descending aorta. After discussing with the team, including Dr. Johnson it was determined the patient would need to have the operation converted to open. Please see Dr. Johnson's note for a full description of the remainder of the operation.     3. Hemostasis:   A) TAVR Sheath: secured in place with the wire still across the valve.   B) TVP:  removed, sheath secured in place  C) Pigtail access: TR band    Technical Factors  1. Complications: Embolization of TAVR valve,   2. Estimated Blood Loss: 50 cc  3. Specimens: None  4. Contrast Volume: 65 ml  5. Sedation: General Anesthesia  6. Echo: ABDIRIZAK    "

## 2023-09-25 NOTE — ANESTHESIA PROCEDURE NOTES
ABDIRIZAK    Date/Time: 9/25/2023 12:08 PM    Performed by: Mynor Geronimo M.D.  Authorized by: Mynor Geronimo M.D.    Start Time:9/25/2023 12:08 PM  Preanesthetic Checklist: patient identified, IV checked, risks and benefits discussed, surgical consent, monitors and equipment checked, pre-op evaluation and timeout performed    Indication for ABDIRIZAK: diagnostic   Patient Location: OR  Intubated: Yes  Bite Block: No  Heart Visualized: Yes  Insertion: atraumatic    **See FULL ABDIRIZAK report in patient's chart via CV Synapse**

## 2023-09-25 NOTE — ANESTHESIA PROCEDURE NOTES
Arterial Line    Performed by: Mynor Geronimo M.D.  Authorized by: Mynor Geronimo M.D.    Start Time:  9/25/2023 12:04 PM  End Time:  9/25/2023 12:05 PM  Localization: ultrasound guidance  Image captured, interpreted and electronically stored.  Patient Location:  OR  Indication: continuous blood pressure monitoring and blood sampling needed        Catheter Size:  20 G  Seldinger Technique?: Yes    Laterality:  Left  Site:  Radial artery  Line Secured:  Antimicrobial disc, tape and transparent dressing  Events: patient tolerated procedure well with no complications

## 2023-09-26 ENCOUNTER — APPOINTMENT (OUTPATIENT)
Dept: RADIOLOGY | Facility: MEDICAL CENTER | Age: 78
DRG: 219 | End: 2023-09-26
Attending: NURSE PRACTITIONER
Payer: MEDICARE

## 2023-09-26 LAB
ANION GAP SERPL CALC-SCNC: 11 MMOL/L (ref 7–16)
BUN SERPL-MCNC: 15 MG/DL (ref 8–22)
CALCIUM SERPL-MCNC: 8.8 MG/DL (ref 8.5–10.5)
CHLORIDE SERPL-SCNC: 111 MMOL/L (ref 96–112)
CO2 SERPL-SCNC: 21 MMOL/L (ref 20–33)
CREAT SERPL-MCNC: 1 MG/DL (ref 0.5–1.4)
EKG IMPRESSION: NORMAL
EKG IMPRESSION: NORMAL
ERYTHROCYTE [DISTWIDTH] IN BLOOD BY AUTOMATED COUNT: 54.5 FL (ref 35.9–50)
GFR SERPLBLD CREATININE-BSD FMLA CKD-EPI: 76 ML/MIN/1.73 M 2
GLUCOSE BLD STRIP.AUTO-MCNC: 119 MG/DL (ref 65–99)
GLUCOSE BLD STRIP.AUTO-MCNC: 127 MG/DL (ref 65–99)
GLUCOSE BLD STRIP.AUTO-MCNC: 144 MG/DL (ref 65–99)
GLUCOSE BLD STRIP.AUTO-MCNC: 150 MG/DL (ref 65–99)
GLUCOSE BLD STRIP.AUTO-MCNC: 150 MG/DL (ref 65–99)
GLUCOSE BLD STRIP.AUTO-MCNC: 155 MG/DL (ref 65–99)
GLUCOSE BLD STRIP.AUTO-MCNC: 158 MG/DL (ref 65–99)
GLUCOSE BLD STRIP.AUTO-MCNC: 163 MG/DL (ref 65–99)
GLUCOSE BLD STRIP.AUTO-MCNC: 168 MG/DL (ref 65–99)
GLUCOSE BLD STRIP.AUTO-MCNC: 184 MG/DL (ref 65–99)
GLUCOSE BLD STRIP.AUTO-MCNC: 190 MG/DL (ref 65–99)
GLUCOSE BLD STRIP.AUTO-MCNC: 194 MG/DL (ref 65–99)
GLUCOSE BLD STRIP.AUTO-MCNC: 194 MG/DL (ref 65–99)
GLUCOSE BLD STRIP.AUTO-MCNC: 196 MG/DL (ref 65–99)
GLUCOSE BLD STRIP.AUTO-MCNC: 197 MG/DL (ref 65–99)
GLUCOSE BLD STRIP.AUTO-MCNC: 201 MG/DL (ref 65–99)
GLUCOSE BLD STRIP.AUTO-MCNC: 210 MG/DL (ref 65–99)
GLUCOSE BLD STRIP.AUTO-MCNC: 214 MG/DL (ref 65–99)
GLUCOSE BLD STRIP.AUTO-MCNC: 217 MG/DL (ref 65–99)
GLUCOSE BLD STRIP.AUTO-MCNC: 219 MG/DL (ref 65–99)
GLUCOSE BLD STRIP.AUTO-MCNC: 225 MG/DL (ref 65–99)
GLUCOSE BLD STRIP.AUTO-MCNC: 230 MG/DL (ref 65–99)
GLUCOSE SERPL-MCNC: 211 MG/DL (ref 65–99)
HCT VFR BLD AUTO: 38.4 % (ref 42–52)
HGB BLD-MCNC: 13.4 G/DL (ref 14–18)
MCH RBC QN AUTO: 30.5 PG (ref 27–33)
MCHC RBC AUTO-ENTMCNC: 34.9 G/DL (ref 32.3–36.5)
MCV RBC AUTO: 87.3 FL (ref 81.4–97.8)
PATHOLOGY CONSULT NOTE: NORMAL
PLATELET # BLD AUTO: 127 K/UL (ref 164–446)
PMV BLD AUTO: 10.5 FL (ref 9–12.9)
POTASSIUM SERPL-SCNC: 3.9 MMOL/L (ref 3.6–5.5)
POTASSIUM SERPL-SCNC: 4 MMOL/L (ref 3.6–5.5)
POTASSIUM SERPL-SCNC: 4.1 MMOL/L (ref 3.6–5.5)
POTASSIUM SERPL-SCNC: 4.1 MMOL/L (ref 3.6–5.5)
RBC # BLD AUTO: 4.4 M/UL (ref 4.7–6.1)
SODIUM SERPL-SCNC: 143 MMOL/L (ref 135–145)
WBC # BLD AUTO: 11 K/UL (ref 4.8–10.8)

## 2023-09-26 PROCEDURE — 93010 ELECTROCARDIOGRAM REPORT: CPT | Mod: 76 | Performed by: INTERNAL MEDICINE

## 2023-09-26 PROCEDURE — 71045 X-RAY EXAM CHEST 1 VIEW: CPT

## 2023-09-26 PROCEDURE — 82962 GLUCOSE BLOOD TEST: CPT | Mod: 91

## 2023-09-26 PROCEDURE — 94150 VITAL CAPACITY TEST: CPT

## 2023-09-26 PROCEDURE — 700111 HCHG RX REV CODE 636 W/ 250 OVERRIDE (IP): Performed by: NURSE PRACTITIONER

## 2023-09-26 PROCEDURE — P9045 ALBUMIN (HUMAN), 5%, 250 ML: HCPCS | Mod: JZ,JG | Performed by: NURSE PRACTITIONER

## 2023-09-26 PROCEDURE — 700102 HCHG RX REV CODE 250 W/ 637 OVERRIDE(OP): Performed by: NURSE PRACTITIONER

## 2023-09-26 PROCEDURE — A9270 NON-COVERED ITEM OR SERVICE: HCPCS | Performed by: NURSE PRACTITIONER

## 2023-09-26 PROCEDURE — 99024 POSTOP FOLLOW-UP VISIT: CPT | Performed by: NURSE PRACTITIONER

## 2023-09-26 PROCEDURE — 94669 MECHANICAL CHEST WALL OSCILL: CPT

## 2023-09-26 PROCEDURE — 84132 ASSAY OF SERUM POTASSIUM: CPT

## 2023-09-26 PROCEDURE — 700105 HCHG RX REV CODE 258: Performed by: NURSE PRACTITIONER

## 2023-09-26 PROCEDURE — A9270 NON-COVERED ITEM OR SERVICE: HCPCS

## 2023-09-26 PROCEDURE — 93005 ELECTROCARDIOGRAM TRACING: CPT | Performed by: NURSE PRACTITIONER

## 2023-09-26 PROCEDURE — 99291 CRITICAL CARE FIRST HOUR: CPT | Performed by: INTERNAL MEDICINE

## 2023-09-26 PROCEDURE — 85027 COMPLETE CBC AUTOMATED: CPT

## 2023-09-26 PROCEDURE — 770022 HCHG ROOM/CARE - ICU (200)

## 2023-09-26 PROCEDURE — 93010 ELECTROCARDIOGRAM REPORT: CPT | Performed by: INTERNAL MEDICINE

## 2023-09-26 PROCEDURE — 80048 BASIC METABOLIC PNL TOTAL CA: CPT

## 2023-09-26 PROCEDURE — 700102 HCHG RX REV CODE 250 W/ 637 OVERRIDE(OP)

## 2023-09-26 RX ORDER — POTASSIUM CHLORIDE 7.45 MG/ML
10 INJECTION INTRAVENOUS ONCE
Status: COMPLETED | OUTPATIENT
Start: 2023-09-26 | End: 2023-09-26

## 2023-09-26 RX ORDER — INSULIN LISPRO 100 [IU]/ML
2-9 INJECTION, SOLUTION INTRAVENOUS; SUBCUTANEOUS
Status: DISCONTINUED | OUTPATIENT
Start: 2023-09-26 | End: 2023-09-29

## 2023-09-26 RX ORDER — DEXTROSE MONOHYDRATE 25 G/50ML
25 INJECTION, SOLUTION INTRAVENOUS
Status: DISCONTINUED | OUTPATIENT
Start: 2023-09-26 | End: 2023-09-29

## 2023-09-26 RX ORDER — INSULIN LISPRO 100 [IU]/ML
0.2 INJECTION, SOLUTION INTRAVENOUS; SUBCUTANEOUS
Status: DISCONTINUED | OUTPATIENT
Start: 2023-09-26 | End: 2023-09-29

## 2023-09-26 RX ORDER — ALBUMIN, HUMAN INJ 5% 5 %
25 SOLUTION INTRAVENOUS ONCE
Status: COMPLETED | OUTPATIENT
Start: 2023-09-26 | End: 2023-09-26

## 2023-09-26 RX ADMIN — OXYCODONE HYDROCHLORIDE 5 MG: 5 TABLET ORAL at 04:49

## 2023-09-26 RX ADMIN — Medication 1 APPLICATOR: at 08:14

## 2023-09-26 RX ADMIN — SENNOSIDES AND DOCUSATE SODIUM 2 TABLET: 50; 8.6 TABLET ORAL at 05:24

## 2023-09-26 RX ADMIN — OMEPRAZOLE 20 MG: 20 CAPSULE, DELAYED RELEASE ORAL at 05:25

## 2023-09-26 RX ADMIN — POTASSIUM CHLORIDE 10 MEQ: 7.46 INJECTION, SOLUTION INTRAVENOUS at 00:56

## 2023-09-26 RX ADMIN — FENTANYL CITRATE 50 MCG: 50 INJECTION, SOLUTION INTRAMUSCULAR; INTRAVENOUS at 09:54

## 2023-09-26 RX ADMIN — INSULIN GLARGINE-YFGN 25 UNITS: 100 INJECTION, SOLUTION SUBCUTANEOUS at 17:53

## 2023-09-26 RX ADMIN — ACETAMINOPHEN 1000 MG: 500 TABLET, FILM COATED ORAL at 17:49

## 2023-09-26 RX ADMIN — ROSUVASTATIN CALCIUM 40 MG: 20 TABLET, FILM COATED ORAL at 20:22

## 2023-09-26 RX ADMIN — ACETAMINOPHEN 1000 MG: 500 TABLET, FILM COATED ORAL at 05:25

## 2023-09-26 RX ADMIN — Medication 1 APPLICATOR: at 20:22

## 2023-09-26 RX ADMIN — ACETAMINOPHEN 1000 MG: 500 TABLET, FILM COATED ORAL at 13:00

## 2023-09-26 RX ADMIN — TRAMADOL HYDROCHLORIDE 50 MG: 50 TABLET ORAL at 14:15

## 2023-09-26 RX ADMIN — INSULIN LISPRO 2 UNITS: 100 INJECTION, SOLUTION INTRAVENOUS; SUBCUTANEOUS at 20:26

## 2023-09-26 RX ADMIN — POTASSIUM CHLORIDE 10 MEQ: 7.46 INJECTION, SOLUTION INTRAVENOUS at 08:14

## 2023-09-26 RX ADMIN — TRAMADOL HYDROCHLORIDE 50 MG: 50 TABLET ORAL at 21:29

## 2023-09-26 RX ADMIN — OXYCODONE HYDROCHLORIDE 10 MG: 10 TABLET ORAL at 13:00

## 2023-09-26 RX ADMIN — ASPIRIN 81 MG: 81 TABLET, COATED ORAL at 05:24

## 2023-09-26 RX ADMIN — POTASSIUM CHLORIDE 10 MEQ: 7.46 INJECTION, SOLUTION INTRAVENOUS at 16:29

## 2023-09-26 RX ADMIN — INSULIN LISPRO 3 UNITS: 100 INJECTION, SOLUTION INTRAVENOUS; SUBCUTANEOUS at 17:52

## 2023-09-26 RX ADMIN — OXYCODONE HYDROCHLORIDE 5 MG: 5 TABLET ORAL at 01:26

## 2023-09-26 RX ADMIN — OXYCODONE HYDROCHLORIDE 10 MG: 10 TABLET ORAL at 08:26

## 2023-09-26 RX ADMIN — ALBUMIN (HUMAN) 25 G: 12.5 SOLUTION INTRAVENOUS at 10:01

## 2023-09-26 RX ADMIN — MAGNESIUM SULFATE IN DEXTROSE 1 G: 10 INJECTION, SOLUTION INTRAVENOUS at 05:30

## 2023-09-26 RX ADMIN — INSULIN LISPRO 7 UNITS: 100 INJECTION, SOLUTION INTRAVENOUS; SUBCUTANEOUS at 17:52

## 2023-09-26 RX ADMIN — SODIUM CHLORIDE 8.5 UNITS/HR: 9 INJECTION, SOLUTION INTRAVENOUS at 03:34

## 2023-09-26 RX ADMIN — SENNOSIDES AND DOCUSATE SODIUM 2 TABLET: 50; 8.6 TABLET ORAL at 17:49

## 2023-09-26 RX ADMIN — POLYETHYLENE GLYCOL 3350 1 PACKET: 17 POWDER, FOR SOLUTION ORAL at 05:25

## 2023-09-26 RX ADMIN — ENOXAPARIN SODIUM 40 MG: 100 INJECTION SUBCUTANEOUS at 17:55

## 2023-09-26 RX ADMIN — ONDANSETRON 8 MG: 2 INJECTION INTRAMUSCULAR; INTRAVENOUS at 08:09

## 2023-09-26 RX ADMIN — VANCOMYCIN HYDROCHLORIDE 1.5 G: 5 INJECTION, POWDER, LYOPHILIZED, FOR SOLUTION INTRAVENOUS at 02:04

## 2023-09-26 RX ADMIN — LEVOTHYROXINE SODIUM 50 MCG: 0.05 TABLET ORAL at 05:24

## 2023-09-26 ASSESSMENT — PAIN DESCRIPTION - PAIN TYPE
TYPE: SURGICAL PAIN

## 2023-09-26 ASSESSMENT — ENCOUNTER SYMPTOMS
DIZZINESS: 0
FEVER: 0
HEADACHES: 0
EYE DISCHARGE: 0
BRUISES/BLEEDS EASILY: 0
SORE THROAT: 0
MEMORY LOSS: 0
WEIGHT LOSS: 0
SHORTNESS OF BREATH: 0
CHILLS: 0
PALPITATIONS: 0
MYALGIAS: 0
VOMITING: 0
WHEEZING: 0
NAUSEA: 0
COUGH: 0
EYE REDNESS: 0

## 2023-09-26 ASSESSMENT — COPD QUESTIONNAIRES
DO YOU EVER COUGH UP ANY MUCUS OR PHLEGM?: NO/ONLY WITH OCCASIONAL COLDS OR INFECTIONS
DURING THE PAST 4 WEEKS HOW MUCH DID YOU FEEL SHORT OF BREATH: NONE/LITTLE OF THE TIME
COPD SCREENING SCORE: 2
HAVE YOU SMOKED AT LEAST 100 CIGARETTES IN YOUR ENTIRE LIFE: NO/DON'T KNOW

## 2023-09-26 ASSESSMENT — FIBROSIS 4 INDEX: FIB4 SCORE: 2.56

## 2023-09-26 ASSESSMENT — LIFESTYLE VARIABLES: EVER_SMOKED: YES

## 2023-09-26 ASSESSMENT — PULMONARY FUNCTION TESTS: FVC: 1.3

## 2023-09-26 NOTE — PROGRESS NOTES
4 Eyes Skin Assessment Completed by Sarah RN and PJ Altman.    Head WDL  Ears WDL  Nose WDL  Mouth WDL  Neck WDL  Breast/Chest Incision  Shoulder Blades WDL  Spine WDL  (R) Arm/Elbow/Hand Bruising and Edema  (L) Arm/Elbow/Hand Bruising, Scab, and Edema  Abdomen Incision  Groin Incision Rass, bruising  Scrotum/Coccyx/Buttocks WDL  (R) Leg Edema  (L) Leg Edema  (R) Heel/Foot/Toe Edema  (L) Heel/Foot/Toe Edema          Devices In Places ECG, Blood Pressure Cuff, Pulse Ox, Hanson, Arterial Line, SCD's, Central Line, and Pacer      Interventions In Place Gray Ear Foams, Pillows, Low Air Loss Mattress, and Heels Loaded W/Pillows    Possible Skin Injury No    Pictures Uploaded Into Epic N/A  Wound Consult Placed N/A  RN Wound Prevention Protocol Ordered Yes

## 2023-09-26 NOTE — HOSPITAL COURSE
"Per Dr Bacon note from 9/26: \"78 year old male with an embolized TAVR valve, severe symptomatic aortic stenosis, RBBB, LYNDA. Went to the OR on 9/25 for an open heart procedure to retrieve an embolized TAVR valve.  He also underwent aortic valve replacement and repair of aortic root enlargement.  Came to the ICU postoperatively still intubated.\"    9/25: admitted to ICU, intubated, advanced swan jayden  9/26: removed a line, extubated  9/27: plan to remove ct's  "

## 2023-09-26 NOTE — CONSULTS
"Critical Care Consultation    Date of Service  9/25/2023    Referring Physician  Puma Hsu M.D.    Consulting Physician  Dakota Bacon D.O.    Reason for Consultation  Post-cardiac surgery ICU needs.     History of Presenting Illness  From Chart review: \"78 year old male with an embolized TAVR valve, severe symptomatic aortic stenosis, RBBB, LYNDA. Went to the OR on 9/25 for an open heart procedure to retrieve an embolized TAVR valve.  He also underwent aortic valve replacement and repair of aortic root enlargement.  Came to the ICU postoperatively still intubated.    Review of Systems  Review of Systems   Unable to perform ROS: Intubated       Past Medical History   has a past medical history of Anesthesia (09/21/2023), Anxiety, Aortic stenosis (03/2022), Back pain, chronic (09/21/2023), Bilateral primary osteoarthritis of knee (01/23/2018), Black stools (03/09/2022), Blood clotting disorder ([facility]) (1978), Breath shortness (09/21/2023), CAD (coronary artery disease) (11/2021), Cancer ([facility]) (? early 90's), Cancer ([facility]) (09/21/2023), Cataract (09/21/2023), Degeneration of lumbar or lumbosacral intervertebral disc ( ), Diabetes, DJD (degenerative joint disease) of cervical spine ( ), High cholesterol (09/21/2023), Hyperlipidemia, Hypersensitive carotid sinus syndrome ( ), Hypertension (09/21/2023), Hypertension associated with type 2 diabetes mellitus ([facility]) (04/10/2012), Insomnia ( ), Neck pain, Neutropenia ([facility]) (03/04/2022), Pacemaker (10/2012), Pancytopenia ([facility]) (03/09/2022), PONV (postoperative nausea and vomiting) (09/21/2023), Psychiatric problem, RBBB ( ), S/P lumbar discectomy ( ), Sick sinus syndrome ([facility]) ( ), Sleep apnea (09/21/2023), Snoring (09/21/2023), Syncope (10/2012), Thyroid disease (09/21/2023), Unintentional weight loss (03/05/2022), and Unspecified hemorrhagic conditions.    Surgical History   has a past surgical history that includes arthroplasty (Left, 01/01/2004); arthroscopy, knee " (Bilateral, 01/01/1978); tonsillectomy; vasectomy; shoulder decompression (Left, 01/01/2008); orthopedic osteotomy; recovery (10/04/2012); lumbar laminectomy diskectomy (11/20/2012); pacemaker insertion (10/01/2012); cervical fusion posterior (02/27/2013); cervical laminectomy posterior (02/27/2013); wound dehiscence (04/10/2013); recovery (10/17/2013); shoulder arthroscopy (Right, 01/01/2015); other; spinal cord stimulator (1 month ago); pr dstr nrolytc agnt parverteb fct sngl crvcl/thora (Right, 10/19/2016); pr dstr nrolytc agnt parverteb fct addl crvcl/thora (10/19/2016); pr fluoroscopic guidance needle placement (10/19/2016); knee arthroplasty total (Right, 2018); and shoulder arthroscopy (Right, 09/21/2023).    Family History  family history includes Alcohol/Drug in his brother and mother; Cancer in his brother and brother; Diabetes in his brother; Heart Disease in his brother, maternal grandmother, and sister; Heart Disease (age of onset: 36) in his father; Heart Disease (age of onset: 60) in his brother; Hyperlipidemia in his brother and maternal grandmother; Hypertension in his brother and maternal grandmother; Lung Disease in his brother and mother; No Known Problems in his maternal grandfather and paternal grandmother; Sleep Apnea in his brother.    Social History   reports that he quit smoking about 33 years ago. His smoking use included cigarettes. He started smoking about 73 years ago. He has a 40.0 pack-year smoking history. He has never used smokeless tobacco. He reports that he does not drink alcohol and does not use drugs.    Medications  Prior to Admission Medications   Prescriptions Last Dose Informant Patient Reported? Taking?   Dulaglutide (TRULICITY) 1.5 MG/0.5ML Solution Pen-injector 9/13/2023 at Q7D Patient No No   Sig: Inject 0.5 mL under the skin every 7 days.   Empagliflozin-metFORMIN HCl ER (SYNJARDY XR) 12.5-1000 MG TABLET SR 24 HR 9/20/2023 Patient Yes Yes   Sig: Take 1 Tablet by mouth  "2 times a day.   Insulin Pen Needle (PEN NEEDLES 31GX5/16\") 31G X 8 MM Misc  Patient No No   Si Each every day.   aspirin EC (ECOTRIN) 81 MG TBEC 2023 Patient Yes No   Sig: Take 81 mg by mouth every evening.   insulin glargine (LANTUS SOLOSTAR) 100 UNIT/ML Solution Pen-injector injection 2023 at PM Patient No No   Sig: Inject 50 Units under the skin every evening.   Patient taking differently: Inject 50 Units under the skin every evening. 25 UNITS THE NIGHT BEFORE SURGERY   levothyroxine (SYNTHROID) 50 MCG Tab 2023 at AM Patient No No   Sig: Take 1 Tablet by mouth every morning on an empty stomach.   lisinopril (PRINIVIL) 5 MG Tab 2023 at AM Patient No No   Sig: Take 1 Tablet by mouth every day.   Patient taking differently: Take 5 mg by mouth every evening.   rosuvastatin (CRESTOR) 40 MG tablet 2023 at PM Patient No No   Sig: Take 1 Tablet by mouth every day.   Patient taking differently: Take 40 mg by mouth every evening.      Facility-Administered Medications: None       Allergies  Allergies   Allergen Reactions    Aleve Cold & [Pseudoephedrine-Naproxen Na] Anaphylaxis    Ceftriaxone Sodium Anaphylaxis     Reaction; s. Patient has tolerated cefazolin.    Naproxen Anaphylaxis     Reaction; .    Latex Rash     Contact site    Tape Rash and Itching     Plastic tape/tegaderm (paper tape ok)       Physical Exam  Temp:  [36.2 °C (97.2 °F)] 36.2 °C (97.2 °F)  Pulse:  [67-87] 87  Resp:  [18] 18  BP: (114-117)/(59-65) 114/65  SpO2:  [96 %] 96 %    Physical Exam  Vitals and nursing note reviewed. Exam conducted with a chaperone present.   Constitutional:       General: He is not in acute distress.     Appearance: He is not diaphoretic.      Interventions: He is sedated and intubated.   Eyes:      General:         Right eye: No discharge.         Left eye: No discharge.      Conjunctiva/sclera: Conjunctivae normal.   Cardiovascular:      Rate and Rhythm: Normal rate.   Pulmonary:      " Effort: Pulmonary effort is normal. No respiratory distress. He is intubated.      Breath sounds: Rhonchi present. No rales.   Abdominal:      Palpations: Abdomen is soft.   Musculoskeletal:      Right lower leg: No edema.      Left lower leg: No edema.   Skin:     General: Skin is warm.      Capillary Refill: Capillary refill takes less than 2 seconds.      Coloration: Skin is not jaundiced.         Fluids  Date 09/25/23 0700 - 09/26/23 0659   Shift 5053-2662 0061-0313 3088-7348 24 Hour Total   INTAKE   I.V. 2000 1000  3000   Blood  800  800   Shift Total 2000 1800  3800   OUTPUT   Urine  1000  1000   Shift Total  1000  1000   Weight (kg) 96.8 96.8 96.8 96.8       Laboratory                          Imaging    Upon my review, the post-op CXR (left) demonstrates that the [place]-Yi catheter needs to be advanced.       Assessment/Plan  Problem list:   #Acute respiratory failure - intubated for surgery  #POD#0 TRANSCATHETER AORTIC VALVE REPLACEMENT - CONVERTED TO OPEN HEART PROCEDURE   #Chronic back pain  #CAD with history of pacemaker  #Former smoker  #Diabetes with long term insulin use  #History of prostate cancer    Plan  -admit to ICU for hemodynamic monitoring/vent management  -[place] Yi adjusted; repeat CXR pending  -post-cardiac surgery vent liberation protocol   -pain control   -on insulin drip 180 protocol   -on low dose pressors  -being paced to 80 bpm with external pacer wires    The patient remains critically ill.  Critical care time = 65 minutes in directly providing and coordinating critical care and extensive data review.  No time overlap and excludes procedures.    Dakota Bacon,   Staff Pulmonologist and Intensivist  [facility]     Please note that this dictation was created using voice recognition software. The accuracy of the dictation is limited to the abilities of the software. I have made every reasonable attempt to correct obvious errors, but I expect that there are errors of grammar  and possibly content that I did not discover before finalizing the note.

## 2023-09-26 NOTE — THERAPY
Physical Therapy Contact Note    Patient Name: Dakota Boyer  Age:  78 y.o., Sex:  male  Medical Record #: [id number]  Today's Date: 9/26/2023 09/26/23 0742   Interdisciplinary Plan of Care Collaboration   Collaboration Comments PT consult received.  Per chart review pt is POD #1 OHS.  Will HOLD PT eval until at least POD #2 per orders.

## 2023-09-26 NOTE — OP REPORT
Operative Report    PreOp Diagnosis: Embolized TAVR valve, severe symptomatic aortic stenosis, right bundle branch block, class II obesity, obstructive sleep apnea,      PostOp Diagnosis: Same as above      Procedure(s):  TRANSCATHETER AORTIC VALVE REPLACEMENT - CONVERTED TO OPEN HEART PROCEDURE - Wound Class: Clean  ECHOCARDIOGRAM, TRANSESOPHAGEAL, INTRAOPERATIVE - Wound Class: None  REPLACEMENT, AORTIC VALVE AND RETRIEVAL OF FOREIGN BODY,  AORTIC ROOT ENLARGEMENT with a 25 mm Otero Inspiris valve- Wound Class: Clean with Drain    Surgeon(s):  LAYO Renae D.O. Kimball S Knackstedt, D.O.    Anesthesiologist/Type of Anesthesia:  Anesthesiologist: Mynor Geronimo M.D./General    Surgical Staff:  Assistant: ERIN Davis  Circulator: Emmanuel Pathak R.N.; Jeanna Ching R.N.; José Prieto R.N.  Perfusionist: Mark Schaefer; Juan Gaytan  Relief Scrub: Candace Ruiz  Scrub Person: Lisa Tyler; Kelly Wu  Radiology Technologist: Juan Sellers  Cath Lab Tech: Mikayla Cabello    Specimens removed if any:  ID Type Source Tests Collected by Time Destination   A : Aortic valve Tissue Heart Valve PATHOLOGY SPECIMEN Puma Hsu M.D. 9/25/2023  3:00 PM        Estimated Blood Loss: Cardiopulmonary bypass    Findings:     Patient was undergoing a TAVR procedure, unfortunately had embolization of the valve after full expansion.  We were unable to move it out of his aortic arch secondary to tortuosity and calcification of the arch.  Therefore he was taken for a open cardiac procedure.    Pre and postoperative echo showed normal ejection fraction with no wall motion abnormalities.  There was severe aortic stenosis with mild aortic insufficiency, trace to mildly mitral insufficiency.  Postoperatively the valve seated well with no paravalvular leak and a mean gradient of 6 mmHg.    TAVR valve was located in the aortic arch adjacent to the left common carotid and  the left subclavian origins.  It was removed without incident and the lining of the aorta appeared free of any issues.    Mild hematoma of the right groin at the TAVR access site.    Patient  from bypass without issue, did not require defibrillation to resume a normal sinus rhythm.  Patient did develop a ventricular tachycardia that responded to cardioversion as well as a bolus of amiodarone.    Complications: Embolization of TAVR valve requiring conversion to open procedure    Complications: None immediate    Patient condition: Guarded to ICU    Chest tubes: Mediastinal: 32 Ghanaian x2    Pacing wires: RV x2    Pericardium: Open    Cross-clamp time: 101 minutes.  Circulatory arrest time: 2 minutes    Pump time: 116 minutes    Cardioplegia: Cold blood antegrade [place] cardioplegia given.  Initial induction with 1000 mL.  300 mL of warm blood given as antegrade hotshot    Vent: Aortic root      Procedure:    For further details of the TAVR portion of the procedure, please see 's dictation.  I was present throughout the entirety of the procedure, and assisted in gaining access as well as deployment of the valve.  When it became apparent that the valve embolized and we were unable to bring it back into the descending aorta, at that point we made a multidisciplinary decision to proceed to the operating room for open procedure.    After informed consent was obtained, the patient was brought to the operating room.  They were placed in supine position on the operating table.  General anesthesia was induced via endotracheal tube.  Lines, arterial line, Hanson were placed by the nursing and anesthesia teams.  They received pre-incision antibiotics and beta-blockade.  The patient was prepped in a sterile fashion from their chin to their feet.  Drapes were placed in a sterile fashion.  The procedure was begun with a timeout.    I was joined throughout the case by Tanya IRWIN who assisted in every aspect  of the procedure    I began the procedure by making a median sternotomy incision with 10 blade scalpel, and deepened it with electrocautery to the sternum.  The sternum was divided in the midline, and hemostasis of the sternal edges was obtained using electrocautery and bone putty.  Systemic heparinization was performed and we achieved an ACT greater than 480 seconds.  Chest was opened with a sternal retractor.    The innominate vein was dissected out and surrounded with a vessel loop.  Innominate artery was dissected out, however it was difficult to access secondary to the pacing wire in the innominate vein.  The pericardium was dissected out and then opened in the usual fashion tented forward with interrupted stay sutures.    The aorta was normal-appearing and not enlarged.  The distal ascending aorta was cannulated in the usual fashion after placing 2 pursestring sutures.  ABDIRIZAK guidance was used for confirmation of intraluminal position.  Dual stage venous cannula was placed in the right atrial appendage to complete the circuit.  Next, I placed a retrograde cardioplegia catheter into the coronary sinus, followed by a antegrade needle in the mid ascending aorta.  Cardiopulmonary bypass was initiated and the patient was cooled to 28 °C.  Rewarming was begun once the TAVR valve was removed.    Once on full flow, the aorta was mobilized away from the pulmonary artery and the root was mobilized as well.  Cross-clamp was then applied and the heart arrested with cold blood cardioplegia and combination antegrade retrograde.  Ice slush was placed for additional protection.    After arrest, the aorta was opened in a transverse oblique fashion.  The valve was resected and the annulus debrided with scissors and pituitary rongeurs.  Sizing was consistent with a 23 mm valve.  Given his BSA, I decided to perform a root enlargement using a next technique.  I further dissected the dome of the left atrium from the noncoronary sinus  and then extended my aortotomy through the mayur of the noncoronary sinus down onto the mitral leaflet.  I was able to gently dissect the roof of the dome of the left atrium away from the aorta and not enter it.  A large bovine pericardial carotid patch was then used to patch that area, with a running 5-0 Prolene.  Resizing was consistent with a 25 mm valve.    At that point we reached 28 centigrade.  I turned my attention to the aortic arch.  The patient's blood volume was emptied out and the pump was stopped.  He was placed in steep Trendelenburg position and cross-clamp removed.  Examination of the arch revealed the TAVR valve as mentioned above.  I was able to cramp the valve in order to easily remove it from the arch.  After it was removed inspection showed no obvious injury to the arch, therefore replaced the cross-clamp after de-airing the patient.  Normal flow was resumed and he was rewarmed.    I then placed my annular sutures for the valve circumferentially in a U stitch fashion leaving the pledget on the LVOT side.  At the patch the sutures were brought in through the patch from the outside.  Resizing after sutures were and were consistent with a 25 mm valve.  This was brought up prepped and then the annular sutures placed through the sewing cuff.  It seated well on the annulus and had clearance of both coronary ostia.  The sutures were then secured with a core knot device.    The aortotomy was closed by first trimming the patch into a sb shape and bringing both limbs of the 5-0 Prolene suture out towards the midline.  The remainder of the incision was closed with a running 4-0 Prolene suture in 2 layers.    Patient was de-aired, flows dropped, and cross-clamp removed.  As the heart reperfused the placement of right ventricular pacing leads and my mediastinal chest tubes.  They were brought out through the epigastrium in the usual fashion.  Surgical sites were checked and found to be hemostatic.  The  patient was then  from bypass in the usual fashion.  Venous cannula and aortic root vent were removed.  Protamine was administered to reverse systemic heparinization.  As the protamine was administered, the patient's blood volume in the pump was administered back to the aortic cannula.  Once that was given back, that cannula was removed.  Sites were oversewn as needed.  Hemostasis was verified to be good.  Proceeded with closure of the sternum using interrupted #5 sternal wires.  The wound was then irrigated and closed in layers using absorbable sutures.  The patient tolerated the procedure well, all instrument counts were correct x2, and he was taken to the ICU in guarded condition.          9/25/2023 5:22 PM Turner Johnson D.O.

## 2023-09-26 NOTE — PROGRESS NOTES
Dr. Hemphill at bedside for Newport repositioning. Newport now at 61cm. Chest xray ordered for placement verification.

## 2023-09-26 NOTE — PROGRESS NOTES
Monitor Summary    Sinus rhythm with a RBBB rates 70s. Intermittently pacing with PACs with PPM. V wires capped per protocol.       12 hour chart check     0.19/.14/.42

## 2023-09-26 NOTE — PROGRESS NOTES
Patient meeting extubation criteria. Parameter for extubation met. RT and Rn agree, patient extubated at 0000 to 5L nasal cannula.

## 2023-09-26 NOTE — PROGRESS NOTES
"Pulmonary/Critical Care Progress Note    Date of admission  9/25/2023    Chief Complaint  78 y.o. male admitted 9/25/2023 with s/p transcatheter aortic valve replacement.     Hospital Course  Per Dr Bacon note from 9/26: \"78 year old male with an embolized TAVR valve, severe symptomatic aortic stenosis, RBBB, LYNDA. Went to the OR on 9/25 for an open heart procedure to retrieve an embolized TAVR valve.  He also underwent aortic valve replacement and repair of aortic root enlargement.  Came to the ICU postoperatively still intubated.\"    9/25: admitted to ICU, intubated, advanced swan jayden    Interval Problem Update  Reviewed last 24 hour events:   No major issues overnight, he was able to be extubated.   Neuro: alert and oriented  Pulm: 2- L nasal cannula  Cards: SR. No ectopy. Still on low dose pressors  Nephro:  Sodium 143 range, appropriate/normal GFR  ID: No leukocytosis   Hem: H&H stable at 13/38, platelets 127  : Appropriate urine output  Endo: keep goal of glucose 140-180, at goal, on insulin.       Review of Systems  Review of Systems   Constitutional:  Negative for chills, fever and weight loss.   HENT:  Negative for congestion, nosebleeds and sore throat.    Eyes:  Negative for discharge and redness.   Respiratory:  Negative for cough, shortness of breath and wheezing.    Cardiovascular:  Negative for chest pain, palpitations and leg swelling.        Surgical site soreness   Gastrointestinal:  Negative for nausea and vomiting.   Genitourinary:  Negative for dysuria.   Musculoskeletal:  Negative for myalgias.   Skin:  Negative for rash.   Neurological:  Negative for dizziness and headaches.   Endo/Heme/Allergies:  Does not bruise/bleed easily.   Psychiatric/Behavioral:  Negative for memory loss.    All other systems reviewed and are negative.       Vital Signs for last 24 hours   Temp:  [35.8 °C (96.4 °F)-37 °C (98.6 °F)] 35.8 °C (96.4 °F)  Pulse:  [72-82] 81  Resp:  [13-36] 22  BP: ()/(51-73) " 101/59  SpO2:  [94 %-99 %] 94 %    Hemodynamic parameters for last 24 hours  CVP:  [4 MM HG-12 MM HG] 4 MM HG  PCWP:  [9 MM HG-14 MM HG] 9 MM HG  CO:  [3.2-5] 3.2  CI:  [1.55-1.57] 1.55    Respiratory Information for the last 24 hours  Vent Mode: Spont  PEEP/CPAP: 8  MAP: 11  Control VTE (exp VT): 416    Physical Exam   Physical Exam  Vitals and nursing note reviewed.   Constitutional:       General: He is not in acute distress.     Appearance: He is not toxic-appearing.   HENT:      Head: Normocephalic and atraumatic.      Nose: Nose normal.      Mouth/Throat:      Mouth: Mucous membranes are moist.   Eyes:      Extraocular Movements: Extraocular movements intact.   Cardiovascular:      Rate and Rhythm: Normal rate and regular rhythm.      Pulses: Normal pulses.      Heart sounds: No murmur heard.     No friction rub. No gallop.   Pulmonary:      Effort: Pulmonary effort is normal. No respiratory distress.      Breath sounds: No wheezing or rales.      Comments: Sub xifoid chest tubes in place. Midline sternotomy covered with gauze.  Abdominal:      General: Bowel sounds are normal.   Musculoskeletal:         General: Normal range of motion.      Cervical back: Normal range of motion.      Right lower leg: No edema.      Left lower leg: No edema.   Skin:     General: Skin is warm.      Capillary Refill: Capillary refill takes less than 2 seconds.      Findings: No rash.   Neurological:      General: No focal deficit present.      Mental Status: He is alert and oriented to person, place, and time.   Psychiatric:         Mood and Affect: Mood normal.         Medications  Current Facility-Administered Medications   Medication Dose Route Frequency Provider Last Rate Last Admin    levothyroxine (Synthroid) tablet 50 mcg  50 mcg Oral AM ES BETO MeekP.R.N.   50 mcg at 09/26/23 0524    rosuvastatin (Crestor) tablet 40 mg  40 mg Oral Nightly BETO MeekP.R.N.        EPINEPHrine (Adrenalin)  infusion 4 mg/250 mL (premix)  0-0.5 mcg/kg/min (Ideal) Intravenous Continuous Turner Johnson D.OLaurita   Dose not Required at 09/25/23 1315    aminocaproic acid (Amicar) 9.68 g in  mL IV Bolus  100 mg/kg Intravenous Once JACKLYN BobOLaurita        aminocaproic acid (Amicar) 5 g in  mL Infusion  2 g/hr Intravenous Once MADONNA Bob.OLaurita        Respiratory Therapy Consult   Nebulization Continuous RT SUBHA Davis.P.N.        NS infusion   Intravenous Continuous SUBHA Davis.P.N.   Stopped at 09/26/23 0300    NS infusion   Intravenous Continuous SUBHA Davis.P.N.   Stopped at 09/26/23 0307    electrolyte-A (Plasmalyte-A) infusion   Intravenous PRN SUBHA Davis.P.N. 999 mL/hr at 09/25/23 1855 New Bag at 09/25/23 1855    enoxaparin (Lovenox) inj 40 mg  40 mg Subcutaneous DAILY AT 1800 Tanya Kidd A.P.N.        Nozin nasal  swab  1 Applicator Each Nostril BID SUBHA Davis.P.N.   1 Applicator at 09/26/23 0814    calcium CHLORIDE 10 % 1,000 mg in dextrose 5% 100 mL IVPB  1,000 mg Intravenous Once PRN SUBHA Davis.P.N. 200 mL/hr at 09/25/23 1906 1,000 mg at 09/25/23 1906    magnesium sulfate in D5W IVPB premix 1 g  1 g Intravenous DAILY SUBHA Davis.P.N.   Stopped at 09/26/23 0630    K+ Scale: Goal of 4.5  1 Each Intravenous Q6HRS SUBHA Davis.P.N.   1 Each at 09/26/23 0600    aspirin EC tablet 81 mg  81 mg Oral DAILY SUBHA Davis.P.N.   81 mg at 09/26/23 0524    clevidipine (Cleviprex) IV emulsion  0-21 mg/hr Intravenous Continuous SUBHA Davis.P.N.   Dose not Required at 09/25/23 1800    nitroglycerin 50 mg in D5W 250 ml infusion  0-100 mcg/min Intravenous Continuous ERIN Davis   Dose not Required at 09/25/23 1800    Pharmacy Consult Request ...Pain Management Review 1 Each  1 Each Other PHARMACY TO DOSE BETO DavisPLauritaNLaurita        acetaminophen (Tylenol) tablet 1,000 mg  1,000 mg Oral Q6HRS Tanya HOBBS  SUBHA Kidd.P.N.   1,000 mg at 09/26/23 0525    Followed by    [START ON 9/30/2023] acetaminophen (Tylenol) tablet 1,000 mg  1,000 mg Oral Q6HRS PRN Tanya Kidd A.P.N.        oxyCODONE immediate-release (Roxicodone) tablet 5 mg  5 mg Oral Q3HRS PRN Tanya Kidd A.P.N.   5 mg at 09/26/23 0449    Or    oxyCODONE immediate release (Roxicodone) tablet 10 mg  10 mg Oral Q3HRS PRN Tanya Kidd A.P.N.   10 mg at 09/26/23 0826    Or    fentaNYL (Sublimaze) injection 50 mcg  50 mcg Intravenous Q3HRS PRN Tanya Kidd A.P.N.   50 mcg at 09/26/23 0954    traMADol (Ultram) 50 MG tablet 50 mg  50 mg Oral Q4HRS PRN Tanya Kidd A.P.N.        midazolam (Versed) injection 2 mg  2 mg Intravenous Q HOUR PRN Tanya Kidd A.P.N.        dexmedetomidine (PRECEDEX) 400 mcg/100mL NS premix infusion  0-1.5 mcg/kg/hr (Ideal) Intravenous Continuous Tanya Kidd A.P.N.   Stopped at 09/25/23 2104    sodium bicarbonate 8.4 % injection 50 mEq  50 mEq Intravenous Q HOUR PRN Tanya Kidd A.P.N.   50 mEq at 09/25/23 1942    morphine 4 MG/ML injection 4 mg  4 mg Intravenous Q HOUR PRN Tanya Kidd A.P.N.   4 mg at 09/25/23 2006    ondansetron (Zofran) syringe/vial injection 8 mg  8 mg Intravenous Q6HRS PRN Tanya Kidd A.P.N.   8 mg at 09/26/23 0809    Or    prochlorperazine (Compazine) injection 10 mg  10 mg Intravenous Q6HRS PRN Tanya Kidd, A.P.N.        senna-docusate (Pericolace Or Senokot S) 8.6-50 MG per tablet 2 Tablet  2 Tablet Oral BID Tanya Kidd, A.P.N.   2 Tablet at 09/26/23 0524    And    polyethylene glycol/lytes (Miralax) PACKET 1 Packet  1 Packet Oral DAILY SUBHA Davis.P.N.   1 Packet at 09/26/23 0525    And    [START ON 9/27/2023] magnesium hydroxide (Milk Of Magnesia) suspension 30 mL  30 mL Oral DAILY Tanya Kidd, A.P.N.        And    bisacodyl (Dulcolax) suppository 10 mg  10 mg Rectal QDAY PRN SUBHA Davis.P.N.        omeprazole (PriLOSEC) capsule 20 mg  20 mg Oral  DAILY SUBHA Davis.P.N.   20 mg at 09/26/23 0525    mag hydrox-al hydrox-simeth (Maalox Plus Es Or Mylanta Ds) suspension 30 mL  30 mL Oral Q4HRS PRN BETO DavisPHA        diphenhydrAMINE (Benadryl) tablet/capsule 25 mg  25 mg Oral HS PRN - MR X 1 BETO DavisPHA        norepinephrine (Levophed) 8 mg in 250 mL NS infusion (premix)  0-1 mcg/kg/min (Ideal) Intravenous Continuous BETO DavisPHA 5 mL/hr at 09/26/23 0835 0.04 mcg/kg/min at 09/26/23 0835    vasopressin (Vasostrict) 20 Units in  mL Infusion  0.03 Units/min Intravenous Continuous BETO DavisPLauritaN.   Stopped at 09/25/23 1950    insulin regular (HumuLIN R,NovoLIN R) injection  0-14 Units Intravenous Once BETO DavisPHA        insulin lispro (HumaLOG,AdmeLOG) injection  0-14 Units Subcutaneous TID AC BETO DavisPHA        insulin regular (Humulin R) 100 Units in  mL Infusion  0-29 Units/hr Intravenous Continuous BETO DavisPHA 7 mL/hr at 09/26/23 1206 7 Units/hr at 09/26/23 1206    dextrose 50% (D50W) injection 12.5-25 g  12.5-25 g Intravenous PRN YAMILE Davis.        MD Alert...Pharmacy to initiate transition from insulin infusion to subcutaneous insulin for cardiothoracic surgery 1 Each  1 Each Other Continuous BETO DavisP.PAYTON           Fluids    Intake/Output Summary (Last 24 hours) at 9/26/2023 1235  Last data filed at 9/26/2023 1000  Gross per 24 hour   Intake 88572.77 ml   Output 3640 ml   Net 7321.77 ml       Laboratory  Recent Labs     09/25/23  1939 09/25/23 2049 09/25/23  2242   ISTATAPH 7.310* 7.384* 7.350*   ISTATAPCO2 38.5* 35.6 40.1*   ISTATAPO2 85 107* 117*   ISTATATCO2 21 22 23   FDKFWCN0ODN 96 98 98   ISTATARTHCO3 19.4 21.3 22.1   ISTATARTBE -6* -3 -3   ISTATTEMP 36.3 C 36.5 C 37.0 C   ISTATFIO2 40 40 40   ISTATSPEC Arterial Arterial Arterial   ISTATAPHTC 7.319* 7.392* 7.350*   JUCFNLKS0SL 82 104* 117*         Recent Labs     09/25/23  1396  09/25/23  1800 09/25/23  2328 09/26/23  0134 09/26/23  0534   SODIUM  --  142  --  143  --    POTASSIUM 4.5 4.4 4.0 4.1 3.9   CHLORIDE  --  108  --  111  --    CO2  --  21  --  21  --    BUN  --  15  --  15  --    CREATININE  --  0.89  --  1.00  --    MAGNESIUM 2.9*  --   --   --   --    CALCIUM  --  8.5  --  8.8  --      Recent Labs     09/25/23  1800 09/26/23 0134   GLUCOSE 242* 211*     Recent Labs     09/26/23 0134   WBC 11.0*     Recent Labs     09/25/23  1736 09/26/23 0134   RBC  --  4.40*   HEMOGLOBIN 13.6* 13.4*   HEMATOCRIT 38.6* 38.4*   PLATELETCT 117* 127*   PROTHROMBTM 17.6*  --    APTT 108.5*  --    INR 1.43*  --        Imaging  X-Ray:  I have personally reviewed the images and compared with prior images.    Assessment/Plan  * S/P TAVR (transcatheter aortic valve replacement)  Assessment & Plan  -S/p transcatheter aortic valve replacement, converted to open on 9/25  -post op day 1  -Surgeon is Dr Hsu and Dr Johnson  -Carlton jayden was placed yesterday and re adjusted  -continue accorind to protocol  -remove a line today  -monitor output from chest tubes  -telemetry  -insulin protoclol 180  -still on low dose levophed, will continue to wean.  -pain control per protocol  -extubated at 00 hr today.    Type 2 diabetes mellitus with other specified complication (HCC)- (present on admission)  Assessment & Plan  On insulin at home  Will continue with insulin ggt 180, consider transition today.    RBBB- (present on admission)  Assessment & Plan  Continue with pacing  telemetry    Back pain, chronic- (present on admission)  Assessment & Plan  Pain control per post surgery protocol         VTE:  Lovenox  Ulcer: Not Indicated  Lines: Central Line  Ongoing indication addressed, Arterial Line  Ongoing indication addressed, Hanson Catheter  Ongoing indication addressed, and pacer wires, chest tubes in place    I have performed a physical exam and reviewed and updated ROS and Plan today (9/26/2023). In review of  yesterday's note (9/25/2023), there are no changes except as documented above.     Discussed patient condition and risk of morbidity and/or mortality with RN, Pharmacy, Charge nurse / hot rounds, and Patient  The patient remains critically ill.  Critical care time = 38 minutes in directly providing and coordinating critical care and extensive data review.  No time overlap and excludes procedures.    Gabriel Mcbride MD, FACP, FCCP  Pulmonary/Critical Care

## 2023-09-26 NOTE — ANESTHESIA POSTPROCEDURE EVALUATION
Patient: Dakota Boyer    Procedure Summary     Date: 09/25/23 Room / Location: [place] [address] / SURGERY [facility]    Anesthesia Start: 1153 Anesthesia Stop: 1736    Procedures:       TRANSCATHETER AORTIC VALVE REPLACEMENT - CONVERTED TO OPEN HEART PROCEDURE (Bilateral: Groin)      ECHOCARDIOGRAM, TRANSESOPHAGEAL, INTRAOPERATIVE (Mouth)      REPLACEMENT, AORTIC VALVE AND RETRIEVAL OF FOREIGN BODY,  AORTIC ROOT ENLARGEMENT (Chest) Diagnosis: (SEVERE AORTIC STENOSIS)    Surgeons: Puma Hsu M.D.; Turner Johnson D.O. Responsible Provider: Mynor Geronimo M.D.    Anesthesia Type: general ASA Status: 4 - Emergent          Final Anesthesia Type: general  Last vitals  BP   Blood Pressure : 105/59, Arterial BP: (!) 87/44    Temp   (!) 35.8 °C (96.4 °F)    Pulse   76   Resp   20    SpO2   94 %      Anesthesia Post Evaluation    Patient location during evaluation: ICU  Level of consciousness: awake and alert    Airway patency: patent  Anesthetic complications: no  Cardiovascular status: adequate  Respiratory status: acceptable  Hydration status: euvolemic    PONV: none          No notable events documented.     Nurse Pain Score: 10 (NPRS)

## 2023-09-26 NOTE — CARE PLAN
Problem: Hyperinflation  Goal: Prevent or improve atelectasis  Description: Target End Date:  3 to 4 days    1. Instruct incentive spirometry usage  2.  Perform hyperinflation therapy as indicated  Outcome: Progressing     Treatment modality: PEP  Frequency: QID  IS 1000    Pt tolerating well, will continue to monitor

## 2023-09-26 NOTE — THERAPY
Occupational Therapy Contact Note    Patient Name: Dakota Boyer  Age:  78 y.o., Sex:  male  Medical Record #: [id number]  Today's Date: 9/26/2023 09/26/23 0743   Interdisciplinary Plan of Care Collaboration   Collaboration Comments OT referral received. Pt is POD #1 [facility]. Will complete OT eval on or after POD #2.

## 2023-09-26 NOTE — HOSPITAL COURSE
"Per Dr Bacon note from 9/26: \"78 year old male with an embolized TAVR valve, severe symptomatic aortic stenosis, RBBB, LYNDA. Went to the OR on 9/25 for an open heart procedure to retrieve an embolized TAVR valve.  He also underwent aortic valve replacement and repair of aortic root enlargement.  Came to the ICU postoperatively still intubated.\"    9/25: admitted to ICU, intubated, advanced swan jayden  "

## 2023-09-26 NOTE — ASSESSMENT & PLAN NOTE
-S/p transcatheter aortic valve replacement, converted to open on 9/25  -post op day 2  -Surgeon is Dr Hsu and Dr Johnson  -monitor output from chest tubes  -plan to remove CTs  -ambulation  -telemetry  -insulin treatment, goal 120-180 while inpatient  -off pressors  -pain control per protocol

## 2023-09-26 NOTE — PROGRESS NOTES
Cardiovascular Surgery Progress Note    Name: Dakota Boyer  MRN: [id number]  : 1945  Admit Date: 2023 10:33 AM  1 Day Post-Op     Procedure:  Procedure(s) and Anesthesia Type:  Panel 1:     * TRANSCATHETER AORTIC VALVE REPLACEMENT - CONVERTED TO OPEN HEART PROCEDURE - General     * ECHOCARDIOGRAM, TRANSESOPHAGEAL, INTRAOPERATIVE - General    Panel 2:     * REPLACEMENT, AORTIC VALVE AND RETRIEVAL OF FOREIGN BODY,  AORTIC ROOT ENLARGEMENT - General    Vitals:  Vitals:    23 0700 23 0716 23 0800 23 0826   BP: 92/51  (!) 89/54 105/59   Pulse: 76 78 78 76   Resp: 15 16 (!) 23 20   Temp:   (!) 35.8 °C (96.4 °F)    TempSrc:   Bladder    SpO2: 95% 94% 95% 94%   Weight:       Height:          Temp (24hrs), Av.6 °C (97.8 °F), Min:35.8 °C (96.4 °F), Max:37 °C (98.6 °F)      Respiratory:  Vent Mode: Spont, PEEP/CPAP: 8, PIP: 15, MAP: 11 Respiration: 20, Pulse Oximetry: 94 %       Fluids:    Intake/Output Summary (Last 24 hours) at 2023 0918  Last data filed at 2023 0847  Gross per 24 hour   Intake 14511.77 ml   Output 3520 ml   Net 9241.77 ml     Admit weight: Weight: 96.8 kg (213 lb 6.5 oz)  Current weight: Weight: 101 kg (223 lb 8.7 oz) (23 0530)    Labs:  Recent Labs     23  1736 23   WBC  --  11.0*   RBC  --  4.40*   HEMOGLOBIN 13.6* 13.4*   HEMATOCRIT 38.6* 38.4*   MCV  --  87.3   MCH  --  30.5   MCHC  --  34.9   RDW  --  54.5*   PLATELETCT 117* 127*   MPV  --  10.5     Recent Labs     23  1800 23  2328 23  0134 23  0534   SODIUM 142  --  143  --    POTASSIUM 4.4 4.0 4.1 3.9   CHLORIDE 108  --  111  --    CO2 21  --  21  --    GLUCOSE 242*  --  211*  --    BUN 15  --  15  --    CREATININE 0.89  --  1.00  --    CALCIUM 8.5  --  8.8  --      Recent Labs     23  1736   APTT 108.5*   INR 1.43*           Medications:  Scheduled Medications   Medication Dose Frequency    levothyroxine  50 mcg AM ES    rosuvastatin  40  mg Nightly    aminocaproic acid (Amicar) 9.68 g in  mL IV Bolus  100 mg/kg Once    aminocaproic acid (Amicar) 5 g in  mL Infusion  2 g/hr Once    enoxaparin (LOVENOX) injection  40 mg DAILY AT 1800    Nozin nasal  swab  1 Applicator BID    magnesium sulfate  1 g DAILY    K+ Scale: Goal of 4.5  1 Each Q6HRS    aspirin  81 mg DAILY    Pharmacy Consult Request  1 Each PHARMACY TO DOSE    acetaminophen  1,000 mg Q6HRS    senna-docusate  2 Tablet BID    And    polyethylene glycol/lytes  1 Packet DAILY    And    [START ON 9/27/2023] magnesium hydroxide  30 mL DAILY    omeprazole  20 mg DAILY    insulin regular  0-14 Units Once    insulin lispro  0-14 Units TID AC        Exam:   Physical Exam  Vitals and nursing note reviewed.   Constitutional:       General: He is not in acute distress.     Appearance: Normal appearance.   HENT:      Head: Normocephalic and atraumatic.      Right Ear: External ear normal.      Left Ear: External ear normal.      Nose: Nose normal.      Mouth/Throat:      Mouth: Mucous membranes are moist.   Eyes:      Extraocular Movements: Extraocular movements intact.      Conjunctiva/sclera: Conjunctivae normal.      Pupils: Pupils are equal, round, and reactive to light.   Cardiovascular:      Rate and Rhythm: Normal rate and regular rhythm.      Pulses: Normal pulses.      Heart sounds: Normal heart sounds.   Pulmonary:      Effort: Pulmonary effort is normal.      Breath sounds: Examination of the right-lower field reveals decreased breath sounds. Examination of the left-lower field reveals decreased breath sounds. Decreased breath sounds present.   Abdominal:      General: Abdomen is flat.      Palpations: Abdomen is soft.   Musculoskeletal:         General: Normal range of motion.      Cervical back: Normal range of motion.      Right lower leg: Edema present.      Left lower leg: Edema present.   Skin:     General: Skin is warm and dry.      Capillary Refill: Capillary refill  takes less than 2 seconds.   Neurological:      General: No focal deficit present.      Mental Status: He is alert and oriented to person, place, and time. Mental status is at baseline.   Psychiatric:         Mood and Affect: Mood normal.         Behavior: Behavior normal.         Cardiac Medications:    ASA - Yes    Plavix - No; contraindicated because of Bleeding    Post-operative Beta Blockers - No; contraindicated because of High risk for heart block    Ace/ARB- No; contraindicated because of Normal EF    Statin - No; contraindicated because of No CAD    Aldactone- No; contraindicated because of Normal EF    SGLT2i-  No contraindicated because of cost prohibitive    Ejection Fraction:  75%    Telemetry:   9/26 SR RBBB intermittent pacing    Assessment/Plan:  POD 1 Extubated on levo gtt.  Neuro intact.  Abdomen soft nontender.  Moderate output from mediastinal tubes.  Art line no longer functional bp cuff correlating.  Plan: Keep mediastinal tubes and love for strict I/O.  One time albumin for hypotension.  Dc art line.  Wean levo.  Wean oxygen as tolerated.    Disposition:  TBD

## 2023-09-26 NOTE — CARE PLAN
Problem: Pain - Standard  Goal: Alleviation of pain or a reduction in pain to the patient’s comfort goal  Outcome: Progressing     Problem: Day of surgery post CABG/Heart valve replacement  Goal: Stabilization in immediate post op period  Intervention: VS q 15 min x 4 hours, then q 1 hour. Include temperature immediately upon arrival. Check CO/CI q 2-4 hours and PRN  Note: Completed   Intervention: If radial artery used, elevate arm, no BP checks or needle sticks from affected arm, monitor ulnar pulse and capillary refill  Note: Cap refill intact  Intervention: First post op hour labs and EKG per order  Note: Performed   Intervention: Serum K q 6 hours x 24 hours.  ABG and CBC prn.  Note: Performed   Intervention: Initiate post cardiac insulin infusion protocol orders for FSBS greater than 140 and check frequency per protocol  Note: Insulin initiated at OR   Intervention: FSBS frequency as per Cardiac Surgery Insulin Drip Protocol  Note: Q1 hour BG performed   Intervention: For patients on Beta Blockers: verify dose given prior to surgery or within 6 hours after arrival to the unit  Note: Not applicable   Intervention: Chest tube to 20 cm suction, record CT drainage with VS, and check for air leak  Note: No airleak noted. Output WDL   Intervention: For CT drainage >300 mL in first hour post op and/or 150 mL in subsequent hours: Stat platelets, PT, INR, TEG, iSTAT, and H&H per order  Note: Not applicable  Intervention: Titrate and wean off vasoactive drips per patient's condition and per MD order while maintaining SBP  mmHg per MD order  Note: Continuing   Intervention: VAP protocol in place  Note:   Completed, Patient extubated   Intervention: Wean from Vent per protocol (see protocol), extubation goal within 6 hours post op  Note: Total intubation time 6hours 35mins  Intervention: IS q 1 hour while awake post extubation  Note: IS post op at 1500   Intervention: Bedrest until extubated and groin lines  out  Note: Completed   Intervention: Dangle within 4 hours post extubation  Note: Performed this am   Intervention: Up in chair 4 hours, day of extubation  Note: Performed this am   Intervention: Maintain all original surgical dressings per provider orders and specifications  Note: Completed   Intervention: Clear liquids post extubation, order carbohydrate free (post cardiac surgery) diet, advance as tolerated  Note: Diet ordered  Intervention: Discontinue Lenox jayden and arterial line 12-18 hours post op if hemodynamically stable and off vasoactive drips  Note: Continuing to progress, Lenox removed. Art line in place for levophed gtt   Intervention: A-Fib and DVT prophylaxis per MD order or contraindications documented (refer to DVT/VTE problem on Care Plan)  Note: Not applicable   Intervention: Amiodarone protocol per MD order  Note: Not applicable    The patient is Watcher - Medium risk of patient condition declining or worsening    Shift Goals  Clinical Goals: Wean off of pressors, extubate, stable Is and Os  Patient Goals: reny  Family Goals: reny    Progress made toward(s) clinical / shift goals:  Patient extubated     Patient is not progressing towards the following goals:

## 2023-09-26 NOTE — PROGRESS NOTES
Pt brought up to room by OR team. Dr. Hemphill at bedside. Report received from Dr. Geronimo. Vaso and Levo for BP support. Dex infusion for sedation, RASS -2. Insulin gtt verified. CT connected to suction, no air leak noted. ABG reviewed, appears hemolyzed, new labs sent to verify electrolytes. Pt being paced at 80, underlying rhythm is paced at 60.

## 2023-09-26 NOTE — ANESTHESIA TIME REPORT
Anesthesia Start and Stop Event Times     Date Time Event    9/25/2023 1136 Ready for Procedure     1153 Anesthesia Start     1736 Anesthesia Stop        Responsible Staff  09/25/23    Name Role Begin End    Mynor Geronimo M.D. Anesth 1153 1734        Overtime Reason:  no overtime (within assigned shift)    Comments:

## 2023-09-27 LAB
ANION GAP SERPL CALC-SCNC: 8 MMOL/L (ref 7–16)
BUN SERPL-MCNC: 15 MG/DL (ref 8–22)
CALCIUM SERPL-MCNC: 8.4 MG/DL (ref 8.5–10.5)
CHLORIDE SERPL-SCNC: 101 MMOL/L (ref 96–112)
CO2 SERPL-SCNC: 23 MMOL/L (ref 20–33)
CREAT SERPL-MCNC: 1.06 MG/DL (ref 0.5–1.4)
ERYTHROCYTE [DISTWIDTH] IN BLOOD BY AUTOMATED COUNT: 57.2 FL (ref 35.9–50)
GFR SERPLBLD CREATININE-BSD FMLA CKD-EPI: 71 ML/MIN/1.73 M 2
GLUCOSE BLD STRIP.AUTO-MCNC: 267 MG/DL (ref 65–99)
GLUCOSE BLD STRIP.AUTO-MCNC: 268 MG/DL (ref 65–99)
GLUCOSE BLD STRIP.AUTO-MCNC: 273 MG/DL (ref 65–99)
GLUCOSE BLD STRIP.AUTO-MCNC: 293 MG/DL (ref 65–99)
GLUCOSE SERPL-MCNC: 231 MG/DL (ref 65–99)
HCT VFR BLD AUTO: 29.5 % (ref 42–52)
HGB BLD-MCNC: 10 G/DL (ref 14–18)
LV EJECT FRACT  99904: 70
LV EJECT FRACT  99904: 70
MCH RBC QN AUTO: 30.4 PG (ref 27–33)
MCHC RBC AUTO-ENTMCNC: 33.9 G/DL (ref 32.3–36.5)
MCV RBC AUTO: 89.7 FL (ref 81.4–97.8)
PLATELET # BLD AUTO: 80 K/UL (ref 164–446)
PLATELETS.RETICULATED NFR BLD AUTO: 7.3 % (ref 0.6–13.1)
PMV BLD AUTO: 11.2 FL (ref 9–12.9)
POTASSIUM SERPL-SCNC: 4.4 MMOL/L (ref 3.6–5.5)
RBC # BLD AUTO: 3.29 M/UL (ref 4.7–6.1)
SODIUM SERPL-SCNC: 132 MMOL/L (ref 135–145)
WBC # BLD AUTO: 7.9 K/UL (ref 4.8–10.8)

## 2023-09-27 PROCEDURE — 700102 HCHG RX REV CODE 250 W/ 637 OVERRIDE(OP): Mod: JZ | Performed by: NURSE PRACTITIONER

## 2023-09-27 PROCEDURE — 700102 HCHG RX REV CODE 250 W/ 637 OVERRIDE(OP): Performed by: NURSE PRACTITIONER

## 2023-09-27 PROCEDURE — 85055 RETICULATED PLATELET ASSAY: CPT

## 2023-09-27 PROCEDURE — 700102 HCHG RX REV CODE 250 W/ 637 OVERRIDE(OP)

## 2023-09-27 PROCEDURE — 94669 MECHANICAL CHEST WALL OSCILL: CPT

## 2023-09-27 PROCEDURE — 99233 SBSQ HOSP IP/OBS HIGH 50: CPT | Performed by: INTERNAL MEDICINE

## 2023-09-27 PROCEDURE — 80048 BASIC METABOLIC PNL TOTAL CA: CPT

## 2023-09-27 PROCEDURE — 700111 HCHG RX REV CODE 636 W/ 250 OVERRIDE (IP): Mod: JZ | Performed by: NURSE PRACTITIONER

## 2023-09-27 PROCEDURE — 97166 OT EVAL MOD COMPLEX 45 MIN: CPT

## 2023-09-27 PROCEDURE — A9270 NON-COVERED ITEM OR SERVICE: HCPCS | Performed by: NURSE PRACTITIONER

## 2023-09-27 PROCEDURE — 770020 HCHG ROOM/CARE - TELE (206)

## 2023-09-27 PROCEDURE — 99024 POSTOP FOLLOW-UP VISIT: CPT | Performed by: NURSE PRACTITIONER

## 2023-09-27 PROCEDURE — 700111 HCHG RX REV CODE 636 W/ 250 OVERRIDE (IP): Performed by: NURSE PRACTITIONER

## 2023-09-27 PROCEDURE — A9270 NON-COVERED ITEM OR SERVICE: HCPCS | Mod: JZ | Performed by: NURSE PRACTITIONER

## 2023-09-27 PROCEDURE — 97163 PT EVAL HIGH COMPLEX 45 MIN: CPT

## 2023-09-27 PROCEDURE — 97535 SELF CARE MNGMENT TRAINING: CPT

## 2023-09-27 PROCEDURE — 85027 COMPLETE CBC AUTOMATED: CPT

## 2023-09-27 PROCEDURE — A9270 NON-COVERED ITEM OR SERVICE: HCPCS

## 2023-09-27 PROCEDURE — 82962 GLUCOSE BLOOD TEST: CPT | Mod: 91

## 2023-09-27 RX ORDER — FUROSEMIDE 10 MG/ML
20 INJECTION INTRAMUSCULAR; INTRAVENOUS 2 TIMES DAILY
Status: DISCONTINUED | OUTPATIENT
Start: 2023-09-27 | End: 2023-09-28

## 2023-09-27 RX ORDER — POTASSIUM CHLORIDE 20 MEQ/1
20 TABLET, EXTENDED RELEASE ORAL 2 TIMES DAILY
Status: DISCONTINUED | OUTPATIENT
Start: 2023-09-27 | End: 2023-09-28

## 2023-09-27 RX ADMIN — OMEPRAZOLE 20 MG: 20 CAPSULE, DELAYED RELEASE ORAL at 05:47

## 2023-09-27 RX ADMIN — INSULIN LISPRO 5 UNITS: 100 INJECTION, SOLUTION INTRAVENOUS; SUBCUTANEOUS at 12:16

## 2023-09-27 RX ADMIN — MAGNESIUM HYDROXIDE 30 ML: 1200 LIQUID ORAL at 17:42

## 2023-09-27 RX ADMIN — INSULIN LISPRO 5 UNITS: 100 INJECTION, SOLUTION INTRAVENOUS; SUBCUTANEOUS at 18:02

## 2023-09-27 RX ADMIN — INSULIN LISPRO 7 UNITS: 100 INJECTION, SOLUTION INTRAVENOUS; SUBCUTANEOUS at 12:16

## 2023-09-27 RX ADMIN — OXYCODONE HYDROCHLORIDE 10 MG: 10 TABLET ORAL at 19:27

## 2023-09-27 RX ADMIN — OXYCODONE HYDROCHLORIDE 10 MG: 10 TABLET ORAL at 08:55

## 2023-09-27 RX ADMIN — SENNOSIDES AND DOCUSATE SODIUM 2 TABLET: 50; 8.6 TABLET ORAL at 17:42

## 2023-09-27 RX ADMIN — ASPIRIN 81 MG: 81 TABLET, COATED ORAL at 05:47

## 2023-09-27 RX ADMIN — ENOXAPARIN SODIUM 40 MG: 100 INJECTION SUBCUTANEOUS at 17:42

## 2023-09-27 RX ADMIN — FUROSEMIDE 20 MG: 10 INJECTION, SOLUTION INTRAVENOUS at 17:43

## 2023-09-27 RX ADMIN — LEVOTHYROXINE SODIUM 50 MCG: 0.05 TABLET ORAL at 05:47

## 2023-09-27 RX ADMIN — Medication 1 APPLICATOR: at 21:08

## 2023-09-27 RX ADMIN — OXYCODONE HYDROCHLORIDE 10 MG: 10 TABLET ORAL at 12:04

## 2023-09-27 RX ADMIN — MAGNESIUM SULFATE IN DEXTROSE 1 G: 10 INJECTION, SOLUTION INTRAVENOUS at 05:57

## 2023-09-27 RX ADMIN — INSULIN LISPRO 7 UNITS: 100 INJECTION, SOLUTION INTRAVENOUS; SUBCUTANEOUS at 08:54

## 2023-09-27 RX ADMIN — ACETAMINOPHEN 1000 MG: 500 TABLET, FILM COATED ORAL at 05:46

## 2023-09-27 RX ADMIN — INSULIN LISPRO 7 UNITS: 100 INJECTION, SOLUTION INTRAVENOUS; SUBCUTANEOUS at 18:02

## 2023-09-27 RX ADMIN — FUROSEMIDE 20 MG: 10 INJECTION, SOLUTION INTRAVENOUS at 09:55

## 2023-09-27 RX ADMIN — INSULIN GLARGINE-YFGN 25 UNITS: 100 INJECTION, SOLUTION SUBCUTANEOUS at 18:01

## 2023-09-27 RX ADMIN — ACETAMINOPHEN 1000 MG: 500 TABLET, FILM COATED ORAL at 12:04

## 2023-09-27 RX ADMIN — Medication 1 APPLICATOR: at 08:55

## 2023-09-27 RX ADMIN — ACETAMINOPHEN 1000 MG: 500 TABLET, FILM COATED ORAL at 17:42

## 2023-09-27 RX ADMIN — POTASSIUM CHLORIDE 20 MEQ: 1500 TABLET, EXTENDED RELEASE ORAL at 09:55

## 2023-09-27 RX ADMIN — INSULIN LISPRO 5 UNITS: 100 INJECTION, SOLUTION INTRAVENOUS; SUBCUTANEOUS at 21:14

## 2023-09-27 RX ADMIN — POLYETHYLENE GLYCOL 3350 1 PACKET: 17 POWDER, FOR SOLUTION ORAL at 05:47

## 2023-09-27 RX ADMIN — ACETAMINOPHEN 1000 MG: 500 TABLET, FILM COATED ORAL at 00:03

## 2023-09-27 RX ADMIN — ROSUVASTATIN CALCIUM 40 MG: 20 TABLET, FILM COATED ORAL at 21:08

## 2023-09-27 RX ADMIN — INSULIN LISPRO 5 UNITS: 100 INJECTION, SOLUTION INTRAVENOUS; SUBCUTANEOUS at 08:52

## 2023-09-27 RX ADMIN — OXYCODONE HYDROCHLORIDE 10 MG: 10 TABLET ORAL at 05:52

## 2023-09-27 RX ADMIN — POTASSIUM CHLORIDE 20 MEQ: 1500 TABLET, EXTENDED RELEASE ORAL at 17:42

## 2023-09-27 RX ADMIN — SENNOSIDES AND DOCUSATE SODIUM 2 TABLET: 50; 8.6 TABLET ORAL at 05:46

## 2023-09-27 ASSESSMENT — COGNITIVE AND FUNCTIONAL STATUS - GENERAL
WALKING IN HOSPITAL ROOM: A LITTLE
EATING MEALS: A LITTLE
MOVING FROM LYING ON BACK TO SITTING ON SIDE OF FLAT BED: A LITTLE
DRESSING REGULAR LOWER BODY CLOTHING: A LOT
CLIMB 3 TO 5 STEPS WITH RAILING: A LOT
TOILETING: A LOT
HELP NEEDED FOR BATHING: A LOT
DAILY ACTIVITIY SCORE: 14
STANDING UP FROM CHAIR USING ARMS: A LITTLE
SUGGESTED CMS G CODE MODIFIER MOBILITY: CL
SUGGESTED CMS G CODE MODIFIER DAILY ACTIVITY: CK
MOVING TO AND FROM BED TO CHAIR: UNABLE
DRESSING REGULAR UPPER BODY CLOTHING: A LOT
PERSONAL GROOMING: A LITTLE
TURNING FROM BACK TO SIDE WHILE IN FLAT BAD: UNABLE
MOBILITY SCORE: 13

## 2023-09-27 ASSESSMENT — ENCOUNTER SYMPTOMS
WHEEZING: 0
WEIGHT LOSS: 0
COUGH: 0
FEVER: 0
EYE REDNESS: 0
SHORTNESS OF BREATH: 0
MYALGIAS: 0
PALPITATIONS: 0
CHILLS: 0
DIZZINESS: 0
HEADACHES: 0
EYE DISCHARGE: 0
SORE THROAT: 0
VOMITING: 0
MEMORY LOSS: 0
NAUSEA: 0
BRUISES/BLEEDS EASILY: 0

## 2023-09-27 ASSESSMENT — FIBROSIS 4 INDEX: FIB4 SCORE: 4.07

## 2023-09-27 ASSESSMENT — PAIN DESCRIPTION - PAIN TYPE
TYPE: SURGICAL PAIN

## 2023-09-27 ASSESSMENT — GAIT ASSESSMENTS
GAIT LEVEL OF ASSIST: CONTACT GUARD ASSIST
DISTANCE (FEET): 130
DEVIATION: SHUFFLED GAIT;BRADYKINETIC;DECREASED BASE OF SUPPORT
ASSISTIVE DEVICE: FRONT WHEEL WALKER

## 2023-09-27 ASSESSMENT — ACTIVITIES OF DAILY LIVING (ADL): TOILETING: INDEPENDENT

## 2023-09-27 NOTE — PROGRESS NOTES
4 Eyes Skin Assessment Completed by PJ Washburn and PJ DRIVER.    Head WDL  Ears Redness and Blanching  Nose WDL  Mouth WDL  Neck WDL  Breast/Chest Redness and Incision  Shoulder Blades WDL  Spine WDL  (R) Arm/Elbow/Hand Redness and Blanching  (L) Arm/Elbow/Hand Redness and Blanching  Abdomen Incision  Groin Redness and Blanching  Scrotum/Coccyx/Buttocks Redness and Blanching  (R) Leg WDL  (L) Leg WDL  (R) Heel/Foot/Toe WDL  (L) Heel/Foot/Toe WDL          Devices In Places Tele Box, Blood Pressure Cuff, Pulse Ox, Hanson, Central Line, Pacer, and Nasal Cannula      Interventions In Place Gray Ear Foams, Chair Waffle, Pillows, and Heels Loaded W/Pillows    Possible Skin Injury No    Pictures Uploaded Into Epic N/A  Wound Consult Placed N/A  RN Wound Prevention Protocol Ordered Yes

## 2023-09-27 NOTE — CARE PLAN
The patient is Watcher - Medium risk of patient condition declining or worsening    Shift Goals  Clinical Goals: weam O2, mobilize, pain management  Patient Goals: rest pain mangement  Family Goals: LESTER    Progress made toward(s) clinical / shift goals:    Problem: Pain - Standard  Goal: Alleviation of pain or a reduction in pain to the patient’s comfort goal  Outcome: Progressing  Note: Pt pain was managed with meds from MAR, pt was also positioned Q2hr to relieve pressure and pain      Problem: Post op day 2 CABG/Heart Valve Replacement  Goal: Optimal care of the post op CABG/heart valve replacement post op day 2  Outcome: Progressing  Intervention: FSBS: when 2 consecutive BS < 130 after post op day 2, discontinue FSBS unless patient is insulin dependent diabetic  Note: Pt BS was checked this shift  Intervention: Daily weights in the morning  Note: Pt weight was in the morning 104kg  Intervention: Up in chair for all meals  Note: Pt was up in the chair for all meals  Intervention: Ambulate 4 times daily, increasing the distance each time  Note: Pt has ambulated 3x this shift will reassessed in the next shift   Intervention: IS q 1 hour while awake and record best IS volume  Note: Pt was encouraged to use IS frequently this shift   Pt pulled 1000 as best for staff       Patient is not progressing towards the following goals:

## 2023-09-27 NOTE — PROGRESS NOTES
Cardiovascular Surgery Progress Note    Name: Dakota Boyer  MRN: [id number]  : 1945  Admit Date: 2023 10:33 AM  2 Days Post-Op     Procedure:  Procedure(s) and Anesthesia Type:  Panel 1:     * TRANSCATHETER AORTIC VALVE REPLACEMENT - CONVERTED TO OPEN HEART PROCEDURE - General     * ECHOCARDIOGRAM, TRANSESOPHAGEAL, INTRAOPERATIVE - General    Panel 2:     * REPLACEMENT, AORTIC VALVE AND RETRIEVAL OF FOREIGN BODY,  AORTIC ROOT ENLARGEMENT - General    Vitals:  Vitals:    23 0400 23 0500 23 0600 23 0728   BP: 105/58 114/57 105/55    Pulse: 87 86 89 88   Resp:    18   Temp: 37.1 °C (98.8 °F)  37.3 °C (99.1 °F)    TempSrc: Bladder  Bladder    SpO2: 95% 93% 92% 92%   Weight:   104 kg (228 lb 13.4 oz)    Height:          Temp (24hrs), Av.1 °C (98.8 °F), Min:36.5 °C (97.7 °F), Max:37.7 °C (99.9 °F)      Respiratory:    Respiration: 18, Pulse Oximetry: 92 %       Fluids:    Intake/Output Summary (Last 24 hours) at 2023 0917  Last data filed at 2023 0600  Gross per 24 hour   Intake 1269.87 ml   Output 1285 ml   Net -15.13 ml       Admit weight: Weight: 96.8 kg (213 lb 6.5 oz)  Current weight: Weight: 104 kg (228 lb 13.4 oz) (23 0600)    Labs:  Recent Labs     23  1736 23  0134 23  0355   WBC  --  11.0* 7.9   RBC  --  4.40* 3.29*   HEMOGLOBIN 13.6* 13.4* 10.0*   HEMATOCRIT 38.6* 38.4* 29.5*   MCV  --  87.3 89.7   MCH  --  30.5 30.4   MCHC  --  34.9 33.9   RDW  --  54.5* 57.2*   PLATELETCT 117* 127* 80*   MPV  --  10.5 11.2       Recent Labs     23  1800 23  2328 23  0134 23  0534 23  1308 23  0355   SODIUM 142  --  143  --   --  132*   POTASSIUM 4.4   < > 4.1 3.9 4.1 4.4   CHLORIDE 108  --  111  --   --  101   CO2 21  --  21  --   --  23   GLUCOSE 242*  --  211*  --   --  231*   BUN 15  --  15  --   --  15   CREATININE 0.89  --  1.00  --   --  1.06   CALCIUM 8.5  --  8.8  --   --  8.4*    < > = values in this  interval not displayed.       Recent Labs     09/25/23  1736   APTT 108.5*   INR 1.43*             Medications:  Scheduled Medications   Medication Dose Frequency    insulin GLARGINE  25 Units Q EVENING    And    insulin lispro  0.2 Units/kg/day TID AC    And    insulin lispro  2-9 Units 4X/DAY ACHS    levothyroxine  50 mcg AM ES    rosuvastatin  40 mg Nightly    enoxaparin (LOVENOX) injection  40 mg DAILY AT 1800    Nozin nasal  swab  1 Applicator BID    aspirin  81 mg DAILY    Pharmacy Consult Request  1 Each PHARMACY TO DOSE    acetaminophen  1,000 mg Q6HRS    senna-docusate  2 Tablet BID    And    polyethylene glycol/lytes  1 Packet DAILY    And    magnesium hydroxide  30 mL DAILY    omeprazole  20 mg DAILY        Exam:   Physical Exam  Vitals and nursing note reviewed.   Constitutional:       General: He is not in acute distress.     Appearance: Normal appearance.   HENT:      Head: Normocephalic and atraumatic.      Right Ear: External ear normal.      Left Ear: External ear normal.      Nose: Nose normal.      Mouth/Throat:      Mouth: Mucous membranes are moist.   Eyes:      Extraocular Movements: Extraocular movements intact.      Conjunctiva/sclera: Conjunctivae normal.      Pupils: Pupils are equal, round, and reactive to light.   Cardiovascular:      Rate and Rhythm: Normal rate and regular rhythm.      Pulses: Normal pulses.      Heart sounds: Normal heart sounds.   Pulmonary:      Effort: Pulmonary effort is normal.      Breath sounds: Examination of the right-lower field reveals decreased breath sounds. Examination of the left-lower field reveals decreased breath sounds. Decreased breath sounds present.   Abdominal:      General: Abdomen is flat.      Palpations: Abdomen is soft.   Musculoskeletal:         General: Normal range of motion.      Cervical back: Normal range of motion.      Right lower leg: Edema present.      Left lower leg: Edema present.   Skin:     General: Skin is warm and  dry.      Capillary Refill: Capillary refill takes less than 2 seconds.   Neurological:      General: No focal deficit present.      Mental Status: He is alert and oriented to person, place, and time. Mental status is at baseline.   Psychiatric:         Mood and Affect: Mood normal.         Behavior: Behavior normal.         Cardiac Medications:    ASA - Yes    Plavix - No; contraindicated because of Bleeding    Post-operative Beta Blockers - No; contraindicated because of High risk for heart block    Ace/ARB- No; contraindicated because of Normal EF    Statin - No; contraindicated because of No CAD    Aldactone- No; contraindicated because of Normal EF    SGLT2i-  No contraindicated because of cost prohibitive    Ejection Fraction:  75%    Telemetry:   9/26 SR RBBB intermittent pacing  9/27 SR    Assessment/Plan:  POD 1 Extubated on levo gtt.  Neuro intact.  Abdomen soft nontender.  Moderate output from mediastinal tubes.  Art line no longer functional bp cuff correlating.  Plan: Keep mediastinal tubes and love for strict I/O.  One time albumin for hypotension.  Dc art line.  Wean levo.  Wean oxygen as tolerated.    POD 2 HDS off gtts.  Neuro intact.  Wounds CDI.  Abdomen soft nontender.  Minimal output from mediastinal tubes.  Cr 1.06 Hgb 10.0.  Plan: DC mediastinal tubes.  Wean oxygen.  Start gentle diuresis.  Keep pacer wires one more day.  Transfer to [facility].    Disposition:  TBD

## 2023-09-27 NOTE — PROGRESS NOTES
Monitor Summary  Rhythm:  SR- pacing OD  HR: 80-90s  Ectopy: n/a  Measurements: 0.19/0.07/0.42                     12 hour chart check

## 2023-09-27 NOTE — PROGRESS NOTES
Assumed care of pt. Phone report received from [facility] RN, pt was afebrile, A&OX4,  Pt was updated on plan of care. Call light, phone and personal belongings in reach. Bed alarm on and working properly, bed in lowest position, and locked.

## 2023-09-27 NOTE — THERAPY
"Occupational Therapy   Initial Evaluation     Patient Name: aDkota Boyer  Age:  78 y.o., Sex:  male  Medical Record #: [id number]  Today's Date: 9/27/2023     Precautions  Precautions: (P) Fall Risk, Cardiac Precautions (See Comments), Sternal Precautions (See Comments)    Assessment  Patient is 78 y.o. male who presents to acute s/p [facility] for aortic valve replacement. Pt required mod A for LB dressing and min A for standing grooming today, unclear how much new education was internalized. Pt reports he lvies w/ spouse, at this time recommend DC home w/ HH due to pts cognition, will follow up w/ spouse to see how comfortable she is w/ caring for pt and maintaining sternal precautions in regards to his ADLs.     Plan    Occupational Therapy Initial Treatment Plan   Treatment Interventions: (P) Self Care / Activities of Daily Living, Neuro Re-Education / Balance, Therapeutic Exercises, Therapeutic Activity, Adaptive Equipment  Treatment Frequency: (P) 3 Times per Week  Duration: (P) Until Therapy Goals Met    DC Equipment Recommendations: (P) Tub / Shower Seat  Discharge Recommendations: (P) Recommend home health for continued occupational therapy services (anticipate quick progression while in house. Will need to ed/train spouse)     Subjective    \"I'm from [place]\"      Objective       09/27/23 1438   Charge Group   OT Evaluation OT Evaluation Mod   Total Time Spent   OT Evaluation (Minutes) 30   Initial Contact Note    Initial Contact Note Order Received and Verified, Occupational Therapy Evaluation in Progress with Full Report to Follow.   Prior Living Situation   Prior Services None   Housing / Facility 1 Story House   Bathroom Set up Walk In Shower   Equipment Owned Front-Wheel Walker;4-Wheel Walker   Lives with - Patient's Self Care Capacity Spouse   Comments Reports spouse works during the day will need to clarify   Prior Level of ADL Function   Self Feeding Independent   Grooming / Hygiene Independent "   Bathing Independent   Dressing Independent   Toileting Independent   Prior Level of IADL Function   Driving / Transportation Driving Independent   Occupation (Pre-Hospital Vocational) Retired Due To Age  ()   Precautions   Precautions Fall Risk;Cardiac Precautions (See Comments);Sternal Precautions (See Comments)   Cognition    Cognition / Consciousness X   Level of Consciousness Alert   New Learning Impaired   Comments pleasent ,cooperative, hyperverbose, does not appear to be inernalizing new ed.   Active ROM Upper Body   Active ROM Upper Body  WDL   Strength Upper Body   Comments WFL   Coordination Upper Body   Coordination WDL   Balance Assessment   Sitting Balance (Static) Fair +   Sitting Balance (Dynamic) Fair   Standing Balance (Static) Fair -   Standing Balance (Dynamic) Fair -   Weight Shift Sitting Fair   Weight Shift Standing Fair   Comments w/ FWW   Bed Mobility    Supine to Sit Moderate Assist   Sit to Supine   (NT in chair post)   ADL Assessment   Grooming Minimal Assist;Standing   Lower Body Dressing Moderate Assist  (usually uses pant leg to assist him get into 4pt sit)   Toileting   (catheter in place)   How much help from another person does the patient currently need...   Putting on and taking off regular lower body clothing? 2   Bathing (including washing, rinsing, and drying)? 2   Toileting, which includes using a toilet, bedpan, or urinal? 2   Putting on and taking off regular upper body clothing? 2   Taking care of personal grooming such as brushing teeth? 3   Eating meals? 3   6 Clicks Daily Activity Score 14   Functional Mobility   Sit to Stand Minimal Assist   Bed, Chair, Wheelchair Transfer Minimal Assist   Mobility within room and bathroom w/ FWW   Activity Tolerance   Sitting in Chair 10+ min (up post)   Sitting Edge of Bed 5 min   Standing 6 min   Patient / Family Goals   Patient / Family Goal #1 To go home   Short Term Goals   Short Term Goal # 1 Pt will demo LB dressing  w/ SPV   Short Term Goal # 2 Pt will demo standing grooming w/ SPV   Short Term Goal # 3 Pt will demo toilet txf w/ SPV   Short Term Goal # 4 Pt will demo toilet hygiene w/ SPV   Education Group   Role of Occupational Therapist Patient Response Patient;Acceptance;Explanation;Demonstration;Verbal Demonstration;Action Demonstration   Occupational Therapy Initial Treatment Plan    Treatment Interventions Self Care / Activities of Daily Living;Neuro Re-Education / Balance;Therapeutic Exercises;Therapeutic Activity;Adaptive Equipment   Treatment Frequency 3 Times per Week   Duration Until Therapy Goals Met   Problem List   Problem List Decreased Active Daily Living Skills;Decreased Homemaking Skills;Decreased Functional Mobility;Decreased Activity Tolerance;Safety Awareness Deficits / Cognition;Impaired Postural Control / Balance   Anticipated Discharge Equipment and Recommendations   DC Equipment Recommendations Tub / Shower Seat   Discharge Recommendations Recommend home health for continued occupational therapy services  (anticipate quick progression while in house. Will need to ed/train spouse)   Interdisciplinary Plan of Care Collaboration   IDT Collaboration with  Nursing;Physical Therapist   Patient Position at End of Therapy Seated;Chair Alarm On;Call Light within Reach;Tray Table within Reach;Phone within Reach   Collaboration Comments report given   Session Information   Date / Session Number  9/27, 1 (1/3, 10/3)

## 2023-09-27 NOTE — THERAPY
Physical Therapy   Initial Evaluation     Patient Name: Dakota Boyer  Age:  78 y.o., Sex:  male  Medical Record #: [id number]  Today's Date: 9/27/2023     Precautions  Precautions: (P) Fall Risk;Cardiac Precautions (See Comments);Sternal Precautions (See Comments)    Assessment  Patient is 78 y.o. male with a diagnosis of aortic stenosis and underwent TAVR on 9/25.     Pt tolerated session well considering acute medical status. Pt presents with impaired bed mobility, decreased strength, impaired activity tolerance. Pt required MOD A for supine to sit with increased time for performance. Pt performed sit to stand with FWW and CGA. Pt ambulated 160' with FWW and CGA taking increased time due to slow gait speed. Post ambulation O2 at 88% on RA and pt was placed on 2L via NC. Pt will benefit from acute PT services to improve his functional independence, assess stairs, and facilitate discharge home.     Pt was provided with written open heart handout which was verbally reviewed including cardiac warning signs, RPE scale, return to walking program, benefits of outpatient cardiac rehab, sternal precautions. He was receptive to all education and demonstrated good carryover with sternal precautions.     Plan    Physical Therapy Initial Treatment Plan   Treatment Plan : (P) Bed Mobility, Debridement, Equipment, Group Therapy, Gait Training, Manual Therapy, Orthotics Training , Neuro Re-Education / Balance, Self Care / Home Evaluation, Stair Training, Therapeutic Activities, Therapeutic Exercise  Treatment Frequency: (P) 4 Times per Week  Duration: (P) Until Therapy Goals Met    DC Equipment Recommendations: (P) None  Discharge Recommendations: (P) Recommend home health for continued physical therapy services       Subjective    Pt is pleasant and cooperative.      Objective       09/27/23 1412   Initial Contact Note    Initial Contact Note Order Received and Verified, Physical Therapy Evaluation in Progress with Full  Report to Follow.   Precautions   Precautions Fall Risk;Cardiac Precautions (See Comments);Sternal Precautions (See Comments)   Vitals   Pulse 92   Pulse Oximetry 93 %   O2 (LPM) 0   O2 Delivery Device None - Room Air   Pain   Intervention Declines   Prior Living Situation   Housing / Facility 1 Story House   Steps Into Home 3   Steps In Home 0   Equipment Owned Front-Wheel Walker;4-Wheel Walker   Lives with - Patient's Self Care Capacity Spouse   Prior Level of Functional Mobility   Bed Mobility Independent   Transfer Status Independent   Ambulation Independent   Ambulation Distance household   Assistive Devices Used Front-Wheel Walker   Stairs Independent   Passive ROM Lower Body   Passive ROM Lower Body WDL   Active ROM Lower Body    Active ROM Lower Body  WDL   Strength Lower Body   Lower Body Strength  X   Gross Strength Generalized Weakness, Equal Bilaterally   Balance Assessment   Sitting Balance (Static) Fair   Sitting Balance (Dynamic) Fair -   Standing Balance (Static) Poor +   Standing Balance (Dynamic) Poor +   Weight Shift Sitting Fair   Weight Shift Standing Fair   Bed Mobility    Supine to Sit Moderate Assist   Gait Analysis   Gait Level Of Assist Contact Guard Assist   Assistive Device Front Wheel Walker   Distance (Feet) 130   # of Times Distance was Traveled 1   Deviation Shuffled Gait;Bradykinetic;Decreased Base Of Support   Functional Mobility   Sit to Stand Contact Guard Assist   Bed, Chair, Wheelchair Transfer Contact Guard Assist   How much difficulty does the patient currently have...   Turning over in bed (including adjusting bedclothes, sheets and blankets)? 1   Sitting down on and standing up from a chair with arms (e.g., wheelchair, bedside commode, etc.) 3   Moving from lying on back to sitting on the side of the bed? 1   How much help from another person does the patient currently need...   Moving to and from a bed to a chair (including a wheelchair)? 3   Need to walk in a hospital  room? 3   Climbing 3-5 steps with a railing? 2   6 clicks Mobility Score 13   Short Term Goals    Short Term Goal # 1 Pt will perform supine<>sit with supervision.   Short Term Goal # 2 Pt will perform sit<>stand and stand pivot transfers with FWW and supervision.   Short Term Goal # 3 Pt will ambulate 300' with FWW and supervision.   Short Term Goal # 4 Pt will perform 3 stairs with B handrails and supervision.   Education Group   Education Provided Role of Physical Therapist   Physical Therapy Initial Treatment Plan    Treatment Plan  Bed Mobility;Debridement;Equipment;Group Therapy;Gait Training;Manual Therapy;Orthotics Training ;Neuro Re-Education / Balance;Self Care / Home Evaluation;Stair Training;Therapeutic Activities;Therapeutic Exercise   Treatment Frequency 4 Times per Week   Duration Until Therapy Goals Met   Problem List    Problems Impaired Bed Mobility;Impaired Transfers;Impaired Ambulation;Functional Strength Deficit;Impaired Balance;Decreased Activity Tolerance;Safety Awareness Deficits / Cognition   Anticipated Discharge Equipment and Recommendations   DC Equipment Recommendations None   Discharge Recommendations Recommend home health for continued physical therapy services   Interdisciplinary Plan of Care Collaboration   IDT Collaboration with  Nursing;Occupational Therapist   Patient Position at End of Therapy Seated;Chair Alarm On;Phone within Reach;Tray Table within Reach;Call Light within Reach   Session Information   Date / Session Number  9/27-1 (1/4, 10/3)

## 2023-09-27 NOTE — CARE PLAN
The patient is Watcher - Medium risk of patient condition declining or worsening    Shift Goals  Clinical Goals: Hemodynamic Stability  Patient Goals: Pain Control  Family Goals: LESTER    Progress made toward(s) clinical / shift goals:  Hemodynamic stability.    Patient is not progressing towards the following goals: Pain Control        Problem: Post Op Day 1 CABG/Heart Valve Replacement  Goal: Optimal care of the post op CABG/heart valve replacement Post Op Day 1  Intervention: EKG and CXR completed  Note: Completed.  Intervention: All valve patients: PT/INR daily  Note: Completed.  Intervention: Antibiotics are discontinued within 24 hours of anesthesia end time unless indication documented for continuation beyond 24 hours  Note: Completed.  Intervention: Daily weights in the morning  Note: Completed.  Intervention: Up in chair for all meals  Note: Patient up to chair for all meals today.  Intervention: Ambulate in am if stable. First ambulation 25 feet. Repeat x 3 as tolerated  Note: Patient will ambulate with night shift.  Intervention: Discontinue love catheter unless documented reason for continuation  Note: Love catheter to stay in place.  Intervention: Assess surgical dressing and check provider orders for potential removal  Note: Island dressing to be removed this evening.  Intervention: [facility] trained RN to remove chest tubes if ordered by provider  Note: CT to remain for today.  Intervention: IS q 1 hour while awake and record best IS volume  Note: Best .  Intervention: Knee high DYLAN hose, on during the day, off at night  Note: Dylan hose on throughout the day.  Intervention: Saline lock IV  Note: Patient saline locked this evening once Levophed and Insulin came off.   Intervention: If patient is CABG or on home beta-blocker, start/resume beta-blocker on POD 1 or POD 2 or document contraindication  Note: Metoprolol held.

## 2023-09-27 NOTE — PROGRESS NOTES
"Pulmonary/Critical Care Progress Note    Date of admission  9/25/2023    Chief Complaint  78 y.o. male admitted 9/25/2023 with s/p transcatheter aortic valve replacement.     Hospital Course  Per Dr Bacon note from 9/26: \"78 year old male with an embolized TAVR valve, severe symptomatic aortic stenosis, RBBB, LYNDA. Went to the OR on 9/25 for an open heart procedure to retrieve an embolized TAVR valve.  He also underwent aortic valve replacement and repair of aortic root enlargement.  Came to the ICU postoperatively still intubated.\"    9/25: admitted to ICU, intubated, advanced swan jayden  9/26: removed a line, extubated  9/27: plan to remove ct's    Interval Problem Update  Reviewed last 24 hour events:   No major issues overnight, he was able to walk a few steps.  Neuro: alert and oriented  Pulm: 2- L nasal cannula  Cards: SR. No ectopy. Off pressors  Nephro:  Sodium 143 range, appropriate/normal GFR  ID: No leukocytosis   Hem: H&H stable at 13/38, platelets 127  : Appropriate urine output  Endo: keep goal of glucose 120-180.       Review of Systems  Review of Systems   Constitutional:  Negative for chills, fever and weight loss.   HENT:  Negative for congestion, nosebleeds and sore throat.    Eyes:  Negative for discharge and redness.   Respiratory:  Negative for cough, shortness of breath and wheezing.    Cardiovascular:  Negative for chest pain, palpitations and leg swelling.        Surgical site soreness   Gastrointestinal:  Negative for nausea and vomiting.   Genitourinary:  Negative for dysuria.   Musculoskeletal:  Negative for myalgias.   Skin:  Negative for rash.   Neurological:  Negative for dizziness and headaches.   Endo/Heme/Allergies:  Does not bruise/bleed easily.   Psychiatric/Behavioral:  Negative for memory loss.    All other systems reviewed and are negative.       Vital Signs for last 24 hours   Temp:  [36.5 °C (97.7 °F)-37.7 °C (99.9 °F)] 37.3 °C (99.1 °F)  Pulse:  [73-89] 88  Resp:  [8-26] " 18  BP: ()/(52-65) 105/55  SpO2:  [92 %-96 %] 92 %    Hemodynamic parameters for last 24 hours  PCWP:  [9 MM HG] 9 MM HG  CO:  [3.2] 3.2  CI:  [1.55-1.6] 1.6    Respiratory Information for the last 24 hours       Physical Exam   Physical Exam  Vitals and nursing note reviewed.   Constitutional:       General: He is not in acute distress.     Appearance: He is not toxic-appearing.   HENT:      Head: Normocephalic and atraumatic.      Nose: Nose normal.      Mouth/Throat:      Mouth: Mucous membranes are moist.   Eyes:      Extraocular Movements: Extraocular movements intact.   Cardiovascular:      Rate and Rhythm: Normal rate and regular rhythm.      Pulses: Normal pulses.      Heart sounds: No murmur heard.     No friction rub. No gallop.   Pulmonary:      Effort: Pulmonary effort is normal. No respiratory distress.      Breath sounds: No wheezing or rales.      Comments: Sub xifoid chest tubes in place. Midline sternotomy covered with gauze.  Abdominal:      General: Bowel sounds are normal.   Musculoskeletal:         General: Normal range of motion.      Cervical back: Normal range of motion.      Right lower leg: No edema.      Left lower leg: No edema.   Skin:     General: Skin is warm.      Capillary Refill: Capillary refill takes less than 2 seconds.      Findings: No rash.   Neurological:      General: No focal deficit present.      Mental Status: He is alert and oriented to person, place, and time.   Psychiatric:         Mood and Affect: Mood normal.         Medications  Current Facility-Administered Medications   Medication Dose Route Frequency Provider Last Rate Last Admin    furosemide (Lasix) injection 20 mg  20 mg Intravenous BID Rene Alegria A.P.R.N.   20 mg at 09/27/23 0955    potassium chloride SA (Kdur) tablet 20 mEq  20 mEq Oral BID SUBHA Willis.P.R.N.   20 mEq at 09/27/23 0955    insulin GLARGINE (Lantus,Semglee) injection  25 Units Subcutaneous Q EVENING Tanya Kidd, A.P.N.   25  Units at 09/26/23 1753    And    insulin lispro (HumaLOG,AdmeLOG) injection  0.2 Units/kg/day Subcutaneous TID AC SUBHA Davis.P.N.   7 Units at 09/27/23 0854    And    insulin lispro (HumaLOG,AdmeLOG) injection  2-9 Units Subcutaneous 4X/DAY ACHS SUBHA Davis.P.N.   5 Units at 09/27/23 0852    And    dextrose 50% (D50W) injection 25 g  25 g Intravenous Q15 MIN PRN Tanya Kidd A.P.N.        levothyroxine (Synthroid) tablet 50 mcg  50 mcg Oral AM ES Sara Perkins A.P.R.N.   50 mcg at 09/27/23 0547    rosuvastatin (Crestor) tablet 40 mg  40 mg Oral Nightly Sara Perkins A.P.R.N.   40 mg at 09/26/23 2022    EPINEPHrine (Adrenalin) infusion 4 mg/250 mL (premix)  0-0.5 mcg/kg/min (Ideal) Intravenous Continuous Turner Johnson D.REBEL   Dose not Required at 09/25/23 1315    Respiratory Therapy Consult   Nebulization Continuous RT SUBHA Davis.P.N.        NS infusion   Intravenous Continuous SUBHA Davis.P.N.   Stopped at 09/26/23 0300    NS infusion   Intravenous Continuous SUBHA Davis.P.N.   Stopped at 09/26/23 0307    electrolyte-A (Plasmalyte-A) infusion   Intravenous PRN SUBHA Davis.P.N. 999 mL/hr at 09/25/23 1855 New Bag at 09/25/23 1855    enoxaparin (Lovenox) inj 40 mg  40 mg Subcutaneous DAILY AT 1800 Tanya Kidd A.P.N.   40 mg at 09/26/23 1755    Nozin nasal  swab  1 Applicator Each Nostril BID SUBHA Davis.P.N.   1 Applicator at 09/27/23 0855    aspirin EC tablet 81 mg  81 mg Oral DAILY SUBHA Davis.P.N.   81 mg at 09/27/23 0547    clevidipine (Cleviprex) IV emulsion  0-21 mg/hr Intravenous Continuous SUBHA Davis.P.N.   Dose not Required at 09/25/23 1800    nitroglycerin 50 mg in D5W 250 ml infusion  0-100 mcg/min Intravenous Continuous SUBHA Davis.P.NLaurita   Dose not Required at 09/25/23 1800    Pharmacy Consult Request ...Pain Management Review 1 Each  1 Each Other PHARMACY TO DOSE SUBHA Davis.P.N.         acetaminophen (Tylenol) tablet 1,000 mg  1,000 mg Oral Q6HRS Tanya Kidd A.P.N.   1,000 mg at 09/27/23 0546    Followed by    [START ON 9/30/2023] acetaminophen (Tylenol) tablet 1,000 mg  1,000 mg Oral Q6HRS PRN Tanya Kidd, A.P.N.        oxyCODONE immediate-release (Roxicodone) tablet 5 mg  5 mg Oral Q3HRS PRN Tanya Kidd, A.P.N.   5 mg at 09/26/23 0449    Or    oxyCODONE immediate release (Roxicodone) tablet 10 mg  10 mg Oral Q3HRS PRN Tanya Kidd A.P.N.   10 mg at 09/27/23 0855    Or    fentaNYL (Sublimaze) injection 50 mcg  50 mcg Intravenous Q3HRS PRN Tanya Kidd A.P.N.   50 mcg at 09/26/23 0954    traMADol (Ultram) 50 MG tablet 50 mg  50 mg Oral Q4HRS PRN Tanya Kidd, A.P.N.   50 mg at 09/26/23 2129    midazolam (Versed) injection 2 mg  2 mg Intravenous Q HOUR PRN Tanya Kidd A.P.N.        dexmedetomidine (PRECEDEX) 400 mcg/100mL NS premix infusion  0-1.5 mcg/kg/hr (Ideal) Intravenous Continuous Tanya Kidd A.P.N.   Stopped at 09/25/23 2104    sodium bicarbonate 8.4 % injection 50 mEq  50 mEq Intravenous Q HOUR PRN Tanya Kidd A.P.N.   50 mEq at 09/25/23 1942    morphine 4 MG/ML injection 4 mg  4 mg Intravenous Q HOUR PRN Tanya Kidd A.P.N.   4 mg at 09/25/23 2006    ondansetron (Zofran) syringe/vial injection 8 mg  8 mg Intravenous Q6HRS PRN Tanya Kidd, A.P.N.   8 mg at 09/26/23 0809    Or    prochlorperazine (Compazine) injection 10 mg  10 mg Intravenous Q6HRS PRN Tanya Kidd, A.P.N.        senna-docusate (Pericolace Or Senokot S) 8.6-50 MG per tablet 2 Tablet  2 Tablet Oral BID Tanya Kidd, A.P.N.   2 Tablet at 09/27/23 0546    And    polyethylene glycol/lytes (Miralax) PACKET 1 Packet  1 Packet Oral DAILY Tanya Kidd, A.P.N.   1 Packet at 09/27/23 0547    And    magnesium hydroxide (Milk Of Magnesia) suspension 30 mL  30 mL Oral DAILY Tanya Kidd, A.P.N.        And    bisacodyl (Dulcolax) suppository 10 mg  10 mg Rectal QDAY PRN Tanya HOBBS  BETO KiddP.NLaurita        omeprazole (PriLOSEC) capsule 20 mg  20 mg Oral DAILY SUBHA Davis.P.N.   20 mg at 09/27/23 0547    mag hydrox-al hydrox-simeth (Maalox Plus Es Or Mylanta Ds) suspension 30 mL  30 mL Oral Q4HRS PRN SUBHA Davis.P.NLaurita        diphenhydrAMINE (Benadryl) tablet/capsule 25 mg  25 mg Oral HS PRN - MR X 1 SUBHA Davis.P.PAYTON        norepinephrine (Levophed) 8 mg in 250 mL NS infusion (premix)  0-1 mcg/kg/min (Ideal) Intravenous Continuous BETO DavisP.NLaurita   Stopped at 09/26/23 1657    vasopressin (Vasostrict) 20 Units in  mL Infusion  0.03 Units/min Intravenous Continuous SUBHA Davis.P.NLaurita   Stopped at 09/25/23 1950       Fluids    Intake/Output Summary (Last 24 hours) at 9/27/2023 1058  Last data filed at 9/27/2023 0600  Gross per 24 hour   Intake 1269.87 ml   Output 1165 ml   Net 104.87 ml       Laboratory  Recent Labs     09/25/23  1939 09/25/23  2049 09/25/23  2242   ISTATAPH 7.310* 7.384* 7.350*   ISTATAPCO2 38.5* 35.6 40.1*   ISTATAPO2 85 107* 117*   ISTATATCO2 21 22 23   WGARRCP0FHA 96 98 98   ISTATARTHCO3 19.4 21.3 22.1   ISTATARTBE -6* -3 -3   ISTATTEMP 36.3 C 36.5 C 37.0 C   ISTATFIO2 40 40 40   ISTATSPEC Arterial Arterial Arterial   ISTATAPHTC 7.319* 7.392* 7.350*   [id number] 82 104* 117*         Recent Labs     09/25/23  1736 09/25/23  1800 09/25/23  2328 09/26/23  0134 09/26/23  0534 09/26/23  1308 09/27/23  0355   SODIUM  --  142  --  143  --   --  132*   POTASSIUM 4.5 4.4   < > 4.1 3.9 4.1 4.4   CHLORIDE  --  108  --  111  --   --  101   CO2  --  21  --  21  --   --  23   BUN  --  15  --  15  --   --  15   CREATININE  --  0.89  --  1.00  --   --  1.06   MAGNESIUM 2.9*  --   --   --   --   --   --    CALCIUM  --  8.5  --  8.8  --   --  8.4*    < > = values in this interval not displayed.     Recent Labs     09/25/23  1800 09/26/23 0134 09/27/23 0355   GLUCOSE 242* 211* 231*     Recent Labs     09/26/23 0134 09/27/23  0355   WBC 11.0* 7.9     Recent  Labs     09/25/23  1736 09/26/23  0134 09/27/23  0355   RBC  --  4.40* 3.29*   HEMOGLOBIN 13.6* 13.4* 10.0*   HEMATOCRIT 38.6* 38.4* 29.5*   PLATELETCT 117* 127* 80*   PROTHROMBTM 17.6*  --   --    APTT 108.5*  --   --    INR 1.43*  --   --        Imaging  X-Ray:  I have personally reviewed the images and compared with prior images.    Assessment/Plan  * S/P TAVR (transcatheter aortic valve replacement)  Assessment & Plan  -S/p transcatheter aortic valve replacement, converted to open on 9/25  -post op day 2  -Surgeon is Dr Hsu and Dr Johnson  -monitor output from chest tubes  -plan to remove CTs  -ambulation  -telemetry  -insulin treatment, goal 120-180 while inpatient  -off pressors  -pain control per protocol    Type 2 diabetes mellitus with other specified complication (HCC)- (present on admission)  Assessment & Plan  On insulin at home and correcting with insulin while inpatient    RBBB- (present on admission)  Assessment & Plan  Continue with pacing  telemetry    Back pain, chronic- (present on admission)  Assessment & Plan  Pain control per post surgery protocol         VTE:  Lovenox  Ulcer: Not Indicated  Lines: Central Line  Ongoing indication addressed and Hanson Catheter  Ongoing indication addressed    I have performed a physical exam and reviewed and updated ROS and Plan today (9/27/2023). In review of yesterday's note (9/26/2023), there are no changes except as documented above.     Discussed patient condition and risk of morbidity and/or mortality with RN, Pharmacy, Charge nurse / hot rounds, and Patient  The patient remains critically ill.  Critical care time = 35 minutes in directly providing and coordinating critical care and extensive data review.  No time overlap and excludes procedures.    Gabriel Mcbride MD, FACP, FCCP  Pulmonary/Critical Care

## 2023-09-27 NOTE — CARE PLAN
The patient is Stable - Low risk of patient condition declining or worsening    Shift Goals  Clinical Goals: mobilize, pain control  Patient Goals: walk. sleep  Family Goals: LESTER    Progress made toward(s) clinical / shift goals:    Problem: Pain - Standard  Goal: Alleviation of pain or a reduction in pain to the patient’s comfort goal  Description: Target End Date:  Prior to discharge or change in level of care    Document on Vitals flowsheet    1.  Document pain using the appropriate pain scale per order or unit policy  2.  Educate and implement non-pharmacologic comfort measures (i.e. relaxation, distraction, massage, cold/heat therapy, etc.)  3.  Pain management medications as ordered  4.  Reassess pain after pain med administration per policy  5.  If opiods administered assess patient's response to pain medication is appropriate per POSS sedation scale  6.  Follow pain management plan developed in collaboration with patient and interdisciplinary team (including palliative care or pain specialists if applicable)  Outcome: Progressing     Problem: Post Op Day 1 CABG/Heart Valve Replacement  Goal: Optimal care of the post op CABG/heart valve replacement Post Op Day 1  Outcome: Progressing  Intervention: EKG and CXR completed  Note: EKG and CXR completed  Intervention: All valve patients: PT/INR daily  Note: Completed  Intervention: Antibiotics are discontinued within 24 hours of anesthesia end time unless indication documented for continuation beyond 24 hours  Note: Antibiotics discontinued  Intervention: Daily weights in the morning  Note: Daily weight 103.8kg  Intervention: Up in chair for all meals  Note: Patient up to chair for all meals  Intervention: Ambulate in am if stable. First ambulation 25 feet. Repeat x 3 as tolerated  Note: First walk 25 feet. Completed x1  Intervention: Discontinue love catheter unless documented reason for continuation  Note: Love to remain in for I/os  Intervention: Assess  surgical dressing and check provider orders for potential removal  Note: Island dressing removed, incisions cleansed  Intervention: Ensure referal to intensive cardiac rehab is ordered, and smoking cessation education if appropriate  Note: Rehab ordered  Intervention: [facility] trained RN to remove chest tubes if ordered by provider  Note: Chest tubes to remain in per CTS  Intervention: IS q 1 hour while awake and record best IS volume  Note: Best IS 1000  Intervention: Knee high DYLAN hose, on during the day, off at night  Note: Dylan hose on during day, SCDs on during night  Intervention: Saline lock IV  Note: Ivs saline locked  Intervention: Transfer to tele status, begin VS q 4 hours  Note: Patient remains ICU status  Intervention: After 24th hour post-anesthesia end time, transition patient to Cardiac Surgery SQ Insulin Protocol  Note: Patient on SQ insulin protocol   Intervention: If patient is CABG or on home beta-blocker, start/resume beta-blocker on POD 1 or POD 2 or document contraindication  Note: Contraindicated       Patient is not progressing towards the following goals:

## 2023-09-28 ENCOUNTER — APPOINTMENT (OUTPATIENT)
Dept: RADIOLOGY | Facility: MEDICAL CENTER | Age: 78
DRG: 219 | End: 2023-09-28
Attending: THORACIC SURGERY (CARDIOTHORACIC VASCULAR SURGERY)
Payer: MEDICARE

## 2023-09-28 LAB
ANION GAP SERPL CALC-SCNC: 10 MMOL/L (ref 7–16)
BUN SERPL-MCNC: 18 MG/DL (ref 8–22)
CALCIUM SERPL-MCNC: 8.6 MG/DL (ref 8.5–10.5)
CHLORIDE SERPL-SCNC: 99 MMOL/L (ref 96–112)
CO2 SERPL-SCNC: 27 MMOL/L (ref 20–33)
CREAT SERPL-MCNC: 1.19 MG/DL (ref 0.5–1.4)
ERYTHROCYTE [DISTWIDTH] IN BLOOD BY AUTOMATED COUNT: 54.4 FL (ref 35.9–50)
GFR SERPLBLD CREATININE-BSD FMLA CKD-EPI: 62 ML/MIN/1.73 M 2
GLUCOSE BLD STRIP.AUTO-MCNC: 264 MG/DL (ref 65–99)
GLUCOSE BLD STRIP.AUTO-MCNC: 285 MG/DL (ref 65–99)
GLUCOSE BLD STRIP.AUTO-MCNC: 302 MG/DL (ref 65–99)
GLUCOSE BLD STRIP.AUTO-MCNC: 333 MG/DL (ref 65–99)
GLUCOSE SERPL-MCNC: 246 MG/DL (ref 65–99)
HCT VFR BLD AUTO: 29.2 % (ref 42–52)
HGB BLD-MCNC: 10.1 G/DL (ref 14–18)
MCH RBC QN AUTO: 30.2 PG (ref 27–33)
MCHC RBC AUTO-ENTMCNC: 34.6 G/DL (ref 32.3–36.5)
MCV RBC AUTO: 87.4 FL (ref 81.4–97.8)
PLATELET # BLD AUTO: 80 K/UL (ref 164–446)
PLATELETS.RETICULATED NFR BLD AUTO: 6.5 % (ref 0.6–13.1)
PMV BLD AUTO: 10.9 FL (ref 9–12.9)
POTASSIUM SERPL-SCNC: 4.2 MMOL/L (ref 3.6–5.5)
RBC # BLD AUTO: 3.34 M/UL (ref 4.7–6.1)
SODIUM SERPL-SCNC: 136 MMOL/L (ref 135–145)
WBC # BLD AUTO: 6.5 K/UL (ref 4.8–10.8)

## 2023-09-28 PROCEDURE — A9270 NON-COVERED ITEM OR SERVICE: HCPCS | Performed by: NURSE PRACTITIONER

## 2023-09-28 PROCEDURE — 700102 HCHG RX REV CODE 250 W/ 637 OVERRIDE(OP): Performed by: NURSE PRACTITIONER

## 2023-09-28 PROCEDURE — 770020 HCHG ROOM/CARE - TELE (206)

## 2023-09-28 PROCEDURE — 85027 COMPLETE CBC AUTOMATED: CPT

## 2023-09-28 PROCEDURE — A9270 NON-COVERED ITEM OR SERVICE: HCPCS

## 2023-09-28 PROCEDURE — A9270 NON-COVERED ITEM OR SERVICE: HCPCS | Mod: JZ | Performed by: NURSE PRACTITIONER

## 2023-09-28 PROCEDURE — 94669 MECHANICAL CHEST WALL OSCILL: CPT

## 2023-09-28 PROCEDURE — 700111 HCHG RX REV CODE 636 W/ 250 OVERRIDE (IP): Mod: JZ | Performed by: NURSE PRACTITIONER

## 2023-09-28 PROCEDURE — 71046 X-RAY EXAM CHEST 2 VIEWS: CPT

## 2023-09-28 PROCEDURE — 85055 RETICULATED PLATELET ASSAY: CPT

## 2023-09-28 PROCEDURE — 700102 HCHG RX REV CODE 250 W/ 637 OVERRIDE(OP): Mod: JZ | Performed by: NURSE PRACTITIONER

## 2023-09-28 PROCEDURE — 82962 GLUCOSE BLOOD TEST: CPT | Mod: 91

## 2023-09-28 PROCEDURE — 700102 HCHG RX REV CODE 250 W/ 637 OVERRIDE(OP)

## 2023-09-28 PROCEDURE — 97530 THERAPEUTIC ACTIVITIES: CPT

## 2023-09-28 PROCEDURE — 80048 BASIC METABOLIC PNL TOTAL CA: CPT

## 2023-09-28 RX ORDER — POTASSIUM CHLORIDE 20 MEQ/1
40 TABLET, EXTENDED RELEASE ORAL 2 TIMES DAILY
Status: DISCONTINUED | OUTPATIENT
Start: 2023-09-28 | End: 2023-09-30 | Stop reason: HOSPADM

## 2023-09-28 RX ORDER — TAMSULOSIN HYDROCHLORIDE 0.4 MG/1
0.4 CAPSULE ORAL
Status: DISCONTINUED | OUTPATIENT
Start: 2023-09-28 | End: 2023-09-30 | Stop reason: HOSPADM

## 2023-09-28 RX ORDER — FUROSEMIDE 10 MG/ML
40 INJECTION INTRAMUSCULAR; INTRAVENOUS 2 TIMES DAILY
Status: DISCONTINUED | OUTPATIENT
Start: 2023-09-28 | End: 2023-09-30 | Stop reason: HOSPADM

## 2023-09-28 RX ADMIN — SENNOSIDES AND DOCUSATE SODIUM 2 TABLET: 50; 8.6 TABLET ORAL at 04:40

## 2023-09-28 RX ADMIN — INSULIN LISPRO 7 UNITS: 100 INJECTION, SOLUTION INTRAVENOUS; SUBCUTANEOUS at 18:51

## 2023-09-28 RX ADMIN — ASPIRIN 81 MG: 81 TABLET, COATED ORAL at 04:39

## 2023-09-28 RX ADMIN — MAGNESIUM HYDROXIDE 30 ML: 1200 LIQUID ORAL at 04:42

## 2023-09-28 RX ADMIN — Medication 1 APPLICATOR: at 10:10

## 2023-09-28 RX ADMIN — INSULIN GLARGINE-YFGN 25 UNITS: 100 INJECTION, SOLUTION SUBCUTANEOUS at 18:49

## 2023-09-28 RX ADMIN — ROSUVASTATIN CALCIUM 40 MG: 20 TABLET, FILM COATED ORAL at 21:36

## 2023-09-28 RX ADMIN — FUROSEMIDE 20 MG: 10 INJECTION, SOLUTION INTRAVENOUS at 04:42

## 2023-09-28 RX ADMIN — INSULIN LISPRO 6 UNITS: 100 INJECTION, SOLUTION INTRAVENOUS; SUBCUTANEOUS at 12:43

## 2023-09-28 RX ADMIN — SENNOSIDES AND DOCUSATE SODIUM 2 TABLET: 50; 8.6 TABLET ORAL at 18:36

## 2023-09-28 RX ADMIN — LEVOTHYROXINE SODIUM 50 MCG: 0.05 TABLET ORAL at 04:40

## 2023-09-28 RX ADMIN — INSULIN LISPRO 5 UNITS: 100 INJECTION, SOLUTION INTRAVENOUS; SUBCUTANEOUS at 10:09

## 2023-09-28 RX ADMIN — INSULIN LISPRO 6 UNITS: 100 INJECTION, SOLUTION INTRAVENOUS; SUBCUTANEOUS at 18:50

## 2023-09-28 RX ADMIN — ACETAMINOPHEN 1000 MG: 500 TABLET, FILM COATED ORAL at 04:38

## 2023-09-28 RX ADMIN — TRAMADOL HYDROCHLORIDE 50 MG: 50 TABLET ORAL at 12:38

## 2023-09-28 RX ADMIN — ACETAMINOPHEN 1000 MG: 500 TABLET, FILM COATED ORAL at 18:36

## 2023-09-28 RX ADMIN — TAMSULOSIN HYDROCHLORIDE 0.4 MG: 0.4 CAPSULE ORAL at 12:37

## 2023-09-28 RX ADMIN — POTASSIUM CHLORIDE 40 MEQ: 20 TABLET, EXTENDED RELEASE ORAL at 18:00

## 2023-09-28 RX ADMIN — Medication 1 APPLICATOR: at 21:36

## 2023-09-28 RX ADMIN — POTASSIUM CHLORIDE 20 MEQ: 1500 TABLET, EXTENDED RELEASE ORAL at 04:40

## 2023-09-28 RX ADMIN — POLYETHYLENE GLYCOL 3350 1 PACKET: 17 POWDER, FOR SOLUTION ORAL at 04:42

## 2023-09-28 RX ADMIN — FUROSEMIDE 40 MG: 10 INJECTION INTRAMUSCULAR; INTRAVENOUS at 18:37

## 2023-09-28 RX ADMIN — ACETAMINOPHEN 1000 MG: 500 TABLET, FILM COATED ORAL at 12:37

## 2023-09-28 RX ADMIN — INSULIN LISPRO 7 UNITS: 100 INJECTION, SOLUTION INTRAVENOUS; SUBCUTANEOUS at 12:44

## 2023-09-28 RX ADMIN — INSULIN LISPRO 5 UNITS: 100 INJECTION, SOLUTION INTRAVENOUS; SUBCUTANEOUS at 21:50

## 2023-09-28 RX ADMIN — OXYCODONE HYDROCHLORIDE 10 MG: 10 TABLET ORAL at 04:38

## 2023-09-28 RX ADMIN — ENOXAPARIN SODIUM 40 MG: 100 INJECTION SUBCUTANEOUS at 18:37

## 2023-09-28 RX ADMIN — INSULIN LISPRO 7 UNITS: 100 INJECTION, SOLUTION INTRAVENOUS; SUBCUTANEOUS at 10:08

## 2023-09-28 RX ADMIN — OMEPRAZOLE 20 MG: 20 CAPSULE, DELAYED RELEASE ORAL at 04:40

## 2023-09-28 RX ADMIN — OXYCODONE HYDROCHLORIDE 10 MG: 10 TABLET ORAL at 10:13

## 2023-09-28 ASSESSMENT — PAIN DESCRIPTION - PAIN TYPE
TYPE: SURGICAL PAIN

## 2023-09-28 ASSESSMENT — FIBROSIS 4 INDEX
FIB4 SCORE: 4.07
FIB4 SCORE: 4.07

## 2023-09-28 ASSESSMENT — LIFESTYLE VARIABLES
CONSUMPTION TOTAL: NEGATIVE
TOTAL SCORE: 0
EVER FELT BAD OR GUILTY ABOUT YOUR DRINKING: NO
AVERAGE NUMBER OF DAYS PER WEEK YOU HAVE A DRINK CONTAINING ALCOHOL: 0
HAVE YOU EVER FELT YOU SHOULD CUT DOWN ON YOUR DRINKING: NO
HOW MANY TIMES IN THE PAST YEAR HAVE YOU HAD 5 OR MORE DRINKS IN A DAY: 0
ALCOHOL_USE: NO
EVER HAD A DRINK FIRST THING IN THE MORNING TO STEADY YOUR NERVES TO GET RID OF A HANGOVER: NO
TOTAL SCORE: 0
HAVE PEOPLE ANNOYED YOU BY CRITICIZING YOUR DRINKING: NO
TOTAL SCORE: 0
ON A TYPICAL DAY WHEN YOU DRINK ALCOHOL HOW MANY DRINKS DO YOU HAVE: 0

## 2023-09-28 ASSESSMENT — COGNITIVE AND FUNCTIONAL STATUS - GENERAL
CLIMB 3 TO 5 STEPS WITH RAILING: A LOT
WALKING IN HOSPITAL ROOM: A LITTLE
SUGGESTED CMS G CODE MODIFIER MOBILITY: CL
MOVING FROM LYING ON BACK TO SITTING ON SIDE OF FLAT BED: UNABLE
STANDING UP FROM CHAIR USING ARMS: A LITTLE
TURNING FROM BACK TO SIDE WHILE IN FLAT BAD: UNABLE
MOVING TO AND FROM BED TO CHAIR: UNABLE
MOBILITY SCORE: 11

## 2023-09-28 ASSESSMENT — GAIT ASSESSMENTS
GAIT LEVEL OF ASSIST: CONTACT GUARD ASSIST
ASSISTIVE DEVICE: FRONT WHEEL WALKER
DISTANCE (FEET): 15
DEVIATION: SHUFFLED GAIT;BRADYKINETIC

## 2023-09-28 ASSESSMENT — PATIENT HEALTH QUESTIONNAIRE - PHQ9
2. FEELING DOWN, DEPRESSED, IRRITABLE, OR HOPELESS: NOT AT ALL
SUM OF ALL RESPONSES TO PHQ9 QUESTIONS 1 AND 2: 0
1. LITTLE INTEREST OR PLEASURE IN DOING THINGS: NOT AT ALL

## 2023-09-28 NOTE — PROGRESS NOTES
Bedside report received. Patient A/Ox 4. VS -120's. Oxygen requirements of 2L. Telemetry monitoring patient is paced. Complains of 9/10 pain, patient medicated per MAR. POC discussed with patient. Pt verbalizes understanding. Call light and belongings within reach. Bed locked and lowest position, alarm and fall precautions in place.

## 2023-09-28 NOTE — THERAPY
Physical Therapy   Daily Treatment     Patient Name: Dakota Boyer  Age:  78 y.o., Sex:  male  Medical Record #: [id number]  Today's Date: 9/28/2023     Precautions  Precautions: Sternal Precautions (See Comments);Cardiac Precautions (See Comments);Fall Risk    Assessment    Pt was seen for PT tx session, continues to demonstrate decreased activity tolerance, balance, strength, and insight into post-op precautions.  Pt mobilized as detailed below with min A and FWW for upright mobility.  Re-iterated education on sternal precautions, talk test, and strategies for mobility.  Will continue to follow.     Plan    Treatment Plan Status: Continue Current Treatment Plan  Type of Treatment: Bed Mobility, Debridement, Equipment, Group Therapy, Gait Training, Manual Therapy, Orthotics Training , Neuro Re-Education / Balance, Self Care / Home Evaluation, Stair Training, Therapeutic Activities, Therapeutic Exercise  Treatment Frequency: 4 Times per Week  Treatment Duration: Until Therapy Goals Met    DC Equipment Recommendations: None  Discharge Recommendations: Recommend home health for continued physical therapy services (Pending progress with stairs & ambulation)     Objective     09/28/23 1522   Precautions   Precautions Sternal Precautions (See Comments);Cardiac Precautions (See Comments);Fall Risk   Pain 0 - 10 Group   Therapist Pain Assessment Post Activity Pain Same as Prior to Activity;Nurse Notified   Cognition    Cognition / Consciousness WDL   Level of Consciousness Alert   Comments Pleasant & cooperative, requires reinforcement for sternal precautions   Balance   Sitting Balance (Static) Fair +   Sitting Balance (Dynamic) Fair   Standing Balance (Static) Fair -   Standing Balance (Dynamic) Fair -   Weight Shift Sitting Fair   Weight Shift Standing Fair   Skilled Intervention Verbal Cuing   Bed Mobility    Supine to Sit (NT, left up in chair)   Sit to Supine Minimal Assist   Scooting Standby Assist (seated in  "chair)   Skilled Intervention Verbal Cuing;Tactile Cuing;Compensatory Strategies   Comments cues for log roll & not to reach behind himself   Gait Analysis   Gait Level Of Assist Contact Guard Assist   Assistive Device Front Wheel Walker   Distance (Feet) 15   # of Times Distance was Traveled 2   Deviation Shuffled Gait;Bradykinetic (flexed posture)   Weight Bearing Status No restrictions   Skilled Intervention Verbal Cuing;Tactile Cuing;Compensatory Strategies   Comments Flexed posture to stand at sink & wash hands, reported \"it's to stay balanced\"   Functional Mobility   Sit to Stand Minimal Assist   Bed, Chair, Wheelchair Transfer Minimal Assist   Toilet Transfers Minimal Assist   Transfer Method Stand Step   Mobility ambulation chair > toilet > EOB   Skilled Intervention Verbal Cuing;Tactile Cuing;Compensatory Strategies   Activity Tolerance   Sitting in Chair Pre session   Sitting Edge of Bed 2 min   Standing 6 min   Short Term Goals    Short Term Goal # 1 Pt will perform supine<>sit with supervision.   Goal Outcome # 1 goal not met   Short Term Goal # 2 Pt will perform sit<>stand and stand pivot transfers with FWW and supervision.   Goal Outcome # 2 Goal not met   Short Term Goal # 3 Pt will ambulate 300' with FWW and supervision.   Goal Outcome # 3 Goal not met   Short Term Goal # 4 Pt will perform 3 stairs with B handrails and supervision.   Goal Outcome # 4 Goal not met   Physical Therapy Treatment Plan   Physical Therapy Treatment Plan Continue Current Treatment Plan     "

## 2023-09-28 NOTE — CARE PLAN
The patient is Stable - Low risk of patient condition declining or worsening    Shift Goals  Clinical Goals: Safe ambulation, pain control and monitoring O2 demands  Patient Goals: Going home  Family Goals:  (Not present)    Progress made toward(s) clinical / shift goals:  On room air, ambulating with assist,       Problem: Fall Risk  Goal: Patient will remain free from falls  Outcome: Progressing     Problem: Knowledge Deficit - Standard  Goal: Patient and family/care givers will demonstrate understanding of plan of care, disease process/condition, diagnostic tests and medications  Outcome: Progressing     Problem: Pain - Standard  Goal: Alleviation of pain or a reduction in pain to the patient’s comfort goal  Outcome: Progressing  Flowsheets  Taken 9/28/2023 1431  Pain Rating Scale (NPRS): 2  Taken 9/28/2023 1338  Non Verbal Scale: Calm     Problem: Skin Integrity  Goal: Skin integrity is maintained or improved  Outcome: Progressing     Problem: Post Op Day 1 CABG/Heart Valve Replacement  Goal: Optimal care of the post op CABG/heart valve replacement Post Op Day 1  Outcome: Progressing     Problem: Post Op Day 3 CABG/Heart Valve replacement  Goal: Optimal care of the post op CABG/Heart Valve replacement post op day 3  Outcome: Progressing  Intervention: Shower daily and clean incisions twice daily with soap and water  Intervention: Ambulate 4 times daily, increasing the distance each time  Intervention: IS q 1 hour while awake and record best IS volume     Problem: Hemodynamics  Goal: Patient's hemodynamics, fluid balance and neurologic status will be stable or improve  Outcome: Progressing     Problem: Respiratory  Goal: Patient will achieve/maintain optimum respiratory ventilation and gas exchange  Outcome: Progressing  Flowsheets  Taken 9/28/2023 1431 by Meghna Seo, R.N.  Incentive Spirometer:   Effective   Self Motivated  Deep Breathe and Cough: Performs Correctly  Taken 9/28/2023 1300 by Meghna Seo,  R.N.  O2 Delivery Device: None - Room Air  Taken 9/28/2023 1058 by Elier East RRT  Incentive Spirometer Volume: 1500 mL     Problem: Risk for Aspiration  Goal: Patient's risk for aspiration will be absent or decrease  Outcome: Progressing  Flowsheets (Taken 9/28/2023 1431)  Aspiration Prevention:   Assessed for signs and symptoms of aspiration   Assisted patient up to chair for meals  Head of Bed Elevated: Self regulated     Problem: Nutrition - Advanced  Goal: Patient will display progressive weight gain toward goal have adequate food and fluid intake  Outcome: Progressing  Flowsheets (Taken 9/28/2023 1431)  Advanced Nutrition:   Promoted systemic fluid hydration within cardiac tolerance   Provided oral care     Problem: Self Care  Goal: Patient will have the ability to perform ADLs independently or with assistance (bathe, groom, dress, toilet and feed)  Outcome: Progressing     Problem: Bowel Elimination - Post Surgical  Goal: Patient will resume regular bowel sounds and function with no discomfort or distention  Outcome: Progressing  Flowsheets  Taken 9/28/2023 1431 by Meghna Seo R.N.  Last BM: 09/24/23  Taken 9/27/2023 1438 by Maddison Head R.N.  Bowel Sounds RUQ: Hypoactive  Bowel Sounds RLQ: Hypoactive  Bowel Sounds LUQ: Hypoactive  Bowel Sounds LLQ: Hypoactive       Patient is not progressing towards the following goals:

## 2023-09-28 NOTE — DISCHARGE PLANNING
Case Management Discharge Planning    Admission Date: 9/25/2023  GMLOS: 8.9  ALOS: 3    6-Clicks ADL Score: 14  6-Clicks Mobility Score: 13  PT and/or OT Eval ordered: Yes  PT/OT: Recommend HH  Post-acute Referrals Ordered: Yes  Post-acute Choice Obtained: Yes  Has referral(s) been sent to post-acute provider:  Yes    [facility] received choice from pt. Choice was [facility]. [facility]. [facility]    [facility] pt.    Anticipated Discharge Dispo: Discharge Disposition: Discharged to home/self care (01)  Discharge Address: [address] [address] [address] [facility] [address]  Discharge Contact Phone Number: [phone]  Per chart review pt resides in [place]    Family Support:  Name Relation Home Work Mobile   Linda Boyer Spouse [phone]   Current Insurance on file: Medicare &     DME Needed: No    Action(s) Taken: Choice obtained and Referral(s) sent,chart reviewed. Pt discussed during IDTrounds    Escalations Completed: None    Medically Clear: No    Next Steps: f/u with pt and medical team to discuss dc needs and barrier    Barriers to Discharge: Medical clearance    Is the patient up for discharge tomorrow: No

## 2023-09-28 NOTE — CARE PLAN
The patient is Watcher - Medium risk of patient condition declining or worsening    Shift Goals  Clinical Goals: Safe ambulation, pain control and monitoring O2 demands  Patient Goals: Going home  Family Goals:  (Not present)    Progress made toward(s) clinical / shift goals:    Problem: Pain - Standard  Goal: Alleviation of pain or a reduction in pain to the patient’s comfort goal  Outcome: Progressing  Note: Using a numeric pain scale. Administering pain medications as ordered. Reassessing pain after administering. Offering non-pharmacologic methods to relieve pain.     Problem: Post Op Day 3 CABG/Heart Valve replacement  Goal: Optimal care of the post op CABG/Heart Valve replacement post op day 3  Outcome: Progressing  Intervention: Daily weights in the morning  Note: Daily weight obtained.  Intervention: Shower daily and clean incisions twice daily with soap and water  Note: Incision site cleansed with soap and water.  Intervention: Up in chair for all meals  Note: Patient up to chair for dinner during this shift.  Intervention: Ambulate 4 times daily, increasing the distance each time  Note: Patient ambulated once for a total of 4 times daily.  Intervention: IS q 1 hour while awake and record best IS volume  Note: IS at 1000.       Patient is not progressing towards the following goals:

## 2023-09-28 NOTE — DISCHARGE PLANNING
Care Transition Team Assessment  LMSW spoke with pt to conduct assessment and verify demographics. Pt stated that he lives with his wife in a single story home with 3 steps to enter. Pt uses a walker at home but was independent prior to admission. Pt stated that he is independent while bathing but feels weak after awhile and has to get out. Pt denies having any history of substance use or mental health.  Upon discharge pt stated that wife will be able to transport him.     Information Source  Orientation Level: Oriented X4  Information Given By: Patient  Who is responsible for making decisions for patient? : Patient    Readmission Evaluation  Is this a readmission?: No    Elopement Risk  Legal Hold: No  Ambulatory or Self Mobile in Wheelchair: Yes  Disoriented: No  Psychiatric Symptoms: None  History of Wandering: No  Elopement this Admit: No  Vocalizing Wanting to Leave: No  Displays Behaviors, Body Language Wanting to Leave: No-Not at Risk for Elopement  Elopement Risk: Not at Risk for Elopement    Interdisciplinary Discharge Planning  Primary Care Physician: NICOLE HAMMER D.O.  Lives with - Patient's Self Care Capacity: Spouse  Housing / Facility: [address]  Prior Services: None  Durable Medical Equipment: Walker    Discharge Preparedness  What is your plan after discharge?: Home with help, Uncertain - pending medical team collaboration  What are your discharge supports?: Spouse  Prior Functional Level: Ambulatory, Uses Walker, Independent with Activities of Daily Living  Difficulity with ADLs: Bathing  Difficulity with IADLs: None    Functional Assesment  Prior Functional Level: Ambulatory, Uses Walker, Independent with Activities of Daily Living    Finances  Financial Barriers to Discharge: No    Vision / Hearing Impairment  Right Eye Vision: Impaired, Wears Glasses  Left Eye Vision: Impaired, Wears Glasses    Domestic Abuse  Physical Abuse or Sexual Abuse: No  Verbal Abuse or Emotional Abuse:  No    Psychological Assessment  History of Substance Abuse: None  History of Psychiatric Problems: No  Non-compliant with Treatment: No  Newly Diagnosed Illness: No    Discharge Risks or Barriers  Discharge risks or barriers?: No    Anticipated Discharge Information  Discharge Disposition: Discharged to home/self care (01)  Discharge Address: [address] [place] [address]  [place] [id number]  Discharge Contact Phone Number: [phone]

## 2023-09-28 NOTE — DIETARY
Nutrition Services: Heart Healthy Diet Education Consult   Day 3 of admit.  Dakota Boyer is a 78 y.o. male with admitting DX of Severe aortic stenosis [I35.0]    RD able to visit pt at bedside to provide heart healthy diet education. Pt sleeping during visit, RD able to give education to spouse at bed side. RD discussed low sodium and saturated fats with increased fiber intake, provided handout reinforcing topics discussed. RD able to answer all questions to patient's satisfaction.     No other education needs identified at this time. Consider referral to outpatient nutrition services for continuation of education as indicated or per pt preferences.     Please re-consult RD as indicated.

## 2023-09-28 NOTE — DISCHARGE PLANNING
Discharge Appointments/outpatient referrals/ and HH set up:    Cardiac surgery follow up appointment made for 4-5 weeks out    Hospital discharge team/schedulers called for cardiology f/u appointment for MD or APRN within 3-4 weeks if possible.    Aortic surveillance program/vascular medicine referral not needed, orders not placed.    Anticoagulation referral/ coumadin clinic referral not needed and orders not placed .    INR draws are not indicated for AVR procedure and 81 mg ASA.    Patient and family want HH. CM and APRN notified, Epic orders placed.

## 2023-09-28 NOTE — DOCUMENTATION QUERY
[facility]                                                                       Query Response Note      PATIENT:               TON POWER  ACCT #:                  [id number]  MRN:                     [id number]  :                      1945  ADMIT DATE:       2023 10:33 AM  DISCH DATE:          RESPONDING  PROVIDER #:        [id number]           QUERY TEXT:    Based on the clinical indicators documented, please clarify the status of acute respiratory failure documented in the  critical care consult.                 The patient's Clinical Indicators include:  :  RR range: (14-36)   SpO2 range: 93% - 99%) Supplemental O2 range: mostly on 3 O2 NC, 5L initially for 1 hour  : RR range: WNL  SpO2 range: 91% - 95%) Supplemental O2 range:  titrated to 1-2L NC     Critical care consult - no respiratory distress...pulmonary effort normal...Dx Acute respiratory failure - intubated for surgery    Treatment - Supplemental O2    Risk factors - Postop from TAVR converted to open    Thank you,  Juliane Tom BSN  Clinical   Connect via [email or web address] Messenger  Options provided:   -- Condition of postoperative acute respiratory failure exists   -- Condition of acute respiratory failure exists   -- Condition of acute respiratory failure does not exist and amended documentation provided in the medical record   -- Other explanation, (please specify the other explanation)      Query created by: Juliane Tom on 2023 1:43 PM    RESPONSE TEXT:    Condition of postoperative acute respiratory failure exists          Electronically signed by:  DORIAN ARCE MD 2023 9:30 AM

## 2023-09-28 NOTE — PROGRESS NOTES
Cardiovascular Surgery Progress Note    Name: Dakota Boyer  MRN: [id number]  : 1945  Admit Date: 2023 10:33 AM  3 Days Post-Op     Procedure:  Procedure(s) and Anesthesia Type:  Panel 1:     * TRANSCATHETER AORTIC VALVE REPLACEMENT - CONVERTED TO OPEN HEART PROCEDURE - General     * ECHOCARDIOGRAM, TRANSESOPHAGEAL, INTRAOPERATIVE - General    Panel 2:     * REPLACEMENT, AORTIC VALVE AND RETRIEVAL OF FOREIGN BODY,  AORTIC ROOT ENLARGEMENT - General    Vitals:  Vitals:    23 0000 23 0500 23 0729 23 0818   BP: 126/69 112/64  102/61   Pulse: 89 92 87 90   Resp: 18 20 18 18   Temp:    37 °C (98.6 °F)   TempSrc:    Temporal   SpO2: 95% 95% 95% 93%   Weight:  101 kg (222 lb 0.1 oz)     Height:          Temp (24hrs), Av.1 °C (98.7 °F), Min:37 °C (98.6 °F), Max:37.2 °C (99 °F)      Respiratory:    Respiration: 18, Pulse Oximetry: 93 %       Fluids:    Intake/Output Summary (Last 24 hours) at 2023 1026  Last data filed at 2023 0400  Gross per 24 hour   Intake --   Output 3600 ml   Net -3600 ml       Admit weight: Weight: 96.8 kg (213 lb 6.5 oz)  Current weight: Weight: 101 kg (222 lb 0.1 oz) (23 0500)    Labs:  Recent Labs     23  0134 23  0355 23  0137   WBC 11.0* 7.9 6.5   RBC 4.40* 3.29* 3.34*   HEMOGLOBIN 13.4* 10.0* 10.1*   HEMATOCRIT 38.4* 29.5* 29.2*   MCV 87.3 89.7 87.4   MCH 30.5 30.4 30.2   MCHC 34.9 33.9 34.6   RDW 54.5* 57.2* 54.4*   PLATELETCT 127* 80* 80*   MPV 10.5 11.2 10.9       Recent Labs     23  0134 23  0534 23  1308 23  0355 23  0137   SODIUM 143  --   --  132* 136   POTASSIUM 4.1   < > 4.1 4.4 4.2   CHLORIDE 111  --   --  101 99   CO2 21  --   --     GLUCOSE 211*  --   --  231* 246*   BUN 15  --   --  15 18   CREATININE 1.00  --   --  1.06 1.19   CALCIUM 8.8  --   --  8.4* 8.6    < > = values in this interval not displayed.       Recent Labs     23  6676   APTT 108.5*   INR 1.43*              Medications:  Scheduled Medications   Medication Dose Frequency    furosemide  40 mg BID    potassium chloride SA  40 mEq BID    insulin GLARGINE  25 Units Q EVENING    And    insulin lispro  0.2 Units/kg/day TID AC    And    insulin lispro  2-9 Units 4X/DAY ACHS    levothyroxine  50 mcg AM ES    rosuvastatin  40 mg Nightly    enoxaparin (LOVENOX) injection  40 mg DAILY AT 1800    Nozin nasal  swab  1 Applicator BID    aspirin  81 mg DAILY    Pharmacy Consult Request  1 Each PHARMACY TO DOSE    acetaminophen  1,000 mg Q6HRS    senna-docusate  2 Tablet BID    And    polyethylene glycol/lytes  1 Packet DAILY    And    magnesium hydroxide  30 mL DAILY    omeprazole  20 mg DAILY        Exam:   Physical Exam  Vitals and nursing note reviewed.   Constitutional:       General: He is not in acute distress.     Appearance: Normal appearance.   HENT:      Head: Normocephalic and atraumatic.      Right Ear: External ear normal.      Left Ear: External ear normal.      Nose: Nose normal.      Mouth/Throat:      Mouth: Mucous membranes are moist.   Eyes:      Extraocular Movements: Extraocular movements intact.      Conjunctiva/sclera: Conjunctivae normal.      Pupils: Pupils are equal, round, and reactive to light.   Cardiovascular:      Rate and Rhythm: Normal rate and regular rhythm.      Pulses: Normal pulses.      Heart sounds: Normal heart sounds.   Pulmonary:      Effort: Pulmonary effort is normal.      Breath sounds: Examination of the right-lower field reveals decreased breath sounds. Examination of the left-lower field reveals decreased breath sounds. Decreased breath sounds present.   Abdominal:      General: Abdomen is flat.      Palpations: Abdomen is soft.   Musculoskeletal:         General: Normal range of motion.      Cervical back: Normal range of motion.      Right lower leg: Edema present.      Left lower leg: Edema present.   Skin:     General: Skin is warm and dry.      Capillary  Refill: Capillary refill takes less than 2 seconds.   Neurological:      General: No focal deficit present.      Mental Status: He is alert and oriented to person, place, and time. Mental status is at baseline.   Psychiatric:         Mood and Affect: Mood normal.         Behavior: Behavior normal.         Cardiac Medications:    ASA - Yes    Plavix - No; contraindicated because of Bleeding    Post-operative Beta Blockers - No; contraindicated because of High risk for heart block    Ace/ARB- No; contraindicated because of Normal EF    Statin - No; contraindicated because of No CAD    Aldactone- No; contraindicated because of Normal EF    SGLT2i-  No contraindicated because of cost prohibitive    Ejection Fraction:  75%    Telemetry:   9/26 SR RBBB intermittent pacing  9/27 SR  9/28 SR    Assessment/Plan:  POD 1 Extubated on levo gtt.  Neuro intact.  Abdomen soft nontender.  Moderate output from mediastinal tubes.  Art line no longer functional bp cuff correlating.  Plan: Keep mediastinal tubes and love for strict I/O.  One time albumin for hypotension.  Dc art line.  Wean levo.  Wean oxygen as tolerated.    POD 2 HDS off gtts.  Neuro intact.  Wounds CDI.  Abdomen soft nontender.  Minimal output from mediastinal tubes.  Cr 1.06 Hgb 10.0.  Plan: DC mediastinal tubes.  Wean oxygen.  Start gentle diuresis.  Keep pacer wires one more day.  Transfer to tele.    POD 3 HDS. Neuro intact.  Wounds CDI.  Abdomen soft nontender.  Cr 1.19 Hgb 10.1.  Plan: DC love.  Add flomax.  Wean oxygen.  Increase diuresis.      Disposition:  PT OT recommending HH.  Order placed.

## 2023-09-28 NOTE — CARE PLAN
Problem: Hyperinflation  Goal: Prevent or improve atelectasis  Description: Target End Date:  3 to 4 days    1. Instruct incentive spirometry usage  2.  Perform hyperinflation therapy as indicated  Outcome: Progressing   /PEP

## 2023-09-29 LAB
ANION GAP SERPL CALC-SCNC: 12 MMOL/L (ref 7–16)
BUN SERPL-MCNC: 21 MG/DL (ref 8–22)
CALCIUM SERPL-MCNC: 8.7 MG/DL (ref 8.5–10.5)
CHLORIDE SERPL-SCNC: 96 MMOL/L (ref 96–112)
CO2 SERPL-SCNC: 26 MMOL/L (ref 20–33)
CREAT SERPL-MCNC: 1.14 MG/DL (ref 0.5–1.4)
ERYTHROCYTE [DISTWIDTH] IN BLOOD BY AUTOMATED COUNT: 53.8 FL (ref 35.9–50)
GFR SERPLBLD CREATININE-BSD FMLA CKD-EPI: 65 ML/MIN/1.73 M 2
GLUCOSE BLD STRIP.AUTO-MCNC: 278 MG/DL (ref 65–99)
GLUCOSE BLD STRIP.AUTO-MCNC: 299 MG/DL (ref 65–99)
GLUCOSE BLD STRIP.AUTO-MCNC: 315 MG/DL (ref 65–99)
GLUCOSE BLD STRIP.AUTO-MCNC: 316 MG/DL (ref 65–99)
GLUCOSE SERPL-MCNC: 261 MG/DL (ref 65–99)
HCT VFR BLD AUTO: 28.3 % (ref 42–52)
HGB BLD-MCNC: 9.8 G/DL (ref 14–18)
MCH RBC QN AUTO: 30.5 PG (ref 27–33)
MCHC RBC AUTO-ENTMCNC: 34.6 G/DL (ref 32.3–36.5)
MCV RBC AUTO: 88.2 FL (ref 81.4–97.8)
PLATELET # BLD AUTO: 98 K/UL (ref 164–446)
PLATELETS.RETICULATED NFR BLD AUTO: 7.2 % (ref 0.6–13.1)
PMV BLD AUTO: 10.1 FL (ref 9–12.9)
POTASSIUM SERPL-SCNC: 3.9 MMOL/L (ref 3.6–5.5)
RBC # BLD AUTO: 3.21 M/UL (ref 4.7–6.1)
SODIUM SERPL-SCNC: 134 MMOL/L (ref 135–145)
WBC # BLD AUTO: 5.3 K/UL (ref 4.8–10.8)

## 2023-09-29 PROCEDURE — 700102 HCHG RX REV CODE 250 W/ 637 OVERRIDE(OP)

## 2023-09-29 PROCEDURE — 85055 RETICULATED PLATELET ASSAY: CPT

## 2023-09-29 PROCEDURE — 82962 GLUCOSE BLOOD TEST: CPT

## 2023-09-29 PROCEDURE — A9270 NON-COVERED ITEM OR SERVICE: HCPCS | Performed by: NURSE PRACTITIONER

## 2023-09-29 PROCEDURE — 700102 HCHG RX REV CODE 250 W/ 637 OVERRIDE(OP): Performed by: NURSE PRACTITIONER

## 2023-09-29 PROCEDURE — A9270 NON-COVERED ITEM OR SERVICE: HCPCS

## 2023-09-29 PROCEDURE — 770020 HCHG ROOM/CARE - TELE (206)

## 2023-09-29 PROCEDURE — 700111 HCHG RX REV CODE 636 W/ 250 OVERRIDE (IP): Mod: JZ | Performed by: NURSE PRACTITIONER

## 2023-09-29 PROCEDURE — 94669 MECHANICAL CHEST WALL OSCILL: CPT

## 2023-09-29 PROCEDURE — 85027 COMPLETE CBC AUTOMATED: CPT

## 2023-09-29 PROCEDURE — 306310 ANTI-EMBOLISM STOCKINGS XXLRG REG: Performed by: THORACIC SURGERY (CARDIOTHORACIC VASCULAR SURGERY)

## 2023-09-29 PROCEDURE — 80048 BASIC METABOLIC PNL TOTAL CA: CPT

## 2023-09-29 RX ORDER — INSULIN LISPRO 100 [IU]/ML
2-9 INJECTION, SOLUTION INTRAVENOUS; SUBCUTANEOUS
Status: DISCONTINUED | OUTPATIENT
Start: 2023-09-29 | End: 2023-09-30 | Stop reason: HOSPADM

## 2023-09-29 RX ORDER — POTASSIUM CHLORIDE 20 MEQ/1
40 TABLET, EXTENDED RELEASE ORAL ONCE
Status: COMPLETED | OUTPATIENT
Start: 2023-09-29 | End: 2023-09-29

## 2023-09-29 RX ORDER — METOLAZONE 2.5 MG/1
2.5 TABLET ORAL ONCE
Status: COMPLETED | OUTPATIENT
Start: 2023-09-29 | End: 2023-09-29

## 2023-09-29 RX ORDER — INSULIN LISPRO 100 [IU]/ML
0.2 INJECTION, SOLUTION INTRAVENOUS; SUBCUTANEOUS
Status: DISCONTINUED | OUTPATIENT
Start: 2023-09-29 | End: 2023-09-30 | Stop reason: HOSPADM

## 2023-09-29 RX ORDER — DEXTROSE MONOHYDRATE 25 G/50ML
25 INJECTION, SOLUTION INTRAVENOUS
Status: DISCONTINUED | OUTPATIENT
Start: 2023-09-29 | End: 2023-09-30 | Stop reason: HOSPADM

## 2023-09-29 RX ADMIN — INSULIN LISPRO 5 UNITS: 100 INJECTION, SOLUTION INTRAVENOUS; SUBCUTANEOUS at 20:50

## 2023-09-29 RX ADMIN — ENOXAPARIN SODIUM 40 MG: 100 INJECTION SUBCUTANEOUS at 17:10

## 2023-09-29 RX ADMIN — POTASSIUM CHLORIDE 40 MEQ: 1500 TABLET, EXTENDED RELEASE ORAL at 11:04

## 2023-09-29 RX ADMIN — INSULIN LISPRO 6 UNITS: 100 INJECTION, SOLUTION INTRAVENOUS; SUBCUTANEOUS at 17:03

## 2023-09-29 RX ADMIN — ACETAMINOPHEN 1000 MG: 500 TABLET, FILM COATED ORAL at 11:03

## 2023-09-29 RX ADMIN — SENNOSIDES AND DOCUSATE SODIUM 2 TABLET: 50; 8.6 TABLET ORAL at 06:02

## 2023-09-29 RX ADMIN — ASPIRIN 81 MG: 81 TABLET, COATED ORAL at 06:02

## 2023-09-29 RX ADMIN — METOPROLOL TARTRATE 12.5 MG: 25 TABLET, FILM COATED ORAL at 11:04

## 2023-09-29 RX ADMIN — FUROSEMIDE 40 MG: 10 INJECTION INTRAMUSCULAR; INTRAVENOUS at 17:11

## 2023-09-29 RX ADMIN — INSULIN LISPRO 7 UNITS: 100 INJECTION, SOLUTION INTRAVENOUS; SUBCUTANEOUS at 08:22

## 2023-09-29 RX ADMIN — FUROSEMIDE 40 MG: 10 INJECTION INTRAMUSCULAR; INTRAVENOUS at 06:09

## 2023-09-29 RX ADMIN — ACETAMINOPHEN 1000 MG: 500 TABLET, FILM COATED ORAL at 23:17

## 2023-09-29 RX ADMIN — Medication 1 APPLICATOR: at 08:20

## 2023-09-29 RX ADMIN — POTASSIUM CHLORIDE 40 MEQ: 20 TABLET, EXTENDED RELEASE ORAL at 17:11

## 2023-09-29 RX ADMIN — OMEPRAZOLE 20 MG: 20 CAPSULE, DELAYED RELEASE ORAL at 06:03

## 2023-09-29 RX ADMIN — ACETAMINOPHEN 1000 MG: 500 TABLET, FILM COATED ORAL at 17:11

## 2023-09-29 RX ADMIN — INSULIN LISPRO 7 UNITS: 100 INJECTION, SOLUTION INTRAVENOUS; SUBCUTANEOUS at 17:03

## 2023-09-29 RX ADMIN — TAMSULOSIN HYDROCHLORIDE 0.4 MG: 0.4 CAPSULE ORAL at 08:20

## 2023-09-29 RX ADMIN — INSULIN LISPRO 7 UNITS: 100 INJECTION, SOLUTION INTRAVENOUS; SUBCUTANEOUS at 11:08

## 2023-09-29 RX ADMIN — Medication 1 APPLICATOR: at 20:45

## 2023-09-29 RX ADMIN — POLYETHYLENE GLYCOL 3350 1 PACKET: 17 POWDER, FOR SOLUTION ORAL at 06:03

## 2023-09-29 RX ADMIN — INSULIN LISPRO 6 UNITS: 100 INJECTION, SOLUTION INTRAVENOUS; SUBCUTANEOUS at 11:08

## 2023-09-29 RX ADMIN — ROSUVASTATIN CALCIUM 40 MG: 20 TABLET, FILM COATED ORAL at 20:45

## 2023-09-29 RX ADMIN — POTASSIUM CHLORIDE 40 MEQ: 20 TABLET, EXTENDED RELEASE ORAL at 06:03

## 2023-09-29 RX ADMIN — LEVOTHYROXINE SODIUM 50 MCG: 0.05 TABLET ORAL at 06:04

## 2023-09-29 RX ADMIN — ACETAMINOPHEN 1000 MG: 500 TABLET, FILM COATED ORAL at 06:02

## 2023-09-29 RX ADMIN — METOPROLOL TARTRATE 12.5 MG: 25 TABLET, FILM COATED ORAL at 17:11

## 2023-09-29 RX ADMIN — METOLAZONE 2.5 MG: 2.5 TABLET ORAL at 11:03

## 2023-09-29 RX ADMIN — OXYCODONE HYDROCHLORIDE 10 MG: 10 TABLET ORAL at 16:57

## 2023-09-29 RX ADMIN — INSULIN LISPRO 5 UNITS: 100 INJECTION, SOLUTION INTRAVENOUS; SUBCUTANEOUS at 08:24

## 2023-09-29 RX ADMIN — MAGNESIUM HYDROXIDE 30 ML: 1200 LIQUID ORAL at 06:03

## 2023-09-29 ASSESSMENT — FIBROSIS 4 INDEX: FIB4 SCORE: 4.07

## 2023-09-29 NOTE — PROGRESS NOTES
Cardiovascular Surgery Progress Note    Name: Dakota Boyer  MRN: [id number]  : 1945  Admit Date: 2023 10:33 AM  4 Days Post-Op     Procedure:  Procedure(s) and Anesthesia Type:  Panel 1:     * TRANSCATHETER AORTIC VALVE REPLACEMENT - CONVERTED TO OPEN HEART PROCEDURE - General     * ECHOCARDIOGRAM, TRANSESOPHAGEAL, INTRAOPERATIVE - General    Panel 2:     * REPLACEMENT, AORTIC VALVE AND RETRIEVAL OF FOREIGN BODY,  AORTIC ROOT ENLARGEMENT - General    Vitals:  Vitals:    23 0552 23 0600 23 0657 23 0850   BP:  (!) 145/72  116/60   Pulse:  95 95 98   Resp:  18 16 15   Temp:  37 °C (98.6 °F)  36.7 °C (98.1 °F)   TempSrc:  Temporal  Temporal   SpO2:  90% 91% 91%   Weight: 102 kg (223 lb 15.8 oz)      Height:          Temp (24hrs), Av °C (98.6 °F), Min:36.7 °C (98.1 °F), Max:37.2 °C (99 °F)      Respiratory:    Respiration: 15, Pulse Oximetry: 91 %       Fluids:    Intake/Output Summary (Last 24 hours) at 2023 09  Last data filed at 2023 1900  Gross per 24 hour   Intake 1350 ml   Output 1100 ml   Net 250 ml       Admit weight: Weight: 96.8 kg (213 lb 6.5 oz)  Current weight: Weight: 102 kg (223 lb 15.8 oz) (23 0552)    Labs:  Recent Labs     23  0615   WBC 7.9 6.5 5.3   RBC 3.29* 3.34* 3.21*   HEMOGLOBIN 10.0* 10.1* 9.8*   HEMATOCRIT 29.5* 29.2* 28.3*   MCV 89.7 87.4 88.2   MCH 30.4 30.2 30.5   MCHC 33.9 34.6 34.6   RDW 57.2* 54.4* 53.8*   PLATELETCT 80* 80* 98*   MPV 11.2 10.9 10.1       Recent Labs     235 23  0137 23  0615   SODIUM 132* 136 134*   POTASSIUM 4.4 4.2 3.9   CHLORIDE 101 99 96   CO2 23 27 26   GLUCOSE 231* 246* 261*   BUN 15 18 21   CREATININE 1.06 1.19 1.14   CALCIUM 8.4* 8.6 8.7                   Medications:  Scheduled Medications   Medication Dose Frequency    metOLazone  2.5 mg Once    potassium chloride SA  40 mEq Once    metoprolol tartrate  12.5 mg TWICE DAILY    furosemide   40 mg BID    potassium chloride SA  40 mEq BID    tamsulosin  0.4 mg AFTER BREAKFAST    insulin GLARGINE  25 Units Q EVENING    And    insulin lispro  0.2 Units/kg/day TID AC    And    insulin lispro  2-9 Units 4X/DAY ACHS    levothyroxine  50 mcg AM ES    rosuvastatin  40 mg Nightly    enoxaparin (LOVENOX) injection  40 mg DAILY AT 1800    Nozin nasal  swab  1 Applicator BID    aspirin  81 mg DAILY    Pharmacy Consult Request  1 Each PHARMACY TO DOSE    acetaminophen  1,000 mg Q6HRS    senna-docusate  2 Tablet BID    And    polyethylene glycol/lytes  1 Packet DAILY    And    magnesium hydroxide  30 mL DAILY    omeprazole  20 mg DAILY        Exam:   Physical Exam  Vitals and nursing note reviewed.   Constitutional:       General: He is not in acute distress.     Appearance: Normal appearance.   HENT:      Head: Normocephalic and atraumatic.      Right Ear: External ear normal.      Left Ear: External ear normal.      Nose: Nose normal.      Mouth/Throat:      Mouth: Mucous membranes are moist.   Eyes:      Extraocular Movements: Extraocular movements intact.      Conjunctiva/sclera: Conjunctivae normal.      Pupils: Pupils are equal, round, and reactive to light.   Cardiovascular:      Rate and Rhythm: Normal rate and regular rhythm.      Pulses: Normal pulses.      Heart sounds: Normal heart sounds.   Pulmonary:      Effort: Pulmonary effort is normal.      Breath sounds: Examination of the right-lower field reveals decreased breath sounds. Examination of the left-lower field reveals decreased breath sounds. Decreased breath sounds present.   Abdominal:      General: Abdomen is flat.      Palpations: Abdomen is soft.   Musculoskeletal:         General: Normal range of motion.      Cervical back: Normal range of motion.      Right lower leg: Edema present.      Left lower leg: Edema present.   Skin:     General: Skin is warm and dry.      Capillary Refill: Capillary refill takes less than 2 seconds.    Neurological:      General: No focal deficit present.      Mental Status: He is alert and oriented to person, place, and time. Mental status is at baseline.   Psychiatric:         Mood and Affect: Mood normal.         Behavior: Behavior normal.         Cardiac Medications:    ASA - Yes    Plavix - No; contraindicated because of Bleeding    Post-operative Beta Blockers - No; contraindicated because of High risk for heart block    Ace/ARB- No; contraindicated because of Normal EF    Statin - No; contraindicated because of No CAD    Aldactone- No; contraindicated because of Normal EF    SGLT2i-  No contraindicated because of cost prohibitive    Ejection Fraction:  75%    Telemetry:   9/26 SR RBBB intermittent pacing  9/27 SR  9/28 SR    Assessment/Plan:  POD 1 Extubated on levo gtt.  Neuro intact.  Abdomen soft nontender.  Moderate output from mediastinal tubes.  Art line no longer functional bp cuff correlating.  Plan: Keep mediastinal tubes and love for strict I/O.  One time albumin for hypotension.  Dc art line.  Wean levo.  Wean oxygen as tolerated.    POD 2 HDS off gtts.  Neuro intact.  Wounds CDI.  Abdomen soft nontender.  Minimal output from mediastinal tubes.  Cr 1.06 Hgb 10.0.  Plan: DC mediastinal tubes.  Wean oxygen.  Start gentle diuresis.  Keep pacer wires one more day.  Transfer to tele.    POD 3 HDS. Neuro intact.  Wounds CDI.  Abdomen soft nontender.  Cr 1.19 Hgb 10.1.  Pacer wires removed.  Plan: DC love.  Add flomax.  Wean oxygen.  Increase diuresis.      POD 4 HDS. SR/ST.  Neuro intact.  Wounds CDI.  Abdomen soft nontender.  Cr 1.14 Hgb 9.8. Plan: One time metolazone/kdur dose.  Add BB for ST.  Probable home tomorrow AM if  approved.      Disposition:  PT OT recommending .  Order placed.

## 2023-09-29 NOTE — PROGRESS NOTES
Bedside report received. No overnight events per night RN. POD #4 Patient A&O x 4. SBP ranging from 120's- 140's. RA. SR -ST on telemetry as high as 1 teen's. Complains of mild pain at surgical incision site will medicate per MAR. Patient encouraged to continue using the incentive spirometer and ambulating as much as possible, per report pt has been very unmotivated to ambulate due to fatigue and weakness. Only able to ambulate short distances. OOB for all meals. POC discussed with patient. Pt verbalizes understanding. Call light and belongings with in reach. Bed locked and in lowest position, alarm and fall precautions in place.

## 2023-09-29 NOTE — PROGRESS NOTES
Monitor Summary:   Rhythm: NSR/ST  Rate: 85 - 103  Measurement: .20/.12/.40  Ectopy: Occasional PAC, rare PVC, bigeminy, trigeminy.    12 hour chart check

## 2023-09-29 NOTE — CARE PLAN
The patient is Watcher - Medium risk of patient condition declining or worsening    Shift Goals  Clinical Goals: Safe ambulation, pain control and monitoring O2 demands  Patient Goals: Going home  Family Goals:  (Not present)    Progress made toward(s) clinical / shift goals:    Problem: Fall Risk  Goal: Patient will remain free from falls  Outcome: Progressing  Note: Fall precautions in place. Bed in lowest position. Non-skid socks in place. Personal possessions within reach. Mobility sign on door. Bed-alarm on. Call light within reach. Pt educated regarding fall prevention and states understanding.       Problem: Knowledge Deficit - Standard  Goal: Patient and family/care givers will demonstrate understanding of plan of care, disease process/condition, diagnostic tests and medications  Outcome: Progressing  Note: White board updated with POC and care team information during bedside report.      Problem: Pain - Standard  Goal: Alleviation of pain or a reduction in pain to the patient’s comfort goal  Outcome: Progressing  Note: Pt assessed for pain regularly and medicated PRN per MAR.       Problem: Post Op Day 3 CABG/Heart Valve replacement  Goal: Optimal care of the post op CABG/Heart Valve replacement post op day 3  Outcome: Progressing     Problem: Bowel Elimination - Post Surgical  Goal: Patient will resume regular bowel sounds and function with no discomfort or distention  Outcome: Progressing  Note: Protocol in use

## 2023-09-30 VITALS
DIASTOLIC BLOOD PRESSURE: 56 MMHG | BODY MASS INDEX: 33.94 KG/M2 | OXYGEN SATURATION: 93 % | WEIGHT: 216.27 LBS | HEART RATE: 71 BPM | RESPIRATION RATE: 16 BRPM | TEMPERATURE: 98.6 F | HEIGHT: 67 IN | SYSTOLIC BLOOD PRESSURE: 99 MMHG

## 2023-09-30 DIAGNOSIS — Z95.2 S/P AVR: ICD-10-CM

## 2023-09-30 LAB
ANION GAP SERPL CALC-SCNC: 9 MMOL/L (ref 7–16)
BUN SERPL-MCNC: 19 MG/DL (ref 8–22)
CALCIUM SERPL-MCNC: 9.1 MG/DL (ref 8.5–10.5)
CHLORIDE SERPL-SCNC: 95 MMOL/L (ref 96–112)
CO2 SERPL-SCNC: 31 MMOL/L (ref 20–33)
CREAT SERPL-MCNC: 1.1 MG/DL (ref 0.5–1.4)
ERYTHROCYTE [DISTWIDTH] IN BLOOD BY AUTOMATED COUNT: 54.2 FL (ref 35.9–50)
GFR SERPLBLD CREATININE-BSD FMLA CKD-EPI: 68 ML/MIN/1.73 M 2
GLUCOSE BLD STRIP.AUTO-MCNC: 272 MG/DL (ref 65–99)
GLUCOSE SERPL-MCNC: 214 MG/DL (ref 65–99)
HCT VFR BLD AUTO: 28.2 % (ref 42–52)
HGB BLD-MCNC: 9.5 G/DL (ref 14–18)
MCH RBC QN AUTO: 29.9 PG (ref 27–33)
MCHC RBC AUTO-ENTMCNC: 33.7 G/DL (ref 32.3–36.5)
MCV RBC AUTO: 88.7 FL (ref 81.4–97.8)
PLATELET # BLD AUTO: 121 K/UL (ref 164–446)
PMV BLD AUTO: 9.9 FL (ref 9–12.9)
POTASSIUM SERPL-SCNC: 3.2 MMOL/L (ref 3.6–5.5)
RBC # BLD AUTO: 3.18 M/UL (ref 4.7–6.1)
SODIUM SERPL-SCNC: 135 MMOL/L (ref 135–145)
WBC # BLD AUTO: 5 K/UL (ref 4.8–10.8)

## 2023-09-30 PROCEDURE — 700102 HCHG RX REV CODE 250 W/ 637 OVERRIDE(OP)

## 2023-09-30 PROCEDURE — 82962 GLUCOSE BLOOD TEST: CPT

## 2023-09-30 PROCEDURE — 85027 COMPLETE CBC AUTOMATED: CPT

## 2023-09-30 PROCEDURE — A9270 NON-COVERED ITEM OR SERVICE: HCPCS | Performed by: NURSE PRACTITIONER

## 2023-09-30 PROCEDURE — 99024 POSTOP FOLLOW-UP VISIT: CPT | Performed by: NURSE PRACTITIONER

## 2023-09-30 PROCEDURE — 700102 HCHG RX REV CODE 250 W/ 637 OVERRIDE(OP): Performed by: NURSE PRACTITIONER

## 2023-09-30 PROCEDURE — A9270 NON-COVERED ITEM OR SERVICE: HCPCS

## 2023-09-30 PROCEDURE — 80048 BASIC METABOLIC PNL TOTAL CA: CPT

## 2023-09-30 RX ORDER — POTASSIUM CHLORIDE 750 MG/1
10 TABLET, EXTENDED RELEASE ORAL DAILY
Qty: 90 TABLET | OUTPATIENT
Start: 2023-09-30

## 2023-09-30 RX ORDER — OXYCODONE HYDROCHLORIDE 5 MG/1
5 TABLET ORAL EVERY 6 HOURS PRN
Qty: 56 TABLET | Refills: 0 | Status: SHIPPED | OUTPATIENT
Start: 2023-09-30 | End: 2023-10-12

## 2023-09-30 RX ORDER — OMEPRAZOLE 20 MG/1
20 CAPSULE, DELAYED RELEASE ORAL DAILY
Qty: 90 CAPSULE | OUTPATIENT
Start: 2023-09-30

## 2023-09-30 RX ORDER — ACETAMINOPHEN 500 MG
1000 TABLET ORAL EVERY 6 HOURS
Qty: 30 TABLET | Refills: 0 | COMMUNITY
Start: 2023-09-30

## 2023-09-30 RX ORDER — FUROSEMIDE 20 MG/1
20 TABLET ORAL DAILY
Qty: 30 TABLET | Refills: 0 | Status: SHIPPED | OUTPATIENT
Start: 2023-09-30 | End: 2023-10-26

## 2023-09-30 RX ORDER — TAMSULOSIN HYDROCHLORIDE 0.4 MG/1
0.4 CAPSULE ORAL
Qty: 90 CAPSULE | OUTPATIENT
Start: 2023-09-30

## 2023-09-30 RX ORDER — POTASSIUM CHLORIDE 750 MG/1
10 TABLET, EXTENDED RELEASE ORAL DAILY
Qty: 30 TABLET | Refills: 0 | Status: SHIPPED | OUTPATIENT
Start: 2023-09-30 | End: 2023-10-26

## 2023-09-30 RX ORDER — OMEPRAZOLE 20 MG/1
20 CAPSULE, DELAYED RELEASE ORAL DAILY
Qty: 30 CAPSULE | Refills: 2 | Status: SHIPPED | OUTPATIENT
Start: 2023-10-01

## 2023-09-30 RX ORDER — TAMSULOSIN HYDROCHLORIDE 0.4 MG/1
0.4 CAPSULE ORAL
Qty: 30 CAPSULE | Refills: 2 | Status: SHIPPED | OUTPATIENT
Start: 2023-09-30 | End: 2023-11-21 | Stop reason: SDUPTHER

## 2023-09-30 RX ADMIN — Medication 1 APPLICATOR: at 10:19

## 2023-09-30 RX ADMIN — INSULIN LISPRO 5 UNITS: 100 INJECTION, SOLUTION INTRAVENOUS; SUBCUTANEOUS at 10:17

## 2023-09-30 RX ADMIN — INSULIN LISPRO 7 UNITS: 100 INJECTION, SOLUTION INTRAVENOUS; SUBCUTANEOUS at 10:18

## 2023-09-30 RX ADMIN — LEVOTHYROXINE SODIUM 50 MCG: 0.05 TABLET ORAL at 06:02

## 2023-09-30 RX ADMIN — ACETAMINOPHEN 1000 MG: 500 TABLET, FILM COATED ORAL at 14:25

## 2023-09-30 RX ADMIN — TRAMADOL HYDROCHLORIDE 50 MG: 50 TABLET ORAL at 14:25

## 2023-09-30 RX ADMIN — ASPIRIN 81 MG: 81 TABLET, COATED ORAL at 06:02

## 2023-09-30 RX ADMIN — METOPROLOL TARTRATE 12.5 MG: 25 TABLET, FILM COATED ORAL at 06:02

## 2023-09-30 RX ADMIN — ACETAMINOPHEN 1000 MG: 500 TABLET, FILM COATED ORAL at 06:01

## 2023-09-30 RX ADMIN — POTASSIUM CHLORIDE 40 MEQ: 20 TABLET, EXTENDED RELEASE ORAL at 06:02

## 2023-09-30 ASSESSMENT — FIBROSIS 4 INDEX: FIB4 SCORE: 2.69

## 2023-09-30 ASSESSMENT — PAIN DESCRIPTION - PAIN TYPE
TYPE: SURGICAL PAIN
TYPE: SURGICAL PAIN

## 2023-09-30 NOTE — CARE PLAN
The patient is Stable - Low risk of patient condition declining or worsening    Shift Goals  Clinical Goals: OPH ADL's  Patient Goals: Comfort  Family Goals:  (Not present)      Problem: Fall Risk  Goal: Patient will remain free from falls  Outcome: Progressing  Note: Fall precautions in place.     Problem: Post Op Day 4 CABG/Heart Valve Replacement  Goal: Optimal care of the Post Op CABG/Heart Valve replacement Post Op Day 4  Outcome: Progressing  Intervention: Daily weights in the morning  Note: Patient being weighed daily in the morning.  Intervention: Shower daily and clean incisions twice daily with soap and water  Note: Patient showered today, and had CHG bath.  Intervention: Up in chair for all meals  Note: Patient up to chair for dinner this morning.  Intervention: Ambulate 4 times daily, increasing the distance each time  Note: Patient ambulating, walked 75 feet this evening.  Intervention: IS q 1 hour while awake and record best IS volume  Note: Patient able to pull 1,500 mL on IS.  Intervention: Consider removal of love, chest tube and pacer wires if not already done  Note: Love, chest tubes, and wires out.        Progress made toward(s) clinical / shift goals:  Progressing

## 2023-09-30 NOTE — PROGRESS NOTES
0700 - Report received from Yanelis OLIVA at patient's bedside. Patient resting in bed quietly with no complaints at this time. Telemetry monitor intact et functioning. Call light and belongings within reach, safety measures intact, white board updated.     0800 - Discussed DC plan with Rene SZYMANSKI. Patient to DC home today, home health verified approved. Patient states his wife will be here early.     1045 - Central line removed per policy. 3 sutures removed. Hemostasis obtained after manual pressure held x 5 minutes. Medications verified sent to [facility].     1300 - All discharge instructions reviewed with patient and patient's wife, who verbalized understanding. All belongings packed to be sent home with patient. Discussed cleansing of incision and care. Patient transferred to private vehicle at main entrance via wheelchair for transport home.

## 2023-09-30 NOTE — CARE PLAN
The patient is Stable - Low risk of patient condition declining or worsening    Shift Goals  Clinical Goals: OPH ADL's  Patient Goals: Comfort  Family Goals:  (Not present)    Progress made toward(s) clinical / shift goals:  DC home today       Problem: Fall Risk  Goal: Patient will remain free from falls  Outcome: Met     Problem: Knowledge Deficit - Standard  Goal: Patient and family/care givers will demonstrate understanding of plan of care, disease process/condition, diagnostic tests and medications  Outcome: Met     Problem: Pain - Standard  Goal: Alleviation of pain or a reduction in pain to the patient’s comfort goal  Outcome: Met     Problem: Skin Integrity  Goal: Skin integrity is maintained or improved  Outcome: Met     Problem: Post Op Day 1 CABG/Heart Valve Replacement  Goal: Optimal care of the post op CABG/heart valve replacement Post Op Day 1  Outcome: Met     Problem: Post Op Day 3 CABG/Heart Valve replacement  Goal: Optimal care of the post op CABG/Heart Valve replacement post op day 3  Outcome: Met     Problem: Post Op Day 4 CABG/Heart Valve Replacement  Goal: Optimal care of the Post Op CABG/Heart Valve replacement Post Op Day 4  Outcome: Met     Problem: Post Op Day 5 CABG/Heart Valve Replacement  Goal: Optimal care of the Post Op CABG/Heart Valve replacement Post Op Day 5  Outcome: Met     Problem: Hemodynamics  Goal: Patient's hemodynamics, fluid balance and neurologic status will be stable or improve  Outcome: Met     Problem: Respiratory  Goal: Patient will achieve/maintain optimum respiratory ventilation and gas exchange  Outcome: Met     Problem: Risk for Aspiration  Goal: Patient's risk for aspiration will be absent or decrease  Outcome: Met     Problem: Nutrition - Advanced  Goal: Patient will display progressive weight gain toward goal have adequate food and fluid intake  Outcome: Met     Problem: Self Care  Goal: Patient will have the ability to perform ADLs independently or with  assistance (bathe, groom, dress, toilet and feed)  Outcome: Met     Problem: Bowel Elimination - Post Surgical  Goal: Patient will resume regular bowel sounds and function with no discomfort or distention  Outcome: Met       Patient is not progressing towards the following goals:

## 2023-09-30 NOTE — DISCHARGE INSTRUCTIONS
Special Equipment    You are being discharged with the following special equipment:  Walker    Diet    Diabetic Diet     A Diabetic Diet can help you control your blood glucose, lower your risk of heart disease, improve your blood pressure and maintain a healthy weight.  Eating healthy foods, low in calories, fat and carbohydrates at the same time every day can help control your blood glucose. Carbohydrates can greatly affect your blood glucose levels.  Counting carbs is important to keep your blood glucose at a healthy level, especially if you use insulin or take certain oral diabetes medicines.  Avoid alcohol as it can cause a sudden decrease in blood glucose (hypoglycemia), especially if you use insulin or take certain oral diabetes medicines.        DIVISION OF CARDIAC SURGERY   DISCHARGE INSTRUCTIONS    Activity:    NO driving for 4 weeks after surgery. You may ride as a passenger.  NO lifting, pushing, or pulling more than 10 pounds for 6 weeks.  For the next 6 weeks, keep your elbows close to your body and move within a pain-free motion when lifting, pushing or pulling.  Do not stretch both arms backwards at the same time.    Walk at least 4 times per day, there is no maximum. The goal is to increase your distance over time.  Continue using incentive spirometer for 2 weeks or until your baseline volume is reached.  If you are going home on oxygen and you were not on oxygen prior to surgery, keep using until you are oxygen free.  Weigh yourself daily.  Call your Cardiologist for a weight gain of 3 or more pounds in 1 day or more than 5 pounds in 7 days.  Take all of your medications as prescribed. Do not use a pill box for the first month at home. If you have questions, please call your nurse navigator at [phone].  Continue to wear the GENET (compression) stockings for 2-4 weeks or until all swelling is gone. You may take them off when you are in bed or when your legs are elevated.    Incision Care:    Make  sure to clean your incision(s) TWICE DAILY.  Once by showering AND once using the no rinse Foam cleanser provided in the hospital.  During the shower, cleanse the incision(s) with a perfume and dye free soap (Dial, Dove, [place])  Use gentle pressure and rub up and down over incision with your hands or a washcloth. Rinse off and pat incision(s) dry with clean towel.  Keep the incision open to air. No creams or lotions on your incision(s). No baths.  If there is any increased redness or swelling, separation of the incision line, or thick drainage from any of your incisions, call the Cardiac Surgeons ([phone]).      General Instructions:    You have been referred to Cardiac Rehab.  You can start Cardiac Rehab 30 days after surgery.  If you do not have an appointment at the time of discharge call [phone] to schedule an appointment.  Your Primary Care Doctor typically handles home oxygen. Oxygen may be stopped when your oxygen level is consistently greater than 90.  Check with your Primary Care Doctor if you are unsure.  Take all of your medications (including pain medications) as prescribed.  Taking medications other than prescribed can result in serious injury.    For Patients Discharged with Narcotic Pain Medication:     If a refill is needed, understand that only 1 refill will be provided and you must come to the Cardiac Surgeons’ office for an appointment (72 hours’ notice is required to schedule and there are no weekend appointments).  If the pain medications you are discharged on are not working, you will need to bring your remaining prescription into the office in order to receive a new prescription.  If you were taking narcotics prior to your heart surgery, the Cardiac Surgeons will provide you with one prescription and additional medications will need to be provided by your pain management doctor.  Do not drink alcohol while taking narcotics.  Lost or stolen medications will not be refilled.  If  medications are stolen, report to law enforcement.    Contact Cardiac Surgery at [phone] if you have any questions.    Aspirin Tablets  What is this medication?  ASPIRIN (AS pir in) lowers the risk of heart attack, stroke, or blood clot. It may also be used to treat mild to moderate pain, inflammation, or arthritis. It belongs to a group of medications called NSAIDs.  This medicine may be used for other purposes; ask your health care provider or pharmacist if you have questions.  COMMON BRAND NAME(S): Aspir-Low, Aspir-Sandi, Aspirtab, Lupillo Advanced Aspirin, Lupillo Aspirin, Lupillo Aspirin Extra Strength, Lupillo Aspirin Plus, Lupillo Extra Strength, Lupillo Extra Strength Plus, Lupillo Genuine Aspirin, Lupillo Womens Aspirin, Bufferin, Bufferin Extra Strength, Bufferin Low Dose  What should I tell my care team before I take this medication?  They need to know if you have any of these conditions:  Anemia  Asthma  Bleeding problems  Diabetes  Gout  History of stomach ulcers or bleeding  If you often drink alcohol  Kidney disease  Liver disease  Low level of vitamin K  Lupus  Smoke tobacco  An unusual or allergic reaction to aspirin, tartrazine dye, other medications, dyes, or preservatives  Pregnant or trying to get pregnant  Breast-feeding  How should I use this medication?  Take this medication by mouth with a glass of water. Follow the directions on the package or prescription label. You can take this medication with or without food. If it upsets your stomach, take it with food. Do not take it more often than directed.  Talk to your care team about the use of this medication in children. While this medication may be prescribed for children as young as 12 years of age for selected conditions, precautions do apply. Children and teenagers should not use this medication to treat chicken pox or flu symptoms unless directed by a care team.  Patients over 65 years old may have a stronger reaction and need a smaller  dose.  Overdosage: If you think you have taken too much of this medicine contact a poison control center or emergency room at once.  NOTE: This medicine is only for you. Do not share this medicine with others.  What if I miss a dose?  If you are taking this medication on a regular schedule and miss a dose, take it as soon as you can. If it is almost time for your next dose, take only that dose. Do not take double or extra doses.  What may interact with this medication?  Do not take this medication with any of the following:  Cidofovir  Ketorolac  Probenecid  This medication may also interact with the following:  Alcohol  Alendronate  Bismuth subsalicylate  Flavocoxid  Herbal supplements like feverfew, garlic, manoj, ginkgo biloba, horse chestnut  Medications for diabetes or glaucoma like acetazolamide, methazolamide  Medications for gout  Medications that prevent or treat blood clots like apixaban, clopidogrel, enoxaparin, heparin, rivaroxaban, warfarin  Other aspirin and aspirin-like medications  NSAIDs, medications for pain and inflammation, like ibuprofen or naproxen  Pemetrexed  Sulfinpyrazone  Varicella live vaccine  This list may not describe all possible interactions. Give your health care provider a list of all the medicines, herbs, non-prescription drugs, or dietary supplements you use. Also tell them if you smoke, drink alcohol, or use illegal drugs. Some items may interact with your medicine.  What should I watch for while using this medication?  If you are treating yourself for pain, tell your doctor or health care provider if the pain lasts more than 10 days, if it gets worse, or if there is a new or different kind of pain. Tell your doctor if you see redness or swelling. Also, check with your doctor if you have a fever that lasts for more than 3 days. Only take this medication to prevent heart attacks or blood clotting if prescribed by your doctor or health care provider.  Do not take other  medications that contain aspirin, ibuprofen, or naproxen with this medication. Side effects such as stomach upset, nausea, or ulcers may be more likely to occur. Many non-prescription medications contain aspirin, ibuprofen, or naproxen. Always read labels carefully.  This medication can cause serious ulcers and bleeding in the stomach. It can happen with no warning. Smoking, drinking alcohol, older age, and poor health can also increase risks. Call your health care provider right away if you have stomach pain or blood in your vomit or stool.  Alcohol may interfere with the effect of this medication. Avoid alcoholic drinks.  This medication may cause serious skin reactions. They can happen weeks to months after starting the medication. Contact your health care provider right away if you notice fevers or flu-like symptoms with a rash. The rash may be red or purple and then turn into blisters or peeling of the skin. Or, you might notice a red rash with swelling of the face, lips or lymph nodes in your neck or under your arms.  Talk to your health care provider if you are pregnant before taking this medication. Taking this medication between weeks 20 and 30 of pregnancy may harm your unborn baby. Your health care provider will monitor you closely if you need to take it. After 30 weeks of pregnancy, do not take this medication.  Be careful brushing or flossing your teeth or using a toothpick because you may get an infection or bleed more easily. If you have any dental work done, tell your dentist you are receiving this medication.  This medication may make it more difficult to get pregnant. Talk to your health care provider if you are concerned about your fertility.  What side effects may I notice from receiving this medication?  Side effects that you should report to your care team as soon as possible:  Allergic reactions--skin rash, itching, hives, swelling of the face, lips, tongue, or throat  Bleeding--bloody or  black, tar-like stools, vomiting blood or brown material that looks like coffee grounds, red or dark brown urine, red or purple spots on skin, unusual bruising or bleeding  Hearing loss, ringing in ears  Kidney injury--decrease in the amount of urine, swelling of the ankles, hands, or feet  Liver injury--right upper belly pain, loss of appetite, nausea, light-colored stool, dark yellow or brown urine, yellowing of the skin or eyes, unusual weakness, fatigue  Rash, fever, and swollen lymph nodes  Side effects that usually do not require medical attention (report to your care team if they continue or are bothersome):  Headache  Loss of appetite  Nausea  Upset stomach  This list may not describe all possible side effects. Call your doctor for medical advice about side effects. You may report side effects to FDA at 0-744-FDA-3863.  Where should I keep my medication?  Keep out of the reach of children and pets.  Store at room temperature between 15 and 30 degrees C (59 and 86 degrees F). Protect from heat and moisture. Get rid of any unused medication after the expiration date.  Do not use this medication if it has a strong vinegar smell.  To get rid of medications that are no longer needed or have :  Take the medication to a medication take-back program. Check with your pharmacy or law enforcement to find a location.  If you cannot return the medication, check the label or package insert to see if the medication should be thrown out in the garbage or flushed down the toilet. If you are not sure, ask your care team. If it is safe to put it in the trash, empty the medication out of the container. Mix the medication with cat litter, dirt, coffee grounds, or other unwanted substance. Seal the mixture in a bag or container. Put it in the trash.  NOTE: This sheet is a summary. It may not cover all possible information. If you have questions about this medicine, talk to your doctor, pharmacist, or health care  provider.  © 2023 [employer]/Gold Standard (2021-10-29 00:00:00)

## 2023-09-30 NOTE — DISCHARGE SUMMARY
DISCHARGE SUMMARY    ADMISSION DATE: 9/25/2023    DISCHARGE DATE: 9/30/2023    ADMITTING DIAGNOSES: Embolized TAVR valve, severe symptomatic aortic stenosis, right bundle branch block, class II obesity, obstructive sleep apnea,       DISCHARGE DIAGNOSES: Embolized TAVR valve, severe symptomatic aortic stenosis, right bundle branch block, class II obesity, obstructive sleep apnea,       PROCEDURES PERFORMED: 9/25/23 Dr Johnson  TRANSCATHETER AORTIC VALVE REPLACEMENT - CONVERTED TO OPEN HEART PROCEDURE - Wound Class: Clean  ECHOCARDIOGRAM, TRANSESOPHAGEAL, INTRAOPERATIVE - Wound Class: None  REPLACEMENT, AORTIC VALVE AND RETRIEVAL OF FOREIGN BODY,  AORTIC ROOT ENLARGEMENT with a 25 mm Otero Inspiris valve- Wound Class: Clean with Drain    HISTORY OF PRESENT ILLNESS:    Dakota Boyer is a 78 y.o. male with history of dual-chamber pacer for carotid hypersensitivity, PCI in 2021, normal ejection fraction presenting for follow-up of D3 aortic stenosis.  He is accompanied by his wife.  He continues to feel progressive fatigue, shortness of breath and leg discomfort.  His legs were so fatigued the other day at [employer] he was unable to make it back to his car.  He also cannot mow the entire yard as he did previously.    HOSPITAL COURSE:   POD 1 Extubated on levo gtt.  Neuro intact.  Abdomen soft nontender.  Moderate output from mediastinal tubes.  Art line no longer functional bp cuff correlating.  Plan: Keep mediastinal tubes and love for strict I/O.  One time albumin for hypotension.  Dc art line.  Wean levo.  Wean oxygen as tolerated.     POD 2 HDS off gtts.  Neuro intact.  Wounds CDI.  Abdomen soft nontender.  Minimal output from mediastinal tubes.  Cr 1.06 Hgb 10.0.  Plan: DC mediastinal tubes.  Wean oxygen.  Start gentle diuresis.  Keep pacer wires one more day.  Transfer to [facility].     POD 3 HDS. Neuro intact.  Wounds CDI.  Abdomen soft nontender.  Cr 1.19 Hgb 10.1.  Pacer wires removed.  Plan: DC  love.  Add flomax.  Wean oxygen.  Increase diuresis.       POD 4 HDS. SR/ST.  Neuro intact.  Wounds CDI.  Abdomen soft nontender.  Cr 1.14 Hgb 9.8. Plan: One time metolazone/kdur dose.  Add BB for ST.  Probable home tomorrow AM if HH approved.      POD 5 HDS. SR.  Wounds CDI.  Neuro intact.  Weight neutral.  Fluid negative.  Plan: DC home with .    TELEMETRY:  9/26 SR RBBB intermittent pacing  9/27 SR  9/28 SR  9/29 SR/ST  9/30 SR    RECENT LABS:     Lab Results   Component Value Date/Time    SODIUM 135 09/30/2023 03:20 AM    POTASSIUM 3.2 (L) 09/30/2023 03:20 AM    CHLORIDE 95 (L) 09/30/2023 03:20 AM    CO2 31 09/30/2023 03:20 AM    GLUCOSE 214 (H) 09/30/2023 03:20 AM    BUN 19 09/30/2023 03:20 AM    CREATININE 1.10 09/30/2023 03:20 AM      Lab Results   Component Value Date/Time    WBC 5.0 09/30/2023 03:20 AM    RBC 3.18 (L) 09/30/2023 03:20 AM    HEMOGLOBIN 9.5 (L) 09/30/2023 03:20 AM    HEMATOCRIT 28.2 (L) 09/30/2023 03:20 AM    MCV 88.7 09/30/2023 03:20 AM    MCH 29.9 09/30/2023 03:20 AM    MCHC 33.7 09/30/2023 03:20 AM    MPV 9.9 09/30/2023 03:20 AM    NEUTSPOLYS 64.40 09/22/2023 10:17 AM    LYMPHOCYTES 23.50 09/22/2023 10:17 AM    MONOCYTES 7.40 09/22/2023 10:17 AM    EOSINOPHILS 3.90 09/22/2023 10:17 AM    BASOPHILS 0.70 09/22/2023 10:17 AM      Lab Results   Component Value Date/Time    PROTHROMBTM 17.6 (H) 09/25/2023 05:36 PM    INR 1.43 (H) 09/25/2023 05:36 PM        Fluids:    Intake/Output Summary (Last 24 hours) at 9/30/2023 0849  Last data filed at 9/30/2023 0500  Gross per 24 hour   Intake 120 ml   Output 1100 ml   Net -980 ml     Admit weight: Weight: 96.8 kg (213 lb 6.5 oz)  Current weight: Weight: 98.1 kg (216 lb 4.3 oz) (09/30/23 0600)    ALLERGIES:     Aleve cold & [pseudoephedrine-naproxen na], Ceftriaxone sodium, Naproxen, Latex, and Tape    EJECTION FRACTION:  75%    CARDIAC MEDICATIONS:    ASA - Yes     Plavix - No; contraindicated because of Bleeding     Post-operative Beta Blockers -  "Yes     Ace/ARB- No; contraindicated because of Normal EF     Statin - No; contraindicated because of No CAD     Aldactone- No; contraindicated because of Normal EF     SGLT2i-  No contraindicated because of cost prohibitive    DISCHARGE MEDICATIONS:      Medication List        START taking these medications        Instructions   acetaminophen 500 MG Tabs  Commonly known as: Tylenol   Take 2 Tablets by mouth every 6 hours.  Dose: 1,000 mg     furosemide 20 MG Tabs  Commonly known as: Lasix   Take 1 Tablet by mouth every day.  Dose: 20 mg     metoprolol tartrate 25 MG Tabs  Commonly known as: Lopressor   Take 0.5 Tablets by mouth 2 times a day.  Dose: 12.5 mg     omeprazole 20 MG delayed-release capsule  Start taking on: October 1, 2023  Commonly known as: PriLOSEC   Take 1 Capsule by mouth every day.  Dose: 20 mg     oxyCODONE immediate-release 5 MG Tabs  Commonly known as: Roxicodone   Take 1 Tablet by mouth every 6 hours as needed for Severe Pain for up to 14 days.  Dose: 5 mg     potassium chloride SA 10 MEQ Tbcr  Commonly known as: K-Dur   Take 1 Tablet by mouth every day.  Dose: 10 mEq     tamsulosin 0.4 MG capsule  Commonly known as: Flomax   Take 1 Capsule by mouth 1/2 hour after breakfast.  Dose: 0.4 mg            CHANGE how you take these medications        Instructions   rosuvastatin 40 MG tablet  What changed: when to take this  Commonly known as: Crestor   Take 1 Tablet by mouth every day.  Dose: 40 mg            CONTINUE taking these medications        Instructions   aspirin EC 81 MG Tbec  Commonly known as: Ecotrin   Take 81 mg by mouth every evening.  Dose: 81 mg     Lantus SoloStar 100 UNIT/ML Sopn injection  Generic drug: insulin glargine   Inject 50 Units under the skin every evening.  Dose: 50 Units     levothyroxine 50 MCG Tabs  Commonly known as: Synthroid   Take 1 Tablet by mouth every morning on an empty stomach.  Dose: 50 mcg     PEN NEEDLES 31GX5/16\" 31G X 8 MM Misc   1 Each every " day.  Dose: 1 Each     Synjardy XR 12.5-1000 MG Tb24  Generic drug: Empagliflozin-metFORMIN HCl ER   Take 1 Tablet by mouth 2 times a day.  Dose: 1 Tablet     Trulicity 1.5 MG/0.5ML Sopn  Generic drug: Dulaglutide   Doctor's comments: Pls keep on file pt and fill in a month, Thank you  Inject 0.5 mL under the skin every 7 days.  Dose: 0.5 mL            STOP taking these medications      lisinopril 5 MG Tabs  Commonly known as: Prinivil              NARCOTIC PAIN MEDICATIONS:   In prescribing controlled substances to this patient, I certify that I have obtained and reviewed their medical history. I have also made a good malou effort to obtain applicable records from other providers who have treated the patient .    I have conducted a physical exam and documented it. I have reviewed the patient's prescription history as maintained by the [place] Prescription Monitoring Program.     I have assessed the patient’s risk for abuse, dependency, and addiction using the validated Opioid Risk Tool.    Given the above, I believe the benefits of controlled substance therapy outweigh the risks. The reasons for prescribing controlled substances include non-narcotic, oral analgesic alternatives have been inadequate for pain control. Accordingly, I have discussed the risk and benefits, treatment plan, and alternative therapies with the patient.     Pt understands this prescription is a controlled substance which is potentially habit-forming and its use is regulated by the [place]. It must be submitted to the pharmacy within 5 days of the date written and can not be called in or faxed to the pharmacy. Refills are subject to terms of a medicine agreement. Any refill requires a new prescription that must be obtained from this office during regular office hours Monday through Thursday 7 am to 4 pm. We ask for 72 hours notice to get an appointment for a narcotic pain medication refill. This medicine can cause nausea, significant constipation,  sedation, confusion.     DIET:   Cardiac diet    DISCHARGE INSTRUCTIONS DISCUSSED WITH THE PATIENT:      1. NO driving for 4 weeks after surgery. You may ride as a passenger.  2. NO lifting of any item over 10 lbs (e.g. gallon of milk) for 6 weeks after surgery.  3. DO walk as much as possible! Walk a minimum of once a day. Depending on your fatigue and comfort level, you may walk as much as you wish. There is no maximum.  4. Other physical activities (sex, housework, gardening, etc.) are OK after 4 weeks   5. Continue using incentive spirometer for 2 weeks, especially if going home on oxygen.    Incision Care:  1. SHOWER ONLY - no baths. Clean incision daily with plain Ivory ® soap or any other dye or perfume free soap. Then pat incision dry with clean towel. Avoid creams or lotions on the incision(s).  a. If there is any increase in redness or swelling, or separation of the incision line, or thick drainage* from any of the incisions, call right away  * Clear, thin drainage is not abnormal especially from the leg incision and/or                         chest tube sites.  2. Continue to wear your GENET Stockings for 4 weeks. You may take off the stockings when in bed or when the legs are elevated.    Patient instructed to call [facility] surgery at [phone]  if any increased shortness of breath, uncontrolled pain, weight gain greater than 3 pounds in 1 day or 5 pounds in 1 week, SBP >140, HR <60 or redness swelling or drainage of incisions.      FOLLOW-UP:   Future Appointments   Date Time Provider Department Center   10/26/2023  1:15 PM KAYE Tsai [facility] None   10/30/2023 12:00 PM CT RESOURCE PROVIDER [facility] None   2/15/2024 10:30 AM LAYO Chase   9/26/2024 12:15 PM [facility] EXAM [facility]

## 2023-09-30 NOTE — PROGRESS NOTES
Assumed care of patient. Bedside report received from Nicola OLIVA. Updated POC, call light within reach, fall precautions in place. Bed locked, and in lowest position, bed alarm on and working. Patient is A&Ox4, states 0/10 pain. Assessment completed, patient in chair eating dinner at this time. Patient instructed to call for assistance. All questions answered, no further needs at this time.

## 2023-09-30 NOTE — CARE PLAN
Problem: Post Op Day 4 CABG/Heart Valve Replacement  Goal: Optimal care of the Post Op CABG/Heart Valve replacement Post Op Day 4  Intervention: Daily weights in the morning  Note: Daily weight obtained and charted  Intervention: Shower daily and clean incisions twice daily with soap and water  Note: Pt was able to complete incision care in shower   Intervention: Up in chair for all meals  Note: Pt was out of bed for all meals   Intervention: Ambulate 4 times daily, increasing the distance each time  Note: Pt only able to tolerate ambulating short distances due to generalized weakness and fatigue   Intervention: IS q 1 hour while awake and record best IS volume  Note: Pt continues to use the IS with today's best 1500    Intervention: Consider removal of love, chest tube and pacer wires if not already done  Note: All tubes removed    The patient is Watcher - Medium risk of patient condition declining or worsening    Shift Goals  Clinical Goals: OOB for meals / ambulate/ BM  Patient Goals: rest / comfort  Family Goals:  (Not present)    Progress made toward(s) clinical / shift goals:  progressing     Patient is not progressing towards the following goals:

## 2023-10-03 ENCOUNTER — APPOINTMENT (OUTPATIENT)
Dept: CARDIOLOGY | Facility: MEDICAL CENTER | Age: 78
End: 2023-10-03
Attending: NURSE PRACTITIONER
Payer: MEDICARE

## 2023-10-03 ENCOUNTER — TELEPHONE (OUTPATIENT)
Dept: CARDIOTHORACIC SURGERY | Facility: MEDICAL CENTER | Age: 78
End: 2023-10-03
Payer: MEDICARE

## 2023-10-03 NOTE — TELEPHONE ENCOUNTER
"Hospital follow up call    he is showering everyday or every other day.    he states that all incisions are healing well and approximated with no s/s of infection (redness, puss, fever, purulent drainage, or tenderness).    he states that the post op pain is well controlled.  Narcotics are being utilized. Tylenol is being utilized.    he is using the IS; volume is improved.    he is walking everyday, distance is increased from the hospital.    he is obtaining daily weights. Current weight is 210.4 lbs which is less than the first home weight of 210.6 lbs. He is not wearing the compression/GENET hose, he denies    he is taking all prescribed meds as directed.  he has medication questions.    he is taking vitals (HR and BP).    BP's: 1 teen's to 120's / 60's to 70's  HR's: 90's    he has not had a bowel movement since discharge, he is taking a softener and \"metamucil or something\".  He states it is normal for him to go 4 days in between BM's, he was told to get a suppository if he doesn't have a BM by tomorrow which would be 5 days, he said \"we'll see\".    he has no additional questions at this time. Follow up education was not needed on anything else. Follow up appointments were reviewed and I ensured they have my number if issues or concerns arise.      Follow up call time was 18 minutes.        "

## 2023-10-05 ENCOUNTER — DOCUMENTATION (OUTPATIENT)
Dept: CARDIOLOGY | Facility: MEDICAL CENTER | Age: 78
End: 2023-10-05
Payer: MEDICARE

## 2023-10-05 NOTE — PROGRESS NOTES
On behalf of [facility]'s Structural Heart Program, we would like to thank you for allowing us to participate in the care of your patient.     He unfortunately suffered an interoperative complication requiring open heart surgery intervention.       Your patient is scheduled to follow up with our Structural Heart Program 10/26/2023 to discuss plan of care.     If you have any questions, please do not hesitate to contact our Structural Heart team.     Sincerely,    [facility] Structural [facility] Team    JON Moody, RN, Structural Heart Program Nurse Coordinator ([phone])  JON Hebert, RN, Structural Heart Program Nurse Coordinator ([phone])

## 2023-10-05 NOTE — PROGRESS NOTES
"CHIEF COMPLAINT: Post-op visit     PROCEDURE: 9/25/23 Dr Johnson  TRANSCATHETER AORTIC VALVE REPLACEMENT - CONVERTED TO OPEN HEART PROCEDURE   ECHOCARDIOGRAM, TRANSESOPHAGEAL, INTRAOPERATIVE  REPLACEMENT, AORTIC VALVE AND RETRIEVAL OF FOREIGN BODY,  AORTIC ROOT ENLARGEMENT with a 25 mm Otero Inspiris valve    HPI: He has been doing well postoperatively.  He no longer has any edema in his legs.  He has recently stopped lasix and kdur.  He has been walking for exercise.  His incisions are nearly fully healed.  He has stopped his omeprazole.     /52 (BP Location: Left arm, Patient Position: Sitting, BP Cuff Size: Adult)   Pulse (!) 101   Temp 36.7 °C (98.1 °F) (Temporal)   Ht 1.702 m (5' 7\")   Wt 93.3 kg (205 lb 9.6 oz)   SpO2 97%     PHYSICAL EXAM:  Cardiac: S1S2  Neuro:  AAO x 3  Resp:  CTA  Wounds:  CDI  Sternum:  Stable      PLAN: Discharge from clinic.    We reviewed post operative sternal precautions, weight limits and driving precautions moving forward.  We reviewed SBE prophylaxis.      Overall, we are very pleased with the patient’s progress and we have instructed the patient to follow-up with us in the future should they have any concerns related to their surgery. Otherwise, we will see the patient on a PRN basis. The patient will continue to follow-up with their Cardiologist and PCP.  The patient has been informed that any further prescription refills should be done through their primary care physician and/or cardiologist.  They acknowledged understanding.  Thank you for allowing us to participate in the care of this very pleasant patient and please let us know if there is any way we may be of further assistance.   "

## 2023-10-12 ENCOUNTER — OFFICE VISIT (OUTPATIENT)
Dept: MEDICAL GROUP | Facility: LAB | Age: 78
End: 2023-10-12
Payer: MEDICARE

## 2023-10-12 VITALS
HEART RATE: 78 BPM | OXYGEN SATURATION: 94 % | WEIGHT: 204 LBS | DIASTOLIC BLOOD PRESSURE: 56 MMHG | BODY MASS INDEX: 32.02 KG/M2 | HEIGHT: 67 IN | SYSTOLIC BLOOD PRESSURE: 104 MMHG | TEMPERATURE: 97.7 F

## 2023-10-12 DIAGNOSIS — D64.9 NORMOCYTIC ANEMIA: ICD-10-CM

## 2023-10-12 DIAGNOSIS — D69.6 THROMBOCYTOPENIA (HCC): ICD-10-CM

## 2023-10-12 DIAGNOSIS — E87.6 HYPOKALEMIA: ICD-10-CM

## 2023-10-12 DIAGNOSIS — Z09 HOSPITAL DISCHARGE FOLLOW-UP: Primary | ICD-10-CM

## 2023-10-12 DIAGNOSIS — U07.1 COVID-19: ICD-10-CM

## 2023-10-12 DIAGNOSIS — Z95.2 STATUS POST AORTIC VALVE REPLACEMENT: ICD-10-CM

## 2023-10-12 PROBLEM — I35.0 SEVERE AORTIC STENOSIS: Status: RESOLVED | Noted: 2020-06-03 | Resolved: 2023-10-12

## 2023-10-12 PROCEDURE — 3074F SYST BP LT 130 MM HG: CPT | Performed by: STUDENT IN AN ORGANIZED HEALTH CARE EDUCATION/TRAINING PROGRAM

## 2023-10-12 PROCEDURE — 0241U POCT CEPHEID COV-2, FLU A/B, RSV - PCR: CPT | Performed by: STUDENT IN AN ORGANIZED HEALTH CARE EDUCATION/TRAINING PROGRAM

## 2023-10-12 PROCEDURE — 99214 OFFICE O/P EST MOD 30 MIN: CPT | Performed by: STUDENT IN AN ORGANIZED HEALTH CARE EDUCATION/TRAINING PROGRAM

## 2023-10-12 PROCEDURE — 3078F DIAST BP <80 MM HG: CPT | Performed by: STUDENT IN AN ORGANIZED HEALTH CARE EDUCATION/TRAINING PROGRAM

## 2023-10-12 ASSESSMENT — FIBROSIS 4 INDEX: FIB4 SCORE: 2.69

## 2023-10-12 NOTE — PROGRESS NOTES
"Hospital follow up call (PJ Lee)     he is showering everyday or every other day.     he states that all incisions are healing well and approximated with no s/s of infection (redness, puss, fever, purulent drainage, or tenderness).     he states that the post op pain is well controlled.  Narcotics are being utilized. Tylenol is being utilized.     he is using the IS; volume is improved.     he is walking everyday, distance is increased from the hospital.     he is obtaining daily weights. Current weight is 210.4 lbs which is less than the first home weight of 210.6 lbs. He is not wearing the compression/GENET hose, he denies     he is taking all prescribed meds as directed.  he has medication questions.     he is taking vitals (HR and BP).     BP's: 1 teen's to 120's / 60's to 70's  HR's: 90's     he has not had a bowel movement since discharge, he is taking a softener and \"metamucil or something\".  He states it is normal for him to go 4 days in between BM's, he was told to get a suppository if he doesn't have a BM by tomorrow which would be 5 days, he said \"we'll see\".     he has no additional questions at this time. Follow up education was not needed on anything else. Follow up appointments were reviewed and I ensured they have my number if issues or concerns arise.      Subjective:     Dakota Boyer is a 78 y.o. male who presents for Hospital Follow-up.    HPI:   Recently hospitalized for aortic valve replacement for severe aortic stenosis.    Patient had his surgery 9/25/2023.  Patient required conversion to open heart procedure for his aortic valve replacement.  He was discharged 9/30/2023.    Patient presents to clinic today with his wife.  States that shortly after discharge 10/3 he developed a cough, worsened fatigue and some confusion along with nasal congestion and headache.  States that he did not have a sore throat but it just did not feel right.  His wife has been making him hot tea " with honey and using steam which has helped.  He denies any shortness of breath and his lower extremity edema has essentially resolved.  He endorses a dry cough with wheezing at times.  States that headache is over his eyes and at the back of the head, mild.  Comes and goes, currently 3 out of 10. Reports that he has a family member staying with him who has been sick with cough and congestion as well.  States that this reminds him of the last time he had COVID and would like to be tested.    Reports that when he was still taking oxycodone the confusion seemed worse but has improved.  States that he had an episode of visual disturbances that lasted approximately 1 hour was seeing colored spots on the wall with the texture looking different.  This resolved on its own.  He has no visual disturbances today.  Has felt better since discontinuing his oxycodone and no longer taking.  Pain is controlled.    Has been checking his weight daily as well as his blood sugar and blood pressure.  Blood sugar and blood pressure have been well controlled.  Weight has been downtrending and as noted prior his lower extremity edema has resolved.  Has been noting that he is more cold than usual but denies any fever or chills.    He was directed to stop his lisinopril 5 mg daily.  Continues with furosemide and potassium as prescribed.  Uncertain why he was prescribed tamsulosin but has been taking as directed without any urinary symptoms or lightheadedness. Constipation resolved.  Eating/drinking but appetite decreased from prior.    Current medicines (including reconciliation performed today)  Current Outpatient Medications   Medication Sig Dispense Refill    acetaminophen (TYLENOL) 500 MG Tab Take 2 Tablets by mouth every 6 hours. 30 Tablet 0    furosemide (LASIX) 20 MG Tab Take 1 Tablet by mouth every day. 30 Tablet 0    metoprolol tartrate (LOPRESSOR) 25 MG Tab Take 0.5 Tablets by mouth 2 times a day. 30 Tablet 2    potassium chloride  "SA (K-DUR) 10 MEQ Tab CR Take 1 Tablet by mouth every day. 30 Tablet 0    omeprazole (PRILOSEC) 20 MG delayed-release capsule Take 1 Capsule by mouth every day. 30 Capsule 2    tamsulosin (FLOMAX) 0.4 MG capsule Take 1 Capsule by mouth 1/2 hour after breakfast. 30 Capsule 2    Empagliflozin-metFORMIN HCl ER (SYNJARDY XR) 12.5-1000 MG TABLET SR 24 HR Take 1 Tablet by mouth 2 times a day.      insulin glargine (LANTUS SOLOSTAR) 100 UNIT/ML Solution Pen-injector injection Inject 50 Units under the skin every evening. (Patient taking differently: Inject 50 Units under the skin every evening. 25 UNITS THE NIGHT BEFORE SURGERY) 45 mL 3    Dulaglutide (TRULICITY) 1.5 MG/0.5ML Solution Pen-injector Inject 0.5 mL under the skin every 7 days. 6 mL 3    Insulin Pen Needle (PEN NEEDLES 31GX5/16\") 31G X 8 MM Misc 1 Each every day. 100 Each 3    levothyroxine (SYNTHROID) 50 MCG Tab Take 1 Tablet by mouth every morning on an empty stomach. 90 Tablet 2    rosuvastatin (CRESTOR) 40 MG tablet Take 1 Tablet by mouth every day. (Patient taking differently: Take 40 mg by mouth every evening.) 90 Tablet 3    aspirin EC (ECOTRIN) 81 MG TBEC Take 81 mg by mouth every evening.       No current facility-administered medications for this visit.     Allergies:   Aleve cold & [pseudoephedrine-naproxen na], Ceftriaxone sodium, Naproxen, Latex, and Tape    Social History     Tobacco Use    Smoking status: Former     Current packs/day: 0.00     Average packs/day: 1 pack/day for 40.0 years (40.0 ttl pk-yrs)     Types: Cigarettes     Start date: 1/3/1950     Quit date: 1/3/1990     Years since quittin.8    Smokeless tobacco: Never   Vaping Use    Vaping Use: Never used   Substance Use Topics    Alcohol use: No     Comment: Quit     Drug use: Never     Objective:     Vitals:    10/12/23 1357   BP: 104/56   BP Location: Left arm   Patient Position: Sitting   BP Cuff Size: Adult   Pulse: 78   Temp: 36.5 °C (97.7 °F)   TempSrc: Temporal   SpO2: " "94%   Weight: 92.5 kg (204 lb)   Height: 1.702 m (5' 7\")     Body mass index is 31.95 kg/m².    Physical Exam  Vitals reviewed.   Constitutional:       General: He is not in acute distress.     Appearance: Normal appearance. He is obese. He is not ill-appearing, toxic-appearing or diaphoretic.      Comments: Appears fatigued.   HENT:      Head: Normocephalic and atraumatic.      Right Ear: Tympanic membrane, ear canal and external ear normal.      Left Ear: Tympanic membrane, ear canal and external ear normal.      Nose: Nose normal. No congestion or rhinorrhea.      Right Sinus: No maxillary sinus tenderness or frontal sinus tenderness.      Left Sinus: No maxillary sinus tenderness or frontal sinus tenderness.      Mouth/Throat:      Mouth: Mucous membranes are moist.      Pharynx: No oropharyngeal exudate or posterior oropharyngeal erythema.   Eyes:      General: No scleral icterus.     Extraocular Movements: Extraocular movements intact.      Conjunctiva/sclera: Conjunctivae normal.      Pupils: Pupils are equal, round, and reactive to light.   Cardiovascular:      Rate and Rhythm: Normal rate and regular rhythm.      Heart sounds: Normal heart sounds. No murmur heard.  Pulmonary:      Effort: Pulmonary effort is normal. No respiratory distress.      Breath sounds: Normal breath sounds. No wheezing, rhonchi or rales.   Chest:      Chest wall: No swelling or tenderness.      Comments: Well-healed midline surgical scar  Abdominal:      General: Abdomen is flat. Bowel sounds are normal. There is no distension.      Palpations: Abdomen is soft. There is no mass.      Tenderness: There is no abdominal tenderness. There is no guarding or rebound.      Hernia: No hernia is present.   Musculoskeletal:      Cervical back: Normal range of motion and neck supple. No rigidity or tenderness.      Right lower leg: No edema.      Left lower leg: No edema.   Lymphadenopathy:      Cervical: No cervical adenopathy.   Skin:     " General: Skin is warm and dry.      Coloration: Skin is not jaundiced.   Neurological:      General: No focal deficit present.      Mental Status: He is alert and oriented to person, place, and time.      Cranial Nerves: No cranial nerve deficit.   Psychiatric:         Mood and Affect: Mood normal.         Behavior: Behavior normal.         Thought Content: Thought content normal.       Assessment and Plan:   1. Hospital discharge follow-up  2. Status post aortic valve replacement  3. Normocytic anemia  - CBC WITH DIFFERENTIAL; Future  - FERRITIN; Future  - FOLATE; Future  - IRON/TOTAL IRON BIND; Future  - VITAMIN B12; Future  4. Thrombocytopenia (HCC)  - CBC WITH DIFFERENTIAL; Future  - FOLATE; Future  - VITAMIN B12; Future  5. Hypokalemia  - Basic Metabolic Panel; Future  - MAGNESIUM; Future  - Chart and discharge summary were reviewed. Trend labs next week as above.  - Hospitalization and results reviewed with patient.   - Medications reviewed including instructions regarding high risk medications, dosing and side effects.  - Recommended Services: No services needed at this time, continue care with cardiology (has appointment 10/26/2023).  - Advance directive/POLST on file?  Yes    6. COVID-19  This is an acute condition. Patient out of the window for outpatient usage of antivirals.  Fortunately exam is without concerning findings.  Discussed symptom management with hydration, humidification, Vicks VapoRub, saline rinses and over-the-counter cough medicine/analgesics if needed.  Declines any medications for symptoms at this time.  Gave return precautions.      Follow-up:Return in about 6 weeks (around 11/23/2023), or if symptoms worsen or fail to improve.

## 2023-10-12 NOTE — Clinical Note
Yadira Fox, I forgot to our the COVID test on this patient. As you recall he was +COVID and negative for all others. Would you be document the results? I put the order in.

## 2023-10-17 LAB
FLUAV RNA SPEC QL NAA+PROBE: NEGATIVE
FLUBV RNA SPEC QL NAA+PROBE: NEGATIVE
RSV RNA SPEC QL NAA+PROBE: NEGATIVE
SARS-COV-2 RNA RESP QL NAA+PROBE: POSITIVE

## 2023-10-20 ENCOUNTER — HOSPITAL ENCOUNTER (OUTPATIENT)
Dept: LAB | Facility: MEDICAL CENTER | Age: 78
End: 2023-10-20
Attending: STUDENT IN AN ORGANIZED HEALTH CARE EDUCATION/TRAINING PROGRAM
Payer: MEDICARE

## 2023-10-20 DIAGNOSIS — D64.9 NORMOCYTIC ANEMIA: ICD-10-CM

## 2023-10-20 DIAGNOSIS — E87.6 HYPOKALEMIA: ICD-10-CM

## 2023-10-20 DIAGNOSIS — D69.6 THROMBOCYTOPENIA (HCC): ICD-10-CM

## 2023-10-20 LAB
ANION GAP SERPL CALC-SCNC: 10 MMOL/L (ref 7–16)
BASOPHILS # BLD AUTO: 1 % (ref 0–1.8)
BASOPHILS # BLD: 0.07 K/UL (ref 0–0.12)
BUN SERPL-MCNC: 13 MG/DL (ref 8–22)
CALCIUM SERPL-MCNC: 10.1 MG/DL (ref 8.5–10.5)
CHLORIDE SERPL-SCNC: 102 MMOL/L (ref 96–112)
CO2 SERPL-SCNC: 27 MMOL/L (ref 20–33)
CREAT SERPL-MCNC: 0.89 MG/DL (ref 0.5–1.4)
EOSINOPHIL # BLD AUTO: 0.37 K/UL (ref 0–0.51)
EOSINOPHIL NFR BLD: 5.3 % (ref 0–6.9)
ERYTHROCYTE [DISTWIDTH] IN BLOOD BY AUTOMATED COUNT: 52.9 FL (ref 35.9–50)
FERRITIN SERPL-MCNC: 473 NG/ML (ref 22–322)
FOLATE SERPL-MCNC: 15.8 NG/ML
GFR SERPLBLD CREATININE-BSD FMLA CKD-EPI: 87 ML/MIN/1.73 M 2
GLUCOSE SERPL-MCNC: 102 MG/DL (ref 65–99)
HCT VFR BLD AUTO: 39.3 % (ref 42–52)
HGB BLD-MCNC: 12.9 G/DL (ref 14–18)
IMM GRANULOCYTES # BLD AUTO: 0.03 K/UL (ref 0–0.11)
IMM GRANULOCYTES NFR BLD AUTO: 0.4 % (ref 0–0.9)
IRON SATN MFR SERPL: 23 % (ref 15–55)
IRON SERPL-MCNC: 68 UG/DL (ref 50–180)
LYMPHOCYTES # BLD AUTO: 1.31 K/UL (ref 1–4.8)
LYMPHOCYTES NFR BLD: 18.7 % (ref 22–41)
MAGNESIUM SERPL-MCNC: 1.8 MG/DL (ref 1.5–2.5)
MCH RBC QN AUTO: 29.1 PG (ref 27–33)
MCHC RBC AUTO-ENTMCNC: 32.8 G/DL (ref 32.3–36.5)
MCV RBC AUTO: 88.5 FL (ref 81.4–97.8)
MONOCYTES # BLD AUTO: 0.65 K/UL (ref 0–0.85)
MONOCYTES NFR BLD AUTO: 9.3 % (ref 0–13.4)
NEUTROPHILS # BLD AUTO: 4.57 K/UL (ref 1.82–7.42)
NEUTROPHILS NFR BLD: 65.3 % (ref 44–72)
NRBC # BLD AUTO: 0 K/UL
NRBC BLD-RTO: 0 /100 WBC (ref 0–0.2)
PLATELET # BLD AUTO: 291 K/UL (ref 164–446)
PMV BLD AUTO: 10.7 FL (ref 9–12.9)
POTASSIUM SERPL-SCNC: 3.7 MMOL/L (ref 3.6–5.5)
RBC # BLD AUTO: 4.44 M/UL (ref 4.7–6.1)
SODIUM SERPL-SCNC: 139 MMOL/L (ref 135–145)
TIBC SERPL-MCNC: 292 UG/DL (ref 250–450)
UIBC SERPL-MCNC: 224 UG/DL (ref 110–370)
VIT B12 SERPL-MCNC: 400 PG/ML (ref 211–911)
WBC # BLD AUTO: 7 K/UL (ref 4.8–10.8)

## 2023-10-20 PROCEDURE — 82607 VITAMIN B-12: CPT

## 2023-10-20 PROCEDURE — 83735 ASSAY OF MAGNESIUM: CPT

## 2023-10-20 PROCEDURE — 83540 ASSAY OF IRON: CPT

## 2023-10-20 PROCEDURE — 85025 COMPLETE CBC W/AUTO DIFF WBC: CPT

## 2023-10-20 PROCEDURE — 82746 ASSAY OF FOLIC ACID SERUM: CPT

## 2023-10-20 PROCEDURE — 36415 COLL VENOUS BLD VENIPUNCTURE: CPT

## 2023-10-20 PROCEDURE — 80048 BASIC METABOLIC PNL TOTAL CA: CPT

## 2023-10-20 PROCEDURE — 82728 ASSAY OF FERRITIN: CPT

## 2023-10-20 PROCEDURE — 83550 IRON BINDING TEST: CPT

## 2023-10-24 DIAGNOSIS — E03.8 SUBCLINICAL HYPOTHYROIDISM: ICD-10-CM

## 2023-10-24 RX ORDER — LEVOTHYROXINE SODIUM 0.05 MG/1
50 TABLET ORAL
Qty: 90 TABLET | Refills: 2 | Status: SHIPPED | OUTPATIENT
Start: 2023-10-24

## 2023-10-26 ENCOUNTER — OFFICE VISIT (OUTPATIENT)
Dept: CARDIOLOGY | Facility: MEDICAL CENTER | Age: 78
End: 2023-10-26
Attending: NURSE PRACTITIONER
Payer: MEDICARE

## 2023-10-26 VITALS
OXYGEN SATURATION: 95 % | BODY MASS INDEX: 31.55 KG/M2 | HEART RATE: 97 BPM | DIASTOLIC BLOOD PRESSURE: 64 MMHG | HEIGHT: 67 IN | RESPIRATION RATE: 16 BRPM | SYSTOLIC BLOOD PRESSURE: 110 MMHG | WEIGHT: 201 LBS

## 2023-10-26 DIAGNOSIS — I25.10 CORONARY ARTERY DISEASE DUE TO CALCIFIED CORONARY LESION: ICD-10-CM

## 2023-10-26 DIAGNOSIS — Z95.2 S/P AVR: ICD-10-CM

## 2023-10-26 DIAGNOSIS — I25.84 CORONARY ARTERY DISEASE DUE TO CALCIFIED CORONARY LESION: ICD-10-CM

## 2023-10-26 DIAGNOSIS — I45.10 RIGHT BUNDLE BRANCH BLOCK: ICD-10-CM

## 2023-10-26 DIAGNOSIS — Z79.4 TYPE 2 DIABETES MELLITUS WITHOUT COMPLICATION, WITH LONG-TERM CURRENT USE OF INSULIN (HCC): ICD-10-CM

## 2023-10-26 DIAGNOSIS — Z87.891 FORMER SMOKER: ICD-10-CM

## 2023-10-26 DIAGNOSIS — Z95.0 PACEMAKER: ICD-10-CM

## 2023-10-26 DIAGNOSIS — E78.49 OTHER HYPERLIPIDEMIA: ICD-10-CM

## 2023-10-26 DIAGNOSIS — Z95.2 STATUS POST AORTIC VALVE REPLACEMENT: ICD-10-CM

## 2023-10-26 DIAGNOSIS — E66.09 CLASS 1 OBESITY DUE TO EXCESS CALORIES WITH SERIOUS COMORBIDITY AND BODY MASS INDEX (BMI) OF 31.0 TO 31.9 IN ADULT: ICD-10-CM

## 2023-10-26 DIAGNOSIS — E11.9 TYPE 2 DIABETES MELLITUS WITHOUT COMPLICATION, WITH LONG-TERM CURRENT USE OF INSULIN (HCC): ICD-10-CM

## 2023-10-26 DIAGNOSIS — G47.33 OBSTRUCTIVE SLEEP APNEA ON CPAP: ICD-10-CM

## 2023-10-26 DIAGNOSIS — Z95.5 STENTED CORONARY ARTERY: ICD-10-CM

## 2023-10-26 PROBLEM — Z87.898 HISTORY OF SYNCOPE: Status: RESOLVED | Noted: 2022-03-09 | Resolved: 2023-10-26

## 2023-10-26 PROBLEM — I77.89 ENLARGED THORACIC AORTA (HCC): Status: RESOLVED | Noted: 2023-09-13 | Resolved: 2023-10-26

## 2023-10-26 PROBLEM — E66.9 OBESITY (BMI 30-39.9): Status: RESOLVED | Noted: 2023-09-13 | Resolved: 2023-10-26

## 2023-10-26 PROBLEM — E87.6 HYPOKALEMIA: Status: RESOLVED | Noted: 2022-03-09 | Resolved: 2023-10-26

## 2023-10-26 LAB — EKG IMPRESSION: NORMAL

## 2023-10-26 PROCEDURE — 93010 ELECTROCARDIOGRAM REPORT: CPT | Performed by: INTERNAL MEDICINE

## 2023-10-26 PROCEDURE — 99213 OFFICE O/P EST LOW 20 MIN: CPT | Performed by: NURSE PRACTITIONER

## 2023-10-26 PROCEDURE — 93005 ELECTROCARDIOGRAM TRACING: CPT | Performed by: NURSE PRACTITIONER

## 2023-10-26 PROCEDURE — 3078F DIAST BP <80 MM HG: CPT | Performed by: NURSE PRACTITIONER

## 2023-10-26 PROCEDURE — 99214 OFFICE O/P EST MOD 30 MIN: CPT | Performed by: NURSE PRACTITIONER

## 2023-10-26 PROCEDURE — 3074F SYST BP LT 130 MM HG: CPT | Performed by: NURSE PRACTITIONER

## 2023-10-26 RX ORDER — ROSUVASTATIN CALCIUM 40 MG/1
40 TABLET, COATED ORAL NIGHTLY
Qty: 100 TABLET | Refills: 3 | Status: SHIPPED | OUTPATIENT
Start: 2023-10-26 | End: 2023-12-26

## 2023-10-26 RX ORDER — LISINOPRIL 5 MG/1
TABLET ORAL
COMMUNITY
Start: 2023-10-20 | End: 2023-10-26

## 2023-10-26 ASSESSMENT — ENCOUNTER SYMPTOMS
COUGH: 0
SHORTNESS OF BREATH: 0
FEVER: 0
CLAUDICATION: 0
MYALGIAS: 0
ORTHOPNEA: 0
PND: 0
ABDOMINAL PAIN: 0
DIZZINESS: 0
PALPITATIONS: 0

## 2023-10-26 ASSESSMENT — FIBROSIS 4 INDEX: FIB4 SCORE: 1.12

## 2023-10-26 NOTE — PROGRESS NOTES
Chief Complaint   Patient presents with    Coronary Artery Disease     Subjective     Gerry Boyer is a 78 y.o. male who presents today for hospital follow up S/P SAVR (emergent with embolized TAVR).    He is a patient of Dr. Hsu in our office.     Hx of PPM, CAD S/P PCI/SWETHA x 2 to the RCA in November 2021, S/P SAVR with embolized TAVR, HTN, hyperlipidemia, DM and hypothyroidism.    He presents today with his wife. She helps manage his medications and care.    Overall, he is doing well but does note some fatigue since the procedure, but unfortunately obtained COVID post-op 1 week after. He is doing walks and no other symptoms to report.    He has no chest pain, shortness of breath, edema, dizziness/lightheadedness, or palpitations.    Past Medical History:   Diagnosis Date    Anesthesia 09/21/2023    PONV times 1    Anxiety     Aortic stenosis 03/2022    Echocardiogram with normal LV size, mild concentric LVH, LVEF 75%. Normal RA, LA and RV. Trace MR. Moderate AS (peak 37mmHg, mean 24mmHg, Vmax 3.0m/s, GUY 1.1cm2). Trace TR.    Back pain, chronic 09/21/2023    bilateral legs as well    Bilateral primary osteoarthritis of knee 01/23/2018    Black stools 03/09/2022    Blood clotting disorder (HCC) 1978    s/p Left knee surgery- DVT in left leg    Breath shortness 09/21/2023    with exertion    CAD (coronary artery disease) 11/2021    PCI/SWETHA x 2 ([place] 3.0 x 38mm, [place] 3.5 x 38mm) to the RCA.    Cancer (HCC) ? early 90's    Melanoma Left arm- surgically removed    Cancer (HCC) 09/21/2023    prostate cancer    Cataract 09/21/2023    small cataracts    Degeneration of lumbar or lumbosacral intervertebral disc      Diabetes     Oral agents and insulin    DJD (degenerative joint disease) of cervical spine      High cholesterol 09/21/2023    medicated    Hyperlipidemia     Hypersensitive carotid sinus syndrome      Hypertension 09/21/2023    medicated    Hypertension associated with type 2 diabetes mellitus (HCC)  04/10/2012    Insomnia      Neck pain     Neutropenia (HCC) 03/04/2022    Pacemaker 10/2012    [facility] [address] [address] #[id number] [facility]     Pancytopenia (HCC) 03/09/2022    PONV (postoperative nausea and vomiting) 09/21/2023    PONV times one    Psychiatric problem     denies    RBBB      S/P lumbar discectomy      Severe aortic stenosis 6/3/2020    March 2022: Echocardiogram with normal LV size, mild concentric LVH, LVEF 75%. Normal RA, LA and RV. Trace MR. Moderate AS (peak 37mmHg, mean 24mmHg, Vmax 3.0m/s, GUY 1.1cm2). Trace TR.  Angiogram 7/11/2023: severe AS    Sick sinus syndrome (HCC)      Sleep apnea 09/21/2023    not using CPAP at present    Snoring 09/21/2023    Syncope 10/2012    Treated with PPM    Thyroid disease 09/21/2023    medicated    Unintentional weight loss 03/05/2022    Unspecified hemorrhagic conditions     Reports bleeds easily     Past Surgical History:   Procedure Laterality Date    TRANSCATHETER AORTIC VALVE REPLACEMENT Bilateral 9/25/2023    Procedure: TRANSCATHETER AORTIC VALVE REPLACEMENT - CONVERTED TO OPEN HEART PROCEDURE;  Surgeon: Puma Hsu M.D.;  Location: [facility];  Service: Cardiac    ECHOCARDIOGRAM, TRANSESOPHAGEAL, INTRAOPERATIVE N/A 9/25/2023    Procedure: ECHOCARDIOGRAM, TRANSESOPHAGEAL, INTRAOPERATIVE;  Surgeon: Puma Hsu M.D.;  Location: [facility];  Service: Cardiac    AORTIC VALVE REPLACEMENT N/A 9/25/2023    Procedure: REPLACEMENT, AORTIC VALVE AND RETRIEVAL OF FOREIGN BODY,  AORTIC ROOT ENLARGEMENT;  Surgeon: Turner Johnson D.O.;  Location: [facility];  Service: Cardiothoracic    SHOULDER ARTHROSCOPY Right 09/21/2023    rotator cuff repair times 2    KNEE ARTHROPLASTY TOTAL Right 2018    IN DSTR NROLYTC AGNT PARVERTEB FCT SNGL CRVCL/THORA Right 10/19/2016    Procedure: NEURO DEST FACET C/T W/IG SNGL - 3ON-C3, C8-T1    SINERGY;  Surgeon: Gaurang Pruett M.D.;  Location: [facility];  Service: Pain  "Management    ID DSTR NROLYTC AGNT PARVERTEB FCT ADDL CRVCL/THORA  10/19/2016    Procedure: NEURO DEST FACET C/T W/IG ADDL;  Surgeon: Gaurang Pruett M.D.;  Location: SURGERY [facility];  Service: Pain Management    ID FLUOROSCOPIC GUIDANCE NEEDLE PLACEMENT  10/19/2016    Procedure: FLOURO GUIDE NEEDLE PLACEMENT;  Surgeon: Gaurang Pruett M.D.;  Location: SURGERY [facility];  Service: Pain Management    SHOULDER ARTHROSCOPY Right 2015    torn bicep tendon and spurs    RECOVERY  10/17/2013    Performed by Cath-Recovery Surgery at SURGERY SAME DAY [facility] ORS    WOUND DEHISCENCE  04/10/2013    Performed by Tu Jain M.D. at SURGERY [facility]    CERVICAL FUSION POSTERIOR  2013    Performed by Tu Jain M.D. at SURGERY [facility]    CERVICAL LAMINECTOMY POSTERIOR  2013    Performed by Tu Jain M.D. at SURGERY [facility]    LUMBAR LAMINECTOMY DISKECTOMY  2012    Performed by Tu Jain M.D. at SURGERY [facility]    RECOVERY  10/04/2012    Performed by Cath-Recovery Surgery at SURGERY SAME DAY [facility]    PACEMAKER INSERTION  10/01/2012    [facility] [id number] implanted by Dr. Waite.    SHOULDER DECOMPRESSION Left 2008    Left rotator cuff times 2    ARTHROPLASTY Left 2004    Knee    ARTHROSCOPY, KNEE Bilateral 1978    ORTHOPEDIC OSTEOTOMY      Both knees several times, 8 total    OTHER      SPINAL CORD STIMULATOR  1 month ago    TONSILLECTOMY      VASECTOMY       Family History   Problem Relation Age of Onset    Lung Disease Mother         Smoker ( of lung dz)    Alcohol/Drug Mother     Heart Disease Father 36        CAD    Heart Disease Sister         \"hole in heart\"    Lung Disease Brother         COPD, CANCER    Cancer Brother         Prostate, Lung ( of lung CA)    Alcohol/Drug Brother     Heart Disease Brother     Diabetes Brother     Hypertension Brother     Hyperlipidemia Brother     Heart Disease " Maternal Grandmother     Hypertension Maternal Grandmother     Hyperlipidemia Maternal Grandmother     Cancer Brother          of esophagus/stomach, thyroid    Heart Disease Brother 60        MI, stent, PM/defib    Sleep Apnea Brother     No Known Problems Maternal Grandfather     No Known Problems Paternal Grandmother      Social History     Socioeconomic History    Marital status:      Spouse name: Not on file    Number of children: Not on file    Years of education: Not on file    Highest education level: 12th grade   Occupational History    Not on file   Tobacco Use    Smoking status: Former     Current packs/day: 0.00     Average packs/day: 1 pack/day for 40.0 years (40.0 ttl pk-yrs)     Types: Cigarettes     Start date: 1/3/1950     Quit date: 1/3/1990     Years since quittin.8    Smokeless tobacco: Never   Vaping Use    Vaping Use: Never used   Substance and Sexual Activity    Alcohol use: No     Comment: Quit     Drug use: Never    Sexual activity: Yes     Partners: Female   Other Topics Concern    Not on file   Social History Narrative    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      Social Determinants of Health     Financial Resource Strain: Low Risk  (2022)    Overall Financial Resource Strain (CARDIA)     Difficulty of Paying Living Expenses: Not hard at all   Food Insecurity: No Food Insecurity (2022)    Hunger Vital Sign     Worried About Running Out of Food in the Last Year: Never true     Ran Out of Food in the Last Year: Never true   Transportation Needs: No Transportation Needs (2022)    PRAPARE - Transportation     Lack of Transportation (Medical): No     Lack of Transportation (Non-Medical): No   Physical Activity: Inactive (2022)    Exercise Vital Sign     Days of Exercise per Week: 0 days     Minutes of Exercise per Session: 0 min   Stress: No Stress Concern Present (2022)    [employer] [place] [employer] - Occupational Stress  Questionnaire     Feeling of Stress : Not at all   Social Connections: Unknown (12/11/2022)    Social Connection and Isolation Panel [NHANES]     Frequency of Communication with Friends and Family: More than three times a week     Frequency of Social Gatherings with Friends and Family: Twice a week     Attends Caodaism Services: More than 4 times per year     Active Member of Clubs or Organizations: Patient refused     Attends Club or Organization Meetings: Patient refused     Marital Status:    Intimate Partner Violence: Not on file   Housing Stability: Low Risk  (12/11/2022)    Housing Stability Vital Sign     Unable to Pay for Housing in the Last Year: No     Number of Places Lived in the Last Year: 1     Unstable Housing in the Last Year: No     Allergies   Allergen Reactions    Aleve Cold & [Pseudoephedrine-Naproxen Na] Anaphylaxis    Ceftriaxone Sodium Anaphylaxis     Reaction; 1970's. Patient has tolerated cefazolin.    Naproxen Anaphylaxis     Reaction; 2004.    Latex Rash     Contact site    Tape Rash and Itching     Plastic tape/tegaderm (paper tape ok)     Outpatient Encounter Medications as of 10/26/2023   Medication Sig Dispense Refill    metoprolol tartrate (LOPRESSOR) 25 MG Tab Take 1 Tablet by mouth 2 times a day. 200 Tablet 3    rosuvastatin (CRESTOR) 40 MG tablet Take 1 Tablet by mouth every evening. 100 Tablet 3    levothyroxine (SYNTHROID) 50 MCG Tab TAKE 1 TABLET BY MOUTH EVERY MORNING ON AN EMPTY STOMACH 90 Tablet 2    acetaminophen (TYLENOL) 500 MG Tab Take 2 Tablets by mouth every 6 hours. 30 Tablet 0    omeprazole (PRILOSEC) 20 MG delayed-release capsule Take 1 Capsule by mouth every day. 30 Capsule 2    tamsulosin (FLOMAX) 0.4 MG capsule Take 1 Capsule by mouth 1/2 hour after breakfast. 30 Capsule 2    Empagliflozin-metFORMIN HCl ER (SYNJARDY XR) 12.5-1000 MG TABLET SR 24 HR Take 1 Tablet by mouth 2 times a day.      insulin glargine (LANTUS SOLOSTAR) 100 UNIT/ML Solution  "Pen-injector injection Inject 50 Units under the skin every evening. (Patient taking differently: Inject 50 Units under the skin every evening. 25 UNITS THE NIGHT BEFORE SURGERY) 45 mL 3    Dulaglutide (TRULICITY) 1.5 MG/0.5ML Solution Pen-injector Inject 0.5 mL under the skin every 7 days. 6 mL 3    aspirin EC (ECOTRIN) 81 MG TBEC Take 81 mg by mouth every evening.      [DISCONTINUED] lisinopril (PRINIVIL) 5 MG Tab       [DISCONTINUED] furosemide (LASIX) 20 MG Tab Take 1 Tablet by mouth every day. 30 Tablet 0    [DISCONTINUED] metoprolol tartrate (LOPRESSOR) 25 MG Tab Take 0.5 Tablets by mouth 2 times a day. 30 Tablet 2    [DISCONTINUED] potassium chloride SA (K-DUR) 10 MEQ Tab CR Take 1 Tablet by mouth every day. 30 Tablet 0    Insulin Pen Needle (PEN NEEDLES 31GX5/16\") 31G X 8 MM Misc 1 Each every day. 100 Each 3    [DISCONTINUED] rosuvastatin (CRESTOR) 40 MG tablet Take 1 Tablet by mouth every day. (Patient taking differently: Take 40 mg by mouth every evening.) 90 Tablet 3     No facility-administered encounter medications on file as of 10/26/2023.     Review of Systems   Constitutional:  Positive for malaise/fatigue. Negative for fever.        Recent COVID infection post-op   Respiratory:  Negative for cough and shortness of breath.    Cardiovascular:  Negative for chest pain, palpitations, orthopnea, claudication, leg swelling and PND.   Gastrointestinal:  Negative for abdominal pain.   Musculoskeletal:  Negative for myalgias.   Neurological:  Negative for dizziness.              Objective     /64 (BP Location: Left arm, Patient Position: Sitting, BP Cuff Size: Adult)   Pulse 97   Resp 16   Ht 1.702 m (5' 7\")   Wt 91.2 kg (201 lb)   SpO2 95%   BMI 31.48 kg/m²     Physical Exam  Vitals and nursing note reviewed.   Constitutional:       Appearance: Normal appearance. He is well-developed and normal weight.   HENT:      Head: Normocephalic and atraumatic.   Neck:      Vascular: No JVD. "   Cardiovascular:      Rate and Rhythm: Normal rate and regular rhythm.      Pulses: Normal pulses.      Heart sounds: Normal heart sounds.      Comments: Midline incision CDI    Pulmonary:      Effort: Pulmonary effort is normal.      Breath sounds: Normal breath sounds.   Musculoskeletal:         General: Normal range of motion.      Comments: Trace LE edema   Skin:     General: Skin is warm and dry.      Capillary Refill: Capillary refill takes less than 2 seconds.   Neurological:      General: No focal deficit present.      Mental Status: He is alert and oriented to person, place, and time. Mental status is at baseline.   Psychiatric:         Mood and Affect: Mood normal.         Behavior: Behavior normal.         Thought Content: Thought content normal.         Judgment: Judgment normal.                Assessment & Plan     1. Coronary artery disease due to calcified coronary lesion  EKG      2. Pacemaker        3. S/P AVR  metoprolol tartrate (LOPRESSOR) 25 MG Tab    Comp Metabolic Panel    CBC WITHOUT DIFFERENTIAL    EC-ECHOCARDIOGRAM COMPLETE W/O CONT      4. Hyperlipidemia associated with type 2 diabetes mellitus (HCC)  rosuvastatin (CRESTOR) 40 MG tablet      5. Coronary artery disease due to calcified coronary lesion: 40-50% disease in the circumflex at cardiac catheterization in 2013  rosuvastatin (CRESTOR) 40 MG tablet      6. Stented coronary artery  rosuvastatin (CRESTOR) 40 MG tablet      7. Obstructive sleep apnea on CPAP          Medical Decision Making: Today's Assessment/Status/Plan:      1. S/P SAVR (emergent due to embolized TAVR)  -doing well post-op, just mild fatigue  -cont asa lifelong  -incision healing well, CTS apt next week  -SBE prophylaxis understands  -echo 3 months then 1 year  -reviewed hospital imaging, labs, and EKG  -cardiac rehab referral placed, brochure given to call  -EKG shows SR with RBBB (Chronic)  -OK to stop furosemide and potassium, no volume overload  -OK to stop  omeprazole in 3 months post-op  -increase metoprolol to 25 mg BID for tachycardia, follow vitals at home    2. HLD with CAD with prior PCI  -no angina or CALIX  -cont asa, statin lifelong    3. PPM for SSS  -stable on device check, follow up at next apt    4. Obesity with DM type and LYNDA  -DM well managed with endocrinology  -not treating LYNDA anymore due to machine not working, not interested in re-starting or referral to sleep medicine team    Patient is to follow up with Garima IRWIN in 3 months with echo, labs.

## 2023-10-26 NOTE — PATIENT INSTRUCTIONS
STOP furosemide and lasix.    INCREASE metoprolol to full tablet of 25 mg twice a day.    Call cardiac rehab to get first consultation if wanted.    Obtain labs and echo in 3 months with follow up in our office.    OK to stop omeprazole in 3 months after prescription ends.    Continue to follow BP and HR at home for another month then OK to spot check.

## 2023-10-29 DIAGNOSIS — Z95.2 S/P AVR: ICD-10-CM

## 2023-10-29 RX ORDER — OMEPRAZOLE 20 MG/1
20 CAPSULE, DELAYED RELEASE ORAL DAILY
Qty: 90 CAPSULE | OUTPATIENT
Start: 2023-10-29

## 2023-10-30 ENCOUNTER — OFFICE VISIT (OUTPATIENT)
Dept: CARDIOTHORACIC SURGERY | Facility: MEDICAL CENTER | Age: 78
End: 2023-10-30
Payer: MEDICARE

## 2023-10-30 VITALS
HEIGHT: 67 IN | OXYGEN SATURATION: 97 % | HEART RATE: 101 BPM | BODY MASS INDEX: 32.27 KG/M2 | SYSTOLIC BLOOD PRESSURE: 116 MMHG | DIASTOLIC BLOOD PRESSURE: 52 MMHG | WEIGHT: 205.6 LBS | TEMPERATURE: 98.1 F

## 2023-10-30 DIAGNOSIS — Z09 POSTOP CHECK: ICD-10-CM

## 2023-10-30 PROCEDURE — 3078F DIAST BP <80 MM HG: CPT | Performed by: NURSE PRACTITIONER

## 2023-10-30 PROCEDURE — 3074F SYST BP LT 130 MM HG: CPT | Performed by: NURSE PRACTITIONER

## 2023-10-30 PROCEDURE — 99024 POSTOP FOLLOW-UP VISIT: CPT | Performed by: NURSE PRACTITIONER

## 2023-10-30 ASSESSMENT — FIBROSIS 4 INDEX: FIB4 SCORE: 1.12

## 2023-11-15 NOTE — PROGRESS NOTES
"CARDIOLOGY OUTPATIENT FOLLOWUP    PCP: Tanya Rayo P.A.-C.    1. Coronary artery disease due to calcified coronary lesion: 40-50% disease in the circumflex at cardiac catheterization in 2013  No angina.  Continue statin.    2. Dyslipidemia  Well-controlled.  No changes advised    3. Essential hypertension, benign  Well-controlled.  No changes advised    4. Pacemaker  Normal device function.  No arrhythmias detected.  Longevity 7 to 8 years    5. RBBB  Asymptomatic.  Normal RV on echocardiogram.  Expectant management    6. Mild aortic stenosis  Stable on exam.     7.  Sedentary lifestyle  - Advised regular physical exercise      Follow up with myself in 1 year    Chief Complaint   Patient presents with   • Coronary Artery Disease       History: Dakota Boyer is a 75 y.o. male with a past medical history of hypertension, hyperlipidemia and diabetes presenting for follow up of pacemaker placed for syncope and carotid hypersensitivity syndrome.  He also has a history of nonobstructive coronary disease, right bundle branch block, mild aortic stenosis, hypertension and hyperlipidemia.    Since his last visit he has remained in stable health.  He does however very sedentary and found it difficult to mow the grass the other day.  This is not unexpected, and has been typical of when he attempts to exert himself in the past.  He does not experience angina or dyspnea but rather fatigue.    He reports his health is otherwise stable.  He is adherent to blood pressure and cholesterol medications without symptoms.  He recently completed a device check and reviewed the results.    ROS:  All other systems reviewed and negative except as per the HPI    PE:  /80 (BP Location: Left arm, Patient Position: Sitting, BP Cuff Size: Adult)   Pulse 77   Resp 18   Ht 1.727 m (5' 8\")   Wt 106 kg (233 lb 11 oz)   SpO2 95%   BMI 35.53 kg/m²   Gen: Well appearing  HEENT: Symmetric face. Anicteric sclerae. Moist mucus " Left message for call back   membranes  NECK: No JVD. No lymphadenopathy  CARDIAC: Normal PMI, regular, normal S1, S2.  VASCULATURE: Normal carotid amplitude without bruit.   RESP: Clear to auscultation bilaterally  ABD: Soft, non-tender, non-distended  EXT: No edema, no clubbing or cyanosis  SKIN: Warm and dry  NEURO: No gross deficits  PSYCH: Appropriate affect, participates in conversation    Past Medical History:   Diagnosis Date   • Anxiety    • Back pain, chronic 10/18/2016   • Blood clotting disorder (HCC) 1978    s/p Left knee surgery- DVT in left leg   • Breath shortness    • CAD (coronary artery disease) October 2012    Positive coronary calcium score.  Follow-up MPI was negative for ischemia.   • Cancer (HCC) ? early 90's    Melanoma Left arm- surgically removed   • Degeneration of lumbar or lumbosacral intervertebral disc     • Dental disorder    • Depression    • Diabetes     Oral agents and insulin   • DJD (degenerative joint disease) of cervical spine     • Episodic lightheadedness     • High cholesterol    • Hyperlipidemia    • Hypersensitive carotid sinus syndrome     • Hypertension    • Incisional infection April 2013    Wound dehiscience of neck surgery.   • Insomnia     • Myocardial infarct (HCC) ?    states was told by  that he has had a heart attack in the past.   • Neck pain 10/18/2016   • Pacemaker October 2012    [facility] [address] [address] #[id number] implanted by [facility].    • RBBB     • S/P lumbar discectomy     • Sick sinus syndrome (HCC)     • Sleep apnea     does not use CPAP anymore- stopped in Dec 2015   • Syncope October 2012    Treated with PPM   • Thyroid disease     Hypothyroid   • Unspecified hemorrhagic conditions     Reports bleeds easily     Allergies   Allergen Reactions   • Aleve Cold & [Pseudoephedrine-Naproxen Na] Anaphylaxis   • Ceftriaxone Sodium Anaphylaxis     Reaction; 1970's.   • Naproxen Anaphylaxis     Reaction; 2004.   • Latex    • Tape Rash and Itching     Plastic tape (paper tape ok)      Outpatient Encounter Medications as of 6/3/2020   Medication Sig Dispense Refill   • SYNJARDY XR 12.5-1000 MG TABLET SR 24 HR TAKE 1 TABLET TWICE A  Tab 3   • meloxicam (MOBIC) 7.5 MG Tab TAKE 1 TO 2 TABLETS DAILY 180 Tab 1   • buPROPion (WELLBUTRIN XL) 150 MG XL tablet Take 1 Tab by mouth every morning. 90 Tab 1   • valsartan (DIOVAN) 160 MG Tab Take 1 Tab by mouth every day. 90 Tab 3   • insulin glargine (LANTUS SOLOSTAR) 100 UNIT/ML Solution Pen-injector injection Inject 26 Units as instructed every evening. 5 PEN 6   • glucose blood (FREESTYLE LITE) strip 1 Strip by Other route 2 times a day. 150 Strip 3   • TRULICITY 1.5 MG/0.5ML Solution Pen-injector INJECT 1.5 MG EVERY 7 DAYS AS INSTRUCTED 6 mL 4   • metoprolol (LOPRESSOR) 25 MG Tab Take 0.5 Tabs by mouth 2 times a day. **change in dose** 90 Tab 3   • atorvastatin (LIPITOR) 20 MG Tab TAKE 1 TABLET DAILY 90 Tab 3   • Cyanocobalamin (VITAMIN B-12) 2500 MCG SL Tab Place  under tongue every day.     • Lancets MISC Lancets order: Lancets for Abbott Freestyle Lite meter. Sig: use 4 times daily  and prn ssx high or low sugar. #100 RF x 0 360 Each 4   • Blood Glucose Monitoring Suppl [place] Meter: Dispense Abbott Freestyle Lite meter. Sig. Use as directed for blood sugar monitoring. #1. NR. 1 Device 0   • B-D ULTRAFINE III SHORT PEN 31G X 8 MM MISC USE 1 NEEDLE EVERY DAY WITH LANTUS 999 Each 2   • aspirin EC (ECOTRIN) 81 MG TBEC Take 81 mg by mouth every day.       No facility-administered encounter medications on file as of 6/3/2020.      Social History     Socioeconomic History   • Marital status:      Spouse name: Not on file   • Number of children: Not on file   • Years of education: Not on file   • Highest education level: Not on file   Occupational History   • Not on file   Social Needs   • Financial resource strain: Not on file   • Food insecurity     Worry: Not on file     Inability: Not on file   • Transportation needs     Medical: Not on  file     Non-medical: Not on file   Tobacco Use   • Smoking status: Former Smoker     Packs/day: 1.00     Years: 40.00     Pack years: 40.00     Types: Cigarettes     Last attempt to quit: 1/3/1990     Years since quittin.4   • Smokeless tobacco: Never Used   Substance and Sexual Activity   • Alcohol use: No     Alcohol/week: 0.0 oz   • Drug use: No   • Sexual activity: Yes     Partners: Female   Lifestyle   • Physical activity     Days per week: Not on file     Minutes per session: Not on file   • Stress: Not on file   Relationships   • Social connections     Talks on phone: Not on file     Gets together: Not on file     Attends [employer] service: Not on file     Active member of club or organization: Not on file     Attends meetings of clubs or organizations: Not on file     Relationship status: Not on file   • Intimate partner violence     Fear of current or ex partner: Not on file     Emotionally abused: Not on file     Physically abused: Not on file     Forced sexual activity: Not on file   Other Topics Concern   • Not on file   Social History Narrative   • Not on file       Studies  Lab Results   Component Value Date/Time    CHOLSTRLTOT 94 (L) 2019 07:11 AM    LDL 40 2019 07:11 AM    HDL 30 (A) 2019 07:11 AM    TRIGLYCERIDE 120 2019 07:11 AM       Lab Results   Component Value Date/Time    SODIUM 136 10/04/2019 03:45 PM    POTASSIUM 4.4 10/04/2019 03:45 PM    CHLORIDE 103 10/04/2019 03:45 PM    CO2 26 10/04/2019 03:45 PM    GLUCOSE 180 (H) 10/04/2019 03:45 PM    BUN 29 (H) 10/04/2019 03:45 PM    CREATININE 1.20 10/04/2019 03:45 PM     Lab Results   Component Value Date/Time    ALKPHOSPHAT 72 10/04/2019 03:45 PM    ASTSGOT 24 10/04/2019 03:45 PM    ALTSGPT 33 10/04/2019 03:45 PM    TBILIRUBIN 1.3 10/04/2019 03:45 PM        For this encounter I directly reviewed ECG tracings and medical records I agree with the interpretations in the electronic health record

## 2023-11-18 ENCOUNTER — NON-PROVIDER VISIT (OUTPATIENT)
Dept: CARDIOLOGY | Facility: MEDICAL CENTER | Age: 78
End: 2023-11-18
Payer: MEDICARE

## 2023-11-18 PROCEDURE — 93294 REM INTERROG EVL PM/LDLS PM: CPT | Performed by: INTERNAL MEDICINE

## 2023-11-20 NOTE — CARDIAC REMOTE MONITOR - SCAN
Device transmission reviewed. Device demonstrated appropriate function.       Electronically Signed by: Gideon Perla M.D.    12/13/2023  8:26 AM

## 2023-11-21 ENCOUNTER — OFFICE VISIT (OUTPATIENT)
Dept: MEDICAL GROUP | Facility: LAB | Age: 78
End: 2023-11-21
Payer: MEDICARE

## 2023-11-21 VITALS
RESPIRATION RATE: 16 BRPM | DIASTOLIC BLOOD PRESSURE: 60 MMHG | HEIGHT: 67 IN | OXYGEN SATURATION: 97 % | TEMPERATURE: 97.2 F | WEIGHT: 205 LBS | BODY MASS INDEX: 32.18 KG/M2 | SYSTOLIC BLOOD PRESSURE: 122 MMHG | HEART RATE: 79 BPM

## 2023-11-21 DIAGNOSIS — R39.9 LOWER URINARY TRACT SYMPTOMS: ICD-10-CM

## 2023-11-21 DIAGNOSIS — Z23 NEED FOR VACCINATION: ICD-10-CM

## 2023-11-21 DIAGNOSIS — E11.69 TYPE 2 DIABETES MELLITUS WITH OTHER SPECIFIED COMPLICATION, WITH LONG-TERM CURRENT USE OF INSULIN (HCC): ICD-10-CM

## 2023-11-21 DIAGNOSIS — C61 MALIGNANT NEOPLASM OF PROSTATE (HCC): ICD-10-CM

## 2023-11-21 DIAGNOSIS — Z79.4 TYPE 2 DIABETES MELLITUS WITH OTHER SPECIFIED COMPLICATION, WITH LONG-TERM CURRENT USE OF INSULIN (HCC): ICD-10-CM

## 2023-11-21 DIAGNOSIS — D64.9 NORMOCYTIC ANEMIA: ICD-10-CM

## 2023-11-21 PROBLEM — R80.9 MICROALBUMINURIA: Status: RESOLVED | Noted: 2023-03-15 | Resolved: 2023-11-21

## 2023-11-21 PROBLEM — D69.6 THROMBOCYTOPENIA (HCC): Status: RESOLVED | Noted: 2023-10-12 | Resolved: 2023-11-21

## 2023-11-21 LAB
HBA1C MFR BLD: 5.3 % (ref ?–5.8)
POCT INT CON NEG: NEGATIVE
POCT INT CON POS: POSITIVE

## 2023-11-21 PROCEDURE — 3078F DIAST BP <80 MM HG: CPT | Performed by: STUDENT IN AN ORGANIZED HEALTH CARE EDUCATION/TRAINING PROGRAM

## 2023-11-21 PROCEDURE — 90662 IIV NO PRSV INCREASED AG IM: CPT | Performed by: STUDENT IN AN ORGANIZED HEALTH CARE EDUCATION/TRAINING PROGRAM

## 2023-11-21 PROCEDURE — 3074F SYST BP LT 130 MM HG: CPT | Performed by: STUDENT IN AN ORGANIZED HEALTH CARE EDUCATION/TRAINING PROGRAM

## 2023-11-21 PROCEDURE — 99214 OFFICE O/P EST MOD 30 MIN: CPT | Mod: 25 | Performed by: STUDENT IN AN ORGANIZED HEALTH CARE EDUCATION/TRAINING PROGRAM

## 2023-11-21 PROCEDURE — 83036 HEMOGLOBIN GLYCOSYLATED A1C: CPT | Performed by: STUDENT IN AN ORGANIZED HEALTH CARE EDUCATION/TRAINING PROGRAM

## 2023-11-21 PROCEDURE — G0008 ADMIN INFLUENZA VIRUS VAC: HCPCS | Performed by: STUDENT IN AN ORGANIZED HEALTH CARE EDUCATION/TRAINING PROGRAM

## 2023-11-21 RX ORDER — TAMSULOSIN HYDROCHLORIDE 0.4 MG/1
0.4 CAPSULE ORAL
Qty: 90 CAPSULE | Refills: 3 | Status: SHIPPED | OUTPATIENT
Start: 2023-11-21

## 2023-11-21 ASSESSMENT — FIBROSIS 4 INDEX: FIB4 SCORE: 1.12

## 2023-11-21 NOTE — PROGRESS NOTES
"Subjective:     Chief Complaint   Patient presents with    Follow-Up     HPI:   Gerry presents today for follow-up.    Has been able to sleep in his bed for the last week instead of recliner. Still having some anterior chest pain at surgical site while laying on side or cough, but otherwise none. Cough is minimal, prior always with lying down. No SOB.     Continues with Trulicity 1.5mg weekly (not 100% sure on this), insulin 50units at night, Synjardy XR 1 tablet twice daily.    Blood sugar this . No recent hypoglycemia in years, 90 lowest he can recall.  Has been able to start to be more active after his surgery and continues to feel improved.    Patient is followed by urology for history of prostate cancer for which they are only monitoring.  Was given tamsulosin 0.4 mg in the hospital and initially was not sure why he was taking it but has noticed some improvement in his chronic urinary symptoms and would like to continue, denies adverse side effects.  Needs refill.    Objective:     Exam:  /60 (BP Location: Left arm, Patient Position: Sitting, BP Cuff Size: Adult)   Pulse 79   Temp 36.2 °C (97.2 °F) (Temporal)   Resp 16   Ht 1.702 m (5' 7\")   Wt 93 kg (205 lb)   SpO2 97%   BMI 32.11 kg/m²  Body mass index is 32.11 kg/m².    Physical Exam  Vitals reviewed.   Constitutional:       General: He is not in acute distress.     Appearance: Normal appearance. He is obese. He is not ill-appearing.   HENT:      Head: Normocephalic and atraumatic.      Nose: Nose normal.      Mouth/Throat:      Mouth: Mucous membranes are moist.   Eyes:      General: No scleral icterus.  Cardiovascular:      Rate and Rhythm: Normal rate and regular rhythm.      Heart sounds: Normal heart sounds. No murmur heard.  Pulmonary:      Effort: Pulmonary effort is normal. No respiratory distress.      Breath sounds: Normal breath sounds. No wheezing, rhonchi or rales.   Abdominal:      General: Abdomen is flat. Bowel sounds are " normal. There is no distension.      Palpations: Abdomen is soft. There is no mass.      Tenderness: There is no abdominal tenderness. There is no guarding or rebound.      Hernia: No hernia is present.   Musculoskeletal:      Right lower leg: No edema.      Left lower leg: No edema.   Skin:     General: Skin is warm and dry.      Coloration: Skin is not jaundiced.   Neurological:      General: No focal deficit present.      Mental Status: He is alert and oriented to person, place, and time.   Psychiatric:         Mood and Affect: Mood normal.         Behavior: Behavior normal.         Thought Content: Thought content normal.       Labs: Reviewed from 10/20/2023  Imaging: Reviewed from 5/12/2023    Assessment & Plan:     78 y.o. male with the following -     1. Type 2 diabetes mellitus with other specified complication, with long-term current use of insulin (HCC)  Chronic, well controlled. Continue medication(s) as below.  We did discuss considering increasing his Trulicity so he can reduce his insulin requirement however he would like to continue on his current regimen unless directed by endocrinology without changes.  Discussed ideal glucose ranges and ways to adjust his insulin if needed, chart provided in the after visit summary.  Encouraged diabetic diet and exercise as tolerated.  - A1C in 6 months. Discussed ideal glucose ranges.  - POCT  A1C  - MICROALBUMIN CREAT RATIO URINE; Future    Empagliflozin-metFORMIN HCl ER (SYNJARDY XR) 12.5-1000 MG TABLET SR 24 HR, Take 1 Tablet by mouth 2 times a day., Disp: , Rfl:     insulin glargine (LANTUS SOLOSTAR) 100 UNIT/ML Solution Pen-injector injection, Inject 50 Units under the skin every evening., Disp: 45 mL, Rfl: 3    Dulaglutide (TRULICITY) 1.5 MG/0.5ML Solution Pen-injector, Inject 0.5 mL under the skin every 7 days., Disp: 6 mL, Rfl: 3    2. Lower urinary tract symptoms  3. Prostate cancer (HCC)  Chronic, urinary symptoms improved with Flomax which he is  tolerating well so okay to continue.  Continue care with urology for monitoring of prostate cancer.  - tamsulosin (FLOMAX) 0.4 MG capsule; Take 1 Capsule by mouth 1/2 hour after breakfast.  Dispense: 90 Capsule; Refill: 3    4. Normocytic anemia  Postsurgical anemia improved.  No iron, B12 or folate deficiency.  Has upcoming CBC/CMP to complete for cardiology, trend then.    5. Need for vaccination  - INFLUENZA VACCINE, HIGH DOSE (65+ ONLY)    I spent a total of 31 minutes with record review, exam, communication with the patient, and documentation of this encounter.    Return in about 6 months (around 5/21/2024), or if symptoms worsen or fail to improve, for Annual Medicare Visit.    Please note that this dictation was created using voice recognition software. I have made every reasonable attempt to correct obvious errors, but I expect that there are errors of grammar and possibly content that I did not discover before finalizing the note.

## 2023-11-21 NOTE — PATIENT INSTRUCTIONS
Consider moving back your endocrinology appointment past 2/21/2024 so you can get another A1C that day.    Fasting lab for CBC/CMP for cardiology prior to visit in January.    Ideal blood pressure <130/80 and at least <140/90.  If <100 for the top number we are over treating.    Long-acting insulin (Lantus)  Adjust dose according to FASTING BLOOD GLUCOSE target  mg/dL  (1) Increase the insulin dose every 3-4 days as needed.   (2) Increase by 2 units if FBG average concentration is 131-170 mg/dL.   (3) Increase by 4 units if FBG average concentration is 171-210 mg/dL.   (4) Increase by 6 units if FBG average concentration is 221-260 mg/dL.   (5) Increase by 8 units if FBG average concentration is greater than 261mg/dL and call us.      Consider cutting back by 1-2 units to previous dose if glucose concentration is below 80 mg/dL or symptoms of hypoglycemia.

## 2023-11-29 ENCOUNTER — PATIENT MESSAGE (OUTPATIENT)
Dept: HEALTH INFORMATION MANAGEMENT | Facility: OTHER | Age: 78
End: 2023-11-29

## 2023-12-21 ENCOUNTER — OFFICE VISIT (OUTPATIENT)
Dept: MEDICAL GROUP | Facility: LAB | Age: 78
End: 2023-12-21
Payer: MEDICARE

## 2023-12-21 VITALS
WEIGHT: 207 LBS | TEMPERATURE: 97.2 F | HEIGHT: 67 IN | DIASTOLIC BLOOD PRESSURE: 70 MMHG | HEART RATE: 74 BPM | OXYGEN SATURATION: 98 % | RESPIRATION RATE: 20 BRPM | SYSTOLIC BLOOD PRESSURE: 114 MMHG | BODY MASS INDEX: 32.49 KG/M2

## 2023-12-21 DIAGNOSIS — E11.3293 MILD NONPROLIFERATIVE DIABETIC RETINOPATHY OF BOTH EYES WITHOUT MACULAR EDEMA ASSOCIATED WITH TYPE 2 DIABETES MELLITUS (HCC): ICD-10-CM

## 2023-12-21 DIAGNOSIS — D23.72 BENIGN NEOPLASM OF SKIN OF LEFT LOWER EXTREMITY, INCLUDING HIP: ICD-10-CM

## 2023-12-21 DIAGNOSIS — Z02.89 ENCOUNTER FOR COMPLETION OF FORM WITH PATIENT: ICD-10-CM

## 2023-12-21 PROBLEM — B07.9 CUTANEOUS WART: Status: ACTIVE | Noted: 2023-12-21

## 2023-12-21 PROCEDURE — 3074F SYST BP LT 130 MM HG: CPT | Performed by: STUDENT IN AN ORGANIZED HEALTH CARE EDUCATION/TRAINING PROGRAM

## 2023-12-21 PROCEDURE — 3078F DIAST BP <80 MM HG: CPT | Performed by: STUDENT IN AN ORGANIZED HEALTH CARE EDUCATION/TRAINING PROGRAM

## 2023-12-21 PROCEDURE — 99214 OFFICE O/P EST MOD 30 MIN: CPT | Mod: 25 | Performed by: STUDENT IN AN ORGANIZED HEALTH CARE EDUCATION/TRAINING PROGRAM

## 2023-12-21 ASSESSMENT — ENCOUNTER SYMPTOMS
SHORTNESS OF BREATH: 0
HEADACHES: 0
DIZZINESS: 0
VOMITING: 0
FEVER: 0
NAUSEA: 0
CHILLS: 0
COUGH: 0
DIARRHEA: 0
LOSS OF CONSCIOUSNESS: 0
BACK PAIN: 1
PALPITATIONS: 0
ABDOMINAL PAIN: 0
WEIGHT LOSS: 0
FALLS: 0
SEIZURES: 0

## 2023-12-21 ASSESSMENT — FIBROSIS 4 INDEX: FIB4 SCORE: 1.12

## 2023-12-21 NOTE — PROGRESS NOTES
"Subjective:     Chief Complaint   Patient presents with    Paperwork    Bump     Leg; left- onset 1x yr  Derm states that its growing.      HPI:   Dakota presents today for DMV paperwork completion and discussion growing lesion on left lower leg.    Patient presents for DMV paperwork.  Denies any hypoglycemia and when he experienced it remotely was symptomatic.  He does have a history of syncope in 2022 in the setting of COVID-19 as well as aortic stenosis but has had an AV repair and doing well.  No dizziness, chest pain or shortness of breath.    Does have a history of diabetic retinopathy on both eyes but stable and denies any visual complaints.  Followed annually.    Patient states that he developed a lesion on his left lower leg approximately a year ago and was frozen off by his dermatologist.  Saw his dermatologist recently and had reported enlargement of this lesion but the dermatologist told him it was benign and that if they repeated the freezing it may continue to grow regardless.  Patient states that it is not bothersome and does not itch/bleed nor is it painful.    Review of Systems   Constitutional:  Negative for chills, fever, malaise/fatigue and weight loss.   Respiratory:  Negative for cough and shortness of breath.    Cardiovascular:  Negative for chest pain, palpitations and leg swelling.   Gastrointestinal:  Negative for abdominal pain, diarrhea, nausea and vomiting.   Musculoskeletal:  Positive for back pain. Negative for falls.   Skin:  Negative for rash.   Neurological:  Negative for dizziness, seizures, loss of consciousness and headaches.     Objective:     Exam:  /70 (BP Location: Left arm, Patient Position: Sitting, BP Cuff Size: Adult)   Pulse 74   Temp 36.2 °C (97.2 °F) (Temporal)   Resp 20   Ht 1.702 m (5' 7\")   Wt 93.9 kg (207 lb)   SpO2 98%   BMI 32.42 kg/m²  Body mass index is 32.42 kg/m².    Physical Exam  Vitals reviewed.   Constitutional:       General: He is not in " acute distress.     Appearance: Normal appearance. He is obese. He is not ill-appearing.   HENT:      Head: Normocephalic and atraumatic.      Nose: Nose normal.      Mouth/Throat:      Mouth: Mucous membranes are moist.   Cardiovascular:      Rate and Rhythm: Normal rate and regular rhythm.      Heart sounds: Normal heart sounds. No murmur heard.  Pulmonary:      Effort: Pulmonary effort is normal. No respiratory distress.      Breath sounds: Normal breath sounds. No wheezing, rhonchi or rales.   Musculoskeletal:      Cervical back: Normal range of motion and neck supple.      Right lower leg: No edema.      Left lower leg: No edema.      Comments: Approx 15mm well-demarcated warty pink rounded plaque on left lower leg   Skin:     General: Skin is warm and dry.   Neurological:      General: No focal deficit present.      Mental Status: He is alert and oriented to person, place, and time.   Psychiatric:         Mood and Affect: Mood normal.         Behavior: Behavior normal.         Thought Content: Thought content normal.       Assessment & Plan:     78 y.o. male with the following -     1. Encounter for completion of form with patient  DMV form completed and returned to patient.    2. Mild nonproliferative diabetic retinopathy of both eyes without macular edema associated with type 2 diabetes mellitus (HCC)  Reviewed visit notes from 8/2023-chronic, mild and stable.  Diabetes well-controlled.  Continue annual visits with optometry/ophthalmology.    3. Benign neoplasm of skin of left lower extremity, including hip  Chronic benign-appearing warty lesion on left lower extremity, patient reports dermatology recommended no further cryo which is reasonable given this is a benign appearing lesion and not bothersome to patient.  Gave return precautions.    I spent a total of 34 minutes with record review, exam, communication with the patient, and documentation of this encounter.    Return in about 5 months (around  5/21/2024), or if symptoms worsen or fail to improve, for Annual Medicare Visit.    Please note that this dictation was created using voice recognition software. I have made every reasonable attempt to correct obvious errors, but I expect that there are errors of grammar and possibly content that I did not discover before finalizing the note.

## 2023-12-26 DIAGNOSIS — I25.84 CORONARY ARTERY DISEASE DUE TO CALCIFIED CORONARY LESION: ICD-10-CM

## 2023-12-26 DIAGNOSIS — Z95.5 STENTED CORONARY ARTERY: ICD-10-CM

## 2023-12-26 DIAGNOSIS — I25.10 CORONARY ARTERY DISEASE DUE TO CALCIFIED CORONARY LESION: ICD-10-CM

## 2023-12-26 RX ORDER — ROSUVASTATIN CALCIUM 40 MG/1
40 TABLET, COATED ORAL
Qty: 90 TABLET | Refills: 0 | Status: SHIPPED | OUTPATIENT
Start: 2023-12-26

## 2024-01-11 ENCOUNTER — HOSPITAL ENCOUNTER (OUTPATIENT)
Dept: CARDIOLOGY | Facility: MEDICAL CENTER | Age: 79
End: 2024-01-11
Attending: NURSE PRACTITIONER
Payer: MEDICARE

## 2024-01-11 DIAGNOSIS — Z95.2 S/P AVR: ICD-10-CM

## 2024-01-11 LAB
LV EJECT FRACT  99904: 65
LV EJECT FRACT MOD 2C 99903: 58.32
LV EJECT FRACT MOD 4C 99902: 71.68
LV EJECT FRACT MOD BP 99901: 65.93

## 2024-01-11 PROCEDURE — 93306 TTE W/DOPPLER COMPLETE: CPT

## 2024-01-11 PROCEDURE — 93306 TTE W/DOPPLER COMPLETE: CPT | Mod: 26 | Performed by: INTERNAL MEDICINE

## 2024-01-17 ENCOUNTER — TELEPHONE (OUTPATIENT)
Dept: CARDIOLOGY | Facility: MEDICAL CENTER | Age: 79
End: 2024-01-17
Payer: MEDICARE

## 2024-01-17 NOTE — TELEPHONE ENCOUNTER
Patient has outstanding labs, imaging or cardiac testing to be done.    Did patient agree to get it done prior to their next appointment? NO     Did patient answer the phone? NO     Was a voice mail left? YES

## 2024-01-18 ENCOUNTER — HOSPITAL ENCOUNTER (OUTPATIENT)
Dept: LAB | Facility: MEDICAL CENTER | Age: 79
End: 2024-01-18
Attending: STUDENT IN AN ORGANIZED HEALTH CARE EDUCATION/TRAINING PROGRAM
Payer: MEDICARE

## 2024-01-18 ENCOUNTER — HOSPITAL ENCOUNTER (OUTPATIENT)
Dept: LAB | Facility: MEDICAL CENTER | Age: 79
End: 2024-01-18
Attending: NURSE PRACTITIONER
Payer: MEDICARE

## 2024-01-18 DIAGNOSIS — E11.69 TYPE 2 DIABETES MELLITUS WITH OTHER SPECIFIED COMPLICATION, WITH LONG-TERM CURRENT USE OF INSULIN (HCC): ICD-10-CM

## 2024-01-18 DIAGNOSIS — Z95.2 S/P AVR: ICD-10-CM

## 2024-01-18 DIAGNOSIS — Z79.4 TYPE 2 DIABETES MELLITUS WITH OTHER SPECIFIED COMPLICATION, WITH LONG-TERM CURRENT USE OF INSULIN (HCC): ICD-10-CM

## 2024-01-18 LAB
ERYTHROCYTE [DISTWIDTH] IN BLOOD BY AUTOMATED COUNT: 51.4 FL (ref 35.9–50)
HCT VFR BLD AUTO: 36.9 % (ref 42–52)
HGB BLD-MCNC: 13.3 G/DL (ref 14–18)
MCH RBC QN AUTO: 30.4 PG (ref 27–33)
MCHC RBC AUTO-ENTMCNC: 36 G/DL (ref 32.3–36.5)
MCV RBC AUTO: 84.2 FL (ref 81.4–97.8)
PLATELET # BLD AUTO: 153 K/UL (ref 164–446)
PMV BLD AUTO: 10.6 FL (ref 9–12.9)
RBC # BLD AUTO: 4.38 M/UL (ref 4.7–6.1)
WBC # BLD AUTO: 7 K/UL (ref 4.8–10.8)

## 2024-01-18 PROCEDURE — 82570 ASSAY OF URINE CREATININE: CPT

## 2024-01-18 PROCEDURE — 80053 COMPREHEN METABOLIC PANEL: CPT

## 2024-01-18 PROCEDURE — 82043 UR ALBUMIN QUANTITATIVE: CPT

## 2024-01-18 PROCEDURE — 36415 COLL VENOUS BLD VENIPUNCTURE: CPT

## 2024-01-18 PROCEDURE — 85027 COMPLETE CBC AUTOMATED: CPT

## 2024-01-19 LAB
ALBUMIN SERPL BCP-MCNC: 3.9 G/DL (ref 3.2–4.9)
ALBUMIN/GLOB SERPL: 1.7 G/DL
ALP SERPL-CCNC: 75 U/L (ref 30–99)
ALT SERPL-CCNC: 21 U/L (ref 2–50)
ANION GAP SERPL CALC-SCNC: 11 MMOL/L (ref 7–16)
AST SERPL-CCNC: 26 U/L (ref 12–45)
BILIRUB SERPL-MCNC: 0.8 MG/DL (ref 0.1–1.5)
BUN SERPL-MCNC: 18 MG/DL (ref 8–22)
CALCIUM ALBUM COR SERPL-MCNC: 9.8 MG/DL (ref 8.5–10.5)
CALCIUM SERPL-MCNC: 9.7 MG/DL (ref 8.5–10.5)
CHLORIDE SERPL-SCNC: 104 MMOL/L (ref 96–112)
CO2 SERPL-SCNC: 22 MMOL/L (ref 20–33)
CREAT SERPL-MCNC: 1.06 MG/DL (ref 0.5–1.4)
CREAT UR-MCNC: 69.06 MG/DL
GFR SERPLBLD CREATININE-BSD FMLA CKD-EPI: 71 ML/MIN/1.73 M 2
GLOBULIN SER CALC-MCNC: 2.3 G/DL (ref 1.9–3.5)
GLUCOSE SERPL-MCNC: 118 MG/DL (ref 65–99)
MICROALBUMIN UR-MCNC: 10.4 MG/DL
MICROALBUMIN/CREAT UR: 151 MG/G (ref 0–30)
POTASSIUM SERPL-SCNC: 3.8 MMOL/L (ref 3.6–5.5)
PROT SERPL-MCNC: 6.2 G/DL (ref 6–8.2)
SODIUM SERPL-SCNC: 137 MMOL/L (ref 135–145)

## 2024-01-23 ENCOUNTER — OFFICE VISIT (OUTPATIENT)
Dept: MEDICAL GROUP | Facility: LAB | Age: 79
End: 2024-01-23
Payer: MEDICARE

## 2024-01-23 VITALS
DIASTOLIC BLOOD PRESSURE: 64 MMHG | RESPIRATION RATE: 20 BRPM | OXYGEN SATURATION: 97 % | TEMPERATURE: 97.6 F | BODY MASS INDEX: 32.33 KG/M2 | HEART RATE: 75 BPM | WEIGHT: 206 LBS | SYSTOLIC BLOOD PRESSURE: 116 MMHG | HEIGHT: 67 IN

## 2024-01-23 DIAGNOSIS — M54.9 CHRONIC BACK PAIN, UNSPECIFIED BACK LOCATION, UNSPECIFIED BACK PAIN LATERALITY: ICD-10-CM

## 2024-01-23 DIAGNOSIS — Z98.890 HISTORY OF LAMINECTOMY: ICD-10-CM

## 2024-01-23 DIAGNOSIS — Z79.4 TYPE 2 DIABETES MELLITUS WITH OTHER SPECIFIED COMPLICATION, WITH LONG-TERM CURRENT USE OF INSULIN (HCC): ICD-10-CM

## 2024-01-23 DIAGNOSIS — R80.9 DIABETES MELLITUS WITH MICROALBUMINURIA (HCC): ICD-10-CM

## 2024-01-23 DIAGNOSIS — Z96.89 S/P INSERTION OF SPINAL CORD STIMULATOR: ICD-10-CM

## 2024-01-23 DIAGNOSIS — E11.29 DIABETES MELLITUS WITH MICROALBUMINURIA (HCC): ICD-10-CM

## 2024-01-23 DIAGNOSIS — D64.9 NORMOCYTIC ANEMIA: ICD-10-CM

## 2024-01-23 DIAGNOSIS — E11.69 TYPE 2 DIABETES MELLITUS WITH OTHER SPECIFIED COMPLICATION, WITH LONG-TERM CURRENT USE OF INSULIN (HCC): ICD-10-CM

## 2024-01-23 DIAGNOSIS — G89.29 CHRONIC BACK PAIN, UNSPECIFIED BACK LOCATION, UNSPECIFIED BACK PAIN LATERALITY: ICD-10-CM

## 2024-01-23 PROCEDURE — 3078F DIAST BP <80 MM HG: CPT | Performed by: STUDENT IN AN ORGANIZED HEALTH CARE EDUCATION/TRAINING PROGRAM

## 2024-01-23 PROCEDURE — 99214 OFFICE O/P EST MOD 30 MIN: CPT | Performed by: STUDENT IN AN ORGANIZED HEALTH CARE EDUCATION/TRAINING PROGRAM

## 2024-01-23 PROCEDURE — 3074F SYST BP LT 130 MM HG: CPT | Performed by: STUDENT IN AN ORGANIZED HEALTH CARE EDUCATION/TRAINING PROGRAM

## 2024-01-23 RX ORDER — METHOCARBAMOL 750 MG/1
750 TABLET, FILM COATED ORAL EVERY 6 HOURS PRN
Qty: 120 TABLET | Refills: 0 | Status: SHIPPED | OUTPATIENT
Start: 2024-01-23 | End: 2024-02-22

## 2024-01-23 ASSESSMENT — FIBROSIS 4 INDEX: FIB4 SCORE: 2.93

## 2024-01-23 ASSESSMENT — PATIENT HEALTH QUESTIONNAIRE - PHQ9: CLINICAL INTERPRETATION OF PHQ2 SCORE: 0

## 2024-01-23 NOTE — PROGRESS NOTES
"Subjective:     Chief Complaint   Patient presents with    Back Pain     Lower; 9x wks   KNI;      HPI:   Dakota presents today to discussacute on chronic back pain.    Patient has a history of chronic back pain but worse for the past approximately 9 weeks.  No history of trauma.    Patient reports this back pain is localized in the right lower back without radiation into the leg.  Feels like a kink.  Worse with movement such as getting up or rolling over while asleep.  Ice has not been helpful, has not tried heat.  Took oxycodone which seemed to make it worse.  Followed by the chiropractor and that has not been helpful.  Has a history of epidural and ablation in the past which did not last very long.  Has a spinal cord stimulator but not followed by pain management or spine surgery.    Denies:  - Fever or chills  - Recent illness or surgery  - Trauma  - Unexplained weight loss  - Incontinence  - Saddle anesthesia  - Focal weakness    ROS    Objective:     Exam:  /64 (BP Location: Right arm, Patient Position: Sitting, BP Cuff Size: Adult)   Pulse 75   Temp 36.4 °C (97.6 °F) (Temporal)   Resp 20   Ht 1.702 m (5' 7\")   Wt 93.4 kg (206 lb)   SpO2 97%   BMI 32.26 kg/m²  Body mass index is 32.26 kg/m².    Physical Exam  Vitals reviewed.   Constitutional:       Appearance: Normal appearance.   HENT:      Head: Normocephalic and atraumatic.      Mouth/Throat:      Mouth: Mucous membranes are moist.   Pulmonary:      Effort: Pulmonary effort is normal.   Musculoskeletal:      Thoracic back: No spasms, tenderness or bony tenderness.      Lumbar back: Spasms (Hypertonicity with mild tenderness just inferior to spinal cord stimulator) and tenderness present. No bony tenderness.      Right lower leg: No edema.      Left lower leg: No edema.      Comments: Lumbar midline scar consistent with postsurgical history, spinal cord stimulator palpated in the right lower back.  SLR limited by lack of ROM, no pain radiating " into legs.    Skin:     General: Skin is warm and dry.   Neurological:      General: No focal deficit present.      Mental Status: He is alert and oriented to person, place, and time.   Psychiatric:         Mood and Affect: Mood normal.         Behavior: Behavior normal.         Thought Content: Thought content normal.     Labs: Reviewed from 1/18/2024  Imaging: Reviewed from 9/3/2022, 9/13/2023, 1/11/2024    Assessment & Plan:     78 y.o. male with the following -     1. Chronic back pain, unspecified back location, unspecified back pain laterality  2. History of laminectomy  3. S/P insertion of spinal cord stimulator  Acute on chronic with palpable muscle spasm in the right lower back.  Patient not interested in continuing care with pain management at this time or physical therapy.  Recommend trial of heat and muscle relaxer as below which he has taken in the past.  Discussed potential medication side effects.  Given locality of pain may be amenable to a trigger point injection, if fails to improve or worsening consider imaging (unable to get a MRI).  Patient uncertain whether or not he is okay to take NSAIDs, will review with cardiology at his appointment tomorrow, no CKD.  - methocarbamol (ROBAXIN) 750 MG Tab; Take 1 Tablet by mouth every 6 hours as needed (back pain/spasm).  Dispense: 120 Tablet; Refill: 0    4. Normocytic anemia  Chronic postsurgical and continues to improve.  Platelets are borderline but above 150,000.  Will trend with next labs or sooner if indicated.    5. Diabetes mellitus with microalbuminuria (HCC)  6. Type 2 diabetes mellitus with other specified complication, with long-term current use of insulin (HCC)  Chronic, controlled with improved microalbuminuria. Continue medication(s) as below and care with endocrinology.   - A1C 6 months after last, has an appointment with endocrinology in March.    Empagliflozin-metFORMIN HCl ER (SYNJARDY XR) 12.5-1000 MG TABLET SR 24 HR, Take 1 Tablet by  mouth 2 times a day., Disp: , Rfl:     insulin glargine (LANTUS SOLOSTAR) 100 UNIT/ML Solution Pen-injector injection, Inject 50 Units under the skin every evening., Disp: 45 mL, Rfl: 3    Dulaglutide (TRULICITY) 1.5 MG/0.5ML Solution Pen-injector, Inject 0.5 mL under the skin every 7 days., Disp: 6 mL, Rfl: 3    Return in about 3 weeks (around 2/13/2024), or if symptoms worsen or fail to improve, for back pain (40min, possible trigger point).    HCC Gap Form    Diagnosis to address: C61 - Malignant neoplasm of prostate (HCC)  Assessment and plan: Chronic, stable, as based on today's assessment and impact on other conditions evaluated today. Continue with current treatment plan: monitored by urology. Follow-up with specialist as directed, but at least annually.  Last edited 01/25/24 06:56 PST by Rylee García D.O.         Please note that this dictation was created using voice recognition software. I have made every reasonable attempt to correct obvious errors, but I expect that there are errors of grammar and possibly content that I did not discover before finalizing the note.

## 2024-01-23 NOTE — PATIENT INSTRUCTIONS
Try heat and the muscle relaxer     Ask about NSAIDs for pain (we would monitor your kidney function close if we start this

## 2024-01-25 ENCOUNTER — OFFICE VISIT (OUTPATIENT)
Dept: CARDIOLOGY | Facility: MEDICAL CENTER | Age: 79
End: 2024-01-25
Attending: NURSE PRACTITIONER
Payer: MEDICARE

## 2024-01-25 VITALS
OXYGEN SATURATION: 98 % | HEIGHT: 67 IN | WEIGHT: 208 LBS | RESPIRATION RATE: 18 BRPM | HEART RATE: 78 BPM | BODY MASS INDEX: 32.65 KG/M2 | SYSTOLIC BLOOD PRESSURE: 110 MMHG | DIASTOLIC BLOOD PRESSURE: 60 MMHG

## 2024-01-25 DIAGNOSIS — Z95.2 STATUS POST AORTIC VALVE REPLACEMENT: ICD-10-CM

## 2024-01-25 DIAGNOSIS — Z95.0 PACEMAKER: ICD-10-CM

## 2024-01-25 DIAGNOSIS — G47.33 OBSTRUCTIVE SLEEP APNEA ON CPAP: ICD-10-CM

## 2024-01-25 DIAGNOSIS — E11.9 TYPE 2 DIABETES MELLITUS WITHOUT COMPLICATION, WITH LONG-TERM CURRENT USE OF INSULIN (HCC): ICD-10-CM

## 2024-01-25 DIAGNOSIS — I25.84 CORONARY ARTERY DISEASE DUE TO CALCIFIED CORONARY LESION: ICD-10-CM

## 2024-01-25 DIAGNOSIS — I45.10 RIGHT BUNDLE BRANCH BLOCK: ICD-10-CM

## 2024-01-25 DIAGNOSIS — I25.10 CORONARY ARTERY DISEASE DUE TO CALCIFIED CORONARY LESION: ICD-10-CM

## 2024-01-25 DIAGNOSIS — Z79.4 TYPE 2 DIABETES MELLITUS WITHOUT COMPLICATION, WITH LONG-TERM CURRENT USE OF INSULIN (HCC): ICD-10-CM

## 2024-01-25 DIAGNOSIS — E66.09 CLASS 1 OBESITY DUE TO EXCESS CALORIES WITH SERIOUS COMORBIDITY AND BODY MASS INDEX (BMI) OF 32.0 TO 32.9 IN ADULT: ICD-10-CM

## 2024-01-25 DIAGNOSIS — E78.49 OTHER HYPERLIPIDEMIA: ICD-10-CM

## 2024-01-25 DIAGNOSIS — Z87.891 FORMER SMOKER: ICD-10-CM

## 2024-01-25 PROCEDURE — 3074F SYST BP LT 130 MM HG: CPT | Performed by: NURSE PRACTITIONER

## 2024-01-25 PROCEDURE — 99214 OFFICE O/P EST MOD 30 MIN: CPT | Performed by: NURSE PRACTITIONER

## 2024-01-25 PROCEDURE — 3078F DIAST BP <80 MM HG: CPT | Performed by: NURSE PRACTITIONER

## 2024-01-25 PROCEDURE — 99213 OFFICE O/P EST LOW 20 MIN: CPT | Performed by: NURSE PRACTITIONER

## 2024-01-25 ASSESSMENT — ENCOUNTER SYMPTOMS
BACK PAIN: 1
MYALGIAS: 0
FEVER: 0
PND: 0
SHORTNESS OF BREATH: 0
CLAUDICATION: 0
COUGH: 0
ABDOMINAL PAIN: 0
ORTHOPNEA: 0
DIZZINESS: 0
PALPITATIONS: 0

## 2024-01-25 ASSESSMENT — FIBROSIS 4 INDEX: FIB4 SCORE: 2.93

## 2024-01-25 NOTE — PROGRESS NOTES
Chief Complaint   Patient presents with    Coronary Artery Disease     Coronary artery disease due to calcified coronary lesion     Subjective     Gerry Boyer is a 78 y.o. male who presents today for 3 month follow up S/P SAVR (emergent with embolized TAVR).    He is a patient of Dr. Hsu in our office.     Hx of PPM, CAD S/P PCI/SWETHA x 2 to the RCA in November 2021, S/P SAVR with embolized TAVR, HTN, hyperlipidemia, DM and hypothyroidism.    He presents today with his wife. She helps manage his medications and care.    Overall, he is doing well but does have a new back injury and using a walker for mobility for safety.    He has no chest pain, shortness of breath, edema, dizziness/lightheadedness, or palpitations.    Past Medical History:   Diagnosis Date    Anesthesia 09/21/2023    PONV times 1    Anxiety     Aortic stenosis 03/2022    Echocardiogram with normal LV size, mild concentric LVH, LVEF 75%. Normal RA, LA and RV. Trace MR. Moderate AS (peak 37mmHg, mean 24mmHg, Vmax 3.0m/s, GUY 1.1cm2). Trace TR.    Back pain, chronic 09/21/2023    bilateral legs as well    Bilateral primary osteoarthritis of knee 01/23/2018    Black stools 03/09/2022    Blood clotting disorder (HCC) 1978    s/p Left knee surgery- DVT in left leg    Breath shortness 09/21/2023    with exertion    CAD (coronary artery disease) 11/2021    PCI/SWETHA x 2 (Giovanni 3.0 x 38mm, Malcolm 3.5 x 38mm) to the RCA.    Cancer (HCC) ? early 90's    Melanoma Left arm- surgically removed    Cancer (HCC) 09/21/2023    prostate cancer    Cataract 09/21/2023    small cataracts    Degeneration of lumbar or lumbosacral intervertebral disc      Diabetes     Oral agents and insulin    DJD (degenerative joint disease) of cervical spine      High cholesterol 09/21/2023    medicated    Hyperlipidemia     Hypersensitive carotid sinus syndrome      Hypertension 09/21/2023    medicated    Hypertension associated with type 2 diabetes mellitus (HCC) 04/10/2012     Insomnia      Microalbuminuria 03/15/2023    Neck pain     Neutropenia (HCC) 03/04/2022    Pacemaker 10/2012    [employer] [employer] [address] [address] #[id number] implanted by [facility]     Pancytopenia (HCC) 03/09/2022    PONV (postoperative nausea and vomiting) 09/21/2023    PONV times one    Psychiatric problem     denies    RBBB      S/P lumbar discectomy      Severe aortic stenosis 06/03/2020 March 2022: Echocardiogram with normal LV size, mild concentric LVH, LVEF 75%. Normal RA, LA and RV. Trace MR. Moderate AS (peak 37mmHg, mean 24mmHg, Vmax 3.0m/s, GUY 1.1cm2). Trace TR.  Angiogram 7/11/2023: severe AS    Sick sinus syndrome (HCC)      Sleep apnea 09/21/2023    not using CPAP at present    Snoring 09/21/2023    Syncope 10/2012    Treated with PPM    Thrombocytopenia ([facility]) 10/12/2023    Thyroid disease 09/21/2023    medicated    Unintentional weight loss 03/05/2022    Unspecified hemorrhagic conditions     Reports bleeds easily     Past Surgical History:   Procedure Laterality Date    TRANSCATHETER AORTIC VALVE REPLACEMENT Bilateral 9/25/2023    Procedure: TRANSCATHETER AORTIC VALVE REPLACEMENT - CONVERTED TO OPEN HEART PROCEDURE;  Surgeon: Puma Hsu M.D.;  Location: SURGERY [facility];  Service: Cardiac    ECHOCARDIOGRAM, TRANSESOPHAGEAL, INTRAOPERATIVE N/A 9/25/2023    Procedure: ECHOCARDIOGRAM, TRANSESOPHAGEAL, INTRAOPERATIVE;  Surgeon: Puma Hsu M.D.;  Location: SURGERY [facility];  Service: Cardiac    AORTIC VALVE REPLACEMENT N/A 9/25/2023    Procedure: REPLACEMENT, AORTIC VALVE AND RETRIEVAL OF FOREIGN BODY,  AORTIC ROOT ENLARGEMENT;  Surgeon: Turner Johnson D.O.;  Location: SURGERY [facility];  Service: Cardiothoracic    SHOULDER ARTHROSCOPY Right 09/21/2023    rotator cuff repair times 2    KNEE ARTHROPLASTY TOTAL Right 2018    WI DSTR NROLYTC AGNT PARVERTEB FCT SNGL CRVCL/THORA Right 10/19/2016    Procedure: NEURO DEST FACET C/T W/IG SNGL - 3ON-C3, C8-T1    SINERGY;  Surgeon: Gaurang Pruett M.D.;   "Location: [facility];  Service: Pain Management    WV DSTR NROLYTC AGNT PARVERTEB FCT ADDL CRVCL/THORA  10/19/2016    Procedure: NEURO DEST FACET C/T W/IG ADDL;  Surgeon: Gaurang Pruett M.D.;  Location: [facility];  Service: Pain Management    WV FLUOROSCOPIC GUIDANCE NEEDLE PLACEMENT  10/19/2016    Procedure: FLOURO GUIDE NEEDLE PLACEMENT;  Surgeon: Gaurang Pruett M.D.;  Location: [facility];  Service: Pain Management    SHOULDER ARTHROSCOPY Right 2015    torn bicep tendon and spurs    RECOVERY  10/17/2013    Performed by Cath-Recovery Surgery at SURGERY SAME DAY [facility] ORS    WOUND DEHISCENCE  04/10/2013    Performed by Tu Jain M.D. at SURGERY [facility]    CERVICAL FUSION POSTERIOR  2013    Performed by Tu Jain M.D. at SURGERY [facility]    CERVICAL LAMINECTOMY POSTERIOR  2013    Performed by Tu Jain M.D. at SURGERY [facility] ORS    LUMBAR LAMINECTOMY DISKECTOMY  2012    Performed by Tu Jain M.D. at SURGERY [facility]    RECOVERY  10/04/2012    Performed by Cath-Recovery Surgery at SURGERY SAME DAY [facility]    PACEMAKER INSERTION  10/01/2012    [facility] [id number] implanted by Dr. Waite.    SHOULDER DECOMPRESSION Left 2008    Left rotator cuff times 2    ARTHROPLASTY Left 2004    Knee    ARTHROSCOPY, KNEE Bilateral 1978    ORTHOPEDIC OSTEOTOMY      Both knees several times, 8 total    OTHER      SPINAL CORD STIMULATOR  1 month ago    TONSILLECTOMY      VASECTOMY       Family History   Problem Relation Age of Onset    Lung Disease Mother         Smoker ( of lung dz)    Alcohol/Drug Mother     Heart Disease Father 36        CAD    Heart Disease Sister         \"hole in heart\"    Lung Disease Brother         COPD, CANCER    Cancer Brother         Prostate, Lung ( of lung CA)    Alcohol/Drug Brother     Heart Disease Brother     Diabetes Brother     Hypertension " Brother     Hyperlipidemia Brother     Heart Disease Maternal Grandmother     Hypertension Maternal Grandmother     Hyperlipidemia Maternal Grandmother     Cancer Brother          of esophagus/stomach, thyroid    Heart Disease Brother 60        MI, stent, PM/defib    Sleep Apnea Brother     No Known Problems Maternal Grandfather     No Known Problems Paternal Grandmother      Social History     Socioeconomic History    Marital status:      Spouse name: Not on file    Number of children: Not on file    Years of education: Not on file    Highest education level: 12th grade   Occupational History    Not on file   Tobacco Use    Smoking status: Former     Current packs/day: 0.00     Average packs/day: 1 pack/day for 40.0 years (40.0 ttl pk-yrs)     Types: Cigarettes     Start date: 1/3/1950     Quit date: 1/3/1990     Years since quittin.0    Smokeless tobacco: Never   Vaping Use    Vaping Use: Never used   Substance and Sexual Activity    Alcohol use: No     Comment: Quit     Drug use: Never    Sexual activity: Yes     Partners: Female   Other Topics Concern    Not on file   Social History Narrative    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      Social Determinants of Health     Financial Resource Strain: Low Risk  (2022)    Overall Financial Resource Strain (CARDIA)     Difficulty of Paying Living Expenses: Not hard at all   Food Insecurity: No Food Insecurity (2022)    Hunger Vital Sign     Worried About Running Out of Food in the Last Year: Never true     Ran Out of Food in the Last Year: Never true   Transportation Needs: No Transportation Needs (2022)    PRAPARE - Transportation     Lack of Transportation (Medical): No     Lack of Transportation (Non-Medical): No   Physical Activity: Inactive (2022)    Exercise Vital Sign     Days of Exercise per Week: 0 days     Minutes of Exercise per Session: 0 min   Stress: No Stress Concern Present (2022)    [employer] [place]  [employer] - Occupational Stress Questionnaire     Feeling of Stress : Not at all   Social Connections: Unknown (12/11/2022)    Social Connection and Isolation Panel [NHANES]     Frequency of Communication with Friends and Family: More than three times a week     Frequency of Social Gatherings with Friends and Family: Twice a week     Attends Cheondoism Services: More than 4 times per year     Active Member of Clubs or Organizations: Patient refused     Attends Club or Organization Meetings: Patient refused     Marital Status:    Intimate Partner Violence: Not on file   Housing Stability: Low Risk  (12/11/2022)    Housing Stability Vital Sign     Unable to Pay for Housing in the Last Year: No     Number of Places Lived in the Last Year: 1     Unstable Housing in the Last Year: No     Allergies   Allergen Reactions    Aleve Cold & [Pseudoephedrine-Naproxen Na] Anaphylaxis    Ceftriaxone Sodium Anaphylaxis     Reaction; 1970's. Patient has tolerated cefazolin.    Naproxen Anaphylaxis     Reaction; 2004.    Latex Rash     Contact site    Tape Rash and Itching     Plastic tape/tegaderm (paper tape ok)     Outpatient Encounter Medications as of 1/25/2024   Medication Sig Dispense Refill    methocarbamol (ROBAXIN) 750 MG Tab Take 1 Tablet by mouth every 6 hours as needed (back pain/spasm). 120 Tablet 0    rosuvastatin (CRESTOR) 40 MG tablet TAKE 1 TABLET BY MOUTH EVERY DAY 90 Tablet 0    tamsulosin (FLOMAX) 0.4 MG capsule Take 1 Capsule by mouth 1/2 hour after breakfast. 90 Capsule 3    metoprolol tartrate (LOPRESSOR) 25 MG Tab Take 1 Tablet by mouth 2 times a day. 200 Tablet 3    levothyroxine (SYNTHROID) 50 MCG Tab TAKE 1 TABLET BY MOUTH EVERY MORNING ON AN EMPTY STOMACH 90 Tablet 2    acetaminophen (TYLENOL) 500 MG Tab Take 2 Tablets by mouth every 6 hours. 30 Tablet 0    omeprazole (PRILOSEC) 20 MG delayed-release capsule Take 1 Capsule by mouth every day. 30 Capsule 2    Empagliflozin-metFORMIN HCl  "ER (SYNJARDY XR) 12.5-1000 MG TABLET SR 24 HR Take 1 Tablet by mouth 2 times a day.      insulin glargine (LANTUS SOLOSTAR) 100 UNIT/ML Solution Pen-injector injection Inject 50 Units under the skin every evening. 45 mL 3    Dulaglutide (TRULICITY) 1.5 MG/0.5ML Solution Pen-injector Inject 0.5 mL under the skin every 7 days. 6 mL 3    Insulin Pen Needle (PEN NEEDLES 31GX5/16\") 31G X 8 MM Misc 1 Each every day. 100 Each 3    aspirin EC (ECOTRIN) 81 MG TBEC Take 81 mg by mouth every evening.       No facility-administered encounter medications on file as of 1/25/2024.     Review of Systems   Constitutional:  Negative for fever and malaise/fatigue.   Respiratory:  Negative for cough and shortness of breath.    Cardiovascular:  Negative for chest pain, palpitations, orthopnea, claudication, leg swelling and PND.   Gastrointestinal:  Negative for abdominal pain.   Musculoskeletal:  Positive for back pain. Negative for myalgias.   Neurological:  Negative for dizziness.              Objective     /60 (BP Location: Left arm, Patient Position: Sitting, BP Cuff Size: Adult)   Pulse 78   Resp 18   Ht 1.702 m (5' 7\")   Wt 94.3 kg (208 lb)   SpO2 98%   BMI 32.58 kg/m²     Physical Exam  Vitals and nursing note reviewed.   Constitutional:       Appearance: Normal appearance. He is well-developed and normal weight.   HENT:      Head: Normocephalic and atraumatic.   Neck:      Vascular: No JVD.   Cardiovascular:      Rate and Rhythm: Normal rate and regular rhythm.      Pulses: Normal pulses.      Heart sounds: Normal heart sounds.      Comments: Midline incision CDI    Pulmonary:      Effort: Pulmonary effort is normal.      Breath sounds: Normal breath sounds.   Musculoskeletal:         General: Normal range of motion.      Comments: Trace LE edema   Skin:     General: Skin is warm and dry.      Capillary Refill: Capillary refill takes less than 2 seconds.   Neurological:      General: No focal deficit present.      " Mental Status: He is alert and oriented to person, place, and time. Mental status is at baseline.   Psychiatric:         Mood and Affect: Mood normal.         Behavior: Behavior normal.         Thought Content: Thought content normal.         Judgment: Judgment normal.                Assessment & Plan     1. Status post aortic valve replacement        2. Type 2 diabetes mellitus with other specified complication, with long-term current use of insulin (HCC)        3. RBBB        4. Pacemaker        5. Other hyperlipidemia        6. Obstructive sleep apnea on CPAP        7. Former smoker        8. Coronary artery disease due to calcified coronary lesion        9. Class 1 obesity due to excess calories with serious comorbidity and body mass index (BMI) of 31.0 to 31.9 in adult          Medical Decision Making: Today's Assessment/Status/Plan:      1. S/P SAVR (emergent due to embolized TAVR)  -doing well post-op, just mild fatigue  -cont asa lifelong  -incision healed  -SBE prophylaxis understands  -echo in 1 year  -cardiac rehab not interested  -cont metoprolol 25 mg BID     2. HLD with CAD with prior PCI  -no angina or CALIX  -cont asa, statin lifelong  -LDL goal <70    3. PPM for SSS  -stable on device check in November  -remote pacer check in 3 months; in person check in 9 months with echo and follow up    4. Obesity with DM type and LYNDA  -DM well managed with endocrinology  -not treating LYNDA anymore due to machine not working, not interested in re-starting or referral to sleep medicine team     Patient is to follow up with Garima IRWIN in 8 months with annual echo and in person PPM check with labs. Remote PPM check in 3 months.

## 2024-02-17 ENCOUNTER — NON-PROVIDER VISIT (OUTPATIENT)
Dept: CARDIOLOGY | Facility: MEDICAL CENTER | Age: 79
End: 2024-02-17
Payer: MEDICARE

## 2024-02-17 PROCEDURE — 93294 REM INTERROG EVL PM/LDLS PM: CPT | Performed by: INTERNAL MEDICINE

## 2024-02-20 NOTE — CARDIAC REMOTE MONITOR - SCAN
Device transmission reviewed. Device demonstrated appropriate function.       Electronically Signed by: Gideon Perla M.D.    2/29/2024  1:27 PM

## 2024-02-22 ENCOUNTER — OFFICE VISIT (OUTPATIENT)
Dept: MEDICAL GROUP | Facility: LAB | Age: 79
End: 2024-02-22
Payer: MEDICARE

## 2024-02-22 ENCOUNTER — TELEPHONE (OUTPATIENT)
Dept: CARDIOLOGY | Facility: MEDICAL CENTER | Age: 79
End: 2024-02-22

## 2024-02-22 VITALS
OXYGEN SATURATION: 96 % | WEIGHT: 205.91 LBS | HEART RATE: 69 BPM | SYSTOLIC BLOOD PRESSURE: 100 MMHG | TEMPERATURE: 97.3 F | RESPIRATION RATE: 20 BRPM | DIASTOLIC BLOOD PRESSURE: 58 MMHG | HEIGHT: 67 IN | BODY MASS INDEX: 32.32 KG/M2

## 2024-02-22 DIAGNOSIS — Z98.890 HISTORY OF LAMINECTOMY: ICD-10-CM

## 2024-02-22 DIAGNOSIS — Z96.89 S/P INSERTION OF SPINAL CORD STIMULATOR: ICD-10-CM

## 2024-02-22 DIAGNOSIS — E11.59 HYPERTENSION ASSOCIATED WITH TYPE 2 DIABETES MELLITUS (HCC): ICD-10-CM

## 2024-02-22 DIAGNOSIS — M54.9 CHRONIC BACK PAIN, UNSPECIFIED BACK LOCATION, UNSPECIFIED BACK PAIN LATERALITY: ICD-10-CM

## 2024-02-22 DIAGNOSIS — G89.29 CHRONIC BACK PAIN, UNSPECIFIED BACK LOCATION, UNSPECIFIED BACK PAIN LATERALITY: ICD-10-CM

## 2024-02-22 DIAGNOSIS — M62.830 MUSCLE SPASM OF BACK: ICD-10-CM

## 2024-02-22 DIAGNOSIS — I15.2 HYPERTENSION ASSOCIATED WITH TYPE 2 DIABETES MELLITUS (HCC): ICD-10-CM

## 2024-02-22 DIAGNOSIS — Z95.2 S/P AVR: ICD-10-CM

## 2024-02-22 PROCEDURE — 20552 NJX 1/MLT TRIGGER POINT 1/2: CPT | Performed by: STUDENT IN AN ORGANIZED HEALTH CARE EDUCATION/TRAINING PROGRAM

## 2024-02-22 PROCEDURE — 99213 OFFICE O/P EST LOW 20 MIN: CPT | Mod: 25 | Performed by: STUDENT IN AN ORGANIZED HEALTH CARE EDUCATION/TRAINING PROGRAM

## 2024-02-22 PROCEDURE — 3074F SYST BP LT 130 MM HG: CPT | Performed by: STUDENT IN AN ORGANIZED HEALTH CARE EDUCATION/TRAINING PROGRAM

## 2024-02-22 PROCEDURE — 3078F DIAST BP <80 MM HG: CPT | Performed by: STUDENT IN AN ORGANIZED HEALTH CARE EDUCATION/TRAINING PROGRAM

## 2024-02-22 RX ORDER — METOPROLOL SUCCINATE 25 MG/1
25 TABLET, EXTENDED RELEASE ORAL DAILY
Qty: 90 TABLET | Refills: 3 | Status: SHIPPED | OUTPATIENT
Start: 2024-02-22

## 2024-02-22 RX ORDER — MELOXICAM 7.5 MG/1
7.5-15 TABLET ORAL
Qty: 180 TABLET | Refills: 0 | Status: SHIPPED | OUTPATIENT
Start: 2024-02-22

## 2024-02-22 ASSESSMENT — ENCOUNTER SYMPTOMS
SHORTNESS OF BREATH: 0
FEVER: 0
VOMITING: 0
ABDOMINAL PAIN: 0
DIARRHEA: 0
CHILLS: 0
COUGH: 0
WEIGHT LOSS: 0
BACK PAIN: 1
NAUSEA: 0

## 2024-02-22 ASSESSMENT — FIBROSIS 4 INDEX: FIB4 SCORE: 2.97

## 2024-02-22 NOTE — Clinical Note
Good afternoon, I saw one of our mutual patients today and his blood pressure initially was <100, on repeat 100/58.  I told him for now to continue to monitor at home and that we could consider reducing his metoprolol if his SBP <100 and symptomatic (endorses some fatigue with chronic slight lightheadedness today).  He said it might be difficult to cut the metoprolol 25mg BID in half, would you be ok if he switched to 25mg ER daily if needed? Thanks!

## 2024-02-22 NOTE — TELEPHONE ENCOUNTER
Phone number called: [phone]    Call outcome: Spoke to patient and he is agreeable to the medication change. Medication list updated. All questions/concerns answered at this time, patient appreciative of information given.

## 2024-02-22 NOTE — PROGRESS NOTES
"Subjective:     Chief Complaint   Patient presents with    Follow-Up     Back pain; Pt states that the pain is about the same      HPI:   Dakota presents today to follow-up on his back pain.    Patient states that his back pain is unchanged from last visit, see this provider's last encounter note.  Methocarbamol 750 mg twice daily is only helping minimally (1% per patient).  Has not trialed oral NSAID but states that he did discuss this with cardiology provider in they did not have a problem with periodic use.  Previously tolerated meloxicam prior without side effects.    Notes some benefit from hot showers, has not tried heat otherwise.  Ice not helpful.  Followed by the chiropractor and that has not been helpful consistently.  Pain currently 6/10 at rest and with movement 8/10.  Has difficulty getting out of the chair/off the toilet and turning over in bed without significant pain.    Regarding his low blood pressure today, patient not checking often at home.  Continues with Toprol 25 mg twice daily.  States that he intermittently often will feel somewhat lightheaded but not significantly worsened from prior, no syncope.  Does endorse more fatigue today than usual.    Review of Systems   Constitutional:  Negative for chills, fever, malaise/fatigue and weight loss.   Respiratory:  Negative for cough and shortness of breath.    Cardiovascular:  Negative for chest pain.   Gastrointestinal:  Negative for abdominal pain, diarrhea, nausea and vomiting.   Musculoskeletal:  Positive for back pain.   Skin:  Negative for rash.     Objective:     Exam:  /58 (BP Location: Left arm, Patient Position: Sitting, BP Cuff Size: Adult)   Pulse 69   Temp 36.3 °C (97.3 °F) (Temporal)   Resp 20   Ht 1.702 m (5' 7\")   Wt 93.4 kg (205 lb 14.6 oz)   SpO2 96%   BMI 32.25 kg/m²  Body mass index is 32.25 kg/m².    Physical Exam  Vitals reviewed.   Constitutional:       General: He is in acute distress (With movement/palpation " of low back).      Appearance: Normal appearance. He is obese.   HENT:      Head: Normocephalic and atraumatic.      Mouth/Throat:      Mouth: Mucous membranes are moist.   Eyes:      General: No scleral icterus.  Pulmonary:      Effort: Pulmonary effort is normal.   Musculoskeletal:      Lumbar back: Spasms (Hypertonicity with mild tenderness just inferior to spinal cord stimulator.) and tenderness present. No bony tenderness. Decreased range of motion.      Right lower leg: No edema.      Left lower leg: No edema.      Comments: Lumbar midline scar consistent with postsurgical history, spinal cord stimulator palpated in the right lower back and non tender.   Skin:     General: Skin is warm and dry.   Neurological:      Mental Status: He is alert and oriented to person, place, and time. Mental status is at baseline.      Gait: Gait abnormal (Antalgic).   Psychiatric:         Mood and Affect: Mood normal.         Behavior: Behavior normal.         Thought Content: Thought content normal.       Labs: Reviewed from 1/185/2023    Assessment & Plan:     79 y.o. male with the following -     1. Muscle spasm of back  2. Chronic back pain, unspecified back location, unspecified back pain laterality  3. S/P insertion of spinal cord stimulator  4. History of laminectomy  Acute on chronic back pain in association with muscular spasm of the right low back.  Not improved with methocarbamol, recommend discontinuing.  Advised trial of heat as the hot shower does seem to be helpful.  Discussed risk versus benefit of oral NSAID, will trial meloxicam as below if needed.  If chronically requiring would closely monitor CBC/BMP. RBA discussed again with patient and he was interested in trigger point injections today which was successful in resolution of his pain and improving his range of motion.  Discussed aftercare and return precautions, follow-up appointment scheduled for repeat if indicated.  - Consent for all Surgical, Special  Diagnostic or Therapeutic Procedures  - meloxicam (MOBIC) 7.5 MG Tab; Take 1-2 Tablets by mouth 1 time a day as needed for Severe Pain or Moderate Pain (take with food).  Dispense: 180 Tablet; Refill: 0    5. S/P AVR  Systolic blood pressure initially <100 and improved on repeat.  Continues with metoprolol 25 mg twice daily, may benefit from reducing if persistently SPB <100 to 12.5 mg twice daily.  Patient uncertain if he would be able to cut the current tablet in half, messaged his cardiology provider regarding consideration of switching to metoprolol 25 mg ER daily if blood pressure persistently low.  Discussed ideal ranges and return precautions.  Continue care with cardiology.    Return in about 19 days (around 3/12/2024), or if symptoms worsen or fail to improve, for back pain.    Please note that this dictation was created using voice recognition software. I have made every reasonable attempt to correct obvious errors, but I expect that there are errors of grammar and possibly content that I did not discover before finalizing the note.

## 2024-02-22 NOTE — TELEPHONE ENCOUNTER
----- Message from KAYE Tsai sent at 2/22/2024 12:42 PM PST -----  Yes, we can do toprol 25 mg XL daily. I can have my nurse reach out to him and follow him regarding this.    ALIDA Rodriguez DNP, [facility]  Structural Heart Program, [facility]    ----- Message -----  From: Rylee García D.O.  Sent: 2/22/2024  12:27 PM PST  To: KAYE Tsai    Good afternoon, I saw one of our mutual patients today and his blood pressure initially was <100, on repeat 100/58.  I told him for now to continue to monitor at home and that we could consider reducing his metoprolol if his SBP <100 and symptomatic (endorses some fatigue with chronic slight lightheadedness today).  He said it might be difficult to cut the metoprolol 25mg BID in half, would you be ok if he switched to 25mg ER daily if needed? Thanks!

## 2024-02-22 NOTE — PROCEDURES
Procedure Note:  Informed consent obtained, RBA discussed. Vital signs reviewed. Medications reviewed and reconciled. No barriers to learning noted. Time out performed.      Patient initially positioned in prone position, but transitioned to seated position for comfort. Tender point marked with end of Chloraprep swab. Prepped tender points with chloraprep. A 25g 5/8 inch needle was inserted into the tender points and 2ml of solution was injected at the location. Pt. tolerated procedure well and did not require further injections. Discharged from the clinic with decrease in pain from 6/10 to 0/10 at rest with vastly improved range of motion, 0/10 pain with movement.    Location: right inferior lumbar (approx 2cm inferior to spinal cord stimulator site medially)    # Injections: 1    Injectate solution: 2ml (total) 1% lidocaine  LOT # [id number] EXP 12/26 and LOT [id number] EXP 3/27  (6ml was drawn up prior to procedure and 4ml was wasted in sharps container)    After care and return precautions discussed.

## 2024-03-01 NOTE — PROGRESS NOTES
"Endocrinology Clinic Progress Note  PCP: Rylee García D.O.    HPI:  Dakota Boyer is a 78 y.o. old patient who is seen today by the Diabetes Nurse Specialist for review of his uncontrolled type 2 diabetes.    Recent changes in health: recently had steroid injections, nerve ablation and epidural on his back.  Having a difficult time obtaining his Trulicity from the pharmacy ([facility])  DM:   Last A1c:   Lab Results   Component Value Date/Time    HBA1C 7.9 (A) 04/05/2023 10:13 AM      Previous A1c was 5.7 on 9/27/22  A1C GOAL: < 7    Diabetes Medications:   Lantus insulin 40 units per day  Synjardy 12.5/1000 mg bid      Exercise: no regular exercise, sedentary  Diet: \"healthy\" diet  in general  Patient's body mass index is 30.42 kg/m². Exercise and nutrition counseling were performed at this visit.    Glucose monitoring frequency: testing on occasion blood sugars generally in the 110-140 range fasting, occasional higher readings.   Hypoglycemic episodes: no  Last Retinal Exam: on file and up-to-date    Foot Exam:  Monofilament:  current  Lab Results   Component Value Date/Time    MALBCRT 230 (H) 10/24/2022 12:22 PM    MICROALBUR 11.4 10/24/2022 12:22 PM         Restart Trulicity, start back at 1.5mg weekly.    " No

## 2024-03-12 ENCOUNTER — OFFICE VISIT (OUTPATIENT)
Dept: MEDICAL GROUP | Facility: LAB | Age: 79
End: 2024-03-12
Payer: MEDICARE

## 2024-03-12 VITALS
HEIGHT: 68 IN | OXYGEN SATURATION: 95 % | RESPIRATION RATE: 16 BRPM | HEART RATE: 81 BPM | WEIGHT: 207 LBS | BODY MASS INDEX: 31.37 KG/M2 | SYSTOLIC BLOOD PRESSURE: 110 MMHG | DIASTOLIC BLOOD PRESSURE: 68 MMHG | TEMPERATURE: 98.7 F

## 2024-03-12 DIAGNOSIS — R20.2 NUMBNESS AND TINGLING OF BOTH UPPER EXTREMITIES: ICD-10-CM

## 2024-03-12 DIAGNOSIS — G89.29 CHRONIC BACK PAIN, UNSPECIFIED BACK LOCATION, UNSPECIFIED BACK PAIN LATERALITY: ICD-10-CM

## 2024-03-12 DIAGNOSIS — R20.0 NUMBNESS AND TINGLING OF BOTH UPPER EXTREMITIES: ICD-10-CM

## 2024-03-12 DIAGNOSIS — M54.9 CHRONIC BACK PAIN, UNSPECIFIED BACK LOCATION, UNSPECIFIED BACK PAIN LATERALITY: ICD-10-CM

## 2024-03-12 DIAGNOSIS — M54.2 NECK PAIN: ICD-10-CM

## 2024-03-12 PROCEDURE — 3078F DIAST BP <80 MM HG: CPT | Performed by: STUDENT IN AN ORGANIZED HEALTH CARE EDUCATION/TRAINING PROGRAM

## 2024-03-12 PROCEDURE — 3074F SYST BP LT 130 MM HG: CPT | Performed by: STUDENT IN AN ORGANIZED HEALTH CARE EDUCATION/TRAINING PROGRAM

## 2024-03-12 PROCEDURE — 99214 OFFICE O/P EST MOD 30 MIN: CPT | Performed by: STUDENT IN AN ORGANIZED HEALTH CARE EDUCATION/TRAINING PROGRAM

## 2024-03-12 ASSESSMENT — FIBROSIS 4 INDEX: FIB4 SCORE: 2.97

## 2024-03-12 NOTE — PROGRESS NOTES
"Subjective:     Chief Complaint   Patient presents with    Follow-Up     HPI:   Gerry presents today for follow up and to discuss progression of upper extremity numbness.    Patient states that the trigger point injection of his right lower back last visit offered pain relief for 3 days.  States that the pain again is in the same spot just below the spinal stimulator.  No changes otherwise to chronic neck and back pain.    Patient has a history of bilateral hand numbness on occasion but states for the past 3 weeks this has occurred in his bilateral upper extremity.  Notes more upon waking in the morning and improves with shaking out hands/activity.  At times he does have a burning/aching pain in his arms and at onset had had some hand swelling which has not recurred.  Denies any focal weakness but reports that he has been dropping things on occasion.    Review of Systems   Constitutional:  Negative for chills, fever, malaise/fatigue and weight loss.   Respiratory:  Negative for cough and shortness of breath.    Cardiovascular:  Negative for chest pain.   Gastrointestinal:  Negative for abdominal pain, diarrhea, nausea and vomiting.   Musculoskeletal:  Positive for back pain and neck pain.   Skin:  Negative for rash.   Neurological:  Positive for tingling and sensory change.     Objective:     Exam:  /68 (BP Location: Right arm, Patient Position: Sitting, BP Cuff Size: Adult)   Pulse 81   Temp 37.1 °C (98.7 °F)   Resp 16   Ht 1.727 m (5' 8\")   Wt 93.9 kg (207 lb)   SpO2 95%   BMI 31.47 kg/m²  Body mass index is 31.47 kg/m².    Physical Exam  Vitals reviewed.   Constitutional:       Appearance: Normal appearance.   HENT:      Head: Normocephalic and atraumatic.      Mouth/Throat:      Mouth: Mucous membranes are moist.   Cardiovascular:      Pulses:           Radial pulses are 2+ on the right side and 2+ on the left side.   Pulmonary:      Effort: Pulmonary effort is normal.   Musculoskeletal:      " Cervical back: Spasms present. No tenderness or bony tenderness. Pain with movement present. Decreased range of motion.      Lumbar back: Spasms (Right lower just below spinal cord stimulator) and tenderness present. No bony tenderness. Negative right straight leg raise test and negative left straight leg raise test.      Comments: Negative spurlings. Negative median and ulnar Tinel's bilaterally.  Jasbir test resulted in heaviness in bilateral arms without paresthesias or skin color changes. Negative mcleod's test bilaterally.  Minimal discomfort supraclavicular fossa without significant hypertonicity.   Skin:     General: Skin is warm and dry.   Neurological:      General: No focal deficit present.      Mental Status: He is alert and oriented to person, place, and time.      Sensory: Sensation is intact. No sensory deficit (Intact bilateral upper extremity).      Motor: Motor function is intact. No weakness (Strength intact bilateral upper extremity).      Deep Tendon Reflexes:      Reflex Scores:       Tricep reflexes are 2+ on the right side and 2+ on the left side.       Bicep reflexes are 2+ on the right side and 2+ on the left side.       Brachioradialis reflexes are 2+ on the right side and 2+ on the left side.  Psychiatric:         Mood and Affect: Mood normal.         Behavior: Behavior normal.         Thought Content: Thought content normal.       Imaging: Reviewed from 9/3/2022    Assessment & Plan:     79 y.o. male with the following -     1. Numbness and tingling of both upper extremities  2. Neck pain  Chronic occasional bilateral hand numbness in this patient with chronic neck pain now with occasional bilateral upper extremity numbness/tingling.  No neurologic deficits on today's exam.  Discussed potential causes/etiology.  Provided with physical therapy for thoracic outlet syndrome, declines formal physical therapy.  If not improved would consider cervical imaging as I suspect cervical disc  disease/radiculopathy to be the cause.  Can continue with meloxicam/acetaminophen as below for neck pain.    3. Chronic back pain, unspecified back location, unspecified back pain laterality  Chronic, scheduled on the 20th for trigger point injection which was helpful prior (limited in time today and unable to complete due to acute on chronic complaint as above).  Can continue medication as below in addition to other measures such as heat or ice.    meloxicam (MOBIC) 7.5 MG Tab, Take 1-2 Tablets by mouth 1 time a day as needed for Severe Pain or Moderate Pain (take with food)., Disp: 180 Tablet, Rfl: 0    acetaminophen (TYLENOL) 500 MG Tab, Take 2 Tablets by mouth every 6 hours., Disp: 30 Tablet, Rfl: 0    Return in about 8 days (around 3/20/2024), or if symptoms worsen or fail to improve, for Trigger point injection.    Please note that this dictation was created using voice recognition software. I have made every reasonable attempt to correct obvious errors, but I expect that there are errors of grammar and possibly content that I did not discover before finalizing the note.

## 2024-03-14 ASSESSMENT — ENCOUNTER SYMPTOMS
FEVER: 0
ABDOMINAL PAIN: 0
NAUSEA: 0
SENSORY CHANGE: 1
BACK PAIN: 1
DIARRHEA: 0
TINGLING: 1
WEIGHT LOSS: 0
SHORTNESS OF BREATH: 0
CHILLS: 0
VOMITING: 0
COUGH: 0
NECK PAIN: 1

## 2024-03-19 DIAGNOSIS — Z79.4 TYPE 2 DIABETES MELLITUS WITH HYPERGLYCEMIA, WITH LONG-TERM CURRENT USE OF INSULIN (HCC): ICD-10-CM

## 2024-03-19 DIAGNOSIS — E11.65 TYPE 2 DIABETES MELLITUS WITH HYPERGLYCEMIA, WITH LONG-TERM CURRENT USE OF INSULIN (HCC): ICD-10-CM

## 2024-03-19 RX ORDER — EMPAGLIFLOZIN, METFORMIN HYDROCHLORIDE 12.5; 1 MG/1; MG/1
2 TABLET, EXTENDED RELEASE ORAL DAILY
Qty: 180 TABLET | Refills: 3 | Status: SHIPPED | OUTPATIENT
Start: 2024-03-19

## 2024-03-20 ENCOUNTER — TELEPHONE (OUTPATIENT)
Dept: PHARMACY | Facility: MEDICAL CENTER | Age: 79
End: 2024-03-20

## 2024-03-20 ENCOUNTER — OFFICE VISIT (OUTPATIENT)
Dept: MEDICAL GROUP | Facility: LAB | Age: 79
End: 2024-03-20
Payer: MEDICARE

## 2024-03-20 VITALS
HEART RATE: 71 BPM | DIASTOLIC BLOOD PRESSURE: 68 MMHG | HEIGHT: 68 IN | OXYGEN SATURATION: 95 % | BODY MASS INDEX: 31.37 KG/M2 | RESPIRATION RATE: 20 BRPM | WEIGHT: 207 LBS | SYSTOLIC BLOOD PRESSURE: 108 MMHG | TEMPERATURE: 97.4 F

## 2024-03-20 DIAGNOSIS — G89.29 CHRONIC BACK PAIN, UNSPECIFIED BACK LOCATION, UNSPECIFIED BACK PAIN LATERALITY: ICD-10-CM

## 2024-03-20 DIAGNOSIS — Z96.89 S/P INSERTION OF SPINAL CORD STIMULATOR: ICD-10-CM

## 2024-03-20 DIAGNOSIS — Z98.890 HISTORY OF LAMINECTOMY: ICD-10-CM

## 2024-03-20 DIAGNOSIS — H02.881 MEIBOMIAN GLAND DYSFUNCTION (MGD) OF RIGHT UPPER EYELID: ICD-10-CM

## 2024-03-20 DIAGNOSIS — M54.9 CHRONIC BACK PAIN, UNSPECIFIED BACK LOCATION, UNSPECIFIED BACK PAIN LATERALITY: ICD-10-CM

## 2024-03-20 DIAGNOSIS — M62.830 MUSCLE SPASM OF BACK: ICD-10-CM

## 2024-03-20 PROCEDURE — 3078F DIAST BP <80 MM HG: CPT | Performed by: STUDENT IN AN ORGANIZED HEALTH CARE EDUCATION/TRAINING PROGRAM

## 2024-03-20 PROCEDURE — 99213 OFFICE O/P EST LOW 20 MIN: CPT | Mod: 25 | Performed by: STUDENT IN AN ORGANIZED HEALTH CARE EDUCATION/TRAINING PROGRAM

## 2024-03-20 PROCEDURE — 3074F SYST BP LT 130 MM HG: CPT | Performed by: STUDENT IN AN ORGANIZED HEALTH CARE EDUCATION/TRAINING PROGRAM

## 2024-03-20 PROCEDURE — 20553 NJX 1/MLT TRIGGER POINTS 3/>: CPT | Performed by: STUDENT IN AN ORGANIZED HEALTH CARE EDUCATION/TRAINING PROGRAM

## 2024-03-20 ASSESSMENT — FIBROSIS 4 INDEX: FIB4 SCORE: 2.97

## 2024-03-20 NOTE — PATIENT INSTRUCTIONS
Can try 2 tablets of 7.5mg (total 15mg/day)-if not helpful then stop using.    Don't use methocarbamol unless you think it's helpful    Meibomian gland dysfunction  - Warm/moist compress 4 times a day for 5-10 min  - Cleanse and massage eyelid. Use warm water or diluted baby shampoo (fragrance free) on a clean wash cloth, gauze pad, or cotton swab; and gently massage the edge of the eyelid with a gentle circular motions. Over the counter eyelid cleaning solutions/pad are also available (Ocusoft hypochlor spray)

## 2024-03-20 NOTE — PROGRESS NOTES
"Subjective:     Chief Complaint   Patient presents with    Other     Trigger injection      HPI:   Gerry presents today for trigger point injection of back.     Patient still with stable chronic low back pain but with focal pain medial to the spinal cord stimulator as prior.  States that he did have improvement with trigger point injection prior and would like to trial again today.    Has taken meloxicam 7.5 mg daily a few times and found it tolerable without side effects but not helpful.  Continues with methocarbamol twice a day but finds that ineffective as well.  The pain at rest is 6 out of 10 but with movement can be more sharp 8 out of 10.    Patient reports a stye on his left upper eyelid that has been there for few months.  Not bothersome without any other eye symptoms.  Denies dry eye.  Recently he tried a warm teabag twice which did not seem to change the bump.    Review of Systems   Constitutional:  Negative for chills, fever, malaise/fatigue and weight loss.   Eyes:  Negative for blurred vision, photophobia, pain, discharge and redness.   Respiratory:  Negative for cough and shortness of breath.    Cardiovascular:  Negative for chest pain.   Gastrointestinal:  Negative for abdominal pain, diarrhea, nausea and vomiting.   Musculoskeletal:  Positive for back pain and neck pain.   Skin:  Negative for rash.   Neurological:  Positive for tingling and sensory change.     Objective:     Exam:  /68 (BP Location: Left arm, Patient Position: Sitting, BP Cuff Size: Adult)   Pulse 71   Temp 36.3 °C (97.4 °F) (Temporal)   Resp 20   Ht 1.727 m (5' 8\")   Wt 93.9 kg (207 lb)   SpO2 95%   BMI 31.47 kg/m²  Body mass index is 31.47 kg/m².    Physical Exam  Vitals reviewed.   Constitutional:       General: He is in acute distress (With movement/palpation of low back).      Appearance: Normal appearance. He is obese.   HENT:      Head: Normocephalic and atraumatic.      Mouth/Throat:      Mouth: Mucous " membranes are moist.   Eyes:      General: Lids are normal. No scleral icterus.        Right eye: No foreign body or discharge.         Left eye: No foreign body or discharge.      Conjunctiva/sclera:      Right eye: Right conjunctiva is not injected. No chemosis, exudate or hemorrhage.     Left eye: Left conjunctiva is not injected. No chemosis, exudate or hemorrhage.     Comments: Meibomian gland dysfunction OS superior, most notable nasally.   Pulmonary:      Effort: Pulmonary effort is normal.   Musculoskeletal:      Lumbar back: Spasms (Hypertonicity with mild tenderness just inferior to spinal cord stimulator.) and tenderness present. No bony tenderness. Decreased range of motion.      Right lower leg: No edema.      Left lower leg: No edema.      Comments: Lumbar midline scar consistent with postsurgical history, spinal cord stimulator palpated in the right lower back and non tender.   Skin:     General: Skin is warm and dry.   Neurological:      Mental Status: He is alert and oriented to person, place, and time. Mental status is at baseline.      Gait: Gait abnormal (Antalgic).   Psychiatric:         Mood and Affect: Mood normal.         Behavior: Behavior normal.         Thought Content: Thought content normal.       Assessment & Plan:     79 y.o. male with the following -     1. Muscle spasm of back  2. Chronic back pain, unspecified back location, unspecified back pain laterality  3. History of laminectomy  4. S/P insertion of spinal cord stimulator  Chronic back pain in association with muscular spasm of the right low back.  Not improved with methocarbamol but still taking, recommend discontinuing.  Can continue heat which has been helpful prior.  Okay to trial 15 mg meloxicam daily but discontinue if not effective.  Tolerated trigger point injections well today with improvement in pain and range of motion. Discussed aftercare and return precautions, follow-up appointment scheduled for repeat if  indicated.   - Consent for all Surgical, Special Diagnostic or Therapeutic Procedures    5. Meibomian gland dysfunction (MGD) of right upper eyelid  This is a chronic condition without evidence of conjunctivitis or blepharitis.  Discussed pathophysiology and advised warm/moist compresses 4 times a day for 5 to 10 minutes and lid hygiene.  Can consider referral if not resolving.    I spent a total of 35 minutes with record review, exam, communication with the patient, and documentation of this encounter.    Return in about 2 weeks (around 4/3/2024), or if symptoms worsen or fail to improve, for follow up/trigger point.    Please note that this dictation was created using voice recognition software. I have made every reasonable attempt to correct obvious errors, but I expect that there are errors of grammar and possibly content that I did not discover before finalizing the note.

## 2024-03-20 NOTE — TELEPHONE ENCOUNTER
SYNJARDY XR 12.5-1000 MG TABLET    Received Renewal PA request via [employer]  for SYNJARDY XR. (Quantity:180, Day Supply:90)     Insurance: Medicare  Member ID:  [id number]  BIN: [id number]  PCN: [id number]  Group: [id number]     Ran Test claim via [facility] for a $129 copay. Will outreach to patient to offer specialty pharmacy services and or release to preferred pharmacy      $43. qty 60T/30DS

## 2024-03-21 NOTE — PROCEDURES
Procedure Note:  Informed consent obtained, RBA discussed. Vital signs reviewed. Medications reviewed and reconciled. No barriers to learning noted. Time out performed.  Pt. positioned comfortably in seated position, tender points marked with end of Chloraprep swab. Prepped tender points with chloraprep. A 25g 5/8 inch needle was inserted into the tender points and 1ml of solution was injected at each location. Pt. tolerated procedure well. Discharged from the clinic with decrease in pain from 6/10 to 1/10 at rest and 8/10 to 2/10 with movement/activity.     Location: right inferior lumbar medial to spinal cord stimulator    # Injections: 3    Injectate solution: 3ml (total) 1% lidocaine   LOT [id number] EXP 3/27    Recommend follow-up in 1-2 weeks for repeat injection therapy if appropriate. After care and return precautions discussed.

## 2024-03-22 ASSESSMENT — ENCOUNTER SYMPTOMS
EYE REDNESS: 0
BACK PAIN: 1
ABDOMINAL PAIN: 0
NECK PAIN: 1
PHOTOPHOBIA: 0
EYE DISCHARGE: 0
TINGLING: 1
WEIGHT LOSS: 0
SHORTNESS OF BREATH: 0
DIARRHEA: 0
BLURRED VISION: 0
COUGH: 0
CHILLS: 0
SENSORY CHANGE: 1
EYE PAIN: 0
FEVER: 0
VOMITING: 0
NAUSEA: 0

## 2024-03-27 ENCOUNTER — APPOINTMENT (OUTPATIENT)
Dept: ENDOCRINOLOGY | Facility: MEDICAL CENTER | Age: 79
End: 2024-03-27
Attending: INTERNAL MEDICINE
Payer: MEDICARE

## 2024-03-28 ENCOUNTER — HOSPITAL ENCOUNTER (OUTPATIENT)
Dept: LAB | Facility: MEDICAL CENTER | Age: 79
End: 2024-03-28
Attending: UROLOGY
Payer: MEDICARE

## 2024-03-28 LAB — PSA SERPL-MCNC: 10.6 NG/ML (ref 0–4)

## 2024-03-28 PROCEDURE — 84153 ASSAY OF PSA TOTAL: CPT

## 2024-03-28 PROCEDURE — 36415 COLL VENOUS BLD VENIPUNCTURE: CPT

## 2024-04-03 ENCOUNTER — APPOINTMENT (OUTPATIENT)
Dept: MEDICAL GROUP | Facility: LAB | Age: 79
End: 2024-04-03
Payer: MEDICARE

## 2024-04-04 ENCOUNTER — PATIENT MESSAGE (OUTPATIENT)
Dept: MEDICAL GROUP | Facility: LAB | Age: 79
End: 2024-04-04

## 2024-04-04 ENCOUNTER — OFFICE VISIT (OUTPATIENT)
Dept: MEDICAL GROUP | Facility: LAB | Age: 79
End: 2024-04-04
Payer: MEDICARE

## 2024-04-04 ENCOUNTER — HOSPITAL ENCOUNTER (OUTPATIENT)
Dept: LAB | Facility: MEDICAL CENTER | Age: 79
End: 2024-04-04
Attending: STUDENT IN AN ORGANIZED HEALTH CARE EDUCATION/TRAINING PROGRAM
Payer: MEDICARE

## 2024-04-04 VITALS
TEMPERATURE: 97.3 F | DIASTOLIC BLOOD PRESSURE: 64 MMHG | WEIGHT: 210.98 LBS | BODY MASS INDEX: 31.98 KG/M2 | OXYGEN SATURATION: 96 % | HEART RATE: 89 BPM | HEIGHT: 68 IN | RESPIRATION RATE: 16 BRPM | SYSTOLIC BLOOD PRESSURE: 108 MMHG

## 2024-04-04 DIAGNOSIS — Z79.4 TYPE 2 DIABETES MELLITUS WITHOUT COMPLICATION, WITH LONG-TERM CURRENT USE OF INSULIN (HCC): ICD-10-CM

## 2024-04-04 DIAGNOSIS — Z98.890 HISTORY OF LAMINECTOMY: ICD-10-CM

## 2024-04-04 DIAGNOSIS — Z95.2 S/P AVR: Chronic | ICD-10-CM

## 2024-04-04 DIAGNOSIS — I15.2 HYPERTENSION ASSOCIATED WITH TYPE 2 DIABETES MELLITUS (HCC): Chronic | ICD-10-CM

## 2024-04-04 DIAGNOSIS — D64.9 NORMOCYTIC ANEMIA: ICD-10-CM

## 2024-04-04 DIAGNOSIS — G89.29 CHRONIC BACK PAIN, UNSPECIFIED BACK LOCATION, UNSPECIFIED BACK PAIN LATERALITY: ICD-10-CM

## 2024-04-04 DIAGNOSIS — Z96.89 S/P INSERTION OF SPINAL CORD STIMULATOR: ICD-10-CM

## 2024-04-04 DIAGNOSIS — Z79.4 TYPE 2 DIABETES MELLITUS WITH OTHER SPECIFIED COMPLICATION, WITH LONG-TERM CURRENT USE OF INSULIN (HCC): ICD-10-CM

## 2024-04-04 DIAGNOSIS — E11.69 TYPE 2 DIABETES MELLITUS WITH OTHER SPECIFIED COMPLICATION, WITH LONG-TERM CURRENT USE OF INSULIN (HCC): ICD-10-CM

## 2024-04-04 DIAGNOSIS — M54.9 CHRONIC BACK PAIN, UNSPECIFIED BACK LOCATION, UNSPECIFIED BACK PAIN LATERALITY: ICD-10-CM

## 2024-04-04 DIAGNOSIS — E11.9 TYPE 2 DIABETES MELLITUS WITHOUT COMPLICATION, WITH LONG-TERM CURRENT USE OF INSULIN (HCC): ICD-10-CM

## 2024-04-04 DIAGNOSIS — E11.59 HYPERTENSION ASSOCIATED WITH TYPE 2 DIABETES MELLITUS (HCC): Chronic | ICD-10-CM

## 2024-04-04 LAB
BASOPHILS # BLD AUTO: 0.4 % (ref 0–1.8)
BASOPHILS # BLD: 0.03 K/UL (ref 0–0.12)
EOSINOPHIL # BLD AUTO: 0.25 K/UL (ref 0–0.51)
EOSINOPHIL NFR BLD: 3.5 % (ref 0–6.9)
ERYTHROCYTE [DISTWIDTH] IN BLOOD BY AUTOMATED COUNT: 47.3 FL (ref 35.9–50)
EST. AVERAGE GLUCOSE BLD GHB EST-MCNC: 151 MG/DL
HBA1C MFR BLD: 6.9 % (ref 4–5.6)
HCT VFR BLD AUTO: 40.4 % (ref 42–52)
HGB BLD-MCNC: 14.5 G/DL (ref 14–18)
IMM GRANULOCYTES # BLD AUTO: 0.02 K/UL (ref 0–0.11)
IMM GRANULOCYTES NFR BLD AUTO: 0.3 % (ref 0–0.9)
IRON SATN MFR SERPL: 29 % (ref 15–55)
IRON SERPL-MCNC: 88 UG/DL (ref 50–180)
LYMPHOCYTES # BLD AUTO: 1.85 K/UL (ref 1–4.8)
LYMPHOCYTES NFR BLD: 25.7 % (ref 22–41)
MCH RBC QN AUTO: 31 PG (ref 27–33)
MCHC RBC AUTO-ENTMCNC: 35.9 G/DL (ref 32.3–36.5)
MCV RBC AUTO: 86.3 FL (ref 81.4–97.8)
MONOCYTES # BLD AUTO: 0.59 K/UL (ref 0–0.85)
MONOCYTES NFR BLD AUTO: 8.2 % (ref 0–13.4)
NEUTROPHILS # BLD AUTO: 4.47 K/UL (ref 1.82–7.42)
NEUTROPHILS NFR BLD: 61.9 % (ref 44–72)
NRBC # BLD AUTO: 0 K/UL
NRBC BLD-RTO: 0 /100 WBC (ref 0–0.2)
PLATELET # BLD AUTO: 147 K/UL (ref 164–446)
PMV BLD AUTO: 11.6 FL (ref 9–12.9)
RBC # BLD AUTO: 4.68 M/UL (ref 4.7–6.1)
TIBC SERPL-MCNC: 305 UG/DL (ref 250–450)
UIBC SERPL-MCNC: 217 UG/DL (ref 110–370)
WBC # BLD AUTO: 7.2 K/UL (ref 4.8–10.8)

## 2024-04-04 PROCEDURE — 83036 HEMOGLOBIN GLYCOSYLATED A1C: CPT | Mod: GA

## 2024-04-04 PROCEDURE — 36415 COLL VENOUS BLD VENIPUNCTURE: CPT

## 2024-04-04 PROCEDURE — 85025 COMPLETE CBC W/AUTO DIFF WBC: CPT

## 2024-04-04 PROCEDURE — 82607 VITAMIN B-12: CPT

## 2024-04-04 PROCEDURE — 3074F SYST BP LT 130 MM HG: CPT | Performed by: STUDENT IN AN ORGANIZED HEALTH CARE EDUCATION/TRAINING PROGRAM

## 2024-04-04 PROCEDURE — 82728 ASSAY OF FERRITIN: CPT

## 2024-04-04 PROCEDURE — 83540 ASSAY OF IRON: CPT

## 2024-04-04 PROCEDURE — 99214 OFFICE O/P EST MOD 30 MIN: CPT | Performed by: STUDENT IN AN ORGANIZED HEALTH CARE EDUCATION/TRAINING PROGRAM

## 2024-04-04 PROCEDURE — 82746 ASSAY OF FOLIC ACID SERUM: CPT

## 2024-04-04 PROCEDURE — 83550 IRON BINDING TEST: CPT

## 2024-04-04 PROCEDURE — 3078F DIAST BP <80 MM HG: CPT | Performed by: STUDENT IN AN ORGANIZED HEALTH CARE EDUCATION/TRAINING PROGRAM

## 2024-04-04 RX ORDER — PREGABALIN 25 MG/1
50 CAPSULE ORAL 3 TIMES DAILY
COMMUNITY
End: 2024-04-04 | Stop reason: CLARIF

## 2024-04-04 RX ORDER — LISINOPRIL 5 MG/1
5 TABLET ORAL DAILY
COMMUNITY
Start: 2024-03-19

## 2024-04-04 RX ORDER — PREGABALIN 50 MG/1
50 CAPSULE ORAL 2 TIMES DAILY
COMMUNITY

## 2024-04-04 ASSESSMENT — FIBROSIS 4 INDEX: FIB4 SCORE: 2.97

## 2024-04-04 NOTE — PROGRESS NOTES
Subjective:     Chief Complaint   Patient presents with    Injections     Trigger      HPI:   Gerry presents today for possible trigger point injection.    Patient reports that trigger point injection last visit lasted about 2 days.  Has some soreness in the right lower back but not pain.  Feels that range of motion and ambulation is not affected by that previous pain.    [facility].  He reports that [facility] told [facility] that he was overtreated in regards to his diabetes.  He has a remote history of hypoglycemia twice per patient, but not recently.  States that the [facility] took [facility] Trulicity 1.5 mg weekly, Synjardy 12.5-1000 mg 2 tablets daily and levothyroxine 50 mcg daily.  Continues with Lantus and he was instructed to decrease from 50 to 40 units nightly.  States that they started him on metformin 500 mg sustained-release nightly to be taken instead of Synjardy.  States that he was not able to get Trulicity covered.    Patient states that he is endocrinology appointment was canceled.  Previously fasting blood sugar between 1/21/1930 and now in the upper 200s.    Also states that his metoprolol 25 mg XL was switched to lisinopril 5 mg daily.  His cardiology team is not aware of this change.    Patient states that they also started him on Lyrica 25 mg 3 times daily for nerve pain in his arms.  So far has been tolerable but not helpful.    He has lab work scheduled 4/16 and a follow-up with [facility] 4/22.    Review of Systems   Constitutional:  Negative for chills, fever, malaise/fatigue and weight loss.   Eyes:  Negative for blurred vision, photophobia, pain, discharge and redness.   Respiratory:  Negative for cough and shortness of breath.    Cardiovascular:  Negative for chest pain, palpitations and leg swelling.   Gastrointestinal:  Negative for abdominal pain, diarrhea, nausea and vomiting.   Musculoskeletal:  Positive for back  "pain and neck pain.   Skin:  Negative for rash.   Neurological:  Positive for tingling and sensory change.       Objective:     Exam:  /64 (BP Location: Right arm, Patient Position: Sitting, BP Cuff Size: Adult)   Pulse 89   Temp 36.3 °C (97.3 °F) (Temporal)   Resp 16   Ht 1.727 m (5' 8\")   Wt 95.7 kg (210 lb 15.7 oz)   SpO2 96%   BMI 32.08 kg/m²  Body mass index is 32.08 kg/m².    Physical Exam  Vitals reviewed.   Constitutional:       Appearance: Normal appearance. He is obese.   HENT:      Head: Normocephalic and atraumatic.      Mouth/Throat:      Mouth: Mucous membranes are moist.   Pulmonary:      Effort: Pulmonary effort is normal.   Musculoskeletal:      Comments: Lumbar midline scar consistent with postsurgical history, spinal cord stimulator palpated in the right lower back and non tender.    Skin:     General: Skin is warm and dry.   Neurological:      General: No focal deficit present.      Mental Status: He is alert and oriented to person, place, and time.   Psychiatric:         Mood and Affect: Mood normal.         Behavior: Behavior normal.         Thought Content: Thought content normal.       Labs: Reviewed from 5/4/2023, 10/2023, 1/18/2024  Imaging: Reviewed from 9/2022    Assessment & Plan:     79 y.o. male with the following -     1. Chronic back pain, unspecified back location, unspecified back pain laterality  2. S/P insertion of spinal cord stimulator  3. History of laminectomy  Chronic, stable with resolved area of spasm in right lower back-declined trigger point injection today. [facility]rica by [facility] for [facility] symptoms as well-not helping yet but has f/u in the next few weeks.    4. Type 2 diabetes mellitus with complication, with long-term current use of insulin (HCC)  Chronic, controlled with prior regimen and now with morning hyperglycemia. Due for A1C.  Declines to continue his care with endocrinology for now.  Discussed that he can titrate metformin back to his previous dose " of 2000 mg total daily.  Continue Trulicity as it was too difficult to obtain.  Provided with chart and after visit summary to adjust his insulin if needed.  Patient for now prefers to [facility] with [facility] and has upcoming lab work and follow-up thereafter.  - HEMOGLOBIN A1C; Future    metFORMIN (GLUCOPHAGE) 500 MG Tab, ER Take 500 mg by mouth every evening., Disp: , Rfl:     insulin glargine (LANTUS SOLOSTAR) 100 UNIT/ML Solution Pen-injector injection, Inject 50 Units under the skin every evening., Disp: 45 mL, Rfl: 3    5. Hypertension associated with type 2 diabetes mellitus (HCC)  6. S/P AVR  Chronic, controlled prior with metoprolol given to patient after AVR and controlled with lisinopril 5mg daily [facility] by [facility].  Message sent to his cardiology provider regarding this change, will confirm that they do not want him to resume the beta-blocker.    7. Normocytic anemia  Improving prior (post surgical), due to trend.  - CBC WITH DIFFERENTIAL; Future  - FERRITIN; Future  - FOLATE; Future  - IRON/TOTAL IRON BIND; Future  - VITAMIN B12; Future    Return in about 7 weeks (around 5/21/2024), or if symptoms worsen or fail to improve, for Annual Medicare Visit.    Please note that this dictation was created using voice recognition software. I have made every reasonable attempt to correct obvious errors, but I expect that there are errors of grammar and possibly content that I did not discover before finalizing the note.

## 2024-04-04 NOTE — PATIENT INSTRUCTIONS
Message cardiology about the changes in your medication (I'll message them too)    Check if metformin is extended release, prior 1000mg twice a day prior-send me a message if you need.    Long-acting insulin:  (if you get above 80units then split dose ex 40units twice a day)  Adjust dose according to FASTING BLOOD GLUCOSE target  mg/dL  (1) Increase the insulin dose every 3-4 days as needed.   (2) Increase by 2 units if FBG average concentration is 131-170 mg/dL.   (3) Increase by 4 units if FBG average concentration is 171-210 mg/dL.   (4) Increase by 6 units if FBG average concentration is 221-260 mg/dL.   (5) Increase by 8 units if FBG average concentration is greater than 261mg/dL and call us.      Consider cutting back by 1-2 units to previous dose if glucose concentration is below 80 mg/dL or symptoms of hypoglycemia.     Ideal glucose ranges: fasting  (ok 140), random (1-2 hours after eating) <180. If glucose <70, we need to adjust your medication to prevent continuing low blood sugar.    Ideal blood pressure <130/80 and at least <140/90.  If <100 for the top number we are over treating.  Severe range >180/120, with symptoms=ER

## 2024-04-04 NOTE — Clinical Note
Hello, I does wanted to make you aware that [facility] and [facility] made many changes.  One of those changes was switching him off metoprolol to lisinopril.  I suspect there was a particular reason why you all wanted him on metoprolol after his AVR so I wanted to close the loop and see if he should go back on a beta-blocker.  Thank you.

## 2024-04-05 LAB
FERRITIN SERPL-MCNC: 165 NG/ML (ref 22–322)
FOLATE SERPL-MCNC: 12.4 NG/ML
VIT B12 SERPL-MCNC: 587 PG/ML (ref 211–911)

## 2024-04-07 ASSESSMENT — ENCOUNTER SYMPTOMS
WEIGHT LOSS: 0
EYE DISCHARGE: 0
NAUSEA: 0
BACK PAIN: 1
SENSORY CHANGE: 1
VOMITING: 0
CHILLS: 0
BLURRED VISION: 0
PHOTOPHOBIA: 0
COUGH: 0
EYE REDNESS: 0
SHORTNESS OF BREATH: 0
DIARRHEA: 0
NECK PAIN: 1
ABDOMINAL PAIN: 0
TINGLING: 1
EYE PAIN: 0
PALPITATIONS: 0
FEVER: 0

## 2024-04-07 NOTE — PROGRESS NOTES
"Subjective:     Chief Complaint   Patient presents with    Injections     Trigger        HPI:   Dakota Boyer is a 79 y.o. female who presents for annual exam. She is feeling well and denies any complaints.    Ob-Gyn/ History:    Patient has GYN provider: {YES/NO:63}  /Para:  ***  Last Pap Smear:  ***. History of abnormal pap smears: {YES/NO:63}  Gyn Surgery: Reviewed  Current Contraceptive Method:  ***. Sexually active: {YES/NO:63}. Number of partners in the past year: {:12046}   Dyspareunia: {YES/NO:63}.  Urinary incontinence: {YES/NO:63}    Last menstrual period:  ***.  Last {:70077} days. Periods regular without significant bleeding/cramping.   No significant bloating/fluid retention, pelvic pain, or abnormal vaginal discharge.   Folate intake: {YES/NO:63}    Health Maintenance  Advanced directive: ***   Osteoporosis Screen/ DEXA: ***  Diet: ***   Exercise: ***   Substance Abuse: ***   Cholesterol Screening:   Lab Results   Component Value Date    LDL 18 2023    Diabetes Screening:   Lab Results   Component Value Date    HBA1C 6.9 (H) 2024   Aspirin Use: ***Not indicated The ASCVD Risk score (Nigel MENCHACA, et al., 2019) failed to calculate.    Cancer screening  Colorectal Cancer Screening: ***   Lung Cancer Screening: *** Not indicated {50-80 yearly screen with 20 pack-years, currently smoke or quit <15 years. USPSTF: B}  Cervical Cancer Screening: ***   Breast Cancer Screening: ***    Infectious disease screening/Immunizations  --Immunizations: Reviewed with patient.   --STI Screening: ***   --Practices safe sex: {YES/NO:63}  --HIV Screening: No results found for: \"VKL067\" Risk factors: {YES/NO:63}  --Hepatitis C Screening: No results found for: \"HEPC\"    She  has a past medical history of Anesthesia (2023), Anxiety, Aortic stenosis (2022), Back pain, chronic (2023), Bilateral primary osteoarthritis of knee (2018), Black stools (2022), Blood clotting " disorder ([facility]) (1978), Breath shortness (09/21/2023), CAD (coronary artery disease) (11/2021), Cancer ([facility]) (? early 90's), Cancer ([facility]) (09/21/2023), Cataract (09/21/2023), Degeneration of lumbar or lumbosacral intervertebral disc ( ), Diabetes, DJD (degenerative joint disease) of cervical spine ( ), High cholesterol (09/21/2023), Hyperlipidemia, Hypersensitive carotid sinus syndrome ( ), Hypertension (09/21/2023), Hypertension associated with type 2 diabetes mellitus ([facility]) (04/10/2012), Insomnia ( ), Microalbuminuria (03/15/2023), Neck pain, Neutropenia ([facility]) (03/04/2022), Pacemaker (10/2012), Pancytopenia ([facility]) (03/09/2022), PONV (postoperative nausea and vomiting) (09/21/2023), Psychiatric problem, RBBB ( ), S/P lumbar discectomy ( ), Severe aortic stenosis (06/03/2020), Sick sinus syndrome ([facility]) ( ), Sleep apnea (09/21/2023), Snoring (09/21/2023), Syncope (10/2012), Thrombocytopenia ([facility]) (10/12/2023), Thyroid disease (09/21/2023), Unintentional weight loss (03/05/2022), and Unspecified hemorrhagic conditions.  She  has a past surgical history that includes arthroplasty (Left, 01/01/2004); arthroscopy, knee (Bilateral, 01/01/1978); tonsillectomy; vasectomy; shoulder decompression (Left, 01/01/2008); orthopedic osteotomy; recovery (10/04/2012); lumbar laminectomy diskectomy (11/20/2012); pacemaker insertion (10/01/2012); cervical fusion posterior (02/27/2013); cervical laminectomy posterior (02/27/2013); wound dehiscence (04/10/2013); recovery (10/17/2013); shoulder arthroscopy (Right, 01/01/2015); other; spinal cord stimulator (1 month ago); pr dstr nrolytc agnt parverteb fct sngl crvcl/thora (Right, 10/19/2016); pr dstr nrolytc agnt parverteb fct addl crvcl/thora (10/19/2016); pr fluoroscopic guidance needle placement (10/19/2016); knee arthroplasty total (Right, 2018); shoulder arthroscopy (Right, 09/21/2023); transcatheter aortic valve replacement (Bilateral, 9/25/2023); echocardiogram, transesophageal,  "intraoperative (N/A, 2023); and aortic valve replacement (N/A, 2023).    Family History   Problem Relation Age of Onset    Lung Disease Mother         Smoker ( of lung dz)    Alcohol/Drug Mother     Heart Disease Father 36        CAD    Heart Disease Sister         \"hole in heart\"    Lung Disease Brother         COPD, CANCER    Cancer Brother         Prostate, Lung ( of lung CA)    Alcohol/Drug Brother     Heart Disease Brother     Diabetes Brother     Hypertension Brother     Hyperlipidemia Brother     Heart Disease Maternal Grandmother     Hypertension Maternal Grandmother     Hyperlipidemia Maternal Grandmother     Cancer Brother          of esophagus/stomach, thyroid    Heart Disease Brother 60        MI, stent, PM/defib    Sleep Apnea Brother     No Known Problems Maternal Grandfather     No Known Problems Paternal Grandmother        Social History     Socioeconomic History    Marital status:      Spouse name: Not on file    Number of children: Not on file    Years of education: Not on file    Highest education level: 12th grade   Occupational History    Not on file   Tobacco Use    Smoking status: Former     Current packs/day: 0.00     Average packs/day: 1 pack/day for 40.0 years (40.0 ttl pk-yrs)     Types: Cigarettes     Start date: 1/3/1950     Quit date: 1/3/1990     Years since quittin.2    Smokeless tobacco: Never   Vaping Use    Vaping Use: Never used   Substance and Sexual Activity    Alcohol use: No     Comment: Quit     Drug use: Never    Sexual activity: Yes     Partners: Female   Other Topics Concern    Not on file   Social History Narrative    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      Social Determinants of Health     Financial Resource Strain: Low Risk  (2022)    Overall Financial Resource Strain (CARDIA)     Difficulty of Paying Living Expenses: Not hard at all   Food Insecurity: No Food Insecurity (2022)    Hunger Vital Sign     Worried About " Running Out of Food in the Last Year: Never true     Ran Out of Food in the Last Year: Never true   Transportation Needs: No Transportation Needs (12/11/2022)    PRAPARE - Transportation     Lack of Transportation (Medical): No     Lack of Transportation (Non-Medical): No   Physical Activity: Inactive (12/11/2022)    Exercise Vital Sign     Days of Exercise per Week: 0 days     Minutes of Exercise per Session: 0 min   Stress: No Stress Concern Present (12/11/2022)    [employer] [place] [employer] - Occupational Stress Questionnaire     Feeling of Stress : Not at all   Social Connections: Unknown (12/11/2022)    Social Connection and Isolation Panel [NHANES]     Frequency of Communication with Friends and Family: More than three times a week     Frequency of Social Gatherings with Friends and Family: Twice a week     Attends Sabianist Services: More than 4 times per year     Active Member of Clubs or Organizations: Patient declined     Attends Club or Organization Meetings: Patient declined     Marital Status:    Intimate Partner Violence: Not on file   Housing Stability: Low Risk  (12/11/2022)    Housing Stability Vital Sign     Unable to Pay for Housing in the Last Year: No     Number of Places Lived in the Last Year: 1     Unstable Housing in the Last Year: No       Patient Active Problem List    Diagnosis Date Noted    Meibomian gland dysfunction (MGD) of right upper eyelid 03/20/2024    Mild nonproliferative diabetic retinopathy of both eyes without macular edema associated with type 2 diabetes mellitus (HCC) 12/21/2023    Benign neoplasm of skin of left lower extremity, including hip 12/21/2023    Lower urinary tract symptoms 11/21/2023    Normocytic anemia 10/12/2023    Status post aortic valve replacement 09/25/2023    Type 2 diabetes mellitus without complication, with long-term current use of insulin (HCC) 08/16/2023    History of falling 08/16/2023    Generalized weakness 08/16/2023     "Pulmonary nodule 04/05/2023    Diabetes mellitus with microalbuminuria (HCC) 03/15/2023    S/P insertion of spinal cord stimulator 12/14/2022    History of laminectomy 12/14/2022    Numbness and tingling of both upper extremities 08/19/2022    Former smoker 11/02/2021    History of bilateral knee replacement 11/02/2021    Prostate cancer (HCC) 08/09/2018    Cervical spondylosis 10/19/2016    Depression with anxiety 03/28/2016    Obstructive sleep apnea on CPAP 03/28/2016    Subclinical hypothyroidism 08/21/2014    Class 1 obesity with serious comorbidity and body mass index (BMI) of 32.0 to 32.9 in adult 08/21/2014    Coronary artery disease due to calcified coronary lesion 08/16/2013    DJD (degenerative joint disease) of cervical spine 02/27/2013    Degeneration of lumbar or lumbosacral intervertebral disc 11/20/2012    Pacemaker 10/04/2012    Other hyperlipidemia 09/28/2012    RBBB 09/28/2012    Back pain, chronic 04/10/2012    Neck pain 04/10/2012         Current Outpatient Medications   Medication Sig Dispense Refill    lisinopril (PRINIVIL) 5 MG Tab Take 5 mg by mouth every day.      pregabalin (LYRICA) 50 MG capsule Take 50 mg by mouth 2 times a day.      metFORMIN (GLUCOPHAGE) 500 MG Tab Take 500 mg by mouth every evening.      rosuvastatin (CRESTOR) 40 MG tablet TAKE 1 TABLET BY MOUTH EVERY DAY 90 Tablet 0    tamsulosin (FLOMAX) 0.4 MG capsule Take 1 Capsule by mouth 1/2 hour after breakfast. 90 Capsule 3    acetaminophen (TYLENOL) 500 MG Tab Take 2 Tablets by mouth every 6 hours. 30 Tablet 0    insulin glargine (LANTUS SOLOSTAR) 100 UNIT/ML Solution Pen-injector injection Inject 50 Units under the skin every evening. 45 mL 3    Insulin Pen Needle (PEN NEEDLES 31GX5/16\") 31G X 8 MM Misc 1 Each every day. 100 Each 3    aspirin EC (ECOTRIN) 81 MG TBEC Take 81 mg by mouth every evening.       No current facility-administered medications for this visit.     Allergies   Allergen Reactions    Aleve Cold & " "[Pseudoephedrine-Naproxen Na] Anaphylaxis    Ceftriaxone Sodium Anaphylaxis     Reaction; 1970's. Patient has tolerated cefazolin.    Naproxen Anaphylaxis     Reaction; 2004.    Latex Rash     Contact site    Tape Rash and Itching     Plastic tape/tegaderm (paper tape ok)       Review of Systems  Constitutional: Negative for fever, chills and malaise/fatigue.   HENT: Negative for congestion.    Eyes: Negative for pain.    Respiratory: Negative for cough and shortness of breath.  Cardiovascular: Negative for leg swelling.   Gastrointestinal: Negative for nausea, vomiting, abdominal pain and diarrhea.   Genitourinary: Negative for dysuria and hematuria.   Skin: Negative for rash.   Neurological: Negative for dizziness, focal weakness and headaches.   Endo/Heme/Allergies: Does not bleed easily.   Psychiatric/Behavioral: Negative for depression.  The patient is not nervous/anxious.      Objective:     /64 (BP Location: Right arm, Patient Position: Sitting, BP Cuff Size: Adult)   Pulse 89   Temp 36.3 °C (97.3 °F) (Temporal)   Resp 16   Ht 1.727 m (5' 8\")   Wt 95.7 kg (210 lb 15.7 oz)   SpO2 96%   BMI 32.08 kg/m²   Body mass index is 32.08 kg/m².  Wt Readings from Last 4 Encounters:   04/04/24 95.7 kg (210 lb 15.7 oz)   03/20/24 93.9 kg (207 lb)   03/12/24 93.9 kg (207 lb)   02/22/24 93.4 kg (205 lb 14.6 oz)       Physical Exam    {CHAPERONE:03802}    Labs: Reviewed from ***  Imaging: Reviewed from ***    Assessment and Plan:     1. Hypertension associated with type 2 diabetes mellitus (HCC)    2. S/P AVR    3. Subclinical hypothyroidism    4. Type 2 diabetes mellitus without complication, with long-term current use of insulin (HCC)  - HEMOGLOBIN A1C; Future    5. Normocytic anemia  - CBC WITH DIFFERENTIAL; Future  - FERRITIN; Future  - FOLATE; Future  - IRON/TOTAL IRON BIND; Future  - VITAMIN B12; Future    Other orders  - lisinopril (PRINIVIL) 5 MG Tab; Take 5 mg by mouth every day.  - pregabalin (LYRICA) " "50 MG capsule; Take 50 mg by mouth 2 times a day.  - metFORMIN (GLUCOPHAGE) 500 MG Tab; Take 500 mg by mouth every evening.       HCM: Reviewed with patient as above.  Immunizations discussed/recommended and patient directed to the pharmacy if vaccines not available in clinic.  Age-appropriate anticipatory guidance discussed: sun screen, dental visits, healthy diet/exercise, AHA recommendation for ETOH intake and tobacco cessation.      Current Outpatient Medications:     lisinopril (PRINIVIL) 5 MG Tab, Take 5 mg by mouth every day., Disp: , Rfl:     pregabalin (LYRICA) 50 MG capsule, Take 50 mg by mouth 2 times a day., Disp: , Rfl:     metFORMIN (GLUCOPHAGE) 500 MG Tab, Take 500 mg by mouth every evening., Disp: , Rfl:     rosuvastatin (CRESTOR) 40 MG tablet, TAKE 1 TABLET BY MOUTH EVERY DAY, Disp: 90 Tablet, Rfl: 0    tamsulosin (FLOMAX) 0.4 MG capsule, Take 1 Capsule by mouth 1/2 hour after breakfast., Disp: 90 Capsule, Rfl: 3    acetaminophen (TYLENOL) 500 MG Tab, Take 2 Tablets by mouth every 6 hours., Disp: 30 Tablet, Rfl: 0    insulin glargine (LANTUS SOLOSTAR) 100 UNIT/ML Solution Pen-injector injection, Inject 50 Units under the skin every evening., Disp: 45 mL, Rfl: 3    Insulin Pen Needle (PEN NEEDLES 31GX5/16\") 31G X 8 MM Misc, 1 Each every day., Disp: 100 Each, Rfl: 3    aspirin EC (ECOTRIN) 81 MG TBEC, Take 81 mg by mouth every evening., Disp: , Rfl:     Follow-up: Return in about 7 weeks (around 5/21/2024), or if symptoms worsen or fail to improve, for Annual Medicare Visit.      "

## 2024-04-10 ENCOUNTER — TELEPHONE (OUTPATIENT)
Dept: CARDIOLOGY | Facility: MEDICAL CENTER | Age: 79
End: 2024-04-10
Payer: MEDICARE

## 2024-04-10 NOTE — TELEPHONE ENCOUNTER
----- Message from KAYE Tsai sent at 4/8/2024 12:09 PM PDT -----  Thanks for the update. [facility] will [facility] and [facility].     Shruti, can you get most recent [facility] records?     Lissette  ----- Message -----  From: Rylee García D.O.  Sent: 4/7/2024   5:31 PM PDT  To: KAYE Tsai, I does wanted to make you aware that [facility] and [facility] many [facility].  One of those changes was switching him off metoprolol to lisinopril.  I suspect there was a particular reason why you all wanted him on metoprolol after his AVR so I wanted to close the loop and see if he should go back on a beta-blocker.  Thank you.

## 2024-05-18 ENCOUNTER — NON-PROVIDER VISIT (OUTPATIENT)
Dept: CARDIOLOGY | Facility: MEDICAL CENTER | Age: 79
End: 2024-05-18
Payer: MEDICARE

## 2024-05-20 PROCEDURE — 93294 REM INTERROG EVL PM/LDLS PM: CPT | Performed by: INTERNAL MEDICINE

## 2024-05-21 ENCOUNTER — OFFICE VISIT (OUTPATIENT)
Dept: MEDICAL GROUP | Facility: LAB | Age: 79
End: 2024-05-21
Payer: MEDICARE

## 2024-05-21 VITALS
WEIGHT: 209 LBS | RESPIRATION RATE: 16 BRPM | TEMPERATURE: 97.8 F | BODY MASS INDEX: 32.8 KG/M2 | SYSTOLIC BLOOD PRESSURE: 98 MMHG | HEART RATE: 84 BPM | DIASTOLIC BLOOD PRESSURE: 50 MMHG | OXYGEN SATURATION: 96 % | HEIGHT: 67 IN

## 2024-05-21 DIAGNOSIS — K43.2 INCISIONAL HERNIA, WITHOUT OBSTRUCTION OR GANGRENE: ICD-10-CM

## 2024-05-21 DIAGNOSIS — R05.3 CHRONIC COUGH: ICD-10-CM

## 2024-05-21 DIAGNOSIS — G47.33 OBSTRUCTIVE SLEEP APNEA ON CPAP: ICD-10-CM

## 2024-05-21 DIAGNOSIS — Z00.00 MEDICARE ANNUAL WELLNESS VISIT, SUBSEQUENT: Primary | ICD-10-CM

## 2024-05-21 PROBLEM — H90.3 SENSORINEURAL HEARING LOSS, BILATERAL: Status: ACTIVE | Noted: 2024-05-21

## 2024-05-21 PROBLEM — D64.9 NORMOCYTIC ANEMIA: Status: RESOLVED | Noted: 2023-10-12 | Resolved: 2024-05-21

## 2024-05-21 PROCEDURE — G0439 PPPS, SUBSEQ VISIT: HCPCS | Performed by: STUDENT IN AN ORGANIZED HEALTH CARE EDUCATION/TRAINING PROGRAM

## 2024-05-21 PROCEDURE — 3074F SYST BP LT 130 MM HG: CPT | Performed by: STUDENT IN AN ORGANIZED HEALTH CARE EDUCATION/TRAINING PROGRAM

## 2024-05-21 PROCEDURE — 3078F DIAST BP <80 MM HG: CPT | Performed by: STUDENT IN AN ORGANIZED HEALTH CARE EDUCATION/TRAINING PROGRAM

## 2024-05-21 RX ORDER — EMPAGLIFLOZIN, METFORMIN HYDROCHLORIDE 12.5; 1 MG/1; MG/1
1 TABLET, EXTENDED RELEASE ORAL 2 TIMES DAILY
COMMUNITY
Start: 2024-03-19

## 2024-05-21 RX ORDER — INSULIN GLARGINE-YFGN 100 [IU]/ML
40 INJECTION, SOLUTION SUBCUTANEOUS NIGHTLY
COMMUNITY
Start: 2024-04-22

## 2024-05-21 ASSESSMENT — PATIENT HEALTH QUESTIONNAIRE - PHQ9: CLINICAL INTERPRETATION OF PHQ2 SCORE: 0

## 2024-05-21 ASSESSMENT — FIBROSIS 4 INDEX: FIB4 SCORE: 3.09

## 2024-05-21 ASSESSMENT — ENCOUNTER SYMPTOMS: GENERAL WELL-BEING: FAIR

## 2024-05-21 ASSESSMENT — ACTIVITIES OF DAILY LIVING (ADL): BATHING_REQUIRES_ASSISTANCE: 0

## 2024-05-21 NOTE — PATIENT INSTRUCTIONS
Ok to stop iron    Vaccines due that can be received only at pharmacy:  COVID  RSV (respiratory syncytial virus)    Flonase 2 sprays each nostril daily   If nasal dryness/nose bleeds develop:  Consider in room humidifier, AYR gel (green box) or Ponaris Nasal Emollient, and/or Flonase reduce down to 1 spray a day    Froilan Med Saline Rinse (black top bottle)-can be used prior to flonase as well to improve efficacy     24 hour antihistamines: Claritin/loratadine or Allegra/fexofenadine or Zyrtec/cetirizine (most sedating)

## 2024-05-21 NOTE — PROGRESS NOTES
Chief Complaint   Patient presents with    Medicare Annual Wellness     HPI:  Dakota Boyer is a 79 y.o. 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) 12/21/2023    Benign neoplasm of skin of left lower extremity, including hip 12/21/2023    Lower urinary tract symptoms 11/21/2023    Status post aortic valve replacement 09/25/2023    Type 2 diabetes mellitus with other specified complication (HCC) 08/16/2023    History of falling 08/16/2023    Generalized weakness 08/16/2023    Pulmonary nodule 04/05/2023    Diabetes mellitus with microalbuminuria (HCC) 03/15/2023    S/P insertion of spinal cord stimulator 12/14/2022    History of laminectomy 12/14/2022    Numbness and tingling of both upper extremities 08/19/2022    Former smoker 11/02/2021    History of bilateral knee replacement 11/02/2021    Prostate cancer (HCC) 08/09/2018    Cervical spondylosis 10/19/2016    Depression with anxiety 03/28/2016    Obstructive sleep apnea on CPAP 03/28/2016    Subclinical hypothyroidism 08/21/2014    Class 1 obesity with serious comorbidity and body mass index (BMI) of 32.0 to 32.9 in adult 08/21/2014    Coronary artery disease due to calcified coronary lesion 08/16/2013    DJD (degenerative joint disease) of cervical spine 02/27/2013    Degeneration of lumbar or lumbosacral intervertebral disc 11/20/2012    Pacemaker 10/04/2012    Hyperlipidemia associated with type 2 diabetes mellitus (HCC) 09/28/2012    RBBB 09/28/2012    Back pain, chronic 04/10/2012    Neck pain 04/10/2012       Current Outpatient Medications   Medication Sig  "Dispense Refill    Insulin Glargine-yfgn 100 UNIT/ML Solution Pen-injector Inject 40 Units under the skin every evening.      SYNJARDY XR 12.5-1000 MG TABLET SR 24 HR Take 1 Tablet by mouth 2 times a day.      lisinopril (PRINIVIL) 5 MG Tab Take 5 mg by mouth every day.      pregabalin (LYRICA) 50 MG capsule Take 50 mg by mouth 2 times a day.      rosuvastatin (CRESTOR) 40 MG tablet TAKE 1 TABLET BY MOUTH EVERY DAY 90 Tablet 0    tamsulosin (FLOMAX) 0.4 MG capsule Take 1 Capsule by mouth 1/2 hour after breakfast. 90 Capsule 3    acetaminophen (TYLENOL) 500 MG Tab Take 2 Tablets by mouth every 6 hours. 30 Tablet 0    Insulin Pen Needle (PEN NEEDLES 31GX5/16\") 31G X 8 MM Misc 1 Each every day. 100 Each 3    aspirin EC (ECOTRIN) 81 MG TBEC Take 81 mg by mouth every evening.       No current facility-administered medications for this visit.          Current supplements as per medication list.     Allergies: Aleve cold & [pseudoephedrine-naproxen na], Ceftriaxone sodium, Naproxen, Latex, and Tape    Current social contact/activities: Spends time with his wife, going to lunch with friend Philip once a week. Watches TV. Plays Mexican Train with friends.     He  reports that he quit smoking about 34 years ago. His smoking use included cigarettes. He started smoking about 74 years ago. He has a 40 pack-year smoking history. He has never used smokeless tobacco. He reports that he does not drink alcohol and does not use drugs.  Counseling given: Not Answered    ROS:    Gait: Has a walker in case, uses rarely.   Ostomy: No  Other tubes: No  Amputations: No  Chronic oxygen use: No  Last eye exam: 2x wks ago, followed by [facility]  Dental: UTD  Wears hearing aids: [facility]  : Reports urinary leakage during the last 6 months that has not interfered at all with their daily activities or sleep. Followed by urology.    Screening:     Depression Screening  Little interest or pleasure in doing things?  0 - not " at all  Feeling down, depressed , or hopeless? 0 - not at all  Trouble falling or staying asleep, or sleeping too much?     Feeling tired or having little energy?     Poor appetite or overeating?     Feeling bad about yourself - or that you are a failure or have let yourself or your family down?    Trouble concentrating on things, such as reading the newspaper or watching television?    Moving or speaking so slowly that other people could have noticed.  Or the opposite - being so fidgety or restless that you have been moving around a lot more than usual?     Thoughts that you would be better off dead, or of hurting yourself?     Patient Health Questionnaire Score:      If depressive symptoms identified deferred to follow up visit unless specifically addressed in assessment and plan.    Interpretation of PHQ-9 Total Score   Score Severity   1-4 No Depression   5-9 Mild Depression   10-14 Moderate Depression   15-19 Moderately Severe Depression   20-27 Severe Depression    Screening for Cognitive Impairment  Do you or any of your friends or family members have any concern about your memory? No   Three Minute Recall (Leader, Season, Table) 0/3   2/3 during visit.   Erik clock face with all 12 numbers and set the hands to show 10 minutes after 11.  Yes    Cognitive concerns identified deferred for follow up unless specifically addressed in assessment and plan.    Fall Risk Assessment  Has the patient had two or more falls in the last year or any fall with injury in the last year?  No One fall a few weeks ago cutting shelving and fell on his hands/knee. Feels unstable at times, has a walker that he uses on occasion.    Safety Assessment  Do you always wear your seatbelt?  Yes  Any changes to home needed to function safely? No  Difficulty hearing.  Yes [facility]  Patient counseled about all safety risks that were identified.    Functional Assessment ADLs  Are there any barriers preventing  you from cooking for yourself or meeting nutritional needs?  No.    Are there any barriers preventing you from driving safely or obtaining transportation?  No.    Are there any barriers preventing you from using a telephone or calling for help?  No    Are there any barriers preventing you from shopping?  No.    Are there any barriers preventing you from taking care of your own finances?  No    Are there any barriers preventing you from managing your medications?  No    Are there any barriers preventing you from showering, bathing or dressing yourself? No    Are there any barriers preventing you from doing housework or laundry? No    Are there any barriers preventing you from using the toilet?No    Are you currently engaging in any exercise or physical activity?  No.      Self-Assessment of Health  What is your perception of your health? Fair    Do you sleep more than six hours a night? Yes    In the past 7 days, how much did pain keep you from doing your normal work? Some    Do you spend quality time with family or friends (virtually or in person)? Yes    Do you usually eat a heart healthy diet that constists of a variety of fruits, vegetables, whole grains and fiber? No    Do you eat foods high in fat and/or Fast Food more than three times per week? Yes    How concerned are you that your medical conditions are not being well managed? Not at all    Are you worried that in the next 2 months, you may not have stable housing that you own, rent, or stay in as part of a household? No      Advance Care Planning  Do you have an Advance Directive, Living Will, Durable Power of , or POLST? Yes  Advance Directive Living Will Durable Power of    is on file. Full code.    Health Maintenance Summary            Postponed - COVID-19 Vaccine (1 - 2023-24 season) Postponed until 12/21/2024      No completion history exists for this topic.              A1c Screening (Every 3 Months) Next due on 7/4/2024       04/04/2024  HEMOGLOBIN A1C    11/21/2023  POCT  A1C    08/08/2023  POCT Hemoglobin A1C    04/05/2023  POCT  A1C    09/27/2022  POCT Hemoglobin A1C    Only the first 5 history entries have been loaded, but more history exists.              Diabetes: Monofilament / LE Exam (Yearly) Next due on 8/8/2024 08/08/2023  Diabetic Monofilament LE Exam    08/08/2023  SmartData: WORKFLOW - DIABETES - DIABETIC FOOT EXAM PERFORMED    04/06/2023  SmartData: WORKFLOW - DIABETES - DIABETIC FOOT EXAM PERFORMED    09/27/2022  Diabetic Monofilament LE Exam    09/27/2022  SmartData: WORKFLOW - DIABETES - DIABETIC FOOT EXAM PERFORMED    Only the first 5 history entries have been loaded, but more history exists.              Diabetes: Retinopathy Screening (Yearly) Next due on 8/21/2024 08/21/2023  AMB EXTERNAL RETINAL SCREENING RESULTS    07/27/2022  REFERRAL FOR RETINAL SCREENING EXAM    07/27/2022  REFERRAL FOR RETINAL SCREENING EXAM    07/27/2021  REFERRAL FOR RETINAL SCREENING EXAM    07/16/2020  REFERRAL FOR RETINAL SCREENING EXAM    Only the first 5 history entries have been loaded, but more history exists.              Ordered - Fasting Lipid Profile (Yearly) Ordered on 1/25/2024 09/22/2023  Lipid Profile    10/24/2022  Lipid Profile    03/05/2022  Lipid Profile (Lipid Panel) Fasting    02/02/2022  Lipid Profile    11/18/2020  Lipid Profile    Only the first 5 history entries have been loaded, but more history exists.              Diabetes: Urine Protein Screening (Yearly) Next due on 1/18/2025 01/18/2024  MICROALBUMIN CREAT RATIO URINE    05/04/2023  MICROALBUMIN CREAT RATIO URINE    05/04/2023  MICROALBUMIN CREAT RATIO URINE    10/24/2022  MICROALBUMIN CREAT RATIO URINE    07/30/2021  MICROALBUMIN CREAT RATIO URINE    Only the first 5 history entries have been loaded, but more history exists.              SERUM CREATININE (Yearly) Next due on 1/18/2025 01/18/2024  Comp Metabolic Panel    10/20/2023   Basic Metabolic Panel    09/30/2023  Basic Metabolic Panel (BMP) Critical Care 0130    09/29/2023  Basic Metabolic Panel (BMP) Critical Care 0130    09/28/2023  Basic Metabolic Panel (BMP) Critical Care 0130    Only the first 5 history entries have been loaded, but more history exists.              Annual Wellness Visit (Yearly) Next due on 5/21/2025 05/21/2024  Level of Service: ANNUAL WELLNESS VISIT-INCLUDES PPPS SUBSEQUE*    05/21/2024  Visit Dx: Medicare annual wellness visit, subsequent    12/14/2022  Level of Service: [place] ANNUAL WELLNESS VISIT-INCLUDES PPPS SUBSEQUE*    12/14/2022  Visit Dx: Medicare annual wellness visit, subsequent    08/14/2020  Subsequent Annual Wellness Visit - Includes PPPS ()    Only the first 5 history entries have been loaded, but more history exists.              IMM DTaP/Tdap/Td Vaccine (2 - Td or Tdap) Next due on 8/9/2028 08/09/2018  Imm Admin: Tdap Vaccine    03/28/2008  Imm Admin: TD Vaccine              Pneumococcal Vaccine: 65+ Years (Series Information) Completed      03/28/2016  Imm Admin: Pneumococcal Conjugate Vaccine (Prevnar/PCV-13)    10/04/2012  Imm Admin: Pneumococcal polysaccharide vaccine (PPSV-23)              Zoster (Shingles) Vaccines (Series Information) Completed      10/08/2019  Imm Admin: Zoster Vaccine Recombinant (RZV) (SHINGRIX)    06/25/2019  Imm Admin: Zoster Vaccine Recombinant (RZV) (SHINGRIX)    03/28/2014  Imm Admin: Zoster Vaccine Live (ZVL) (Zostavax) - HISTORICAL DATA    01/09/2014  Imm Admin: Zoster Vaccine Live (ZVL) (Zostavax) - HISTORICAL DATA              Hepatitis B Vaccine (Hep B) (Series Information) Completed      05/25/2021  Imm Admin: Hepatitis B Vaccine (Adol/Adult)    11/20/2020  Imm Admin: Hepatitis B Vaccine (Adol/Adult)    09/22/2020  Imm Admin: Hepatitis B Vaccine (Adol/Adult)              Influenza Vaccine (Series Information) Completed      11/21/2023  Imm Admin: Influenza Vaccine Adult HD    12/14/2022  Imm Admin:  Influenza Vaccine Adult HD    2021  Imm Admin: Influenza Vaccine Adult HD    10/26/2020  Imm Admin: Influenza Vaccine Adult HD    10/04/2019  Imm Admin: Influenza Vaccine Adult HD    Only the first 5 history entries have been loaded, but more history exists.              Hepatitis A Vaccine (Hep A) (Series Information) Aged Out      No completion history exists for this topic.              HPV Vaccines (Series Information) Aged Out      No completion history exists for this topic.              Polio Vaccine (Inactivated Polio) (Series Information) Aged Out      No completion history exists for this topic.              Meningococcal Immunization (Series Information) Aged Out      No completion history exists for this topic.              Discontinued - Colorectal Cancer Screening  Discontinued        Frequency changed to Never automatically (Topic No Longer Applies)    2022  OCCULT BLOOD FECES IMMUNOASSAY    2020  OCCULT BLOOD FECES IMMUNOASSAY    10/19/2019  Occult Blood Feces component of OCCULT BLOOD STOOL    10/16/2019  Occult Blood Feces component of OCCULT BLOOD STOOL    Only the first 5 history entries have been loaded, but more history exists.              Discontinued - Hepatitis C Screening  Discontinued        Frequency changed to Never automatically (Topic No Longer Applies)    2022  Hepatitis C Antibody component of HEP C VIRUS ANTIBODY                  Patient Care Team:  Rylee García D.O. as PCP - General (Family Medicine)  [facility] (DME Supplier)  ROSSY TsaiRLauritaNLaurita (Cardiovascular Disease (Cardiology))  Alton Garcia M.D. (Urology)  Puma Hsu M.D. (Interventional Cardiology)    Social History     Tobacco Use    Smoking status: Former     Current packs/day: 0.00     Average packs/day: 1 pack/day for 40.0 years (40.0 ttl pk-yrs)     Types: Cigarettes     Start date: 1/3/1950     Quit date: 1/3/1990     Years since quittin.4    Smokeless tobacco:  "Never   Vaping Use    Vaping status: Never Used   Substance Use Topics    Alcohol use: No     Comment: Quit     Drug use: Never     Family History   Problem Relation Age of Onset    Lung Disease Mother         Smoker ( of lung dz)    Alcohol/Drug Mother     Heart Disease Father 36        CAD    Heart Disease Sister         \"hole in heart\"    Lung Disease Brother         COPD, CANCER    Cancer Brother         Prostate, Lung ( of lung CA)    Alcohol/Drug Brother     Heart Disease Brother     Diabetes Brother     Hypertension Brother     Hyperlipidemia Brother     Heart Disease Maternal Grandmother     Hypertension Maternal Grandmother     Hyperlipidemia Maternal Grandmother     Cancer Brother          of esophagus/stomach, thyroid    Heart Disease Brother 60        MI, stent, PM/defib    Sleep Apnea Brother     No Known Problems Maternal Grandfather     No Known Problems Paternal Grandmother      He  has a past medical history of Anesthesia (2023), Anxiety, Aortic stenosis (2022), Back pain, chronic (2023), Bilateral primary osteoarthritis of knee (2018), Black stools (2022), Blood clotting disorder ([facility]) (), Breath shortness (2023), CAD (coronary artery disease) (2021), Cancer ([facility]) (? early s), Cancer ([facility]) (2023), Cataract (2023), Degeneration of lumbar or lumbosacral intervertebral disc ( ), Diabetes, DJD (degenerative joint disease) of cervical spine ( ), High cholesterol (2023), Hyperlipidemia, Hypersensitive carotid sinus syndrome ( ), Hypertension (2023), Hypertension associated with type 2 diabetes mellitus ([facility]) (04/10/2012), Insomnia ( ), Microalbuminuria (03/15/2023), Neck pain, Neutropenia ([facility]) (2022), Normocytic anemia (10/12/2023), Pacemaker (10/2012), Pancytopenia ([facility]) (2022), PONV (postoperative nausea and vomiting) (2023), Psychiatric problem, RBBB ( ), S/P lumbar discectomy ( ), Severe aortic " stenosis (06/03/2020), Sick sinus syndrome (HCC) ( ), Sleep apnea (09/21/2023), Snoring (09/21/2023), Syncope (10/2012), Thrombocytopenia (HCC) (10/12/2023), Thyroid disease (09/21/2023), Unintentional weight loss (03/05/2022), and Unspecified hemorrhagic conditions.   Past Surgical History:   Procedure Laterality Date    TRANSCATHETER AORTIC VALVE REPLACEMENT Bilateral 9/25/2023    Procedure: TRANSCATHETER AORTIC VALVE REPLACEMENT - CONVERTED TO OPEN HEART PROCEDURE;  Surgeon: Puma Hsu M.D.;  Location: [facility];  Service: Cardiac    ECHOCARDIOGRAM, TRANSESOPHAGEAL, INTRAOPERATIVE N/A 9/25/2023    Procedure: ECHOCARDIOGRAM, TRANSESOPHAGEAL, INTRAOPERATIVE;  Surgeon: Puma Hsu M.D.;  Location: [facility];  Service: Cardiac    AORTIC VALVE REPLACEMENT N/A 9/25/2023    Procedure: REPLACEMENT, AORTIC VALVE AND RETRIEVAL OF FOREIGN BODY,  AORTIC ROOT ENLARGEMENT;  Surgeon: Turner Johnson D.O.;  Location: [facility];  Service: Cardiothoracic    SHOULDER ARTHROSCOPY Right 09/21/2023    rotator cuff repair times 2    KNEE ARTHROPLASTY TOTAL Right 2018    MI DSTR NROLYTC AGNT PARVERTEB FCT SNGL CRVCL/THORA Right 10/19/2016    Procedure: NEURO DEST FACET C/T W/IG SNGL - 3ON-C3, C8-T1    SINERGY;  Surgeon: Gaurang Pruett M.D.;  Location: [facility];  Service: Pain Management    MI DSTR NROLYTC AGNT PARVERTEB FCT ADDL CRVCL/THORA  10/19/2016    Procedure: NEURO DEST FACET C/T W/IG ADDL;  Surgeon: Gaurang Pruett M.D.;  Location: [facility];  Service: Pain Management    MI FLUOROSCOPIC GUIDANCE NEEDLE PLACEMENT  10/19/2016    Procedure: FLOURO GUIDE NEEDLE PLACEMENT;  Surgeon: Gaurang Pruett M.D.;  Location: [facility];  Service: Pain Management    SHOULDER ARTHROSCOPY Right 01/01/2015    torn bicep tendon and spurs    RECOVERY  10/17/2013    Performed by Cath-Recovery Surgery at SURGERY SAME DAY [facility] ORS    WOUND DEHISCENCE  04/10/2013     "Performed by Tu Jain M.D. at SURGERY [facility] ORS    CERVICAL FUSION POSTERIOR  02/27/2013    Performed by uT Jain M.D. at SURGERY [facility] ORS    CERVICAL LAMINECTOMY POSTERIOR  02/27/2013    Performed by Tu Jain M.D. at SURGERY [facility]    LUMBAR LAMINECTOMY DISKECTOMY  11/20/2012    Performed by Tu Jain M.D. at SURGERY [facility]    RECOVERY  10/04/2012    Performed by Cath-Recovery Surgery at SURGERY SAME DAY [facility] ORS    PACEMAKER INSERTION  10/01/2012    [facility] [address] [id number] implanted by Dr. Waite.    SHOULDER DECOMPRESSION Left 01/01/2008    Left rotator cuff times 2    ARTHROPLASTY Left 01/01/2004    Knee    ARTHROSCOPY, KNEE Bilateral 01/01/1978    ORTHOPEDIC OSTEOTOMY      Both knees several times, 8 total    OTHER      SPINAL CORD STIMULATOR  1 month ago    TONSILLECTOMY      VASECTOMY       Exam:   BP 98/50 (BP Location: Left arm, Patient Position: Sitting, BP Cuff Size: Adult)   Pulse 84   Temp 36.6 °C (97.8 °F) (Temporal)   Resp 16   Ht 1.702 m (5' 7\")   Wt 94.8 kg (209 lb)   SpO2 96%  Body mass index is 32.73 kg/m².    Hearing good.    Dentition good.  Physical Exam  Vitals reviewed.   Constitutional:       General: He is not in acute distress.     Appearance: Normal appearance. He is obese. He is not ill-appearing.   HENT:      Head: Normocephalic and atraumatic.      Right Ear: Tympanic membrane, ear canal and external ear normal.      Left Ear: Tympanic membrane, ear canal and external ear normal.      Nose: Nose normal.      Mouth/Throat:      Mouth: Mucous membranes are moist.      Pharynx: No oropharyngeal exudate or posterior oropharyngeal erythema.   Eyes:      General: No scleral icterus.     Conjunctiva/sclera: Conjunctivae normal.   Cardiovascular:      Rate and Rhythm: Normal rate and regular rhythm.      Heart sounds: Normal heart sounds. No murmur heard.  Pulmonary:      Effort: Pulmonary effort is normal. No respiratory " distress.      Breath sounds: Normal breath sounds. No stridor. No wheezing, rhonchi or rales.   Abdominal:      Hernia: A hernia (Small, only noticeable with cough over incision in the epigastrium) is present.   Musculoskeletal:      Cervical back: Normal range of motion and neck supple. No rigidity or tenderness.      Right lower leg: No edema.      Left lower leg: No edema.   Lymphadenopathy:      Cervical: No cervical adenopathy.   Neurological:      General: No focal deficit present.      Mental Status: He is alert and oriented to person, place, and time.   Psychiatric:         Mood and Affect: Mood normal.         Behavior: Behavior normal.         Thought Content: Thought content normal.       Labs: Reviewed from 1/18/2024, 3/28/2024, 4/4/2024    Assessment and Plan. The following treatment and monitoring plan is recommended:      1. Medicare annual wellness visit, subsequent  Services suggested: Sleep medicine.  [facility] and [facility]/numbness and tingling in the upper extremities.  Health Care Screening: Age-appropriate preventive services recommended by USPTF and ACIP covered by Medicare were discussed today. Services ordered if indicated and agreed upon by the patient.  Immunizations reviewed/recommended and directed to the pharmacy if not available in clinic.  Referrals offered: Community-based lifestyle interventions to reduce health risks and promote self-management and wellness, fall prevention, nutrition, physical activity, tobacco-use cessation, weight loss, and mental health services as per orders if indicated.    Discussion today about general wellness and lifestyle habits:    Prevent falls and reduce trip hazards; Cautioned about securing or removing rugs.  Have a working fire alarm, fire extinguisher and carbon monoxide detector.  Engage in regular physical activity and social activities.    2. Chronic cough  Chronic per patient but new issue to me.  Previous  pulmonary function tests were normal, does have a history of tobacco use.  Denies other symptoms, cardiopulmonary exam today was normal.  Patient states that [facility] to investigate elevated PSA, may consider imaging if etiology unclear.  Will investigate for now with PFTs as below.  Gave strict return precautions.  - PULMONARY FUNCTION TESTS -Test requested: Complete Pulmonary Function Test; Include MIPS/MEPS? No; Future    3. Incisional hernia, without obstruction or gangrene  Chronic following aortic valve replacement and per patient more noticeable with cough as above.  For now monitor, consider repair if bothersome.    4. Obstructive sleep apnea on CPAP  Chronic, overdue for follow-up with sleep medicine.  Referred to reestablish.  - Referral to Pulmonary and Sleep Medicine    Follow-up: Return in about 6 weeks (around 7/2/2024), or if symptoms worsen or fail to improve, for cough.

## 2024-06-07 ENCOUNTER — NON-PROVIDER VISIT (OUTPATIENT)
Dept: SLEEP MEDICINE | Facility: MEDICAL CENTER | Age: 79
End: 2024-06-07
Attending: STUDENT IN AN ORGANIZED HEALTH CARE EDUCATION/TRAINING PROGRAM
Payer: MEDICARE

## 2024-06-07 VITALS — WEIGHT: 207.1 LBS | HEIGHT: 68 IN | BODY MASS INDEX: 31.39 KG/M2

## 2024-06-07 DIAGNOSIS — R05.3 CHRONIC COUGH: ICD-10-CM

## 2024-06-07 PROCEDURE — 94726 PLETHYSMOGRAPHY LUNG VOLUMES: CPT | Mod: 26 | Performed by: STUDENT IN AN ORGANIZED HEALTH CARE EDUCATION/TRAINING PROGRAM

## 2024-06-07 PROCEDURE — 94010 BREATHING CAPACITY TEST: CPT | Mod: 26 | Performed by: STUDENT IN AN ORGANIZED HEALTH CARE EDUCATION/TRAINING PROGRAM

## 2024-06-07 PROCEDURE — 94729 DIFFUSING CAPACITY: CPT | Performed by: STUDENT IN AN ORGANIZED HEALTH CARE EDUCATION/TRAINING PROGRAM

## 2024-06-07 PROCEDURE — 94726 PLETHYSMOGRAPHY LUNG VOLUMES: CPT | Performed by: STUDENT IN AN ORGANIZED HEALTH CARE EDUCATION/TRAINING PROGRAM

## 2024-06-07 PROCEDURE — 94729 DIFFUSING CAPACITY: CPT | Mod: 26 | Performed by: STUDENT IN AN ORGANIZED HEALTH CARE EDUCATION/TRAINING PROGRAM

## 2024-06-07 PROCEDURE — 94010 BREATHING CAPACITY TEST: CPT | Performed by: STUDENT IN AN ORGANIZED HEALTH CARE EDUCATION/TRAINING PROGRAM

## 2024-06-07 ASSESSMENT — PULMONARY FUNCTION TESTS
FEV1: 2.29
FEV1/FVC_PERCENT_PREDICTED: 104
FEV1_LLN: 2.25
FEV1/FVC_PERCENT_LLN: 63
FVC_PERCENT_PREDICTED: 82
FEV1/FVC_PERCENT_PREDICTED: 102
FEV1_PREDICTED: 2.69
FVC_LLN: 3.02
FEV1/FVC_PERCENT_PREDICTED: 74
FEV1/FVC_PREDICTED: 75
FEV1_PERCENT_PREDICTED: 85
FEV1/FVC: 77
FEV1/FVC: 77
FVC_PREDICTED: 3.62
FVC: 2.98

## 2024-06-07 ASSESSMENT — FIBROSIS 4 INDEX: FIB4 SCORE: 3.09

## 2024-06-07 NOTE — PROCEDURES
Tech: Lia Harrison, CRT  Good patient effort & cooperation.  Test was performed on the [employer] Body Plethysmograph- Elite DX system.  The predicted sets used for Spirometry are GLI-2012, for Lung Volumes are ITS, and for DLCO is GLI 2017.  The results of this test meet the ATS standards for acceptability and repeatability.  The DLCO was uncorrected for Hb.  Patient refused bronchodilator, no post FVC.    Interpretation:    There is no significant obstruction or restriction.  Bronchodilator testing was not performed.  Normal diffusion capacity.

## 2024-06-10 DIAGNOSIS — E11.65 TYPE 2 DIABETES MELLITUS WITH HYPERGLYCEMIA, WITH LONG-TERM CURRENT USE OF INSULIN (HCC): ICD-10-CM

## 2024-06-10 DIAGNOSIS — Z79.4 TYPE 2 DIABETES MELLITUS WITH HYPERGLYCEMIA, WITH LONG-TERM CURRENT USE OF INSULIN (HCC): ICD-10-CM

## 2024-06-10 RX ORDER — DULAGLUTIDE 1.5 MG/.5ML
0.5 INJECTION, SOLUTION SUBCUTANEOUS
Qty: 6 ML | Refills: 3 | Status: SHIPPED | OUTPATIENT
Start: 2024-06-10 | End: 2024-06-12 | Stop reason: SDUPTHER

## 2024-06-12 DIAGNOSIS — Z79.4 TYPE 2 DIABETES MELLITUS WITH HYPERGLYCEMIA, WITH LONG-TERM CURRENT USE OF INSULIN (HCC): ICD-10-CM

## 2024-06-12 DIAGNOSIS — E11.65 TYPE 2 DIABETES MELLITUS WITH HYPERGLYCEMIA, WITH LONG-TERM CURRENT USE OF INSULIN (HCC): ICD-10-CM

## 2024-06-12 RX ORDER — DULAGLUTIDE 4.5 MG/.5ML
0.5 INJECTION, SOLUTION SUBCUTANEOUS
Qty: 6 ML | Refills: 0 | Status: SHIPPED | OUTPATIENT
Start: 2024-06-12 | End: 2024-06-18 | Stop reason: SDUPTHER

## 2024-06-18 ENCOUNTER — TELEPHONE (OUTPATIENT)
Dept: ENDOCRINOLOGY | Facility: MEDICAL CENTER | Age: 79
End: 2024-06-18
Payer: MEDICARE

## 2024-06-18 DIAGNOSIS — Z79.4 TYPE 2 DIABETES MELLITUS WITH HYPERGLYCEMIA, WITH LONG-TERM CURRENT USE OF INSULIN (HCC): ICD-10-CM

## 2024-06-18 DIAGNOSIS — E11.65 TYPE 2 DIABETES MELLITUS WITH HYPERGLYCEMIA, WITH LONG-TERM CURRENT USE OF INSULIN (HCC): ICD-10-CM

## 2024-06-18 RX ORDER — DULAGLUTIDE 4.5 MG/.5ML
0.5 INJECTION, SOLUTION SUBCUTANEOUS
Qty: 6 ML | Refills: 0 | Status: SHIPPED | OUTPATIENT
Start: 2024-06-18

## 2024-07-02 ENCOUNTER — OFFICE VISIT (OUTPATIENT)
Dept: MEDICAL GROUP | Facility: LAB | Age: 79
End: 2024-07-02
Payer: MEDICARE

## 2024-07-02 VITALS
OXYGEN SATURATION: 96 % | RESPIRATION RATE: 20 BRPM | DIASTOLIC BLOOD PRESSURE: 52 MMHG | HEIGHT: 68 IN | BODY MASS INDEX: 30.94 KG/M2 | HEART RATE: 85 BPM | WEIGHT: 204.15 LBS | SYSTOLIC BLOOD PRESSURE: 98 MMHG | TEMPERATURE: 97.4 F

## 2024-07-02 DIAGNOSIS — R05.3 CHRONIC COUGH: ICD-10-CM

## 2024-07-02 DIAGNOSIS — Z79.4 TYPE 2 DIABETES MELLITUS WITH OTHER SPECIFIED COMPLICATION, WITH LONG-TERM CURRENT USE OF INSULIN (HCC): ICD-10-CM

## 2024-07-02 DIAGNOSIS — E11.69 TYPE 2 DIABETES MELLITUS WITH OTHER SPECIFIED COMPLICATION, WITH LONG-TERM CURRENT USE OF INSULIN (HCC): ICD-10-CM

## 2024-07-02 PROBLEM — G56.00 CARPAL TUNNEL SYNDROME: Status: ACTIVE | Noted: 2024-07-02

## 2024-07-02 PROBLEM — K03.81 CRACKED TOOTH: Status: ACTIVE | Noted: 2024-07-02

## 2024-07-02 PROCEDURE — 3074F SYST BP LT 130 MM HG: CPT | Performed by: STUDENT IN AN ORGANIZED HEALTH CARE EDUCATION/TRAINING PROGRAM

## 2024-07-02 PROCEDURE — 3078F DIAST BP <80 MM HG: CPT | Performed by: STUDENT IN AN ORGANIZED HEALTH CARE EDUCATION/TRAINING PROGRAM

## 2024-07-02 PROCEDURE — 99214 OFFICE O/P EST MOD 30 MIN: CPT | Performed by: STUDENT IN AN ORGANIZED HEALTH CARE EDUCATION/TRAINING PROGRAM

## 2024-07-02 RX ORDER — DULAGLUTIDE 1.5 MG/.5ML
0.5 INJECTION, SOLUTION SUBCUTANEOUS
COMMUNITY
Start: 2024-06-10 | End: 2024-07-02

## 2024-07-02 RX ORDER — FLUTICASONE PROPIONATE 50 MCG
2 SPRAY, SUSPENSION (ML) NASAL DAILY
Qty: 16 G | Refills: 3 | Status: SHIPPED | OUTPATIENT
Start: 2024-07-02 | End: 2024-07-02

## 2024-07-02 RX ORDER — FLUTICASONE PROPIONATE 50 MCG
SPRAY, SUSPENSION (ML) NASAL
Qty: 48 G | Refills: 3 | Status: SHIPPED | OUTPATIENT
Start: 2024-07-02

## 2024-07-02 RX ORDER — DULAGLUTIDE 3 MG/.5ML
0.5 INJECTION, SOLUTION SUBCUTANEOUS
Qty: 6 ML | Refills: 3 | Status: SHIPPED | OUTPATIENT
Start: 2024-07-02

## 2024-07-02 RX ORDER — DULAGLUTIDE 3 MG/.5ML
0.5 INJECTION, SOLUTION SUBCUTANEOUS
Qty: 6 ML | Refills: 3 | Status: SHIPPED | OUTPATIENT
Start: 2024-07-02 | End: 2024-07-02

## 2024-07-02 ASSESSMENT — ENCOUNTER SYMPTOMS
WEIGHT LOSS: 0
NAUSEA: 0
ABDOMINAL PAIN: 0
SPUTUM PRODUCTION: 1
DIARRHEA: 0
FEVER: 0
COUGH: 1
VOMITING: 0
SHORTNESS OF BREATH: 1
CHILLS: 0

## 2024-07-02 ASSESSMENT — FIBROSIS 4 INDEX: FIB4 SCORE: 3.09

## 2024-07-12 ENCOUNTER — OFFICE VISIT (OUTPATIENT)
Dept: SLEEP MEDICINE | Facility: MEDICAL CENTER | Age: 79
End: 2024-07-12
Attending: STUDENT IN AN ORGANIZED HEALTH CARE EDUCATION/TRAINING PROGRAM
Payer: MEDICARE

## 2024-07-12 VITALS
SYSTOLIC BLOOD PRESSURE: 100 MMHG | DIASTOLIC BLOOD PRESSURE: 60 MMHG | OXYGEN SATURATION: 93 % | HEART RATE: 89 BPM | HEIGHT: 67 IN | WEIGHT: 198.6 LBS | BODY MASS INDEX: 31.17 KG/M2

## 2024-07-12 DIAGNOSIS — G47.33 OSA ON CPAP: ICD-10-CM

## 2024-07-12 DIAGNOSIS — G47.33 OSA (OBSTRUCTIVE SLEEP APNEA): ICD-10-CM

## 2024-07-12 PROCEDURE — 3078F DIAST BP <80 MM HG: CPT

## 2024-07-12 PROCEDURE — 99212 OFFICE O/P EST SF 10 MIN: CPT | Performed by: STUDENT IN AN ORGANIZED HEALTH CARE EDUCATION/TRAINING PROGRAM

## 2024-07-12 PROCEDURE — 99213 OFFICE O/P EST LOW 20 MIN: CPT

## 2024-07-12 PROCEDURE — 3074F SYST BP LT 130 MM HG: CPT

## 2024-07-12 ASSESSMENT — FIBROSIS 4 INDEX: FIB4 SCORE: 3.09

## 2024-08-02 ENCOUNTER — TELEPHONE (OUTPATIENT)
Dept: CARDIOLOGY | Facility: MEDICAL CENTER | Age: 79
End: 2024-08-02
Payer: MEDICARE

## 2024-08-02 NOTE — TELEPHONE ENCOUNTER
SC      Caller: Lylajazmin Nagel @ [facility]     Office Name, phone number[facility] fax number[facility]     Fax: [phone]    Procedure Name: Carpel Tunnel Release - Requesting most recent Echo     Procedure Scheduled Date: 8/15/24     Callback Number: See Routing Notes     Thank You  Naomie MARTINEZ

## 2024-08-06 ENCOUNTER — OFFICE VISIT (OUTPATIENT)
Dept: MEDICAL GROUP | Facility: LAB | Age: 79
End: 2024-08-06
Payer: MEDICARE

## 2024-08-06 VITALS
HEIGHT: 67 IN | TEMPERATURE: 98.5 F | SYSTOLIC BLOOD PRESSURE: 120 MMHG | HEART RATE: 72 BPM | RESPIRATION RATE: 18 BRPM | OXYGEN SATURATION: 96 % | BODY MASS INDEX: 31.71 KG/M2 | WEIGHT: 202 LBS | DIASTOLIC BLOOD PRESSURE: 76 MMHG

## 2024-08-06 DIAGNOSIS — E11.69 TYPE 2 DIABETES MELLITUS WITH OTHER SPECIFIED COMPLICATION, WITH LONG-TERM CURRENT USE OF INSULIN (HCC): ICD-10-CM

## 2024-08-06 DIAGNOSIS — R05.3 CHRONIC COUGH: ICD-10-CM

## 2024-08-06 DIAGNOSIS — Z79.4 TYPE 2 DIABETES MELLITUS WITH OTHER SPECIFIED COMPLICATION, WITH LONG-TERM CURRENT USE OF INSULIN (HCC): ICD-10-CM

## 2024-08-06 PROCEDURE — 99214 OFFICE O/P EST MOD 30 MIN: CPT | Performed by: STUDENT IN AN ORGANIZED HEALTH CARE EDUCATION/TRAINING PROGRAM

## 2024-08-06 PROCEDURE — 3074F SYST BP LT 130 MM HG: CPT | Performed by: STUDENT IN AN ORGANIZED HEALTH CARE EDUCATION/TRAINING PROGRAM

## 2024-08-06 PROCEDURE — 3078F DIAST BP <80 MM HG: CPT | Performed by: STUDENT IN AN ORGANIZED HEALTH CARE EDUCATION/TRAINING PROGRAM

## 2024-08-06 ASSESSMENT — FIBROSIS 4 INDEX: FIB4 SCORE: 3.09

## 2024-08-06 ASSESSMENT — ENCOUNTER SYMPTOMS
NAUSEA: 0
SHORTNESS OF BREATH: 1
ABDOMINAL PAIN: 0
FEVER: 0
COUGH: 1
VOMITING: 0
DIARRHEA: 0
WEIGHT LOSS: 0
CHILLS: 0
SPUTUM PRODUCTION: 1

## 2024-08-06 NOTE — PATIENT INSTRUCTIONS
Long-acting insulin: (lantus)  Adjust dose according to FASTING BLOOD GLUCOSE target  (I'm ok with <140) mg/dL  (1) Increase the insulin dose every 3-4 days as needed.   (2) Increase by 2 units if FBG average concentration is 141-180 mg/dL.   (3) Increase by 4 units if FBG average concentration is 181-230 mg/dL.   (4) Increase by 6 units if FBG average concentration is 231-260 mg/dL.   (5) Increase by 8 units if FBG average concentration is greater than 261mg/dL and call us.      Consider cutting back by 1-2 units to previous dose if glucose concentration is below 80 mg/dL or symptoms of hypoglycemia.     Flonase 2 sprays each nostril daily   If nasal dryness/nose bleeds develop:  Consider in room humidifier, AYR gel (green box) or Ponaris Nasal Emollient, and/or Flonase reduce down to 1 spray a day    Froilan Med Saline Rinse (black top bottle)-can be used prior to flonase as well to improve efficacy     24 hour antihistamines: Claritin/loratadine or Zyrtec/cetirizine (most sedating)  Avoid oral decongestants (can rise blood pressure/heart rate)

## 2024-08-06 NOTE — TELEPHONE ENCOUNTER
[facility] Surgery is [facility] 15th, Ulnar nerve and carpal tunnel release.   They need most recent Echocardiogram,  and cardiac risk stratification.   Fax number: [phone], BARBRA Ericksonne  ------------------------------------------------------------  Last OV: 01/25/24  Proposed Surgery: Ulnar Nerve and carpal tunnel release  Surgery Date: 08/15/2024  [facility]  Fax Number: [phone]  Preference of Location (default is surgery center unless specified by Cardiologist or GAMAL)  Prior Clearance Addressed: No      Anticoags/Antiplatelets: Aspirin  Anticoags/Antiplatelet managed by Cardiology? YES    Outstanding Cardiac Imaging : Yes  Echo.   Clearance to provider to review  Stent, Cardiac Devices, or Catheterization: No  Ablation, TAVR/Valve (including open heart), Cardioversion: Yes  Date:   Recent Cardiac Hospitalization: No            When: N/A  History (cardiac history):   Past Medical History:   Diagnosis Date    Anesthesia 09/21/2023    PONV times 1    Anxiety     Aortic stenosis 03/2022    Echocardiogram with normal LV size, mild concentric LVH, LVEF 75%. Normal RA, LA and RV. Trace MR. Moderate AS (peak 37mmHg, mean 24mmHg, Vmax 3.0m/s, GUY 1.1cm2). Trace TR.    Back pain, chronic 09/21/2023    bilateral legs as well    Bilateral primary osteoarthritis of knee 01/23/2018    Black stools 03/09/2022    Blood clotting disorder (HCC) 1978    s/p Left knee surgery- DVT in left leg    Breath shortness 09/21/2023    with exertion    CAD (coronary artery disease) 11/2021    PCI/SWETHA x 2 (Bison 3.0 x 38mm, Bison 3.5 x 38mm) to the RCA.    Cancer ([facility]) ? early 90's    Melanoma Left arm- surgically removed    Cancer (HCC) 09/21/2023    prostate cancer    Cataract 09/21/2023    small cataracts    Degeneration of lumbar or lumbosacral intervertebral disc      Diabetes     Oral agents and insulin    DJD (degenerative joint disease) of cervical spine      High cholesterol 09/21/2023    medicated     Hyperlipidemia     Hypersensitive carotid sinus syndrome      Hypertension 09/21/2023    medicated    Hypertension associated with type 2 diabetes mellitus (HCC) 04/10/2012    Insomnia      Microalbuminuria 03/15/2023    Neck pain     Neutropenia (HCC) 03/04/2022    Normocytic anemia 10/12/2023    Pacemaker 10/2012    [facility] #[id number] [facility]     Pancytopenia (HCC) 03/09/2022    PONV (postoperative nausea and vomiting) 09/21/2023    PONV times one    Psychiatric problem     denies    RBBB      S/P lumbar discectomy      Severe aortic stenosis 06/03/2020 March 2022: Echocardiogram with normal LV size, mild concentric LVH, LVEF 75%. Normal RA, LA and RV. Trace MR. Moderate AS (peak 37mmHg, mean 24mmHg, Vmax 3.0m/s, GUY 1.1cm2). Trace TR.  Angiogram 7/11/2023: severe AS    Sick sinus syndrome (HCC)      Sleep apnea 09/21/2023    not using CPAP at present    Snoring 09/21/2023    Syncope 10/2012    Treated with PPM    Thrombocytopenia (HCC) 10/12/2023    Thyroid disease 09/21/2023    medicated    Unintentional weight loss 03/05/2022    Unspecified hemorrhagic conditions     Reports bleeds easily             Surgical Clearance Letter Sent: No Provider to advise.   **Scan clearance request letter into [facility].**       SC: 1 year post AVR echo scheduled on 09/26/2024. Please advise clearance and aspirin. Thanks!

## 2024-08-06 NOTE — TELEPHONE ENCOUNTER
RAJ Tsai.  You2 hours ago (11:59 AM)       Okay to proceed and hold aspirin 5 days if warranted. SC       [facility]. Received fax confirmation, scanned into [employer].

## 2024-08-06 NOTE — PROGRESS NOTES
Subjective:     Chief Complaint   Patient presents with    Follow-Up     HPI:   Dakota is a 79 y.o. male who presents today for follow-up.    Verbal consent was acquired by the patient to use [employer] ambient listening note generation during this visit Yes.    History of Present Illness  The patient is an 79-year-old male coming in for follow-up. Last visit, we discussed his chronic cough and started Flonase. I also discontinued lisinopril to see if contributing. Last visit, we also increased his Trulicity to 3 mg weekly in an effort to reduce his insulin requirement.  See visit dated 7/2/2024 for details.    The patient continues to experience a persistent cough without any changes or worsening from prior, never started Flonase because of the thought of spraying something in his nose.  He is no longer taking lisinopril as directed, brings in a list of his blood pressure measurements which have been good.  [facility] yielded normal results, indicating no pulmonary issues. He has a history of smoking, quit 01/03/1990.    The patient has been adhering to his increased dose of Trulicity, with no reported episodes of hypoglycemia. His fasting blood glucose levels remain above 130. He is currently on a regimen of 36 units of Lantus, a reduction from 40 units. He has made some dietary modifications and has an 8-week supply of Trulicity 3mg remaining.  Brings in a list of his fasting blood glucose.  He will need to hold some of his medication in preparation for his left carpal and cubital tunnel [facility].    Patient saw sleep medicine regarding his sleep apnea, will need a new machine since his last was recalled.    Review of Systems   Constitutional:  Negative for chills, fever, malaise/fatigue and weight loss.   Respiratory:  Positive for cough, sputum production and shortness of breath (stable minimal dyspnea on exertion).    Cardiovascular:  Negative for  "chest pain and leg swelling.   Gastrointestinal:  Negative for abdominal pain, diarrhea, nausea and vomiting.   Skin:  Negative for rash.     Objective:     Exam:  /76 (BP Location: Left arm, Patient Position: Sitting, BP Cuff Size: Adult)   Pulse 72   Temp 36.9 °C (98.5 °F)   Resp 18   Ht 1.702 m (5' 7\")   Wt 91.6 kg (202 lb)   SpO2 96%   BMI 31.64 kg/m²  Body mass index is 31.64 kg/m².    Physical Exam  Vitals reviewed.   Constitutional:       General: He is not in acute distress.     Appearance: Normal appearance. He is obese. He is not ill-appearing.   HENT:      Head: Normocephalic and atraumatic.      Nose: Nose normal.      Mouth/Throat:      Mouth: Mucous membranes are moist.   Cardiovascular:      Rate and Rhythm: Normal rate and regular rhythm.      Heart sounds: Normal heart sounds. No murmur heard.  Pulmonary:      Effort: Pulmonary effort is normal. No respiratory distress.      Breath sounds: Normal breath sounds. No wheezing, rhonchi or rales.   Musculoskeletal:      Cervical back: Normal range of motion and neck supple.      Right lower leg: No edema.      Left lower leg: No edema.      Comments: Wearing brace on left wrist   Skin:     General: Skin is warm and dry.   Neurological:      Mental Status: He is alert and oriented to person, place, and time. Mental status is at baseline.   Psychiatric:         Mood and Affect: Mood normal.         Behavior: Behavior normal.         Thought Content: Thought content normal.       Assessment & Plan:     79 y.o. male with the following -     1. Chronic cough  Chronic, stable.  PFTs limited by lack of albuterol use but no obvious restriction/obstruction. Lisinopril stopped and cough persists. Suspect upper airway cough syndrome but patient has not started Flonase, agreeable to trying at this time.  An alternative would be loratadine 10 mg daily.  Reports recent normal chest x-ray but given history of prior tobacco use and current prostate cancer " low threshold for CT chest.    fluticasone (FLONASE) 50 MCG/ACT nasal spray, SPRAY 2 SPRAY IN EACH NOSTRIL EVERY DAY, Disp: 48 g, Rfl: 3    2. Type 2 diabetes mellitus with other specified complication, with long-term current use of insulin (HCC)  Chronic, last A1C at goal. FBG under better control with Trulicity 3 mg weekly (see media). Ok to aim for FBG <140. Provided with chart and after visit summary in order to adjust insulin as directed.  Will continue with Synjardy without changes.  - A1C in 3 months. Discussed ideal glucose ranges.     Dulaglutide (TRULICITY) 3 MG/0.5ML Solution Pen-injector, Inject 0.5 mL under the skin every 7 days., Disp: 6 mL, Rfl: 3    Insulin Glargine-yfgn 100 UNIT/ML Solution Pen-injector, Inject 36 Units under the skin every evening., Disp: , Rfl:     SYNJARDY XR 12.5-1000 MG TABLET SR 24 HR, Take 1 Tablet by mouth 2 times a day., Disp: , Rfl:     I spent a total of 30 minutes with record review, exam, communication with the patient, and documentation of this encounter.    Return in about 2 months (around 10/6/2024), or if symptoms worsen or fail to improve, for Diabetes (A1C).    Please note that this dictation was created using voice recognition software. I have made every reasonable attempt to correct obvious errors, but I expect that there are errors of grammar and possibly content that I did not discover before finalizing the note.

## 2024-08-17 ENCOUNTER — NON-PROVIDER VISIT (OUTPATIENT)
Dept: CARDIOLOGY | Facility: MEDICAL CENTER | Age: 79
End: 2024-08-17
Payer: MEDICARE

## 2024-08-17 PROCEDURE — 93294 REM INTERROG EVL PM/LDLS PM: CPT | Performed by: INTERNAL MEDICINE

## 2024-08-18 ENCOUNTER — TELEPHONE (OUTPATIENT)
Dept: SLEEP MEDICINE | Facility: MEDICAL CENTER | Age: 79
End: 2024-08-18
Payer: MEDICARE

## 2024-08-19 NOTE — CARDIAC REMOTE MONITOR - SCAN
Device transmission reviewed. Device demonstrated appropriate function.       Electronically Signed by: Guille Rivas M.D.    8/19/2024  3:23 PM

## 2024-08-28 ENCOUNTER — HOME STUDY (OUTPATIENT)
Dept: SLEEP MEDICINE | Facility: MEDICAL CENTER | Age: 79
End: 2024-08-28
Payer: MEDICARE

## 2024-08-28 DIAGNOSIS — G47.33 OSA ON CPAP: ICD-10-CM

## 2024-08-28 PROCEDURE — G0400 HOME SLEEP TEST/TYPE 4 PORTA: HCPCS | Performed by: STUDENT IN AN ORGANIZED HEALTH CARE EDUCATION/TRAINING PROGRAM

## 2024-09-05 ENCOUNTER — OFFICE VISIT (OUTPATIENT)
Dept: ENDOCRINOLOGY | Facility: MEDICAL CENTER | Age: 79
End: 2024-09-05
Attending: INTERNAL MEDICINE
Payer: MEDICARE

## 2024-09-05 ENCOUNTER — PHARMACY VISIT (OUTPATIENT)
Dept: PHARMACY | Facility: MEDICAL CENTER | Age: 79
End: 2024-09-05
Payer: COMMERCIAL

## 2024-09-05 VITALS
HEART RATE: 94 BPM | SYSTOLIC BLOOD PRESSURE: 130 MMHG | DIASTOLIC BLOOD PRESSURE: 78 MMHG | OXYGEN SATURATION: 96 % | WEIGHT: 201 LBS | HEIGHT: 67 IN | BODY MASS INDEX: 31.55 KG/M2

## 2024-09-05 DIAGNOSIS — E11.65 TYPE 2 DIABETES MELLITUS WITH HYPERGLYCEMIA, WITH LONG-TERM CURRENT USE OF INSULIN (HCC): ICD-10-CM

## 2024-09-05 DIAGNOSIS — Z79.4 TYPE 2 DIABETES MELLITUS WITH HYPERGLYCEMIA, WITH LONG-TERM CURRENT USE OF INSULIN (HCC): ICD-10-CM

## 2024-09-05 DIAGNOSIS — E03.8 SUBCLINICAL HYPOTHYROIDISM: ICD-10-CM

## 2024-09-05 DIAGNOSIS — E55.9 VITAMIN D DEFICIENCY: ICD-10-CM

## 2024-09-05 DIAGNOSIS — E78.5 DYSLIPIDEMIA: ICD-10-CM

## 2024-09-05 DIAGNOSIS — Z79.4 LONG-TERM INSULIN USE (HCC): ICD-10-CM

## 2024-09-05 LAB
HBA1C MFR BLD: 6.2 % (ref ?–5.8)
POCT INT CON NEG: NEGATIVE
POCT INT CON POS: POSITIVE

## 2024-09-05 PROCEDURE — 3078F DIAST BP <80 MM HG: CPT | Performed by: INTERNAL MEDICINE

## 2024-09-05 PROCEDURE — RXMED WILLOW AMBULATORY MEDICATION CHARGE: Performed by: INTERNAL MEDICINE

## 2024-09-05 PROCEDURE — 3075F SYST BP GE 130 - 139MM HG: CPT | Performed by: INTERNAL MEDICINE

## 2024-09-05 PROCEDURE — 99214 OFFICE O/P EST MOD 30 MIN: CPT | Performed by: INTERNAL MEDICINE

## 2024-09-05 PROCEDURE — 99212 OFFICE O/P EST SF 10 MIN: CPT | Performed by: INTERNAL MEDICINE

## 2024-09-05 PROCEDURE — 83036 HEMOGLOBIN GLYCOSYLATED A1C: CPT | Performed by: INTERNAL MEDICINE

## 2024-09-05 RX ORDER — SEMAGLUTIDE 0.68 MG/ML
0.5 INJECTION, SOLUTION SUBCUTANEOUS
Qty: 3 ML | Refills: 0 | Status: SHIPPED | OUTPATIENT
Start: 2024-09-05

## 2024-09-05 RX ORDER — SEMAGLUTIDE 0.68 MG/ML
0.5 INJECTION, SOLUTION SUBCUTANEOUS
Qty: 9 ML | Refills: 3 | Status: SHIPPED | OUTPATIENT
Start: 2024-09-05

## 2024-09-05 ASSESSMENT — FIBROSIS 4 INDEX: FIB4 SCORE: 3.09

## 2024-09-05 NOTE — PROCEDURES
DIAGNOSTIC HOME SLEEP TEST (HST) REPORT WatchPAT      PATIENT ID:  NAME:  Dakota Boyer  MRN:               [id number]  YOB: 1945  DATE OF STUDY: 8/28/2024      Impression:     This study shows evidence of:      1. Does Not Meet Criteria for  obstructive sleep apnea with PAT 4% apnea hypopnea index(pAHI) of 2.3 per hour.  PAT respiratory disturbance index (pRDI) was 8.3 per hour. These findings are based on 7 channels recording of PAT signal with sleep staging, heart rate, pulse oximetry, actigraphy, body position, snoring and respiratory movement.     2. Oxygenation O2 Sat. mean O2 sat was 93%,  mayur was 76% (artifact),  and maximum O2 at 97 %. O2 sat was at or  below 88% for 0.1 min of evaluation time. Oxygen Desaturation (>=4%) Index was 2.3/hr. AVG HR was 82 BPM.      TECHNICAL DESCRIPTION: Patient underwent home sleep apnea testing with peripheral arterial tone signal (WatchPAT™). This is a Type IV portable monitor and device per Medicare. Monitoring was done with 7 channels recording of PAT signal with sleep staging, heart rate, pulse oximetry, actigraphy, body position, snoring and respiratory movement. Prior to using the device, the patient received verbal and written instructions for its application and was provided with the help desk phone number for additional telephonic instruction with 24-hour availability of qualified personnel to answer questions.    Respiratory events:      General sleep summary: . Total recording time is 10 hours and 8 minutes and total Sleep time is 8 hours and 55 minutes. The patient spent 451 minutes in the supine position and 85 minutes in the nonsupine position.      Recommendations:    1.  Did not meet criteria for obstructive sleep apnea with an overall 4% AHI of 2.3 events an hour (less than 5 is considered normal).  If there is suspicion of obstructive sleep apnea patient may benefit from undergoing in lab polysomnography.  Home studies can  underestimate the severity of obstructive sleep apnea.  2. In general patients with sleep apnea are advised to avoid alcohol and sedatives and to not operate a motor vehicle while drowsy. In some cases alternative treatment options may prove effective in resolving sleep apnea in these options include upper airway surgery, the use of a dental orthotic or weight loss and positional therapy. Clinical correlation is required.         Karlos Caceres MD

## 2024-09-05 NOTE — PROGRESS NOTES
CHIEF COMPLAINT: Patient is here for follow up of Type 2 Diabetes Mellitus    HPI:     Dakota Boyer is a 79 y.o. male with Type 2 Diabetes Mellitus here for follow up.    Labs from September 5, 2024 show A1c is 6.3%  Labs from April 4, 2024 show A1c is 6.9%  Labs from 4/6/2023 show a1c is 7.9%  Labs from 3/4/2022 show a1c was 5.7%  Labs from 12/28/2021 show a1c was 6.0%  Labs from Sep 21, 2021 show A1c was 6.9%  Labs from 5/18/2021 show A1c was  6.7%  Labs from 7/1/2020 show A1c was 6.4%      He has a history of coronary artery disease with prior PCI in 2013 and has a pacemaker.  He was previously followed by Dr. Charles in cardiology and is now followed by Dr. Hsu.  Other comorbid conditions include obesity, sleep apnea, hyperlipidemia, hypertension, and nonproliferative diabetic retinopathy OU       On follow-up he is on   Synjardy 12.5/1000 mg twice a day,   Lantus 36 units daily        He ran out of Trulicity 3.0mg weekly again due to [facility] not having the medication in stock  We talked about switching to Ozempic or Mounjaro to permanently replace Trulicity because this is the second time this has happened          He has hyperlipidemia and is taking rosuvastatin   He denies muscle aches or muscle weakness   Latest Reference Range & Units 09/22/23 11:11   Cholesterol,Tot 100 - 199 mg/dL 96 (L)   Triglycerides 0 - 149 mg/dL 207 (H)   HDL >=40 mg/dL 37 !   LDL <100 mg/dL 18   (L): Data is abnormally low  (H): Data is abnormally high  !: Data is abnormal    He has essential hypertension and is taking valsartan.    Blood pressure is at goal  He has new onset albuminuria  UACR was 151 on 1/18/2024  UACR was 230 on 10/2022  UACR was < 30 on 7/2021        He is up-to-date with his eye exams [facility] and last eye exam was on 7/27/2022        He does have subclinical hypothyroidism  TPO antibodies are negative  He is on Levothyroxine 50mcg daily  He denies constipation  TSH is 1.5 on  5/2023  TSH is 1.70 on 10/2022 (levo 50)  TSH was 5.59 with a free T4 on 3/4/2022 (levo 25)  TSH was 3.4 with a free T4 of 2/2/2022  TSH was 6.1 with a free T4 of 1.08 on July 30, 2021  He is currently not on thyroid hormone replacement therapy              BG Diary:  Patient forgot blood sugar logs      Weight has been stable    Diabetes Complications   Retinopathy: Known retinopathy.  Last eye exam: July 2022  Neuropathy: Denies paresthesias or numbness in hands or feet. Denies any foot wounds.  Exercise: Minimal.  Diet: Fair.  Patient's medications, allergies, and social histories were reviewed and updated as appropriate.    ROS:     CONS:     No fever, no chills   EYES:     No diplopia, no blurry vision   CV:           No chest pain, no palpitations   PULM:     No SOB, no cough, no hemoptysis.   GI:            No nausea, no vomiting, no diarrhea, no constipation   ENDO:     No polyuria, no polydipsia, no heat intolerance, no cold intolerance       Past Medical History:  Problem List:  2024-07: Carpal tunnel syndrome  2024-07: Cracked tooth  2024-05: Sensorineural hearing loss, bilateral  2024-05: Incisional hernia, without obstruction or gangrene  2024-05: Chronic cough  2024-03: Meibomian gland dysfunction (MGD) of right upper eyelid  2023-12: Mild nonproliferative diabetic retinopathy of both eyes   without macular edema associated with type 2 diabetes mellitus (HCC)  2023-12: Benign neoplasm of skin of left lower extremity, including   hip  2023-11: Lower urinary tract symptoms  2023-10: Normocytic anemia  2023-10: Thrombocytopenia ([facility])  2023-09: Status post aortic valve replacement  2023-09: Status post primary angioplasty with coronary stent  2023-09: Enlarged thoracic aorta ([facility])  2023-09: Obesity (BMI 30-39.9)  2023-08: Type 2 diabetes mellitus with other specified complication   ([facility])  2023-08: History of falling  2023-08: Generalized weakness  2023-04: Dyslipidemia  2023-04: Pulmonary nodule  2023-03:  Diabetes mellitus with microalbuminuria ([facility])  2022-12: S/P insertion of spinal cord stimulator  2022-12: History of laminectomy  2022-08: Numbness and tingling of both upper extremities  2022-04: Urinary urgency  2022-03: Pancytopenia ([facility])  2022-03: Hypokalemia  2022-03: History of syncope  2022-03: Constipation  2022-03: Black stools  2022-03: Unintentional weight loss  2022-03: Syncope  2022-03: Neutropenia ([facility])  2022-03: Syncope and collapse  2021-11: Former smoker  2021-11: History of bilateral knee replacement  2020-07: Long-term insulin use ([facility])  2020-06: Severe aortic stenosis  2018-12: Bilateral lower extremity edema  2018-08: Prostate cancer ([facility])  2018-01: Elevated PSA  2018-01: Bilateral primary osteoarthritis of knee  2017-06: Altered level of consciousness: March 2017  2016-10: Cervical spondylosis  2016-06: Hypertensive retinopathy of both eyes  2016-03: Depression with anxiety  2016-03: LYNDA (obstructive sleep apnea)  2014-08: Type 2 diabetes mellitus, uncontrolled  2014-08: Subclinical hypothyroidism  2014-08: Class 1 obesity with serious comorbidity and body mass index   (BMI) of 32.0 to 32.9 in adult  2013-11: History of diverticulitis  2013-10: Other dyspnea and respiratory abnormality  2013-09: Fatigue  2013-09: Shortness of breath  2013-08: Coronary artery disease due to calcified coronary lesion  2013-04: Incisional infection  2013-02: DJD (degenerative joint disease) of cervical spine  2012-11: Degeneration of lumbar or lumbosacral intervertebral disc  2012-10: Sick sinus syndrome (HCC)  2012-10: Pacemaker  2012-09: Hyperlipidemia associated with type 2 diabetes mellitus ([facility])  2012-09: Episodic lightheadedness  2012-09: RBBB  2012-04: Back pain, chronic  2012-04: Hypertension associated with type 2 diabetes mellitus ([facility])  2012-04: Neck pain  2012-04: Insomnia      Past Surgical History:  Past Surgical History:   Procedure Laterality Date    TRANSCATHETER AORTIC VALVE REPLACEMENT Bilateral  9/25/2023    Procedure: TRANSCATHETER AORTIC VALVE REPLACEMENT - CONVERTED TO OPEN HEART PROCEDURE;  Surgeon: Puma Hsu M.D.;  Location: SURGERY [facility];  Service: Cardiac    ECHOCARDIOGRAM, TRANSESOPHAGEAL, INTRAOPERATIVE N/A 9/25/2023    Procedure: ECHOCARDIOGRAM, TRANSESOPHAGEAL, INTRAOPERATIVE;  Surgeon: Puma Hsu M.D.;  Location: SURGERY [facility];  Service: Cardiac    AORTIC VALVE REPLACEMENT N/A 9/25/2023    Procedure: REPLACEMENT, AORTIC VALVE AND RETRIEVAL OF FOREIGN BODY,  AORTIC ROOT ENLARGEMENT;  Surgeon: Turner Johnson D.O.;  Location: SURGERY [facility];  Service: Cardiothoracic    SHOULDER ARTHROSCOPY Right 09/21/2023    rotator cuff repair times 2    KNEE ARTHROPLASTY TOTAL Right 2018    NE DSTR NROLYTC AGNT PARVERTEB FCT SNGL CRVCL/THORA Right 10/19/2016    Procedure: NEURO DEST FACET C/T W/IG SNGL - 3ON-C3, C8-T1    SINERGY;  Surgeon: Gaurang Pruett M.D.;  Location: [facility];  Service: Pain Management    NE DSTR NROLYTC AGNT PARVERTEB FCT ADDL CRVCL/THORA  10/19/2016    Procedure: NEURO DEST FACET C/T W/IG ADDL;  Surgeon: Gaurang Pruett M.D.;  Location: [facility];  Service: Pain Management    NE FLUOROSCOPIC GUIDANCE NEEDLE PLACEMENT  10/19/2016    Procedure: FLOURO GUIDE NEEDLE PLACEMENT;  Surgeon: Gaurang Pruett M.D.;  Location: [facility];  Service: Pain Management    SHOULDER ARTHROSCOPY Right 01/01/2015    torn bicep tendon and spurs    RECOVERY  10/17/2013    Performed by Cath-Recovery Surgery at SURGERY SAME DAY [facility] ORS    WOUND DEHISCENCE  04/10/2013    Performed by Tu Jain M.D. at SURGERY [facility] ORS    CERVICAL FUSION POSTERIOR  02/27/2013    Performed by Tu Jain M.D. at SURGERY [facility] ORS    CERVICAL LAMINECTOMY POSTERIOR  02/27/2013    Performed by Tu Jain M.D. at [facility] ORS    LUMBAR LAMINECTOMY DISKECTOMY  11/20/2012    Performed by Tu Jain M.D. at [facility]  [facility] ORS    RECOVERY  10/04/2012    Performed by Cath-Recovery Surgery at SURGERY SAME DAY [facility] ORS    PACEMAKER INSERTION  10/01/2012    [facility]  2110 implanted by Dr. Waite.    SHOULDER DECOMPRESSION Left 2008    Left rotator cuff times 2    ARTHROPLASTY Left 2004    Knee    ARTHROSCOPY, KNEE Bilateral 1978    ORTHOPEDIC OSTEOTOMY      Both knees several times, 8 total    OTHER      SPINAL CORD STIMULATOR  1 month ago    TONSILLECTOMY      VASECTOMY          Allergies:  Aleve cold & [pseudoephedrine-naproxen na]; Ceftriaxone sodium; Naproxen; Latex; and Tape     Social History:  Social History     Tobacco Use    Smoking status: Former     Current packs/day: 0.00     Average packs/day: 1 pack/day for 40.0 years (40.0 ttl pk-yrs)     Types: Cigarettes     Start date: 1/3/1950     Quit date: 1/3/1990     Years since quittin.6    Smokeless tobacco: Never   Vaping Use    Vaping status: Never Used   Substance Use Topics    Alcohol use: No     Comment: Quit     Drug use: Never        Family History:   family history includes Alcohol/Drug in his brother and mother; Cancer in his brother and brother; Diabetes in his brother; Heart Disease in his brother, maternal grandmother, and sister; Heart Disease (age of onset: 36) in his father; Heart Disease (age of onset: 60) in his brother; Hyperlipidemia in his brother and maternal grandmother; Hypertension in his brother and maternal grandmother; Lung Disease in his brother and mother; No Known Problems in his maternal grandfather and paternal grandmother; Sleep Apnea in his brother.      PHYSICAL EXAM:   OBJECTIVE:  Vital signs: /78 (BP Location: Right arm, Patient Position: Sitting, BP Cuff Size: Adult)   Pulse 94   Ht 1.702 m (5' 7\")   Wt 91.2 kg (201 lb)   SpO2 96%   BMI 31.48 kg/m²   GENERAL: Well-developed, well-nourished in no apparent distress.   EYE:  No ocular asymmetry, PERRLA  HENT: Pink, moist mucous " membranes.    NECK: No thyromegaly.   CARDIOVASCULAR:  No murmurs  LUNGS: Clear breath sounds  ABDOMEN: Soft, nontender   EXTREMITIES: No clubbing, cyanosis, or edema.   NEUROLOGICAL: No gross focal motor abnormalities   LYMPH: No cervical adenopathy palpated.   SKIN: No rashes, lesions.       Labs:  Lab Results   Component Value Date/Time    HBA1C 6.2 (A) 09/05/2024 03:13 PM        Lab Results   Component Value Date/Time    WBC 7.2 04/04/2024 12:27 PM    RBC 4.68 (L) 04/04/2024 12:27 PM    HEMOGLOBIN 14.5 04/04/2024 12:27 PM    MCV 86.3 04/04/2024 12:27 PM    MCH 31.0 04/04/2024 12:27 PM    MCHC 35.9 04/04/2024 12:27 PM    RDW 47.3 04/04/2024 12:27 PM    MPV 11.6 04/04/2024 12:27 PM       Lab Results   Component Value Date/Time    SODIUM 137 01/18/2024 11:14 AM    POTASSIUM 3.8 01/18/2024 11:14 AM    CHLORIDE 104 01/18/2024 11:14 AM    CO2 22 01/18/2024 11:14 AM    ANION 11.0 01/18/2024 11:14 AM    GLUCOSE 118 (H) 01/18/2024 11:14 AM    BUN 18 01/18/2024 11:14 AM    CREATININE 1.06 01/18/2024 11:14 AM    CALCIUM 9.7 01/18/2024 11:14 AM    ASTSGOT 26 01/18/2024 11:14 AM    ALTSGPT 21 01/18/2024 11:14 AM    TBILIRUBIN 0.8 01/18/2024 11:14 AM    ALBUMIN 3.9 01/18/2024 11:14 AM    TOTPROTEIN 6.2 01/18/2024 11:14 AM    GLOBULIN 2.3 01/18/2024 11:14 AM    AGRATIO 1.7 01/18/2024 11:14 AM       Lab Results   Component Value Date/Time    CHOLSTRLTOT 94 (L) 05/13/2019 0711    TRIGLYCERIDE 120 05/13/2019 0711    HDL 30 (A) 05/13/2019 0711    LDL 40 05/13/2019 0711       Lab Results   Component Value Date/Time    MALBCRT 151 (H) 01/18/2024 11:14 AM    MICROALBUR 10.4 01/18/2024 11:14 AM        Lab Results   Component Value Date/Time    TSHULTRASEN 3.650 10/08/2019 1546     No results found for: FREEDIR  Lab Results   Component Value Date/Time    FREET3 3.6 07/22/2014 0743     No results found for: THYSTIMIG        ASSESSMENT/PLAN:     1. Type 2 diabetes mellitus with hyperglycemia, with long-term current use of insulin  (HCC)  Well-controlled   A1c is 6.3%  Continue Lantus 36 units daily  Stop Trulicity  Start Ozempic 0.25 mg weekly and then after 1 month go to 0.5 mg weekly  Continue Synjardy twice a day  He needs to complete labs for his lipids and thyroid this week  Follow-up in 3 months to reassess A1c while on Ozempic    2. Subclinical hypothyroidism  Controlled   Continue Levothyroxine 50 MCG daily  Reviewed how to properly take levothyroxine  Get thyroid labs today    3. Dyslipidemia  Controlled  Continue atorvastatin  Get fasting lipids this week    4. Long-term insulin use (HCC)  Patient is on long-term basal insulin therapy with a GLP-1 and SGLT2 inhibitor for type 2 diabetes management      Return in about 3 months (around 12/5/2024).      Thank you kindly for allowing me to participate in the diabetes care plan for this patient.    Luis Daniel Ring MD, JORGE A, NU      CC:   Tanya Rayo P.A.-C.

## 2024-09-10 ENCOUNTER — TELEPHONE (OUTPATIENT)
Dept: CARDIOLOGY | Facility: MEDICAL CENTER | Age: 79
End: 2024-09-10
Payer: MEDICARE

## 2024-09-10 NOTE — TELEPHONE ENCOUNTER
Patient transferred to me from  to r/s his 1yr post AVR echo because he has a [facility] app[facility] 9/26 at 1300. Rescheduled echo for 1515 9/26.

## 2024-09-23 ENCOUNTER — HOSPITAL ENCOUNTER (OUTPATIENT)
Dept: LAB | Facility: MEDICAL CENTER | Age: 79
End: 2024-09-23
Attending: NURSE PRACTITIONER
Payer: MEDICARE

## 2024-09-23 DIAGNOSIS — E78.49 OTHER HYPERLIPIDEMIA: ICD-10-CM

## 2024-09-23 PROCEDURE — 80061 LIPID PANEL: CPT

## 2024-09-23 PROCEDURE — 36415 COLL VENOUS BLD VENIPUNCTURE: CPT

## 2024-09-24 ENCOUNTER — TELEPHONE (OUTPATIENT)
Dept: CARDIOLOGY | Facility: MEDICAL CENTER | Age: 79
End: 2024-09-24
Payer: MEDICARE

## 2024-09-24 DIAGNOSIS — E78.49 OTHER HYPERLIPIDEMIA: ICD-10-CM

## 2024-09-24 LAB
CHOLEST SERPL-MCNC: 178 MG/DL (ref 100–199)
FASTING STATUS PATIENT QL REPORTED: NORMAL
HDLC SERPL-MCNC: 46 MG/DL
LDLC SERPL CALC-MCNC: 109 MG/DL
TRIGL SERPL-MCNC: 116 MG/DL (ref 0–149)

## 2024-09-24 RX ORDER — ROSUVASTATIN CALCIUM 40 MG/1
40 TABLET, COATED ORAL EVERY EVENING
Qty: 90 TABLET | Refills: 3 | Status: SHIPPED | OUTPATIENT
Start: 2024-09-24

## 2024-09-24 NOTE — TELEPHONE ENCOUNTER
Phone Number Called: [phone]    Call outcome: Spoke to patient regarding message below.    Message: Called to ask if pt has been taking his statin. Pt stated he had no refills left and stopped taking it. Advised it is recommended to continue taking statin. Pt also reported forgetting to fast for labs and had hot chocolate prior to getting his labs drawn.     To SC confirming you would like Rosuvastatin 40 mg tablet QPM? ~thank you

## 2024-09-24 NOTE — TELEPHONE ENCOUNTER
RAJ Tsai.  You58 minutes ago (12:57 PM)       Yes please. Thank you!    Repeat lipid in 6 months. SC

## 2024-09-24 NOTE — TELEPHONE ENCOUNTER
----- Message from Nurse Practitioner KAYE Rodriguez sent at 9/24/2024 10:24 AM PDT -----  Is he taking his statin? LDL goal <70 with CAD hx.    Recommend add zetia 10 mg QPM for LDL not at goal if he is taking his statin.    If not taking statin, recommend compliance and repeat lipid in 6 months. SC

## 2024-09-26 ENCOUNTER — APPOINTMENT (OUTPATIENT)
Dept: CARDIOLOGY | Facility: MEDICAL CENTER | Age: 79
End: 2024-09-26
Attending: INTERNAL MEDICINE
Payer: MEDICARE

## 2024-09-26 DIAGNOSIS — I35.0 NONRHEUMATIC AORTIC (VALVE) STENOSIS: ICD-10-CM

## 2024-09-26 LAB
LV EJECT FRACT  99904: 63
LV EJECT FRACT MOD 2C 99903: 57.41
LV EJECT FRACT MOD 4C 99902: 67.37
LV EJECT FRACT MOD BP 99901: 62.95

## 2024-09-26 PROCEDURE — 93306 TTE W/DOPPLER COMPLETE: CPT

## 2024-09-26 PROCEDURE — 93306 TTE W/DOPPLER COMPLETE: CPT | Mod: 26 | Performed by: INTERNAL MEDICINE

## 2024-10-17 ENCOUNTER — TELEPHONE (OUTPATIENT)
Dept: CARDIOLOGY | Facility: MEDICAL CENTER | Age: 79
End: 2024-10-17
Payer: MEDICARE

## 2024-10-23 ENCOUNTER — TELEPHONE (OUTPATIENT)
Dept: ENDOCRINOLOGY | Facility: MEDICAL CENTER | Age: 79
End: 2024-10-23
Payer: MEDICARE

## 2024-10-28 ENCOUNTER — OFFICE VISIT (OUTPATIENT)
Dept: SLEEP MEDICINE | Facility: MEDICAL CENTER | Age: 79
End: 2024-10-28
Attending: NURSE PRACTITIONER
Payer: MEDICARE

## 2024-10-28 VITALS
HEART RATE: 95 BPM | OXYGEN SATURATION: 97 % | WEIGHT: 208 LBS | HEIGHT: 66 IN | BODY MASS INDEX: 33.43 KG/M2 | SYSTOLIC BLOOD PRESSURE: 122 MMHG | DIASTOLIC BLOOD PRESSURE: 58 MMHG

## 2024-10-28 DIAGNOSIS — G47.33 OSA ON CPAP: ICD-10-CM

## 2024-10-28 PROCEDURE — 99214 OFFICE O/P EST MOD 30 MIN: CPT | Performed by: NURSE PRACTITIONER

## 2024-10-28 PROCEDURE — 99213 OFFICE O/P EST LOW 20 MIN: CPT | Performed by: NURSE PRACTITIONER

## 2024-10-28 PROCEDURE — 3078F DIAST BP <80 MM HG: CPT | Performed by: NURSE PRACTITIONER

## 2024-10-28 PROCEDURE — 3074F SYST BP LT 130 MM HG: CPT | Performed by: NURSE PRACTITIONER

## 2024-10-28 RX ORDER — VITAMIN B COMPLEX
CAPSULE ORAL
COMMUNITY
Start: 2024-07-10

## 2024-10-28 RX ORDER — GABAPENTIN 300 MG/1
300 CAPSULE ORAL
COMMUNITY
Start: 2024-09-03

## 2024-10-28 RX ORDER — ATORVASTATIN CALCIUM 80 MG/1
80 TABLET, FILM COATED ORAL
COMMUNITY
Start: 2024-07-23 | End: 2024-10-30

## 2024-10-28 ASSESSMENT — FIBROSIS 4 INDEX: FIB4 SCORE: 3.09

## 2024-10-30 ENCOUNTER — OFFICE VISIT (OUTPATIENT)
Dept: CARDIOLOGY | Facility: MEDICAL CENTER | Age: 79
End: 2024-10-30
Attending: NURSE PRACTITIONER
Payer: MEDICARE

## 2024-10-30 ENCOUNTER — DOCUMENTATION (OUTPATIENT)
Dept: CARDIOLOGY | Facility: MEDICAL CENTER | Age: 79
End: 2024-10-30

## 2024-10-30 VITALS
DIASTOLIC BLOOD PRESSURE: 50 MMHG | HEART RATE: 91 BPM | BODY MASS INDEX: 32.95 KG/M2 | OXYGEN SATURATION: 96 % | HEIGHT: 66 IN | SYSTOLIC BLOOD PRESSURE: 104 MMHG | RESPIRATION RATE: 16 BRPM | WEIGHT: 205 LBS

## 2024-10-30 DIAGNOSIS — E11.69 TYPE 2 DIABETES MELLITUS WITH OTHER SPECIFIED COMPLICATION, WITH LONG-TERM CURRENT USE OF INSULIN (HCC): ICD-10-CM

## 2024-10-30 DIAGNOSIS — Z95.2 STATUS POST AORTIC VALVE REPLACEMENT: ICD-10-CM

## 2024-10-30 DIAGNOSIS — I25.10 CORONARY ARTERY DISEASE DUE TO CALCIFIED CORONARY LESION: ICD-10-CM

## 2024-10-30 DIAGNOSIS — G47.33 OSA (OBSTRUCTIVE SLEEP APNEA): ICD-10-CM

## 2024-10-30 DIAGNOSIS — E66.09 CLASS 1 OBESITY DUE TO EXCESS CALORIES WITH SERIOUS COMORBIDITY AND BODY MASS INDEX (BMI) OF 33.0 TO 33.9 IN ADULT: ICD-10-CM

## 2024-10-30 DIAGNOSIS — Z87.891 FORMER SMOKER: ICD-10-CM

## 2024-10-30 DIAGNOSIS — I25.84 CORONARY ARTERY DISEASE DUE TO CALCIFIED CORONARY LESION: ICD-10-CM

## 2024-10-30 DIAGNOSIS — I45.10 RIGHT BUNDLE BRANCH BLOCK: ICD-10-CM

## 2024-10-30 DIAGNOSIS — E66.811 CLASS 1 OBESITY DUE TO EXCESS CALORIES WITH SERIOUS COMORBIDITY AND BODY MASS INDEX (BMI) OF 33.0 TO 33.9 IN ADULT: ICD-10-CM

## 2024-10-30 DIAGNOSIS — Z79.4 TYPE 2 DIABETES MELLITUS WITH OTHER SPECIFIED COMPLICATION, WITH LONG-TERM CURRENT USE OF INSULIN (HCC): ICD-10-CM

## 2024-10-30 DIAGNOSIS — Z95.0 PACEMAKER: ICD-10-CM

## 2024-10-30 DIAGNOSIS — E78.5 HYPERLIPIDEMIA ASSOCIATED WITH TYPE 2 DIABETES MELLITUS (HCC): ICD-10-CM

## 2024-10-30 DIAGNOSIS — E11.69 HYPERLIPIDEMIA ASSOCIATED WITH TYPE 2 DIABETES MELLITUS (HCC): ICD-10-CM

## 2024-10-30 PROCEDURE — 99212 OFFICE O/P EST SF 10 MIN: CPT | Performed by: NURSE PRACTITIONER

## 2024-10-30 RX ORDER — LIDOCAINE 50 MG/G
OINTMENT TOPICAL DAILY
COMMUNITY

## 2024-10-30 ASSESSMENT — ENCOUNTER SYMPTOMS
MYALGIAS: 0
DIZZINESS: 0
ABDOMINAL PAIN: 0
ORTHOPNEA: 0
PALPITATIONS: 0
FEVER: 0
BACK PAIN: 1
PND: 0
COUGH: 0
CLAUDICATION: 0
SHORTNESS OF BREATH: 0

## 2024-10-30 ASSESSMENT — FIBROSIS 4 INDEX: FIB4 SCORE: 3.09

## 2024-11-06 ENCOUNTER — APPOINTMENT (OUTPATIENT)
Dept: MEDICAL GROUP | Facility: LAB | Age: 79
End: 2024-11-06
Payer: MEDICARE

## 2024-11-06 VITALS
RESPIRATION RATE: 20 BRPM | OXYGEN SATURATION: 96 % | TEMPERATURE: 97.2 F | SYSTOLIC BLOOD PRESSURE: 110 MMHG | BODY MASS INDEX: 32.47 KG/M2 | HEIGHT: 66 IN | WEIGHT: 202 LBS | DIASTOLIC BLOOD PRESSURE: 64 MMHG | HEART RATE: 98 BPM

## 2024-11-06 DIAGNOSIS — E78.5 HYPERLIPIDEMIA ASSOCIATED WITH TYPE 2 DIABETES MELLITUS (HCC): ICD-10-CM

## 2024-11-06 DIAGNOSIS — E11.69 HYPERLIPIDEMIA ASSOCIATED WITH TYPE 2 DIABETES MELLITUS (HCC): ICD-10-CM

## 2024-11-06 PROCEDURE — 3074F SYST BP LT 130 MM HG: CPT | Performed by: STUDENT IN AN ORGANIZED HEALTH CARE EDUCATION/TRAINING PROGRAM

## 2024-11-06 PROCEDURE — 3078F DIAST BP <80 MM HG: CPT | Performed by: STUDENT IN AN ORGANIZED HEALTH CARE EDUCATION/TRAINING PROGRAM

## 2024-11-06 PROCEDURE — 99213 OFFICE O/P EST LOW 20 MIN: CPT | Performed by: STUDENT IN AN ORGANIZED HEALTH CARE EDUCATION/TRAINING PROGRAM

## 2024-11-06 RX ORDER — ATORVASTATIN CALCIUM 80 MG/1
80 TABLET, FILM COATED ORAL EVERY EVENING
COMMUNITY

## 2024-11-06 ASSESSMENT — FIBROSIS 4 INDEX: FIB4 SCORE: 3.09

## 2024-11-06 ASSESSMENT — ENCOUNTER SYMPTOMS
WEIGHT LOSS: 0
COUGH: 0
DIARRHEA: 0
NAUSEA: 0
FEVER: 0
SHORTNESS OF BREATH: 0
VOMITING: 0
CHILLS: 0
BACK PAIN: 1
ABDOMINAL PAIN: 0

## 2024-11-06 NOTE — PROGRESS NOTES
Verbal consent was acquired by the patient to use [employer] ambient listening note generation during this visit Yes.    Subjective:     Chief Complaint   Patient presents with    Follow-Up     HPI:   Gerry is a 79 y.o. male who presents today for follow-up.    History of Present Illness  The patient is an 79-year-old male here for a follow-up visit. He is accompanied by an adult female.    He reports a decrease in his PSA level from 10.6 to 4.1 following an injection (unsure of what, followed [facility]). He has discussed the possibility of a prostate scan with his urologist.    He is currently on a regimen of Ozempic 0.5, Synjardy twice daily, and insulin at a dose of 36 units. Followed by [facility] provider for this as well, no DM concerns. He had an eye examination two weeks ago, which revealed minor changes and he believes mild macular degeneration. He has been diagnosed with cataracts. Reports they recommended f/u in 1 yr.     Not taking crestor 40mg, back on atorvastatin 80 mg nightly prescribed by [facility]. States he was out of medication when last checked in 9/2024 (ran out) and the [facility] has been checking his labs.    He was advised to take gabapentin three times a day, but due to adverse effects, he now takes it only once at night. He has discontinued flomax due to urinary incontinence and is currently doing well from a urinary standpoint without it.    He no longer qualifies for a sleep apnea diagnosis, reports recheck showed no LYNDA.    Review of Systems   Constitutional:  Negative for chills, fever, malaise/fatigue and weight loss.   Respiratory:  Negative for cough and shortness of breath.    Cardiovascular:  Negative for chest pain.   Gastrointestinal:  Negative for abdominal pain, diarrhea, nausea and vomiting.   Musculoskeletal:  Positive for back pain.   Skin:  Negative for rash.       Objective:     Exam:  /64 (BP Location: Left arm, Patient Position: Sitting, BP Cuff Size: Adult)   Pulse 98   Temp  "36.2 °C (97.2 °F) (Temporal)   Resp 20   Ht 1.676 m (5' 6\")   Wt 91.6 kg (202 lb)   SpO2 96%   BMI 32.60 kg/m²  Body mass index is 32.6 kg/m².    Physical Exam  Vitals reviewed.   Constitutional:       General: He is not in acute distress.     Appearance: Normal appearance. He is not ill-appearing.   HENT:      Head: Normocephalic and atraumatic.      Nose: Nose normal.      Mouth/Throat:      Mouth: Mucous membranes are moist.   Cardiovascular:      Rate and Rhythm: Normal rate and regular rhythm.      Heart sounds: Normal heart sounds. No murmur heard.  Pulmonary:      Effort: Pulmonary effort is normal. No respiratory distress.      Breath sounds: Normal breath sounds. No wheezing, rhonchi or rales.   Musculoskeletal:      Cervical back: Normal range of motion and neck supple.      Right lower leg: No edema.      Left lower leg: No edema.   Skin:     General: Skin is warm and dry.   Neurological:      Mental Status: He is alert and oriented to person, place, and time. Mental status is at baseline.   Psychiatric:         Mood and Affect: Mood normal.         Behavior: Behavior normal.         Thought Content: Thought content normal.       Labs: Reviewed from 4/4/2024, 9/5/2024, 9/23/2024    Assessment & Plan:     79 y.o. male with the following -     1. Hyperlipidemia associated with type 2 diabetes mellitus (HCC)  Chronic, uncontrolled off statin which he has resume. Continue medication(s) as below. Reports that he [facility] with [facility], patient to share his lab results to update his [facility] chart.    atorvastatin (LIPITOR) 80 MG tablet, Take 80 mg by mouth every evening., Disp: , Rfl:     I spent a total of 23 minutes with record review, exam, communication with the patient, and documentation of this encounter.    Return in about 28 weeks (around 5/21/2025), or if symptoms worsen or fail to improve, for Annual Medicare Visit.    Please note that this dictation was created using voice recognition software. I have " made every reasonable attempt to correct obvious errors, but I expect that there are errors of grammar and possibly content that I did not discover before finalizing the note.

## 2024-11-06 NOTE — PATIENT INSTRUCTIONS
Share your [facility] labs with me and the eye exam findings    Vaccines due that can be received only at pharmacy:  COVID  RSV (respiratory syncytial virus)

## 2024-11-16 ENCOUNTER — NON-PROVIDER VISIT (OUTPATIENT)
Dept: CARDIOLOGY | Facility: MEDICAL CENTER | Age: 79
End: 2024-11-16
Payer: MEDICARE

## 2024-11-18 PROCEDURE — 93294 REM INTERROG EVL PM/LDLS PM: CPT | Performed by: INTERNAL MEDICINE

## 2024-11-19 NOTE — CARDIAC REMOTE MONITOR - SCAN
Device transmission reviewed. Device demonstrated appropriate function.       Electronically Signed by: Gideon Perla M.D.    11/30/2024  9:31 PM

## 2024-12-06 ENCOUNTER — TELEPHONE (OUTPATIENT)
Dept: CARDIOLOGY | Facility: MEDICAL CENTER | Age: 79
End: 2024-12-06
Payer: MEDICARE

## 2025-01-02 ENCOUNTER — OFFICE VISIT (OUTPATIENT)
Dept: MEDICAL GROUP | Facility: PHYSICIAN GROUP | Age: 80
End: 2025-01-02
Payer: MEDICARE

## 2025-01-02 VITALS
HEART RATE: 98 BPM | BODY MASS INDEX: 32.62 KG/M2 | HEIGHT: 66 IN | TEMPERATURE: 97.6 F | OXYGEN SATURATION: 96 % | RESPIRATION RATE: 14 BRPM | DIASTOLIC BLOOD PRESSURE: 62 MMHG | SYSTOLIC BLOOD PRESSURE: 104 MMHG | WEIGHT: 203 LBS

## 2025-01-02 DIAGNOSIS — E66.811 CLASS 1 OBESITY DUE TO EXCESS CALORIES WITH SERIOUS COMORBIDITY AND BODY MASS INDEX (BMI) OF 32.0 TO 32.9 IN ADULT: ICD-10-CM

## 2025-01-02 DIAGNOSIS — E66.09 CLASS 1 OBESITY DUE TO EXCESS CALORIES WITH SERIOUS COMORBIDITY AND BODY MASS INDEX (BMI) OF 32.0 TO 32.9 IN ADULT: ICD-10-CM

## 2025-01-02 DIAGNOSIS — J06.9 ACUTE URI: ICD-10-CM

## 2025-01-02 DIAGNOSIS — E11.69 TYPE 2 DIABETES MELLITUS WITH OTHER SPECIFIED COMPLICATION, WITH LONG-TERM CURRENT USE OF INSULIN (HCC): ICD-10-CM

## 2025-01-02 DIAGNOSIS — R05.1 ACUTE COUGH: ICD-10-CM

## 2025-01-02 DIAGNOSIS — J02.9 ACUTE PHARYNGITIS, UNSPECIFIED ETIOLOGY: ICD-10-CM

## 2025-01-02 DIAGNOSIS — Z79.4 TYPE 2 DIABETES MELLITUS WITH OTHER SPECIFIED COMPLICATION, WITH LONG-TERM CURRENT USE OF INSULIN (HCC): ICD-10-CM

## 2025-01-02 PROCEDURE — 3074F SYST BP LT 130 MM HG: CPT

## 2025-01-02 PROCEDURE — 3078F DIAST BP <80 MM HG: CPT

## 2025-01-02 PROCEDURE — 99214 OFFICE O/P EST MOD 30 MIN: CPT

## 2025-01-02 RX ORDER — BENZONATATE 100 MG/1
100 CAPSULE ORAL 3 TIMES DAILY PRN
Qty: 60 CAPSULE | Refills: 0 | Status: SHIPPED | OUTPATIENT
Start: 2025-01-02

## 2025-01-02 ASSESSMENT — PATIENT HEALTH QUESTIONNAIRE - PHQ9: CLINICAL INTERPRETATION OF PHQ2 SCORE: 0

## 2025-01-02 ASSESSMENT — FIBROSIS 4 INDEX: FIB4 SCORE: 3.09

## 2025-01-02 NOTE — ASSESSMENT & PLAN NOTE
Chronic, stable. Seeing [facility] for ongoing [facility] management.   Currently taking semaglutide 0.5 mg weekly, insulin 36 units nighty, synjardy xr 12.5-1000 mg BID  Continue this.

## 2025-01-03 NOTE — PROGRESS NOTES
"Chief Complaint   Patient presents with    Sore Throat     Congestion,x3d        HPI: Patient is a 79 y.o. male complaining of 3 days of illness including: dry cough, rhinorrhea, sore throat.   Mucus is: thin.  Similarly ill exposures: yes.  Treatments tried: otc cough/cold medicne  He  reports that he quit smoking about 35 years ago. His smoking use included cigarettes. He started smoking about 75 years ago. He has a 40 pack-year smoking history. He has never used smokeless tobacco..     ROS:  No fever, nausea, changes in bowel movements or skin rash. Positive for cough, sore throat, runny nose.     I reviewed the patient's medications, allergies and medical history:  Current Outpatient Medications   Medication Sig Dispense Refill    benzonatate (TESSALON) 100 MG Cap Take 1 Capsule by mouth 3 times a day as needed for Cough. 60 Capsule 0    atorvastatin (LIPITOR) 80 MG tablet Take 80 mg by mouth every evening.      gabapentin (NEURONTIN) 300 MG Cap Take 300 mg by mouth every evening.      B Complex Vitamins (VITAMIN B COMPLEX) Cap Take  by mouth.      Ferrous Sulfate Dried (HIGH POTENCY IRON) 65 MG Tab Take  by mouth.      Semaglutide,0.25 or 0.5MG/DOS, (OZEMPIC, 0.25 OR 0.5 MG/DOSE,) 2 MG/3ML Solution Pen-injector Inject 0.5 mg under the skin every 7 days. Trulicity is out of stock multiple times 9 mL 3    Insulin Glargine-yfgn 100 UNIT/ML Solution Pen-injector Inject 36 Units under the skin every evening.      SYNJARDY XR 12.5-1000 MG TABLET SR 24 HR Take 1 Tablet by mouth 2 times a day.      Insulin Pen Needle (PEN NEEDLES 31GX5/16\") 31G X 8 MM Misc 1 Each every day. 100 Each 3    aspirin EC (ECOTRIN) 81 MG TBEC Take 81 mg by mouth every evening.      lidocaine (XYLOCAINE) 5 % Ointment Apply  topically every day.       No current facility-administered medications for this visit.     Aleve cold & [pseudoephedrine-naproxen na], Ceftriaxone sodium, Naproxen, Vancomycin, Flomax [tamsulosin], Latex, and Tape  Past " Medical History:   Diagnosis Date    Anesthesia 09/21/2023    PONV times 1    Anxiety     Aortic stenosis 03/2022    Echocardiogram with normal LV size, mild concentric LVH, LVEF 75%. Normal RA, LA and RV. Trace MR. Moderate AS (peak 37mmHg, mean 24mmHg, Vmax 3.0m/s, GUY 1.1cm2). Trace TR.    Back pain, chronic 09/21/2023    bilateral legs as well    Bilateral primary osteoarthritis of knee 01/23/2018    Black stools 03/09/2022    Blood clotting disorder (HCC) 1978    s/p Left knee surgery- DVT in left leg    Breath shortness 09/21/2023    with exertion    CAD (coronary artery disease) 11/2021    PCI/SWETHA x 2 ([place] 3.0 x 38mm, [place] 3.5 x 38mm) to the RCA.    Cancer (Formerly [facility]) ? early 90's    Melanoma Left arm- surgically removed    Cancer (Formerly [facility]) 09/21/2023    prostate cancer    Cataract 09/21/2023    small cataracts    Degeneration of lumbar or lumbosacral intervertebral disc      Diabetes     Oral agents and insulin    DJD (degenerative joint disease) of cervical spine      High cholesterol 09/21/2023    medicated    Hyperlipidemia     Hypersensitive carotid sinus syndrome      Hypertension 09/21/2023    medicated    Hypertension associated with type 2 diabetes mellitus (Formerly [facility]) 04/10/2012    Insomnia      Microalbuminuria 03/15/2023    Neck pain     Neutropenia (Formerly [facility]) 03/04/2022    Normocytic anemia 10/12/2023    LYNDA (obstructive sleep apnea) 03/28/2016    [facility]         Pacemaker 10/2012    [facility] [employer] [address] #[id number] implanted by [facility]     Pancytopenia (Formerly [facility]) 03/09/2022    PONV (postoperative nausea and vomiting) 09/21/2023    PONV times one    Psychiatric problem     denies    RBBB      S/P lumbar discectomy      Severe aortic stenosis 06/03/2020 March 2022: Echocardiogram with normal LV size, mild concentric LVH, LVEF 75%. Normal RA, LA and RV. Trace MR. Moderate AS (peak 37mmHg, mean 24mmHg, Vmax 3.0m/s, GUY 1.1cm2). Trace TR.  Angiogram 7/11/2023: severe AS    Sick sinus syndrome (HCC)       "Sleep apnea 09/21/2023    not using CPAP at present    Snoring 09/21/2023    Subclinical hypothyroidism 08/21/2014    Syncope 10/2012    Treated with PPM    Thrombocytopenia (HCC) 10/12/2023    Thyroid disease 09/21/2023    medicated    Unintentional weight loss 03/05/2022    Unspecified hemorrhagic conditions     Reports bleeds easily        EXAM:  /62 (BP Location: Left arm, Patient Position: Sitting, BP Cuff Size: Adult)   Pulse 98   Temp 36.4 °C (97.6 °F) (Temporal)   Resp 14   Ht 1.676 m (5' 6\")   Wt 92.1 kg (203 lb)   SpO2 96%   General: Alert, no conversational dyspnea or audible wheeze, non-toxic appearance.  Eyes: PERRL, conjunctiva slightly injected, no eye discharge.  Ears: Normal pinnae,TM's normal bilaterally.  Nares: Patent with thin mucus.  Sinuses: non tender over maxillary / frontal sinuses.  Throat: Erythematous injection without exudate.   Neck: Supple, with no adenopathy.  Lungs: Clear to auscultation bilaterally, no wheeze, crackles or rhonchi.   Heart: Regular rate without murmur.  Skin: Warm and dry without rash.     ASSESSMENT:   1. Class 1 obesity due to excess calories with serious comorbidity and body mass index (BMI) of 32.0 to 32.9 in adult    2. Type 2 diabetes mellitus with other specified complication, with long-term current use of insulin (HCC)    3. Acute cough    4. Acute pharyngitis, unspecified etiology    5. Acute URI         Problem List Items Addressed This Visit       Class 1 obesity with serious comorbidity and body mass index (BMI) of 32.0 to 32.9 in adult    Relevant Orders    Patient identified as having weight management issue.  Appropriate orders and counseling given.    Type 2 diabetes mellitus with other specified complication (HCC)     Chronic, stable. [facility].   Currently taking semaglutide 0.5 mg weekly, insulin 36 units nighty, synjardy xr 12.5-1000 mg BID  Continue this.             Other Visit Diagnoses       Acute " cough        Relevant Medications    benzonatate (TESSALON) 100 MG Cap    Acute pharyngitis, unspecified etiology        Acute URI                   PLAN:  1. Educated patient that majority of upper respiratory infections are viral and do not need antibiotics.   2. Twice daily use of nasal saline rinse or Neti-Pot.  3. OTC anti-pyretics and decongestants as needed.  4. Follow-up in office or urgent care for worsening symptoms, difficulty breathing, lack of expected recovery, or should new symptoms or problems arise.

## 2025-01-03 NOTE — PATIENT INSTRUCTIONS
Viral Symptom Relief for Adults  You have been diagnosed with a viral illness.  Antibiotics do not cure viral infections. [facility] is committed to treating your illness in the best way possible and that includes avoiding antibiotics when they are likely to do more harm than good. The treatments recommended below will help you feel better while your body’s own defenses are fighting the virus    General instructions:  Stay hydrated  Use a cool mist vaporizer to relieve congestion    Specific medications:   Cough   Suppressant: None    Expectorant: Guaifenesin extended release 600 mg tablets  (Mucinex, Mucus Relief)       Headache; ear, throat, muscle, or joint pain; or fever   Alternate acetaminophen and ibuprofen every 4 hours      Sore throat   Menthol lozenges (Cepacol, Vicks VapoDrops)     Nasal congestion   Nasal Spray: Sodium chloride (saline) nasal spray (Ocean, Simply Saline)    By mouth: None      Runny nose, itchy and watery eyes, and sneezing   Loratadine 10 mg tablets (Claritin)      Important Medication Instructions   Use medicines according to package instructions or as directed by your healthcare provider.    Stop the medications when symptoms improve.    Brand names are listed convenience; any brand may be utilized as long as it has the same active ingredient     Follow-up:   If not improved in 5 days, new symptoms occur, or you have other concerns please call or return for a recheck.    Cough, Adult  Coughing is a reflex that clears your throat and your airways (respiratory system). Coughing helps to heal and protect your lungs. It is normal to cough occasionally, but a cough that happens with other symptoms or lasts a long time may be a sign of a condition that needs treatment. An acute cough may only last 2-3 weeks, while a chronic cough may last 8 or more weeks.  Coughing is commonly caused by:  Infection of the respiratory systemby viruses or bacteria.  Breathing in substances that irritate your  lungs.  Allergies.  Asthma.  Mucus that runs down the back of your throat (postnasal drip).  Smoking.  Acid backing up from the stomach into the esophagus (gastroesophageal reflux).  Certain medicines.  Chronic lung problems.  Other medical conditions such as heart failure or a blood clot in the lung (pulmonary embolism).  Follow these instructions at home:  Medicines  Take over-the-counter and prescription medicines only as told by your health care provider.  Talk with your health care provider before you take a cough suppressant medicine.  Lifestyle    Avoid cigarette smoke. Do not use any products that contain nicotine or tobacco, such as cigarettes, e-cigarettes, and chewing tobacco. If you need help quitting, ask your health care provider.  Drink enough fluid to keep your urine pale yellow.  Avoid caffeine.  Do not drink alcohol if your health care provider tells you not to drink.  General instructions    Pay close attention to changes in your cough. Tell your health care provider about them.  Always cover your mouth when you cough.  Avoid things that make you cough, such as perfume, candles, cleaning products, or campfire or tobacco smoke.  If the air is dry, use a cool mist vaporizer or humidifier in your bedroom or your home to help loosen secretions.  If your cough is worse at night, try to sleep in a semi-upright position.  Rest as needed.  Keep all follow-up visits as told by your health care provider. This is important.  Contact a health care provider if you:  Have new symptoms.  Cough up pus.  Have a cough that does not get better after 2-3 weeks or gets worse.  Cannot control your cough with cough suppressant medicines and you are losing sleep.  Have pain that gets worse or pain that is not helped with medicine.  Have a fever.  Have unexplained weight loss.  Have night sweats.  Get help right away if:  You cough up blood.  You have difficulty breathing.  Your heartbeat is very fast.  These symptoms may  represent a serious problem that is an emergency. Do not wait to see if the symptoms will go away. Get medical help right away. Call your local emergency services (911 in the [place] Do not drive yourself to the hospital.  Summary  Coughing is a reflex that clears your throat and your airways. It is normal to cough occasionally, but a cough that happens with other symptoms or lasts a long time may be a sign of a condition that needs treatment.  Take over-the-counter and prescription medicines only as told by your health care provider.  Always cover your mouth when you cough.  Contact a health care provider if you have new symptoms or a cough that does not get better after 2-3 weeks or gets worse.  This information is not intended to replace advice given to you by your health care provider. Make sure you discuss any questions you have with your health care provider.  Document Revised: 01/06/2020 Document Reviewed: 01/06/2020  Elsevier Patient Education © 2023 [employer]

## 2025-05-20 ENCOUNTER — OFFICE VISIT (OUTPATIENT)
Dept: MEDICAL GROUP | Facility: LAB | Age: 80
End: 2025-05-20
Payer: MEDICARE

## 2025-05-20 ENCOUNTER — RESULTS FOLLOW-UP (OUTPATIENT)
Dept: MEDICAL GROUP | Facility: LAB | Age: 80
End: 2025-05-20

## 2025-05-20 VITALS
HEART RATE: 61 BPM | WEIGHT: 198.1 LBS | SYSTOLIC BLOOD PRESSURE: 118 MMHG | BODY MASS INDEX: 31.84 KG/M2 | RESPIRATION RATE: 16 BRPM | OXYGEN SATURATION: 97 % | HEIGHT: 66 IN | DIASTOLIC BLOOD PRESSURE: 62 MMHG | TEMPERATURE: 96.5 F

## 2025-05-20 DIAGNOSIS — E11.69 TYPE 2 DIABETES MELLITUS WITH OTHER SPECIFIED COMPLICATION, WITH LONG-TERM CURRENT USE OF INSULIN (HCC): Primary | ICD-10-CM

## 2025-05-20 DIAGNOSIS — J06.9 UPPER RESPIRATORY TRACT INFECTION, UNSPECIFIED TYPE: ICD-10-CM

## 2025-05-20 DIAGNOSIS — Z79.4 TYPE 2 DIABETES MELLITUS WITH OTHER SPECIFIED COMPLICATION, WITH LONG-TERM CURRENT USE OF INSULIN (HCC): Primary | ICD-10-CM

## 2025-05-20 LAB
FLUAV RNA SPEC QL NAA+PROBE: NEGATIVE
FLUBV RNA SPEC QL NAA+PROBE: NEGATIVE
HBA1C MFR BLD: 5.7 % (ref ?–5.8)
POCT INT CON NEG: NEGATIVE
POCT INT CON POS: POSITIVE
RSV RNA SPEC QL NAA+PROBE: NEGATIVE
SARS-COV-2 RNA RESP QL NAA+PROBE: NEGATIVE

## 2025-05-20 PROCEDURE — 99213 OFFICE O/P EST LOW 20 MIN: CPT | Performed by: STUDENT IN AN ORGANIZED HEALTH CARE EDUCATION/TRAINING PROGRAM

## 2025-05-20 PROCEDURE — 83036 HEMOGLOBIN GLYCOSYLATED A1C: CPT | Performed by: STUDENT IN AN ORGANIZED HEALTH CARE EDUCATION/TRAINING PROGRAM

## 2025-05-20 PROCEDURE — 3074F SYST BP LT 130 MM HG: CPT | Performed by: STUDENT IN AN ORGANIZED HEALTH CARE EDUCATION/TRAINING PROGRAM

## 2025-05-20 PROCEDURE — 3078F DIAST BP <80 MM HG: CPT | Performed by: STUDENT IN AN ORGANIZED HEALTH CARE EDUCATION/TRAINING PROGRAM

## 2025-05-20 PROCEDURE — 0241U POCT CEPHEID COV-2, FLU A/B, RSV - PCR: CPT | Performed by: STUDENT IN AN ORGANIZED HEALTH CARE EDUCATION/TRAINING PROGRAM

## 2025-05-20 RX ORDER — SOLIFENACIN SUCCINATE 5 MG/1
5 TABLET, FILM COATED ORAL DAILY
COMMUNITY
Start: 2025-02-05

## 2025-05-20 ASSESSMENT — FIBROSIS 4 INDEX: FIB4 SCORE: 3.13

## 2025-05-20 NOTE — PROGRESS NOTES
Verbal consent was acquired by the patient to use [employer] ambient listening note generation during this visit Yes.    Subjective:     Chief Complaint   Patient presents with    Follow-Up     HPI:     History of Present Illness  The patient is an 80-year-old male who presents for a follow-up visit.    [facility].  Patient continues with Ozempic but is not entirely certain which dose he is on, he believes 0.5 mg weekly.  Denies any adverse side effects.  Continues with Synjardy and states that his insulin was increased by the endocrinologist up to 40 units nightly of Lantus. He is scheduled to see his endocrinologist on 07/23/2025.    He reports experiencing cold-like symptoms, including a sore throat and nasal congestion, which started yesterday. He also reports occasional coughing, which is not as severe as previous episodes. He has not taken any medication for his current symptoms. He also reports headaches affecting both eyes but has not taken any analgesics for relief. He does not report any expectoration or difficulty swallowing.  No trouble breathing.  He reports no gastrointestinal issues. He was sick last week with vomiting and diarrhea which have resolved.    He had carpal tunnel surgery on one hand and received an injection for the other hand. He takes Solifenacin once a day for overactive bladder.    Review of Systems   Constitutional:  Negative for chills, fever, malaise/fatigue and weight loss.   HENT:  Positive for congestion and sore throat.    Respiratory:  Positive for cough. Negative for shortness of breath.    Cardiovascular:  Negative for chest pain.   Gastrointestinal:  Negative for abdominal pain.   Skin:  Negative for rash.   Neurological:  Positive for headaches.     Objective:     Exam:  /62 (BP Location: Left arm, Patient Position: Sitting, BP Cuff Size: Adult)   Pulse 61   Temp 35.8 °C (96.5 °F) (Temporal)   Resp 16   Ht  "1.676 m (5' 6\")   Wt 89.9 kg (198 lb 1.6 oz)   SpO2 97%   BMI 31.97 kg/m²  Body mass index is 31.97 kg/m².    Physical Exam  Vitals reviewed.   Constitutional:       General: He is not in acute distress.     Appearance: Normal appearance. He is not ill-appearing.   HENT:      Head: Normocephalic and atraumatic.      Right Ear: Tympanic membrane, ear canal and external ear normal.      Left Ear: Tympanic membrane, ear canal and external ear normal.      Nose: Nose normal.      Mouth/Throat:      Mouth: Mucous membranes are moist.      Pharynx: No oropharyngeal exudate or posterior oropharyngeal erythema.   Eyes:      General: No scleral icterus.     Conjunctiva/sclera: Conjunctivae normal.   Cardiovascular:      Rate and Rhythm: Normal rate and regular rhythm.      Heart sounds: Normal heart sounds.   Pulmonary:      Effort: Pulmonary effort is normal. No respiratory distress.      Breath sounds: Normal breath sounds. No stridor. No wheezing, rhonchi or rales.   Musculoskeletal:      Cervical back: Normal range of motion and neck supple. No rigidity or tenderness.   Lymphadenopathy:      Cervical: No cervical adenopathy.   Skin:     General: Skin is warm and dry.   Neurological:      General: No focal deficit present.      Mental Status: He is alert and oriented to person, place, and time.   Psychiatric:         Mood and Affect: Mood normal.         Behavior: Behavior normal.         Thought Content: Thought content normal.       Reviewed most recent/relevant labs/imaging.    Assessment & Plan:     80 y.o. male with the following -     1. Type 2 diabetes mellitus with other specified complication, with long-term current use of insulin (HCC)  Chronic.  [facility]  - His A1c level is commendably at 5.7, indicating good control of his diabetes.  Denies hypoglycemia.  - He is currently taking 40 units of insulin and Ozempic and Synjardy.  - He is advised to verify the dosage of his " [facility]em[facility]. If he is on a lower dose, an adjustment may be considered, but this will need to be coordinated with the [facility] endocrin[facility] during his appointment on 07/23/2025.  - His A1c will be rechecked in 6 months unless there are changes in his medication regimen.  - POCT  A1C    insulin glargine 100 UNIT/ML SC SOPN injection, Inject 40 Units under the skin every evening., Disp: , Rfl:     Semaglutide,0.25 or 0.5MG/DOS, (OZEMPIC, 0.25 OR 0.5 MG/DOSE,) 2 MG/3ML Solution Pen-injector, Inject 0.5 mg under the skin every 7 days. Trulicity is out of stock multiple times, Disp: 9 mL, Rfl: 3    SYNJARDY XR 12.5-1000 MG TABLET SR 24 HR, Take 1 Tablet by mouth 2 times a day., Disp: , Rfl:     2. Upper respiratory tract infection, unspecified type  This is an acute condition without evidence of bacterial infection.  Viral PCR testing negative.  Discussed symptom management with hydration and over-the-counter cough medicine/analgesics if needed.  He does have Tessalon pearls already at home as needed for cough. Gave return precautions.    - POCT CEPHEID COV-2, FLU A/B, RSV - PCR    Return in about 6 months (around 11/20/2025), or if symptoms worsen or fail to improve, for Annual Medicare Visit.    Please note that this dictation was created using voice recognition software. I have made every reasonable attempt to correct obvious errors, but I expect that there are errors of grammar and possibly content that I did not discover before finalizing the note.

## 2025-05-21 ASSESSMENT — ENCOUNTER SYMPTOMS
HEADACHES: 1
WEIGHT LOSS: 0
ABDOMINAL PAIN: 0
SORE THROAT: 1
COUGH: 1
SHORTNESS OF BREATH: 0
FEVER: 0
CHILLS: 0

## (undated) DEVICE — CRIMPER CATHETER EDWARDS DISPOSABLE (1EA)

## (undated) DEVICE — SUTURE 5-0 PROLENE RB-1 D/A 36 (36PK/BX)"

## (undated) DEVICE — DRAPE MAYO STAND - (30/CA)

## (undated) DEVICE — SUTURE 0 VICRYL PLUS CTX - 36 INCH (36/BX)

## (undated) DEVICE — CHLORAPREP 26 ML APPLICATOR - ORANGE TINT(25/CA)

## (undated) DEVICE — CATHETER 6FR AL1 100CM (5/BX)

## (undated) DEVICE — GLOVESZ 8.5 BIOGEL PI MICRO - PF LF (50PR/BX)

## (undated) DEVICE — DEVICE INFLATION NOVAFLEX ATRION LOCK SYRINGE 29MM 38ML (1EA)

## (undated) DEVICE — TUBE E-T HI-LO CUFF 8.5MM (10EA/PK)

## (undated) DEVICE — INTRODUCER CATHETER  DILATOR PROTRUDING 8FR 2.5CM (10EA/BX)

## (undated) DEVICE — SUTURE 4-0 PROLENE RB-1 D/A 36 (36PK/BX)"

## (undated) DEVICE — CATHETER PIGTAIL 6FR 145 (5EA/BX)

## (undated) DEVICE — TOWELS CLOTH SURGICAL - (4/PK 20PK/CA)

## (undated) DEVICE — BLADE SURGICAL #11 - (50/BX)

## (undated) DEVICE — CATHETER THERMALDILUTION SWAN - (5EA/CA)

## (undated) DEVICE — TOWEL STOP TIMEOUT SAFETY FLAG (40EA/CA)

## (undated) DEVICE — DERMABOND ADVANCED - (12EA/BX)

## (undated) DEVICE — SUTURE 2-0 ETHIBOND V-5 30 (12EA/BX)"

## (undated) DEVICE — GUIDEWIRE STARTER STRAIGHT FIXED CORE .035 150CM 4 STRAIGHT PTFE/HEPARIN COATED (10/BX)

## (undated) DEVICE — DEVICE INFLATION ATRION NOVALFEX TRANSFEMORAL SYSTEM (1EA)

## (undated) DEVICE — DRAPE CLEAR W/ELASTIC BAND RAD CARM 40 X40" (20EA/CA)"

## (undated) DEVICE — COVER LIGHT HANDLE ALC PLUS DISP (18EA/BX)

## (undated) DEVICE — IV TUBING HI-FLO RATE W/CLAMP (50/CA)

## (undated) DEVICE — ELECTRODE DUAL RETURN W/ CORD - (50/PK)

## (undated) DEVICE — KIT CATHETERIZATION TWO-LUMEN CENTRAL VENOUS PI CVC (5EA/CA)

## (undated) DEVICE — WAX BONE ALKYLENE OXIDE HEMOSTASIS OSTENE 3.5GM (10EA/CA)

## (undated) DEVICE — DEVICE KIT COR-KNOT COMBO2 DEVICES & 12 KNOTS PER KIT (6KT/CA)

## (undated) DEVICE — GLIDESHEATH SLENDER NITINOL KIT .021 GW 6FR 10CM SINGLE WALL

## (undated) DEVICE — SYR ANGIO CNRST INJ HI-PRS 3W 65 - (10EA/CA)"

## (undated) DEVICE — STOPCOCK IV 400 PSI 3W ROT (50EA/BX)

## (undated) DEVICE — VESSELOOP MAXI BLUE STERILE- SURG-I-LOOP (10EA/BX)

## (undated) DEVICE — NEEDLE ANCHOR (2EA/PK 20PK/CT)

## (undated) DEVICE — INTRODUCER SHEATH 6FR 2.5CM - DILATOR PROTRUDING (10/BX)

## (undated) DEVICE — KIT INTROPERCUTANEOUS SHEATH - 8.5 FR W/MAX BARRIER AND BIOPATCH  (5/CA)

## (undated) DEVICE — ELECTRODE RADIOLUCNT SOLID GEL DEFIB PADS (12EA/CA)

## (undated) DEVICE — LACTATED RINGERS INJ 1000 ML - (14EA/CA 60CA/PF)

## (undated) DEVICE — TUBING CLEARLINK DUO-VENT - C-FLO (48EA/CA)

## (undated) DEVICE — Device

## (undated) DEVICE — QUICKLOADS COR-KNOT TITANIUM - (12/BX)

## (undated) DEVICE — GLOVE SZ 7.5 BIOGEL PI MICRO - PF LF (50PR/BX)

## (undated) DEVICE — CANISTER SUCTION 3000ML MECHANICAL FILTER AUTO SHUTOFF MEDI-VAC NONSTERILE LF DISP  (40EA/CA)

## (undated) DEVICE — SUTURE  0 ETHIBOND CT-1 30 IN (36PK/BX)

## (undated) DEVICE — WIRE GUIDE LUNDQST.035X180 - TSMG-35-180-4-LES ORDER BY BOX (5EA/BX)

## (undated) DEVICE — COVER FOOT UNIVERSAL DISP. - (25EA/CA)

## (undated) DEVICE — PACK TAVR (3EA/CA)

## (undated) DEVICE — ARM BAND RADIAL TR BAND (5EA/BX)

## (undated) DEVICE — SUCTION INSTRUMENT YANKAUER BULBOUS TIP W/O VENT (50EA/CA)

## (undated) DEVICE — BLANKET UNDERBODY FULL ACCES - (5/CA)

## (undated) DEVICE — SET EXTENSION WITH 2 PORTS (48EA/CA) ***PART #2C8610 IS A SUBSTITUTE*****

## (undated) DEVICE — GLOVE SIZE 6.5  SURGEON ACCELERATOR FREE GREEN (50PR/BX)

## (undated) DEVICE — SUTURE DEVICE CLOSURE REPAIR SYSTEM PERCLOSE PROSTYLE (10EA/BX)

## (undated) DEVICE — DECANTER FLD BLS - (50/CA)

## (undated) DEVICE — COVER LIGHT HANDLE FLEXIBLE - SOFT (2EA/PK 80PK/CA)

## (undated) DEVICE — GLOVE BIOGEL PI INDICATOR SZ 7.5 SURGICAL PF LF -(50/BX 4BX/CA)

## (undated) DEVICE — GLOVE BIOGEL PI INDICATOR SZ 6.5 SURGICAL PF LF - (50/BX 4BX/CA)

## (undated) DEVICE — SENSOR OXIMETER ADULT SPO2 RD SET (20EA/BX)

## (undated) DEVICE — PACK CV DRAPING/BASIN 2PART - (1/CA)

## (undated) DEVICE — GLOVE BIOGEL PI ORTHO SZ 7 PF LF (40PR/BX)

## (undated) DEVICE — GLOVE BIOGEL INDICATOR SZ 7SURGICAL PF LTX - (50/BX 4BX/CA)

## (undated) DEVICE — CABLE TEMPORARY PACING

## (undated) DEVICE — WIRE GUIDE AES .035 260CM WITH 3MM J TIP"

## (undated) DEVICE — KIT RETROFIT PROBE COVERS (24EA/EA)

## (undated) DEVICE — PTFE PLED STER - (250/CA)

## (undated) DEVICE — GOWN SURGICAL XX-LARGE - (28EA/CA) SIRUS NON REINFORCED